# Patient Record
Sex: MALE | Race: BLACK OR AFRICAN AMERICAN | NOT HISPANIC OR LATINO | Employment: OTHER | URBAN - METROPOLITAN AREA
[De-identification: names, ages, dates, MRNs, and addresses within clinical notes are randomized per-mention and may not be internally consistent; named-entity substitution may affect disease eponyms.]

---

## 2017-01-23 ENCOUNTER — CLINICAL SUPPORT (OUTPATIENT)
Dept: ELECTROPHYSIOLOGY | Facility: CLINIC | Age: 57
End: 2017-01-23
Payer: COMMERCIAL

## 2017-01-23 DIAGNOSIS — Z95.0 CARDIAC PACEMAKER IN SITU: ICD-10-CM

## 2017-01-23 DIAGNOSIS — I49.5 SSS (SICK SINUS SYNDROME): ICD-10-CM

## 2017-01-23 PROCEDURE — 93293 PM PHONE R-STRIP DEVICE EVAL: CPT | Mod: S$GLB,,, | Performed by: INTERNAL MEDICINE

## 2017-01-27 ENCOUNTER — TELEPHONE (OUTPATIENT)
Dept: ENDOSCOPY | Facility: HOSPITAL | Age: 57
End: 2017-01-27

## 2017-01-27 DIAGNOSIS — Z12.11 SPECIAL SCREENING FOR MALIGNANT NEOPLASMS, COLON: Primary | ICD-10-CM

## 2017-01-27 RX ORDER — POLYETHYLENE GLYCOL 3350, SODIUM SULFATE ANHYDROUS, SODIUM BICARBONATE, SODIUM CHLORIDE, POTASSIUM CHLORIDE 236; 22.74; 6.74; 5.86; 2.97 G/4L; G/4L; G/4L; G/4L; G/4L
4 POWDER, FOR SOLUTION ORAL ONCE
Qty: 4000 ML | Refills: 0 | Status: SHIPPED | OUTPATIENT
Start: 2017-01-27 | End: 2017-01-27

## 2017-01-27 NOTE — TELEPHONE ENCOUNTER
Patient is rescheduled for Colonoscopy 2/13/2017 with Dr. Juares.  Instructions sent via mail.  Prep used: PEG.

## 2017-02-13 ENCOUNTER — ANESTHESIA (OUTPATIENT)
Dept: ENDOSCOPY | Facility: HOSPITAL | Age: 57
End: 2017-02-13
Payer: COMMERCIAL

## 2017-02-13 ENCOUNTER — ANESTHESIA EVENT (OUTPATIENT)
Dept: ENDOSCOPY | Facility: HOSPITAL | Age: 57
End: 2017-02-13
Payer: COMMERCIAL

## 2017-02-13 VITALS — RESPIRATION RATE: 11 BRPM

## 2017-02-13 PROCEDURE — 25000003 PHARM REV CODE 250: Performed by: COLON & RECTAL SURGERY

## 2017-02-13 PROCEDURE — D9220A PRA ANESTHESIA: Mod: 33,ANES,, | Performed by: ANESTHESIOLOGY

## 2017-02-13 PROCEDURE — 25000003 PHARM REV CODE 250: Performed by: NURSE ANESTHETIST, CERTIFIED REGISTERED

## 2017-02-13 PROCEDURE — D9220A PRA ANESTHESIA: Mod: 33,CRNA,, | Performed by: NURSE ANESTHETIST, CERTIFIED REGISTERED

## 2017-02-13 PROCEDURE — 63600175 PHARM REV CODE 636 W HCPCS: Performed by: NURSE ANESTHETIST, CERTIFIED REGISTERED

## 2017-02-13 RX ORDER — LIDOCAINE HCL/PF 100 MG/5ML
SYRINGE (ML) INTRAVENOUS
Status: DISCONTINUED | OUTPATIENT
Start: 2017-02-13 | End: 2017-02-13

## 2017-02-13 RX ORDER — PROPOFOL 10 MG/ML
VIAL (ML) INTRAVENOUS CONTINUOUS PRN
Status: DISCONTINUED | OUTPATIENT
Start: 2017-02-13 | End: 2017-02-13

## 2017-02-13 RX ORDER — PROPOFOL 10 MG/ML
VIAL (ML) INTRAVENOUS
Status: DISCONTINUED | OUTPATIENT
Start: 2017-02-13 | End: 2017-02-13

## 2017-02-13 RX ADMIN — LIDOCAINE HYDROCHLORIDE 100 MG: 20 INJECTION, SOLUTION INTRAVENOUS at 12:02

## 2017-02-13 RX ADMIN — PROPOFOL 40 MG: 10 INJECTION, EMULSION INTRAVENOUS at 12:02

## 2017-02-13 RX ADMIN — PROPOFOL 50 MG: 10 INJECTION, EMULSION INTRAVENOUS at 12:02

## 2017-02-13 RX ADMIN — SODIUM CHLORIDE: 0.9 INJECTION, SOLUTION INTRAVENOUS at 01:02

## 2017-02-13 RX ADMIN — SODIUM CHLORIDE: 0.9 INJECTION, SOLUTION INTRAVENOUS at 12:02

## 2017-02-13 RX ADMIN — PROPOFOL 150 MCG/KG/MIN: 10 INJECTION, EMULSION INTRAVENOUS at 12:02

## 2017-02-13 NOTE — ANESTHESIA PREPROCEDURE EVALUATION
02/13/2017  Renny Young is a 56 y.o., male.    [facility] Anesthesia Evaluation    I have reviewed the Patient Summary Reports.    I have reviewed the Nursing Notes.      Review of Systems  Anesthesia Hx:  No problems with previous Anesthesia    Hematology/Oncology:  Hematology Normal   Oncology Normal     EENT/Dental:EENT/Dental Normal   Cardiovascular:   Pacemaker Hypertension Sick sinus syndrome   Pulmonary:  Pulmonary Normal    Renal/:  Renal/ Normal     Hepatic/GI:  Hepatic/GI Normal    Musculoskeletal:  Musculoskeletal Normal    Neurological:  Neurology Normal    Endocrine:   Diabetes, type 2    Dermatological:  Skin Normal    Psych:  Psychiatric Normal           Physical Exam  General:  Well nourished    Airway/Jaw/Neck:  Airway Findings: Mouth Opening: Normal Tongue: Normal  General Airway Assessment: Adult  Mallampati: II  TM Distance: Normal, at least 6 cm        Eyes/Ears/Nose:  EYES/EARS/NOSE FINDINGS: Normal   Dental:  Dental Findings: In tact   Chest/Lungs:  Chest/Lungs Clear    Heart/Vascular:  Heart Findings: Normal Heart murmur: negative Vascular Findings: Normal    Abdomen:  Abdomen Findings: Normal    Musculoskeletal:  Musculoskeletal Findings: Normal   Skin:  Skin Findings: Normal    Mental Status:  Mental Status Findings: Normal        Anesthesia Plan  Type of Anesthesia, risks & benefits discussed:  Anesthesia Type:  general  Patient's Preference:   Intra-op Monitoring Plan:   Intra-op Monitoring Plan Comments:   Post Op Pain Control Plan:   Post Op Pain Control Plan Comments:   Induction:   IV  Beta Blocker:  Patient is not currently on a Beta-Blocker (No further documentation required).       Informed Consent: Patient understands risks and agrees with Anesthesia plan.  Questions answered. Anesthesia consent signed with patient.  ASA Score: 3     Day of Surgery Review of History &  Physical:    H&P update referred to the surgeon.         Ready For Surgery From Anesthesia Perspective.

## 2017-02-13 NOTE — TRANSFER OF CARE
"Anesthesia Transfer of Care Note    Patient: Renny Young    Procedure(s) Performed: Procedure(s) (LRB):  COLONOSCOPY (N/A)    Patient location: PACU    Anesthesia Type: general    Transport from OR: Transported from OR on room air with adequate spontaneous ventilation    Post pain: adequate analgesia    Post assessment: no apparent anesthetic complications    Post vital signs: stable    Level of consciousness: sedated and responds to stimulation    Nausea/Vomiting: no nausea/vomiting    Complications: none          Last vitals:   Visit Vitals    /69 (BP Location: Left arm, Patient Position: Lying, BP Method: Automatic)    Pulse 60    Temp 36.8 °C (98.3 °F) (Oral)    Resp 18    Ht 5' 7" (1.702 m)    Wt 68 kg (150 lb)    SpO2 98%    BMI 23.49 kg/m2     "

## 2017-02-13 NOTE — ANESTHESIA POSTPROCEDURE EVALUATION
"Anesthesia Post Evaluation    Patient: Renny Young    Procedure(s) Performed: Procedure(s) (LRB):  COLONOSCOPY (N/A)    Final Anesthesia Type: general  Patient location during evaluation: GI PACU  Patient participation: Yes- Able to Participate  Level of consciousness: awake and alert  Post-procedure vital signs: reviewed and stable  Pain management: adequate  Airway patency: patent  PONV status at discharge: No PONV  Anesthetic complications: no      Cardiovascular status: blood pressure returned to baseline  Respiratory status: unassisted, spontaneous ventilation and room air  Hydration status: euvolemic  Follow-up not needed.        Visit Vitals    /78    Pulse 60    Temp 36.8 °C (98.3 °F) (Oral)    Resp 20    Ht 5' 7" (1.702 m)    Wt 68 kg (150 lb)    SpO2 100%    BMI 23.49 kg/m2       Pain/Gill Score: Pain Assessment Performed: Yes (2/13/2017  1:52 PM)  Presence of Pain: denies (2/13/2017  1:52 PM)  Gill Score: 10 (2/13/2017  1:52 PM)      "

## 2017-02-14 PROBLEM — I10 HYPERTENSION, ESSENTIAL: Status: ACTIVE | Noted: 2017-02-14

## 2017-03-01 DIAGNOSIS — I10 ESSENTIAL HYPERTENSION: ICD-10-CM

## 2017-03-02 RX ORDER — METFORMIN HYDROCHLORIDE 500 MG/1
TABLET ORAL
Qty: 30 TABLET | Refills: 0 | Status: SHIPPED | OUTPATIENT
Start: 2017-03-02 | End: 2017-04-04 | Stop reason: SDUPTHER

## 2017-03-02 RX ORDER — AMLODIPINE BESYLATE 10 MG/1
TABLET ORAL
Qty: 30 TABLET | Refills: 0 | Status: SHIPPED | OUTPATIENT
Start: 2017-03-02 | End: 2017-04-06 | Stop reason: SDUPTHER

## 2017-04-04 RX ORDER — METFORMIN HYDROCHLORIDE 500 MG/1
TABLET ORAL
Qty: 30 TABLET | Refills: 0 | Status: SHIPPED | OUTPATIENT
Start: 2017-04-04 | End: 2017-04-06 | Stop reason: SDUPTHER

## 2017-04-06 ENCOUNTER — LAB VISIT (OUTPATIENT)
Dept: LAB | Facility: HOSPITAL | Age: 57
End: 2017-04-06
Attending: FAMILY MEDICINE
Payer: COMMERCIAL

## 2017-04-06 ENCOUNTER — OFFICE VISIT (OUTPATIENT)
Dept: INTERNAL MEDICINE | Facility: CLINIC | Age: 57
End: 2017-04-06
Payer: COMMERCIAL

## 2017-04-06 VITALS
OXYGEN SATURATION: 97 % | HEART RATE: 91 BPM | DIASTOLIC BLOOD PRESSURE: 58 MMHG | SYSTOLIC BLOOD PRESSURE: 105 MMHG | HEIGHT: 67 IN | WEIGHT: 154.63 LBS | BODY MASS INDEX: 24.27 KG/M2

## 2017-04-06 DIAGNOSIS — R63.4 WEIGHT LOSS: ICD-10-CM

## 2017-04-06 DIAGNOSIS — R10.9 FLANK PAIN: ICD-10-CM

## 2017-04-06 DIAGNOSIS — E78.5 HYPERLIPIDEMIA, UNSPECIFIED HYPERLIPIDEMIA TYPE: ICD-10-CM

## 2017-04-06 DIAGNOSIS — E11.9 TYPE 2 DIABETES MELLITUS WITHOUT COMPLICATION, UNSPECIFIED LONG TERM INSULIN USE STATUS: ICD-10-CM

## 2017-04-06 DIAGNOSIS — Z95.0 PACEMAKER: ICD-10-CM

## 2017-04-06 DIAGNOSIS — I10 HYPERTENSION, ESSENTIAL: ICD-10-CM

## 2017-04-06 DIAGNOSIS — R10.9 FLANK PAIN: Primary | ICD-10-CM

## 2017-04-06 DIAGNOSIS — I10 ESSENTIAL HYPERTENSION: ICD-10-CM

## 2017-04-06 DIAGNOSIS — F17.200 SMOKER: ICD-10-CM

## 2017-04-06 LAB
ALBUMIN SERPL BCP-MCNC: 4.1 G/DL
ALP SERPL-CCNC: 87 U/L
ALT SERPL W/O P-5'-P-CCNC: 19 U/L
ANION GAP SERPL CALC-SCNC: 9 MMOL/L
AST SERPL-CCNC: 21 U/L
BILIRUB SERPL-MCNC: 0.7 MG/DL
BILIRUB UR QL STRIP: NEGATIVE
BUN SERPL-MCNC: 21 MG/DL
CALCIUM SERPL-MCNC: 9.7 MG/DL
CHLORIDE SERPL-SCNC: 106 MMOL/L
CLARITY UR REFRACT.AUTO: CLEAR
CO2 SERPL-SCNC: 28 MMOL/L
COLOR UR AUTO: YELLOW
CREAT SERPL-MCNC: 1.2 MG/DL
ERYTHROCYTE [DISTWIDTH] IN BLOOD BY AUTOMATED COUNT: 15.2 %
EST. GFR  (AFRICAN AMERICAN): >60 ML/MIN/1.73 M^2
EST. GFR  (NON AFRICAN AMERICAN): >60 ML/MIN/1.73 M^2
GLUCOSE SERPL-MCNC: 89 MG/DL
GLUCOSE UR QL STRIP: NEGATIVE
HCT VFR BLD AUTO: 46 %
HGB BLD-MCNC: 15.6 G/DL
HGB UR QL STRIP: NEGATIVE
KETONES UR QL STRIP: NEGATIVE
LEUKOCYTE ESTERASE UR QL STRIP: NEGATIVE
MCH RBC QN AUTO: 29.6 PG
MCHC RBC AUTO-ENTMCNC: 33.9 %
MCV RBC AUTO: 87 FL
MICROSCOPIC COMMENT: NORMAL
NITRITE UR QL STRIP: NEGATIVE
PH UR STRIP: 6 [PH] (ref 5–8)
PLATELET # BLD AUTO: 244 K/UL
PMV BLD AUTO: 10.8 FL
POTASSIUM SERPL-SCNC: 4.1 MMOL/L
PROT SERPL-MCNC: 7.7 G/DL
PROT UR QL STRIP: NEGATIVE
RBC # BLD AUTO: 5.27 M/UL
SODIUM SERPL-SCNC: 143 MMOL/L
SP GR UR STRIP: 1.02 (ref 1–1.03)
TSH SERPL DL<=0.005 MIU/L-ACNC: 1.19 UIU/ML
URN SPEC COLLECT METH UR: ABNORMAL
UROBILINOGEN UR STRIP-ACNC: ABNORMAL EU/DL
WBC # BLD AUTO: 12.57 K/UL

## 2017-04-06 PROCEDURE — 36415 COLL VENOUS BLD VENIPUNCTURE: CPT

## 2017-04-06 PROCEDURE — 80053 COMPREHEN METABOLIC PANEL: CPT

## 2017-04-06 PROCEDURE — 81001 URINALYSIS AUTO W/SCOPE: CPT

## 2017-04-06 PROCEDURE — 3044F HG A1C LEVEL LT 7.0%: CPT | Mod: S$GLB,,, | Performed by: FAMILY MEDICINE

## 2017-04-06 PROCEDURE — 84443 ASSAY THYROID STIM HORMONE: CPT

## 2017-04-06 PROCEDURE — 3078F DIAST BP <80 MM HG: CPT | Mod: S$GLB,,, | Performed by: FAMILY MEDICINE

## 2017-04-06 PROCEDURE — 85027 COMPLETE CBC AUTOMATED: CPT

## 2017-04-06 PROCEDURE — 3060F POS MICROALBUMINURIA REV: CPT | Mod: S$GLB,,, | Performed by: FAMILY MEDICINE

## 2017-04-06 PROCEDURE — 3074F SYST BP LT 130 MM HG: CPT | Mod: S$GLB,,, | Performed by: FAMILY MEDICINE

## 2017-04-06 PROCEDURE — 1160F RVW MEDS BY RX/DR IN RCRD: CPT | Mod: S$GLB,,, | Performed by: FAMILY MEDICINE

## 2017-04-06 PROCEDURE — 83036 HEMOGLOBIN GLYCOSYLATED A1C: CPT

## 2017-04-06 PROCEDURE — 99999 PR PBB SHADOW E&M-EST. PATIENT-LVL III: CPT | Mod: PBBFAC,,, | Performed by: FAMILY MEDICINE

## 2017-04-06 PROCEDURE — 99214 OFFICE O/P EST MOD 30 MIN: CPT | Mod: S$GLB,,, | Performed by: FAMILY MEDICINE

## 2017-04-06 RX ORDER — DOCUSATE SODIUM 100 MG/1
100 CAPSULE, LIQUID FILLED ORAL 2 TIMES DAILY
Qty: 60 CAPSULE | Refills: 0 | Status: SHIPPED | OUTPATIENT
Start: 2017-04-06 | End: 2018-05-22

## 2017-04-06 RX ORDER — METFORMIN HYDROCHLORIDE 500 MG/1
TABLET ORAL
Qty: 30 TABLET | Refills: 2 | Status: SHIPPED | OUTPATIENT
Start: 2017-04-06 | End: 2017-04-26 | Stop reason: SDUPTHER

## 2017-04-06 RX ORDER — NAPROXEN SODIUM 220 MG/1
81 TABLET, FILM COATED ORAL
COMMUNITY
Start: 2014-02-18 | End: 2017-04-06

## 2017-04-06 RX ORDER — AMLODIPINE BESYLATE 10 MG/1
5 TABLET ORAL DAILY
Qty: 30 TABLET | Refills: 0
Start: 2017-04-06 | End: 2017-04-10 | Stop reason: DRUGHIGH

## 2017-04-06 NOTE — MR AVS SNAPSHOT
[facility]  [address]  [facility] [address]  Phone: [phone]  Fax: [phone]                  Renny LUNDBERG Hector   2017 3:20 PM   Office Visit    Description:  Male : 1960   Provider:  Jarvis Washington MD   Department:  [facility]           Reason for Visit     Flank Pain           Diagnoses this Visit        Comments    Flank pain    -  Primary     Weight loss         Smoker         Hypertension, essential         Type 2 diabetes mellitus without complication, unspecified long term insulin use status         Hyperlipidemia, unspecified hyperlipidemia type         Pacemaker         Essential hypertension                To Do List           Future Appointments        Provider Department Dept Phone    2017 4:00 PM TELEPHONE CHECK, PACEMAKER [facility] - Arrhythmia [phone]      Goals (5 Years of Data)     None      Follow-Up and Disposition     Return in about 3 weeks (around 2017) for after test results to discuss.    Follow-up and Disposition History       These Medications        Disp Refills Start End    docusate sodium (COLACE) 100 MG capsule 60 capsule 0 2017     Take 1 capsule (100 mg total) by mouth 2 (two) times daily. - Oral    Pharmacy: [facility] [address] [place] [address] Ph #: [phone]       amlodipine (NORVASC) 10 MG tablet 30 tablet 0 2017     Take 0.5 tablets (5 mg total) by mouth once daily. - Oral    Pharmacy: [facility] [address] [place] [address] Ph #: [phone]         [facility] On Call     [facility] On Call Nurse Care Line -  Assistance  Unless otherwise directed by your provider, please contact [facility], our nurse care line that is available for / assistance.     Registered nurses in the [facility] provide: appointment scheduling, clinical advisement, health education, and other advisory services.  Call:  [phone] (toll free)               Medications           Message regarding Medications     Verify the changes and/or additions to your medication regime listed below are the same as discussed with your clinician today.  If any of these changes or additions are incorrect, please notify your healthcare provider.        START taking these NEW medications        Refills    docusate sodium (COLACE) 100 MG capsule 0    Sig: Take 1 capsule (100 mg total) by mouth 2 (two) times daily.    Class: Normal    Route: Oral      CHANGE how you are taking these medications     Start Taking Instead of    amlodipine (NORVASC) 10 MG tablet amlodipine (NORVASC) 10 MG tablet    Dosage:  Take 0.5 tablets (5 mg total) by mouth once daily. Dosage:  TAKE ONE TABLET BY MOUTH ONCE DAILY    Reason for Change:  Reorder       STOP taking these medications     aspirin 81 MG Chew Take 81 mg by mouth. Take one tablet by mouth daily    tadalafil (CIALIS) 20 MG Tab Take 1 tablet (20 mg total) by mouth daily as needed.           Verify that the below list of medications is an accurate representation of the medications you are currently taking.  If none reported, the list may be blank. If incorrect, please contact your healthcare provider. Carry this list with you in case of emergency.           Current Medications     amlodipine (NORVASC) 10 MG tablet Take 0.5 tablets (5 mg total) by mouth once daily.    aspirin (ECOTRIN) 81 MG EC tablet Take 81 mg by mouth once daily.    atorvastatin (LIPITOR) 20 MG tablet Take 1 tablet (20 mg total) by mouth once daily.    gabapentin (NEURONTIN) 300 MG capsule Take 1 capsule (300 mg total) by mouth 2 (two) times daily.    lisinopril-hydrochlorothiazide (PRINZIDE,ZESTORETIC) 20-12.5 mg per tablet Take 1 tablet by mouth once daily.    metformin (GLUCOPHAGE) 500 MG tablet TAKE ONE TABLET BY MOUTH ONCE DAILY WITH BREAKFAST    docusate sodium (COLACE) 100 MG capsule Take 1 capsule (100 mg total) by mouth 2 (two)  "times daily.           Clinical Reference Information           Your Vitals Were     BP Pulse Height Weight SpO2 BMI    105/58 (BP Location: Right arm, Patient Position: Sitting, BP Method: Manual) 91 5' 7" (1.702 m) 70.1 kg (154 lb 10.4 oz) 97% 24.22 kg/m2      Blood Pressure          Most Recent Value    BP  (!)  105/58      Allergies as of 4/6/2017     No Known Allergies      Immunizations Administered on Date of Encounter - 4/6/2017     None      Orders Placed During Today's Visit      Normal Orders This Visit    Urinalysis Microscopic     Urinalysis     Future Labs/Procedures Expected by Expires    CBC Without Differential  4/6/2017 4/6/2018    Comprehensive metabolic panel  4/6/2017 4/6/2018    CT Chest Lung Screening Low Dose  4/6/2017 4/6/2018    Hemoglobin A1c  4/6/2017 4/6/2018    TSH  4/6/2017 4/6/2018    US Abdomen Complete  4/6/2017 4/6/2018      [facility] Sign-Up     Activating your [facility] account is as easy as 1-2-3!     1) Visit [email or web address], select Sign Up Now, enter this activation code and your date of birth, then select Next.  [email or web address]  Expires: 5/21/2017  4:29 PM      2) Create a username and password to use when you visit [facility] in the future and select a security question in case you lose your password and select Next.    3) Enter your e-mail address and click Sign Up!    Additional Information  If you have questions, please e-mail [email or web address] or call [phone] to talk to our [facility] staff. Remember, [facility] is NOT to be used for urgent needs. For medical emergencies, dial 911.         Smoking Cessation     If you would like to quit smoking:   You may be eligible for free services if you are a [place] resident and started smoking cigarettes before September 1, 1988.  Call the Smoking Cessation Trust ([employer]CT) toll free at (036) 055-5212 or (114) 918-4953.   Call 3-800-QUIT-NOW if you do not meet the above criteria.   Contact us via email: " [email or web address]   View our website for more information: [email or web address]        Language Assistance Services     ATTENTION: Language assistance services are available, free of charge. Please call [phone].      ATENCIÓN: Si habla rene, tiene a capps disposición servicios gratuitos de asistencia lingüística. Llame al [phone].     CHÚ Ý: N?u b?n nói Ti?ng Vi?t, có các d?ch v? h? tr? ngôn ng? mi?n phí dành cho b?n. G?i s? [phone].         [facility] complies with applicable Federal civil rights laws and does not discriminate on the basis of race, color, national origin, age, disability, or sex.

## 2017-04-07 LAB
ESTIMATED AVG GLUCOSE: 140 MG/DL
HBA1C MFR BLD HPLC: 6.5 %

## 2017-04-10 DIAGNOSIS — I10 ESSENTIAL HYPERTENSION: ICD-10-CM

## 2017-04-10 RX ORDER — AMLODIPINE BESYLATE 5 MG/1
5 TABLET ORAL DAILY
Qty: 30 TABLET | Refills: 11 | Status: SHIPPED | OUTPATIENT
Start: 2017-04-10 | End: 2017-04-26 | Stop reason: SDUPTHER

## 2017-04-21 ENCOUNTER — HOSPITAL ENCOUNTER (OUTPATIENT)
Dept: RADIOLOGY | Facility: HOSPITAL | Age: 57
Discharge: HOME | End: 2017-04-21
Attending: FAMILY MEDICINE

## 2017-04-21 ENCOUNTER — HOSPITAL ENCOUNTER (OUTPATIENT)
Dept: RADIOLOGY | Facility: HOSPITAL | Age: 57
Discharge: HOME | End: 2017-04-21
Attending: FAMILY MEDICINE
Payer: COMMERCIAL

## 2017-04-21 DIAGNOSIS — R63.4 WEIGHT LOSS: ICD-10-CM

## 2017-04-21 DIAGNOSIS — F17.200 SMOKER: ICD-10-CM

## 2017-04-21 DIAGNOSIS — R10.9 FLANK PAIN: ICD-10-CM

## 2017-04-21 PROCEDURE — 76497 UNLISTED CT PROCEDURE: CPT | Mod: TC

## 2017-04-21 PROCEDURE — 76700 US EXAM ABDOM COMPLETE: CPT | Mod: 26,,, | Performed by: RADIOLOGY

## 2017-04-21 PROCEDURE — 76700 US EXAM ABDOM COMPLETE: CPT | Mod: TC

## 2017-04-22 ENCOUNTER — TELEPHONE (OUTPATIENT)
Dept: INTERNAL MEDICINE | Facility: CLINIC | Age: 57
End: 2017-04-22

## 2017-04-24 ENCOUNTER — TELEPHONE (OUTPATIENT)
Dept: INTERNAL MEDICINE | Facility: CLINIC | Age: 57
End: 2017-04-24

## 2017-04-24 DIAGNOSIS — R91.1 PULMONARY NODULE: Primary | ICD-10-CM

## 2017-04-24 NOTE — TELEPHONE ENCOUNTER
Called patient and discussed labs and or test results. Patient expressed understanding and had the opportunity to ask questions. Any questions were answered. See meds, orders, follow up and instructions sections of encounter.    Specific issues include:  pulm nodule re scan in 6 months  US OK

## 2017-04-26 ENCOUNTER — OFFICE VISIT (OUTPATIENT)
Dept: INTERNAL MEDICINE | Facility: CLINIC | Age: 57
End: 2017-04-26
Payer: COMMERCIAL

## 2017-04-26 ENCOUNTER — CLINICAL SUPPORT (OUTPATIENT)
Dept: ELECTROPHYSIOLOGY | Facility: CLINIC | Age: 57
End: 2017-04-26
Payer: COMMERCIAL

## 2017-04-26 VITALS
BODY MASS INDEX: 24.17 KG/M2 | HEART RATE: 67 BPM | DIASTOLIC BLOOD PRESSURE: 64 MMHG | OXYGEN SATURATION: 97 % | SYSTOLIC BLOOD PRESSURE: 112 MMHG | HEIGHT: 67 IN | WEIGHT: 154 LBS

## 2017-04-26 DIAGNOSIS — I49.5 SSS (SICK SINUS SYNDROME): ICD-10-CM

## 2017-04-26 DIAGNOSIS — F17.200 SMOKER: ICD-10-CM

## 2017-04-26 DIAGNOSIS — M54.5 LOW BACK PAIN, UNSPECIFIED BACK PAIN LATERALITY, UNSPECIFIED CHRONICITY, WITH SCIATICA PRESENCE UNSPECIFIED: ICD-10-CM

## 2017-04-26 DIAGNOSIS — R91.1 PULMONARY NODULE: ICD-10-CM

## 2017-04-26 DIAGNOSIS — E78.5 HYPERLIPIDEMIA, UNSPECIFIED HYPERLIPIDEMIA TYPE: ICD-10-CM

## 2017-04-26 DIAGNOSIS — Z95.0 CARDIAC PACEMAKER IN SITU: ICD-10-CM

## 2017-04-26 DIAGNOSIS — I10 HYPERTENSION, ESSENTIAL: Primary | ICD-10-CM

## 2017-04-26 DIAGNOSIS — E11.9 TYPE 2 DIABETES MELLITUS WITHOUT COMPLICATION, UNSPECIFIED LONG TERM INSULIN USE STATUS: ICD-10-CM

## 2017-04-26 PROCEDURE — 3074F SYST BP LT 130 MM HG: CPT | Mod: S$GLB,,, | Performed by: FAMILY MEDICINE

## 2017-04-26 PROCEDURE — 99999 PR PBB SHADOW E&M-EST. PATIENT-LVL III: CPT | Mod: PBBFAC,,, | Performed by: FAMILY MEDICINE

## 2017-04-26 PROCEDURE — 3060F POS MICROALBUMINURIA REV: CPT | Mod: 8P,S$GLB,, | Performed by: FAMILY MEDICINE

## 2017-04-26 PROCEDURE — 93293 PM PHONE R-STRIP DEVICE EVAL: CPT | Mod: S$GLB,,, | Performed by: INTERNAL MEDICINE

## 2017-04-26 PROCEDURE — 3044F HG A1C LEVEL LT 7.0%: CPT | Mod: S$GLB,,, | Performed by: FAMILY MEDICINE

## 2017-04-26 PROCEDURE — 3078F DIAST BP <80 MM HG: CPT | Mod: S$GLB,,, | Performed by: FAMILY MEDICINE

## 2017-04-26 PROCEDURE — 99214 OFFICE O/P EST MOD 30 MIN: CPT | Mod: S$GLB,,, | Performed by: FAMILY MEDICINE

## 2017-04-26 PROCEDURE — 1160F RVW MEDS BY RX/DR IN RCRD: CPT | Mod: S$GLB,,, | Performed by: FAMILY MEDICINE

## 2017-04-26 RX ORDER — AMLODIPINE BESYLATE 5 MG/1
5 TABLET ORAL DAILY
Qty: 90 TABLET | Refills: 3 | Status: SHIPPED | OUTPATIENT
Start: 2017-04-26 | End: 2018-04-06 | Stop reason: SDUPTHER

## 2017-04-26 RX ORDER — METFORMIN HYDROCHLORIDE 500 MG/1
TABLET ORAL
Qty: 90 TABLET | Refills: 3 | Status: SHIPPED | OUTPATIENT
Start: 2017-04-26 | End: 2018-04-06 | Stop reason: SDUPTHER

## 2017-04-26 NOTE — PROGRESS NOTES
Subjective:       Patient ID: Renny Young is a 56 y.o. male.    Chief Complaint: Follow-up    HPI  Review of Systems   Constitutional: Negative for chills, fatigue and fever.   HENT: Negative for congestion and trouble swallowing.    Eyes: Negative for redness.   Respiratory: Negative for cough, chest tightness and shortness of breath.    Cardiovascular: Negative for chest pain, palpitations and leg swelling.   Gastrointestinal: Negative for abdominal pain and blood in stool.   Genitourinary: Positive for flank pain. Negative for hematuria.   Musculoskeletal: Positive for back pain. Negative for arthralgias, gait problem, joint swelling, myalgias and neck pain.   Skin: Negative for color change and rash.   Neurological: Negative for tremors, speech difficulty, weakness, numbness and headaches.   Hematological: Negative for adenopathy. Does not bruise/bleed easily.   Psychiatric/Behavioral: Negative for behavioral problems, confusion and sleep disturbance. The patient is not nervous/anxious.        Objective:      Physical Exam   Constitutional: He is oriented to person, place, and time. He appears well-developed and well-nourished. No distress.   Neck: Neck supple.   Pulmonary/Chest: Effort normal.   Abdominal: He exhibits no distension. There is no tenderness. There is no rebound and no CVA tenderness.   Musculoskeletal: He exhibits no edema.        Right hip: He exhibits normal range of motion and normal strength.        Left hip: He exhibits normal range of motion and normal strength.        Thoracic back: He exhibits normal range of motion and no tenderness.        Lumbar back: He exhibits normal range of motion, no tenderness and no spasm.        Right lower leg: He exhibits no edema.        Left lower leg: He exhibits no edema.   Neurological: He is alert and oriented to person, place, and time. He has normal strength. He displays normal reflexes. No sensory deficit. He displays a negative Romberg sign.  Coordination and gait normal.   Negative SLR.   Skin: Skin is warm and dry. No rash noted.   Psychiatric: He has a normal mood and affect. His behavior is normal. Judgment and thought content normal.   Nursing note and vitals reviewed.      Assessment:       1. Hypertension, essential    2. Uncontrolled type 2 diabetes mellitus with complication, without long-term current use of insulin    3. Hyperlipidemia, unspecified hyperlipidemia type    4. Type 2 diabetes mellitus without complication, unspecified long term insulin use status    5. Pulmonary nodule    6. Smoker    7. Low back pain, unspecified back pain laterality, unspecified chronicity, with sciatica presence unspecified        Plan:   Renny was seen today for follow-up.    Diagnoses and all orders for this visit:    Hypertension, essential    Uncontrolled type 2 diabetes mellitus with complication, without long-term current use of insulin    Hyperlipidemia, unspecified hyperlipidemia type    Type 2 diabetes mellitus without complication, unspecified long term insulin use status    Pulmonary nodule    Smoker    Low back pain, unspecified back pain laterality, unspecified chronicity, with sciatica presence unspecified  -     Ambulatory Consult to [facility] Sp[facility] Clinic    Other orders  -     amlodipine (NORVASC) 5 MG tablet; Take 1 tablet (5 mg total) by mouth once daily.  -     metformin (GLUCOPHAGE) 500 MG tablet; TAKE ONE TABLET BY MOUTH ONCE DAILY WITH BREAKFAST      See meds, orders, follow up, routing and instructions sections of encounter.  The patient is in, concerning his recent laboratory.  He presents with his   spouse who has sarcoidosis.  I explained this pulmonary nodule did not appear   ominous, we would recommend a six-month repeat.  I went over some additional   laboratory with him.  He has continued to have left flank pain.  His ultrasound   was negative.  We had reduced his Norvasc to half tablet and at this point going   forward, we will  change his dose to 5 mg tablet.  His A1c was 6.5.  He has no   other acute complaints at this time.      CARLA/HN  dd: 04/26/2017 18:36:26 (CDT)  td: 04/27/2017 08:46:51 (CDT)  Doc ID   #[id number]  Job ID #[id number]    CC:

## 2017-04-26 NOTE — MR AVS SNAPSHOT
[facility]  [address]  [facility] [address]  Phone: [phone]  Fax: [phone]                  Renny LUNDBERG Hector   2017 2:40 PM   Office Visit    Description:  Male : 1960   Provider:  Jarvis Washington MD   Department:  [facility]           Reason for Visit     Follow-up           Diagnoses this Visit        Comments    Hypertension, essential    -  Primary     Uncontrolled type 2 diabetes mellitus with complication, without long-term current use of insulin         Hyperlipidemia, unspecified hyperlipidemia type         Type 2 diabetes mellitus without complication, unspecified long term insulin use status         Pulmonary nodule         Smoker         Low back pain, unspecified back pain laterality, unspecified chronicity, with sciatica presence unspecified                To Do List           Future Appointments        Provider Department Dept Phone    2017 4:00 PM TELEPHONE CHECK, PACEMAKER [facility] Arrhythmia [phone]      Goals (5 Years of Data)     None      Follow-Up and Disposition     Return in about 6 months (around 10/26/2017), or if symptoms worsen or fail to improve, for lab review (after future labs), Call or message through Portal for status update..       These Medications        Disp Refills Start End    amlodipine (NORVASC) 5 MG tablet 90 tablet 3 2017     Take 1 tablet (5 mg total) by mouth once daily. - Oral    Pharmacy: [address] Ph #: [phone]       metformin (GLUCOPHAGE) 500 MG tablet 90 tablet 3 2017     TAKE ONE TABLET BY MOUTH ONCE DAILY WITH BREAKFAST    Pharmacy: [address] Ph #: [phone]         [facility]     [facility] Nurse Care Line -  Assistance  Unless otherwise directed by your provider, please contact [facility], our nurse care line that is  "available for 24/7 assistance.     Registered nurses in the [facility] provide: appointment scheduling, clinical advisement, health education, and other advisory services.  Call: [phone] (toll free)               Medications           Message regarding Medications     Verify the changes and/or additions to your medication regime listed below are the same as discussed with your clinician today.  If any of these changes or additions are incorrect, please notify your healthcare provider.             Verify that the below list of medications is an accurate representation of the medications you are currently taking.  If none reported, the list may be blank. If incorrect, please contact your healthcare provider. Carry this list with you in case of emergency.           Current Medications     amlodipine (NORVASC) 5 MG tablet Take 1 tablet (5 mg total) by mouth once daily.    aspirin (ECOTRIN) 81 MG EC tablet Take 81 mg by mouth once daily.    atorvastatin (LIPITOR) 20 MG tablet Take 1 tablet (20 mg total) by mouth once daily.    docusate sodium (COLACE) 100 MG capsule Take 1 capsule (100 mg total) by mouth 2 (two) times daily.    gabapentin (NEURONTIN) 300 MG capsule Take 1 capsule (300 mg total) by mouth 2 (two) times daily.    lisinopril-hydrochlorothiazide (PRINZIDE,ZESTORETIC) 20-12.5 mg per tablet Take 1 tablet by mouth once daily.    metformin (GLUCOPHAGE) 500 MG tablet TAKE ONE TABLET BY MOUTH ONCE DAILY WITH BREAKFAST           Clinical Reference Information           Your Vitals Were     BP Pulse Height Weight SpO2 BMI    112/64 (BP Location: Left arm, Patient Position: Sitting, BP Method: Manual) 67 5' 7" (1.702 m) 69.9 kg (154 lb) 97% 24.12 kg/m2      Blood Pressure          Most Recent Value    BP  112/64      Allergies as of 4/26/2017     No Known Allergies      Immunizations Administered on Date of Encounter - 4/26/2017     None      Orders Placed During Today's Visit      Normal Orders " This Visit    Ambulatory Consult to Back & Spine Clinic       [facility] Sign-Up     Activating your [facility] account is as easy as 1-2-3!     1) Visit [email or web address], select Sign Up Now, enter this activation code and your date of birth, then select Next.  [email or web address]  Expires: 5/21/2017  4:29 PM      2) Create a username and password to use when you visit [facility] in the future and select a security question in case you lose your password and select Next.    3) Enter your e-mail address and click Sign Up!    Additional Information  If you have questions, please e-mail [email or web address] or call [phone] to talk to our [facility] staff. Remember, [facility] is NOT to be used for urgent needs. For medical emergencies, dial 911.         Smoking Cessation     If you would like to quit smoking:   You may be eligible for free services if you are a [place] resident and started smoking cigarettes before September 1, 1988.  Call the Smoking Cessation Trust ([facility]) toll free at (572) 917-8115 or (260) 142-7425.   Call 1-800-QUIT-NOW if you do not meet the above criteria.   Contact us via email: [email or web address]   View our website for more information: [email or web address]        Language Assistance Services     ATTENTION: Language assistance services are available, free of charge. Please call [phone].      ATENCIÓN: Si habla español, tiene a capps disposición servicios gratuitos de asistencia lingüística. Llame al [phone].     CHÚ Ý: N?u b?n nói Ti?ng Vi?t, có các d?ch v? h? tr? ngôn ng? mi?n phí dành cho b?n. G?i s? [phone].         [facility] complies with applicable Federal civil rights laws and does not discriminate on the basis of race, color, national origin, age, disability, or sex.

## 2017-06-26 DIAGNOSIS — E11.9 TYPE 2 DIABETES MELLITUS WITHOUT COMPLICATION: ICD-10-CM

## 2017-06-26 RX ORDER — ATORVASTATIN CALCIUM 20 MG/1
TABLET, FILM COATED ORAL
Qty: 30 TABLET | Refills: 0 | Status: SHIPPED | OUTPATIENT
Start: 2017-06-26 | End: 2017-09-07 | Stop reason: SDUPTHER

## 2017-07-28 ENCOUNTER — CLINICAL SUPPORT (OUTPATIENT)
Dept: ELECTROPHYSIOLOGY | Facility: CLINIC | Age: 57
End: 2017-07-28
Payer: COMMERCIAL

## 2017-07-28 DIAGNOSIS — I49.5 SSS (SICK SINUS SYNDROME): ICD-10-CM

## 2017-07-28 DIAGNOSIS — Z95.0 CARDIAC PACEMAKER IN SITU: ICD-10-CM

## 2017-07-28 PROCEDURE — 93293 PM PHONE R-STRIP DEVICE EVAL: CPT | Mod: S$GLB,,, | Performed by: INTERNAL MEDICINE

## 2017-07-30 DIAGNOSIS — I10 ESSENTIAL HYPERTENSION: ICD-10-CM

## 2017-07-31 RX ORDER — LISINOPRIL AND HYDROCHLOROTHIAZIDE 12.5; 2 MG/1; MG/1
TABLET ORAL
Qty: 30 TABLET | Refills: 0 | Status: SHIPPED | OUTPATIENT
Start: 2017-07-31 | End: 2017-08-23 | Stop reason: SDUPTHER

## 2017-08-14 DIAGNOSIS — E11.42 DIABETIC POLYNEUROPATHY ASSOCIATED WITH TYPE 2 DIABETES MELLITUS: ICD-10-CM

## 2017-08-14 RX ORDER — GABAPENTIN 300 MG/1
CAPSULE ORAL
Qty: 60 CAPSULE | Refills: 5 | Status: SHIPPED | OUTPATIENT
Start: 2017-08-14 | End: 2018-09-02 | Stop reason: SDUPTHER

## 2017-08-23 DIAGNOSIS — I10 ESSENTIAL HYPERTENSION: ICD-10-CM

## 2017-08-23 RX ORDER — LISINOPRIL AND HYDROCHLOROTHIAZIDE 12.5; 2 MG/1; MG/1
TABLET ORAL
Qty: 30 TABLET | Refills: 0 | Status: SHIPPED | OUTPATIENT
Start: 2017-08-23 | End: 2017-09-26 | Stop reason: SDUPTHER

## 2017-09-07 DIAGNOSIS — E11.9 TYPE 2 DIABETES MELLITUS WITHOUT COMPLICATION: ICD-10-CM

## 2017-09-07 RX ORDER — ATORVASTATIN CALCIUM 20 MG/1
TABLET, FILM COATED ORAL
Qty: 30 TABLET | Refills: 0 | Status: SHIPPED | OUTPATIENT
Start: 2017-09-07 | End: 2017-11-01 | Stop reason: SDUPTHER

## 2017-09-26 DIAGNOSIS — I10 ESSENTIAL HYPERTENSION: ICD-10-CM

## 2017-09-26 RX ORDER — LISINOPRIL AND HYDROCHLOROTHIAZIDE 12.5; 2 MG/1; MG/1
TABLET ORAL
Qty: 30 TABLET | Refills: 0 | Status: SHIPPED | OUTPATIENT
Start: 2017-09-26 | End: 2017-11-01 | Stop reason: SDUPTHER

## 2017-10-30 ENCOUNTER — HOSPITAL ENCOUNTER (OUTPATIENT)
Dept: CARDIOLOGY | Facility: CLINIC | Age: 57
Discharge: HOME | End: 2017-10-30
Payer: COMMERCIAL

## 2017-10-30 ENCOUNTER — CLINICAL SUPPORT (OUTPATIENT)
Dept: ELECTROPHYSIOLOGY | Facility: CLINIC | Age: 57
End: 2017-10-30
Payer: COMMERCIAL

## 2017-10-30 ENCOUNTER — OFFICE VISIT (OUTPATIENT)
Dept: ELECTROPHYSIOLOGY | Facility: CLINIC | Age: 57
End: 2017-10-30
Payer: COMMERCIAL

## 2017-10-30 VITALS
HEART RATE: 60 BPM | BODY MASS INDEX: 24.81 KG/M2 | WEIGHT: 158.06 LBS | DIASTOLIC BLOOD PRESSURE: 62 MMHG | SYSTOLIC BLOOD PRESSURE: 108 MMHG | HEIGHT: 67 IN

## 2017-10-30 DIAGNOSIS — I10 HYPERTENSION, ESSENTIAL: ICD-10-CM

## 2017-10-30 DIAGNOSIS — I49.5 SSS (SICK SINUS SYNDROME): ICD-10-CM

## 2017-10-30 DIAGNOSIS — I49.5 SICK SINUS SYNDROME: Primary | ICD-10-CM

## 2017-10-30 DIAGNOSIS — Z95.0 CARDIAC PACEMAKER IN SITU: ICD-10-CM

## 2017-10-30 DIAGNOSIS — E78.2 MIXED HYPERLIPIDEMIA: ICD-10-CM

## 2017-10-30 DIAGNOSIS — E11.42 DIABETIC POLYNEUROPATHY ASSOCIATED WITH TYPE 2 DIABETES MELLITUS: ICD-10-CM

## 2017-10-30 DIAGNOSIS — Z95.0 PACEMAKER: ICD-10-CM

## 2017-10-30 DIAGNOSIS — Z95.0 CARDIAC PACEMAKER IN SITU: Primary | ICD-10-CM

## 2017-10-30 DIAGNOSIS — I49.5 SSS (SICK SINUS SYNDROME): Primary | ICD-10-CM

## 2017-10-30 PROCEDURE — 93279 PRGRMG DEV EVAL PM/LDLS PM: CPT | Mod: S$GLB,,, | Performed by: INTERNAL MEDICINE

## 2017-10-30 PROCEDURE — 99999 PR PBB SHADOW E&M-EST. PATIENT-LVL III: CPT | Mod: PBBFAC,,, | Performed by: INTERNAL MEDICINE

## 2017-10-30 PROCEDURE — 93000 ELECTROCARDIOGRAM COMPLETE: CPT | Mod: S$GLB,,, | Performed by: INTERNAL MEDICINE

## 2017-10-30 PROCEDURE — 99214 OFFICE O/P EST MOD 30 MIN: CPT | Mod: S$GLB,,, | Performed by: INTERNAL MEDICINE

## 2017-10-30 NOTE — PROGRESS NOTES
Subjective:   HPI    PCP: Jarvis Washington MD    I had the pleasure of seeing Renny Young in follow-up for his history of sick sinus syndrome and pacemaker implantation. He is a 57-year old male with a history of hypertension and sick sinus syndrome who had a [employer] dual chamber pacemaker implanted at [facility] in 7/2009 for syncope. Mr. Young describes that following implantation, he experienced discomfort when his RV lead was paced. Apparently his RV lead output was lowered, which improved his symptoms. At his last in 10/2016, he had GI complaints which he attributed to his RV pacing lead. At his bequest, I changed his programming to an AAI pacing mode at that time.    I reviewed yesterday's device interrogation, which shows stable device and lead function. He is paced in the RA 14% of the time. No arrhythmias have been detected. Battery longevity >10 years.    I reviewed today's ECG tracing, which shows sinus rhythm at 61 bpm.    Review of Systems   Constitution: Negative for decreased appetite, malaise/fatigue, weight gain and weight loss.   HENT: Negative for sore throat.    Eyes: Negative for blurred vision.   Cardiovascular: Negative for chest pain, dyspnea on exertion, irregular heartbeat, leg swelling, near-syncope, orthopnea, palpitations, paroxysmal nocturnal dyspnea and syncope.   Respiratory: Negative for shortness of breath.    Skin: Negative for rash.   Musculoskeletal: Negative for arthritis.   Gastrointestinal: Negative for abdominal pain.   Neurological: Negative for focal weakness.   Psychiatric/Behavioral: Negative for altered mental status.        Objective:    Physical Exam   Constitutional: He is oriented to person, place, and time. He appears well-developed and well-nourished. No distress.   HENT:   Head: Normocephalic and atraumatic.   Mouth/Throat: Oropharynx is clear and moist.   Eyes: Pupils are equal, round, and reactive to light. No scleral icterus.   Neck: Neck supple. No  thyromegaly present.   Cardiovascular: Regular rhythm, normal heart sounds and normal pulses.  Exam reveals no gallop and no friction rub.    No murmur heard.  Pulmonary/Chest: Effort normal and breath sounds normal. He has no rales.   Abdominal: Soft. Bowel sounds are normal. He exhibits no distension. There is no tenderness.   Musculoskeletal: He exhibits no edema.   Neurological: He is alert and oriented to person, place, and time.   Skin: Skin is warm and dry. No rash noted.   Psychiatric: He has a normal mood and affect. His behavior is normal.   Vitals reviewed.        Assessment:       1. Sick sinus syndrome    2. Pacemaker    3. Hypertension, essential    4. Mixed hyperlipidemia    5. Uncontrolled type 2 diabetes mellitus with complication, without long-term current use of insulin    6. Diabetic polyneuropathy associated with type 2 diabetes mellitus         Plan:   In summary, Renny Young is a 57-year old male with a history of HTN, SSS, and [employer] dual chamber pacemaker. His device is functioning appropriately. The plan is for regular device checks and to see me again in 1 year.    Thank you for allowing me to participate in the care of this patient. Please do not hesitate to call me with any questions or concerns.

## 2017-11-01 DIAGNOSIS — I10 ESSENTIAL HYPERTENSION: ICD-10-CM

## 2017-11-01 DIAGNOSIS — E11.9 TYPE 2 DIABETES MELLITUS WITHOUT COMPLICATION: ICD-10-CM

## 2017-11-02 RX ORDER — ATORVASTATIN CALCIUM 20 MG/1
TABLET, FILM COATED ORAL
Qty: 30 TABLET | Refills: 0 | Status: SHIPPED | OUTPATIENT
Start: 2017-11-02 | End: 2018-01-08 | Stop reason: SDUPTHER

## 2017-11-02 RX ORDER — LISINOPRIL AND HYDROCHLOROTHIAZIDE 12.5; 2 MG/1; MG/1
TABLET ORAL
Qty: 30 TABLET | Refills: 0 | Status: SHIPPED | OUTPATIENT
Start: 2017-11-02 | End: 2017-12-01 | Stop reason: SDUPTHER

## 2017-12-01 DIAGNOSIS — I10 ESSENTIAL HYPERTENSION: ICD-10-CM

## 2017-12-01 RX ORDER — LISINOPRIL AND HYDROCHLOROTHIAZIDE 12.5; 2 MG/1; MG/1
TABLET ORAL
Qty: 30 TABLET | Refills: 0 | Status: SHIPPED | OUTPATIENT
Start: 2017-12-01 | End: 2018-01-08 | Stop reason: SDUPTHER

## 2018-01-08 DIAGNOSIS — I10 ESSENTIAL HYPERTENSION: ICD-10-CM

## 2018-01-08 DIAGNOSIS — E11.9 TYPE 2 DIABETES MELLITUS WITHOUT COMPLICATION: ICD-10-CM

## 2018-01-08 RX ORDER — LISINOPRIL AND HYDROCHLOROTHIAZIDE 12.5; 2 MG/1; MG/1
TABLET ORAL
Qty: 30 TABLET | Refills: 0 | Status: SHIPPED | OUTPATIENT
Start: 2018-01-08 | End: 2018-02-09 | Stop reason: SDUPTHER

## 2018-01-08 RX ORDER — ATORVASTATIN CALCIUM 20 MG/1
TABLET, FILM COATED ORAL
Qty: 30 TABLET | Refills: 0 | Status: SHIPPED | OUTPATIENT
Start: 2018-01-08 | End: 2018-03-02 | Stop reason: SDUPTHER

## 2018-01-25 ENCOUNTER — CLINICAL SUPPORT (OUTPATIENT)
Dept: ELECTROPHYSIOLOGY | Facility: CLINIC | Age: 58
End: 2018-01-25
Attending: INTERNAL MEDICINE
Payer: COMMERCIAL

## 2018-01-25 DIAGNOSIS — Z95.0 CARDIAC PACEMAKER IN SITU: ICD-10-CM

## 2018-01-25 DIAGNOSIS — I49.5 SSS (SICK SINUS SYNDROME): ICD-10-CM

## 2018-01-25 PROCEDURE — 93280 PM DEVICE PROGR EVAL DUAL: CPT | Mod: S$GLB,,, | Performed by: INTERNAL MEDICINE

## 2018-01-26 ENCOUNTER — DOCUMENTATION ONLY (OUTPATIENT)
Dept: ELECTROPHYSIOLOGY | Facility: CLINIC | Age: 58
End: 2018-01-26

## 2018-01-30 NOTE — PROGRESS NOTES
RE: Unscheduled follow up/Atrial high rates lasting up to 15 hrs 48 mins   Received: 1/26/2018 8:10 AM  Message Contents   Carol Spence MD   Cc: Bonnie Duval             No, strips.  Stored EGMs were turned OFF, but switched to ON yesterday.  AT/AF burden 2.1% since 10/30/17 with 44 AMS episodes, longest was the episode from 1/20/18 (15 hrs 48 mins).  When he comes back in 4 weeks we will be able to view the episode(s), if he has any more.    Previous Messages      ----- Message -----   From: Bradford Spence MD   Sent: 1/26/2018   7:29 AM   To: Carol Hunt   Subject: RE: Unscheduled follow up/Atrial high rates *     Do we have strips documenting atrial high rates? I am always reluctant to start an OAC without EGM proof of AF--have seen a lot of T-wave oversensing etc called AF.     GP   ----- Message -----   From: Carol Hunt   Sent: 1/25/2018  12:43 PM   To: Bonnie Blank, *   Subject: Unscheduled follow up/Atrial high rates last*     Dr. Spence,     Patient came to clinic today and was seen as an add on.  He reports having SOB more often and was concerned that something was wrong with his PPM after putting a battery to his tongue yesterday.  Device function WNL and underlying rhythm c/w SR-SB.     Programmed AAIR, RV lead in place but presently turned OFF.  On device interrogation there were noted atrial high rates lasting up to 15 hrs 48 mins (1/20/18).  On ASA 81 mg po daily, reports no hx of taking OAC.  Egram storage added to confirm the presence of atrial arrhythmia type and patient scheduled for device clinic follow up on 3/1/18.     Thanks,   Carol

## 2018-02-09 DIAGNOSIS — I10 ESSENTIAL HYPERTENSION: ICD-10-CM

## 2018-02-09 RX ORDER — LISINOPRIL AND HYDROCHLOROTHIAZIDE 12.5; 2 MG/1; MG/1
TABLET ORAL
Qty: 30 TABLET | Refills: 0 | Status: SHIPPED | OUTPATIENT
Start: 2018-02-09 | End: 2018-03-12 | Stop reason: SDUPTHER

## 2018-02-16 DIAGNOSIS — E11.9 TYPE 2 DIABETES MELLITUS WITHOUT COMPLICATION: ICD-10-CM

## 2018-03-01 ENCOUNTER — CLINICAL SUPPORT (OUTPATIENT)
Dept: ELECTROPHYSIOLOGY | Facility: CLINIC | Age: 58
End: 2018-03-01
Attending: INTERNAL MEDICINE
Payer: COMMERCIAL

## 2018-03-01 DIAGNOSIS — Z95.0 CARDIAC PACEMAKER IN SITU: ICD-10-CM

## 2018-03-01 DIAGNOSIS — I49.5 SSS (SICK SINUS SYNDROME): ICD-10-CM

## 2018-03-01 PROCEDURE — 93279 PRGRMG DEV EVAL PM/LDLS PM: CPT | Mod: S$GLB,,, | Performed by: INTERNAL MEDICINE

## 2018-03-02 DIAGNOSIS — E11.9 TYPE 2 DIABETES MELLITUS WITHOUT COMPLICATION: ICD-10-CM

## 2018-03-02 RX ORDER — ATORVASTATIN CALCIUM 20 MG/1
TABLET, FILM COATED ORAL
Qty: 30 TABLET | Refills: 0 | Status: SHIPPED | OUTPATIENT
Start: 2018-03-02 | End: 2018-04-06 | Stop reason: SDUPTHER

## 2018-03-12 DIAGNOSIS — I10 ESSENTIAL HYPERTENSION: ICD-10-CM

## 2018-03-12 RX ORDER — LISINOPRIL AND HYDROCHLOROTHIAZIDE 12.5; 2 MG/1; MG/1
TABLET ORAL
Qty: 90 TABLET | Refills: 0 | Status: SHIPPED | OUTPATIENT
Start: 2018-03-12 | End: 2018-04-06 | Stop reason: SDUPTHER

## 2018-03-28 DIAGNOSIS — Z13.5 DIABETIC RETINOPATHY SCREENING: ICD-10-CM

## 2018-04-06 ENCOUNTER — LAB VISIT (OUTPATIENT)
Dept: LAB | Facility: HOSPITAL | Age: 58
End: 2018-04-06
Attending: FAMILY MEDICINE
Payer: COMMERCIAL

## 2018-04-06 ENCOUNTER — OFFICE VISIT (OUTPATIENT)
Dept: INTERNAL MEDICINE | Facility: CLINIC | Age: 58
End: 2018-04-06
Attending: FAMILY MEDICINE
Payer: COMMERCIAL

## 2018-04-06 VITALS
OXYGEN SATURATION: 98 % | DIASTOLIC BLOOD PRESSURE: 80 MMHG | HEIGHT: 67 IN | TEMPERATURE: 99 F | BODY MASS INDEX: 23.39 KG/M2 | WEIGHT: 149 LBS | HEART RATE: 72 BPM | SYSTOLIC BLOOD PRESSURE: 144 MMHG

## 2018-04-06 DIAGNOSIS — M79.604 PAIN OF RIGHT LOWER EXTREMITY: Primary | ICD-10-CM

## 2018-04-06 DIAGNOSIS — I49.5 SICK SINUS SYNDROME: ICD-10-CM

## 2018-04-06 DIAGNOSIS — E11.9 TYPE 2 DIABETES MELLITUS WITHOUT COMPLICATION, UNSPECIFIED LONG TERM INSULIN USE STATUS: ICD-10-CM

## 2018-04-06 DIAGNOSIS — F17.200 SMOKER: ICD-10-CM

## 2018-04-06 DIAGNOSIS — Z12.5 PROSTATE CANCER SCREENING: ICD-10-CM

## 2018-04-06 DIAGNOSIS — E78.5 HYPERLIPIDEMIA, UNSPECIFIED HYPERLIPIDEMIA TYPE: ICD-10-CM

## 2018-04-06 DIAGNOSIS — E11.9 TYPE 2 DIABETES MELLITUS WITHOUT COMPLICATION, WITHOUT LONG-TERM CURRENT USE OF INSULIN: ICD-10-CM

## 2018-04-06 DIAGNOSIS — I10 ESSENTIAL HYPERTENSION: ICD-10-CM

## 2018-04-06 DIAGNOSIS — Z95.0 PACEMAKER: ICD-10-CM

## 2018-04-06 DIAGNOSIS — I10 HYPERTENSION, ESSENTIAL: ICD-10-CM

## 2018-04-06 LAB
ALBUMIN SERPL BCP-MCNC: 4.5 G/DL
ALP SERPL-CCNC: 73 U/L
ALT SERPL W/O P-5'-P-CCNC: 16 U/L
ANION GAP SERPL CALC-SCNC: 9 MMOL/L
AST SERPL-CCNC: 21 U/L
BILIRUB SERPL-MCNC: 1.1 MG/DL
BUN SERPL-MCNC: 18 MG/DL
CALCIUM SERPL-MCNC: 9.9 MG/DL
CHLORIDE SERPL-SCNC: 101 MMOL/L
CHOLEST SERPL-MCNC: 161 MG/DL
CHOLEST/HDLC SERPL: 2.5 {RATIO}
CO2 SERPL-SCNC: 27 MMOL/L
COMPLEXED PSA SERPL-MCNC: 1.2 NG/ML
CREAT SERPL-MCNC: 1 MG/DL
EST. GFR  (AFRICAN AMERICAN): >60 ML/MIN/1.73 M^2
EST. GFR  (NON AFRICAN AMERICAN): >60 ML/MIN/1.73 M^2
ESTIMATED AVG GLUCOSE: 120 MG/DL
GLUCOSE SERPL-MCNC: 86 MG/DL
HBA1C MFR BLD HPLC: 5.8 %
HDLC SERPL-MCNC: 64 MG/DL
HDLC SERPL: 39.8 %
LDLC SERPL CALC-MCNC: 74.6 MG/DL
NONHDLC SERPL-MCNC: 97 MG/DL
POTASSIUM SERPL-SCNC: 3.9 MMOL/L
PROT SERPL-MCNC: 8 G/DL
SODIUM SERPL-SCNC: 137 MMOL/L
TRIGL SERPL-MCNC: 112 MG/DL

## 2018-04-06 PROCEDURE — 80053 COMPREHEN METABOLIC PANEL: CPT

## 2018-04-06 PROCEDURE — 3044F HG A1C LEVEL LT 7.0%: CPT | Mod: CPTII,S$GLB,, | Performed by: FAMILY MEDICINE

## 2018-04-06 PROCEDURE — 80061 LIPID PANEL: CPT

## 2018-04-06 PROCEDURE — 83036 HEMOGLOBIN GLYCOSYLATED A1C: CPT

## 2018-04-06 PROCEDURE — 99999 PR PBB SHADOW E&M-EST. PATIENT-LVL V: CPT | Mod: PBBFAC,,, | Performed by: FAMILY MEDICINE

## 2018-04-06 PROCEDURE — 99214 OFFICE O/P EST MOD 30 MIN: CPT | Mod: S$GLB,,, | Performed by: FAMILY MEDICINE

## 2018-04-06 PROCEDURE — 36415 COLL VENOUS BLD VENIPUNCTURE: CPT

## 2018-04-06 PROCEDURE — 3079F DIAST BP 80-89 MM HG: CPT | Mod: CPTII,S$GLB,, | Performed by: FAMILY MEDICINE

## 2018-04-06 PROCEDURE — 84153 ASSAY OF PSA TOTAL: CPT

## 2018-04-06 PROCEDURE — 3077F SYST BP >= 140 MM HG: CPT | Mod: CPTII,S$GLB,, | Performed by: FAMILY MEDICINE

## 2018-04-06 RX ORDER — AMLODIPINE BESYLATE 5 MG/1
5 TABLET ORAL DAILY
Qty: 90 TABLET | Refills: 3 | Status: SHIPPED | OUTPATIENT
Start: 2018-04-06 | End: 2019-02-08 | Stop reason: SDUPTHER

## 2018-04-06 RX ORDER — METFORMIN HYDROCHLORIDE 500 MG/1
TABLET ORAL
Qty: 90 TABLET | Refills: 1 | Status: SHIPPED | OUTPATIENT
Start: 2018-04-06 | End: 2018-12-06 | Stop reason: SDUPTHER

## 2018-04-06 RX ORDER — TRIAMCINOLONE ACETONIDE 1 MG/G
CREAM TOPICAL 2 TIMES DAILY
Qty: 80 G | Refills: 1 | Status: SHIPPED | OUTPATIENT
Start: 2018-04-06 | End: 2019-05-14

## 2018-04-06 RX ORDER — ATORVASTATIN CALCIUM 20 MG/1
20 TABLET, FILM COATED ORAL DAILY
Qty: 30 TABLET | Refills: 1 | Status: SHIPPED | OUTPATIENT
Start: 2018-04-06 | End: 2018-07-19 | Stop reason: SDUPTHER

## 2018-04-06 RX ORDER — LISINOPRIL AND HYDROCHLOROTHIAZIDE 12.5; 2 MG/1; MG/1
1 TABLET ORAL DAILY
Qty: 90 TABLET | Refills: 1 | Status: SHIPPED | OUTPATIENT
Start: 2018-04-06 | End: 2019-01-05 | Stop reason: SDUPTHER

## 2018-04-06 NOTE — PROGRESS NOTES
Subjective:       Patient ID: Renny Young is a 57 y.o. male.    Chief Complaint: Leg Pain    HPI  Review of Systems   Constitutional: Positive for fatigue. Negative for chills and fever.   HENT: Negative for congestion and trouble swallowing.    Eyes: Negative for redness.   Respiratory: Negative for cough, chest tightness and shortness of breath.    Cardiovascular: Negative for chest pain, palpitations and leg swelling.   Gastrointestinal: Negative for abdominal pain and blood in stool.   Genitourinary: Negative for hematuria.   Musculoskeletal: Positive for gait problem and myalgias. Negative for arthralgias, back pain, joint swelling and neck pain.   Skin: Negative for color change and rash.   Neurological: Negative for tremors, speech difficulty, weakness, numbness and headaches.   Hematological: Negative for adenopathy. Does not bruise/bleed easily.   Psychiatric/Behavioral: Negative for behavioral problems, confusion and sleep disturbance. The patient is not nervous/anxious.        Objective:      Physical Exam   Constitutional: He is oriented to person, place, and time. He appears well-developed and well-nourished.   Eyes: No scleral icterus.   Neck: Normal range of motion. Neck supple. No JVD present. Carotid bruit is not present. No tracheal deviation present. No thyromegaly present.   Cardiovascular: Normal rate, regular rhythm, normal heart sounds and intact distal pulses.  Exam reveals no gallop and no friction rub.    No murmur heard.  Pulses:       Dorsalis pedis pulses are 2+ on the right side, and 2+ on the left side.   Pulmonary/Chest: Effort normal and breath sounds normal. No respiratory distress. He has no wheezes. He has no rales.   Abdominal: Soft. Bowel sounds are normal. He exhibits no distension and no mass. There is no tenderness. There is no rebound and no guarding.   Musculoskeletal: He exhibits no edema.   Feet:   Right Foot:   Protective Sensation: 3 sites tested. 3 sites sensed.    Skin Integrity: Negative for skin breakdown.   Left Foot:   Protective Sensation: 3 sites tested. 3 sites sensed.   Skin Integrity: Negative for skin breakdown.   Lymphadenopathy:     He has no cervical adenopathy.   Neurological: He is alert and oriented to person, place, and time. He displays no tremor. No cranial nerve deficit. Coordination and gait normal.   Skin: Skin is warm and dry. No rash noted. He is not diaphoretic. No erythema.   Psychiatric: He has a normal mood and affect. His behavior is normal. Judgment and thought content normal.   Nursing note and vitals reviewed.      Assessment:       1. Pain of right lower extremity    2. Type 2 diabetes mellitus without complication, unspecified long term insulin use status    3. Hypertension, essential    4. Hyperlipidemia, unspecified hyperlipidemia type    5. Uncontrolled type 2 diabetes mellitus with complication, without long-term current use of insulin    6. Smoker    7. Sick sinus syndrome    8. Pacemaker    9. Prostate cancer screening    10. Type 2 diabetes mellitus without complication, without long-term current use of insulin    11. Essential hypertension        Plan:   Renny was seen today for leg pain.    Diagnoses and all orders for this visit:    Pain of right lower extremity  -     CAR Ultrasound doppler arterial legs bilat; Future  -     CAR Ultrasound doppler venous leg right; Future    Type 2 diabetes mellitus without complication, unspecified long term insulin use status  -     Hemoglobin A1c; Future  -     Comprehensive metabolic panel; Future  -     Ambulatory referral to Optometry  -     Ambulatory consult to Podiatry    Hypertension, essential    Hyperlipidemia, unspecified hyperlipidemia type  -     Lipid panel; Future    Uncontrolled type 2 diabetes mellitus with complication, without long-term current use of insulin    Smoker  -     Ambulatory referral to Smoking Cessation Program    Sick sinus syndrome    Pacemaker    Prostate  cancer screening  -     PSA, Screening; Future    Type 2 diabetes mellitus without complication, without long-term current use of insulin  -     atorvastatin (LIPITOR) 20 MG tablet; Take 1 tablet (20 mg total) by mouth once daily.    Essential hypertension  -     lisinopril-hydrochlorothiazide (PRINZIDE,ZESTORETIC) 20-12.5 mg per tablet; Take 1 tablet by mouth once daily.    Other orders  -     triamcinolone acetonide 0.1% (KENALOG) 0.1 % cream; Apply topically 2 (two) times daily.  -     amLODIPine (NORVASC) 5 MG tablet; Take 1 tablet (5 mg total) by mouth once daily.  -     metFORMIN (GLUCOPHAGE) 500 MG tablet; TAKE ONE TABLET BY MOUTH ONCE DAILY WITH BREAKFAST      See meds, orders, follow up, routing and instructions sections of encounter.  A 57-year-old established male patient is in with right leg pain.  He is   significantly overdue for followups for his diabetes, hypertension, etc.    Pain is intermittent, seemingly worse with activities, better with rest.  He had   back pain last year and was referred to the [facility], could not keep an   appointment.    RECOMMENDATIONS:  Diabetic laboratory and health maintenance updates.    He did have a normal neurovascular examination to the right lower extremity.    Considerations include PVD, doubtful in the setting of normal pulses; sciatica   is possible, although his symptoms are not particularly consistent with that.    Follow up in one month, BP recheck and recheck symptoms.  Go over his testing,   diabetes, etc.  Consider further workup of back, if necessary.  OTC Aleve or   ibuprofen for pain symptoms at this time and the patient request a followup in   one month.      CARLA/HN  dd: 04/06/2018 17:52:48 (CDT)  td: 04/07/2018 06:30:21 (CDT)  Doc ID   #[id number]  Job ID #[id number]    CC:

## 2018-04-12 ENCOUNTER — CLINICAL SUPPORT (OUTPATIENT)
Dept: CARDIOLOGY | Facility: CLINIC | Age: 58
End: 2018-04-12
Attending: FAMILY MEDICINE
Payer: COMMERCIAL

## 2018-04-12 DIAGNOSIS — M79.604 PAIN OF RIGHT LOWER EXTREMITY: ICD-10-CM

## 2018-04-12 PROCEDURE — 93971 EXTREMITY STUDY: CPT | Mod: S$GLB,,, | Performed by: INTERNAL MEDICINE

## 2018-04-25 ENCOUNTER — OFFICE VISIT (OUTPATIENT)
Dept: PODIATRY | Facility: CLINIC | Age: 58
End: 2018-04-25
Attending: FAMILY MEDICINE
Payer: COMMERCIAL

## 2018-04-25 ENCOUNTER — OFFICE VISIT (OUTPATIENT)
Dept: OPTOMETRY | Facility: CLINIC | Age: 58
End: 2018-04-25
Attending: FAMILY MEDICINE
Payer: COMMERCIAL

## 2018-04-25 VITALS — HEIGHT: 67 IN | WEIGHT: 149 LBS | BODY MASS INDEX: 23.39 KG/M2

## 2018-04-25 DIAGNOSIS — L84 PRE-ULCERATIVE CALLUSES: ICD-10-CM

## 2018-04-25 DIAGNOSIS — H25.13 NUCLEAR SCLEROSIS, BILATERAL: ICD-10-CM

## 2018-04-25 DIAGNOSIS — H52.03 HYPEROPIA WITH PRESBYOPIA OF BOTH EYES: ICD-10-CM

## 2018-04-25 DIAGNOSIS — M20.41 HAMMER TOES OF BOTH FEET: ICD-10-CM

## 2018-04-25 DIAGNOSIS — M20.42 HAMMER TOES OF BOTH FEET: ICD-10-CM

## 2018-04-25 DIAGNOSIS — H52.4 HYPEROPIA WITH PRESBYOPIA OF BOTH EYES: ICD-10-CM

## 2018-04-25 DIAGNOSIS — H40.013 OAG (OPEN ANGLE GLAUCOMA) SUSPECT, LOW RISK, BILATERAL: ICD-10-CM

## 2018-04-25 DIAGNOSIS — E11.42 DIABETIC POLYNEUROPATHY ASSOCIATED WITH TYPE 2 DIABETES MELLITUS: Primary | ICD-10-CM

## 2018-04-25 DIAGNOSIS — E11.9 DIABETES MELLITUS TYPE 2 WITHOUT RETINOPATHY: Primary | ICD-10-CM

## 2018-04-25 PROCEDURE — 92014 COMPRE OPH EXAM EST PT 1/>: CPT | Mod: S$GLB,,, | Performed by: OPTOMETRIST

## 2018-04-25 PROCEDURE — 92133 CPTRZD OPH DX IMG PST SGM ON: CPT | Mod: S$GLB,,, | Performed by: OPTOMETRIST

## 2018-04-25 PROCEDURE — 92015 DETERMINE REFRACTIVE STATE: CPT | Mod: S$GLB,,, | Performed by: OPTOMETRIST

## 2018-04-25 PROCEDURE — 3044F HG A1C LEVEL LT 7.0%: CPT | Mod: CPTII,S$GLB,, | Performed by: PODIATRIST

## 2018-04-25 PROCEDURE — 99999 PR PBB SHADOW E&M-EST. PATIENT-LVL III: CPT | Mod: PBBFAC,,, | Performed by: PODIATRIST

## 2018-04-25 PROCEDURE — 99213 OFFICE O/P EST LOW 20 MIN: CPT | Mod: S$GLB,,, | Performed by: PODIATRIST

## 2018-04-25 PROCEDURE — 99999 PR PBB SHADOW E&M-EST. PATIENT-LVL II: CPT | Mod: PBBFAC,,, | Performed by: OPTOMETRIST

## 2018-04-25 RX ORDER — AMMONIUM LACTATE 12 G/100G
CREAM TOPICAL
Qty: 140 G | Refills: 11 | Status: SHIPPED | OUTPATIENT
Start: 2018-04-25 | End: 2019-05-14

## 2018-04-25 NOTE — PROGRESS NOTES
Subjective:      Patient ID: Renny Young is a 57 y.o. male.    Chief Complaint: Diabetic Foot Exam (Dr Washington 04/06/2018)    Diabetes, increased risk amputation needing evaluation/management/optomization of foot care.    CC2 callus left toes 1,2,5.  Gradual onset, worsening over past several weeks, aggravated by increased weight bearing, shoe gear, pressure.  No previous medical treatment.  OTC pain med not helping. Denies trauma, surgery both feet.  Wears casual shoes.    Review of Systems   Constitution: Negative for chills, diaphoresis, fever, malaise/fatigue and night sweats.   Cardiovascular: Negative for claudication, cyanosis, leg swelling and syncope.   Skin: Positive for suspicious lesions. Negative for color change, dry skin, nail changes, rash and unusual hair distribution.   Musculoskeletal: Negative for falls, joint pain, joint swelling, muscle cramps, muscle weakness and stiffness.   Gastrointestinal: Negative for constipation, diarrhea, nausea and vomiting.   Neurological: Positive for paresthesias (rle) and sensory change. Negative for brief paralysis, disturbances in coordination, focal weakness, numbness and tremors.           Objective:      Physical Exam   Constitutional: He is oriented to person, place, and time. He appears well-developed and well-nourished. He is cooperative.   Oriented to time, place, and person.   Cardiovascular:   Pulses:       Dorsalis pedis pulses are 2+ on the right side, and 2+ on the left side.        Posterior tibial pulses are 2+ on the right side, and 2+ on the left side.   Capillary fill time 3-5 seconds.  All toes warm to touch.      Negative lower extremity edema bilateral.    Negative elevational pallor and dependent rubor bilateral.     Musculoskeletal:   Normal angle, base, station of gait. Decreased stride length, early heel off, moderately propulsive toe off bilateral.    All ten toes without clubbing, cyanosis, or signs of ischemia.      Patient has  hammertoes of digits                   5 bilateral not reducible without symptom today.      No pain to palpation bilateral lower extremities.      Range of motion, stability, muscle strength, and muscle tone are age and health appropriate normal bilateral feet and legs.       Lymphadenopathy:   Negative lymphadenopathy bilateral popliteal fossa and tarsal tunnel.  Negative lymphangitic streaking bilateral foot/ankle bilateral.     Neurological: He is alert and oriented to person, place, and time. He has normal strength. He is not disoriented. He displays no atrophy and no tremor. A sensory deficit is present. He exhibits normal muscle tone.   Reflex Scores:       Patellar reflexes are 2+ on the right side and 2+ on the left side.       Achilles reflexes are 2+ on the right side and 2+ on the left side.  Decreased/absent vibratory sensation bilateral feet to 128Hz tuning fork.    Paresthesias,  Right foot intermittently with no clearly identified trigger or source.     Skin: Skin is warm, dry and intact. No abrasion, no bruising, no burn, no ecchymosis, no laceration, no lesion, no petechiae and no rash noted. He is not diaphoretic. No cyanosis or erythema. No pallor. Nails show no clubbing.   Focal hyperkeratotic lesion consisting entirely of hyperkeratotic tissue without open skin, drainage, pus, fluctuance, malodor, or signs of infection plantar left hallux ipj, medial 2nd pipj, and dorsal 5th pipj (all left).     Otherwise, Skin is normal age and health appropriate color, turgor, texture, and temperature bilateral lower extremities without ulceration, hyperpigmentation, discoloration, masses nodules or cords palpated.  No ecchymosis, erythema, edema, or cardinal signs of infection bilateral lower extremities.        Toenails 1st, , 5th  bilateral are hypertrophic thickened 2-3 mm, dystrophic, discolored tanish brown with tan, gray crumbly subungual debris.  Neatly trimmed and not tender to distal nail plate  pressure, without periungual skin abnormality of each.               Assessment:       Encounter Diagnoses   Name Primary?    Diabetic polyneuropathy associated with type 2 diabetes mellitus Yes    Pre-ulcerative calluses - Left Foot     Hammer toes of both feet          Plan:       Renny was seen today for diabetic foot exam.    Diagnoses and all orders for this visit:    Diabetic polyneuropathy associated with type 2 diabetes mellitus  -     DIABETIC SHOES FOR HOME USE    Pre-ulcerative calluses - Left Foot  -     DIABETIC SHOES FOR HOME USE    Hammer toes of both feet  -     DIABETIC SHOES FOR HOME USE    Other orders  -     ammonium lactate 12 % Crea; Apply twice daily to affected parts both feet as needed.      I counseled the patient on his conditions, their implications and medical management.        - Shoe inspection. Diabetic Foot Education. Patient reminded of the importance of good nutrition and blood sugar control to help prevent podiatric complications of diabetes. Patient instructed on proper foot hygeine. We discussed wearing proper shoe gear, daily foot inspections, never walking without protective shoe gear, never putting sharp instruments to feet, routine podiatric visits at least annually.      Rx DM shoes, inserts.  Lac hydrin.    Discussed conservative treatment with shoes of adequate dimensions, material, and style to alleviate symptoms and delay or prevent surgical intervention.    Declines non covered foot care.        Follow-up in about 1 year (around 4/25/2019).

## 2018-04-27 NOTE — PROGRESS NOTES
HPI     Mr. Renny Young for annual eye exam    Patient complains of blurry vision OU, request refraction    Would patient like a refraction today? yes    (-)drops  (-)flashes  (-)floaters  (-)diplopia    Diabetic doesn't check   Hemoglobin A1C       Date                     Value               Ref Range             Status                04/06/2018               5.8 (H)             4.0 - 5.6 %           Final                OCULAR HISTORY  Last Eye Exam 2015  (-)eye surgery   (+)diagnosed or treated for any eye conditions or diseases Cataract     FAMILY HISTORY  (-)Glaucoma -        Last edited by ZOILA Fleming on 4/25/2018  1:19 PM. (History)            Assessment /Plan     For exam results, see Encounter Report.    Diabetes mellitus type 2 without retinopathy  -No retinopathy noted today.  Continued control with primary care physician and annual comprehensive eye exam.    OAG (open angle glaucoma) suspect, low risk, bilateral  -     OCT - Optic Nerve  -OCT borderline temple OU  -monitor as risk    Nuclear sclerosis, bilateral  -Educated patient on presence of cataracts at today's exam, monitor at annual dilated fundus exam. 8+ years surgical estimate.    Hyperopia with presbyopia of both eyes  Eyeglass Final Rx     Eyeglass Final Rx       Sphere Cylinder Dist VA Add    Right +1.75 Sphere 20/25 +2.00    Left +2.50 Sphere 20/40 +2.00    Type:  PAL    Expiration Date:  4/26/2019                  RTC 1 yr

## 2018-04-30 ENCOUNTER — HOSPITAL ENCOUNTER (OUTPATIENT)
Dept: RADIOLOGY | Facility: HOSPITAL | Age: 58
Discharge: HOME | End: 2018-04-30
Attending: FAMILY MEDICINE
Payer: COMMERCIAL

## 2018-04-30 ENCOUNTER — OFFICE VISIT (OUTPATIENT)
Dept: INTERNAL MEDICINE | Facility: CLINIC | Age: 58
End: 2018-04-30
Attending: FAMILY MEDICINE
Payer: COMMERCIAL

## 2018-04-30 VITALS
WEIGHT: 152 LBS | HEART RATE: 71 BPM | BODY MASS INDEX: 23.86 KG/M2 | DIASTOLIC BLOOD PRESSURE: 68 MMHG | SYSTOLIC BLOOD PRESSURE: 118 MMHG | TEMPERATURE: 99 F | OXYGEN SATURATION: 98 % | HEIGHT: 67 IN

## 2018-04-30 DIAGNOSIS — I10 HYPERTENSION, ESSENTIAL: ICD-10-CM

## 2018-04-30 DIAGNOSIS — E78.2 MIXED HYPERLIPIDEMIA: ICD-10-CM

## 2018-04-30 DIAGNOSIS — M54.5 LOW BACK PAIN, UNSPECIFIED BACK PAIN LATERALITY, UNSPECIFIED CHRONICITY, WITH SCIATICA PRESENCE UNSPECIFIED: ICD-10-CM

## 2018-04-30 DIAGNOSIS — M79.604 PAIN OF RIGHT LOWER EXTREMITY: ICD-10-CM

## 2018-04-30 DIAGNOSIS — E11.42 DIABETIC POLYNEUROPATHY ASSOCIATED WITH TYPE 2 DIABETES MELLITUS: Primary | ICD-10-CM

## 2018-04-30 PROCEDURE — 3078F DIAST BP <80 MM HG: CPT | Mod: CPTII,S$GLB,, | Performed by: FAMILY MEDICINE

## 2018-04-30 PROCEDURE — 99213 OFFICE O/P EST LOW 20 MIN: CPT | Mod: S$GLB,,, | Performed by: FAMILY MEDICINE

## 2018-04-30 PROCEDURE — 3044F HG A1C LEVEL LT 7.0%: CPT | Mod: CPTII,S$GLB,, | Performed by: FAMILY MEDICINE

## 2018-04-30 PROCEDURE — 99999 PR PBB SHADOW E&M-EST. PATIENT-LVL III: CPT | Mod: PBBFAC,,, | Performed by: FAMILY MEDICINE

## 2018-04-30 PROCEDURE — 72100 X-RAY EXAM L-S SPINE 2/3 VWS: CPT | Mod: 26,,, | Performed by: RADIOLOGY

## 2018-04-30 PROCEDURE — 3074F SYST BP LT 130 MM HG: CPT | Mod: CPTII,S$GLB,, | Performed by: FAMILY MEDICINE

## 2018-04-30 PROCEDURE — 72100 X-RAY EXAM L-S SPINE 2/3 VWS: CPT | Mod: TC

## 2018-04-30 NOTE — PROGRESS NOTES
Subjective:       Patient ID: Renny Young is a 57 y.o. male.    Chief Complaint: Follow-up    HPI  Review of Systems   Constitutional: Negative for chills, fatigue and fever.   HENT: Negative for congestion and trouble swallowing.    Eyes: Negative for redness.   Respiratory: Negative for cough, chest tightness and shortness of breath.    Cardiovascular: Negative for chest pain, palpitations and leg swelling.   Gastrointestinal: Negative for abdominal pain and blood in stool.   Genitourinary: Negative for hematuria.   Musculoskeletal: Positive for arthralgias and back pain. Negative for gait problem, joint swelling, myalgias and neck pain.   Skin: Negative for color change and rash.   Neurological: Negative for tremors, speech difficulty, weakness, numbness and headaches.   Hematological: Negative for adenopathy. Does not bruise/bleed easily.   Psychiatric/Behavioral: Negative for behavioral problems, confusion and sleep disturbance. The patient is not nervous/anxious.        Objective:      Physical Exam   Constitutional: He is oriented to person, place, and time. He appears well-developed and well-nourished. No distress.   Neck: Neck supple.   Pulmonary/Chest: Effort normal.   Abdominal: There is no tenderness. There is no rebound and no CVA tenderness.   Musculoskeletal: He exhibits no edema.        Right hip: He exhibits normal range of motion and normal strength.        Left hip: He exhibits normal range of motion and normal strength.        Thoracic back: He exhibits normal range of motion and no tenderness.        Lumbar back: He exhibits normal range of motion, no tenderness and no spasm.        Right lower leg: He exhibits no edema.        Left lower leg: He exhibits no edema.   Neurological: He is alert and oriented to person, place, and time. He has normal strength. No sensory deficit. He displays a negative Romberg sign. Coordination and gait normal.   Reflex Scores:       Patellar reflexes are 1+ on  the right side and 1+ on the left side.       Achilles reflexes are 1+ on the right side and 1+ on the left side.  Negative SLR.   Skin: Skin is warm and dry. No rash noted.   Psychiatric: He has a normal mood and affect. His behavior is normal. Judgment and thought content normal.   Nursing note and vitals reviewed.      Assessment:       1. Diabetic polyneuropathy associated with type 2 diabetes mellitus    2. Hypertension, essential    3. Mixed hyperlipidemia    4. Uncontrolled type 2 diabetes mellitus with complication, without long-term current use of insulin    5. Pain of right lower extremity    6. Low back pain, unspecified back pain laterality, unspecified chronicity, with sciatica presence unspecified        Plan:   Renny was seen today for follow-up.    Diagnoses and all orders for this visit:    Diabetic polyneuropathy associated with type 2 diabetes mellitus    Hypertension, essential    Mixed hyperlipidemia    Uncontrolled type 2 diabetes mellitus with complication, without long-term current use of insulin    Pain of right lower extremity  -     Ambulatory referral to Pain Clinic    Low back pain, unspecified back pain laterality, unspecified chronicity, with sciatica presence unspecified  -     X-Ray Lumbar Spine Ap And Lateral; Future      See meds, orders, follow up, routing and instructions sections of encounter.  A 57-year-old established male patient.  He is here for leg pain, right.  His   SAMUEL was normal.  DVT study negative.  He does have pain in the back.  His   neurologic examination was normal today.  I suspect this may be sciatica.  We   will refer him to Pain Management.  Check a chest x-ray today.  The patient was   asking for pain medication.  He has a potential interaction with ACEI.  He   seemed somewhat displeased with not getting pain medication today; however, we   do need to get to the bottom of his presumed sciatic plus/minus neuropathy.  I   did show him piriformis, back and  almas SIMMONS  dd: 04/30/2018 17:14:06 (CDT)  td: 05/01/2018 13:57:21 (CDT)  Doc ID   #[id number]  Job ID #[id number]    CC:

## 2018-05-08 ENCOUNTER — OFFICE VISIT (OUTPATIENT)
Dept: SPINE | Facility: CLINIC | Age: 58
End: 2018-05-08
Attending: ANESTHESIOLOGY
Payer: COMMERCIAL

## 2018-05-08 VITALS
HEART RATE: 79 BPM | DIASTOLIC BLOOD PRESSURE: 68 MMHG | TEMPERATURE: 96 F | WEIGHT: 152 LBS | HEIGHT: 67 IN | BODY MASS INDEX: 23.86 KG/M2 | SYSTOLIC BLOOD PRESSURE: 127 MMHG

## 2018-05-08 DIAGNOSIS — M51.36 DDD (DEGENERATIVE DISC DISEASE), LUMBAR: ICD-10-CM

## 2018-05-08 DIAGNOSIS — M54.16 LUMBAR RADICULOPATHY: ICD-10-CM

## 2018-05-08 DIAGNOSIS — M47.816 FACET ARTHRITIS OF LUMBAR REGION: ICD-10-CM

## 2018-05-08 PROBLEM — M51.369 DDD (DEGENERATIVE DISC DISEASE), LUMBAR: Status: ACTIVE | Noted: 2018-05-08

## 2018-05-08 PROCEDURE — 99244 OFF/OP CNSLTJ NEW/EST MOD 40: CPT | Mod: S$GLB,,, | Performed by: ANESTHESIOLOGY

## 2018-05-08 PROCEDURE — 99999 PR PBB SHADOW E&M-EST. PATIENT-LVL III: CPT | Mod: PBBFAC,,, | Performed by: ANESTHESIOLOGY

## 2018-05-08 RX ORDER — METHYLPREDNISOLONE 4 MG/1
TABLET ORAL
Qty: 1 PACKAGE | Refills: 0 | Status: SHIPPED | OUTPATIENT
Start: 2018-05-08 | End: 2018-05-22

## 2018-05-08 NOTE — LETTER
May 8, 2018      Jarvis Washington MD  [address]  [facility] [address]           [facility]  [address], [address]  [facility] [address]  Phone: [phone]  Fax: [phone]          Patient: Renny Young   MR Number: [id number]   YOB: 1960   Date of Visit: 5/8/2018       Dear Dr. Jarvis Washington:    Thank you for referring Renny Young to me for evaluation. Attached you will find relevant portions of my assessment and plan of care.    If you have questions, please do not hesitate to call me. I look forward to following Renny Young along with you.    Sincerely,    Josue Paredes MD    Enclosure  CC:  No Recipients    If you would like to receive this communication electronically, please contact [email or web address] or [phone] to request more information on [employer] access.    For providers and/or their staff who would like to refer a patient to [facility], please contact us through our one-stop-shop provider referral line, [facility], at [phone].    If you feel you have received this communication in error or would no longer like to receive these types of communications, please e-mail [email or web address]

## 2018-05-08 NOTE — PROGRESS NOTES
Chronic Pain - New Consult    Referring Physician: Jarvis Washington MD    Chief Complaint:   Chief Complaint   Patient presents with    Low-back Pain    Leg Pain     right side        SUBJECTIVE: Disclaimer: This note has been generated using voice-recognition software. There may be typographical errors that have been missed during proof-reading    Initial encounter:    Renny Young presents to the clinic for the evaluation of lower back and right leg pain. The pain started two months ago and symptoms have been worsening.    Brief history:    Pain Description:    The pain is located in the lower back and right leg area in the L5/S1 distribution.      At BEST  5/10     At WORST  7/10 on the WORST day.      On average pain is rated as 5/10.     Today the pain is rated as 5/10    The pain is described as aching      Symptoms interfere with daily activity.     Exacerbating factors: Standing and Morning.      Mitigating factors medications.     Patient denies night fever/night sweats, urinary incontinence, bowel incontinence, significant weight loss, significant motor weakness and loss of sensations.  Patient denies any suicidal or homicidal ideations    Pain Medications:  Current:  Aleve  Gabapentin 300mg in AM    Tried in Past:  NSAIDs -Never  TCA -Never  SNRI -Never  Anti-convulsants -  Muscle Relaxants -Never  Opioids-Never    Physical Therapy/Home Exercise: no       report:  Reviewed and consistent with medication use as prescribed.    Pain Procedures: none    Chiropractor -never  Acupuncture - never  TENS unit -never  Spinal decompression -never  Joint replacement -never    Imaging:     X-Ray Lumbar Spine    Narrative     EXAMINATION:  XR LUMBAR SPINE AP AND LATERAL    CLINICAL HISTORY:  Low back pain, >6wks conservative tx, persistent-progressive sx, surgical candidate;Low back pain    TECHNIQUE:  AP, lateral and spot images were performed of the lumbar spine.    COMPARISON:  None    FINDINGS:  There  are 5 non-rib-bearing lumbar type vertebral bodies.  There is mild levocurvature of the lumbar spine between L1 and L4 that I calculate at 4°.   Vertebral body heights and alignment are preserved.    There is severe loss of disc space height at L5-S1 with near effacement of that disc space; vacuum phenomenon is present at the anterior portion of the disc space.  Other disc spaces are preserved.    There is facet arthropathy at L5-S1.  I do not identify spondylolysis or spondylolisthesis.    Sacroiliac joints are incompletely visualized.    Calcific atherosclerosis is present in the abdominal aorta and iliac arteries of this 57-year-old man.  I do not identify aneurysm.   Impression       Please see above.      Electronically signed by: Arin Galicia MD  Date: 05/01/2018  Time: 08:06         Past Medical History:   Diagnosis Date    Diabetes mellitus, type 2     Hypertension     Pacemaker      Past Surgical History:   Procedure Laterality Date    COLONOSCOPY N/A 2/13/2017    Procedure: COLONOSCOPY;  Surgeon: NILDA Juares MD;  Location: [facility] ([address] [place] [address]);  Service: Endoscopy;  Laterality: N/A;  Do not cancel this order. Patient has Pacemaker in place.     HERNIA REPAIR       Social History     Social History    Marital status:      Spouse name: N/A    Number of children: 3    Years of education: N/A     Occupational History          Social History Main Topics    Smoking status: Current Every Day Smoker     Packs/day: 0.50     Types: Cigarettes    Smokeless tobacco: Never Used    Alcohol use Yes      Comment: Beer- Socially    Drug use: No    Sexual activity: Yes     Other Topics Concern    Not on file     Social History Narrative    No narrative on file     Family History   Problem Relation Age of Onset    Diabetes Mother     Diabetes Father     Kidney disease Father     Melanoma Neg Hx        Review of patient's allergies indicates:  No Known Allergies    Current Outpatient  Prescriptions   Medication Sig    aspirin (ECOTRIN) 81 MG EC tablet Take 81 mg by mouth once daily.    atorvastatin (LIPITOR) 20 MG tablet Take 1 tablet (20 mg total) by mouth once daily.    docusate sodium (COLACE) 100 MG capsule Take 1 capsule (100 mg total) by mouth 2 (two) times daily.    gabapentin (NEURONTIN) 300 MG capsule TAKE ONE CAPSULE BY MOUTH TWICE DAILY    lisinopril-hydrochlorothiazide (PRINZIDE,ZESTORETIC) 20-12.5 mg per tablet Take 1 tablet by mouth once daily.    metFORMIN (GLUCOPHAGE) 500 MG tablet TAKE ONE TABLET BY MOUTH ONCE DAILY WITH BREAKFAST    amLODIPine (NORVASC) 5 MG tablet Take 1 tablet (5 mg total) by mouth once daily.    ammonium lactate 12 % Crea Apply twice daily to affected parts both feet as needed.    methylPREDNISolone (MEDROL DOSEPACK) 4 mg tablet use as directed    triamcinolone acetonide 0.1% (KENALOG) 0.1 % cream Apply topically 2 (two) times daily.     No current facility-administered medications for this visit.        REVIEW OF SYSTEMS:    GENERAL:  No weight loss, malaise or fevers.  HEENT:   No recent changes in vision or hearing  NECK:  Negative for lumps, no difficulty with swallowing.  RESPIRATORY:  Negative for cough, wheezing or shortness of breath, patient denies any recent URI.  CARDIOVASCULAR:  Negative for chest pain, leg swelling or palpitations. Pacemaker for SSS  GI:  Negative for abdominal discomfort, blood in stools or black stools or change in bowel habits.  MUSCULOSKELETAL:  See HPI.  SKIN:  Negative for lesions, rash, and itching.  PSYCH:  No mood disorder or recent psychosocial stressors.  Patients sleep is not disturbed secondary to pain.  HEMATOLOGY/LYMPHOLOGY:  Negative for prolonged bleeding, bruising easily or swollen nodes.  Patient is not currently taking any anti-coagulants  ENDO: DM2 on metformin  NEURO:   No history of headaches, syncope, paralysis, seizures or tremors.  All other reviewed and negative other than  "HPI.    OBJECTIVE:    /68   Pulse 79   Temp 96.3 °F (35.7 °C)   Ht 5' 7" (1.702 m)   Wt 68.9 kg (152 lb)   BMI 23.81 kg/m²     PHYSICAL EXAMINATION:    GENERAL: Well appearing, in no acute distress, alert and oriented x3.  PSYCH:  Mood and affect appropriate.  SKIN: Skin color, texture, turgor normal, no rashes or lesions.  HEAD/FACE:  Normocephalic, atraumatic. Cranial nerves grossly intact.  CV: RRR with palpation of the radial artery.  PULM: No evidence of respiratory difficulty, symmetric chest rise.  BACK: Straight leg raising in the sitting and supine positions is negative to radicular pain. There is pain with palpation over the facet joints of the lumbar spine bilaterally at L5/S1. There is decreased range of motion with extension to 15 degrees, and facet loading maneuvers cause reproducible pain bilaterally left > right.    EXTREMITIES: Peripheral joint ROM is full and pain free without obvious instability or laxity in all four extremities. No deformities, edema, or skin discoloration. Good capillary refill.  MUSCULOSKELETAL: Hip, and knee provocative maneuvers are negative.  There is  pain with palpation over the sacroiliac joints bilaterally.  There is no pain to palpation over the greater trochanteric bursa bilaterally.  FABERs test is positive bilaterally.  FADIRs test is negative.   5/5 strength in right ankle with plantar and dorsiflexion, 5/5 strength in left ankle with plantar and dorsiflexion, 5/5 strength with right knee flexion extension, 5/5 strength with knee flexion extension on the left  No atrophy or tone abnormalities are noted.  NEURO: Bilateral lower extremity coordination and muscle stretch reflexes are physiologic and symmetric.  Plantar response are downgoing. No clonus.   Decreased sensation to light touch on the lateral aspect of the right leg.   GAIT: Antalgic, ambulates without assistance     Lab Results   Component Value Date    HGBA1C 5.8 (H) 04/06/2018     Lab Results "   Component Value Date    WBC 12.57 04/06/2017    HGB 15.6 04/06/2017    HCT 46.0 04/06/2017    MCV 87 04/06/2017     04/06/2017     BMP  Lab Results   Component Value Date     04/06/2018    K 3.9 04/06/2018     04/06/2018    CO2 27 04/06/2018    BUN 18 04/06/2018    CREATININE 1.0 04/06/2018    CALCIUM 9.9 04/06/2018    ANIONGAP 9 04/06/2018    ESTGFRAFRICA >60.0 04/06/2018    EGFRNONAA >60.0 04/06/2018     ASSESSMENT: 57 y.o. year old male with pain, consistent with     Encounter Diagnoses   Name Primary?    DDD (degenerative disc disease), lumbar     Lumbar radiculopathy     Facet arthritis of lumbar region        PLAN:   Medrol dose kizzy    Continue current dose of gabapentin, in the future we can escalate or trial lyrica as an alternative.    F/u in 2 weeks with APC, if no improvement will set the patient up for a right L5 and S1 TFESI    Additionally in the future he may benefit from a MBB to be followed by RFA of the lumbar spine at the levels of L3, L4, L5 bilaterally.    The above plan and management options were discussed at length with patient. Patient is in agreement with the above and verbalized understanding. It will be communicated with the referring physician via electronic record, fax, or mail.    Josue Paredes  05/08/2018

## 2018-05-22 ENCOUNTER — OFFICE VISIT (OUTPATIENT)
Dept: PAIN MEDICINE | Facility: CLINIC | Age: 58
End: 2018-05-22
Payer: COMMERCIAL

## 2018-05-22 VITALS
WEIGHT: 155 LBS | BODY MASS INDEX: 24.33 KG/M2 | SYSTOLIC BLOOD PRESSURE: 108 MMHG | HEART RATE: 74 BPM | TEMPERATURE: 98 F | HEIGHT: 67 IN | DIASTOLIC BLOOD PRESSURE: 70 MMHG

## 2018-05-22 DIAGNOSIS — M54.17 LUMBOSACRAL RADICULOPATHY: Primary | ICD-10-CM

## 2018-05-22 DIAGNOSIS — M47.816 LUMBAR SPONDYLOSIS: ICD-10-CM

## 2018-05-22 DIAGNOSIS — M51.36 DDD (DEGENERATIVE DISC DISEASE), LUMBAR: ICD-10-CM

## 2018-05-22 PROCEDURE — 99214 OFFICE O/P EST MOD 30 MIN: CPT | Mod: S$GLB,,, | Performed by: NURSE PRACTITIONER

## 2018-05-22 PROCEDURE — 99999 PR PBB SHADOW E&M-EST. PATIENT-LVL III: CPT | Mod: PBBFAC,,, | Performed by: NURSE PRACTITIONER

## 2018-05-22 PROCEDURE — 3078F DIAST BP <80 MM HG: CPT | Mod: CPTII,S$GLB,, | Performed by: NURSE PRACTITIONER

## 2018-05-22 PROCEDURE — 3008F BODY MASS INDEX DOCD: CPT | Mod: CPTII,S$GLB,, | Performed by: NURSE PRACTITIONER

## 2018-05-22 PROCEDURE — 3074F SYST BP LT 130 MM HG: CPT | Mod: CPTII,S$GLB,, | Performed by: NURSE PRACTITIONER

## 2018-05-22 NOTE — PROGRESS NOTES
Chronic Pain - Established Visit    Referring Physician: No ref. provider found    Chief Complaint:   No chief complaint on file.       SUBJECTIVE: Disclaimer: This note has been generated using voice-recognition software. There may be typographical errors that have been missed during proof-reading    Interval History 5/22/2018:  The patient returns for follow up of back pain.  He is having radiation down there back of the right leg to the posterior calf.  The back pain is most severe in the morning and he states that this feels like a stiffness.  This improves when he walks.  He has severe leg pain that is intermittent, mainly with activity.  This is his biggest complaint.  He did complete the medrol dose pack recently with limited improvement.  He had XRAYs which show severe loss of disc space at L5/S1.  He has not had a CT scan.  He is taking Gabapentin 300 mg at bedtime.  It is written BID but it makes him drowsy during the day.  He has some benefit with Aleve but has been told to avoid NSAIDs due to history of pacemaker placement.  His pain today is 5/10.     Initial encounter:    Renny Young presents to the clinic for the evaluation of lower back and right leg pain. The pain started two months ago and symptoms have been worsening.    Brief history:    Pain Description:    The pain is located in the lower back and right leg area in the L5/S1 distribution.      At BEST  5/10     At WORST  7/10 on the WORST day.      On average pain is rated as 5/10.     Today the pain is rated as 5/10    The pain is described as aching      Symptoms interfere with daily activity.     Exacerbating factors: Standing and Morning.      Mitigating factors medications.     Patient denies night fever/night sweats, urinary incontinence, bowel incontinence, significant weight loss, significant motor weakness and loss of sensations.  Patient denies any suicidal or homicidal ideations    Pain Medications:  Current:  Gabapentin 300mg in  AM    Tried in Past:  NSAIDs - Aleve  TCA -Never  SNRI -Never  Anti-convulsants - Gabapentin  Muscle Relaxants -Never  Opioids-Never    Physical Therapy/Home Exercise: yes     report:  Reviewed and consistent with medication use as prescribed.    Pain Procedures: none    Chiropractor -never  Acupuncture - never  TENS unit -never  Spinal decompression -never  Joint replacement -never    Imaging:     X-Ray Lumbar Spine    Narrative     EXAMINATION:  XR LUMBAR SPINE AP AND LATERAL    CLINICAL HISTORY:  Low back pain, >6wks conservative tx, persistent-progressive sx, surgical candidate;Low back pain    TECHNIQUE:  AP, lateral and spot images were performed of the lumbar spine.    COMPARISON:  None    FINDINGS:  There are 5 non-rib-bearing lumbar type vertebral bodies.  There is mild levocurvature of the lumbar spine between L1 and L4 that I calculate at 4°.   Vertebral body heights and alignment are preserved.    There is severe loss of disc space height at L5-S1 with near effacement of that disc space; vacuum phenomenon is present at the anterior portion of the disc space.  Other disc spaces are preserved.    There is facet arthropathy at L5-S1.  I do not identify spondylolysis or spondylolisthesis.    Sacroiliac joints are incompletely visualized.    Calcific atherosclerosis is present in the abdominal aorta and iliac arteries of this 57-year-old man.  I do not identify aneurysm.   Impression       Please see above.      Electronically signed by: Arin Galicia MD  Date: 05/01/2018  Time: 08:06         Past Medical History:   Diagnosis Date    Diabetes mellitus, type 2     Hypertension     Pacemaker      Past Surgical History:   Procedure Laterality Date    COLONOSCOPY N/A 2/13/2017    Procedure: COLONOSCOPY;  Surgeon: NILDA Juares MD;  Location: [facility] ([address] [place] [address]);  Service: Endoscopy;  Laterality: N/A;  Do not cancel this order. Patient has Pacemaker in place.     HERNIA REPAIR       Social History      Social History    Marital status:      Spouse name: N/A    Number of children: 3    Years of education: N/A     Occupational History          Social History Main Topics    Smoking status: Current Every Day Smoker     Packs/day: 0.50     Types: Cigarettes    Smokeless tobacco: Never Used    Alcohol use Yes      Comment: Beer- Socially    Drug use: No    Sexual activity: Yes     Other Topics Concern    Not on file     Social History Narrative    No narrative on file     Family History   Problem Relation Age of Onset    Diabetes Mother     Diabetes Father     Kidney disease Father     Melanoma Neg Hx        Review of patient's allergies indicates:  No Known Allergies    Current Outpatient Prescriptions   Medication Sig    amLODIPine (NORVASC) 5 MG tablet Take 1 tablet (5 mg total) by mouth once daily.    ammonium lactate 12 % Crea Apply twice daily to affected parts both feet as needed.    aspirin (ECOTRIN) 81 MG EC tablet Take 81 mg by mouth once daily.    atorvastatin (LIPITOR) 20 MG tablet Take 1 tablet (20 mg total) by mouth once daily.    gabapentin (NEURONTIN) 300 MG capsule TAKE ONE CAPSULE BY MOUTH TWICE DAILY    lisinopril-hydrochlorothiazide (PRINZIDE,ZESTORETIC) 20-12.5 mg per tablet Take 1 tablet by mouth once daily.    metFORMIN (GLUCOPHAGE) 500 MG tablet TAKE ONE TABLET BY MOUTH ONCE DAILY WITH BREAKFAST    methylPREDNISolone (MEDROL DOSEPACK) 4 mg tablet use as directed    triamcinolone acetonide 0.1% (KENALOG) 0.1 % cream Apply topically 2 (two) times daily.     No current facility-administered medications for this visit.        REVIEW OF SYSTEMS:    GENERAL:  No weight loss, malaise or fevers.  HEENT:   No recent changes in vision or hearing  NECK:  Negative for lumps, no difficulty with swallowing.  RESPIRATORY:  Negative for cough, wheezing or shortness of breath, patient denies any recent URI.  CARDIOVASCULAR:  Negative for chest pain, leg swelling or  "palpitations. Pacemaker for SSS  GI:  Negative for abdominal discomfort, blood in stools or black stools or change in bowel habits.  MUSCULOSKELETAL:  See HPI.  SKIN:  Negative for lesions, rash, and itching.  PSYCH:  No mood disorder or recent psychosocial stressors.  Patients sleep is not disturbed secondary to pain.  HEMATOLOGY/LYMPHOLOGY:  Negative for prolonged bleeding, bruising easily or swollen nodes.  Patient is not currently taking any anti-coagulants  ENDO: DM2 on metformin  NEURO:   No history of headaches, syncope, paralysis, seizures or tremors.  All other reviewed and negative other than HPI.    OBJECTIVE:    /70   Pulse 74   Temp 98.1 °F (36.7 °C)   Ht 5' 7" (1.702 m)   Wt 70.3 kg (154 lb 15.7 oz)   BMI 24.27 kg/m²     PHYSICAL EXAMINATION:    GENERAL: Well appearing, in no acute distress, alert and oriented x3.  PSYCH:  Mood and affect appropriate.  SKIN: Skin color, texture, turgor normal, no rashes or lesions.  HEAD/FACE:  Normocephalic, atraumatic. Cranial nerves grossly intact.  CV: RRR with palpation of the radial artery.  PULM: No evidence of respiratory difficulty, symmetric chest rise.  BACK: Straight leg raising in the sitting and supine positions is positive to radicular pain at right L5 distribution. There is pain with palpation over the facet joints of the lumbar spine bilaterally at L5/S1. There is decreased range of motion with extension to 15 degrees, and facet loading maneuvers cause reproducible pain bilaterally.  EXTREMITIES: Peripheral joint ROM is full and pain free without obvious instability or laxity in all four extremities. No deformities, edema, or skin discoloration. Good capillary refill.  MUSCULOSKELETAL: Hip, and knee provocative maneuvers are negative.  There is  pain with palpation over the sacroiliac joints bilaterally.  There is no pain to palpation over the greater trochanteric bursa bilaterally.  FABERs test is positive bilaterally.  FADIRs test is " negative.   5/5 strength in right ankle with plantar and dorsiflexion, 5/5 strength in left ankle with plantar and dorsiflexion, 5/5 strength with right knee flexion extension, 5/5 strength with knee flexion extension on the left  No atrophy or tone abnormalities are noted.  NEURO: Bilateral lower extremity coordination and muscle stretch reflexes are physiologic and symmetric.  Plantar response are downgoing. No clonus.   Decreased sensation to light touch on the lateral aspect of the right leg at L5 and S1 distribution.   GAIT: Antalgic, ambulates without assistance.    Lab Results   Component Value Date    HGBA1C 5.8 (H) 04/06/2018     Lab Results   Component Value Date    WBC 12.57 04/06/2017    HGB 15.6 04/06/2017    HCT 46.0 04/06/2017    MCV 87 04/06/2017     04/06/2017     BMP  Lab Results   Component Value Date     04/06/2018    K 3.9 04/06/2018     04/06/2018    CO2 27 04/06/2018    BUN 18 04/06/2018    CREATININE 1.0 04/06/2018    CALCIUM 9.9 04/06/2018    ANIONGAP 9 04/06/2018    ESTGFRAFRICA >60.0 04/06/2018    EGFRNONAA >60.0 04/06/2018     ASSESSMENT: 57 y.o. year old male with pain, consistent with     Encounter Diagnoses   Name Primary?    Lumbosacral radiculopathy Yes    DDD (degenerative disc disease), lumbar     Lumbar spondylosis        PLAN:     - Lumbar XRAYs reviewed with the patient today.  I will order a lumbar CT to further evaluate for radicular symptoms.  He cannot have an MRI due to pacemaker.    - Continue Gabapentin 300 mg which he will increase to 2 pills at bedtime.    - Schedule for right L5 and S1 TFESI.  I will call him with CT results prior to procedure.  The procedure, risks, benefits and options were discussed with patient. There are no contraindications to the procedure. The patient expressed understanding and agreed to proceed.      - Additionally in the future he may benefit from a MBB to be followed by RFA of the lumbar spine at the levels of L3, L4,  L5 bilaterally.  His morning pain and stiffness is most likely due to facet arthropathy.    - RTC 2 weeks after procedure.      The above plan and management options were discussed at length with patient. Patient is in agreement with the above and verbalized understanding.     Trista Oviedo  05/22/2018

## 2018-05-28 ENCOUNTER — HOSPITAL ENCOUNTER (OUTPATIENT)
Dept: RADIOLOGY | Facility: OTHER | Age: 58
Discharge: HOME | End: 2018-05-28
Attending: NURSE PRACTITIONER
Payer: COMMERCIAL

## 2018-05-28 DIAGNOSIS — M51.36 DDD (DEGENERATIVE DISC DISEASE), LUMBAR: ICD-10-CM

## 2018-05-28 DIAGNOSIS — M47.816 LUMBAR SPONDYLOSIS: ICD-10-CM

## 2018-05-28 DIAGNOSIS — M54.17 LUMBOSACRAL RADICULOPATHY: ICD-10-CM

## 2018-05-28 PROCEDURE — 72131 CT LUMBAR SPINE W/O DYE: CPT | Mod: TC

## 2018-05-28 PROCEDURE — 72131 CT LUMBAR SPINE W/O DYE: CPT | Mod: 26,,, | Performed by: RADIOLOGY

## 2018-05-30 ENCOUNTER — TELEPHONE (OUTPATIENT)
Dept: PAIN MEDICINE | Facility: CLINIC | Age: 58
End: 2018-05-30

## 2018-05-30 NOTE — TELEPHONE ENCOUNTER
Spoke with patient regarding lumbar CT results.  He will keep appointment for JEAN MARIE with Dr. Paredes.

## 2018-06-05 ENCOUNTER — SURGERY (OUTPATIENT)
Age: 58
End: 2018-06-05

## 2018-06-05 ENCOUNTER — HOSPITAL ENCOUNTER (OUTPATIENT)
Facility: OTHER | Age: 58
Discharge: HOME | End: 2018-06-05
Attending: ANESTHESIOLOGY | Admitting: ANESTHESIOLOGY
Payer: COMMERCIAL

## 2018-06-05 VITALS
HEART RATE: 66 BPM | DIASTOLIC BLOOD PRESSURE: 78 MMHG | RESPIRATION RATE: 18 BRPM | OXYGEN SATURATION: 100 % | SYSTOLIC BLOOD PRESSURE: 118 MMHG | HEIGHT: 67 IN | TEMPERATURE: 98 F | BODY MASS INDEX: 24.01 KG/M2 | WEIGHT: 153 LBS

## 2018-06-05 DIAGNOSIS — M54.16 LUMBAR RADICULOPATHY: Primary | ICD-10-CM

## 2018-06-05 LAB — POCT GLUCOSE: 83 MG/DL (ref 70–110)

## 2018-06-05 PROCEDURE — 82947 ASSAY GLUCOSE BLOOD QUANT: CPT | Performed by: ANESTHESIOLOGY

## 2018-06-05 PROCEDURE — 63600175 PHARM REV CODE 636 W HCPCS: Performed by: ANESTHESIOLOGY

## 2018-06-05 PROCEDURE — 64484 NJX AA&/STRD TFRM EPI L/S EA: CPT | Performed by: ANESTHESIOLOGY

## 2018-06-05 PROCEDURE — 25500020 PHARM REV CODE 255: Performed by: ANESTHESIOLOGY

## 2018-06-05 PROCEDURE — 64483 NJX AA&/STRD TFRM EPI L/S 1: CPT | Performed by: ANESTHESIOLOGY

## 2018-06-05 PROCEDURE — 64484 NJX AA&/STRD TFRM EPI L/S EA: CPT | Mod: RT,,, | Performed by: ANESTHESIOLOGY

## 2018-06-05 PROCEDURE — 64483 NJX AA&/STRD TFRM EPI L/S 1: CPT | Mod: RT,,, | Performed by: ANESTHESIOLOGY

## 2018-06-05 PROCEDURE — 25000003 PHARM REV CODE 250: Performed by: ANESTHESIOLOGY

## 2018-06-05 RX ORDER — MIDAZOLAM HYDROCHLORIDE 1 MG/ML
INJECTION INTRAMUSCULAR; INTRAVENOUS
Status: DISCONTINUED | OUTPATIENT
Start: 2018-06-05 | End: 2018-06-05 | Stop reason: HOSPADM

## 2018-06-05 RX ORDER — METHYLPREDNISOLONE ACETATE 40 MG/ML
INJECTION, SUSPENSION INTRA-ARTICULAR; INTRALESIONAL; INTRAMUSCULAR; SOFT TISSUE
Status: DISCONTINUED | OUTPATIENT
Start: 2018-06-05 | End: 2018-06-05 | Stop reason: HOSPADM

## 2018-06-05 RX ORDER — BUPIVACAINE HYDROCHLORIDE 2.5 MG/ML
INJECTION, SOLUTION EPIDURAL; INFILTRATION; INTRACAUDAL
Status: DISCONTINUED | OUTPATIENT
Start: 2018-06-05 | End: 2018-06-05 | Stop reason: HOSPADM

## 2018-06-05 RX ORDER — LIDOCAINE HYDROCHLORIDE 10 MG/ML
INJECTION INFILTRATION; PERINEURAL
Status: DISCONTINUED | OUTPATIENT
Start: 2018-06-05 | End: 2018-06-05 | Stop reason: HOSPADM

## 2018-06-05 RX ORDER — SODIUM CHLORIDE 9 MG/ML
INJECTION, SOLUTION INTRAVENOUS CONTINUOUS
Status: DISCONTINUED | OUTPATIENT
Start: 2018-06-05 | End: 2018-06-05 | Stop reason: HOSPADM

## 2018-06-05 RX ADMIN — METHYLPREDNISOLONE ACETATE 40 MG: 40 INJECTION, SUSPENSION INTRA-ARTICULAR; INTRALESIONAL; INTRAMUSCULAR; SOFT TISSUE at 03:06

## 2018-06-05 RX ADMIN — METHYLPREDNISOLONE ACETATE 40 MG: 40 INJECTION, SUSPENSION INTRA-ARTICULAR; INTRALESIONAL; INTRAMUSCULAR; SOFT TISSUE at 01:06

## 2018-06-05 RX ADMIN — BUPIVACAINE HYDROCHLORIDE 10 ML: 2.5 INJECTION, SOLUTION EPIDURAL; INFILTRATION; INTRACAUDAL; PERINEURAL at 03:06

## 2018-06-05 RX ADMIN — IOHEXOL 5 ML: 300 INJECTION, SOLUTION INTRAVENOUS at 03:06

## 2018-06-05 RX ADMIN — MIDAZOLAM HYDROCHLORIDE 2 MG: 1 INJECTION, SOLUTION INTRAMUSCULAR; INTRAVENOUS at 03:06

## 2018-06-05 RX ADMIN — LIDOCAINE HYDROCHLORIDE 10 ML: 10 INJECTION, SOLUTION INFILTRATION; PERINEURAL at 03:06

## 2018-06-05 NOTE — OP NOTE
INFORMED CONSENT: The procedure, risks, benefits and options were discussed with patient. There are no contraindications to the procedure. The patient expressed understanding and agreed to proceed. The personnel performing the procedure was discussed.    06/05/2018    Surgeon: Josue Paredes MD    Assistants: None    PROCEDURE:    1)  Right  L5 TRANSFORAMINAL EPIDURAL STEROID INJECTION    2)  Right  S1 TRANSFORAMINAL EPIDURAL STEROID INJECTION    Pre Procedure diagnosis:    Right  L5 and S1  Lumbar radiculopathy [M54.16]    Post-Procedure diagnosis:   same    Complications: None    Specimens: None      DESCRIPTION OF PROCEDURE: The patient was brought to the procedure room. IV access was obtained prior to the procedure. The patient was positioned prone on the fluoroscopy table. Continuous hemodynamic monitoring was initiated including blood pressure, EKG, and pulse oximetry. . The skin was prepped with chlorhexidine and draped in a sterile fashion. Skin anesthesia was achieved using a total of 10mL of lidocaine, 5mL over each respective injection site.     The  L5 and S1  transforaminal spaces were identified with fluoroscopy in the  AP, oblique, and lateral views.  A 22 gauge spinal quinke needle was then advanced into the area of the trans foraminal spaces bilaterally with confirmation of proper needle position using AP, oblique, and lateral fluoroscopic views. Once the needle tip was in the area of the transforaminal space, and there was no blood, CSF or paraesthesias,  1.5 mL of Omnipaque 300mg/ml was injected on each side for a total of 3mL.  Fluoroscopic imaging in the AP and lateral views revealed a clear outline of the spinal nerve with proximal spread of agent through the neural foramen into the epidural space. A total combination of 1 mL of Bupivicaine 0.25% and 40 mg depo medrol was injected on each side for a total of 4mL of injected medications with displacement of the contrast dye confirming that the  medication went into the area of the transforaminal spaces bilaterally. A sterile dressing was applied.   Patient tolerated the procedure well.    Conscious sedation provided by M.D    The patient was monitored with continuous pulse oximetry, EKG, and intermittent blood pressure monitors.  The patient was hemodynamically stable throughout the entire process was responsive to voice, and breathing spontaneously.  Supplemental O2 was provided at 2L/min via nasal cannula.  Patient was comfortable for the duration of the procedure. (See nurse documentation and case log for sedation time)    There was a total of 2mg IV Midazolam was titrated for the procedure    Patient was taken back to the recovery room for further observation.     The patient was discharged to home in stable condition

## 2018-06-05 NOTE — DISCHARGE SUMMARY
Discharge Note  Short Stay      SUMMARY     Admit Date: 6/5/2018    Attending Physician: Josue Paredes      Discharge Physician: Josue Paredes      Discharge Date: 6/5/2018 5:29 PM    Procedure(s) (LRB):  INJECTION-STEROID-EPIDURAL-TRANSFORAMINAL (Right)    Final Diagnosis: Lumbar radiculopathy [M54.16]    Disposition: Home or self care    Patient Instructions:   Discharge Medication List as of 6/5/2018  4:04 PM      CONTINUE these medications which have NOT CHANGED    Details   aspirin (ECOTRIN) 81 MG EC tablet Take 81 mg by mouth once daily., Until Discontinued, Historical Med      metFORMIN (GLUCOPHAGE) 500 MG tablet TAKE ONE TABLET BY MOUTH ONCE DAILY WITH BREAKFAST, Normal      amLODIPine (NORVASC) 5 MG tablet Take 1 tablet (5 mg total) by mouth once daily., Starting Fri 4/6/2018, Normal      ammonium lactate 12 % Crea Apply twice daily to affected parts both feet as needed., Normal      atorvastatin (LIPITOR) 20 MG tablet Take 1 tablet (20 mg total) by mouth once daily., Starting Fri 4/6/2018, Normal      gabapentin (NEURONTIN) 300 MG capsule TAKE ONE CAPSULE BY MOUTH TWICE DAILY, Normal      lisinopril-hydrochlorothiazide (PRINZIDE,ZESTORETIC) 20-12.5 mg per tablet Take 1 tablet by mouth once daily., Starting Fri 4/6/2018, Normal      triamcinolone acetonide 0.1% (KENALOG) 0.1 % cream Apply topically 2 (two) times daily., Starting Fri 4/6/2018, Normal                 Discharge Diagnosis: Lumbar radiculopathy [M54.16]  Condition on Discharge: Stable with no complications to procedure   Diet on Discharge: Same as before.  Activity: as per instruction sheet.  Discharge to: Home with a responsible adult.  Follow up: 2-4 weeks

## 2018-06-05 NOTE — H&P (VIEW-ONLY)
Chronic Pain - Established Visit    Referring Physician: No ref. provider found    Chief Complaint:   No chief complaint on file.       SUBJECTIVE: Disclaimer: This note has been generated using voice-recognition software. There may be typographical errors that have been missed during proof-reading    Interval History 5/22/2018:  The patient returns for follow up of back pain.  He is having radiation down there back of the right leg to the posterior calf.  The back pain is most severe in the morning and he states that this feels like a stiffness.  This improves when he walks.  He has severe leg pain that is intermittent, mainly with activity.  This is his biggest complaint.  He did complete the medrol dose pack recently with limited improvement.  He had XRAYs which show severe loss of disc space at L5/S1.  He has not had a CT scan.  He is taking Gabapentin 300 mg at bedtime.  It is written BID but it makes him drowsy during the day.  He has some benefit with Aleve but has been told to avoid NSAIDs due to history of pacemaker placement.  His pain today is 5/10.     Initial encounter:    Renny Young presents to the clinic for the evaluation of lower back and right leg pain. The pain started two months ago and symptoms have been worsening.    Brief history:    Pain Description:    The pain is located in the lower back and right leg area in the L5/S1 distribution.      At BEST  5/10     At WORST  7/10 on the WORST day.      On average pain is rated as 5/10.     Today the pain is rated as 5/10    The pain is described as aching      Symptoms interfere with daily activity.     Exacerbating factors: Standing and Morning.      Mitigating factors medications.     Patient denies night fever/night sweats, urinary incontinence, bowel incontinence, significant weight loss, significant motor weakness and loss of sensations.  Patient denies any suicidal or homicidal ideations    Pain Medications:  Current:  Gabapentin 300mg in  AM    Tried in Past:  NSAIDs - Aleve  TCA -Never  SNRI -Never  Anti-convulsants - Gabapentin  Muscle Relaxants -Never  Opioids-Never    Physical Therapy/Home Exercise: yes     report:  Reviewed and consistent with medication use as prescribed.    Pain Procedures: none    Chiropractor -never  Acupuncture - never  TENS unit -never  Spinal decompression -never  Joint replacement -never    Imaging:     X-Ray Lumbar Spine    Narrative     EXAMINATION:  XR LUMBAR SPINE AP AND LATERAL    CLINICAL HISTORY:  Low back pain, >6wks conservative tx, persistent-progressive sx, surgical candidate;Low back pain    TECHNIQUE:  AP, lateral and spot images were performed of the lumbar spine.    COMPARISON:  None    FINDINGS:  There are 5 non-rib-bearing lumbar type vertebral bodies.  There is mild levocurvature of the lumbar spine between L1 and L4 that I calculate at 4°.   Vertebral body heights and alignment are preserved.    There is severe loss of disc space height at L5-S1 with near effacement of that disc space; vacuum phenomenon is present at the anterior portion of the disc space.  Other disc spaces are preserved.    There is facet arthropathy at L5-S1.  I do not identify spondylolysis or spondylolisthesis.    Sacroiliac joints are incompletely visualized.    Calcific atherosclerosis is present in the abdominal aorta and iliac arteries of this 57-year-old man.  I do not identify aneurysm.   Impression       Please see above.      Electronically signed by: Arin Galicia MD  Date: 05/01/2018  Time: 08:06         Past Medical History:   Diagnosis Date    Diabetes mellitus, type 2     Hypertension     Pacemaker      Past Surgical History:   Procedure Laterality Date    COLONOSCOPY N/A 2/13/2017    Procedure: COLONOSCOPY;  Surgeon: NILDA Juares MD;  Location: [facility] ([address] [place] [address]);  Service: Endoscopy;  Laterality: N/A;  Do not cancel this order. Patient has Pacemaker in place.     HERNIA REPAIR       Social History      Social History    Marital status:      Spouse name: N/A    Number of children: 3    Years of education: N/A     Occupational History          Social History Main Topics    Smoking status: Current Every Day Smoker     Packs/day: 0.50     Types: Cigarettes    Smokeless tobacco: Never Used    Alcohol use Yes      Comment: Beer- Socially    Drug use: No    Sexual activity: Yes     Other Topics Concern    Not on file     Social History Narrative    No narrative on file     Family History   Problem Relation Age of Onset    Diabetes Mother     Diabetes Father     Kidney disease Father     Melanoma Neg Hx        Review of patient's allergies indicates:  No Known Allergies    Current Outpatient Prescriptions   Medication Sig    amLODIPine (NORVASC) 5 MG tablet Take 1 tablet (5 mg total) by mouth once daily.    ammonium lactate 12 % Crea Apply twice daily to affected parts both feet as needed.    aspirin (ECOTRIN) 81 MG EC tablet Take 81 mg by mouth once daily.    atorvastatin (LIPITOR) 20 MG tablet Take 1 tablet (20 mg total) by mouth once daily.    gabapentin (NEURONTIN) 300 MG capsule TAKE ONE CAPSULE BY MOUTH TWICE DAILY    lisinopril-hydrochlorothiazide (PRINZIDE,ZESTORETIC) 20-12.5 mg per tablet Take 1 tablet by mouth once daily.    metFORMIN (GLUCOPHAGE) 500 MG tablet TAKE ONE TABLET BY MOUTH ONCE DAILY WITH BREAKFAST    methylPREDNISolone (MEDROL DOSEPACK) 4 mg tablet use as directed    triamcinolone acetonide 0.1% (KENALOG) 0.1 % cream Apply topically 2 (two) times daily.     No current facility-administered medications for this visit.        REVIEW OF SYSTEMS:    GENERAL:  No weight loss, malaise or fevers.  HEENT:   No recent changes in vision or hearing  NECK:  Negative for lumps, no difficulty with swallowing.  RESPIRATORY:  Negative for cough, wheezing or shortness of breath, patient denies any recent URI.  CARDIOVASCULAR:  Negative for chest pain, leg swelling or  "palpitations. Pacemaker for SSS  GI:  Negative for abdominal discomfort, blood in stools or black stools or change in bowel habits.  MUSCULOSKELETAL:  See HPI.  SKIN:  Negative for lesions, rash, and itching.  PSYCH:  No mood disorder or recent psychosocial stressors.  Patients sleep is not disturbed secondary to pain.  HEMATOLOGY/LYMPHOLOGY:  Negative for prolonged bleeding, bruising easily or swollen nodes.  Patient is not currently taking any anti-coagulants  ENDO: DM2 on metformin  NEURO:   No history of headaches, syncope, paralysis, seizures or tremors.  All other reviewed and negative other than HPI.    OBJECTIVE:    /70   Pulse 74   Temp 98.1 °F (36.7 °C)   Ht 5' 7" (1.702 m)   Wt 70.3 kg (154 lb 15.7 oz)   BMI 24.27 kg/m²     PHYSICAL EXAMINATION:    GENERAL: Well appearing, in no acute distress, alert and oriented x3.  PSYCH:  Mood and affect appropriate.  SKIN: Skin color, texture, turgor normal, no rashes or lesions.  HEAD/FACE:  Normocephalic, atraumatic. Cranial nerves grossly intact.  CV: RRR with palpation of the radial artery.  PULM: No evidence of respiratory difficulty, symmetric chest rise.  BACK: Straight leg raising in the sitting and supine positions is positive to radicular pain at right L5 distribution. There is pain with palpation over the facet joints of the lumbar spine bilaterally at L5/S1. There is decreased range of motion with extension to 15 degrees, and facet loading maneuvers cause reproducible pain bilaterally.  EXTREMITIES: Peripheral joint ROM is full and pain free without obvious instability or laxity in all four extremities. No deformities, edema, or skin discoloration. Good capillary refill.  MUSCULOSKELETAL: Hip, and knee provocative maneuvers are negative.  There is  pain with palpation over the sacroiliac joints bilaterally.  There is no pain to palpation over the greater trochanteric bursa bilaterally.  FABERs test is positive bilaterally.  FADIRs test is " negative.   5/5 strength in right ankle with plantar and dorsiflexion, 5/5 strength in left ankle with plantar and dorsiflexion, 5/5 strength with right knee flexion extension, 5/5 strength with knee flexion extension on the left  No atrophy or tone abnormalities are noted.  NEURO: Bilateral lower extremity coordination and muscle stretch reflexes are physiologic and symmetric.  Plantar response are downgoing. No clonus.   Decreased sensation to light touch on the lateral aspect of the right leg at L5 and S1 distribution.   GAIT: Antalgic, ambulates without assistance.    Lab Results   Component Value Date    HGBA1C 5.8 (H) 04/06/2018     Lab Results   Component Value Date    WBC 12.57 04/06/2017    HGB 15.6 04/06/2017    HCT 46.0 04/06/2017    MCV 87 04/06/2017     04/06/2017     BMP  Lab Results   Component Value Date     04/06/2018    K 3.9 04/06/2018     04/06/2018    CO2 27 04/06/2018    BUN 18 04/06/2018    CREATININE 1.0 04/06/2018    CALCIUM 9.9 04/06/2018    ANIONGAP 9 04/06/2018    ESTGFRAFRICA >60.0 04/06/2018    EGFRNONAA >60.0 04/06/2018     ASSESSMENT: 57 y.o. year old male with pain, consistent with     Encounter Diagnoses   Name Primary?    Lumbosacral radiculopathy Yes    DDD (degenerative disc disease), lumbar     Lumbar spondylosis        PLAN:     - Lumbar XRAYs reviewed with the patient today.  I will order a lumbar CT to further evaluate for radicular symptoms.  He cannot have an MRI due to pacemaker.    - Continue Gabapentin 300 mg which he will increase to 2 pills at bedtime.    - Schedule for right L5 and S1 TFESI.  I will call him with CT results prior to procedure.  The procedure, risks, benefits and options were discussed with patient. There are no contraindications to the procedure. The patient expressed understanding and agreed to proceed.      - Additionally in the future he may benefit from a MBB to be followed by RFA of the lumbar spine at the levels of L3, L4,  L5 bilaterally.  His morning pain and stiffness is most likely due to facet arthropathy.    - RTC 2 weeks after procedure.      The above plan and management options were discussed at length with patient. Patient is in agreement with the above and verbalized understanding.     Trista Oviedo  05/22/2018

## 2018-06-05 NOTE — DISCHARGE INSTRUCTIONS
Thank you for allowing us to care for you today. You may receive a survey about the care we provided. Your feedback is valuable and helps us provide excellent care throughout the community. Home Care Instructions Pain Management:    1. DIET:   You may resume your normal diet today.   2. BATHING:   You may shower with luke warm water.  3. DRESSING:   You may remove your bandage today.   4. ACTIVITY LEVEL:   You may resume your normal activities 24 hrs after your procedure.  5. MEDICATIONS:   You may resume your normal medications today.   6. SPECIAL INSTRUCTIONS:   No heat to the injection site for 24 hrs including, bath or shower, heating pad, moist heat, or hot tubs.    Use ice pack to injection site for any pain or discomfort.  Apply ice packs for 20 minute intervals as needed.   If you have received any sedatives by mouth today you may not drive for 12 hours.    If you have received any sedation through your IV, you may not drive for 24 hrs.     PLEASE CALL YOUR DOCTOR IF:  1. Redness or swelling around the injection site.  2. Fever of 101 degrees  3. Drainage (pus) from the injection site.  4. For any continuous bleeding (some dried blood over the incision is normal.)    FOR EMERGENCIES:   If any unusual problems or difficulties occur during clinic hours, call [phone] or [phone].   Adult Procedural Sedation Instructions    Recovery After Procedural Sedation (Adult)  You have been given medicine by vein to make you sleep during your surgery. This may have included both a pain medicine and sleeping medicine. Most of the effects have worn off. But you may still have some drowsiness for the next 6 to 8 hours.  Home care  Follow these guidelines when you get home:  · For the next 8 hours, you should be watched by a responsible adult. This person should make sure your condition is not getting worse.  · Don't drink any alcohol for the next 24 hours.  · Don't drive, operate dangerous machinery, or make important  business or personal decisions during the next 24 hours.  Note: Your healthcare provider may tell you not to take any medicine by mouth for pain or sleep in the next 4 hours. These medicines may react with the medicines you were given in the hospital. This could cause a much stronger response than usual.  Follow-up care  Follow up with your healthcare provider if you are not alert and back to your usual level of activity within 12 hours.  When to seek medical advice  Call your healthcare provider right away if any of these occur:  · Drowsiness gets worse  · Weakness or dizziness gets worse  · Repeated vomiting  · You can't be awakened   Date Last Reviewed: 10/18/2016  © 0723-4642 [employer]. [address] [place] [address], [place] [address]. All rights reserved. This information is not intended as a substitute for professional medical care. Always follow your healthcare professional's instructions.

## 2018-06-20 ENCOUNTER — OFFICE VISIT (OUTPATIENT)
Dept: PAIN MEDICINE | Facility: CLINIC | Age: 58
End: 2018-06-20
Payer: COMMERCIAL

## 2018-06-20 VITALS
HEIGHT: 67 IN | RESPIRATION RATE: 18 BRPM | BODY MASS INDEX: 23.88 KG/M2 | DIASTOLIC BLOOD PRESSURE: 64 MMHG | WEIGHT: 152.13 LBS | TEMPERATURE: 97 F | HEART RATE: 66 BPM | SYSTOLIC BLOOD PRESSURE: 95 MMHG

## 2018-06-20 DIAGNOSIS — M47.816 LUMBAR SPONDYLOSIS: Primary | ICD-10-CM

## 2018-06-20 DIAGNOSIS — M51.36 DDD (DEGENERATIVE DISC DISEASE), LUMBAR: ICD-10-CM

## 2018-06-20 DIAGNOSIS — M54.16 LUMBAR RADICULOPATHY: ICD-10-CM

## 2018-06-20 DIAGNOSIS — M54.17 LUMBOSACRAL RADICULOPATHY: ICD-10-CM

## 2018-06-20 PROCEDURE — 3008F BODY MASS INDEX DOCD: CPT | Mod: CPTII,S$GLB,, | Performed by: NURSE PRACTITIONER

## 2018-06-20 PROCEDURE — 3074F SYST BP LT 130 MM HG: CPT | Mod: CPTII,S$GLB,, | Performed by: NURSE PRACTITIONER

## 2018-06-20 PROCEDURE — 99999 PR PBB SHADOW E&M-EST. PATIENT-LVL IV: CPT | Mod: PBBFAC,,, | Performed by: NURSE PRACTITIONER

## 2018-06-20 PROCEDURE — 3078F DIAST BP <80 MM HG: CPT | Mod: CPTII,S$GLB,, | Performed by: NURSE PRACTITIONER

## 2018-06-20 PROCEDURE — 99213 OFFICE O/P EST LOW 20 MIN: CPT | Mod: S$GLB,,, | Performed by: NURSE PRACTITIONER

## 2018-06-20 NOTE — PROGRESS NOTES
Chronic Pain - Established Visit    Referring Physician: No ref. provider found    Chief Complaint:   Chief Complaint   Patient presents with    Low-back Pain        SUBJECTIVE: Disclaimer: This note has been generated using voice-recognition software. There may be typographical errors that have been missed during proof-reading    Interval History 6/20/2018:  The patient returns today for follow up of lower back and right leg pain.  He is s/p right L5 and S1 TF JEAN MARIE on 6/5/18 with 100% relief of right leg pain.  He has not had any leg pain since the procedure.  He continues to report pain across the lower back.  This is always worse first thing in the morning.  He states that this feels aching and throbbing in nature.  He did have recent lumbar CT scan.  He would like to discuss further procedures.  He continues to work and be active.  His pain today is 5/10.    Interval History 5/22/2018:  The patient returns for follow up of back pain.  He is having radiation down there back of the right leg to the posterior calf.  The back pain is most severe in the morning and he states that this feels like a stiffness.  This improves when he walks.  He has severe leg pain that is intermittent, mainly with activity.  This is his biggest complaint.  He did complete the medrol dose pack recently with limited improvement.  He had XRAYs which show severe loss of disc space at L5/S1.  He has not had a CT scan.  He is taking Gabapentin 300 mg at bedtime.  It is written BID but it makes him drowsy during the day.  He has some benefit with Aleve but has been told to avoid NSAIDs due to history of pacemaker placement.  His pain today is 5/10.     Initial encounter:    Renny LUNDBERG Hector presents to the clinic for the evaluation of lower back and right leg pain. The pain started two months ago and symptoms have been worsening.    Brief history:    Pain Description:    The pain is located in the lower back and right leg area in the L5/S1  distribution.      At BEST  5/10     At WORST  7/10 on the WORST day.      On average pain is rated as 5/10.     Today the pain is rated as 5/10    The pain is described as aching      Symptoms interfere with daily activity.     Exacerbating factors: Standing and Morning.      Mitigating factors medications.     Patient denies night fever/night sweats, urinary incontinence, bowel incontinence, significant weight loss, significant motor weakness and loss of sensations.  Patient denies any suicidal or homicidal ideations    Pain Medications:  Current:  Gabapentin 300mg BID    Tried in Past:  NSAIDs - Aleve  TCA -Never  SNRI -Never  Anti-convulsants - Gabapentin  Muscle Relaxants -Never  Opioids-Never    Physical Therapy/Home Exercise: yes     report:  Reviewed and consistent with medication use as prescribed.    Pain Procedures: none    Chiropractor -never  Acupuncture - never  TENS unit -never  Spinal decompression -never  Joint replacement -never    Imaging:     Lumbar CT 5/28/18    Narrative     EXAMINATION:  CT LUMBAR SPINE WITHOUT CONTRAST    CLINICAL HISTORY:  Low back pain, >6wks conservative tx, persistent-progressive sx, surgical candidate;  Radiculopathy, lumbosacral region    TECHNIQUE:  Low-dose axial, sagittal and coronal reformations are obtained through the lumbar spine.  Contrast was not administered.    COMPARISON:  None.    FINDINGS:  Posterior vertebral alignment is satisfactory.  There is mild levoconvexity of the lumbar spine.  Vertebral body heights are well maintained.  There is prominent degenerative disc disease at the L5-S1 level with marked disc space narrowing with vertebral endplate sclerosis and irregularity as well as vacuum disc phenomenon at the L5-S1 level.  There are anterior and posterior osteophytes at the L5-S1 level associated with diffuse concentric disc bulging.  Mild concentric disc bulging is also present at the L4-L5 level.  There is mild bilateral neural foraminal  narrowing at the L5-S1 level due to the diffuse disc bulging and osteophyte formation as well as mild facet arthropathy at the L5-S1.  No abnormal paraspinal masses are evident.  There is a mild amount of atherosclerotic calcification within the abdominal aorta which tapers normally without aneurysmal dilatation.   Impression       No evidence for acute fracture, bone destruction, or subluxation.    Prominent degenerative disc disease at the L5-S1 level with anterior and posterior osteophyte formation as well as concentric disc bulging.  This does not appear to result in significant spinal stenosis.    Minimal diffuse disc bulging at the L4-L5 level.    Mild neural foraminal narrowing at the L5-S1 level bilaterally.       X-Ray Lumbar Spine    Narrative     EXAMINATION:  XR LUMBAR SPINE AP AND LATERAL    CLINICAL HISTORY:  Low back pain, >6wks conservative tx, persistent-progressive sx, surgical candidate;Low back pain    TECHNIQUE:  AP, lateral and spot images were performed of the lumbar spine.    COMPARISON:  None    FINDINGS:  There are 5 non-rib-bearing lumbar type vertebral bodies.  There is mild levocurvature of the lumbar spine between L1 and L4 that I calculate at 4°.   Vertebral body heights and alignment are preserved.    There is severe loss of disc space height at L5-S1 with near effacement of that disc space; vacuum phenomenon is present at the anterior portion of the disc space.  Other disc spaces are preserved.    There is facet arthropathy at L5-S1.  I do not identify spondylolysis or spondylolisthesis.    Sacroiliac joints are incompletely visualized.    Calcific atherosclerosis is present in the abdominal aorta and iliac arteries of this 57-year-old man.  I do not identify aneurysm.   Impression       Please see above.      Electronically signed by: Arin Galicia MD  Date: 05/01/2018  Time: 08:06         Past Medical History:   Diagnosis Date    Diabetes mellitus, type 2     Hypertension      Pacemaker      Past Surgical History:   Procedure Laterality Date    COLONOSCOPY N/A 2/13/2017    Procedure: COLONOSCOPY;  Surgeon: NILDA Juares MD;  Location: [facility] ([address] [place] [address]);  Service: Endoscopy;  Laterality: N/A;  Do not cancel this order. Patient has Pacemaker in place.     HERNIA REPAIR      TRANSFORAMINAL EPIDURAL INJECTION OF STEROID Right 6/5/2018    Procedure: INJECTION-STEROID-EPIDURAL-TRANSFORAMINAL;  Surgeon: Josue Paredes MD;  Location: [facility];  Service: Pain Management;  Laterality: Right;  LUMBAR RIGHT L5 AND S1 TRANSFORAMINL JEAN MARIE  [id number]    W/ SEDATION      Social History     Social History    Marital status:      Spouse name: N/A    Number of children: 3    Years of education: N/A     Occupational History          Social History Main Topics    Smoking status: Current Every Day Smoker     Packs/day: 0.50     Types: Cigarettes    Smokeless tobacco: Never Used    Alcohol use Yes      Comment: Beer- Socially    Drug use: No    Sexual activity: Yes     Other Topics Concern    Not on file     Social History Narrative    No narrative on file     Family History   Problem Relation Age of Onset    Diabetes Mother     Diabetes Father     Kidney disease Father     Melanoma Neg Hx        Review of patient's allergies indicates:  No Known Allergies    Current Outpatient Prescriptions   Medication Sig    aspirin (ECOTRIN) 81 MG EC tablet Take 81 mg by mouth once daily.    atorvastatin (LIPITOR) 20 MG tablet Take 1 tablet (20 mg total) by mouth once daily.    gabapentin (NEURONTIN) 300 MG capsule TAKE ONE CAPSULE BY MOUTH TWICE DAILY    lisinopril-hydrochlorothiazide (PRINZIDE,ZESTORETIC) 20-12.5 mg per tablet Take 1 tablet by mouth once daily.    metFORMIN (GLUCOPHAGE) 500 MG tablet TAKE ONE TABLET BY MOUTH ONCE DAILY WITH BREAKFAST    amLODIPine (NORVASC) 5 MG tablet Take 1 tablet (5 mg total) by mouth once daily.    ammonium lactate 12 % Crea Apply twice  "daily to affected parts both feet as needed.    triamcinolone acetonide 0.1% (KENALOG) 0.1 % cream Apply topically 2 (two) times daily.     No current facility-administered medications for this visit.        REVIEW OF SYSTEMS:    GENERAL:  No weight loss, malaise or fevers.  HEENT:   No recent changes in vision or hearing  NECK:  Negative for lumps, no difficulty with swallowing.  RESPIRATORY:  Negative for cough, wheezing or shortness of breath, patient denies any recent URI.  CARDIOVASCULAR:  Negative for chest pain, leg swelling or palpitations. Pacemaker for SSS  GI:  Negative for abdominal discomfort, blood in stools or black stools or change in bowel habits.  MUSCULOSKELETAL:  See HPI.  SKIN:  Negative for lesions, rash, and itching.  PSYCH:  No mood disorder or recent psychosocial stressors.  Patients sleep is not disturbed secondary to pain.  HEMATOLOGY/LYMPHOLOGY:  Negative for prolonged bleeding, bruising easily or swollen nodes.  Patient is not currently taking any anti-coagulants  ENDO: DM2 on metformin  NEURO:   No history of headaches, syncope, paralysis, seizures or tremors.  All other reviewed and negative other than HPI.    OBJECTIVE:    BP 95/64   Pulse 66   Temp 97.1 °F (36.2 °C) (Oral)   Resp 18   Ht 5' 7" (1.702 m)   Wt 69 kg (152 lb 1.6 oz)   BMI 23.82 kg/m²     PHYSICAL EXAMINATION:    GENERAL: Well appearing, in no acute distress, alert and oriented x3.  PSYCH:  Mood and affect appropriate.  SKIN: Skin color, texture, turgor normal, no rashes or lesions.  HEAD/FACE:  Normocephalic, atraumatic. Cranial nerves grossly intact.  CV: RRR with palpation of the radial artery.  PULM: No evidence of respiratory difficulty, symmetric chest rise.  BACK: Straight leg raising in the sitting and supine positions is negative to radicular pain.  There is pain with palpation over the facet joints of the lumbar spine bilaterally.  There is decreased range of motion with extension to 15 degrees, and " facet loading maneuvers cause reproducible pain bilaterally, R>L.  EXTREMITIES: Peripheral joint ROM is full and pain free without obvious instability or laxity in all four extremities. No deformities, edema, or skin discoloration. Good capillary refill.  MUSCULOSKELETAL: Hip, and knee provocative maneuvers are negative.  There is  pain with palpation over the sacroiliac joints bilaterally.  There is no pain to palpation over the greater trochanteric bursa bilaterally.  FABERs test is positive bilaterally.  FADIRs test is negative.   5/5 strength in right ankle with plantar and dorsiflexion, 5/5 strength in left ankle with plantar and dorsiflexion, 5/5 strength with right knee flexion extension, 5/5 strength with knee flexion extension on the left  No atrophy or tone abnormalities are noted.  NEURO: Bilateral lower extremity coordination and muscle stretch reflexes are physiologic and symmetric.  Plantar response are downgoing. No clonus.  Normal sensation.  GAIT: Antalgic, ambulates without assistance.    Lab Results   Component Value Date    HGBA1C 5.8 (H) 04/06/2018     Lab Results   Component Value Date    WBC 12.57 04/06/2017    HGB 15.6 04/06/2017    HCT 46.0 04/06/2017    MCV 87 04/06/2017     04/06/2017     BMP  Lab Results   Component Value Date     04/06/2018    K 3.9 04/06/2018     04/06/2018    CO2 27 04/06/2018    BUN 18 04/06/2018    CREATININE 1.0 04/06/2018    CALCIUM 9.9 04/06/2018    ANIONGAP 9 04/06/2018    ESTGFRAFRICA >60.0 04/06/2018    EGFRNONAA >60.0 04/06/2018     ASSESSMENT: 57 y.o. year old male with lower back pain, consistent with the following diagnoses:    Encounter Diagnoses   Name Primary?    Lumbar spondylosis Yes    Lumbar radiculopathy     Lumbosacral radiculopathy     DDD (degenerative disc disease), lumbar        PLAN:     - Recent lumbar CT reviewed with patient in detail today.    - Continue Gabapentin 300 mg BID (he takes both at night due to  sedation).    - He is s/p right L5 and S1 TFESI with complete relief of right leg pain.  Will schedule for bilateral L3,4,5 MBB for axial back pain.  The patient will call with relief and if diagnostic, we can schedule radiofrequency ablation of affected nerves, one side followed by the other 2 weeks apart.  The procedure, risks, benefits and options were discussed with patient. There are no contraindications to the procedure. The patient expressed understanding and agreed to proceed.      - Also consider SI joint injections.    - The patient will continue a home exercise routine to help with pain and strengthening.      - RTC 2 weeks after procedure.      The above plan and management options were discussed at length with patient. Patient is in agreement with the above and verbalized understanding.     Trista Oviedo  06/20/2018

## 2018-07-05 ENCOUNTER — HOSPITAL ENCOUNTER (OUTPATIENT)
Facility: OTHER | Age: 58
Discharge: HOME | End: 2018-07-05
Attending: ANESTHESIOLOGY | Admitting: ANESTHESIOLOGY
Payer: COMMERCIAL

## 2018-07-05 ENCOUNTER — SURGERY (OUTPATIENT)
Age: 58
End: 2018-07-05

## 2018-07-05 VITALS
HEART RATE: 72 BPM | TEMPERATURE: 98 F | SYSTOLIC BLOOD PRESSURE: 150 MMHG | RESPIRATION RATE: 18 BRPM | OXYGEN SATURATION: 98 % | HEIGHT: 67 IN | BODY MASS INDEX: 24.33 KG/M2 | DIASTOLIC BLOOD PRESSURE: 84 MMHG | WEIGHT: 155 LBS

## 2018-07-05 DIAGNOSIS — M47.816 LUMBAR SPONDYLOSIS: ICD-10-CM

## 2018-07-05 DIAGNOSIS — M47.816 FACET ARTHRITIS OF LUMBAR REGION: Primary | ICD-10-CM

## 2018-07-05 LAB — POCT GLUCOSE: 76 MG/DL (ref 70–110)

## 2018-07-05 PROCEDURE — 64494 INJ PARAVERT F JNT L/S 2 LEV: CPT | Mod: 50 | Performed by: ANESTHESIOLOGY

## 2018-07-05 PROCEDURE — 64495 INJ PARAVERT F JNT L/S 3 LEV: CPT | Mod: 50 | Performed by: ANESTHESIOLOGY

## 2018-07-05 PROCEDURE — 25000003 PHARM REV CODE 250: Performed by: ANESTHESIOLOGY

## 2018-07-05 PROCEDURE — 82947 ASSAY GLUCOSE BLOOD QUANT: CPT | Performed by: ANESTHESIOLOGY

## 2018-07-05 PROCEDURE — 64494 INJ PARAVERT F JNT L/S 2 LEV: CPT | Mod: 50,,, | Performed by: ANESTHESIOLOGY

## 2018-07-05 PROCEDURE — 64493 INJ PARAVERT F JNT L/S 1 LEV: CPT | Mod: 50 | Performed by: ANESTHESIOLOGY

## 2018-07-05 PROCEDURE — 64493 INJ PARAVERT F JNT L/S 1 LEV: CPT | Mod: 50,,, | Performed by: ANESTHESIOLOGY

## 2018-07-05 RX ORDER — BUPIVACAINE HYDROCHLORIDE 2.5 MG/ML
INJECTION, SOLUTION EPIDURAL; INFILTRATION; INTRACAUDAL
Status: DISCONTINUED | OUTPATIENT
Start: 2018-07-05 | End: 2018-07-05 | Stop reason: HOSPADM

## 2018-07-05 RX ORDER — SODIUM CHLORIDE 9 MG/ML
500 INJECTION, SOLUTION INTRAVENOUS CONTINUOUS
Status: DISCONTINUED | OUTPATIENT
Start: 2018-07-05 | End: 2018-07-05 | Stop reason: HOSPADM

## 2018-07-05 RX ORDER — LIDOCAINE HYDROCHLORIDE 10 MG/ML
INJECTION INFILTRATION; PERINEURAL
Status: DISCONTINUED | OUTPATIENT
Start: 2018-07-05 | End: 2018-07-05 | Stop reason: HOSPADM

## 2018-07-05 RX ORDER — TRIAMCINOLONE ACETONIDE 40 MG/ML
INJECTION, SUSPENSION INTRA-ARTICULAR; INTRAMUSCULAR
Status: DISCONTINUED | OUTPATIENT
Start: 2018-07-05 | End: 2018-07-05 | Stop reason: HOSPADM

## 2018-07-05 RX ADMIN — LIDOCAINE HYDROCHLORIDE 10 ML: 10 INJECTION, SOLUTION INFILTRATION; PERINEURAL at 02:07

## 2018-07-05 RX ADMIN — BUPIVACAINE HYDROCHLORIDE 10 ML: 2.5 INJECTION, SOLUTION EPIDURAL; INFILTRATION; INTRACAUDAL; PERINEURAL at 02:07

## 2018-07-05 NOTE — DISCHARGE INSTRUCTIONS
Home Care Instructions Pain Management:    1. DIET:   You may resume your normal diet today.   2. BATHING:   You may shower with luke warm water.  3. DRESSING:   You may remove your bandage today.   4. ACTIVITY LEVEL:   You may resume your normal activities 24 hrs after your procedure.  5. MEDICATIONS:   You may resume your normal medications today.   6. SPECIAL INSTRUCTIONS:   No heat to the injection site for 24 hrs including, bath or shower, heating pad, moist heat, or hot tubs.    Use ice pack to injection site for any pain or discomfort.  Apply ice packs for 20 minute intervals as needed.   If you have received any sedatives by mouth today you may not drive for 12 hours.    If you have received any sedation through your IV, you may not drive for 24 hrs.     PLEASE CALL YOUR DOCTOR IF:  1. Redness or swelling around the injection site.  2. Fever of 101 degrees  3. Drainage (pus) from the injection site.  4. For any continuous bleeding (some dried blood over the incision is normal.)    FOR EMERGENCIES:   If any unusual problems or difficulties occur during clinic hours, call [phone] or [phone].     Thank you for allowing us to care for you today. You may receive a survey about the care we provided. Your feedback is valuable and helps us provide excellent care throughout the community.

## 2018-07-05 NOTE — OP NOTE
lUMBAR Medial Branch Block Under Fluoroscopy  Time-out taken to identify patient and procedure side prior to starting the procedure.   I attest that I have reviewed the patient's home medications prior to the procedure and no contraindication have been identified. I  re-evaluated the patient after the patient was positioned for the procedure in the procedure room immediately before the procedural time-out. The vital signs are current and represent the current state of the patient which has not significantly changed since the preprocedure assessment.            Date of Service: 07/05/2018    PCP: Jarvis Neri MD    PROCEDURE:  Bilateral L3, L4, L5 medial branch blocks    REASON FOR PROCEDURE: Lumbar spondylosis [M47.816]  1. Facet arthritis of lumbar region    2. Lumbar spondylosis      POSTPROCEDURE DIAGNOSIS:   Lumbar spondylosis [M47.816]    1. Facet arthritis of lumbar region    2. Lumbar spondylosis           PHYSICIAN: Krystal Mtz MD    ASSISTANTS: Gee Alvarado DO    MEDICATIONS INJECTED:Kenalog 20mg Bupivicaine 25% 1.5ml at each level    LOCAL ANESTHETIC USED: Xylocaine 1% 9ml with Sodium Bicarbonate 1ml.  3ml each site.    SEDATION MEDICATIONS: None    ESTIMATED BLOOD LOSS:  None.    COMPLICATIONS:  None.    TECHNIQUE: Laying in a prone position, the patient was prepped and draped in the usual sterile fashion using ChloraPrep and fenestrated drape.  The level was determined under fluoroscopic guidance.  Local anesthetic was given by going down to the hub of the 27-gauge 1.25in needle and raising a wheel.  A 22-gauge 3.5inch needle was introduced to the anatomic local of the medial branch at the lateral mass utilizing live fluoroscopy. Medication was then injected slowly. The patient tolerated the procedure well.     PAIN BEFORE THE PROCEDURE:  7/10.    PAIN AFTER THE PROCEDURE:  0/10.    The patient was monitored after the procedure.  Patient was given post procedure and discharge instructions to follow  at home.  We will see the patient back in two weeks or the patient may call to inform of status. The patient was discharged in a stable condition

## 2018-07-19 DIAGNOSIS — E11.9 TYPE 2 DIABETES MELLITUS WITHOUT COMPLICATION, WITHOUT LONG-TERM CURRENT USE OF INSULIN: ICD-10-CM

## 2018-07-19 RX ORDER — ATORVASTATIN CALCIUM 20 MG/1
TABLET, FILM COATED ORAL
Qty: 30 TABLET | Refills: 1 | Status: SHIPPED | OUTPATIENT
Start: 2018-07-19 | End: 2018-10-23 | Stop reason: SDUPTHER

## 2018-07-23 ENCOUNTER — TELEPHONE (OUTPATIENT)
Dept: PAIN MEDICINE | Facility: CLINIC | Age: 58
End: 2018-07-23

## 2018-07-23 NOTE — TELEPHONE ENCOUNTER
Contacted patient regarding message.     Patient stated he would like to schedule a follow up visit with Trista to discuss scheduling another procedure.    Patient stated he had 100% complete relief from the Nerve Block he had on 07/05/18.    An appointment for a f/u after the procedure was scheduled with Trista Oviedo NP for 07/25/18 at 7:40am.    Patient acknowledged information given and expressed understanding.

## 2018-07-23 NOTE — TELEPHONE ENCOUNTER
----- Message from Suki Rodriguez sent at 7/23/2018 11:36 AM CDT -----  Contact: pt    Name of Who is Calling:ANDRES MONTERROSO [[id number]]    What is the request in detail: Patient request a call back to schedule a follow up appointment after Nerve block procedure  Please contact to further discuss and advise    Can the clinic reply by [facility]: No    What Number [facility] Back [facility]: [phone]

## 2018-07-25 ENCOUNTER — OFFICE VISIT (OUTPATIENT)
Dept: PAIN MEDICINE | Facility: CLINIC | Age: 58
End: 2018-07-25
Payer: COMMERCIAL

## 2018-07-25 VITALS
BODY MASS INDEX: 24.24 KG/M2 | SYSTOLIC BLOOD PRESSURE: 147 MMHG | RESPIRATION RATE: 13 BRPM | DIASTOLIC BLOOD PRESSURE: 97 MMHG | WEIGHT: 154.75 LBS | HEART RATE: 86 BPM

## 2018-07-25 DIAGNOSIS — M54.16 LUMBAR RADICULOPATHY: ICD-10-CM

## 2018-07-25 DIAGNOSIS — M47.816 FACET ARTHRITIS OF LUMBAR REGION: ICD-10-CM

## 2018-07-25 DIAGNOSIS — M51.36 DDD (DEGENERATIVE DISC DISEASE), LUMBAR: ICD-10-CM

## 2018-07-25 DIAGNOSIS — M47.816 LUMBAR SPONDYLOSIS: Primary | ICD-10-CM

## 2018-07-25 PROCEDURE — 99213 OFFICE O/P EST LOW 20 MIN: CPT | Mod: S$GLB,,, | Performed by: NURSE PRACTITIONER

## 2018-07-25 PROCEDURE — 3077F SYST BP >= 140 MM HG: CPT | Mod: CPTII,S$GLB,, | Performed by: NURSE PRACTITIONER

## 2018-07-25 PROCEDURE — 3008F BODY MASS INDEX DOCD: CPT | Mod: CPTII,S$GLB,, | Performed by: NURSE PRACTITIONER

## 2018-07-25 PROCEDURE — 99999 PR PBB SHADOW E&M-EST. PATIENT-LVL III: CPT | Mod: PBBFAC,,, | Performed by: NURSE PRACTITIONER

## 2018-07-25 PROCEDURE — 3080F DIAST BP >= 90 MM HG: CPT | Mod: CPTII,S$GLB,, | Performed by: NURSE PRACTITIONER

## 2018-07-25 NOTE — H&P (VIEW-ONLY)
Chronic Pain - Established Visit    Referring Physician: No ref. provider found    Chief Complaint:   No chief complaint on file.       SUBJECTIVE: Disclaimer: This note has been generated using voice-recognition software. There may be typographical errors that have been missed during proof-reading    Interval History 7/25/2018:  The patient returns today for follow up of back pain.  He is s/p bilateral L3,4,5 MBB on 7/5/18 with 100% relief for 2 days.  He previously had benefit with right TF JEAN MARIE for leg pain.  He has not had right leg pain since the epidural.  However, he is reporting lateral left leg pain that has progressed over the past couple of weeks.  He continues to be active and works.  His pain today is 5/10.    Interval History 6/20/2018:  The patient returns today for follow up of lower back and right leg pain.  He is s/p right L5 and S1 TF JEAN MARIE on 6/5/18 with 100% relief of right leg pain.  He has not had any leg pain since the procedure.  He continues to report pain across the lower back.  This is always worse first thing in the morning.  He states that this feels aching and throbbing in nature.  He did have recent lumbar CT scan.  He would like to discuss further procedures.  He continues to work and be active.  His pain today is 5/10.    Interval History 5/22/2018:  The patient returns for follow up of back pain.  He is having radiation down there back of the right leg to the posterior calf.  The back pain is most severe in the morning and he states that this feels like a stiffness.  This improves when he walks.  He has severe leg pain that is intermittent, mainly with activity.  This is his biggest complaint.  He did complete the medrol dose pack recently with limited improvement.  He had XRAYs which show severe loss of disc space at L5/S1.  He has not had a CT scan.  He is taking Gabapentin 300 mg at bedtime.  It is written BID but it makes him drowsy during the day.  He has some benefit with  Aleve but has been told to avoid NSAIDs due to history of pacemaker placement.  His pain today is 5/10.     Initial encounter:    Renny Young presents to the clinic for the evaluation of lower back and right leg pain. The pain started two months ago and symptoms have been worsening.    Brief history:    Pain Description:    The pain is located in the lower back and right leg area in the L5/S1 distribution.      At BEST  5/10     At WORST  7/10 on the WORST day.      On average pain is rated as 5/10.     Today the pain is rated as 5/10    The pain is described as aching      Symptoms interfere with daily activity.     Exacerbating factors: Standing and Morning.      Mitigating factors medications.     Patient denies night fever/night sweats, urinary incontinence, bowel incontinence, significant weight loss, significant motor weakness and loss of sensations.  Patient denies any suicidal or homicidal ideations    Pain Medications:  Current:  Gabapentin 300mg BID    Tried in Past:  NSAIDs - Aleve  TCA -Never  SNRI -Never  Anti-convulsants - Gabapentin   Muscle Relaxants -Never  Opioids-Never    Physical Therapy/Home Exercise: yes     report:  Reviewed and consistent with medication use as prescribed.    Pain Procedures:   6/5/18 Right L5 and S1 TF JEAN MARIE- significant  7/5/18 Bilateral L3,4,5 MBB- 100% relief for 2 days    Chiropractor -never  Acupuncture - never  TENS unit -never  Spinal decompression -never  Joint replacement -never    Imaging:     Lumbar CT 5/28/18    Narrative     EXAMINATION:  CT LUMBAR SPINE WITHOUT CONTRAST    CLINICAL HISTORY:  Low back pain, >6wks conservative tx, persistent-progressive sx, surgical candidate;  Radiculopathy, lumbosacral region    TECHNIQUE:  Low-dose axial, sagittal and coronal reformations are obtained through the lumbar spine.  Contrast was not administered.    COMPARISON:  None.    FINDINGS:  Posterior vertebral alignment is satisfactory.  There is mild levoconvexity of the  lumbar spine.  Vertebral body heights are well maintained.  There is prominent degenerative disc disease at the L5-S1 level with marked disc space narrowing with vertebral endplate sclerosis and irregularity as well as vacuum disc phenomenon at the L5-S1 level.  There are anterior and posterior osteophytes at the L5-S1 level associated with diffuse concentric disc bulging.  Mild concentric disc bulging is also present at the L4-L5 level.  There is mild bilateral neural foraminal narrowing at the L5-S1 level due to the diffuse disc bulging and osteophyte formation as well as mild facet arthropathy at the L5-S1.  No abnormal paraspinal masses are evident.  There is a mild amount of atherosclerotic calcification within the abdominal aorta which tapers normally without aneurysmal dilatation.   Impression       No evidence for acute fracture, bone destruction, or subluxation.    Prominent degenerative disc disease at the L5-S1 level with anterior and posterior osteophyte formation as well as concentric disc bulging.  This does not appear to result in significant spinal stenosis.    Minimal diffuse disc bulging at the L4-L5 level.    Mild neural foraminal narrowing at the L5-S1 level bilaterally.       X-Ray Lumbar Spine    Narrative     EXAMINATION:  XR LUMBAR SPINE AP AND LATERAL    CLINICAL HISTORY:  Low back pain, >6wks conservative tx, persistent-progressive sx, surgical candidate;Low back pain    TECHNIQUE:  AP, lateral and spot images were performed of the lumbar spine.    COMPARISON:  None    FINDINGS:  There are 5 non-rib-bearing lumbar type vertebral bodies.  There is mild levocurvature of the lumbar spine between L1 and L4 that I calculate at 4°.   Vertebral body heights and alignment are preserved.    There is severe loss of disc space height at L5-S1 with near effacement of that disc space; vacuum phenomenon is present at the anterior portion of the disc space.  Other disc spaces are preserved.    There is  facet arthropathy at L5-S1.  I do not identify spondylolysis or spondylolisthesis.    Sacroiliac joints are incompletely visualized.    Calcific atherosclerosis is present in the abdominal aorta and iliac arteries of this 57-year-old man.  I do not identify aneurysm.   Impression       Please see above.      Electronically signed by: Arin Galicia MD  Date: 05/01/2018  Time: 08:06         Past Medical History:   Diagnosis Date    Diabetes mellitus, type 2     Hypertension     Pacemaker      Past Surgical History:   Procedure Laterality Date    COLONOSCOPY N/A 2/13/2017    Procedure: COLONOSCOPY;  Surgeon: NILDA Juares MD;  Location: [facility] ENDO (4TH FLR);  Service: Endoscopy;  Laterality: N/A;  Do not cancel this order. Patient has Pacemaker in place.     HERNIA REPAIR      INJECTION OF ANESTHETIC AGENT AROUND NERVE Bilateral 7/5/2018    Procedure: BLOCK, NERVE;  Surgeon: Krystal Mtz MD;  Location: [facility] PAIN MGT;  Service: Pain Management;  Laterality: Bilateral;  Lumbar Bilateral L3-L4-L5 Medial Branch Block    TRANSFORAMINAL EPIDURAL INJECTION OF STEROID Right 6/5/2018    Procedure: INJECTION-STEROID-EPIDURAL-TRANSFORAMINAL;  Surgeon: Josue Paredes MD;  Location: [facility] PAIN MGT;  Service: Pain Management;  Laterality: Right;  LUMBAR RIGHT L5 AND S1 TRANSFORAMINL JEAN MARIE  [id number]    W/ SEDATION      Social History     Social History    Marital status:      Spouse name: N/A    Number of children: 3    Years of education: N/A     Occupational History          Social History Main Topics    Smoking status: Current Every Day Smoker     Packs/day: 0.50     Types: Cigarettes    Smokeless tobacco: Never Used    Alcohol use Yes      Comment: Beer- Socially    Drug use: No    Sexual activity: Yes     Other Topics Concern    Not on file     Social History Narrative    No narrative on file     Family History   Problem Relation Age of Onset    Diabetes Mother     Diabetes Father     Kidney  disease Father     Melanoma Neg Hx        Review of patient's allergies indicates:  No Known Allergies    Current Outpatient Prescriptions   Medication Sig    amLODIPine (NORVASC) 5 MG tablet Take 1 tablet (5 mg total) by mouth once daily.    ammonium lactate 12 % Crea Apply twice daily to affected parts both feet as needed.    aspirin (ECOTRIN) 81 MG EC tablet Take 81 mg by mouth once daily.    atorvastatin (LIPITOR) 20 MG tablet TAKE 1 TABLET BY MOUTH ONCE DAILY    gabapentin (NEURONTIN) 300 MG capsule TAKE ONE CAPSULE BY MOUTH TWICE DAILY    lisinopril-hydrochlorothiazide (PRINZIDE,ZESTORETIC) 20-12.5 mg per tablet Take 1 tablet by mouth once daily.    metFORMIN (GLUCOPHAGE) 500 MG tablet TAKE ONE TABLET BY MOUTH ONCE DAILY WITH BREAKFAST    triamcinolone acetonide 0.1% (KENALOG) 0.1 % cream Apply topically 2 (two) times daily.     No current facility-administered medications for this visit.        REVIEW OF SYSTEMS:    GENERAL:  No weight loss, malaise or fevers.  HEENT:   No recent changes in vision or hearing  NECK:  Negative for lumps, no difficulty with swallowing.  RESPIRATORY:  Negative for cough, wheezing or shortness of breath, patient denies any recent URI.  CARDIOVASCULAR:  Negative for chest pain, leg swelling or palpitations. Pacemaker for SSS.  GI:  Negative for abdominal discomfort, blood in stools or black stools or change in bowel habits.  MUSCULOSKELETAL:  See HPI.  SKIN:  Negative for lesions, rash, and itching.  PSYCH:  No mood disorder or recent psychosocial stressors.  Patients sleep is not disturbed secondary to pain.  HEMATOLOGY/LYMPHOLOGY:  Negative for prolonged bleeding, bruising easily or swollen nodes.  Patient is not currently taking any anti-coagulants  ENDO: DM2 on metformin  NEURO:   No history of headaches, syncope, paralysis, seizures or tremors.  All other reviewed and negative other than HPI.    OBJECTIVE:    BP (!) 147/97   Pulse 86   Resp 13   Wt 70.2 kg (154 lb  12.2 oz)   BMI 24.24 kg/m²     PHYSICAL EXAMINATION:    GENERAL: Well appearing, in no acute distress, alert and oriented x3.  PSYCH:  Mood and affect appropriate.  SKIN: Skin color, texture, turgor normal, no rashes or lesions.  HEAD/FACE:  Normocephalic, atraumatic. Cranial nerves grossly intact.  CV: RRR with palpation of the radial artery.  PULM: No evidence of respiratory difficulty, symmetric chest rise.  BACK: Straight leg raising in the sitting and supine positions is negative to radicular pain.  There is pain with palpation over the facet joints of the lumbar spine bilaterally.  There is decreased range of motion with extension to 15 degrees, and facet loading maneuvers cause reproducible pain bilaterally, L>R.  EXTREMITIES: Peripheral joint ROM is full and pain free without obvious instability or laxity in all four extremities. No deformities, edema, or skin discoloration. Good capillary refill.  MUSCULOSKELETAL: Hip, and knee provocative maneuvers are negative.  There is  pain with palpation over the sacroiliac joints bilaterally.  There is no pain to palpation over the greater trochanteric bursa bilaterally.  FABERs test is positive on the right.  FADIRs test is negative.  5/5 strength in right ankle with plantar and dorsiflexion, 5/5 strength in left ankle with plantar and dorsiflexion, 5/5 strength with right knee flexion extension, 5/5 strength with knee flexion extension on the left  No atrophy or tone abnormalities are noted.  NEURO: Bilateral lower extremity coordination and muscle stretch reflexes are physiologic and symmetric.  Plantar response are downgoing. No clonus.  Normal sensation.  GAIT: Antalgic, ambulates without assistance.    Lab Results   Component Value Date    HGBA1C 5.8 (H) 04/06/2018     Lab Results   Component Value Date    WBC 12.57 04/06/2017    HGB 15.6 04/06/2017    HCT 46.0 04/06/2017    MCV 87 04/06/2017     04/06/2017     BMP  Lab Results   Component Value Date      04/06/2018    K 3.9 04/06/2018     04/06/2018    CO2 27 04/06/2018    BUN 18 04/06/2018    CREATININE 1.0 04/06/2018    CALCIUM 9.9 04/06/2018    ANIONGAP 9 04/06/2018    ESTGFRAFRICA >60.0 04/06/2018    EGFRNONAA >60.0 04/06/2018     ASSESSMENT: 57 y.o. year old male with lower back pain, consistent with the following diagnoses:    Encounter Diagnoses   Name Primary?    Lumbar spondylosis Yes    Lumbar radiculopathy     DDD (degenerative disc disease), lumbar     Facet arthritis of lumbar region        PLAN:     - Recent lumbar CT reviewed with patient in detail today.    - Continue Gabapentin 300 mg BID (he takes both at night due to sedation).    - He is s/p bilateral L3,4,5 MBB with 100% relief short term.  Will schedule for left then right L3,4,5 RFAs, 2 weeks apart.    - Consider JEAN MARIE if left leg pain persists.     - The patient will continue a home exercise routine to help with pain and strengthening.      - RTC 4 weeks after procedures.      The above plan and management options were discussed at length with patient. Patient is in agreement with the above and verbalized understanding.     Trista Oviedo  07/25/2018

## 2018-07-25 NOTE — PROGRESS NOTES
Chronic Pain - Established Visit    Referring Physician: No ref. provider found    Chief Complaint:   No chief complaint on file.       SUBJECTIVE: Disclaimer: This note has been generated using voice-recognition software. There may be typographical errors that have been missed during proof-reading    Interval History 7/25/2018:  The patient returns today for follow up of back pain.  He is s/p bilateral L3,4,5 MBB on 7/5/18 with 100% relief for 2 days.  He previously had benefit with right TF JEAN MARIE for leg pain.  He has not had right leg pain since the epidural.  However, he is reporting lateral left leg pain that has progressed over the past couple of weeks.  He continues to be active and works.  His pain today is 5/10.    Interval History 6/20/2018:  The patient returns today for follow up of lower back and right leg pain.  He is s/p right L5 and S1 TF JEAN MARIE on 6/5/18 with 100% relief of right leg pain.  He has not had any leg pain since the procedure.  He continues to report pain across the lower back.  This is always worse first thing in the morning.  He states that this feels aching and throbbing in nature.  He did have recent lumbar CT scan.  He would like to discuss further procedures.  He continues to work and be active.  His pain today is 5/10.    Interval History 5/22/2018:  The patient returns for follow up of back pain.  He is having radiation down there back of the right leg to the posterior calf.  The back pain is most severe in the morning and he states that this feels like a stiffness.  This improves when he walks.  He has severe leg pain that is intermittent, mainly with activity.  This is his biggest complaint.  He did complete the medrol dose pack recently with limited improvement.  He had XRAYs which show severe loss of disc space at L5/S1.  He has not had a CT scan.  He is taking Gabapentin 300 mg at bedtime.  It is written BID but it makes him drowsy during the day.  He has some benefit with  Aleve but has been told to avoid NSAIDs due to history of pacemaker placement.  His pain today is 5/10.     Initial encounter:    Renny Young presents to the clinic for the evaluation of lower back and right leg pain. The pain started two months ago and symptoms have been worsening.    Brief history:    Pain Description:    The pain is located in the lower back and right leg area in the L5/S1 distribution.      At BEST  5/10     At WORST  7/10 on the WORST day.      On average pain is rated as 5/10.     Today the pain is rated as 5/10    The pain is described as aching      Symptoms interfere with daily activity.     Exacerbating factors: Standing and Morning.      Mitigating factors medications.     Patient denies night fever/night sweats, urinary incontinence, bowel incontinence, significant weight loss, significant motor weakness and loss of sensations.  Patient denies any suicidal or homicidal ideations    Pain Medications:  Current:  Gabapentin 300mg BID    Tried in Past:  NSAIDs - Aleve  TCA -Never  SNRI -Never  Anti-convulsants - Gabapentin   Muscle Relaxants -Never  Opioids-Never    Physical Therapy/Home Exercise: yes     report:  Reviewed and consistent with medication use as prescribed.    Pain Procedures:   6/5/18 Right L5 and S1 TF JEAN MARIE- significant  7/5/18 Bilateral L3,4,5 MBB- 100% relief for 2 days    Chiropractor -never  Acupuncture - never  TENS unit -never  Spinal decompression -never  Joint replacement -never    Imaging:     Lumbar CT 5/28/18    Narrative     EXAMINATION:  CT LUMBAR SPINE WITHOUT CONTRAST    CLINICAL HISTORY:  Low back pain, >6wks conservative tx, persistent-progressive sx, surgical candidate;  Radiculopathy, lumbosacral region    TECHNIQUE:  Low-dose axial, sagittal and coronal reformations are obtained through the lumbar spine.  Contrast was not administered.    COMPARISON:  None.    FINDINGS:  Posterior vertebral alignment is satisfactory.  There is mild levoconvexity of the  lumbar spine.  Vertebral body heights are well maintained.  There is prominent degenerative disc disease at the L5-S1 level with marked disc space narrowing with vertebral endplate sclerosis and irregularity as well as vacuum disc phenomenon at the L5-S1 level.  There are anterior and posterior osteophytes at the L5-S1 level associated with diffuse concentric disc bulging.  Mild concentric disc bulging is also present at the L4-L5 level.  There is mild bilateral neural foraminal narrowing at the L5-S1 level due to the diffuse disc bulging and osteophyte formation as well as mild facet arthropathy at the L5-S1.  No abnormal paraspinal masses are evident.  There is a mild amount of atherosclerotic calcification within the abdominal aorta which tapers normally without aneurysmal dilatation.   Impression       No evidence for acute fracture, bone destruction, or subluxation.    Prominent degenerative disc disease at the L5-S1 level with anterior and posterior osteophyte formation as well as concentric disc bulging.  This does not appear to result in significant spinal stenosis.    Minimal diffuse disc bulging at the L4-L5 level.    Mild neural foraminal narrowing at the L5-S1 level bilaterally.       X-Ray Lumbar Spine    Narrative     EXAMINATION:  XR LUMBAR SPINE AP AND LATERAL    CLINICAL HISTORY:  Low back pain, >6wks conservative tx, persistent-progressive sx, surgical candidate;Low back pain    TECHNIQUE:  AP, lateral and spot images were performed of the lumbar spine.    COMPARISON:  None    FINDINGS:  There are 5 non-rib-bearing lumbar type vertebral bodies.  There is mild levocurvature of the lumbar spine between L1 and L4 that I calculate at 4°.   Vertebral body heights and alignment are preserved.    There is severe loss of disc space height at L5-S1 with near effacement of that disc space; vacuum phenomenon is present at the anterior portion of the disc space.  Other disc spaces are preserved.    There is  facet arthropathy at L5-S1.  I do not identify spondylolysis or spondylolisthesis.    Sacroiliac joints are incompletely visualized.    Calcific atherosclerosis is present in the abdominal aorta and iliac arteries of this 57-year-old man.  I do not identify aneurysm.   Impression       Please see above.      Electronically signed by: Arin Galicia MD  Date: 05/01/2018  Time: 08:06         Past Medical History:   Diagnosis Date    Diabetes mellitus, type 2     Hypertension     Pacemaker      Past Surgical History:   Procedure Laterality Date    COLONOSCOPY N/A 2/13/2017    Procedure: COLONOSCOPY;  Surgeon: NILDA Juares MD;  Location: [facility] (4TH FLR);  Service: Endoscopy;  Laterality: N/A;  Do not cancel this order. Patient has Pacemaker in place.     HERNIA REPAIR      INJECTION OF ANESTHETIC AGENT AROUND NERVE Bilateral 7/5/2018    Procedure: BLOCK, NERVE;  Surgeon: Krystal Mtz MD;  Location: [facility], [place], operated by [facility];  Service: Pain Management;  Laterality: Bilateral;  Lumbar Bilateral L3-L4-L5 Medial Branch Block    TRANSFORAMINAL EPIDURAL INJECTION OF STEROID Right 6/5/2018    Procedure: INJECTION-STEROID-EPIDURAL-TRANSFORAMINAL;  Surgeon: Josue Paredes MD;  Location: [facility], [place], operated by [facility];  Service: Pain Management;  Laterality: Right;  LUMBAR RIGHT L5 AND S1 TRANSFORAMINL JEAN MARIE  [id number]    W/ SEDATION      Social History     Social History    Marital status:      Spouse name: N/A    Number of children: 3    Years of education: N/A     Occupational History          Social History Main Topics    Smoking status: Current Every Day Smoker     Packs/day: 0.50     Types: Cigarettes    Smokeless tobacco: Never Used    Alcohol use Yes      Comment: Beer- Socially    Drug use: No    Sexual activity: Yes     Other Topics Concern    Not on file     Social History Narrative    No narrative on file     Family History   Problem Relation Age of Onset    Diabetes Mother     Diabetes Father     Kidney  disease Father     Melanoma Neg Hx        Review of patient's allergies indicates:  No Known Allergies    Current Outpatient Prescriptions   Medication Sig    amLODIPine (NORVASC) 5 MG tablet Take 1 tablet (5 mg total) by mouth once daily.    ammonium lactate 12 % Crea Apply twice daily to affected parts both feet as needed.    aspirin (ECOTRIN) 81 MG EC tablet Take 81 mg by mouth once daily.    atorvastatin (LIPITOR) 20 MG tablet TAKE 1 TABLET BY MOUTH ONCE DAILY    gabapentin (NEURONTIN) 300 MG capsule TAKE ONE CAPSULE BY MOUTH TWICE DAILY    lisinopril-hydrochlorothiazide (PRINZIDE,ZESTORETIC) 20-12.5 mg per tablet Take 1 tablet by mouth once daily.    metFORMIN (GLUCOPHAGE) 500 MG tablet TAKE ONE TABLET BY MOUTH ONCE DAILY WITH BREAKFAST    triamcinolone acetonide 0.1% (KENALOG) 0.1 % cream Apply topically 2 (two) times daily.     No current facility-administered medications for this visit.        REVIEW OF SYSTEMS:    GENERAL:  No weight loss, malaise or fevers.  HEENT:   No recent changes in vision or hearing  NECK:  Negative for lumps, no difficulty with swallowing.  RESPIRATORY:  Negative for cough, wheezing or shortness of breath, patient denies any recent URI.  CARDIOVASCULAR:  Negative for chest pain, leg swelling or palpitations. Pacemaker for SSS.  GI:  Negative for abdominal discomfort, blood in stools or black stools or change in bowel habits.  MUSCULOSKELETAL:  See HPI.  SKIN:  Negative for lesions, rash, and itching.  PSYCH:  No mood disorder or recent psychosocial stressors.  Patients sleep is not disturbed secondary to pain.  HEMATOLOGY/LYMPHOLOGY:  Negative for prolonged bleeding, bruising easily or swollen nodes.  Patient is not currently taking any anti-coagulants  ENDO: DM2 on metformin  NEURO:   No history of headaches, syncope, paralysis, seizures or tremors.  All other reviewed and negative other than HPI.    OBJECTIVE:    BP (!) 147/97   Pulse 86   Resp 13   Wt 70.2 kg (154 lb  12.2 oz)   BMI 24.24 kg/m²     PHYSICAL EXAMINATION:    GENERAL: Well appearing, in no acute distress, alert and oriented x3.  PSYCH:  Mood and affect appropriate.  SKIN: Skin color, texture, turgor normal, no rashes or lesions.  HEAD/FACE:  Normocephalic, atraumatic. Cranial nerves grossly intact.  CV: RRR with palpation of the radial artery.  PULM: No evidence of respiratory difficulty, symmetric chest rise.  BACK: Straight leg raising in the sitting and supine positions is negative to radicular pain.  There is pain with palpation over the facet joints of the lumbar spine bilaterally.  There is decreased range of motion with extension to 15 degrees, and facet loading maneuvers cause reproducible pain bilaterally, L>R.  EXTREMITIES: Peripheral joint ROM is full and pain free without obvious instability or laxity in all four extremities. No deformities, edema, or skin discoloration. Good capillary refill.  MUSCULOSKELETAL: Hip, and knee provocative maneuvers are negative.  There is  pain with palpation over the sacroiliac joints bilaterally.  There is no pain to palpation over the greater trochanteric bursa bilaterally.  FABERs test is positive on the right.  FADIRs test is negative.  5/5 strength in right ankle with plantar and dorsiflexion, 5/5 strength in left ankle with plantar and dorsiflexion, 5/5 strength with right knee flexion extension, 5/5 strength with knee flexion extension on the left  No atrophy or tone abnormalities are noted.  NEURO: Bilateral lower extremity coordination and muscle stretch reflexes are physiologic and symmetric.  Plantar response are downgoing. No clonus.  Normal sensation.  GAIT: Antalgic, ambulates without assistance.    Lab Results   Component Value Date    HGBA1C 5.8 (H) 04/06/2018     Lab Results   Component Value Date    WBC 12.57 04/06/2017    HGB 15.6 04/06/2017    HCT 46.0 04/06/2017    MCV 87 04/06/2017     04/06/2017     BMP  Lab Results   Component Value Date      04/06/2018    K 3.9 04/06/2018     04/06/2018    CO2 27 04/06/2018    BUN 18 04/06/2018    CREATININE 1.0 04/06/2018    CALCIUM 9.9 04/06/2018    ANIONGAP 9 04/06/2018    ESTGFRAFRICA >60.0 04/06/2018    EGFRNONAA >60.0 04/06/2018     ASSESSMENT: 57 y.o. year old male with lower back pain, consistent with the following diagnoses:    Encounter Diagnoses   Name Primary?    Lumbar spondylosis Yes    Lumbar radiculopathy     DDD (degenerative disc disease), lumbar     Facet arthritis of lumbar region        PLAN:     - Recent lumbar CT reviewed with patient in detail today.    - Continue Gabapentin 300 mg BID (he takes both at night due to sedation).    - He is s/p bilateral L3,4,5 MBB with 100% relief short term.  Will schedule for left then right L3,4,5 RFAs, 2 weeks apart.    - Consider JEAN MARIE if left leg pain persists.     - The patient will continue a home exercise routine to help with pain and strengthening.      - RTC 4 weeks after procedures.      The above plan and management options were discussed at length with patient. Patient is in agreement with the above and verbalized understanding.     Trista Oviedo  07/25/2018

## 2018-07-27 ENCOUNTER — TELEPHONE (OUTPATIENT)
Dept: ELECTROPHYSIOLOGY | Facility: CLINIC | Age: 58
End: 2018-07-27

## 2018-07-27 NOTE — TELEPHONE ENCOUNTER
Contacted the patient on this afternoon in relation to scheduling an appointment to have the  upgrade done on his pacemaker.  Patient was given the appointment information of 8/3/18 @ 1:00 pm.  Informed the patient the [employer] Rep will be here to install the upgrade.  Patient verbalized understanding to all of the above.

## 2018-07-31 ENCOUNTER — SURGERY (OUTPATIENT)
Age: 58
End: 2018-07-31

## 2018-07-31 ENCOUNTER — HOSPITAL ENCOUNTER (OUTPATIENT)
Facility: OTHER | Age: 58
Discharge: HOME | End: 2018-07-31
Attending: ANESTHESIOLOGY | Admitting: ANESTHESIOLOGY
Payer: COMMERCIAL

## 2018-07-31 VITALS
TEMPERATURE: 97 F | DIASTOLIC BLOOD PRESSURE: 70 MMHG | SYSTOLIC BLOOD PRESSURE: 110 MMHG | RESPIRATION RATE: 16 BRPM | WEIGHT: 155 LBS | HEIGHT: 67 IN | HEART RATE: 62 BPM | OXYGEN SATURATION: 97 % | BODY MASS INDEX: 24.33 KG/M2

## 2018-07-31 DIAGNOSIS — M54.16 LUMBAR RADICULOPATHY: Primary | ICD-10-CM

## 2018-07-31 DIAGNOSIS — G89.29 CHRONIC PAIN: ICD-10-CM

## 2018-07-31 LAB — POCT GLUCOSE: 93 MG/DL (ref 70–110)

## 2018-07-31 PROCEDURE — 64635 DESTROY LUMB/SAC FACET JNT: CPT | Mod: LT,,, | Performed by: ANESTHESIOLOGY

## 2018-07-31 PROCEDURE — 63600175 PHARM REV CODE 636 W HCPCS: Performed by: ANESTHESIOLOGY

## 2018-07-31 PROCEDURE — 64636 DESTROY L/S FACET JNT ADDL: CPT | Mod: LT,,, | Performed by: ANESTHESIOLOGY

## 2018-07-31 PROCEDURE — 82947 ASSAY GLUCOSE BLOOD QUANT: CPT | Performed by: ANESTHESIOLOGY

## 2018-07-31 PROCEDURE — 25000003 PHARM REV CODE 250: Performed by: STUDENT IN AN ORGANIZED HEALTH CARE EDUCATION/TRAINING PROGRAM

## 2018-07-31 PROCEDURE — 25000003 PHARM REV CODE 250: Performed by: ANESTHESIOLOGY

## 2018-07-31 PROCEDURE — 64635 DESTROY LUMB/SAC FACET JNT: CPT | Performed by: ANESTHESIOLOGY

## 2018-07-31 PROCEDURE — 64636 DESTROY L/S FACET JNT ADDL: CPT | Performed by: ANESTHESIOLOGY

## 2018-07-31 PROCEDURE — 99152 MOD SED SAME PHYS/QHP 5/>YRS: CPT | Mod: ,,, | Performed by: ANESTHESIOLOGY

## 2018-07-31 RX ORDER — SODIUM CHLORIDE 9 MG/ML
500 INJECTION, SOLUTION INTRAVENOUS CONTINUOUS
Status: DISCONTINUED | OUTPATIENT
Start: 2018-07-31 | End: 2018-07-31 | Stop reason: HOSPADM

## 2018-07-31 RX ORDER — BUPIVACAINE HYDROCHLORIDE 2.5 MG/ML
INJECTION, SOLUTION EPIDURAL; INFILTRATION; INTRACAUDAL
Status: DISCONTINUED | OUTPATIENT
Start: 2018-07-31 | End: 2018-07-31 | Stop reason: HOSPADM

## 2018-07-31 RX ORDER — FENTANYL CITRATE 50 UG/ML
INJECTION, SOLUTION INTRAMUSCULAR; INTRAVENOUS
Status: DISCONTINUED | OUTPATIENT
Start: 2018-07-31 | End: 2018-07-31 | Stop reason: HOSPADM

## 2018-07-31 RX ORDER — LIDOCAINE HYDROCHLORIDE 10 MG/ML
INJECTION INFILTRATION; PERINEURAL
Status: DISCONTINUED | OUTPATIENT
Start: 2018-07-31 | End: 2018-07-31 | Stop reason: HOSPADM

## 2018-07-31 RX ORDER — MIDAZOLAM HYDROCHLORIDE 1 MG/ML
INJECTION INTRAMUSCULAR; INTRAVENOUS
Status: DISCONTINUED | OUTPATIENT
Start: 2018-07-31 | End: 2018-07-31 | Stop reason: HOSPADM

## 2018-07-31 RX ADMIN — FENTANYL CITRATE 25 MCG: 50 INJECTION, SOLUTION INTRAMUSCULAR; INTRAVENOUS at 11:07

## 2018-07-31 RX ADMIN — SODIUM CHLORIDE 500 ML: 0.9 INJECTION, SOLUTION INTRAVENOUS at 11:07

## 2018-07-31 RX ADMIN — MIDAZOLAM HYDROCHLORIDE 2 MG: 1 INJECTION, SOLUTION INTRAMUSCULAR; INTRAVENOUS at 11:07

## 2018-07-31 RX ADMIN — LIDOCAINE HYDROCHLORIDE 10 ML: 10 INJECTION, SOLUTION INFILTRATION; PERINEURAL at 11:07

## 2018-07-31 RX ADMIN — FENTANYL CITRATE 50 MCG: 50 INJECTION, SOLUTION INTRAMUSCULAR; INTRAVENOUS at 11:07

## 2018-07-31 RX ADMIN — BUPIVACAINE HYDROCHLORIDE 10 ML: 2.5 INJECTION, SOLUTION EPIDURAL; INFILTRATION; INTRACAUDAL; PERINEURAL at 11:07

## 2018-07-31 NOTE — H&P (VIEW-ONLY)
Chronic Pain - Established Visit    Referring Physician: No ref. provider found    Chief Complaint:   No chief complaint on file.       SUBJECTIVE: Disclaimer: This note has been generated using voice-recognition software. There may be typographical errors that have been missed during proof-reading    Interval History 7/25/2018:  The patient returns today for follow up of back pain.  He is s/p bilateral L3,4,5 MBB on 7/5/18 with 100% relief for 2 days.  He previously had benefit with right TF JEAN MARIE for leg pain.  He has not had right leg pain since the epidural.  However, he is reporting lateral left leg pain that has progressed over the past couple of weeks.  He continues to be active and works.  His pain today is 5/10.    Interval History 6/20/2018:  The patient returns today for follow up of lower back and right leg pain.  He is s/p right L5 and S1 TF JEAN MARIE on 6/5/18 with 100% relief of right leg pain.  He has not had any leg pain since the procedure.  He continues to report pain across the lower back.  This is always worse first thing in the morning.  He states that this feels aching and throbbing in nature.  He did have recent lumbar CT scan.  He would like to discuss further procedures.  He continues to work and be active.  His pain today is 5/10.    Interval History 5/22/2018:  The patient returns for follow up of back pain.  He is having radiation down there back of the right leg to the posterior calf.  The back pain is most severe in the morning and he states that this feels like a stiffness.  This improves when he walks.  He has severe leg pain that is intermittent, mainly with activity.  This is his biggest complaint.  He did complete the medrol dose pack recently with limited improvement.  He had XRAYs which show severe loss of disc space at L5/S1.  He has not had a CT scan.  He is taking Gabapentin 300 mg at bedtime.  It is written BID but it makes him drowsy during the day.  He has some benefit with  Aleve but has been told to avoid NSAIDs due to history of pacemaker placement.  His pain today is 5/10.     Initial encounter:    Renny Young presents to the clinic for the evaluation of lower back and right leg pain. The pain started two months ago and symptoms have been worsening.    Brief history:    Pain Description:    The pain is located in the lower back and right leg area in the L5/S1 distribution.      At BEST  5/10     At WORST  7/10 on the WORST day.      On average pain is rated as 5/10.     Today the pain is rated as 5/10    The pain is described as aching      Symptoms interfere with daily activity.     Exacerbating factors: Standing and Morning.      Mitigating factors medications.     Patient denies night fever/night sweats, urinary incontinence, bowel incontinence, significant weight loss, significant motor weakness and loss of sensations.  Patient denies any suicidal or homicidal ideations    Pain Medications:  Current:  Gabapentin 300mg BID    Tried in Past:  NSAIDs - Aleve  TCA -Never  SNRI -Never  Anti-convulsants - Gabapentin   Muscle Relaxants -Never  Opioids-Never    Physical Therapy/Home Exercise: yes     report:  Reviewed and consistent with medication use as prescribed.    Pain Procedures:   6/5/18 Right L5 and S1 TF JEAN MARIE- significant  7/5/18 Bilateral L3,4,5 MBB- 100% relief for 2 days    Chiropractor -never  Acupuncture - never  TENS unit -never  Spinal decompression -never  Joint replacement -never    Imaging:     Lumbar CT 5/28/18    Narrative     EXAMINATION:  CT LUMBAR SPINE WITHOUT CONTRAST    CLINICAL HISTORY:  Low back pain, >6wks conservative tx, persistent-progressive sx, surgical candidate;  Radiculopathy, lumbosacral region    TECHNIQUE:  Low-dose axial, sagittal and coronal reformations are obtained through the lumbar spine.  Contrast was not administered.    COMPARISON:  None.    FINDINGS:  Posterior vertebral alignment is satisfactory.  There is mild levoconvexity of the  lumbar spine.  Vertebral body heights are well maintained.  There is prominent degenerative disc disease at the L5-S1 level with marked disc space narrowing with vertebral endplate sclerosis and irregularity as well as vacuum disc phenomenon at the L5-S1 level.  There are anterior and posterior osteophytes at the L5-S1 level associated with diffuse concentric disc bulging.  Mild concentric disc bulging is also present at the L4-L5 level.  There is mild bilateral neural foraminal narrowing at the L5-S1 level due to the diffuse disc bulging and osteophyte formation as well as mild facet arthropathy at the L5-S1.  No abnormal paraspinal masses are evident.  There is a mild amount of atherosclerotic calcification within the abdominal aorta which tapers normally without aneurysmal dilatation.   Impression       No evidence for acute fracture, bone destruction, or subluxation.    Prominent degenerative disc disease at the L5-S1 level with anterior and posterior osteophyte formation as well as concentric disc bulging.  This does not appear to result in significant spinal stenosis.    Minimal diffuse disc bulging at the L4-L5 level.    Mild neural foraminal narrowing at the L5-S1 level bilaterally.       X-Ray Lumbar Spine    Narrative     EXAMINATION:  XR LUMBAR SPINE AP AND LATERAL    CLINICAL HISTORY:  Low back pain, >6wks conservative tx, persistent-progressive sx, surgical candidate;Low back pain    TECHNIQUE:  AP, lateral and spot images were performed of the lumbar spine.    COMPARISON:  None    FINDINGS:  There are 5 non-rib-bearing lumbar type vertebral bodies.  There is mild levocurvature of the lumbar spine between L1 and L4 that I calculate at 4°.   Vertebral body heights and alignment are preserved.    There is severe loss of disc space height at L5-S1 with near effacement of that disc space; vacuum phenomenon is present at the anterior portion of the disc space.  Other disc spaces are preserved.    There is  facet arthropathy at L5-S1.  I do not identify spondylolysis or spondylolisthesis.    Sacroiliac joints are incompletely visualized.    Calcific atherosclerosis is present in the abdominal aorta and iliac arteries of this 57-year-old man.  I do not identify aneurysm.   Impression       Please see above.      Electronically signed by: Arin Galicia MD  Date: 05/01/2018  Time: 08:06         Past Medical History:   Diagnosis Date    Diabetes mellitus, type 2     Hypertension     Pacemaker      Past Surgical History:   Procedure Laterality Date    COLONOSCOPY N/A 2/13/2017    Procedure: COLONOSCOPY;  Surgeon: NILDA Juares MD;  Location: [facility] ENDO (4TH FLR);  Service: Endoscopy;  Laterality: N/A;  Do not cancel this order. Patient has Pacemaker in place.     HERNIA REPAIR      INJECTION OF ANESTHETIC AGENT AROUND NERVE Bilateral 7/5/2018    Procedure: BLOCK, NERVE;  Surgeon: Krystal Mtz MD;  Location: [facility] PAIN MGT;  Service: Pain Management;  Laterality: Bilateral;  Lumbar Bilateral L3-L4-L5 Medial Branch Block    TRANSFORAMINAL EPIDURAL INJECTION OF STEROID Right 6/5/2018    Procedure: INJECTION-STEROID-EPIDURAL-TRANSFORAMINAL;  Surgeon: Josue Paredes MD;  Location: [facility] PAIN MGT;  Service: Pain Management;  Laterality: Right;  LUMBAR RIGHT L5 AND S1 TRANSFORAMINL JEAN MARIE  [id number]    W/ SEDATION      Social History     Social History    Marital status:      Spouse name: N/A    Number of children: 3    Years of education: N/A     Occupational History          Social History Main Topics    Smoking status: Current Every Day Smoker     Packs/day: 0.50     Types: Cigarettes    Smokeless tobacco: Never Used    Alcohol use Yes      Comment: Beer- Socially    Drug use: No    Sexual activity: Yes     Other Topics Concern    Not on file     Social History Narrative    No narrative on file     Family History   Problem Relation Age of Onset    Diabetes Mother     Diabetes Father     Kidney  disease Father     Melanoma Neg Hx        Review of patient's allergies indicates:  No Known Allergies    Current Outpatient Prescriptions   Medication Sig    amLODIPine (NORVASC) 5 MG tablet Take 1 tablet (5 mg total) by mouth once daily.    ammonium lactate 12 % Crea Apply twice daily to affected parts both feet as needed.    aspirin (ECOTRIN) 81 MG EC tablet Take 81 mg by mouth once daily.    atorvastatin (LIPITOR) 20 MG tablet TAKE 1 TABLET BY MOUTH ONCE DAILY    gabapentin (NEURONTIN) 300 MG capsule TAKE ONE CAPSULE BY MOUTH TWICE DAILY    lisinopril-hydrochlorothiazide (PRINZIDE,ZESTORETIC) 20-12.5 mg per tablet Take 1 tablet by mouth once daily.    metFORMIN (GLUCOPHAGE) 500 MG tablet TAKE ONE TABLET BY MOUTH ONCE DAILY WITH BREAKFAST    triamcinolone acetonide 0.1% (KENALOG) 0.1 % cream Apply topically 2 (two) times daily.     No current facility-administered medications for this visit.        REVIEW OF SYSTEMS:    GENERAL:  No weight loss, malaise or fevers.  HEENT:   No recent changes in vision or hearing  NECK:  Negative for lumps, no difficulty with swallowing.  RESPIRATORY:  Negative for cough, wheezing or shortness of breath, patient denies any recent URI.  CARDIOVASCULAR:  Negative for chest pain, leg swelling or palpitations. Pacemaker for SSS.  GI:  Negative for abdominal discomfort, blood in stools or black stools or change in bowel habits.  MUSCULOSKELETAL:  See HPI.  SKIN:  Negative for lesions, rash, and itching.  PSYCH:  No mood disorder or recent psychosocial stressors.  Patients sleep is not disturbed secondary to pain.  HEMATOLOGY/LYMPHOLOGY:  Negative for prolonged bleeding, bruising easily or swollen nodes.  Patient is not currently taking any anti-coagulants  ENDO: DM2 on metformin  NEURO:   No history of headaches, syncope, paralysis, seizures or tremors.  All other reviewed and negative other than HPI.    OBJECTIVE:    BP (!) 147/97   Pulse 86   Resp 13   Wt 70.2 kg (154 lb  12.2 oz)   BMI 24.24 kg/m²     PHYSICAL EXAMINATION:    GENERAL: Well appearing, in no acute distress, alert and oriented x3.  PSYCH:  Mood and affect appropriate.  SKIN: Skin color, texture, turgor normal, no rashes or lesions.  HEAD/FACE:  Normocephalic, atraumatic. Cranial nerves grossly intact.  CV: RRR with palpation of the radial artery.  PULM: No evidence of respiratory difficulty, symmetric chest rise.  BACK: Straight leg raising in the sitting and supine positions is negative to radicular pain.  There is pain with palpation over the facet joints of the lumbar spine bilaterally.  There is decreased range of motion with extension to 15 degrees, and facet loading maneuvers cause reproducible pain bilaterally, L>R.  EXTREMITIES: Peripheral joint ROM is full and pain free without obvious instability or laxity in all four extremities. No deformities, edema, or skin discoloration. Good capillary refill.  MUSCULOSKELETAL: Hip, and knee provocative maneuvers are negative.  There is  pain with palpation over the sacroiliac joints bilaterally.  There is no pain to palpation over the greater trochanteric bursa bilaterally.  FABERs test is positive on the right.  FADIRs test is negative.  5/5 strength in right ankle with plantar and dorsiflexion, 5/5 strength in left ankle with plantar and dorsiflexion, 5/5 strength with right knee flexion extension, 5/5 strength with knee flexion extension on the left  No atrophy or tone abnormalities are noted.  NEURO: Bilateral lower extremity coordination and muscle stretch reflexes are physiologic and symmetric.  Plantar response are downgoing. No clonus.  Normal sensation.  GAIT: Antalgic, ambulates without assistance.    Lab Results   Component Value Date    HGBA1C 5.8 (H) 04/06/2018     Lab Results   Component Value Date    WBC 12.57 04/06/2017    HGB 15.6 04/06/2017    HCT 46.0 04/06/2017    MCV 87 04/06/2017     04/06/2017     BMP  Lab Results   Component Value Date      04/06/2018    K 3.9 04/06/2018     04/06/2018    CO2 27 04/06/2018    BUN 18 04/06/2018    CREATININE 1.0 04/06/2018    CALCIUM 9.9 04/06/2018    ANIONGAP 9 04/06/2018    ESTGFRAFRICA >60.0 04/06/2018    EGFRNONAA >60.0 04/06/2018     ASSESSMENT: 57 y.o. year old male with lower back pain, consistent with the following diagnoses:    Encounter Diagnoses   Name Primary?    Lumbar spondylosis Yes    Lumbar radiculopathy     DDD (degenerative disc disease), lumbar     Facet arthritis of lumbar region        PLAN:     - Recent lumbar CT reviewed with patient in detail today.    - Continue Gabapentin 300 mg BID (he takes both at night due to sedation).    - He is s/p bilateral L3,4,5 MBB with 100% relief short term.  Will schedule for left then right L3,4,5 RFAs, 2 weeks apart.    - Consider JEAN MARIE if left leg pain persists.     - The patient will continue a home exercise routine to help with pain and strengthening.      - RTC 4 weeks after procedures.      The above plan and management options were discussed at length with patient. Patient is in agreement with the above and verbalized understanding.     Trista Oviedo  07/25/2018

## 2018-07-31 NOTE — OP NOTE
Lumbar Medial nerve branch block radiofrequency ablation Under Fluoroscopy     Time-out taken to identify patient and procedure side prior to starting the procedure.     07/31/2018    PROCEDURE: Left radiofrequency ablation of the the medial branch nerves at the   transverse process of  L4, L5 and sacral ala    2)Conscious sedation provided by MD     REASON FOR PROCEDURE: Facet arthritis of lumbar region [M46.96]     PHYSICIAN: Josue Paredes MD     ASSISTANTS: Camille Oglesby MD PGY-5 Pain Fellow    MEDICATIONS INJECTED: 0.25% Bupivicaine total 6mL     LOCAL ANESTHETIC USED: Xylocaine 1% 1mL / site     ESTIMATED BLOOD LOSS: None.     COMPLICATIONS: None.     Interval history: Patient reports that he had complete relief of pain for the day of the procedure, we will proceed with the RFA     TECHNIQUE: Laying in a prone position, the patient was prepped and draped in the usual sterile fashion using ChloraPrep and fenestrated drape. The level was determined under fluoroscopic guidance. Local anesthetic was given by going down to the hub of the 27-gauge 1.25in needle and raising a wheel. A 18-gauge 10mm curved active tip needle was introduced to the anatomic local of the medial branch at each of the above levels using fluoroscopy. Then sensory and motor testing was performed to confirm that the needle tips were in the correct location. Then after negative aspiration, 1 mL of 0.25% bupivacaine was injected into each level. This was followed by thermal lesioning at 80 degrees celsius for 90 seconds.     Conscious sedation provided by M.D   The patient was monitored with continuous pulse oximetry, EKG, and intermittent blood pressure monitors. The patient was hemodynamically stable throughout the entire process was responsive to voice, and breathing spontaneously. Supplemental O2 was provided at 2L/min via nasal cannula. Patient was comfortable for the duration of the procedure. (See nurse documentation and case log for  sedation time)    There was a total of 2mg IV Midazolam and 75mcg Fentanyl titrated for the procedure    The patient tolerated the procedure well. Was able to move their leg at the knee and ankle at the conclusion of the procedure    The patient was monitored after the procedure. Patient was given post procedure and discharge instructions to follow at home. We will see the patient back in two weeks or the patient may call to inform of status. The patient was discharged in a stable condition

## 2018-07-31 NOTE — DISCHARGE INSTRUCTIONS
Adult Procedural Sedation Instructions    Recovery After Procedural Sedation (Adult)  You have been given medicine by vein to make you sleep during your surgery. This may have included both a pain medicine and sleeping medicine. Most of the effects have worn off. But you may still have some drowsiness for the next 6 to 8 hours.  Home care  Follow these guidelines when you get home:  · For the next 8 hours, you should be watched by a responsible adult. This person should make sure your condition is not getting worse.  · Don't drink any alcohol for the next 24 hours.  · Don't drive, operate dangerous machinery, or make important business or personal decisions during the next 24 hours.  Note: Your healthcare provider may tell you not to take any medicine by mouth for pain or sleep in the next 4 hours. These medicines may react with the medicines you were given in the hospital. This could cause a much stronger response than usual.  Follow-up care  Follow up with your healthcare provider if you are not alert and back to your usual level of activity within 12 hours.  When to seek medical advice  Call your healthcare provider right away if any of these occur:  · Drowsiness gets worse  · Weakness or dizziness gets worse  · Repeated vomiting  · You can't be awakened   Date Last Reviewed: 10/18/2016  © 0947-0218 [employer]. [address] [place] [address], [place] [address]. All rights reserved. This information is not intended as a substitute for professional medical care. Always follow your healthcare professional's instructions.       Thank you for allowing us to care for you today. You may receive a survey about the care we provided. Your feedback is valuable and helps us provide excellent care throughout the community.     Home Care Instructions for Pain Management:    1. DIET:   You may resume your normal diet today.   2. BATHING:   You may shower with luke warm water. No tub baths or anything that will soak  injection sites under water for the next 24 hours.  3. DRESSING:   You may remove your bandage today.   4. ACTIVITY LEVEL:   You may resume your normal activities 24 hrs after your procedure. Nothing strenuous today.  5. MEDICATIONS:   You may resume your normal medications today. To restart blood thinners, ask your doctor.  6. DRIVING    If you have received any sedatives by mouth today, you may not drive for 12 hours.    If you have received any sedation through your IV, you may not drive for 24 hrs.   7. SPECIAL INSTRUCTIONS:   No heat to the injection site for 24 hrs including, hot bath or shower, heating pad, moist heat, or hot tubs.    Use ice pack to injection site for any pain or discomfort.  Apply ice packs for 20 minute intervals as needed.    IF you have diabetes, be sure to monitor your blood sugar more closely. IF your injection contained steroids your blood sugar levels may become higher than normal.    If you are still having pain upon discharge:  Your pain may improve over the next 48 hours. The anesthetic (numbing medication) works immediately to 48 hours. IF your injection contained a steroid (anti-inflammatory medication), it takes approximately 3 days to start feeling relief and 7-10 days to see your greatest results from the medication. It is possible you may need subsequent injections. This would be discussed at your follow up appointment with pain management or your referring doctor.      PLEASE CALL YOUR DOCTOR IF:  1. Redness or swelling around the injection site.  2. Fever of 101 degrees or more  3. Drainage (pus) from the injection site.  4. For any continuous bleeding (some dried blood over the incision is normal.)    FOR EMERGENCIES:   If any unusual problems or difficulties occur during clinic hours, call [phone] or 348.

## 2018-07-31 NOTE — DISCHARGE SUMMARY
Discharge Note  Short Stay      SUMMARY     Admit Date: 7/31/2018    Attending Physician: Camille Oglesby      Discharge Physician: Camille Oglesby      Discharge Date: 7/31/2018 11:48 AM    Procedure(s) (LRB):  RADIOFREQUENCY ABLATION (Left)    Final Diagnosis: Facet arthritis of lumbar region [M46.96]    Disposition: Home or self care    Patient Instructions:   Current Discharge Medication List      CONTINUE these medications which have NOT CHANGED    Details   amLODIPine (NORVASC) 5 MG tablet Take 1 tablet (5 mg total) by mouth once daily.  Qty: 90 tablet, Refills: 3      ammonium lactate 12 % Crea Apply twice daily to affected parts both feet as needed.  Qty: 140 g, Refills: 11      aspirin (ECOTRIN) 81 MG EC tablet Take 81 mg by mouth once daily.      atorvastatin (LIPITOR) 20 MG tablet TAKE 1 TABLET BY MOUTH ONCE DAILY  Qty: 30 tablet, Refills: 1    Comments: Please consider 90 day supplies to promote better adherence  Associated Diagnoses: Type 2 diabetes mellitus without complication, without long-term current use of insulin      gabapentin (NEURONTIN) 300 MG capsule TAKE ONE CAPSULE BY MOUTH TWICE DAILY  Qty: 60 capsule, Refills: 5    Associated Diagnoses: Diabetic polyneuropathy associated with type 2 diabetes mellitus      lisinopril-hydrochlorothiazide (PRINZIDE,ZESTORETIC) 20-12.5 mg per tablet Take 1 tablet by mouth once daily.  Qty: 90 tablet, Refills: 1    Comments: Please consider 90 day supplies to promote better adherence  Associated Diagnoses: Essential hypertension      metFORMIN (GLUCOPHAGE) 500 MG tablet TAKE ONE TABLET BY MOUTH ONCE DAILY WITH BREAKFAST  Qty: 90 tablet, Refills: 1      triamcinolone acetonide 0.1% (KENALOG) 0.1 % cream Apply topically 2 (two) times daily.  Qty: 80 g, Refills: 1                 Discharge Diagnosis: Facet arthritis of lumbar region [M46.96]  Condition on Discharge: Stable with no complications to procedure   Diet on Discharge: Same as before.  Activity: as per  instruction sheet.  Discharge to: Home with a responsible adult.  Follow up: 2-4 weeks

## 2018-08-03 ENCOUNTER — CLINICAL SUPPORT (OUTPATIENT)
Dept: ELECTROPHYSIOLOGY | Facility: CLINIC | Age: 58
End: 2018-08-03
Attending: INTERNAL MEDICINE
Payer: COMMERCIAL

## 2018-08-03 DIAGNOSIS — I49.5 SSS (SICK SINUS SYNDROME): ICD-10-CM

## 2018-08-03 DIAGNOSIS — Z95.0 CARDIAC PACEMAKER IN SITU: ICD-10-CM

## 2018-08-03 PROCEDURE — 93279 PRGRMG DEV EVAL PM/LDLS PM: CPT | Mod: S$GLB,,, | Performed by: INTERNAL MEDICINE

## 2018-08-14 ENCOUNTER — HOSPITAL ENCOUNTER (OUTPATIENT)
Facility: OTHER | Age: 58
Discharge: HOME | End: 2018-08-14
Attending: ANESTHESIOLOGY | Admitting: ANESTHESIOLOGY
Payer: COMMERCIAL

## 2018-08-14 VITALS
DIASTOLIC BLOOD PRESSURE: 67 MMHG | RESPIRATION RATE: 18 BRPM | BODY MASS INDEX: 24.33 KG/M2 | HEIGHT: 67 IN | WEIGHT: 155 LBS | TEMPERATURE: 98 F | SYSTOLIC BLOOD PRESSURE: 107 MMHG | HEART RATE: 60 BPM | OXYGEN SATURATION: 96 %

## 2018-08-14 DIAGNOSIS — M47.816 LUMBAR SPONDYLOSIS: Primary | ICD-10-CM

## 2018-08-14 LAB — POCT GLUCOSE: 101 MG/DL (ref 70–110)

## 2018-08-14 PROCEDURE — 25000003 PHARM REV CODE 250: Performed by: ANESTHESIOLOGY

## 2018-08-14 PROCEDURE — 64635 DESTROY LUMB/SAC FACET JNT: CPT | Performed by: ANESTHESIOLOGY

## 2018-08-14 PROCEDURE — 82947 ASSAY GLUCOSE BLOOD QUANT: CPT | Performed by: ANESTHESIOLOGY

## 2018-08-14 PROCEDURE — 64636 DESTROY L/S FACET JNT ADDL: CPT | Mod: RT,,, | Performed by: ANESTHESIOLOGY

## 2018-08-14 PROCEDURE — 64635 DESTROY LUMB/SAC FACET JNT: CPT | Mod: RT,,, | Performed by: ANESTHESIOLOGY

## 2018-08-14 PROCEDURE — 63600175 PHARM REV CODE 636 W HCPCS: Performed by: ANESTHESIOLOGY

## 2018-08-14 PROCEDURE — 99152 MOD SED SAME PHYS/QHP 5/>YRS: CPT | Mod: ,,, | Performed by: ANESTHESIOLOGY

## 2018-08-14 PROCEDURE — 64636 DESTROY L/S FACET JNT ADDL: CPT | Performed by: ANESTHESIOLOGY

## 2018-08-14 RX ORDER — BUPIVACAINE HYDROCHLORIDE 2.5 MG/ML
INJECTION, SOLUTION EPIDURAL; INFILTRATION; INTRACAUDAL
Status: DISCONTINUED | OUTPATIENT
Start: 2018-08-14 | End: 2018-08-14 | Stop reason: HOSPADM

## 2018-08-14 RX ORDER — MIDAZOLAM HYDROCHLORIDE 1 MG/ML
INJECTION INTRAMUSCULAR; INTRAVENOUS
Status: DISCONTINUED | OUTPATIENT
Start: 2018-08-14 | End: 2018-08-14 | Stop reason: HOSPADM

## 2018-08-14 RX ORDER — SODIUM CHLORIDE 9 MG/ML
INJECTION, SOLUTION INTRAVENOUS CONTINUOUS
Status: DISCONTINUED | OUTPATIENT
Start: 2018-08-14 | End: 2018-08-14 | Stop reason: HOSPADM

## 2018-08-14 RX ORDER — FENTANYL CITRATE 50 UG/ML
INJECTION, SOLUTION INTRAMUSCULAR; INTRAVENOUS
Status: DISCONTINUED | OUTPATIENT
Start: 2018-08-14 | End: 2018-08-14 | Stop reason: HOSPADM

## 2018-08-14 RX ORDER — LIDOCAINE HYDROCHLORIDE 10 MG/ML
INJECTION INFILTRATION; PERINEURAL
Status: DISCONTINUED | OUTPATIENT
Start: 2018-08-14 | End: 2018-08-14 | Stop reason: HOSPADM

## 2018-08-14 NOTE — INTERVAL H&P NOTE
The patient has been examined and the H&P has been reviewed:    I concur with the findings and no changes have occurred since H&P was written.    Anesthesia/Surgery risks, benefits and alternative options discussed and understood by patient/family.    HPI    Patient is a 57 year old male with history of lumbar facet arthropathy and RLE radiculopathy. He is s/p left L3, L4, L5 RFA on 7/31/18. Patient states today that this has provided him with ~70% relief of his low back pain on the left side.     He is here today for right L3, L4, L5 RFA.     Of note, he is also s/p right L5 and S1 TFESI on 6/5/18, which provided 100% relief of his right lower extremity pain, although now he states that the right sided leg pain is beginning to return.     PMHx, PSHx, Allergies, Medications reviewed in epic    ROS negative except pain complaints in HPI    OBJECTIVE:    There were no vitals taken for this visit.    PHYSICAL EXAMINATION:    GENERAL: Well appearing, in no acute distress, alert and oriented x3.  PSYCH:  Mood and affect appropriate.  SKIN: Skin color, texture, turgor normal, no rashes or lesions.  CV: RRR with palpation of the radial artery.  PULM: No evidence of respiratory difficulty, symmetric chest rise. Clear to auscultation.  NEURO: Cranial nerves grossly intact.    Plan:    Proceed with procedure as planned    Max Brownhty  08/14/2018        Active Hospital Problems    Diagnosis  POA    Lumbar spondylosis [M47.816]  Yes      Resolved Hospital Problems   No resolved problems to display.

## 2018-08-14 NOTE — OP NOTE
Lumbar Medial nerve branch block radiofrequency ablation Under Fluoroscopy     Time-out taken to identify patient and procedure side prior to starting the procedure.     08/14/2018    PROCEDURE: Right radiofrequency ablation of the the medial branch nerves at the   transverse process of  L4, L5 and sacral ala    2)Conscious sedation provided by MD     REASON FOR PROCEDURE: Facet arthritis of lumbar region [M46.96]     PHYSICIAN: Josue Paredes MD     ASSISTANTS: Dylan Roblero MD    MEDICATIONS INJECTED: 0.25% Bupivicaine total 6mL     LOCAL ANESTHETIC USED: Xylocaine 1% 1mL / site     ESTIMATED BLOOD LOSS: None.     COMPLICATIONS: None.     Interval history: Patient reports that he had complete relief of pain for the day of the procedure, we will proceed with the RFA     TECHNIQUE: Laying in a prone position, the patient was prepped and draped in the usual sterile fashion using ChloraPrep and fenestrated drape. The level was determined under fluoroscopic guidance. Local anesthetic was given by going down to the hub of the 27-gauge 1.25in needle and raising a wheel. A 18-gauge 10mm curved active tip needle was introduced to the anatomic local of the medial branch at each of the above levels using fluoroscopy. Then sensory and motor testing was performed to confirm that the needle tips were in the correct location. Then after negative aspiration, 1 mL of 0.25% bupivacaine was injected into each level. This was followed by thermal lesioning at 80 degrees celsius for 90 seconds.     Conscious sedation provided by M.D   The patient was monitored with continuous pulse oximetry, EKG, and intermittent blood pressure monitors. The patient was hemodynamically stable throughout the entire process was responsive to voice, and breathing spontaneously. Supplemental O2 was provided at 2L/min via nasal cannula. Patient was comfortable for the duration of the procedure. (See nurse documentation and case log for sedation  time)    There was a total of 2mg IV Midazolam and 100mcg Fentanyl titrated for the procedure    The patient tolerated the procedure well. Was able to move their leg at the knee and ankle at the conclusion of the procedure    The patient was monitored after the procedure. Patient was given post procedure and discharge instructions to follow at home. We will see the patient back in two weeks or the patient may call to inform of status. The patient was discharged in a stable condition

## 2018-08-14 NOTE — DISCHARGE SUMMARY
Approval received for 1/31/18-1/31/19    rx sent to pharmacy as previously ordered, LM for pharmacy on PA approval    Please call and schedule patient, no further refills until seen in office    Discharge Note  Short Stay      SUMMARY     Admit Date: 8/14/2018    Attending Physician: Josue Paredes    Discharge Physician: Josue Paredes    Discharge Date: 8/14/2018 11:32 AM    Procedure(s) (LRB):  RADIOFREQUENCY ABLATION (Right)    Final Diagnosis: Facet arthritis of lumbar region [M46.96]    Disposition: Home or self care    Patient Instructions:   Current Discharge Medication List      CONTINUE these medications which have NOT CHANGED    Details   amLODIPine (NORVASC) 5 MG tablet Take 1 tablet (5 mg total) by mouth once daily.  Qty: 90 tablet, Refills: 3      ammonium lactate 12 % Crea Apply twice daily to affected parts both feet as needed.  Qty: 140 g, Refills: 11      aspirin (ECOTRIN) 81 MG EC tablet Take 81 mg by mouth once daily.      atorvastatin (LIPITOR) 20 MG tablet TAKE 1 TABLET BY MOUTH ONCE DAILY  Qty: 30 tablet, Refills: 1    Comments: Please consider 90 day supplies to promote better adherence  Associated Diagnoses: Type 2 diabetes mellitus without complication, without long-term current use of insulin      gabapentin (NEURONTIN) 300 MG capsule TAKE ONE CAPSULE BY MOUTH TWICE DAILY  Qty: 60 capsule, Refills: 5    Associated Diagnoses: Diabetic polyneuropathy associated with type 2 diabetes mellitus      lisinopril-hydrochlorothiazide (PRINZIDE,ZESTORETIC) 20-12.5 mg per tablet Take 1 tablet by mouth once daily.  Qty: 90 tablet, Refills: 1    Comments: Please consider 90 day supplies to promote better adherence  Associated Diagnoses: Essential hypertension      metFORMIN (GLUCOPHAGE) 500 MG tablet TAKE ONE TABLET BY MOUTH ONCE DAILY WITH BREAKFAST  Qty: 90 tablet, Refills: 1      triamcinolone acetonide 0.1% (KENALOG) 0.1 % cream Apply topically 2 (two) times daily.  Qty: 80 g, Refills: 1                 Discharge Diagnosis: Facet arthritis of lumbar region [M46.96]  Condition on Discharge: Stable with no complications to procedure   Diet on Discharge: Same as before.  Activity: as per  instruction sheet.  Discharge to: Home with a responsible adult.  Follow up: 2-4 weeks

## 2018-09-01 DIAGNOSIS — E11.42 DIABETIC POLYNEUROPATHY ASSOCIATED WITH TYPE 2 DIABETES MELLITUS: ICD-10-CM

## 2018-09-02 RX ORDER — GABAPENTIN 300 MG/1
CAPSULE ORAL
Qty: 60 CAPSULE | Refills: 5 | Status: SHIPPED | OUTPATIENT
Start: 2018-09-02 | End: 2019-07-30 | Stop reason: SDUPTHER

## 2018-09-11 ENCOUNTER — OFFICE VISIT (OUTPATIENT)
Dept: PAIN MEDICINE | Facility: CLINIC | Age: 58
End: 2018-09-11
Payer: COMMERCIAL

## 2018-09-11 VITALS
SYSTOLIC BLOOD PRESSURE: 126 MMHG | WEIGHT: 156.5 LBS | RESPIRATION RATE: 18 BRPM | BODY MASS INDEX: 24.56 KG/M2 | DIASTOLIC BLOOD PRESSURE: 80 MMHG | HEIGHT: 67 IN | TEMPERATURE: 98 F | HEART RATE: 70 BPM

## 2018-09-11 DIAGNOSIS — M47.816 FACET ARTHRITIS OF LUMBAR REGION: ICD-10-CM

## 2018-09-11 DIAGNOSIS — M51.36 DDD (DEGENERATIVE DISC DISEASE), LUMBAR: ICD-10-CM

## 2018-09-11 DIAGNOSIS — M54.16 LUMBAR RADICULOPATHY: Primary | ICD-10-CM

## 2018-09-11 DIAGNOSIS — M47.816 LUMBAR SPONDYLOSIS: ICD-10-CM

## 2018-09-11 PROCEDURE — 99213 OFFICE O/P EST LOW 20 MIN: CPT | Mod: S$GLB,,, | Performed by: NURSE PRACTITIONER

## 2018-09-11 PROCEDURE — 3008F BODY MASS INDEX DOCD: CPT | Mod: CPTII,S$GLB,, | Performed by: NURSE PRACTITIONER

## 2018-09-11 PROCEDURE — 3079F DIAST BP 80-89 MM HG: CPT | Mod: CPTII,S$GLB,, | Performed by: NURSE PRACTITIONER

## 2018-09-11 PROCEDURE — 3074F SYST BP LT 130 MM HG: CPT | Mod: CPTII,S$GLB,, | Performed by: NURSE PRACTITIONER

## 2018-09-11 PROCEDURE — 99999 PR PBB SHADOW E&M-EST. PATIENT-LVL III: CPT | Mod: PBBFAC,,, | Performed by: NURSE PRACTITIONER

## 2018-09-11 NOTE — H&P (VIEW-ONLY)
Chronic Pain - Established Visit    Referring Physician: No ref. provider found    Chief Complaint:   No chief complaint on file.       SUBJECTIVE: Disclaimer: This note has been generated using voice-recognition software. There may be typographical errors that have been missed during proof-reading    Interval History 9/11/2018:  The patient presents for procedure follow up appointment.  He is s/p left then right L3,4,5 RFA completed on 8/14/18 with 80% pain relief initially.  He has had a little more pain over the past week.  He is now having intermittent radiation into the back of both legs, left greater than right.  He previously had benefit with right sided TF JEAN MARIE.  He Is still taking gabapentin.  His pain today is 5/10.    Interval History 7/25/2018:  The patient returns today for follow up of back pain.  He is s/p bilateral L3,4,5 MBB on 7/5/18 with 100% relief for 2 days.  He previously had benefit with right TF JEAN MARIE for leg pain.  He has not had right leg pain since the epidural.  However, he is reporting lateral left leg pain that has progressed over the past couple of weeks.  He continues to be active and works.  His pain today is 5/10.    Interval History 6/20/2018:  The patient returns today for follow up of lower back and right leg pain.  He is s/p right L5 and S1 TF JEAN MARIE on 6/5/18 with 100% relief of right leg pain.  He has not had any leg pain since the procedure.  He continues to report pain across the lower back.  This is always worse first thing in the morning.  He states that this feels aching and throbbing in nature.  He did have recent lumbar CT scan.  He would like to discuss further procedures.  He continues to work and be active.  His pain today is 5/10.    Interval History 5/22/2018:  The patient returns for follow up of back pain.  He is having radiation down there back of the right leg to the posterior calf.  The back pain is most severe in the morning and he states that this feels like a  stiffness.  This improves when he walks.  He has severe leg pain that is intermittent, mainly with activity.  This is his biggest complaint.  He did complete the medrol dose pack recently with limited improvement.  He had XRAYs which show severe loss of disc space at L5/S1.  He has not had a CT scan.  He is taking Gabapentin 300 mg at bedtime.  It is written BID but it makes him drowsy during the day.  He has some benefit with Aleve but has been told to avoid NSAIDs due to history of pacemaker placement.  His pain today is 5/10.     Initial encounter:    Renny Young presents to the clinic for the evaluation of lower back and right leg pain. The pain started two months ago and symptoms have been worsening.    Brief history:    Pain Description:    The pain is located in the lower back and right leg area in the L5/S1 distribution.      At BEST  5/10     At WORST  7/10 on the WORST day.      On average pain is rated as 5/10.     Today the pain is rated as 5/10    The pain is described as aching      Symptoms interfere with daily activity.     Exacerbating factors: Standing and Morning.      Mitigating factors medications.     Patient denies night fever/night sweats, urinary incontinence, bowel incontinence, significant weight loss, significant motor weakness and loss of sensations.  Patient denies any suicidal or homicidal ideations    Pain Medications:  Current:  Gabapentin 300mg BID    Tried in Past:  NSAIDs - Aleve  TCA -Never  SNRI -Never  Anti-convulsants - Gabapentin   Muscle Relaxants -Never  Opioids-Never    Physical Therapy/Home Exercise: yes     report:  Reviewed and consistent with medication use as prescribed.    Pain Procedures:   6/5/18 Right L5 and S1 TF JEAN MARIE- significant  7/5/18 Bilateral L3,4,5 MBB- 100% relief for 2 days  7/31/18 Left L3,4,5 RFA- 80% relief  8/14/18 Right L3,4,5 RFA- 80% relief    Chiropractor -never  Acupuncture - never  TENS unit -never  Spinal decompression -never  Joint  replacement -never    Imaging:     Lumbar CT 5/28/18    Narrative     EXAMINATION:  CT LUMBAR SPINE WITHOUT CONTRAST    CLINICAL HISTORY:  Low back pain, >6wks conservative tx, persistent-progressive sx, surgical candidate;  Radiculopathy, lumbosacral region    TECHNIQUE:  Low-dose axial, sagittal and coronal reformations are obtained through the lumbar spine.  Contrast was not administered.    COMPARISON:  None.    FINDINGS:  Posterior vertebral alignment is satisfactory.  There is mild levoconvexity of the lumbar spine.  Vertebral body heights are well maintained.  There is prominent degenerative disc disease at the L5-S1 level with marked disc space narrowing with vertebral endplate sclerosis and irregularity as well as vacuum disc phenomenon at the L5-S1 level.  There are anterior and posterior osteophytes at the L5-S1 level associated with diffuse concentric disc bulging.  Mild concentric disc bulging is also present at the L4-L5 level.  There is mild bilateral neural foraminal narrowing at the L5-S1 level due to the diffuse disc bulging and osteophyte formation as well as mild facet arthropathy at the L5-S1.  No abnormal paraspinal masses are evident.  There is a mild amount of atherosclerotic calcification within the abdominal aorta which tapers normally without aneurysmal dilatation.   Impression       No evidence for acute fracture, bone destruction, or subluxation.    Prominent degenerative disc disease at the L5-S1 level with anterior and posterior osteophyte formation as well as concentric disc bulging.  This does not appear to result in significant spinal stenosis.    Minimal diffuse disc bulging at the L4-L5 level.    Mild neural foraminal narrowing at the L5-S1 level bilaterally.       X-Ray Lumbar Spine    Narrative     EXAMINATION:  XR LUMBAR SPINE AP AND LATERAL    CLINICAL HISTORY:  Low back pain, >6wks conservative tx, persistent-progressive sx, surgical candidate;Low back pain    TECHNIQUE:  AP,  lateral and spot images were performed of the lumbar spine.    COMPARISON:  None    FINDINGS:  There are 5 non-rib-bearing lumbar type vertebral bodies.  There is mild levocurvature of the lumbar spine between L1 and L4 that I calculate at 4°.   Vertebral body heights and alignment are preserved.    There is severe loss of disc space height at L5-S1 with near effacement of that disc space; vacuum phenomenon is present at the anterior portion of the disc space.  Other disc spaces are preserved.    There is facet arthropathy at L5-S1.  I do not identify spondylolysis or spondylolisthesis.    Sacroiliac joints are incompletely visualized.    Calcific atherosclerosis is present in the abdominal aorta and iliac arteries of this 57-year-old man.  I do not identify aneurysm.   Impression       Please see above.      Electronically signed by: Arin Galicia MD  Date: 05/01/2018  Time: 08:06         Past Medical History:   Diagnosis Date    Diabetes mellitus, type 2     Hypertension     Pacemaker      Past Surgical History:   Procedure Laterality Date    BLOCK, NERVE Bilateral 7/5/2018    Performed by Krystal Mtz [facility] at [facility]    COLONOSCOPY N/A 2/13/2017    Procedure: COLONOSCOPY;  Surgeon: NILDA Juares MD;  Location: [facility] ([address] [place] [address]);  Service: Endoscopy;  Laterality: N/A;  Do not cancel this order. Patient has Pacemaker in place.     COLONOSCOPY N/A 2/13/2017    [facility]    COLONOSCOPY N/A 7/20/2015    Performed by Laz Elliott MD at [facility] ([facility])    HERNIA REPAIR      INJECTION OF ANESTHETIC AGENT AROUND NERVE Bilateral 7/5/2018    Procedure: BLOCK, NERVE;  Surgeon: Krystal Mtz MD;  Location: [facility];  Service: Pain Management;  Laterality: Bilateral;  Lumbar Bilateral L3-L4-L5 Medial Branch Block    INJECTION-STEROID-EPIDURAL-TRANSFORAMINAL Right 6/5/2018    Performed by Josue Paredes MD at [facility]    RADIOFREQUENCY  ABLATION Right 8/14/2018    [facility]    RADIOFREQUENCY ABLATION Left 7/31/2018    Performed by [facility]es MD at [facility]    TRANSFORAMINAL EPIDURAL INJECTION OF STEROID Right 6/5/2018    Procedure: INJECTION-STEROID-EPIDURAL-TRANSFORAMINAL;  Surgeon: Josue Paredes MD;  Location: [facility];  Service: Pain Management;  Laterality: Right;  LUMBAR RIGHT L5 AND S1 TRANSFORAMINL JEAN MARIE  [id number]    W/ SEDATION      Social History     Socioeconomic History    Marital status:      Spouse name: Not on file    Number of children: 3    Years of education: Not on file    Highest education level: Not on file   Social Needs    Financial resource strain: Not on file    Food insecurity - worry: Not on file    Food insecurity - inability: Not on file    Transportation needs - medical: Not on file    Transportation needs - non-medical: Not on file   Occupational History    Occupation:    Tobacco Use    Smoking status: Current Every Day Smoker     Packs/day: 0.50     Types: Cigarettes    Smokeless tobacco: Never Used   Substance and Sexual Activity    Alcohol use: Yes     Comment: Beer- Socially    Drug use: No    Sexual activity: Yes   Other Topics Concern    Not on file   Social History Narrative    Not on file     Family History   Problem Relation Age of Onset    Diabetes Mother     Diabetes Father     Kidney disease Father     Melanoma Neg Hx        Review of patient's allergies indicates:  No Known Allergies    Current Outpatient Medications   Medication Sig    amLODIPine (NORVASC) 5 MG tablet Take 1 tablet (5 mg total) by mouth once daily.    ammonium lactate 12 % Crea Apply twice daily to affected parts both feet as needed.    aspirin (ECOTRIN) 81 MG EC tablet Take 81 mg by mouth once daily.    atorvastatin (LIPITOR) 20 MG tablet TAKE 1 TABLET BY MOUTH ONCE DAILY    gabapentin (NEURONTIN) 300 MG capsule TAKE ONE CAPSULE BY MOUTH  "TWICE DAILY    lisinopril-hydrochlorothiazide (PRINZIDE,ZESTORETIC) 20-12.5 mg per tablet Take 1 tablet by mouth once daily.    metFORMIN (GLUCOPHAGE) 500 MG tablet TAKE ONE TABLET BY MOUTH ONCE DAILY WITH BREAKFAST    triamcinolone acetonide 0.1% (KENALOG) 0.1 % cream Apply topically 2 (two) times daily.     No current facility-administered medications for this visit.        REVIEW OF SYSTEMS:    GENERAL:  No weight loss, malaise or fevers.  HEENT:   No recent changes in vision or hearing  NECK:  Negative for lumps, no difficulty with swallowing.  RESPIRATORY:  Negative for cough, wheezing or shortness of breath, patient denies any recent URI.  CARDIOVASCULAR:  Negative for chest pain, leg swelling or palpitations. Pacemaker for SSS.  GI:  Negative for abdominal discomfort, blood in stools or black stools or change in bowel habits.  MUSCULOSKELETAL:  See HPI.  SKIN:  Negative for lesions, rash, and itching.  PSYCH:  No mood disorder or recent psychosocial stressors.  Patients sleep is not disturbed secondary to pain.  HEMATOLOGY/LYMPHOLOGY:  Negative for prolonged bleeding, bruising easily or swollen nodes.  Patient is not currently taking any anti-coagulants  ENDO: DM2 on metformin  NEURO:   No history of headaches, syncope, paralysis, seizures or tremors.  All other reviewed and negative other than HPI.    OBJECTIVE:    /80   Pulse 70   Temp 97.5 °F (36.4 °C) (Oral)   Resp 18   Ht 5' 7" (1.702 m)   Wt 71 kg (156 lb 8 oz)   BMI 24.51 kg/m²     PHYSICAL EXAMINATION:    GENERAL: Well appearing, in no acute distress, alert and oriented x3.  PSYCH:  Mood and affect appropriate.  SKIN: Skin color, texture, turgor normal, no rashes or lesions.  HEAD/FACE:  Normocephalic, atraumatic. Cranial nerves grossly intact.  CV: RRR with palpation of the radial artery.  PULM: No evidence of respiratory difficulty, symmetric chest rise.  BACK: Straight leg raising in the sitting and supine positions is positive to " radicular pain at bilateral L5 distribution.  There is no pain with palpation over the facet joints of the lumbar spine bilaterally.  There is decreased range of motion with extension to 15 degrees, and facet loading maneuvers cause reproducible pain bilaterally.  EXTREMITIES: Peripheral joint ROM is full and pain free without obvious instability or laxity in all four extremities. No deformities, edema, or skin discoloration. Good capillary refill.  MUSCULOSKELETAL: Hip, and knee provocative maneuvers are negative.  There is pain with palpation over the sacroiliac joints bilaterally.  There is no pain to palpation over the greater trochanteric bursa bilaterally.  FABERs test is positive on the right.  FADIRs test is negative.  5/5 strength in right ankle with plantar and dorsiflexion, 5/5 strength in left ankle with plantar and dorsiflexion, 5/5 strength with right knee flexion extension, 5/5 strength with knee flexion extension on the left  No atrophy or tone abnormalities are noted.  NEURO: Bilateral lower extremity coordination and muscle stretch reflexes are physiologic and symmetric.  Plantar response are downgoing. No clonus.  Normal sensation.  GAIT: Antalgic.    Lab Results   Component Value Date    HGBA1C 5.8 (H) 04/06/2018     Lab Results   Component Value Date    WBC 12.57 04/06/2017    HGB 15.6 04/06/2017    HCT 46.0 04/06/2017    MCV 87 04/06/2017     04/06/2017     BMP  Lab Results   Component Value Date     04/06/2018    K 3.9 04/06/2018     04/06/2018    CO2 27 04/06/2018    BUN 18 04/06/2018    CREATININE 1.0 04/06/2018    CALCIUM 9.9 04/06/2018    ANIONGAP 9 04/06/2018    ESTGFRAFRICA >60.0 04/06/2018    EGFRNONAA >60.0 04/06/2018     ASSESSMENT: 57 y.o. year old male with lower back pain, consistent with the following diagnoses:    Encounter Diagnoses   Name Primary?    Lumbar spondylosis Yes    Lumbar radiculopathy     DDD (degenerative disc disease), lumbar     Facet  arthritis of lumbar region        PLAN:     - Recent lumbar CT reviewed with patient in detail today.    - Continue Gabapentin 300 mg BID (he takes both at night due to sedation).    - Schedule for L5/S1 JEAN MARIE for leg pain.  The procedure, risks, benefits and options were discussed with patient. There are no contraindications to the procedure. The patient expressed understanding and agreed to proceed.      - The patient will continue a home exercise routine to help with pain and strengthening.      - RTC 2 weeks after procedure.      The above plan and management options were discussed at length with patient. Patient is in agreement with the above and verbalized understanding.     Trista Oviedo  09/11/2018

## 2018-09-11 NOTE — PROGRESS NOTES
Chronic Pain - Established Visit    Referring Physician: No ref. provider found    Chief Complaint:   No chief complaint on file.       SUBJECTIVE: Disclaimer: This note has been generated using voice-recognition software. There may be typographical errors that have been missed during proof-reading    Interval History 9/11/2018:  The patient presents for procedure follow up appointment.  He is s/p left then right L3,4,5 RFA completed on 8/14/18 with 80% pain relief initially.  He has had a little more pain over the past week.  He is now having intermittent radiation into the back of both legs, left greater than right.  He previously had benefit with right sided TF JEAN MARIE.  He Is still taking gabapentin.  His pain today is 5/10.    Interval History 7/25/2018:  The patient returns today for follow up of back pain.  He is s/p bilateral L3,4,5 MBB on 7/5/18 with 100% relief for 2 days.  He previously had benefit with right TF JEAN MARIE for leg pain.  He has not had right leg pain since the epidural.  However, he is reporting lateral left leg pain that has progressed over the past couple of weeks.  He continues to be active and works.  His pain today is 5/10.    Interval History 6/20/2018:  The patient returns today for follow up of lower back and right leg pain.  He is s/p right L5 and S1 TF JEAN MARIE on 6/5/18 with 100% relief of right leg pain.  He has not had any leg pain since the procedure.  He continues to report pain across the lower back.  This is always worse first thing in the morning.  He states that this feels aching and throbbing in nature.  He did have recent lumbar CT scan.  He would like to discuss further procedures.  He continues to work and be active.  His pain today is 5/10.    Interval History 5/22/2018:  The patient returns for follow up of back pain.  He is having radiation down there back of the right leg to the posterior calf.  The back pain is most severe in the morning and he states that this feels like a  stiffness.  This improves when he walks.  He has severe leg pain that is intermittent, mainly with activity.  This is his biggest complaint.  He did complete the medrol dose pack recently with limited improvement.  He had XRAYs which show severe loss of disc space at L5/S1.  He has not had a CT scan.  He is taking Gabapentin 300 mg at bedtime.  It is written BID but it makes him drowsy during the day.  He has some benefit with Aleve but has been told to avoid NSAIDs due to history of pacemaker placement.  His pain today is 5/10.     Initial encounter:    Renny Young presents to the clinic for the evaluation of lower back and right leg pain. The pain started two months ago and symptoms have been worsening.    Brief history:    Pain Description:    The pain is located in the lower back and right leg area in the L5/S1 distribution.      At BEST  5/10     At WORST  7/10 on the WORST day.      On average pain is rated as 5/10.     Today the pain is rated as 5/10    The pain is described as aching      Symptoms interfere with daily activity.     Exacerbating factors: Standing and Morning.      Mitigating factors medications.     Patient denies night fever/night sweats, urinary incontinence, bowel incontinence, significant weight loss, significant motor weakness and loss of sensations.  Patient denies any suicidal or homicidal ideations    Pain Medications:  Current:  Gabapentin 300mg BID    Tried in Past:  NSAIDs - Aleve  TCA -Never  SNRI -Never  Anti-convulsants - Gabapentin   Muscle Relaxants -Never  Opioids-Never    Physical Therapy/Home Exercise: yes     report:  Reviewed and consistent with medication use as prescribed.    Pain Procedures:   6/5/18 Right L5 and S1 TF JEAN MARIE- significant  7/5/18 Bilateral L3,4,5 MBB- 100% relief for 2 days  7/31/18 Left L3,4,5 RFA- 80% relief  8/14/18 Right L3,4,5 RFA- 80% relief    Chiropractor -never  Acupuncture - never  TENS unit -never  Spinal decompression -never  Joint  replacement -never    Imaging:     Lumbar CT 5/28/18    Narrative     EXAMINATION:  CT LUMBAR SPINE WITHOUT CONTRAST    CLINICAL HISTORY:  Low back pain, >6wks conservative tx, persistent-progressive sx, surgical candidate;  Radiculopathy, lumbosacral region    TECHNIQUE:  Low-dose axial, sagittal and coronal reformations are obtained through the lumbar spine.  Contrast was not administered.    COMPARISON:  None.    FINDINGS:  Posterior vertebral alignment is satisfactory.  There is mild levoconvexity of the lumbar spine.  Vertebral body heights are well maintained.  There is prominent degenerative disc disease at the L5-S1 level with marked disc space narrowing with vertebral endplate sclerosis and irregularity as well as vacuum disc phenomenon at the L5-S1 level.  There are anterior and posterior osteophytes at the L5-S1 level associated with diffuse concentric disc bulging.  Mild concentric disc bulging is also present at the L4-L5 level.  There is mild bilateral neural foraminal narrowing at the L5-S1 level due to the diffuse disc bulging and osteophyte formation as well as mild facet arthropathy at the L5-S1.  No abnormal paraspinal masses are evident.  There is a mild amount of atherosclerotic calcification within the abdominal aorta which tapers normally without aneurysmal dilatation.   Impression       No evidence for acute fracture, bone destruction, or subluxation.    Prominent degenerative disc disease at the L5-S1 level with anterior and posterior osteophyte formation as well as concentric disc bulging.  This does not appear to result in significant spinal stenosis.    Minimal diffuse disc bulging at the L4-L5 level.    Mild neural foraminal narrowing at the L5-S1 level bilaterally.       X-Ray Lumbar Spine    Narrative     EXAMINATION:  XR LUMBAR SPINE AP AND LATERAL    CLINICAL HISTORY:  Low back pain, >6wks conservative tx, persistent-progressive sx, surgical candidate;Low back pain    TECHNIQUE:  AP,  lateral and spot images were performed of the lumbar spine.    COMPARISON:  None    FINDINGS:  There are 5 non-rib-bearing lumbar type vertebral bodies.  There is mild levocurvature of the lumbar spine between L1 and L4 that I calculate at 4°.   Vertebral body heights and alignment are preserved.    There is severe loss of disc space height at L5-S1 with near effacement of that disc space; vacuum phenomenon is present at the anterior portion of the disc space.  Other disc spaces are preserved.    There is facet arthropathy at L5-S1.  I do not identify spondylolysis or spondylolisthesis.    Sacroiliac joints are incompletely visualized.    Calcific atherosclerosis is present in the abdominal aorta and iliac arteries of this 57-year-old man.  I do not identify aneurysm.   Impression       Please see above.      Electronically signed by: Arin Galicia MD  Date: 05/01/2018  Time: 08:06         Past Medical History:   Diagnosis Date    Diabetes mellitus, type 2     Hypertension     Pacemaker      Past Surgical History:   Procedure Laterality Date    BLOCK, NERVE Bilateral 7/5/2018    [facility]rystal [facility]tz [facility] at [facility]    COLONOSCOPY N/A 2/13/2017    Procedure: COLONOSCOPY;  Surgeon: NILDA Juares MD;  Location: [facility] ([address] [place] [address]);  Service: Endoscopy;  Laterality: N/A;  Do not cancel this order. Patient has Pacemaker in place.     COLONOSCOPY N/A 2/13/2017    [facility]    COLONOSCOPY N/A 7/20/2015    Performed by Laz Elliott MD at [facility] ([facility])    HERNIA REPAIR      INJECTION OF ANESTHETIC AGENT AROUND NERVE Bilateral 7/5/2018    Procedure: BLOCK, NERVE;  Surgeon: Krystal Mtz MD;  Location: [facility];  Service: Pain Management;  Laterality: Bilateral;  Lumbar Bilateral L3-L4-L5 Medial Branch Block    INJECTION-STEROID-EPIDURAL-TRANSFORAMINAL Right 6/5/2018    Performed by Josue Paredes MD at [facility]    RADIOFREQUENCY  ABLATION Right 8/14/2018    Performed by [facility]es [facility] at [facility] [place] [place]    RADIOFREQUENCY ABLATION Left 7/31/2018    Per[facility] by [facility] at [facility]    TRANSFORAMINAL EPIDURAL INJECTION OF STEROID Right 6/5/2018    Procedure: INJECTION-STEROID-EPIDURAL-TRANSFORAMINAL;  Surgeon: Josue Paredes MD;  Location: [facility];  Service: Pain Management;  Laterality: Right;  LUMBAR RIGHT L5 AND S1 TRANSFORAMINL JEAN MARIE  [id number]    W/ SEDATION      Social History     Socioeconomic History    Marital status:      Spouse name: Not on file    Number of children: 3    Years of education: Not on file    Highest education level: Not on file   Social Needs    Financial resource strain: Not on file    Food insecurity - worry: Not on file    Food insecurity - inability: Not on file    Transportation needs - medical: Not on file    Transportation needs - non-medical: Not on file   Occupational History    Occupation:    Tobacco Use    Smoking status: Current Every Day Smoker     Packs/day: 0.50     Types: Cigarettes    Smokeless tobacco: Never Used   Substance and Sexual Activity    Alcohol use: Yes     Comment: Beer- Socially    Drug use: No    Sexual activity: Yes   Other Topics Concern    Not on file   Social History Narrative    Not on file     Family History   Problem Relation Age of Onset    Diabetes Mother     Diabetes Father     Kidney disease Father     Melanoma Neg Hx        Review of patient's allergies indicates:  No Known Allergies    Current Outpatient Medications   Medication Sig    amLODIPine (NORVASC) 5 MG tablet Take 1 tablet (5 mg total) by mouth once daily.    ammonium lactate 12 % Crea Apply twice daily to affected parts both feet as needed.    aspirin (ECOTRIN) 81 MG EC tablet Take 81 mg by mouth once daily.    atorvastatin (LIPITOR) 20 MG tablet TAKE 1 TABLET BY MOUTH ONCE DAILY    gabapentin (NEURONTIN) 300 MG capsule TAKE ONE CAPSULE BY MOUTH  "TWICE DAILY    lisinopril-hydrochlorothiazide (PRINZIDE,ZESTORETIC) 20-12.5 mg per tablet Take 1 tablet by mouth once daily.    metFORMIN (GLUCOPHAGE) 500 MG tablet TAKE ONE TABLET BY MOUTH ONCE DAILY WITH BREAKFAST    triamcinolone acetonide 0.1% (KENALOG) 0.1 % cream Apply topically 2 (two) times daily.     No current facility-administered medications for this visit.        REVIEW OF SYSTEMS:    GENERAL:  No weight loss, malaise or fevers.  HEENT:   No recent changes in vision or hearing  NECK:  Negative for lumps, no difficulty with swallowing.  RESPIRATORY:  Negative for cough, wheezing or shortness of breath, patient denies any recent URI.  CARDIOVASCULAR:  Negative for chest pain, leg swelling or palpitations. Pacemaker for SSS.  GI:  Negative for abdominal discomfort, blood in stools or black stools or change in bowel habits.  MUSCULOSKELETAL:  See HPI.  SKIN:  Negative for lesions, rash, and itching.  PSYCH:  No mood disorder or recent psychosocial stressors.  Patients sleep is not disturbed secondary to pain.  HEMATOLOGY/LYMPHOLOGY:  Negative for prolonged bleeding, bruising easily or swollen nodes.  Patient is not currently taking any anti-coagulants  ENDO: DM2 on metformin  NEURO:   No history of headaches, syncope, paralysis, seizures or tremors.  All other reviewed and negative other than HPI.    OBJECTIVE:    /80   Pulse 70   Temp 97.5 °F (36.4 °C) (Oral)   Resp 18   Ht 5' 7" (1.702 m)   Wt 71 kg (156 lb 8 oz)   BMI 24.51 kg/m²     PHYSICAL EXAMINATION:    GENERAL: Well appearing, in no acute distress, alert and oriented x3.  PSYCH:  Mood and affect appropriate.  SKIN: Skin color, texture, turgor normal, no rashes or lesions.  HEAD/FACE:  Normocephalic, atraumatic. Cranial nerves grossly intact.  CV: RRR with palpation of the radial artery.  PULM: No evidence of respiratory difficulty, symmetric chest rise.  BACK: Straight leg raising in the sitting and supine positions is positive to " radicular pain at bilateral L5 distribution.  There is no pain with palpation over the facet joints of the lumbar spine bilaterally.  There is decreased range of motion with extension to 15 degrees, and facet loading maneuvers cause reproducible pain bilaterally.  EXTREMITIES: Peripheral joint ROM is full and pain free without obvious instability or laxity in all four extremities. No deformities, edema, or skin discoloration. Good capillary refill.  MUSCULOSKELETAL: Hip, and knee provocative maneuvers are negative.  There is pain with palpation over the sacroiliac joints bilaterally.  There is no pain to palpation over the greater trochanteric bursa bilaterally.  FABERs test is positive on the right.  FADIRs test is negative.  5/5 strength in right ankle with plantar and dorsiflexion, 5/5 strength in left ankle with plantar and dorsiflexion, 5/5 strength with right knee flexion extension, 5/5 strength with knee flexion extension on the left  No atrophy or tone abnormalities are noted.  NEURO: Bilateral lower extremity coordination and muscle stretch reflexes are physiologic and symmetric.  Plantar response are downgoing. No clonus.  Normal sensation.  GAIT: Antalgic.    Lab Results   Component Value Date    HGBA1C 5.8 (H) 04/06/2018     Lab Results   Component Value Date    WBC 12.57 04/06/2017    HGB 15.6 04/06/2017    HCT 46.0 04/06/2017    MCV 87 04/06/2017     04/06/2017     BMP  Lab Results   Component Value Date     04/06/2018    K 3.9 04/06/2018     04/06/2018    CO2 27 04/06/2018    BUN 18 04/06/2018    CREATININE 1.0 04/06/2018    CALCIUM 9.9 04/06/2018    ANIONGAP 9 04/06/2018    ESTGFRAFRICA >60.0 04/06/2018    EGFRNONAA >60.0 04/06/2018     ASSESSMENT: 57 y.o. year old male with lower back pain, consistent with the following diagnoses:    Encounter Diagnoses   Name Primary?    Lumbar spondylosis Yes    Lumbar radiculopathy     DDD (degenerative disc disease), lumbar     Facet  arthritis of lumbar region        PLAN:     - Recent lumbar CT reviewed with patient in detail today.    - Continue Gabapentin 300 mg BID (he takes both at night due to sedation).    - Schedule for L5/S1 JEAN MARIE for leg pain.  The procedure, risks, benefits and options were discussed with patient. There are no contraindications to the procedure. The patient expressed understanding and agreed to proceed.      - The patient will continue a home exercise routine to help with pain and strengthening.      - RTC 2 weeks after procedure.      The above plan and management options were discussed at length with patient. Patient is in agreement with the above and verbalized understanding.     Trista Oviedo  09/11/2018

## 2018-09-11 NOTE — LETTER
September 11, 2018      [facility]  [address]  [facility] [address]  Phone: [phone]  Fax: [phone]       Patient: Renny Young   YOB: 1960  Date of Visit: 09/11/2018    To Whom It May Concern:    Ermelinda Young  was at [facility] on 09/11/2018. He may return to work 9/12/2018.  We have been treating the patient since May 2018 for back pain.  He has been undergoing multiple procedures to help with his back pain since this time.  If you have any questions or concerns, or if I can be of further assistance, please do not hesitate to contact me.    Sincerely,    EDGARD Carreon

## 2018-10-11 ENCOUNTER — HOSPITAL ENCOUNTER (OUTPATIENT)
Facility: OTHER | Age: 58
Discharge: HOME | End: 2018-10-11
Attending: ANESTHESIOLOGY | Admitting: ANESTHESIOLOGY
Payer: COMMERCIAL

## 2018-10-11 VITALS
HEART RATE: 60 BPM | TEMPERATURE: 98 F | RESPIRATION RATE: 18 BRPM | BODY MASS INDEX: 24.33 KG/M2 | DIASTOLIC BLOOD PRESSURE: 77 MMHG | HEIGHT: 67 IN | SYSTOLIC BLOOD PRESSURE: 131 MMHG | WEIGHT: 155 LBS | OXYGEN SATURATION: 96 %

## 2018-10-11 DIAGNOSIS — M54.16 LUMBAR RADICULOPATHY: Primary | ICD-10-CM

## 2018-10-11 LAB — POCT GLUCOSE: 94 MG/DL (ref 70–110)

## 2018-10-11 PROCEDURE — 25000003 PHARM REV CODE 250: Performed by: ANESTHESIOLOGY

## 2018-10-11 PROCEDURE — 63600175 PHARM REV CODE 636 W HCPCS: Performed by: ANESTHESIOLOGY

## 2018-10-11 PROCEDURE — 62323 NJX INTERLAMINAR LMBR/SAC: CPT | Performed by: ANESTHESIOLOGY

## 2018-10-11 PROCEDURE — 25500020 PHARM REV CODE 255: Performed by: ANESTHESIOLOGY

## 2018-10-11 PROCEDURE — 82947 ASSAY GLUCOSE BLOOD QUANT: CPT | Performed by: ANESTHESIOLOGY

## 2018-10-11 PROCEDURE — 62323 NJX INTERLAMINAR LMBR/SAC: CPT | Mod: ,,, | Performed by: ANESTHESIOLOGY

## 2018-10-11 RX ORDER — MIDAZOLAM HYDROCHLORIDE 1 MG/ML
INJECTION INTRAMUSCULAR; INTRAVENOUS
Status: DISCONTINUED | OUTPATIENT
Start: 2018-10-11 | End: 2018-10-11 | Stop reason: HOSPADM

## 2018-10-11 RX ORDER — BUPIVACAINE HYDROCHLORIDE 2.5 MG/ML
INJECTION, SOLUTION EPIDURAL; INFILTRATION; INTRACAUDAL
Status: DISCONTINUED | OUTPATIENT
Start: 2018-10-11 | End: 2018-10-11 | Stop reason: HOSPADM

## 2018-10-11 RX ORDER — METHYLPREDNISOLONE ACETATE 40 MG/ML
INJECTION, SUSPENSION INTRA-ARTICULAR; INTRALESIONAL; INTRAMUSCULAR; SOFT TISSUE
Status: DISCONTINUED | OUTPATIENT
Start: 2018-10-11 | End: 2018-10-11 | Stop reason: HOSPADM

## 2018-10-11 RX ORDER — LIDOCAINE HYDROCHLORIDE 10 MG/ML
INJECTION INFILTRATION; PERINEURAL
Status: DISCONTINUED | OUTPATIENT
Start: 2018-10-11 | End: 2018-10-11 | Stop reason: HOSPADM

## 2018-10-11 NOTE — DISCHARGE INSTRUCTIONS
Thank you for allowing us to care for you today. You may receive a survey about the care we provided. Your feedback is valuable and helps us provide excellent care throughout the community.     Home Care Instructions for Pain Management:    1. DIET:   You may resume your normal diet today.   2. BATHING:   You may shower with luke warm water. No tub baths or anything that will soak injection sites under water for the next 24 hours.  3. DRESSING:   You may remove your bandage today.   4. ACTIVITY LEVEL:   You may resume your normal activities 24 hrs after your procedure. Nothing strenuous today.  5. MEDICATIONS:   You may resume your normal medications today. To restart blood thinners, ask your doctor.  6. DRIVING    If you have received any sedatives by mouth today, you may not drive for 12 hours.    If you have received any sedation through your IV, you may not drive for 24 hrs.   7. SPECIAL INSTRUCTIONS:   No heat to the injection site for 24 hrs including, hot bath or shower, heating pad, moist heat, or hot tubs.    Use ice pack to injection site for any pain or discomfort.  Apply ice packs for 20 minute intervals as needed.    IF you have diabetes, be sure to monitor your blood sugar more closely. IF your injection contained steroids your blood sugar levels may become higher than normal.    If you are still having pain upon discharge:  Your pain may improve over the next 48 hours. The anesthetic (numbing medication) works immediately to 48 hours. IF your injection contained a steroid (anti-inflammatory medication), it takes approximately 3 days to start feeling relief and 7-10 days to see your greatest results from the medication. It is possible you may need subsequent injections. This would be discussed at your follow up appointment with pain management or your referring doctor.      PLEASE CALL YOUR DOCTOR IF:  1. Redness or swelling around the injection site.  2. Fever of 101 degrees or more  3. Drainage  (pus) from the injection site.  4. For any continuous bleeding (some dried blood over the incision is normal.)    FOR EMERGENCIES:   If any unusual problems or difficulties occur during clinic hours, call [phone] or [phone].

## 2018-10-11 NOTE — INTERVAL H&P NOTE
"The patient has been examined and the H&P has been reviewed:    I concur with the findings and no changes have occurred since H&P was written.    Anesthesia/Surgery risks, benefits and alternative options discussed and understood by patient/family.    HPI  58 M with lumbar radiculopathy with pain in both legs and low back. Here today for L5/S1 IL JEAN MARIE.    PMHx, PSHx, Allergies, Medications reviewed in epic    ROS negative except pain complaints in HPI    OBJECTIVE:    /87   Pulse 65   Temp 98.2 °F (36.8 °C) (Oral)   Resp 18   Ht 5' 7" (1.702 m)   Wt 70.3 kg (155 lb)   SpO2 99%   BMI 24.28 kg/m²     PHYSICAL EXAMINATION:    GENERAL: Well appearing, in no acute distress, alert and oriented x3.  PSYCH:  Mood and affect appropriate.  SKIN: Skin color, texture, turgor normal, no rashes or lesions.  CV: RRR with palpation of the radial artery.  PULM: No evidence of respiratory difficulty, symmetric chest rise. Clear to auscultation.  NEURO: Cranial nerves grossly intact.    Plan:    Proceed with procedure as planned - L5/S1 ILESI.    Max Brownhty  10/11/2018        Active Hospital Problems    Diagnosis  POA    Lumbar radiculopathy [M54.16]  Yes      Resolved Hospital Problems   No resolved problems to display.     "

## 2018-10-11 NOTE — OP NOTE
Lumbar Interlaminar Epidural Steroid Injection under Fluoroscopic Guidance.   Time-out taken to identify patient and procedure prior to starting the procedure.     10/11/2018    PROCEDURE: Interlaminar epidural steroid injection under fluoroscopic guidance.     Pre-Op diagnosis: Lumbar radiculopathy [M54.16]    Post-Op diagnosis: Lumbar radiculopathy [M54.16]    PHYSICIAN: MICHELLE LI MD    ASSISTANTS: VANESSA ISRAEL MD    ESTIMATED BLOOD LOSS: none.     COMPLICATIONS: none.     SPECIMENS: none    TECHNIQUE: With the patient laying in a prone position, the area was prepped and draped in the usual sterile fashion using ChloraPrep and a fenestrated drape. 1% lidocaine was given using a 27-gauge needle by raising a wheal and going down to the hub of the needle over the L4/5 interlaminar space.  The interlaminar space was then approached with a 3.5 inch 18-gauge Touhy needle was introduced under fluoroscopic guidance in the AP and Lateral view. Once the Ligamentum flavum was encountered loss of resistance to saline was used to enter the epidural space. With positive loss of resistance and negative CSF or Blood, 3mL contrast dye Omnipaque (300mg/ml) was injected to confirm placement and there was no vascular runoff. Then 1ml 40mg/ml Depomedrol + 1mL 0.25% Bupivicaine + 8mL preservative free normal saline was injected slowly. Displacement of the radio opaque contrast after injection of the medication confirmed that the medication went into the area of the epidural space.  The patient tolerated the procedure well.     Conscious sedation provided by M.D    The patient was monitored with continuous pulse oximetry, EKG, and intermittent blood pressure monitors.  The patient was hemodynamically stable throughout the entire process was responsive to voice, and breathing spontaneously.  Supplemental O2 was provided at 2L/min via nasal cannula.  Patient was comfortable for the duration of the procedure. (See nurse  documentation and case log for sedation time)    There was a total of 2mg IV Midazolam was titrated for the procedure      The patient was monitored after the procedure.   They were given post-procedure and discharge instructions to follow at home.  The patient was discharged in a stable condition.

## 2018-10-23 DIAGNOSIS — E11.9 TYPE 2 DIABETES MELLITUS WITHOUT COMPLICATION, WITHOUT LONG-TERM CURRENT USE OF INSULIN: ICD-10-CM

## 2018-10-23 RX ORDER — ATORVASTATIN CALCIUM 20 MG/1
TABLET, FILM COATED ORAL
Qty: 90 TABLET | Refills: 1 | Status: SHIPPED | OUTPATIENT
Start: 2018-10-23 | End: 2019-02-08 | Stop reason: SDUPTHER

## 2018-10-25 ENCOUNTER — OFFICE VISIT (OUTPATIENT)
Dept: PAIN MEDICINE | Facility: CLINIC | Age: 58
End: 2018-10-25
Payer: COMMERCIAL

## 2018-10-25 VITALS
DIASTOLIC BLOOD PRESSURE: 85 MMHG | WEIGHT: 158.81 LBS | HEIGHT: 67 IN | TEMPERATURE: 97 F | SYSTOLIC BLOOD PRESSURE: 116 MMHG | RESPIRATION RATE: 18 BRPM | HEART RATE: 61 BPM | BODY MASS INDEX: 24.92 KG/M2

## 2018-10-25 DIAGNOSIS — M51.36 DDD (DEGENERATIVE DISC DISEASE), LUMBAR: ICD-10-CM

## 2018-10-25 DIAGNOSIS — M54.16 LUMBAR RADICULOPATHY: Primary | ICD-10-CM

## 2018-10-25 DIAGNOSIS — M47.816 LUMBAR SPONDYLOSIS: ICD-10-CM

## 2018-10-25 PROCEDURE — 99213 OFFICE O/P EST LOW 20 MIN: CPT | Mod: S$GLB,,, | Performed by: NURSE PRACTITIONER

## 2018-10-25 PROCEDURE — 99999 PR PBB SHADOW E&M-EST. PATIENT-LVL III: CPT | Mod: PBBFAC,,, | Performed by: NURSE PRACTITIONER

## 2018-10-25 PROCEDURE — 3074F SYST BP LT 130 MM HG: CPT | Mod: CPTII,S$GLB,, | Performed by: NURSE PRACTITIONER

## 2018-10-25 PROCEDURE — 3008F BODY MASS INDEX DOCD: CPT | Mod: CPTII,S$GLB,, | Performed by: NURSE PRACTITIONER

## 2018-10-25 PROCEDURE — 3079F DIAST BP 80-89 MM HG: CPT | Mod: CPTII,S$GLB,, | Performed by: NURSE PRACTITIONER

## 2018-10-25 NOTE — PROGRESS NOTES
Chronic Pain - Established Visit    Referring Physician: No ref. provider found    Chief Complaint:   Chief Complaint   Patient presents with    Low-back Pain        SUBJECTIVE: Disclaimer: This note has been generated using voice-recognition software. There may be typographical errors that have been missed during proof-reading    Interval History 10/25/2018:  The patient returns for follow up of back and leg pain.  He is s/p L5/S1 IL JEAN MARIE on 10/11/18 with 80% pain relief for his legs.  He has had increased lower back pain over the past few days which he attributes to weather changes.  He describes it as aching.  He has not been stretching.  He does walk a lot for work.  He cannot take oral NSAIDs due to pacemaker placement.  His pain today is 8/10.     Interval History 9/11/2018:  The patient presents for procedure follow up appointment.  He is s/p left then right L3,4,5 RFA completed on 8/14/18 with 80% pain relief initially.  He has had a little more pain over the past week.  He is now having intermittent radiation into the back of both legs, left greater than right.  He previously had benefit with right sided TF JEAN MARIE.  He is still taking gabapentin.  His pain today is 5/10.    Interval History 7/25/2018:  The patient returns today for follow up of back pain.  He is s/p bilateral L3,4,5 MBB on 7/5/18 with 100% relief for 2 days.  He previously had benefit with right TF JEAN MARIE for leg pain.  He has not had right leg pain since the epidural.  However, he is reporting lateral left leg pain that has progressed over the past couple of weeks.  He continues to be active and works.  His pain today is 5/10.    Interval History 6/20/2018:  The patient returns today for follow up of lower back and right leg pain.  He is s/p right L5 and S1 TF JEAN MARIE on 6/5/18 with 100% relief of right leg pain.  He has not had any leg pain since the procedure.  He continues to report pain across the lower back.  This is always worse first thing  in the morning.  He states that this feels aching and throbbing in nature.  He did have recent lumbar CT scan.  He would like to discuss further procedures.  He continues to work and be active.  His pain today is 5/10.    Interval History 5/22/2018:  The patient returns for follow up of back pain.  He is having radiation down there back of the right leg to the posterior calf.  The back pain is most severe in the morning and he states that this feels like a stiffness.  This improves when he walks.  He has severe leg pain that is intermittent, mainly with activity.  This is his biggest complaint.  He did complete the medrol dose pack recently with limited improvement.  He had XRAYs which show severe loss of disc space at L5/S1.  He has not had a CT scan.  He is taking Gabapentin 300 mg at bedtime.  It is written BID but it makes him drowsy during the day.  He has some benefit with Aleve but has been told to avoid NSAIDs due to history of pacemaker placement.  His pain today is 5/10.     Initial encounter:    Renny Young presents to the clinic for the evaluation of lower back and right leg pain. The pain started two months ago and symptoms have been worsening.    Brief history:    Pain Description:    The pain is located in the lower back and right leg area in the L5/S1 distribution.      At BEST  5/10     At WORST  7/10 on the WORST day.      On average pain is rated as 5/10.     Today the pain is rated as 5/10    The pain is described as aching      Symptoms interfere with daily activity.     Exacerbating factors: Standing and Morning.      Mitigating factors medications.     Patient denies night fever/night sweats, urinary incontinence, bowel incontinence, significant weight loss, significant motor weakness and loss of sensations.  Patient denies any suicidal or homicidal ideations    Pain Medications:  Current:  Gabapentin 300mg BID    Tried in Past:  NSAIDs - Aleve  TCA -Never  SNRI -Never  Anti-convulsants -  Gabapentin   Muscle Relaxants -Never  Opioids-Never    Physical Therapy/Home Exercise: yes     report:  Reviewed and consistent with medication use as prescribed.    Pain Procedures:   6/5/18 Right L5 and S1 TF JEAN MARIE- significant  7/5/18 Bilateral L3,4,5 MBB- 100% relief for 2 days  7/31/18 Left L3,4,5 RFA- 80% relief  8/14/18 Right L3,4,5 RFA- 80% relief  10/11/18 L5/S1 IL JEAN MARIE- 80% relief    Chiropractor -never  Acupuncture - never  TENS unit -never  Spinal decompression -never  Joint replacement -never    Imaging:     Lumbar CT 5/28/18    Narrative     EXAMINATION:  CT LUMBAR SPINE WITHOUT CONTRAST    CLINICAL HISTORY:  Low back pain, >6wks conservative tx, persistent-progressive sx, surgical candidate;  Radiculopathy, lumbosacral region    TECHNIQUE:  Low-dose axial, sagittal and coronal reformations are obtained through the lumbar spine.  Contrast was not administered.    COMPARISON:  None.    FINDINGS:  Posterior vertebral alignment is satisfactory.  There is mild levoconvexity of the lumbar spine.  Vertebral body heights are well maintained.  There is prominent degenerative disc disease at the L5-S1 level with marked disc space narrowing with vertebral endplate sclerosis and irregularity as well as vacuum disc phenomenon at the L5-S1 level.  There are anterior and posterior osteophytes at the L5-S1 level associated with diffuse concentric disc bulging.  Mild concentric disc bulging is also present at the L4-L5 level.  There is mild bilateral neural foraminal narrowing at the L5-S1 level due to the diffuse disc bulging and osteophyte formation as well as mild facet arthropathy at the L5-S1.  No abnormal paraspinal masses are evident.  There is a mild amount of atherosclerotic calcification within the abdominal aorta which tapers normally without aneurysmal dilatation.   Impression       No evidence for acute fracture, bone destruction, or subluxation.    Prominent degenerative disc disease at the L5-S1 level  with anterior and posterior osteophyte formation as well as concentric disc bulging.  This does not appear to result in significant spinal stenosis.    Minimal diffuse disc bulging at the L4-L5 level.    Mild neural foraminal narrowing at the L5-S1 level bilaterally.       X-Ray Lumbar Spine    Narrative     EXAMINATION:  XR LUMBAR SPINE AP AND LATERAL    CLINICAL HISTORY:  Low back pain, >6wks conservative tx, persistent-progressive sx, surgical candidate;Low back pain    TECHNIQUE:  AP, lateral and spot images were performed of the lumbar spine.    COMPARISON:  None    FINDINGS:  There are 5 non-rib-bearing lumbar type vertebral bodies.  There is mild levocurvature of the lumbar spine between L1 and L4 that I calculate at 4°.   Vertebral body heights and alignment are preserved.    There is severe loss of disc space height at L5-S1 with near effacement of that disc space; vacuum phenomenon is present at the anterior portion of the disc space.  Other disc spaces are preserved.    There is facet arthropathy at L5-S1.  I do not identify spondylolysis or spondylolisthesis.    Sacroiliac joints are incompletely visualized.    Calcific atherosclerosis is present in the abdominal aorta and iliac arteries of this 57-year-old man.  I do not identify aneurysm.   Impression       Please see above.      Electronically signed by: Arin Galicia MD  Date: 05/01/2018  Time: 08:06         Past Medical History:   Diagnosis Date    Diabetes mellitus, type 2     Hypertension     Pacemaker      Past Surgical History:   Procedure Laterality Date    BLOCK, NERVE Bilateral 7/5/2018    Performed by [facility]rystal [facility]tz [facility] at [facility]    COLONOSCOPY N/A 2/13/2017    Procedure: COLONOSCOPY;  Surgeon: NILDA Juares MD;  [facility]  Service: Endoscopy;  Laterality: N/A;  Do not cancel this order. Patient has Pacemaker in place.     COLONOSCOPY N/A 2/13/2017    Performed by NILDA Juares MD at [facility] (4TH FLR)     COLONOSCOPY N/A 7/20/2015    Performed by Laz Elliott [facility] at [facility] (4TH F[facility])    HERNIA REPAIR      INJECTION OF ANESTHETIC AGENT AROUND NERVE Bilateral 7/5/2018    Procedure: BLOCK, NERVE;  Surgeon: Krystal Mtz MD;  Location: [facility];  Service: Pain Management;  Laterality: Bilateral;  Lumbar Bilateral L3-L4-L5 Medial Branch Block    Injection,steroid,epidural, LUMBAR L5/S1 JEAN MARIE N/A 10/11/2018    Performed by Josue Paredes MD at [facility]    INJECTION-STEROID-EPIDURAL-TRANSFORAMINAL Right 6/5/2018    Performed by [facility]es [facility] at [facility]    RADIOFREQUENCY ABLATION Right 8/14/2018    Performed by [facility] at [facility]    RADIOFREQUENCY ABLATION Left 7/31/2018    Performed by [facility]es MD at [facility]    TRANSFORAMINAL EPIDURAL INJECTION OF STEROID Right 6/5/2018    Procedure: INJECTION-STEROID-EPIDURAL-TRANSFORAMINAL;  Surgeon: Josue Paredes MD;  Location: [facility];  Service: Pain Management;  Laterality: Right;  LUMBAR RIGHT L5 AND S1 TRANSFORAMINL JEAN MARIE  [id number]    W/ SEDATION      Social History     Socioeconomic History    Marital status: Single     Spouse name: Not on file    Number of children: 3    Years of education: Not on file    Highest education level: Not on file   Social Needs    Financial resource strain: Not on file    Food insecurity - worry: Not on file    Food insecurity - inability: Not on file    Transportation needs - medical: Not on file    Transportation needs - non-medical: Not on file   Occupational History    Occupation:    Tobacco Use    Smoking status: Current Every Day Smoker     Packs/day: 0.50     Types: Cigarettes    Smokeless tobacco: Never Used   Substance and Sexual Activity    Alcohol use: Yes     Comment: Beer- Socially    Drug use: No    Sexual activity: Yes   Other Topics Concern    Not on file   Social History Narrative    Not on file     Family History   Problem  Relation Age of Onset    Diabetes Mother     Diabetes Father     Kidney disease Father     Melanoma Neg Hx        Review of patient's allergies indicates:  No Known Allergies    Current Outpatient Medications   Medication Sig    amLODIPine (NORVASC) 5 MG tablet Take 1 tablet (5 mg total) by mouth once daily.    ammonium lactate 12 % Crea Apply twice daily to affected parts both feet as needed.    aspirin (ECOTRIN) 81 MG EC tablet Take 81 mg by mouth once daily.    atorvastatin (LIPITOR) 20 MG tablet TAKE 1 TABLET BY MOUTH ONCE DAILY    gabapentin (NEURONTIN) 300 MG capsule TAKE ONE CAPSULE BY MOUTH TWICE DAILY    lisinopril-hydrochlorothiazide (PRINZIDE,ZESTORETIC) 20-12.5 mg per tablet Take 1 tablet by mouth once daily.    metFORMIN (GLUCOPHAGE) 500 MG tablet TAKE ONE TABLET BY MOUTH ONCE DAILY WITH BREAKFAST    triamcinolone acetonide 0.1% (KENALOG) 0.1 % cream Apply topically 2 (two) times daily.     No current facility-administered medications for this visit.        REVIEW OF SYSTEMS:    GENERAL:  No weight loss, malaise or fevers.  HEENT:   No recent changes in vision or hearing  NECK:  Negative for lumps, no difficulty with swallowing.  RESPIRATORY:  Negative for cough, wheezing or shortness of breath, patient denies any recent URI.  CARDIOVASCULAR:  Negative for chest pain, leg swelling or palpitations. Pacemaker for SSS.  GI:  Negative for abdominal discomfort, blood in stools or black stools or change in bowel habits.  MUSCULOSKELETAL:  See HPI.  SKIN:  Negative for lesions, rash, and itching.  PSYCH:  No mood disorder or recent psychosocial stressors.  Patients sleep is not disturbed secondary to pain.  HEMATOLOGY/LYMPHOLOGY:  Negative for prolonged bleeding, bruising easily or swollen nodes.  Patient is not currently taking any anti-coagulants  ENDO: DM2 on metformin  NEURO:   No history of headaches, syncope, paralysis, seizures or tremors.  All other reviewed and negative other than  "HPI.    OBJECTIVE:    /85   Pulse 61   Temp 97 °F (36.1 °C) (Oral)   Resp 18   Ht 5' 7" (1.702 m)   Wt 72 kg (158 lb 12.8 oz)   BMI 24.87 kg/m²     PHYSICAL EXAMINATION:    GENERAL: Well appearing, in no acute distress, alert and oriented x3.  PSYCH:  Mood and affect appropriate.  SKIN: Skin color, texture, turgor normal, no rashes or lesions.  HEAD/FACE:  Normocephalic, atraumatic. Cranial nerves grossly intact.  CV: RRR with palpation of the radial artery.  PULM: No evidence of respiratory difficulty, symmetric chest rise.  BACK: Straight leg raising in the sitting and supine positions is negative to radicular pain.  There is no pain with palpation over the facet joints of the lumbar spine bilaterally.  There is decreased range of motion with extension to 15 degrees, and facet loading maneuvers cause reproducible pain bilaterally.  EXTREMITIES: Peripheral joint ROM is full and pain free without obvious instability or laxity in all four extremities. No deformities, edema, or skin discoloration. Good capillary refill.  MUSCULOSKELETAL: Hip, and knee provocative maneuvers are negative.  There is pain with palpation over the sacroiliac joints bilaterally.  There is no pain to palpation over the greater trochanteric bursa bilaterally.  FABERs test is negative.  FADIRs test is negative.  5/5 strength in right ankle with plantar and dorsiflexion, 5/5 strength in left ankle with plantar and dorsiflexion, 5/5 strength with right knee flexion extension, 5/5 strength with knee flexion extension on the left  No atrophy or tone abnormalities are noted.  NEURO: Bilateral lower extremity coordination and muscle stretch reflexes are physiologic and symmetric.  Plantar response are downgoing. No clonus.  Normal sensation.  GAIT: Antalgic.    Lab Results   Component Value Date    HGBA1C 5.8 (H) 04/06/2018     Lab Results   Component Value Date    WBC 12.57 04/06/2017    HGB 15.6 04/06/2017    HCT 46.0 04/06/2017    MCV " 87 04/06/2017     04/06/2017     BMP  Lab Results   Component Value Date     04/06/2018    K 3.9 04/06/2018     04/06/2018    CO2 27 04/06/2018    BUN 18 04/06/2018    CREATININE 1.0 04/06/2018    CALCIUM 9.9 04/06/2018    ANIONGAP 9 04/06/2018    ESTGFRAFRICA >60.0 04/06/2018    EGFRNONAA >60.0 04/06/2018     ASSESSMENT: 58 y.o. year old male with lower back pain, consistent with the following diagnoses:    Encounter Diagnoses   Name Primary?    Lumbar radiculopathy Yes    Lumbar spondylosis     DDD (degenerative disc disease), lumbar        PLAN:     - Recent lumbar CT reviewed with patient in detail today.    - Continue Gabapentin 300 mg BID (he takes both at night due to sedation).    - Trial diclofenac gel PRN.    - Consider SI joint injections.  He declined today.    - The patient will continue a home exercise routine to help with pain and strengthening.  I demonstrated some stretches to him today.    - RTC PRN.      The above plan and management options were discussed at length with patient. Patient is in agreement with the above and verbalized understanding.     Trista Oviedo  10/25/2018

## 2018-10-26 ENCOUNTER — IMMUNIZATION (OUTPATIENT)
Dept: PHARMACY | Facility: CLINIC | Age: 58
End: 2018-10-26

## 2018-10-26 ENCOUNTER — IMMUNIZATION (OUTPATIENT)
Dept: PHARMACY | Facility: CLINIC | Age: 58
End: 2018-10-26
Payer: COMMERCIAL

## 2018-10-29 DIAGNOSIS — E11.9 TYPE 2 DIABETES MELLITUS WITHOUT COMPLICATION: ICD-10-CM

## 2018-10-29 DIAGNOSIS — Z11.59 NEED FOR HEPATITIS C SCREENING TEST: ICD-10-CM

## 2018-12-06 ENCOUNTER — OFFICE VISIT (OUTPATIENT)
Dept: SPINE | Facility: CLINIC | Age: 58
End: 2018-12-06
Payer: COMMERCIAL

## 2018-12-06 VITALS
DIASTOLIC BLOOD PRESSURE: 70 MMHG | WEIGHT: 161 LBS | HEIGHT: 67 IN | BODY MASS INDEX: 25.27 KG/M2 | SYSTOLIC BLOOD PRESSURE: 108 MMHG | HEART RATE: 69 BPM

## 2018-12-06 DIAGNOSIS — M47.816 LUMBAR SPONDYLOSIS: Primary | ICD-10-CM

## 2018-12-06 DIAGNOSIS — M47.816 FACET ARTHRITIS OF LUMBAR REGION: ICD-10-CM

## 2018-12-06 DIAGNOSIS — M51.36 DDD (DEGENERATIVE DISC DISEASE), LUMBAR: ICD-10-CM

## 2018-12-06 DIAGNOSIS — M54.16 LUMBAR RADICULOPATHY: ICD-10-CM

## 2018-12-06 PROCEDURE — 3008F BODY MASS INDEX DOCD: CPT | Mod: CPTII,S$GLB,, | Performed by: NURSE PRACTITIONER

## 2018-12-06 PROCEDURE — 3078F DIAST BP <80 MM HG: CPT | Mod: CPTII,S$GLB,, | Performed by: NURSE PRACTITIONER

## 2018-12-06 PROCEDURE — 99999 PR PBB SHADOW E&M-EST. PATIENT-LVL III: CPT | Mod: PBBFAC,,, | Performed by: NURSE PRACTITIONER

## 2018-12-06 PROCEDURE — 3074F SYST BP LT 130 MM HG: CPT | Mod: CPTII,S$GLB,, | Performed by: NURSE PRACTITIONER

## 2018-12-06 PROCEDURE — 99213 OFFICE O/P EST LOW 20 MIN: CPT | Mod: S$GLB,,, | Performed by: NURSE PRACTITIONER

## 2018-12-06 RX ORDER — METFORMIN HYDROCHLORIDE 500 MG/1
TABLET ORAL
Qty: 90 TABLET | Refills: 1 | Status: SHIPPED | OUTPATIENT
Start: 2018-12-06 | End: 2019-02-08 | Stop reason: SDUPTHER

## 2018-12-06 NOTE — PROGRESS NOTES
Chronic Pain - Established Visit    Referring Physician: No ref. provider found    Chief Complaint:   Chief Complaint   Patient presents with    Low-back Pain        SUBJECTIVE: Disclaimer: This note has been generated using voice-recognition software. There may be typographical errors that have been missed during proof-reading    Interval History 12/6/2018:  The patient presents today for follow up.  He continues with pain to the lower back.  He is not having leg pain at this time.  He had some relief with RFAs with the past.  He stopped Gabapentin when his leg pain improved.  He takes Tylenol sparingly with some benefit.  His pain today is 5/10.  The patient denies any bowel or bladder incontinence or signs of saddle paresthesia.  The patient denies any major medical changes since last office visit.    Interval History 10/25/2018:  The patient returns for follow up of back and leg pain.  He is s/p L5/S1 IL JEAN MARIE on 10/11/18 with 80% pain relief for his legs.  He has had increased lower back pain over the past few days which he attributes to weather changes.  He describes it as aching.  He has not been stretching.  He does walk a lot for work.  He cannot take oral NSAIDs due to pacemaker placement.  His pain today is 8/10.     Interval History 9/11/2018:  The patient presents for procedure follow up appointment.  He is s/p left then right L3,4,5 RFA completed on 8/14/18 with 80% pain relief initially.  He has had a little more pain over the past week.  He is now having intermittent radiation into the back of both legs, left greater than right.  He previously had benefit with right sided TF JEAN MARIE.  He is still taking gabapentin.  His pain today is 5/10.    Interval History 7/25/2018:  The patient returns today for follow up of back pain.  He is s/p bilateral L3,4,5 MBB on 7/5/18 with 100% relief for 2 days.  He previously had benefit with right TF JEAN MARIE for leg pain.  He has not had right leg pain since the epidural.   However, he is reporting lateral left leg pain that has progressed over the past couple of weeks.  He continues to be active and works.  His pain today is 5/10.    Interval History 6/20/2018:  The patient returns today for follow up of lower back and right leg pain.  He is s/p right L5 and S1 TF JEAN MARIE on 6/5/18 with 100% relief of right leg pain.  He has not had any leg pain since the procedure.  He continues to report pain across the lower back.  This is always worse first thing in the morning.  He states that this feels aching and throbbing in nature.  He did have recent lumbar CT scan.  He would like to discuss further procedures.  He continues to work and be active.  His pain today is 5/10.    Interval History 5/22/2018:  The patient returns for follow up of back pain.  He is having radiation down there back of the right leg to the posterior calf.  The back pain is most severe in the morning and he states that this feels like a stiffness.  This improves when he walks.  He has severe leg pain that is intermittent, mainly with activity.  This is his biggest complaint.  He did complete the medrol dose pack recently with limited improvement.  He had XRAYs which show severe loss of disc space at L5/S1.  He has not had a CT scan.  He is taking Gabapentin 300 mg at bedtime.  It is written BID but it makes him drowsy during the day.  He has some benefit with Aleve but has been told to avoid NSAIDs due to history of pacemaker placement.  His pain today is 5/10.     Initial encounter:    Renny Young presents to the clinic for the evaluation of lower back and right leg pain. The pain started two months ago and symptoms have been worsening.    Brief history:    Pain Description:    The pain is located in the lower back and right leg area in the L5/S1 distribution.      At BEST  5/10     At WORST  7/10 on the WORST day.      On average pain is rated as 5/10.     Today the pain is rated as 5/10    The pain is described as  aching      Symptoms interfere with daily activity.     Exacerbating factors: Standing and Morning.      Mitigating factors medications.     Patient denies night fever/night sweats, urinary incontinence, bowel incontinence, significant weight loss, significant motor weakness and loss of sensations.  Patient denies any suicidal or homicidal ideations    Pain Medications:  Current:  OTC Tylenol PRN    Tried in Past:  NSAIDs - Aleve  TCA -Never  SNRI -Never  Anti-convulsants - Gabapentin   Muscle Relaxants -Never  Opioids-Never    Physical Therapy/Home Exercise: yes     report:  Reviewed and consistent with medication use as prescribed.    Pain Procedures:   6/5/18 Right L5 and S1 TF JEAN MARIE- significant  7/5/18 Bilateral L3,4,5 MBB- 100% relief for 2 days  7/31/18 Left L3,4,5 RFA- 80% relief  8/14/18 Right L3,4,5 RFA- 80% relief  10/11/18 L5/S1 IL JEAN MARIE- 80% relief    Chiropractor -never  Acupuncture - never  TENS unit -never  Spinal decompression -never  Joint replacement -never    Imaging:     Lumbar CT 5/28/18    Narrative     EXAMINATION:  CT LUMBAR SPINE WITHOUT CONTRAST    CLINICAL HISTORY:  Low back pain, >6wks conservative tx, persistent-progressive sx, surgical candidate;  Radiculopathy, lumbosacral region    TECHNIQUE:  Low-dose axial, sagittal and coronal reformations are obtained through the lumbar spine.  Contrast was not administered.    COMPARISON:  None.    FINDINGS:  Posterior vertebral alignment is satisfactory.  There is mild levoconvexity of the lumbar spine.  Vertebral body heights are well maintained.  There is prominent degenerative disc disease at the L5-S1 level with marked disc space narrowing with vertebral endplate sclerosis and irregularity as well as vacuum disc phenomenon at the L5-S1 level.  There are anterior and posterior osteophytes at the L5-S1 level associated with diffuse concentric disc bulging.  Mild concentric disc bulging is also present at the L4-L5 level.  There is mild  bilateral neural foraminal narrowing at the L5-S1 level due to the diffuse disc bulging and osteophyte formation as well as mild facet arthropathy at the L5-S1.  No abnormal paraspinal masses are evident.  There is a mild amount of atherosclerotic calcification within the abdominal aorta which tapers normally without aneurysmal dilatation.   Impression       No evidence for acute fracture, bone destruction, or subluxation.    Prominent degenerative disc disease at the L5-S1 level with anterior and posterior osteophyte formation as well as concentric disc bulging.  This does not appear to result in significant spinal stenosis.    Minimal diffuse disc bulging at the L4-L5 level.    Mild neural foraminal narrowing at the L5-S1 level bilaterally.       X-Ray Lumbar Spine    Narrative     EXAMINATION:  XR LUMBAR SPINE AP AND LATERAL    CLINICAL HISTORY:  Low back pain, >6wks conservative tx, persistent-progressive sx, surgical candidate;Low back pain    TECHNIQUE:  AP, lateral and spot images were performed of the lumbar spine.    COMPARISON:  None    FINDINGS:  There are 5 non-rib-bearing lumbar type vertebral bodies.  There is mild levocurvature of the lumbar spine between L1 and L4 that I calculate at 4°.   Vertebral body heights and alignment are preserved.    There is severe loss of disc space height at L5-S1 with near effacement of that disc space; vacuum phenomenon is present at the anterior portion of the disc space.  Other disc spaces are preserved.    There is facet arthropathy at L5-S1.  I do not identify spondylolysis or spondylolisthesis.    Sacroiliac joints are incompletely visualized.    Calcific atherosclerosis is present in the abdominal aorta and iliac arteries of this 57-year-old man.  I do not identify aneurysm.   Impression       Please see above.      Electronically signed by: Arin Galicia MD  Date: 05/01/2018  Time: 08:06         Past Medical History:   Diagnosis Date    Diabetes mellitus,  type 2     Hypertension     Pacemaker      Past Surgical History:   Procedure Laterality Date    BLOCK, NERVE Bilateral 7/5/2018    Performed by [facility]rystal [facility]tz [facility] at [facility]    COLONOSCOPY N/A 2/13/2017    Procedure: COLONOSCOPY;  Surgeon: NILDA Juares MD;  Location: [facility] (4TH FLR);  Service: Endoscopy;  Laterality: N/A;  Do not cancel this order. Patient has Pacemaker in place.     COLONOSCOPY N/A 2/13/2017    Performed by NILDA Juares MD at [facility] (4TH FLR)    COLONOSCOPY N/A 7/20/2015    Performed by Laz Elliott MD at [facility] (4TH FLR)    HERNIA REPAIR      INJECTION OF ANESTHETIC AGENT AROUND NERVE Bilateral 7/5/2018    Procedure: BLOCK, NERVE;  Surgeon: Krystal Mtz MD;  Location: [facility];  Service: Pain Management;  Laterality: Bilateral;  Lumbar Bilateral L3-L4-L5 Medial Branch Block    Injection,steroid,epidural, LUMBAR L5/S1 JENA MARIE N/A 10/11/2018    Performed by Josue Paredes MD at [facility]    INJECTION-STEROID-EPIDURAL-TRANSFORAMINAL Right 6/5/2018    Performed by [facility] at [facility]    RADIOFREQUENCY ABLATION Right 8/14/2018    Performed by [facility] at [facility]    RADIOFREQUENCY ABLATION Left 7/31/2018    Performed by [facility] at [facility]    TRANSFORAMINAL EPIDURAL INJECTION OF STEROID Right 6/5/2018    Procedure: INJECTION-STEROID-EPIDURAL-TRANSFORAMINAL;  Surgeon: Josue Paredes MD;  Location: [facility];  Service: Pain Management;  Laterality: Right;  LUMBAR RIGHT L5 AND S1 TRANSFORAMINL JEAN MARIE  [id number]    W/ SEDATION      Social History     Socioeconomic History    Marital status:      Spouse name: Not on file    Number of children: 3    Years of education: Not on file    Highest education level: Not on file   Social Needs    Financial resource strain: Not on file    Food insecurity - worry: Not on file    Food insecurity - inability: Not on file    Transportation needs -  medical: Not on file    Transportation needs - non-medical: Not on file   Occupational History    Occupation:    Tobacco Use    Smoking status: Current Every Day Smoker     Packs/day: 0.50     Types: Cigarettes    Smokeless tobacco: Never Used   Substance and Sexual Activity    Alcohol use: Yes     Comment: Beer- Socially    Drug use: No    Sexual activity: Yes   Other Topics Concern    Not on file   Social History Narrative    Not on file     Family History   Problem Relation Age of Onset    Diabetes Mother     Diabetes Father     Kidney disease Father     Melanoma Neg Hx        Review of patient's allergies indicates:  No Known Allergies    Current Outpatient Medications   Medication Sig    amLODIPine (NORVASC) 5 MG tablet Take 1 tablet (5 mg total) by mouth once daily.    ammonium lactate 12 % Crea Apply twice daily to affected parts both feet as needed.    aspirin (ECOTRIN) 81 MG EC tablet Take 81 mg by mouth once daily.    atorvastatin (LIPITOR) 20 MG tablet TAKE 1 TABLET BY MOUTH ONCE DAILY    gabapentin (NEURONTIN) 300 MG capsule TAKE ONE CAPSULE BY MOUTH TWICE DAILY    lisinopril-hydrochlorothiazide (PRINZIDE,ZESTORETIC) 20-12.5 mg per tablet Take 1 tablet by mouth once daily.    metFORMIN (GLUCOPHAGE) 500 MG tablet TAKE 1 TABLET BY MOUTH ONCE DAILY WITH BREAKFAST    triamcinolone acetonide 0.1% (KENALOG) 0.1 % cream Apply topically 2 (two) times daily.     No current facility-administered medications for this visit.        REVIEW OF SYSTEMS:    GENERAL:  No weight loss, malaise or fevers.  HEENT:   No recent changes in vision or hearing  NECK:  Negative for lumps, no difficulty with swallowing.  RESPIRATORY:  Negative for cough, wheezing or shortness of breath, patient denies any recent URI.  CARDIOVASCULAR:  Negative for chest pain, leg swelling or palpitations. Pacemaker for SSS.  GI:  Negative for abdominal discomfort, blood in stools or black stools or change in bowel  "habits.  MUSCULOSKELETAL:  See HPI.  SKIN:  Negative for lesions, rash, and itching.  PSYCH:  No mood disorder or recent psychosocial stressors.  Patients sleep is not disturbed secondary to pain.  HEMATOLOGY/LYMPHOLOGY:  Negative for prolonged bleeding, bruising easily or swollen nodes.  Patient is not currently taking any anti-coagulants  ENDO: DM2 on metformin  NEURO:   No history of headaches, syncope, paralysis, seizures or tremors.  All other reviewed and negative other than HPI.    OBJECTIVE:    Ht 5' 7" (1.702 m)   Wt 73 kg (161 lb)   BMI 25.22 kg/m²     PHYSICAL EXAMINATION:    GENERAL: Well appearing, in no acute distress, alert and oriented x3.  PSYCH:  Mood and affect appropriate.  SKIN: Skin color, texture, turgor normal, no rashes or lesions.  HEAD/FACE:  Normocephalic, atraumatic. Cranial nerves grossly intact.  CV: RRR with palpation of the radial artery.  PULM: No evidence of respiratory difficulty, symmetric chest rise.  BACK: Straight leg raising in the sitting and supine positions is negative to radicular pain.  There is no pain with palpation over the facet joints of the lumbar spine bilaterally.  There is decreased range of motion with extension to 15 degrees, and facet loading maneuvers cause reproducible pain bilaterally, L>R.  EXTREMITIES: Peripheral joint ROM is full and pain free without obvious instability or laxity in all four extremities. No deformities, edema, or skin discoloration. Good capillary refill.  MUSCULOSKELETAL: Hip, and knee provocative maneuvers are negative.  There is mild pain with palpation over the sacroiliac joints bilaterally.  There is no pain to palpation over the greater trochanteric bursa bilaterally.  FABERs test is negative.  FADIRs test is negative.  5/5 strength in right ankle with plantar and dorsiflexion, 5/5 strength in left ankle with plantar and dorsiflexion, 5/5 strength with right knee flexion extension, 5/5 strength with knee flexion extension on " the left  No atrophy or tone abnormalities are noted.  NEURO: Bilateral lower extremity coordination and muscle stretch reflexes are physiologic and symmetric.  Plantar response are downgoing. No clonus.  Normal sensation.  GAIT: Antalgic.    Lab Results   Component Value Date    HGBA1C 5.8 (H) 04/06/2018     Lab Results   Component Value Date    WBC 12.57 04/06/2017    HGB 15.6 04/06/2017    HCT 46.0 04/06/2017    MCV 87 04/06/2017     04/06/2017     BMP  Lab Results   Component Value Date     04/06/2018    K 3.9 04/06/2018     04/06/2018    CO2 27 04/06/2018    BUN 18 04/06/2018    CREATININE 1.0 04/06/2018    CALCIUM 9.9 04/06/2018    ANIONGAP 9 04/06/2018    ESTGFRAFRICA >60.0 04/06/2018    EGFRNONAA >60.0 04/06/2018     ASSESSMENT: 58 y.o. year old male with lower back pain, consistent with the following diagnoses:    Encounter Diagnoses   Name Primary?    DDD (degenerative disc disease), lumbar Yes    Lumbar radiculopathy     Facet arthritis of lumbar region        PLAN:     - Lumbar CT reviewed with patient in detail today.    - Discontinue Gabapentin.    - Can repeat RFAs after January if needed.    - The patient will continue a home exercise routine to help with pain and strengthening.    - RTC in 2 months or sooner if needed.      The above plan and management options were discussed at length with patient. Patient is in agreement with the above and verbalized understanding.     Trista Oviedo  12/06/2018

## 2019-01-05 DIAGNOSIS — I10 HYPERTENSION, ESSENTIAL: ICD-10-CM

## 2019-01-05 DIAGNOSIS — Z00.00 ANNUAL PHYSICAL EXAM: Primary | ICD-10-CM

## 2019-01-05 DIAGNOSIS — I10 ESSENTIAL HYPERTENSION: ICD-10-CM

## 2019-01-05 DIAGNOSIS — Z12.5 PROSTATE CANCER SCREENING: ICD-10-CM

## 2019-01-05 DIAGNOSIS — E78.5 HYPERLIPIDEMIA, UNSPECIFIED HYPERLIPIDEMIA TYPE: ICD-10-CM

## 2019-01-06 RX ORDER — LISINOPRIL AND HYDROCHLOROTHIAZIDE 12.5; 2 MG/1; MG/1
1 TABLET ORAL DAILY
Qty: 90 TABLET | Refills: 0 | Status: SHIPPED | OUTPATIENT
Start: 2019-01-06 | End: 2019-02-08 | Stop reason: SINTOL

## 2019-01-06 NOTE — TELEPHONE ENCOUNTER
Patient is due for a follow up appointment - please call patient to schedule appointment. Also, please review lab order section and schedule any standing or future labs before appointment if applicable. Thank you.

## 2019-01-21 ENCOUNTER — LAB VISIT (OUTPATIENT)
Dept: LAB | Facility: HOSPITAL | Age: 59
End: 2019-01-21
Attending: FAMILY MEDICINE
Payer: COMMERCIAL

## 2019-01-21 DIAGNOSIS — E78.5 HYPERLIPIDEMIA, UNSPECIFIED HYPERLIPIDEMIA TYPE: ICD-10-CM

## 2019-01-21 DIAGNOSIS — Z00.00 ANNUAL PHYSICAL EXAM: ICD-10-CM

## 2019-01-21 DIAGNOSIS — Z11.59 NEED FOR HEPATITIS C SCREENING TEST: ICD-10-CM

## 2019-01-21 DIAGNOSIS — Z12.5 PROSTATE CANCER SCREENING: ICD-10-CM

## 2019-01-21 LAB
ALBUMIN SERPL BCP-MCNC: 4.2 G/DL
ALP SERPL-CCNC: 67 U/L
ALT SERPL W/O P-5'-P-CCNC: 22 U/L
ANION GAP SERPL CALC-SCNC: 9 MMOL/L
AST SERPL-CCNC: 22 U/L
BILIRUB SERPL-MCNC: 1.1 MG/DL
BUN SERPL-MCNC: 17 MG/DL
CALCIUM SERPL-MCNC: 10 MG/DL
CHLORIDE SERPL-SCNC: 106 MMOL/L
CHOLEST SERPL-MCNC: 129 MG/DL
CHOLEST/HDLC SERPL: 2.5 {RATIO}
CO2 SERPL-SCNC: 27 MMOL/L
COMPLEXED PSA SERPL-MCNC: 1 NG/ML
CREAT SERPL-MCNC: 1.1 MG/DL
EST. GFR  (AFRICAN AMERICAN): >60 ML/MIN/1.73 M^2
EST. GFR  (NON AFRICAN AMERICAN): >60 ML/MIN/1.73 M^2
ESTIMATED AVG GLUCOSE: 134 MG/DL
GLUCOSE SERPL-MCNC: 102 MG/DL
HBA1C MFR BLD HPLC: 6.3 %
HDLC SERPL-MCNC: 52 MG/DL
HDLC SERPL: 40.3 %
LDLC SERPL CALC-MCNC: 62.2 MG/DL
NONHDLC SERPL-MCNC: 77 MG/DL
POTASSIUM SERPL-SCNC: 4.5 MMOL/L
PROT SERPL-MCNC: 7.7 G/DL
SODIUM SERPL-SCNC: 142 MMOL/L
TRIGL SERPL-MCNC: 74 MG/DL

## 2019-01-21 PROCEDURE — 84153 ASSAY OF PSA TOTAL: CPT

## 2019-01-21 PROCEDURE — 86803 HEPATITIS C AB TEST: CPT

## 2019-01-21 PROCEDURE — 80053 COMPREHEN METABOLIC PANEL: CPT

## 2019-01-21 PROCEDURE — 36415 COLL VENOUS BLD VENIPUNCTURE: CPT

## 2019-01-21 PROCEDURE — 83036 HEMOGLOBIN GLYCOSYLATED A1C: CPT

## 2019-01-21 PROCEDURE — 80061 LIPID PANEL: CPT

## 2019-01-22 LAB — HCV AB SERPL QL IA: NEGATIVE

## 2019-02-07 ENCOUNTER — OFFICE VISIT (OUTPATIENT)
Dept: SPINE | Facility: CLINIC | Age: 59
End: 2019-02-07
Payer: COMMERCIAL

## 2019-02-07 VITALS
BODY MASS INDEX: 25.27 KG/M2 | TEMPERATURE: 99 F | HEIGHT: 67 IN | SYSTOLIC BLOOD PRESSURE: 146 MMHG | HEART RATE: 99 BPM | DIASTOLIC BLOOD PRESSURE: 90 MMHG | WEIGHT: 161 LBS

## 2019-02-07 DIAGNOSIS — M51.36 DDD (DEGENERATIVE DISC DISEASE), LUMBAR: ICD-10-CM

## 2019-02-07 DIAGNOSIS — M54.16 LUMBAR RADICULOPATHY: ICD-10-CM

## 2019-02-07 DIAGNOSIS — M47.816 LUMBAR SPONDYLOSIS: Primary | ICD-10-CM

## 2019-02-07 DIAGNOSIS — M47.816 FACET ARTHRITIS OF LUMBAR REGION: ICD-10-CM

## 2019-02-07 PROCEDURE — 99213 OFFICE O/P EST LOW 20 MIN: CPT | Mod: S$GLB,,, | Performed by: NURSE PRACTITIONER

## 2019-02-07 PROCEDURE — 99999 PR PBB SHADOW E&M-EST. PATIENT-LVL III: CPT | Mod: PBBFAC,,, | Performed by: NURSE PRACTITIONER

## 2019-02-07 PROCEDURE — 3080F DIAST BP >= 90 MM HG: CPT | Mod: CPTII,S$GLB,, | Performed by: NURSE PRACTITIONER

## 2019-02-07 PROCEDURE — 3077F SYST BP >= 140 MM HG: CPT | Mod: CPTII,S$GLB,, | Performed by: NURSE PRACTITIONER

## 2019-02-07 PROCEDURE — 3077F PR MOST RECENT SYSTOLIC BLOOD PRESSURE >= 140 MM HG: ICD-10-PCS | Mod: CPTII,S$GLB,, | Performed by: NURSE PRACTITIONER

## 2019-02-07 PROCEDURE — 3080F PR MOST RECENT DIASTOLIC BLOOD PRESSURE >= 90 MM HG: ICD-10-PCS | Mod: CPTII,S$GLB,, | Performed by: NURSE PRACTITIONER

## 2019-02-07 PROCEDURE — 3008F BODY MASS INDEX DOCD: CPT | Mod: CPTII,S$GLB,, | Performed by: NURSE PRACTITIONER

## 2019-02-07 PROCEDURE — 99999 PR PBB SHADOW E&M-EST. PATIENT-LVL III: ICD-10-PCS | Mod: PBBFAC,,, | Performed by: NURSE PRACTITIONER

## 2019-02-07 PROCEDURE — 99213 PR OFFICE/OUTPT VISIT, EST, LEVL III, 20-29 MIN: ICD-10-PCS | Mod: S$GLB,,, | Performed by: NURSE PRACTITIONER

## 2019-02-07 PROCEDURE — 3008F PR BODY MASS INDEX (BMI) DOCUMENTED: ICD-10-PCS | Mod: CPTII,S$GLB,, | Performed by: NURSE PRACTITIONER

## 2019-02-07 NOTE — LETTER
February 7, 2019      [facility] [address]  [address], [address]  [facility] [address]  Phone: [phone]  Fax: [phone]       Patient: Renny Young   YOB: 1960  Date of Visit: 02/07/2019    To Whom It May Concern:    Ermelinda Young  was at [facility] on 02/07/2019. He may return to work with no restrictions. If you have any questions or concerns, or if I can be of further assistance, please do not hesitate to contact me.    Sincerely,    EDGARD Carreon

## 2019-02-07 NOTE — PROGRESS NOTES
Chronic Pain - Established Visit    Referring Physician: No ref. provider found    Chief Complaint:   Chief Complaint   Patient presents with    Low-back Pain        SUBJECTIVE: Disclaimer: This note has been generated using voice-recognition software. There may be typographical errors that have been missed during proof-reading    Interval History 2/7/2019:  The patient presents for check up of chronic back pain.  He reports doing well since last visit.  He feels that last JEAN MARIE is still providing him benefit.  He has not had any leg pain.  He does still have back pain but states that it is tolerable.  His morning pain is much improved from RFAs last year.  He continues to work and is active.  His pain today is 5/10.    Interval History 12/6/2018:  The patient presents today for follow up.  He continues with pain to the lower back.  He is not having leg pain at this time.  He had some relief with RFAs with the past.  He stopped Gabapentin when his leg pain improved.  He takes Tylenol sparingly with some benefit.  His pain today is 5/10.  The patient denies any bowel or bladder incontinence or signs of saddle paresthesia.  The patient denies any major medical changes since last office visit.    Interval History 10/25/2018:  The patient returns for follow up of back and leg pain.  He is s/p L5/S1 IL JEAN MARIE on 10/11/18 with 80% pain relief for his legs.  He has had increased lower back pain over the past few days which he attributes to weather changes.  He describes it as aching.  He has not been stretching.  He does walk a lot for work.  He cannot take oral NSAIDs due to pacemaker placement.  His pain today is 8/10.     Interval History 9/11/2018:  The patient presents for procedure follow up appointment.  He is s/p left then right L3,4,5 RFA completed on 8/14/18 with 80% pain relief initially.  He has had a little more pain over the past week.  He is now having intermittent radiation into the back of both legs, left  greater than right.  He previously had benefit with right sided TF JEAN MARIE.  He is still taking gabapentin.  His pain today is 5/10.    Interval History 7/25/2018:  The patient returns today for follow up of back pain.  He is s/p bilateral L3,4,5 MBB on 7/5/18 with 100% relief for 2 days.  He previously had benefit with right TF JEAN MARIE for leg pain.  He has not had right leg pain since the epidural.  However, he is reporting lateral left leg pain that has progressed over the past couple of weeks.  He continues to be active and works.  His pain today is 5/10.    Interval History 6/20/2018:  The patient returns today for follow up of lower back and right leg pain.  He is s/p right L5 and S1 TF JEAN MARIE on 6/5/18 with 100% relief of right leg pain.  He has not had any leg pain since the procedure.  He continues to report pain across the lower back.  This is always worse first thing in the morning.  He states that this feels aching and throbbing in nature.  He did have recent lumbar CT scan.  He would like to discuss further procedures.  He continues to work and be active.  His pain today is 5/10.    Interval History 5/22/2018:  The patient returns for follow up of back pain.  He is having radiation down there back of the right leg to the posterior calf.  The back pain is most severe in the morning and he states that this feels like a stiffness.  This improves when he walks.  He has severe leg pain that is intermittent, mainly with activity.  This is his biggest complaint.  He did complete the medrol dose pack recently with limited improvement.  He had XRAYs which show severe loss of disc space at L5/S1.  He has not had a CT scan.  He is taking Gabapentin 300 mg at bedtime.  It is written BID but it makes him drowsy during the day.  He has some benefit with Aleve but has been told to avoid NSAIDs due to history of pacemaker placement.  His pain today is 5/10.     Initial encounter:    Renny Young presents to the clinic for the  evaluation of lower back and right leg pain. The pain started two months ago and symptoms have been worsening.    Brief history:    Pain Description:    The pain is located in the lower back and right leg area in the L5/S1 distribution.      At BEST  5/10     At WORST  7/10 on the WORST day.      On average pain is rated as 5/10.     Today the pain is rated as 5/10    The pain is described as aching      Symptoms interfere with daily activity.     Exacerbating factors: Standing and Morning.      Mitigating factors medications.     Patient denies night fever/night sweats, urinary incontinence, bowel incontinence, significant weight loss, significant motor weakness and loss of sensations.  Patient denies any suicidal or homicidal ideations    Pain Medications:  Current:  OTC Tylenol PRN    Tried in Past:  NSAIDs - Aleve  TCA -Never  SNRI -Never  Anti-convulsants - Gabapentin   Muscle Relaxants -Never  Opioids-Never    Physical Therapy/Home Exercise: yes     report:  Reviewed and consistent with medication use as prescribed.    Pain Procedures:   6/5/18 Right L5 and S1 TF JEAN MARIE- significant  7/5/18 Bilateral L3,4,5 MBB- 100% relief for 2 days  7/31/18 Left L3,4,5 RFA- 80% relief  8/14/18 Right L3,4,5 RFA- 80% relief  10/11/18 L5/S1 IL JEAN MARIE- 80% relief    Chiropractor -never  Acupuncture - never  TENS unit -never  Spinal decompression -never  Joint replacement -never    Imaging:     Lumbar CT 5/28/18    Narrative     EXAMINATION:  CT LUMBAR SPINE WITHOUT CONTRAST    CLINICAL HISTORY:  Low back pain, >6wks conservative tx, persistent-progressive sx, surgical candidate;  Radiculopathy, lumbosacral region    TECHNIQUE:  Low-dose axial, sagittal and coronal reformations are obtained through the lumbar spine.  Contrast was not administered.    COMPARISON:  None.    FINDINGS:  Posterior vertebral alignment is satisfactory.  There is mild levoconvexity of the lumbar spine.  Vertebral body heights are well maintained.  There is  prominent degenerative disc disease at the L5-S1 level with marked disc space narrowing with vertebral endplate sclerosis and irregularity as well as vacuum disc phenomenon at the L5-S1 level.  There are anterior and posterior osteophytes at the L5-S1 level associated with diffuse concentric disc bulging.  Mild concentric disc bulging is also present at the L4-L5 level.  There is mild bilateral neural foraminal narrowing at the L5-S1 level due to the diffuse disc bulging and osteophyte formation as well as mild facet arthropathy at the L5-S1.  No abnormal paraspinal masses are evident.  There is a mild amount of atherosclerotic calcification within the abdominal aorta which tapers normally without aneurysmal dilatation.   Impression       No evidence for acute fracture, bone destruction, or subluxation.    Prominent degenerative disc disease at the L5-S1 level with anterior and posterior osteophyte formation as well as concentric disc bulging.  This does not appear to result in significant spinal stenosis.    Minimal diffuse disc bulging at the L4-L5 level.    Mild neural foraminal narrowing at the L5-S1 level bilaterally.       X-Ray Lumbar Spine    Narrative     EXAMINATION:  XR LUMBAR SPINE AP AND LATERAL    CLINICAL HISTORY:  Low back pain, >6wks conservative tx, persistent-progressive sx, surgical candidate;Low back pain    TECHNIQUE:  AP, lateral and spot images were performed of the lumbar spine.    COMPARISON:  None    FINDINGS:  There are 5 non-rib-bearing lumbar type vertebral bodies.  There is mild levocurvature of the lumbar spine between L1 and L4 that I calculate at 4°.   Vertebral body heights and alignment are preserved.    There is severe loss of disc space height at L5-S1 with near effacement of that disc space; vacuum phenomenon is present at the anterior portion of the disc space.  Other disc spaces are preserved.    There is facet arthropathy at L5-S1.  I do not identify spondylolysis or  spondylolisthesis.    Sacroiliac joints are incompletely visualized.    Calcific atherosclerosis is present in the abdominal aorta and iliac arteries of this 57-year-old man.  I do not identify aneurysm.   Impression       Please see above.      Electronically signed by: Arin Galicia MD  Date: 05/01/2018  Time: 08:06         Past Medical History:   Diagnosis Date    Diabetes mellitus, type 2     Hypertension     Pacemaker      Past Surgical History:   Procedure Laterality Date    BLOCK, NERVE Bilateral 7/5/2018    [facility]    COLONOSCOPY N/A 2/13/2017    [facility]DA [facility]ares [facility] at [facility] ([facility])    COLONOSCOPY N/A 7/20/2015    Performed by Laz Elliott MD at [facility] ([facility])    HERNIA REPAIR      Injection,steroid,epidural, LUMBAR L5/S1 JEAN MARIE N/A 10/11/2018    [facility] by [facility] at [facility]    INJECTION-STEROID-EPIDURAL-TRANSFORAMINAL Right 6/5/2018    [facility] at [facility]    RADIOFREQUENCY ABLATION Right 8/14/2018    [facility] at [facility]    RADIOFREQUENCY ABLATION Left 7/31/2018    [facility] by [facility] at [facility]     Social History     Socioeconomic History    Marital status:      Spouse name: Not on file    Number of children: 3    Years of education: Not on file    Highest education level: Not on file   Social Needs    Financial resource strain: Not on file    Food insecurity - worry: Not on file    Food insecurity - inability: Not on file    Transportation needs - medical: Not on file    Transportation needs - non-medical: Not on file   Occupational History    Occupation:    Tobacco Use    Smoking status: Current Every Day Smoker     Packs/day: 0.50     Types: Cigarettes    Smokeless tobacco: Never Used   Substance and Sexual Activity    Alcohol use: Yes     Comment: Beer- Socially    Drug use: No    Sexual  activity: Yes   Other Topics Concern    Not on file   Social History Narrative    Not on file     Family History   Problem Relation Age of Onset    Diabetes Mother     Diabetes Father     Kidney disease Father     Melanoma Neg Hx        Review of patient's allergies indicates:  No Known Allergies    Current Outpatient Medications   Medication Sig    amLODIPine (NORVASC) 5 MG tablet Take 1 tablet (5 mg total) by mouth once daily.    ammonium lactate 12 % Crea Apply twice daily to affected parts both feet as needed.    aspirin (ECOTRIN) 81 MG EC tablet Take 81 mg by mouth once daily.    atorvastatin (LIPITOR) 20 MG tablet TAKE 1 TABLET BY MOUTH ONCE DAILY    gabapentin (NEURONTIN) 300 MG capsule TAKE ONE CAPSULE BY MOUTH TWICE DAILY    lisinopril-hydrochlorothiazide (PRINZIDE,ZESTORETIC) 20-12.5 mg per tablet TAKE 1 TABLET BY MOUTH ONCE DAILY    metFORMIN (GLUCOPHAGE) 500 MG tablet TAKE 1 TABLET BY MOUTH ONCE DAILY WITH BREAKFAST    triamcinolone acetonide 0.1% (KENALOG) 0.1 % cream Apply topically 2 (two) times daily.     No current facility-administered medications for this visit.        REVIEW OF SYSTEMS:    GENERAL:  No weight loss, malaise or fevers.  HEENT:   No recent changes in vision or hearing  NECK:  Negative for lumps, no difficulty with swallowing.  RESPIRATORY:  Negative for cough, wheezing or shortness of breath, patient denies any recent URI.  CARDIOVASCULAR:  Negative for chest pain, leg swelling or palpitations. Pacemaker for SSS.  GI:  Negative for abdominal discomfort, blood in stools or black stools or change in bowel habits.  MUSCULOSKELETAL:  See HPI.  SKIN:  Negative for lesions, rash, and itching.  PSYCH:  No mood disorder or recent psychosocial stressors.  Patients sleep is not disturbed secondary to pain.  HEMATOLOGY/LYMPHOLOGY:  Negative for prolonged bleeding, bruising easily or swollen nodes.  Patient is not currently taking any anti-coagulants  ENDO: DM2 on  "metformin  NEURO:   No history of headaches, syncope, paralysis, seizures or tremors.  All other reviewed and negative other than HPI.    OBJECTIVE:    BP (!) 146/90   Pulse 99   Temp 98.5 °F (36.9 °C) (Oral)   Ht 5' 7" (1.702 m)   Wt 73 kg (161 lb)   BMI 25.22 kg/m²     PHYSICAL EXAMINATION:    GENERAL: Well appearing, in no acute distress, alert and oriented x3.  PSYCH:  Mood and affect appropriate.  SKIN: Skin color, texture, turgor normal, no rashes or lesions.  HEAD/FACE:  Normocephalic, atraumatic. Cranial nerves grossly intact.  CV: RRR with palpation of the radial artery.  PULM: No evidence of respiratory difficulty, symmetric chest rise.  BACK: Straight leg raising in the sitting and supine positions is negative to radicular pain.  There is no pain with palpation over the facet joints of the lumbar spine bilaterally.  There is decreased range of motion with extension to 15 degrees, and facet loading maneuvers cause mild pain bilaterally.  EXTREMITIES: Peripheral joint ROM is full and pain free without obvious instability or laxity in all four extremities. No deformities, edema, or skin discoloration. Good capillary refill.  MUSCULOSKELETAL: Hip, and knee provocative maneuvers are negative.  There is no pain with palpation over the sacroiliac joints bilaterally.  There is no pain to palpation over the greater trochanteric bursa bilaterally.  FABERs test is negative.  FADIRs test is negative.  5/5 strength in right ankle with plantar and dorsiflexion, 5/5 strength in left ankle with plantar and dorsiflexion, 5/5 strength with right knee flexion extension, 5/5 strength with knee flexion extension on the left  No atrophy or tone abnormalities are noted.  NEURO: Bilateral lower extremity coordination and muscle stretch reflexes are physiologic and symmetric.  Plantar response are downgoing. No clonus.  Normal sensation.  GAIT: Antalgic.    Lab Results   Component Value Date    HGBA1C 6.3 (H) 01/21/2019 "     Lab Results   Component Value Date    WBC 12.57 04/06/2017    HGB 15.6 04/06/2017    HCT 46.0 04/06/2017    MCV 87 04/06/2017     04/06/2017     BMP  Lab Results   Component Value Date     01/21/2019    K 4.5 01/21/2019     01/21/2019    CO2 27 01/21/2019    BUN 17 01/21/2019    CREATININE 1.1 01/21/2019    CALCIUM 10.0 01/21/2019    ANIONGAP 9 01/21/2019    ESTGFRAFRICA >60 01/21/2019    EGFRNONAA >60 01/21/2019     ASSESSMENT: 58 y.o. year old male with lower back pain, consistent with the following diagnoses:    Encounter Diagnoses   Name Primary?    Lumbar spondylosis Yes    Lumbar radiculopathy     DDD (degenerative disc disease), lumbar     Facet arthritis of lumbar region        PLAN:     - Lumbar CT reviewed with patient in detail today.    - Can repeat RFAs or JEAN MARIE if needed.    - The patient will continue a home exercise routine to help with pain and strengthening.    - RTC in 2 months or sooner if needed.      The above plan and management options were discussed at length with patient. Patient is in agreement with the above and verbalized understanding.     Trista Oviedo  02/07/2019

## 2019-02-08 ENCOUNTER — OFFICE VISIT (OUTPATIENT)
Dept: INTERNAL MEDICINE | Facility: CLINIC | Age: 59
End: 2019-02-08
Attending: FAMILY MEDICINE
Payer: COMMERCIAL

## 2019-02-08 VITALS
OXYGEN SATURATION: 96 % | BODY MASS INDEX: 24.56 KG/M2 | TEMPERATURE: 99 F | WEIGHT: 156.5 LBS | HEART RATE: 87 BPM | HEIGHT: 67 IN | SYSTOLIC BLOOD PRESSURE: 96 MMHG | DIASTOLIC BLOOD PRESSURE: 60 MMHG

## 2019-02-08 DIAGNOSIS — Z95.0 PACEMAKER: ICD-10-CM

## 2019-02-08 DIAGNOSIS — R91.1 LUNG NODULE: ICD-10-CM

## 2019-02-08 DIAGNOSIS — E11.9 TYPE 2 DIABETES MELLITUS WITHOUT COMPLICATION, WITHOUT LONG-TERM CURRENT USE OF INSULIN: ICD-10-CM

## 2019-02-08 DIAGNOSIS — E78.5 HYPERLIPIDEMIA, UNSPECIFIED HYPERLIPIDEMIA TYPE: ICD-10-CM

## 2019-02-08 DIAGNOSIS — I49.5 SICK SINUS SYNDROME: ICD-10-CM

## 2019-02-08 DIAGNOSIS — Z00.00 ANNUAL PHYSICAL EXAM: Primary | ICD-10-CM

## 2019-02-08 DIAGNOSIS — F17.200 SMOKER: ICD-10-CM

## 2019-02-08 DIAGNOSIS — R91.1 PULMONARY NODULE: ICD-10-CM

## 2019-02-08 DIAGNOSIS — E11.9 TYPE 2 DIABETES MELLITUS WITHOUT COMPLICATION, UNSPECIFIED WHETHER LONG TERM INSULIN USE: ICD-10-CM

## 2019-02-08 DIAGNOSIS — I10 HYPERTENSION, ESSENTIAL: ICD-10-CM

## 2019-02-08 PROCEDURE — 3078F DIAST BP <80 MM HG: CPT | Mod: CPTII,S$GLB,, | Performed by: FAMILY MEDICINE

## 2019-02-08 PROCEDURE — 3078F PR MOST RECENT DIASTOLIC BLOOD PRESSURE < 80 MM HG: ICD-10-PCS | Mod: CPTII,S$GLB,, | Performed by: FAMILY MEDICINE

## 2019-02-08 PROCEDURE — 99396 PR PREVENTIVE VISIT,EST,40-64: ICD-10-PCS | Mod: S$GLB,,, | Performed by: FAMILY MEDICINE

## 2019-02-08 PROCEDURE — 99999 PR PBB SHADOW E&M-EST. PATIENT-LVL V: CPT | Mod: PBBFAC,,, | Performed by: FAMILY MEDICINE

## 2019-02-08 PROCEDURE — 3044F HG A1C LEVEL LT 7.0%: CPT | Mod: CPTII,S$GLB,, | Performed by: FAMILY MEDICINE

## 2019-02-08 PROCEDURE — 3074F PR MOST RECENT SYSTOLIC BLOOD PRESSURE < 130 MM HG: ICD-10-PCS | Mod: CPTII,S$GLB,, | Performed by: FAMILY MEDICINE

## 2019-02-08 PROCEDURE — 99999 PR PBB SHADOW E&M-EST. PATIENT-LVL V: ICD-10-PCS | Mod: PBBFAC,,, | Performed by: FAMILY MEDICINE

## 2019-02-08 PROCEDURE — 99396 PREV VISIT EST AGE 40-64: CPT | Mod: S$GLB,,, | Performed by: FAMILY MEDICINE

## 2019-02-08 PROCEDURE — 3044F PR MOST RECENT HEMOGLOBIN A1C LEVEL <7.0%: ICD-10-PCS | Mod: CPTII,S$GLB,, | Performed by: FAMILY MEDICINE

## 2019-02-08 PROCEDURE — 3074F SYST BP LT 130 MM HG: CPT | Mod: CPTII,S$GLB,, | Performed by: FAMILY MEDICINE

## 2019-02-08 RX ORDER — AMLODIPINE BESYLATE 5 MG/1
5 TABLET ORAL DAILY
Qty: 90 TABLET | Refills: 3 | Status: SHIPPED | OUTPATIENT
Start: 2019-02-08 | End: 2020-02-17 | Stop reason: SDUPTHER

## 2019-02-08 RX ORDER — METFORMIN HYDROCHLORIDE 500 MG/1
TABLET ORAL
Qty: 90 TABLET | Refills: 3 | Status: SHIPPED | OUTPATIENT
Start: 2019-02-08 | End: 2020-02-17 | Stop reason: SDUPTHER

## 2019-02-08 RX ORDER — LOSARTAN POTASSIUM AND HYDROCHLOROTHIAZIDE 12.5; 5 MG/1; MG/1
1 TABLET ORAL DAILY
Qty: 90 TABLET | Refills: 3 | Status: SHIPPED | OUTPATIENT
Start: 2019-02-08 | End: 2019-11-08 | Stop reason: SDUPTHER

## 2019-02-08 RX ORDER — ATORVASTATIN CALCIUM 20 MG/1
20 TABLET, FILM COATED ORAL DAILY
Qty: 90 TABLET | Refills: 3 | Status: SHIPPED | OUTPATIENT
Start: 2019-02-08 | End: 2020-02-17 | Stop reason: SDUPTHER

## 2019-02-08 NOTE — PROGRESS NOTES
Subjective:       Patient ID: Renny Young is a 58 y.o. male.    Chief Complaint: Follow-up    Established patient for an annual wellness check/physical exam and also chronic disease management. Specific complaints - see dictation and please see ROS.  P, S, Fm, Soc Hx's; Meds, allergies reviewed and reconciled.  Health maintenance file reviewed and addressed items due.      Review of Systems   Constitutional: Negative for chills, fatigue, fever and unexpected weight change.   HENT: Negative for congestion and trouble swallowing.    Eyes: Negative for redness and visual disturbance.   Respiratory: Negative for cough, chest tightness and shortness of breath.    Cardiovascular: Negative for chest pain, palpitations and leg swelling.   Gastrointestinal: Negative for abdominal pain and blood in stool.   Genitourinary: Negative for difficulty urinating and hematuria.   Musculoskeletal: Positive for back pain. Negative for arthralgias, gait problem, joint swelling, myalgias and neck pain.   Skin: Negative for color change and rash.   Neurological: Negative for tremors, speech difficulty, weakness, numbness and headaches.   Hematological: Negative for adenopathy. Does not bruise/bleed easily.   Psychiatric/Behavioral: Negative for behavioral problems, confusion and sleep disturbance. The patient is not nervous/anxious.        Objective:      Physical Exam   Constitutional: He appears well-developed and well-nourished.   HENT:   Head: Normocephalic.   Right Ear: Tympanic membrane, external ear and ear canal normal.   Left Ear: Tympanic membrane, external ear and ear canal normal.   Mouth/Throat: Oropharynx is clear and moist.   Eyes: Pupils are equal, round, and reactive to light.   Neck: Normal range of motion. Neck supple. Carotid bruit is not present. No thyromegaly present.   Cardiovascular: Normal rate, regular rhythm, normal heart sounds and intact distal pulses. Exam reveals no gallop and no friction rub.   No murmur  heard.  Pulmonary/Chest: Effort normal and breath sounds normal.   Abdominal: Soft. He exhibits no distension and no mass. There is no tenderness. There is no rebound and no guarding.   Musculoskeletal: Normal range of motion. He exhibits no edema or tenderness.   Lymphadenopathy:     He has no cervical adenopathy.   Neurological: He is alert. He has normal strength. He displays normal reflexes. No cranial nerve deficit or sensory deficit. Coordination and gait normal.   Skin: Skin is warm and dry.   Psychiatric: He has a normal mood and affect. His behavior is normal. Judgment and thought content normal.   Nursing note and vitals reviewed.      Assessment:       1. Annual physical exam    2. Hypertension, essential    3. Hyperlipidemia, unspecified hyperlipidemia type    4. Type 2 diabetes mellitus without complication, unspecified whether long term insulin use    5. Sick sinus syndrome    6. Pacemaker    7. Smoker    8. Pulmonary nodule    9. Lung nodule    10. Type 2 diabetes mellitus without complication, without long-term current use of insulin        Plan:   Renny was seen today for follow-up.    Diagnoses and all orders for this visit:    Annual physical exam  -     Ambulatory referral to Optometry    Hypertension, essential    Hyperlipidemia, unspecified hyperlipidemia type    Type 2 diabetes mellitus without complication, unspecified whether long term insulin use  -     Ambulatory referral to Optometry    Sick sinus syndrome  -     Ambulatory Referral to Electrophysiology    Pacemaker  -     Ambulatory Referral to Electrophysiology    Smoker    Pulmonary nodule    Lung nodule  -     CT Chest Without Contrast; Future    Type 2 diabetes mellitus without complication, without long-term current use of insulin  -     atorvastatin (LIPITOR) 20 MG tablet; Take 1 tablet (20 mg total) by mouth once daily.    Other orders  -     losartan-hydrochlorothiazide 50-12.5 mg (HYZAAR) 50-12.5 mg per tablet; Take 1 tablet by  mouth once daily.  -     amLODIPine (NORVASC) 5 MG tablet; Take 1 tablet (5 mg total) by mouth once daily.  -     metFORMIN (GLUCOPHAGE) 500 MG tablet; TAKE 1 TABLET BY MOUTH ONCE DAILY WITH BREAKFAST      See meds, orders, follow up, routing and instructions sections of encounter.  A 58-year-old established patient.  He is in for his annual.  His A1c was 6.3.    His other laboratory looked quite good.  He does not have any significant   symptoms at this time.  His back pain is relatively well controlled.    Recommend one-year followup.  We did discuss smoking cessation.  He is not   interested in quitting at this time.      CARLA/HN  dd: 02/08/2019 16:27:10 (CST)  td: 02/09/2019 07:16:21 (CST)  Doc ID   #[id number]  Job ID #[id number]    CC:

## 2019-02-11 ENCOUNTER — TELEPHONE (OUTPATIENT)
Dept: ELECTROPHYSIOLOGY | Facility: CLINIC | Age: 59
End: 2019-02-11

## 2019-02-13 ENCOUNTER — HOSPITAL ENCOUNTER (OUTPATIENT)
Dept: RADIOLOGY | Facility: HOSPITAL | Age: 59
Discharge: HOME | End: 2019-02-13
Attending: FAMILY MEDICINE
Payer: COMMERCIAL

## 2019-02-13 DIAGNOSIS — R91.1 LUNG NODULE: ICD-10-CM

## 2019-02-13 PROCEDURE — 71250 CT THORAX DX C-: CPT | Mod: 26,,, | Performed by: RADIOLOGY

## 2019-02-13 PROCEDURE — 71250 CT THORAX DX C-: CPT | Mod: TC

## 2019-02-13 PROCEDURE — 71250 CT CHEST WITHOUT CONTRAST: ICD-10-PCS | Mod: 26,,, | Performed by: RADIOLOGY

## 2019-02-19 DIAGNOSIS — I49.5 SICK SINUS SYNDROME: Primary | ICD-10-CM

## 2019-02-25 ENCOUNTER — TELEPHONE (OUTPATIENT)
Dept: ELECTROPHYSIOLOGY | Facility: CLINIC | Age: 59
End: 2019-02-25

## 2019-02-25 NOTE — TELEPHONE ENCOUNTER
----- Message from Janeth Farrell sent at 2/25/2019  8:07 AM CST -----  Contact: pt  Pt says he received a text Friday evening that an appt was being scheduled for him on today. He cannot make this appt due to work, and he would like to rs. He's requesting that he is called before rs the appt and he will need a late appt because of his work schedule. He can be reached at the number listed.    Thanks

## 2019-03-06 ENCOUNTER — TELEPHONE (OUTPATIENT)
Dept: INTERNAL MEDICINE | Facility: CLINIC | Age: 59
End: 2019-03-06

## 2019-03-06 DIAGNOSIS — R91.1 LUNG NODULE: ICD-10-CM

## 2019-03-06 DIAGNOSIS — R91.1 PULMONARY NODULE: Primary | ICD-10-CM

## 2019-03-06 NOTE — TELEPHONE ENCOUNTER
Called patient and discussed labs and or test results. Patient expressed understanding and had the opportunity to ask questions. Any questions were answered. See meds, orders, follow up and instructions sections of encounter.    Specific issues include:  Stable chest CT - repeat in 1 year. Liver lesion was ultrasounded previously.

## 2019-03-26 ENCOUNTER — CLINICAL SUPPORT (OUTPATIENT)
Dept: CARDIOLOGY | Facility: HOSPITAL | Age: 59
End: 2019-03-26
Attending: INTERNAL MEDICINE
Payer: COMMERCIAL

## 2019-03-26 ENCOUNTER — OFFICE VISIT (OUTPATIENT)
Dept: ELECTROPHYSIOLOGY | Facility: CLINIC | Age: 59
End: 2019-03-26
Attending: FAMILY MEDICINE
Payer: COMMERCIAL

## 2019-03-26 ENCOUNTER — HOSPITAL ENCOUNTER (OUTPATIENT)
Dept: CARDIOLOGY | Facility: CLINIC | Age: 59
Discharge: HOME | End: 2019-03-26
Payer: COMMERCIAL

## 2019-03-26 VITALS
WEIGHT: 155 LBS | HEART RATE: 61 BPM | DIASTOLIC BLOOD PRESSURE: 62 MMHG | BODY MASS INDEX: 24.28 KG/M2 | SYSTOLIC BLOOD PRESSURE: 100 MMHG

## 2019-03-26 DIAGNOSIS — R14.2 BELCHING: ICD-10-CM

## 2019-03-26 DIAGNOSIS — I49.5 SICK SINUS SYNDROME: ICD-10-CM

## 2019-03-26 DIAGNOSIS — Z95.0 PACEMAKER: Primary | ICD-10-CM

## 2019-03-26 DIAGNOSIS — I49.5 SSS (SICK SINUS SYNDROME): ICD-10-CM

## 2019-03-26 DIAGNOSIS — I10 HYPERTENSION, ESSENTIAL: ICD-10-CM

## 2019-03-26 DIAGNOSIS — Z95.0 CARDIAC PACEMAKER IN SITU: ICD-10-CM

## 2019-03-26 PROCEDURE — 93279 PRGRMG DEV EVAL PM/LDLS PM: CPT

## 2019-03-26 PROCEDURE — 93000 ELECTROCARDIOGRAM COMPLETE: CPT | Mod: S$GLB,,, | Performed by: INTERNAL MEDICINE

## 2019-03-26 PROCEDURE — 99999 PR PBB SHADOW E&M-EST. PATIENT-LVL III: ICD-10-PCS | Mod: PBBFAC,,, | Performed by: NURSE PRACTITIONER

## 2019-03-26 PROCEDURE — 3008F PR BODY MASS INDEX (BMI) DOCUMENTED: ICD-10-PCS | Mod: CPTII,S$GLB,, | Performed by: NURSE PRACTITIONER

## 2019-03-26 PROCEDURE — 99214 PR OFFICE/OUTPT VISIT, EST, LEVL IV, 30-39 MIN: ICD-10-PCS | Mod: S$GLB,,, | Performed by: NURSE PRACTITIONER

## 2019-03-26 PROCEDURE — 99999 PR PBB SHADOW E&M-EST. PATIENT-LVL III: CPT | Mod: PBBFAC,,, | Performed by: NURSE PRACTITIONER

## 2019-03-26 PROCEDURE — 3074F SYST BP LT 130 MM HG: CPT | Mod: CPTII,S$GLB,, | Performed by: NURSE PRACTITIONER

## 2019-03-26 PROCEDURE — 3008F BODY MASS INDEX DOCD: CPT | Mod: CPTII,S$GLB,, | Performed by: NURSE PRACTITIONER

## 2019-03-26 PROCEDURE — 3078F DIAST BP <80 MM HG: CPT | Mod: CPTII,S$GLB,, | Performed by: NURSE PRACTITIONER

## 2019-03-26 PROCEDURE — 3074F PR MOST RECENT SYSTOLIC BLOOD PRESSURE < 130 MM HG: ICD-10-PCS | Mod: CPTII,S$GLB,, | Performed by: NURSE PRACTITIONER

## 2019-03-26 PROCEDURE — 3078F PR MOST RECENT DIASTOLIC BLOOD PRESSURE < 80 MM HG: ICD-10-PCS | Mod: CPTII,S$GLB,, | Performed by: NURSE PRACTITIONER

## 2019-03-26 PROCEDURE — 99214 OFFICE O/P EST MOD 30 MIN: CPT | Mod: S$GLB,,, | Performed by: NURSE PRACTITIONER

## 2019-03-26 PROCEDURE — 93000 RHYTHM STRIP: ICD-10-PCS | Mod: S$GLB,,, | Performed by: INTERNAL MEDICINE

## 2019-03-26 RX ORDER — IBUPROFEN 600 MG/1
TABLET ORAL
COMMUNITY
Start: 2019-01-18 | End: 2019-05-14

## 2019-03-26 NOTE — LETTER
March 26, 2019      Jarvis Washington MD  [address]  [facility] [address]           [facility] Arrhythmia  [address]  [facility] [address]  Phone: [phone]  Fax: [phone]          Patient: Renny Young   MR Number: [id number]   YOB: 1960   Date of Visit: 3/26/2019       Dear Dr. Jarvis Washington:    Thank you for referring Renny Young to me for evaluation. Attached you will find relevant portions of my assessment and plan of care.    If you have questions, please do not hesitate to call me. I look forward to following Renny Young along with you.    Sincerely,    Lizabeth Hill, COREY    Enclosure  CC:  No Recipients    If you would like to receive this communication electronically, please contact [email or web address] or [phone] to request more information on [facility] Link access.    For providers and/or their staff who would like to refer a patient to [facility], please contact us through our one-stop-shop provider referral line, [facility], at [phone].    If you feel you have received this communication in error or would no longer like to receive these types of communications, please e-mail [email or web address]

## 2019-03-26 NOTE — Clinical Note
He has been belching since he got his device. He also had other discomfort that was resolved after turning off his v lead, but continues to belch. Is there any other reason his device could be contributing to this or should he just follow up with GI?LIAM Lui

## 2019-03-26 NOTE — PROGRESS NOTES
"Mr. Young is a patient of Dr. Spence and was last seen in clinic 10/30/2017.      Subjective:   Patient ID:  Renny Young is a 58 y.o. male who presents for follow-up of Pacemaker Check  .     HPI:    Mr. Young is a 58 y.o. male with SSS, PPM (2009), DM, and HTN here for annual follow up.     Background:    PCP: Jarvis Washington MD    Renny Young has a history of hypertension and sick sinus syndrome who had a [employer] dual chamber pacemaker implanted at [facility] in 7/2009 for syncope. Mr. Young describes that following implantation, he experienced discomfort when his RV lead was paced. Apparently his RV lead output was lowered, which improved his symptoms. At his last in 10/2016, he had GI complaints which he attributed to his RV pacing lead. At his bequest, I changed his programming to an AAI pacing mode at that time.    Update (03/26/2019):    Today he complains of belching and fatigue that comes and goes. He will experience these symptoms frequently off and on for a couple of days and then feel fine for days (episodes can occur once a month or once every few months). These symptoms have been going on for years (see note from 2015). He does say that he feels better with v pacing off. Mr. Young denies chest pain with exertion or at rest, palpitations, dizziness, or syncope.    Device Interrogation (3/26/2019) reveals an intrinsic sinus rhythm with stable lead and device function. No arrhythmias or treated episodes were noted. ("AT"x8, c/w FFRW)  He paces 13% in the RA. Estimated battery longevity 5-6 years.     I have personally reviewed the patient's EKG today, which shows sinus rhythm at 61bpm. OR interval is 140. QTc is 370.    Current Outpatient Medications   Medication Sig    amLODIPine (NORVASC) 5 MG tablet Take 1 tablet (5 mg total) by mouth once daily.    ammonium lactate 12 % Crea Apply twice daily to affected parts both feet as needed.    aspirin (ECOTRIN) 81 MG EC tablet Take 81 mg by mouth once " daily.    atorvastatin (LIPITOR) 20 MG tablet Take 1 tablet (20 mg total) by mouth once daily.    gabapentin (NEURONTIN) 300 MG capsule TAKE ONE CAPSULE BY MOUTH TWICE DAILY    losartan-hydrochlorothiazide 50-12.5 mg (HYZAAR) 50-12.5 mg per tablet Take 1 tablet by mouth once daily.    metFORMIN (GLUCOPHAGE) 500 MG tablet TAKE 1 TABLET BY MOUTH ONCE DAILY WITH BREAKFAST    triamcinolone acetonide 0.1% (KENALOG) 0.1 % cream Apply topically 2 (two) times daily.     No current facility-administered medications for this visit.      Review of Systems   Constitution: Negative for malaise/fatigue.   Cardiovascular: Negative for chest pain, dyspnea on exertion, irregular heartbeat, leg swelling and palpitations.   Respiratory: Negative for shortness of breath.    Hematologic/Lymphatic: Negative for bleeding problem.   Skin: Negative for rash.   Musculoskeletal: Negative for myalgias.   Gastrointestinal: Negative for hematemesis, hematochezia and nausea.        Belching     Genitourinary: Negative for hematuria.   Neurological: Negative for light-headedness.   Psychiatric/Behavioral: Negative for altered mental status.   Allergic/Immunologic: Negative for persistent infections.     Objective:        /62   Pulse 61   Wt 70.3 kg (155 lb)   BMI 24.28 kg/m²     Physical Exam   Constitutional: He is oriented to person, place, and time. He appears well-developed and well-nourished.   HENT:   Head: Normocephalic.   Nose: Nose normal.   Eyes: Pupils are equal, round, and reactive to light.   Cardiovascular: Normal rate, regular rhythm, S1 normal and S2 normal.   No murmur heard.  Pulses:       Radial pulses are 2+ on the right side, and 2+ on the left side.   Pulmonary/Chest: Breath sounds normal. No respiratory distress.   Device to LUCW.   Abdominal: Normal appearance.   Musculoskeletal: Normal range of motion. He exhibits no edema.   Neurological: He is alert and oriented to person, place, and time.   Skin: Skin is  warm and dry. No erythema.   Psychiatric: He has a normal mood and affect. His speech is normal and behavior is normal.   Nursing note and vitals reviewed.    Lab Results   Component Value Date     01/21/2019    K 4.5 01/21/2019    BUN 17 01/21/2019    CREATININE 1.1 01/21/2019    ALT 22 01/21/2019    AST 22 01/21/2019    HGB 15.6 04/06/2017    HCT 46.0 04/06/2017    TSH 1.194 04/06/2017    LDLCALC 62.2 (L) 01/21/2019           Assessment:     1. Pacemaker    2. Sick sinus syndrome    3. Hypertension, essential    4. Belching      Plan:     In summary, Mr. Young is a 58 y.o. male with SSS, PPM (2009), DM, and HTN here for annual follow up.   Mr. Young is doing well from a device perspective with stable lead and device function. No arrhythmia noted. Ventricular pacing off.   He continues to have periodic belching which has persisted for years. Has not had GI eval.  He has some confusion about which BP meds to take. He will confirm this with his PCP.     Referral to GI.  Continue current medication regimen and device settings.   Follow up in device clinic as scheduled.   Follow up in EP clinic in 1 year, sooner as needed.     *A copy of this note has been sent to Dr. Spence*    Follow up in about 1 year (around 3/26/2020).    ------------------------------------------------------------------    SARAH Sahu, NP-C  [facility]

## 2019-04-20 DIAGNOSIS — I10 ESSENTIAL HYPERTENSION: ICD-10-CM

## 2019-04-21 RX ORDER — LISINOPRIL AND HYDROCHLOROTHIAZIDE 12.5; 2 MG/1; MG/1
1 TABLET ORAL DAILY
Qty: 90 TABLET | Refills: 0 | OUTPATIENT
Start: 2019-04-21

## 2019-04-24 DIAGNOSIS — I10 ESSENTIAL HYPERTENSION: ICD-10-CM

## 2019-04-24 RX ORDER — LISINOPRIL AND HYDROCHLOROTHIAZIDE 12.5; 2 MG/1; MG/1
1 TABLET ORAL DAILY
Qty: 90 TABLET | Refills: 0 | OUTPATIENT
Start: 2019-04-24

## 2019-04-29 ENCOUNTER — OFFICE VISIT (OUTPATIENT)
Dept: OPTOMETRY | Facility: CLINIC | Age: 59
End: 2019-04-29
Payer: COMMERCIAL

## 2019-04-29 DIAGNOSIS — H52.03 HYPEROPIA WITH PRESBYOPIA OF BOTH EYES: ICD-10-CM

## 2019-04-29 DIAGNOSIS — H40.013 OAG (OPEN ANGLE GLAUCOMA) SUSPECT, LOW RISK, BILATERAL: ICD-10-CM

## 2019-04-29 DIAGNOSIS — E11.9 DIABETES MELLITUS TYPE 2 WITHOUT RETINOPATHY: Primary | ICD-10-CM

## 2019-04-29 DIAGNOSIS — H25.13 NUCLEAR SCLEROSIS, BILATERAL: ICD-10-CM

## 2019-04-29 DIAGNOSIS — H52.4 HYPEROPIA WITH PRESBYOPIA OF BOTH EYES: ICD-10-CM

## 2019-04-29 PROCEDURE — 92133 CPTRZD OPH DX IMG PST SGM ON: CPT | Mod: S$GLB,,, | Performed by: OPTOMETRIST

## 2019-04-29 PROCEDURE — 92133 OCT, OPTIC NERVE - OU - BOTH EYES: ICD-10-PCS | Mod: S$GLB,,, | Performed by: OPTOMETRIST

## 2019-04-29 PROCEDURE — 92015 PR REFRACTION: ICD-10-PCS | Mod: S$GLB,,, | Performed by: OPTOMETRIST

## 2019-04-29 PROCEDURE — 99999 PR PBB SHADOW E&M-EST. PATIENT-LVL III: CPT | Mod: PBBFAC,,, | Performed by: OPTOMETRIST

## 2019-04-29 PROCEDURE — 92015 DETERMINE REFRACTIVE STATE: CPT | Mod: S$GLB,,, | Performed by: OPTOMETRIST

## 2019-04-29 PROCEDURE — 92014 PR EYE EXAM, EST PATIENT,COMPREHESV: ICD-10-PCS | Mod: S$GLB,,, | Performed by: OPTOMETRIST

## 2019-04-29 PROCEDURE — 92014 COMPRE OPH EXAM EST PT 1/>: CPT | Mod: S$GLB,,, | Performed by: OPTOMETRIST

## 2019-04-29 PROCEDURE — 99999 PR PBB SHADOW E&M-EST. PATIENT-LVL III: ICD-10-PCS | Mod: PBBFAC,,, | Performed by: OPTOMETRIST

## 2019-04-29 NOTE — LETTER
April 29, 2019      Jarvis Washington MD  [address]  [facility] [address]           [facility]  [address]  [facility] [address]  Phone: [phone]  Fax: [phone]          Patient: Renny Young   MR Number: [id number]   YOB: 1960   Date of Visit: 4/29/2019       Dear Dr. Jarvis Washington:    Thank you for referring Renny Young to me for evaluation. Attached you will find relevant portions of my assessment and plan of care.    If you have questions, please do not hesitate to call me. I look forward to following Renny Young along with you.    Sincerely,    Harley Duffy, OD    Enclosure  CC:  No Recipients    If you would like to receive this communication electronically, please contact [email or web address] or [phone] to request more information on [employer] access.    For providers and/or their staff who would like to refer a patient to [facility], please contact us through our one-stop-shop provider referral line, [facility], at [phone].    If you feel you have received this communication in error or would no longer like to receive these types of communications, please e-mail [email or web address]

## 2019-04-29 NOTE — PROGRESS NOTES
HPI     58 Year old male presents for diabetic eye exam OU.  Pt states that he believes he needs a new rx for glasses. Pt did not get   the new rx last year. Pt denies any signs of flashes or floaters OU. No   pain or discomfort OU. No use of eye gtts at this time.     Diabetic x 8 Years  LBS-Unknown  Hemoglobin A1C       Date                     Value               Ref Range             Status                01/21/2019               6.3 (H)             4.0 - 5.6 %           Final                 04/06/2018               5.8 (H)             4.0 - 5.6 %           Final                 04/06/2017               6.5 (H)             4.5 - 6.2 %           Final                  Last edited by Aliya Randhawa on 4/29/2019  3:40 PM. (History)            Assessment /Plan     For exam results, see Encounter Report.    Diabetes mellitus type 2 without retinopathy  -No retinopathy noted today.  Continued control with primary care physician and annual comprehensive eye exam.    Nuclear sclerosis, bilateral  -Educated patient on presence of cataracts at today's exam, monitor at annual dilated fundus exam. 5+ years surgical estimate.    OAG (open angle glaucoma) suspect, low risk, bilateral  -     OCT, Optic Nerve - OU - Both Eyes  -OCT stable with prior  -monitor as suspect    Hyperopia with presbyopia of both eyes  Eyeglass Final Rx     Eyeglass Final Rx       Sphere Cylinder Dist VA Add    Right +1.50 Sphere 20/60 +2.00    Left +2.50 Sphere 20/25 +2.00    Expiration Date:  4/29/2020                  RTC 1 yr

## 2019-05-14 ENCOUNTER — OFFICE VISIT (OUTPATIENT)
Dept: PAIN MEDICINE | Facility: CLINIC | Age: 59
End: 2019-05-14
Payer: COMMERCIAL

## 2019-05-14 VITALS
BODY MASS INDEX: 24.8 KG/M2 | HEIGHT: 67 IN | RESPIRATION RATE: 18 BRPM | TEMPERATURE: 98 F | SYSTOLIC BLOOD PRESSURE: 123 MMHG | DIASTOLIC BLOOD PRESSURE: 76 MMHG | HEART RATE: 70 BPM | WEIGHT: 158 LBS

## 2019-05-14 DIAGNOSIS — M47.816 FACET ARTHRITIS OF LUMBAR REGION: ICD-10-CM

## 2019-05-14 DIAGNOSIS — M54.16 LUMBAR RADICULOPATHY: ICD-10-CM

## 2019-05-14 DIAGNOSIS — M47.816 LUMBAR SPONDYLOSIS: Primary | ICD-10-CM

## 2019-05-14 DIAGNOSIS — M51.36 DDD (DEGENERATIVE DISC DISEASE), LUMBAR: ICD-10-CM

## 2019-05-14 PROCEDURE — 3074F SYST BP LT 130 MM HG: CPT | Mod: CPTII,S$GLB,, | Performed by: NURSE PRACTITIONER

## 2019-05-14 PROCEDURE — 3008F BODY MASS INDEX DOCD: CPT | Mod: CPTII,S$GLB,, | Performed by: NURSE PRACTITIONER

## 2019-05-14 PROCEDURE — 3008F PR BODY MASS INDEX (BMI) DOCUMENTED: ICD-10-PCS | Mod: CPTII,S$GLB,, | Performed by: NURSE PRACTITIONER

## 2019-05-14 PROCEDURE — 3078F DIAST BP <80 MM HG: CPT | Mod: CPTII,S$GLB,, | Performed by: NURSE PRACTITIONER

## 2019-05-14 PROCEDURE — 99999 PR PBB SHADOW E&M-EST. PATIENT-LVL III: ICD-10-PCS | Mod: PBBFAC,,, | Performed by: NURSE PRACTITIONER

## 2019-05-14 PROCEDURE — 3074F PR MOST RECENT SYSTOLIC BLOOD PRESSURE < 130 MM HG: ICD-10-PCS | Mod: CPTII,S$GLB,, | Performed by: NURSE PRACTITIONER

## 2019-05-14 PROCEDURE — 3078F PR MOST RECENT DIASTOLIC BLOOD PRESSURE < 80 MM HG: ICD-10-PCS | Mod: CPTII,S$GLB,, | Performed by: NURSE PRACTITIONER

## 2019-05-14 PROCEDURE — 99999 PR PBB SHADOW E&M-EST. PATIENT-LVL III: CPT | Mod: PBBFAC,,, | Performed by: NURSE PRACTITIONER

## 2019-05-14 PROCEDURE — 99213 PR OFFICE/OUTPT VISIT, EST, LEVL III, 20-29 MIN: ICD-10-PCS | Mod: S$GLB,,, | Performed by: NURSE PRACTITIONER

## 2019-05-14 PROCEDURE — 99213 OFFICE O/P EST LOW 20 MIN: CPT | Mod: S$GLB,,, | Performed by: NURSE PRACTITIONER

## 2019-05-14 NOTE — PROGRESS NOTES
Chronic Pain - Established Visit    Referring Physician: No ref. provider found    Chief Complaint:   No chief complaint on file.       SUBJECTIVE: Disclaimer: This note has been generated using voice-recognition software. There may be typographical errors that have been missed during proof-reading    Interval History 5/14/2019;  The patient returns for follow up.  He previously had benefit with JEAN MARIE for leg pain and RFAs for back pain.  He is having some back pain that has been worsening over the past couple of weeks.  He has significant pain in the morning.  He has some improvement when he moves around.  He says that cold air aggravates his pain.  He stopped Gabapentin last year when his leg pain improved.  He is not having much leg pain now.  His pain today is 6/10.    Interval History 2/7/2019:  The patient presents for check up of chronic back pain.  He reports doing well since last visit.  He feels that last JEAN MARIE is still providing him benefit.  He has not had any leg pain.  He does still have back pain but states that it is tolerable.  His morning pain is much improved from RFAs last year.  He continues to work and is active.  His pain today is 5/10.    Interval History 12/6/2018:  The patient presents today for follow up.  He continues with pain to the lower back.  He is not having leg pain at this time.  He had some relief with RFAs with the past.  He stopped Gabapentin when his leg pain improved.  He takes Tylenol sparingly with some benefit.  His pain today is 5/10.  The patient denies any bowel or bladder incontinence or signs of saddle paresthesia.  The patient denies any major medical changes since last office visit.    Interval History 10/25/2018:  The patient returns for follow up of back and leg pain.  He is s/p L5/S1 IL JEAN MARIE on 10/11/18 with 80% pain relief for his legs.  He has had increased lower back pain over the past few days which he attributes to weather changes.  He describes it as aching.   He has not been stretching.  He does walk a lot for work.  He cannot take oral NSAIDs due to pacemaker placement.  His pain today is 8/10.     Interval History 9/11/2018:  The patient presents for procedure follow up appointment.  He is s/p left then right L3,4,5 RFA completed on 8/14/18 with 80% pain relief initially.  He has had a little more pain over the past week.  He is now having intermittent radiation into the back of both legs, left greater than right.  He previously had benefit with right sided TF JEAN MARIE.  He is still taking gabapentin.  His pain today is 5/10.    Interval History 7/25/2018:  The patient returns today for follow up of back pain.  He is s/p bilateral L3,4,5 MBB on 7/5/18 with 100% relief for 2 days.  He previously had benefit with right TF JEAN MARIE for leg pain.  He has not had right leg pain since the epidural.  However, he is reporting lateral left leg pain that has progressed over the past couple of weeks.  He continues to be active and works.  His pain today is 5/10.    Interval History 6/20/2018:  The patient returns today for follow up of lower back and right leg pain.  He is s/p right L5 and S1 TF JEAN MARIE on 6/5/18 with 100% relief of right leg pain.  He has not had any leg pain since the procedure.  He continues to report pain across the lower back.  This is always worse first thing in the morning.  He states that this feels aching and throbbing in nature.  He did have recent lumbar CT scan.  He would like to discuss further procedures.  He continues to work and be active.  His pain today is 5/10.    Interval History 5/22/2018:  The patient returns for follow up of back pain.  He is having radiation down there back of the right leg to the posterior calf.  The back pain is most severe in the morning and he states that this feels like a stiffness.  This improves when he walks.  He has severe leg pain that is intermittent, mainly with activity.  This is his biggest complaint.  He did complete the  medrol dose pack recently with limited improvement.  He had XRAYs which show severe loss of disc space at L5/S1.  He has not had a CT scan.  He is taking Gabapentin 300 mg at bedtime.  It is written BID but it makes him drowsy during the day.  He has some benefit with Aleve but has been told to avoid NSAIDs due to history of pacemaker placement.  His pain today is 5/10.     Initial encounter:    Renny Young presents to the clinic for the evaluation of lower back and right leg pain. The pain started two months ago and symptoms have been worsening.    Brief history:    Pain Description:    The pain is located in the lower back and right leg area in the L5/S1 distribution.      At BEST  5/10     At WORST  7/10 on the WORST day.      On average pain is rated as 5/10.     Today the pain is rated as 5/10    The pain is described as aching      Symptoms interfere with daily activity.     Exacerbating factors: Standing and Morning.      Mitigating factors medications.     Patient denies night fever/night sweats, urinary incontinence, bowel incontinence, significant weight loss, significant motor weakness and loss of sensations.  Patient denies any suicidal or homicidal ideations    Pain Medications:  Current:  OTC Tylenol PRN    Tried in Past:  NSAIDs - Aleve  TCA -Never  SNRI -Never  Anti-convulsants - Gabapentin   Muscle Relaxants -Never  Opioids-Never    Physical Therapy/Home Exercise: yes     report:  Reviewed and consistent with medication use as prescribed.    Pain Procedures:   6/5/18 Right L5 and S1 TF JEAN MARIE- significant  7/5/18 Bilateral L3,4,5 MBB- 100% relief for 2 days  7/31/18 Left L3,4,5 RFA- 80% relief  8/14/18 Right L3,4,5 RFA- 80% relief  10/11/18 L5/S1 IL JEAN MARIE- 80% relief    Chiropractor -never  Acupuncture - never  TENS unit -never  Spinal decompression -never  Joint replacement -never    Imaging:     Lumbar CT 5/28/18    Narrative     EXAMINATION:  CT LUMBAR SPINE WITHOUT CONTRAST    CLINICAL  HISTORY:  Low back pain, >6wks conservative tx, persistent-progressive sx, surgical candidate;  Radiculopathy, lumbosacral region    TECHNIQUE:  Low-dose axial, sagittal and coronal reformations are obtained through the lumbar spine.  Contrast was not administered.    COMPARISON:  None.    FINDINGS:  Posterior vertebral alignment is satisfactory.  There is mild levoconvexity of the lumbar spine.  Vertebral body heights are well maintained.  There is prominent degenerative disc disease at the L5-S1 level with marked disc space narrowing with vertebral endplate sclerosis and irregularity as well as vacuum disc phenomenon at the L5-S1 level.  There are anterior and posterior osteophytes at the L5-S1 level associated with diffuse concentric disc bulging.  Mild concentric disc bulging is also present at the L4-L5 level.  There is mild bilateral neural foraminal narrowing at the L5-S1 level due to the diffuse disc bulging and osteophyte formation as well as mild facet arthropathy at the L5-S1.  No abnormal paraspinal masses are evident.  There is a mild amount of atherosclerotic calcification within the abdominal aorta which tapers normally without aneurysmal dilatation.   Impression       No evidence for acute fracture, bone destruction, or subluxation.    Prominent degenerative disc disease at the L5-S1 level with anterior and posterior osteophyte formation as well as concentric disc bulging.  This does not appear to result in significant spinal stenosis.    Minimal diffuse disc bulging at the L4-L5 level.    Mild neural foraminal narrowing at the L5-S1 level bilaterally.       X-Ray Lumbar Spine    Narrative     EXAMINATION:  XR LUMBAR SPINE AP AND LATERAL    CLINICAL HISTORY:  Low back pain, >6wks conservative tx, persistent-progressive sx, surgical candidate;Low back pain    TECHNIQUE:  AP, lateral and spot images were performed of the lumbar spine.    COMPARISON:  None    FINDINGS:  There are 5 non-rib-bearing  lumbar type vertebral bodies.  There is mild levocurvature of the lumbar spine between L1 and L4 that I calculate at 4°.   Vertebral body heights and alignment are preserved.    There is severe loss of disc space height at L5-S1 with near effacement of that disc space; vacuum phenomenon is present at the anterior portion of the disc space.  Other disc spaces are preserved.    There is facet arthropathy at L5-S1.  I do not identify spondylolysis or spondylolisthesis.    Sacroiliac joints are incompletely visualized.    Calcific atherosclerosis is present in the abdominal aorta and iliac arteries of this 57-year-old man.  I do not identify aneurysm.   Impression       Please see above.      Electronically signed by: Arin Galicia MD  Date: 05/01/2018  Time: 08:06         Past Medical History:   Diagnosis Date    Diabetes mellitus, type 2     Hypertension     Pacemaker      Past Surgical History:   Procedure Laterality Date    BLOCK, NERVE Bilateral 7/5/2018    [facility]    COLONOSCOPY N/A 2/13/2017    Performed by [facility]ares [facility] at [facility] ([facility])    COLONOSCOPY N/A 7/20/2015    Performed by Laz Elliott [facility] at [facility] (4TH FLR)    HERNIA REPAIR      Injection,steroid,epidural, LUMBAR L5/S1 JEAN MARIE N/A 10/11/2018    Performed by [facility]es [facility] at [facility]    INJECTION-STEROID-EPIDURAL-TRANSFORAMINAL Right 6/5/2018    Performed [facility] at [facility]    RADIOFREQUENCY ABLATION Right 8/14/2018    [facility] at [facility]    RADIOFREQUENCY ABLATION Left 7/31/2018    [facility] by [facility] at [facility]     Social History     Socioeconomic History    Marital status:      Spouse name: Not on file    Number of children: 3    Years of education: Not on file    Highest education level: Not on file   Occupational History    Occupation:    Social Needs    Financial resource  strain: Not on file    Food insecurity:     Worry: Not on file     Inability: Not on file    Transportation needs:     Medical: Not on file     Non-medical: Not on file   Tobacco Use    Smoking status: Current Every Day Smoker     Packs/day: 0.50     Types: Cigarettes    Smokeless tobacco: Never Used   Substance and Sexual Activity    Alcohol use: Yes     Comment: Beer- Socially    Drug use: No    Sexual activity: Yes   Lifestyle    Physical activity:     Days per week: Not on file     Minutes per session: Not on file    Stress: Not on file   Relationships    Social connections:     Talks on phone: Not on file     Gets together: Not on file     Attends Yazidism service: Not on file     Active member of club or organization: Not on file     Attends meetings of clubs or organizations: Not on file     Relationship status: Not on file   Other Topics Concern    Not on file   Social History Narrative    Not on file     Family History   Problem Relation Age of Onset    Diabetes Mother     Diabetes Father     Kidney disease Father     Melanoma Neg Hx        Review of patient's allergies indicates:  No Known Allergies    Current Outpatient Medications   Medication Sig    amLODIPine (NORVASC) 5 MG tablet Take 1 tablet (5 mg total) by mouth once daily.    ammonium lactate 12 % Crea Apply twice daily to affected parts both feet as needed.    aspirin (ECOTRIN) 81 MG EC tablet Take 81 mg by mouth once daily.    atorvastatin (LIPITOR) 20 MG tablet Take 1 tablet (20 mg total) by mouth once daily.    gabapentin (NEURONTIN) 300 MG capsule TAKE ONE CAPSULE BY MOUTH TWICE DAILY    ibuprofen (ADVIL,MOTRIN) 600 MG tablet     losartan-hydrochlorothiazide 50-12.5 mg (HYZAAR) 50-12.5 mg per tablet Take 1 tablet by mouth once daily.    metFORMIN (GLUCOPHAGE) 500 MG tablet TAKE 1 TABLET BY MOUTH ONCE DAILY WITH BREAKFAST    triamcinolone acetonide 0.1% (KENALOG) 0.1 % cream Apply topically 2 (two) times daily.     No  "current facility-administered medications for this visit.        REVIEW OF SYSTEMS:    GENERAL:  No weight loss, malaise or fevers.  HEENT:   No recent changes in vision or hearing  NECK:  Negative for lumps, no difficulty with swallowing.  RESPIRATORY:  Negative for cough, wheezing or shortness of breath, patient denies any recent URI.  CARDIOVASCULAR:  Negative for chest pain, leg swelling or palpitations. Pacemaker for SSS.  GI:  Negative for abdominal discomfort, blood in stools or black stools or change in bowel habits.  MUSCULOSKELETAL:  See HPI.  SKIN:  Negative for lesions, rash, and itching.  PSYCH:  No mood disorder or recent psychosocial stressors.  Patients sleep is not disturbed secondary to pain.  HEMATOLOGY/LYMPHOLOGY:  Negative for prolonged bleeding, bruising easily or swollen nodes.  Patient is not currently taking any anti-coagulants  ENDO: DM2 on metformin  NEURO:   No history of headaches, syncope, paralysis, seizures or tremors.  All other reviewed and negative other than HPI.    OBJECTIVE:    /76   Pulse 70   Temp 98.3 °F (36.8 °C)   Resp 18   Ht 5' 7" (1.702 m)   Wt 71.7 kg (158 lb)   BMI 24.75 kg/m²     PHYSICAL EXAMINATION:    GENERAL: Well appearing, in no acute distress, alert and oriented x3.  PSYCH:  Mood and affect appropriate.  SKIN: Skin color, texture, turgor normal, no rashes or lesions.  HEAD/FACE:  Normocephalic, atraumatic. Cranial nerves grossly intact.  CV: RRR with palpation of the radial artery.  PULM: No evidence of respiratory difficulty, symmetric chest rise.  BACK: Straight leg raising in the sitting and supine positions is negative to radicular pain.  There is no pain with palpation over the facet joints of the lumbar spine bilaterally.  There is decreased range of motion with extension to 15 degrees, and facet loading maneuvers cause mild pain bilaterally, L>R.  EXTREMITIES: Peripheral joint ROM is full and pain free without obvious instability or laxity in " all four extremities. No deformities, edema, or skin discoloration. Good capillary refill.  MUSCULOSKELETAL: Hip, and knee provocative maneuvers are negative.  There is no pain with palpation over the sacroiliac joints bilaterally.  There is no pain to palpation over the greater trochanteric bursa bilaterally.  FABERs test is negative.  FADIRs test is negative.  5/5 strength in right ankle with plantar and dorsiflexion, 5/5 strength in left ankle with plantar and dorsiflexion, 5/5 strength with right knee flexion extension, 5/5 strength with knee flexion extension on the left.  No atrophy or tone abnormalities are noted.  NEURO: Bilateral lower extremity coordination and muscle stretch reflexes are physiologic and symmetric.  Plantar response are downgoing. No clonus.  Normal sensation.  GAIT: Antalgic.    Lab Results   Component Value Date    HGBA1C 6.3 (H) 01/21/2019     Lab Results   Component Value Date    WBC 12.57 04/06/2017    HGB 15.6 04/06/2017    HCT 46.0 04/06/2017    MCV 87 04/06/2017     04/06/2017     BMP  Lab Results   Component Value Date     01/21/2019    K 4.5 01/21/2019     01/21/2019    CO2 27 01/21/2019    BUN 17 01/21/2019    CREATININE 1.1 01/21/2019    CALCIUM 10.0 01/21/2019    ANIONGAP 9 01/21/2019    ESTGFRAFRICA >60 01/21/2019    EGFRNONAA >60 01/21/2019     ASSESSMENT: 58 y.o. year old male with lower back pain, consistent with the following diagnoses:    Encounter Diagnoses   Name Primary?    Lumbar spondylosis Yes    Lumbar radiculopathy     Facet arthritis of lumbar region     DDD (degenerative disc disease), lumbar        PLAN:     - Lumbar CT reviewed with patient in detail today.    - Schedule for repeat left then right L3,4,5 RFAs, 2 weeks apart.    - Consider repeat JEAN MARIE if leg pain returns.    - The patient will continue a home exercise routine to help with pain and strengthening.    - RTC 4 weeks after completion of procedures.      The above plan and  management options were discussed at length with patient. Patient is in agreement with the above and verbalized understanding.     Trista Oviedo  05/14/2019

## 2019-06-11 ENCOUNTER — HOSPITAL ENCOUNTER (OUTPATIENT)
Facility: OTHER | Age: 59
Discharge: HOME | End: 2019-06-11
Attending: ANESTHESIOLOGY | Admitting: ANESTHESIOLOGY
Payer: COMMERCIAL

## 2019-06-11 VITALS
HEART RATE: 60 BPM | HEIGHT: 67 IN | BODY MASS INDEX: 24.33 KG/M2 | OXYGEN SATURATION: 98 % | WEIGHT: 155 LBS | SYSTOLIC BLOOD PRESSURE: 123 MMHG | RESPIRATION RATE: 18 BRPM | TEMPERATURE: 98 F | DIASTOLIC BLOOD PRESSURE: 79 MMHG

## 2019-06-11 DIAGNOSIS — G89.29 CHRONIC PAIN: ICD-10-CM

## 2019-06-11 LAB — POCT GLUCOSE: 88 MG/DL (ref 70–110)

## 2019-06-11 PROCEDURE — 64494 INJ PARAVERT F JNT L/S 2 LEV: CPT | Mod: LT | Performed by: ANESTHESIOLOGY

## 2019-06-11 PROCEDURE — 64495 INJ PARAVERT F JNT L/S 3 LEV: CPT | Mod: LT | Performed by: ANESTHESIOLOGY

## 2019-06-11 PROCEDURE — 64494 PR INJ DX/THER AGNT PARAVERT FACET JOINT,IMG GUIDE,LUMBAR/SAC, 2ND LEVEL: ICD-10-PCS | Mod: LT,,, | Performed by: ANESTHESIOLOGY

## 2019-06-11 PROCEDURE — 64493 INJ PARAVERT F JNT L/S 1 LEV: CPT | Mod: LT,,, | Performed by: ANESTHESIOLOGY

## 2019-06-11 PROCEDURE — 25000003 PHARM REV CODE 250: Performed by: ANESTHESIOLOGY

## 2019-06-11 PROCEDURE — 64493 INJ PARAVERT F JNT L/S 1 LEV: CPT | Mod: LT | Performed by: ANESTHESIOLOGY

## 2019-06-11 PROCEDURE — 64494 INJ PARAVERT F JNT L/S 2 LEV: CPT | Mod: LT,,, | Performed by: ANESTHESIOLOGY

## 2019-06-11 PROCEDURE — 63600175 PHARM REV CODE 636 W HCPCS: Performed by: ANESTHESIOLOGY

## 2019-06-11 PROCEDURE — 64493 PR INJ DX/THER AGNT PARAVERT FACET JOINT,IMG GUIDE,LUMBAR/SAC,1ST LVL: ICD-10-PCS | Mod: LT,,, | Performed by: ANESTHESIOLOGY

## 2019-06-11 PROCEDURE — 82947 ASSAY GLUCOSE BLOOD QUANT: CPT | Performed by: ANESTHESIOLOGY

## 2019-06-11 RX ORDER — SODIUM CHLORIDE 9 MG/ML
500 INJECTION, SOLUTION INTRAVENOUS CONTINUOUS
Status: DISCONTINUED | OUTPATIENT
Start: 2019-06-11 | End: 2019-06-11 | Stop reason: HOSPADM

## 2019-06-11 RX ORDER — BUPIVACAINE HYDROCHLORIDE 2.5 MG/ML
INJECTION, SOLUTION EPIDURAL; INFILTRATION; INTRACAUDAL
Status: DISCONTINUED | OUTPATIENT
Start: 2019-06-11 | End: 2019-06-11 | Stop reason: HOSPADM

## 2019-06-11 RX ORDER — LIDOCAINE HYDROCHLORIDE 10 MG/ML
INJECTION INFILTRATION; PERINEURAL
Status: DISCONTINUED | OUTPATIENT
Start: 2019-06-11 | End: 2019-06-11 | Stop reason: HOSPADM

## 2019-06-11 RX ORDER — FENTANYL CITRATE 50 UG/ML
INJECTION, SOLUTION INTRAMUSCULAR; INTRAVENOUS
Status: DISCONTINUED | OUTPATIENT
Start: 2019-06-11 | End: 2019-06-11 | Stop reason: HOSPADM

## 2019-06-11 RX ORDER — MIDAZOLAM HYDROCHLORIDE 1 MG/ML
INJECTION INTRAMUSCULAR; INTRAVENOUS
Status: DISCONTINUED | OUTPATIENT
Start: 2019-06-11 | End: 2019-06-11 | Stop reason: HOSPADM

## 2019-06-11 RX ADMIN — SODIUM CHLORIDE 500 ML: 0.9 INJECTION, SOLUTION INTRAVENOUS at 01:06

## 2019-06-11 NOTE — H&P
"HPI  Patient presenting for  Procedure(s) (LRB):  RADIOFREQUENCY ABLATION, L3-L4-L5 MEDIAL BRANCH   1 OF 2 (Left)    No health changes since previous encounter    PMHx, PSHx, Allergies, Medications reviewed in epic    ROS negative except pain complaints in HPI    OBJECTIVE:    /78 (BP Location: Right arm, Patient Position: Lying)   Pulse 70   Temp 98.3 °F (36.8 °C) (Oral)   Resp 18   Ht 5' 7" (1.702 m)   Wt 70.3 kg (155 lb)   SpO2 95%   BMI 24.28 kg/m²     PHYSICAL EXAMINATION:    GENERAL: Well appearing, in no acute distress, alert and oriented x3.  PSYCH:  Mood and affect appropriate.  SKIN: Skin color, texture, turgor normal, no rashes or lesions.  CV: RRR with palpation of the radial artery.  PULM: No evidence of respiratory difficulty, symmetric chest rise. Clear to auscultation.  NEURO: Cranial nerves grossly intact.    Plan:    Proceed with procedure as planned    SALINAS Ewing  06/11/2019    "

## 2019-06-11 NOTE — OP NOTE
Lumbar Medial nerve branch block radiofrequency ablation Under Fluoroscopy     Time-out taken to identify patient and procedure side prior to starting the procedure.     06/11/2019    PROCEDURE: Left radiofrequency ablation of the the medial branch nerves at the   transverse process of  L4, L5 and sacral ala    2)Conscious sedation provided by MD     REASON FOR PROCEDURE: Lumbar spondylosis [M47.816]     PHYSICIAN: Josue Paredes MD     ASSISTANTS: None     MEDICATIONS INJECTED: 0.25% Bupivicaine total 6mL     LOCAL ANESTHETIC USED: Xylocaine 1% 1mL / site     ESTIMATED BLOOD LOSS: None.     COMPLICATIONS: None.     Interval history: Patient reports that he had complete relief of pain for the day of the procedure, we will proceed with the RFA     TECHNIQUE: Laying in a prone position, the patient was prepped and draped in the usual sterile fashion using ChloraPrep and fenestrated drape. The level was determined under fluoroscopic guidance. Local anesthetic was given by going down to the hub of the 27-gauge 1.25in needle and raising a wheel. A 18-gauge 10mm curved active tip needle was introduced to the anatomic local of the medial branch at each of the above levels using fluoroscopy. Then sensory and motor testing was performed to confirm that the needle tips were in the correct location. Then after negative aspiration, 1 mL of 0.25% bupivacaine was injected into each level. This was followed by thermal lesioning at 80 degrees celsius for 90 seconds.     Conscious sedation provided by M.D   The patient was monitored with continuous pulse oximetry, EKG, and intermittent blood pressure monitors. The patient was hemodynamically stable throughout the entire process was responsive to voice, and breathing spontaneously. Supplemental O2 was provided at 2L/min via nasal cannula. Patient was comfortable for the duration of the procedure. (See nurse documentation and case log for sedation time)    There was a total of 2mg IV  Midazolam and 100 mcg Fentanyl titrated for the procedure    The patient tolerated the procedure well. Was able to move their leg at the knee and ankle at the conclusion of the procedure    The patient was monitored after the procedure. Patient was given post procedure and discharge instructions to follow at home. We will see the patient back in two weeks or the patient may call to inform of status. The patient was discharged in a stable condition

## 2019-06-11 NOTE — DISCHARGE INSTRUCTIONS
Thank you for allowing us to care for you today. You may receive a survey about the care we provided. Your feedback is valuable and helps us provide excellent care throughout the community.     Home Care Instructions for Pain Management:    1. DIET:   You may resume your normal diet today.   2. BATHING:   You may shower with luke warm water. No tub baths or anything that will soak injection sites under water for the next 24 hours.  3. DRESSING:   You may remove your bandage today.   4. ACTIVITY LEVEL:   You may resume your normal activities 24 hrs after your procedure. Nothing strenuous today.  5. MEDICATIONS:   You may resume your normal medications today. To restart blood thinners, ask your doctor.  6. DRIVING    If you have received any sedatives by mouth today, you may not drive for 12 hours.    If you have received any sedation through your IV, you may not drive for 24 hrs.   7. SPECIAL INSTRUCTIONS:   No heat to the injection site for 24 hrs including, hot bath or shower, heating pad, moist heat, or hot tubs.    Use ice pack to injection site for any pain or discomfort.  Apply ice packs for 20 minute intervals as needed.    IF you have diabetes, be sure to monitor your blood sugar more closely. IF your injection contained steroids your blood sugar levels may become higher than normal.    If you are still having pain upon discharge:  Your pain may improve over the next 48 hours. The anesthetic (numbing medication) works immediately to 48 hours. IF your injection contained a steroid (anti-inflammatory medication), it takes approximately 3 days to start feeling relief and 7-10 days to see your greatest results from the medication. It is possible you may need subsequent injections. This would be discussed at your follow up appointment with pain management or your referring doctor.      PLEASE CALL YOUR DOCTOR IF:  1. Redness or swelling around the injection site.  2. Fever of 101 degrees or more  3. Drainage  (pus) from the injection site.  4. For any continuous bleeding (some dried blood over the incision is normal.)    FOR EMERGENCIES:   If any unusual problems or difficulties occur during clinic hours, call [phone] or [phone].

## 2019-06-25 ENCOUNTER — HOSPITAL ENCOUNTER (OUTPATIENT)
Facility: OTHER | Age: 59
Discharge: HOME | End: 2019-06-25
Attending: ANESTHESIOLOGY | Admitting: ANESTHESIOLOGY
Payer: COMMERCIAL

## 2019-06-25 VITALS
HEIGHT: 67 IN | RESPIRATION RATE: 18 BRPM | SYSTOLIC BLOOD PRESSURE: 116 MMHG | TEMPERATURE: 99 F | BODY MASS INDEX: 24.48 KG/M2 | HEART RATE: 59 BPM | OXYGEN SATURATION: 96 % | DIASTOLIC BLOOD PRESSURE: 69 MMHG | WEIGHT: 156 LBS

## 2019-06-25 DIAGNOSIS — G89.29 CHRONIC PAIN: ICD-10-CM

## 2019-06-25 DIAGNOSIS — M47.816 LUMBAR SPONDYLOSIS: Primary | ICD-10-CM

## 2019-06-25 LAB — POCT GLUCOSE: 104 MG/DL (ref 70–110)

## 2019-06-25 PROCEDURE — 25000003 PHARM REV CODE 250: Performed by: ANESTHESIOLOGY

## 2019-06-25 PROCEDURE — 64636 DESTROY L/S FACET JNT ADDL: CPT | Mod: RT | Performed by: ANESTHESIOLOGY

## 2019-06-25 PROCEDURE — 99152 MOD SED SAME PHYS/QHP 5/>YRS: CPT | Mod: ,,, | Performed by: ANESTHESIOLOGY

## 2019-06-25 PROCEDURE — 64635 DESTROY LUMB/SAC FACET JNT: CPT | Mod: RT,,, | Performed by: ANESTHESIOLOGY

## 2019-06-25 PROCEDURE — 99152 PR MOD CONSCIOUS SEDATION, SAME PHYS, 5+ YRS, FIRST 15 MIN: ICD-10-PCS | Mod: ,,, | Performed by: ANESTHESIOLOGY

## 2019-06-25 PROCEDURE — 64635 PR DESTROY LUMB/SAC FACET JNT: ICD-10-PCS | Mod: RT,,, | Performed by: ANESTHESIOLOGY

## 2019-06-25 PROCEDURE — 64636 DESTROY L/S FACET JNT ADDL: CPT | Mod: RT,,, | Performed by: ANESTHESIOLOGY

## 2019-06-25 PROCEDURE — 63600175 PHARM REV CODE 636 W HCPCS: Performed by: ANESTHESIOLOGY

## 2019-06-25 PROCEDURE — 82947 ASSAY GLUCOSE BLOOD QUANT: CPT | Performed by: ANESTHESIOLOGY

## 2019-06-25 PROCEDURE — 64635 DESTROY LUMB/SAC FACET JNT: CPT | Mod: RT | Performed by: ANESTHESIOLOGY

## 2019-06-25 PROCEDURE — 64636 PR DESTROY L/S FACET JNT ADDL: ICD-10-PCS | Mod: RT,,, | Performed by: ANESTHESIOLOGY

## 2019-06-25 RX ORDER — LIDOCAINE HYDROCHLORIDE 10 MG/ML
INJECTION INFILTRATION; PERINEURAL
Status: DISCONTINUED | OUTPATIENT
Start: 2019-06-25 | End: 2019-06-25 | Stop reason: HOSPADM

## 2019-06-25 RX ORDER — BUPIVACAINE HYDROCHLORIDE 2.5 MG/ML
INJECTION, SOLUTION EPIDURAL; INFILTRATION; INTRACAUDAL
Status: DISCONTINUED | OUTPATIENT
Start: 2019-06-25 | End: 2019-06-25 | Stop reason: HOSPADM

## 2019-06-25 RX ORDER — SODIUM CHLORIDE 9 MG/ML
500 INJECTION, SOLUTION INTRAVENOUS CONTINUOUS
Status: DISCONTINUED | OUTPATIENT
Start: 2019-06-25 | End: 2019-06-25 | Stop reason: HOSPADM

## 2019-06-25 RX ORDER — FENTANYL CITRATE 50 UG/ML
INJECTION, SOLUTION INTRAMUSCULAR; INTRAVENOUS
Status: DISCONTINUED | OUTPATIENT
Start: 2019-06-25 | End: 2019-06-25 | Stop reason: HOSPADM

## 2019-06-25 RX ORDER — MIDAZOLAM HYDROCHLORIDE 1 MG/ML
INJECTION INTRAMUSCULAR; INTRAVENOUS
Status: DISCONTINUED | OUTPATIENT
Start: 2019-06-25 | End: 2019-06-25 | Stop reason: HOSPADM

## 2019-06-25 RX ADMIN — SODIUM CHLORIDE 500 ML: 0.9 INJECTION, SOLUTION INTRAVENOUS at 09:06

## 2019-06-25 NOTE — DISCHARGE SUMMARY
Discharge Note  Short Stay      SUMMARY     Admit Date: 6/25/2019    Attending Physician: MICHELLE LI      Discharge Physician: MICHELLE LI      Discharge Date: 6/25/2019 11:03 AM    Procedure(s) (LRB):  RADIOFREQUENCY ABLATION, L3-L4-L5 MEDIAL BRANCH JING 2 OF 2 (Right)    Final Diagnosis: Lumbar spondylosis [M47.816]    Disposition: Home or self care    Patient Instructions:   Discharge Medication List as of 6/25/2019 10:27 AM      CONTINUE these medications which have NOT CHANGED    Details   amLODIPine (NORVASC) 5 MG tablet Take 1 tablet (5 mg total) by mouth once daily., Starting Fri 2/8/2019, Normal      aspirin (ECOTRIN) 81 MG EC tablet Take 81 mg by mouth once daily., Until Discontinued, Historical Med      atorvastatin (LIPITOR) 20 MG tablet Take 1 tablet (20 mg total) by mouth once daily., Starting Fri 2/8/2019, Normal      gabapentin (NEURONTIN) 300 MG capsule TAKE ONE CAPSULE BY MOUTH TWICE DAILY, Normal      losartan-hydrochlorothiazide 50-12.5 mg (HYZAAR) 50-12.5 mg per tablet Take 1 tablet by mouth once daily., Starting Fri 2/8/2019, Normal      metFORMIN (GLUCOPHAGE) 500 MG tablet TAKE 1 TABLET BY MOUTH ONCE DAILY WITH BREAKFAST, Normal                 Discharge Diagnosis: Lumbar spondylosis [M47.816]  Condition on Discharge: Stable with no complications to procedure   Diet on Discharge: Same as before.  Activity: as per instruction sheet.  Discharge to: Home with a responsible adult.  Follow up: 2-4 weeks

## 2019-06-25 NOTE — H&P
"HPI  Patient presenting for  Procedure(s) (LRB):  RADIOFREQUENCY ABLATION, L3-L4-L5 MEDIAL BRANCH JING 2 OF 2 (Right)    No health changes since previous encounter    PMHx, PSHx, Allergies, Medications reviewed in epic    ROS negative except pain complaints in HPI    OBJECTIVE:    /73   Pulse 64   Temp 98.7 °F (37.1 °C) (Oral)   Resp 18   Ht 5' 7" (1.702 m)   Wt 70.8 kg (156 lb)   SpO2 99%   BMI 24.43 kg/m²     PHYSICAL EXAMINATION:    GENERAL: Well appearing, in no acute distress, alert and oriented x3.  PSYCH:  Mood and affect appropriate.  SKIN: Skin color, texture, turgor normal, no rashes or lesions.  CV: RRR with palpation of the radial artery.  PULM: No evidence of respiratory difficulty, symmetric chest rise. Clear to auscultation.  NEURO: Cranial nerves grossly intact.    Plan:    Proceed with procedure as planned    Dylan Roblero  06/25/2019    "

## 2019-06-25 NOTE — DISCHARGE INSTRUCTIONS
Adult Procedural Sedation Instructions    Recovery After Procedural Sedation (Adult)  You have been given medicine by vein to make you sleep during your surgery. This may have included both a pain medicine and sleeping medicine. Most of the effects have worn off. But you may still have some drowsiness for the next 6 to 8 hours.  Home care  Follow these guidelines when you get home:  · For the next 8 hours, you should be watched by a responsible adult. This person should make sure your condition is not getting worse.  · Don't drink any alcohol for the next 24 hours.  · Don't drive, operate dangerous machinery, or make important business or personal decisions during the next 24 hours.  Note: Your healthcare provider may tell you not to take any medicine by mouth for pain or sleep in the next 4 hours. These medicines may react with the medicines you were given in the hospital. This could cause a much stronger response than usual.  Follow-up care  Follow up with your healthcare provider if you are not alert and back to your usual level of activity within 12 hours.  When to seek medical advice  Call your healthcare provider right away if any of these occur:  · Drowsiness gets worse  · Weakness or dizziness gets worse  · Repeated vomiting  · You can't be awakened   Date Last Reviewed: 10/18/2016  © 0190-5567 [employer]. [address] [place] [address], [place] [address]. All rights reserved. This information is not intended as a substitute for professional medical care. Always follow your healthcare professional's instructions.       Thank you for allowing us to care for you today. You may receive a survey about the care we provided. Your feedback is valuable and helps us provide excellent care throughout the community.     Home Care Instructions for Pain Management:    1. DIET:   You may resume your normal diet today.   2. BATHING:   You may shower with luke warm water. No tub baths or anything that will soak  injection sites under water for the next 24 hours.  3. DRESSING:   You may remove your bandage today.   4. ACTIVITY LEVEL:   You may resume your normal activities 24 hrs after your procedure. Nothing strenuous today.  5. MEDICATIONS:   You may resume your normal medications today. To restart blood thinners, ask your doctor.  6. DRIVING    If you have received any sedatives by mouth today, you may not drive for 12 hours.    If you have received any sedation through your IV, you may not drive for 24 hrs.   7. SPECIAL INSTRUCTIONS:   No heat to the injection site for 24 hrs including, hot bath or shower, heating pad, moist heat, or hot tubs.    Use ice pack to injection site for any pain or discomfort.  Apply ice packs for 20 minute intervals as needed.    IF you have diabetes, be sure to monitor your blood sugar more closely. IF your injection contained steroids your blood sugar levels may become higher than normal.    If you are still having pain upon discharge:  Your pain may improve over the next 48 hours. The anesthetic (numbing medication) works immediately to 48 hours. IF your injection contained a steroid (anti-inflammatory medication), it takes approximately 3 days to start feeling relief and 7-10 days to see your greatest results from the medication. It is possible you may need subsequent injections. This would be discussed at your follow up appointment with pain management or your referring doctor.      PLEASE CALL YOUR DOCTOR IF:  1. Redness or swelling around the injection site.  2. Fever of 101 degrees or more  3. Drainage (pus) from the injection site.  4. For any continuous bleeding (some dried blood over the incision is normal.)    FOR EMERGENCIES:   If any unusual problems or difficulties occur during clinic hours, call [phone] or [phone].

## 2019-07-17 DIAGNOSIS — Z95.0 PACEMAKER: Primary | ICD-10-CM

## 2019-07-17 DIAGNOSIS — I49.5 SICK SINUS SYNDROME: ICD-10-CM

## 2019-07-29 ENCOUNTER — PATIENT OUTREACH (OUTPATIENT)
Dept: ADMINISTRATIVE | Facility: OTHER | Age: 59
End: 2019-07-29

## 2019-07-30 ENCOUNTER — OFFICE VISIT (OUTPATIENT)
Dept: PAIN MEDICINE | Facility: CLINIC | Age: 59
End: 2019-07-30
Payer: COMMERCIAL

## 2019-07-30 VITALS
TEMPERATURE: 99 F | RESPIRATION RATE: 18 BRPM | SYSTOLIC BLOOD PRESSURE: 143 MMHG | DIASTOLIC BLOOD PRESSURE: 94 MMHG | BODY MASS INDEX: 26.37 KG/M2 | HEART RATE: 82 BPM | WEIGHT: 168 LBS | HEIGHT: 67 IN

## 2019-07-30 DIAGNOSIS — M47.816 LUMBAR SPONDYLOSIS: ICD-10-CM

## 2019-07-30 DIAGNOSIS — M47.816 FACET ARTHRITIS OF LUMBAR REGION: ICD-10-CM

## 2019-07-30 DIAGNOSIS — E11.42 DIABETIC POLYNEUROPATHY ASSOCIATED WITH TYPE 2 DIABETES MELLITUS: ICD-10-CM

## 2019-07-30 DIAGNOSIS — M54.16 LUMBAR RADICULOPATHY: Primary | ICD-10-CM

## 2019-07-30 PROCEDURE — 3080F DIAST BP >= 90 MM HG: CPT | Mod: CPTII,S$GLB,, | Performed by: NURSE PRACTITIONER

## 2019-07-30 PROCEDURE — 3077F SYST BP >= 140 MM HG: CPT | Mod: CPTII,S$GLB,, | Performed by: NURSE PRACTITIONER

## 2019-07-30 PROCEDURE — 99213 OFFICE O/P EST LOW 20 MIN: CPT | Mod: S$GLB,,, | Performed by: NURSE PRACTITIONER

## 2019-07-30 PROCEDURE — 3080F PR MOST RECENT DIASTOLIC BLOOD PRESSURE >= 90 MM HG: ICD-10-PCS | Mod: CPTII,S$GLB,, | Performed by: NURSE PRACTITIONER

## 2019-07-30 PROCEDURE — 99999 PR PBB SHADOW E&M-EST. PATIENT-LVL III: CPT | Mod: PBBFAC,,, | Performed by: NURSE PRACTITIONER

## 2019-07-30 PROCEDURE — 3077F PR MOST RECENT SYSTOLIC BLOOD PRESSURE >= 140 MM HG: ICD-10-PCS | Mod: CPTII,S$GLB,, | Performed by: NURSE PRACTITIONER

## 2019-07-30 PROCEDURE — 3008F PR BODY MASS INDEX (BMI) DOCUMENTED: ICD-10-PCS | Mod: CPTII,S$GLB,, | Performed by: NURSE PRACTITIONER

## 2019-07-30 PROCEDURE — 3044F HG A1C LEVEL LT 7.0%: CPT | Mod: CPTII,S$GLB,, | Performed by: NURSE PRACTITIONER

## 2019-07-30 PROCEDURE — 99213 PR OFFICE/OUTPT VISIT, EST, LEVL III, 20-29 MIN: ICD-10-PCS | Mod: S$GLB,,, | Performed by: NURSE PRACTITIONER

## 2019-07-30 PROCEDURE — 3044F PR MOST RECENT HEMOGLOBIN A1C LEVEL <7.0%: ICD-10-PCS | Mod: CPTII,S$GLB,, | Performed by: NURSE PRACTITIONER

## 2019-07-30 PROCEDURE — 99999 PR PBB SHADOW E&M-EST. PATIENT-LVL III: ICD-10-PCS | Mod: PBBFAC,,, | Performed by: NURSE PRACTITIONER

## 2019-07-30 PROCEDURE — 3008F BODY MASS INDEX DOCD: CPT | Mod: CPTII,S$GLB,, | Performed by: NURSE PRACTITIONER

## 2019-07-30 RX ORDER — GABAPENTIN 300 MG/1
300 CAPSULE ORAL 2 TIMES DAILY
Qty: 60 CAPSULE | Refills: 5 | Status: SHIPPED | OUTPATIENT
Start: 2019-07-30 | End: 2020-06-09 | Stop reason: SDUPTHER

## 2019-07-30 NOTE — PROGRESS NOTES
Chronic Pain - Established Visit    Referring Physician: No ref. provider found    Chief Complaint:   Chief Complaint   Patient presents with    Follow-up     4 weeks after 2nd RFA        SUBJECTIVE: Disclaimer: This note has been generated using voice-recognition software. There may be typographical errors that have been missed during proof-reading    Interval History 7/30/2019:  The patient returns for follow up of back pain.  He is s/p repeat left then right L3,4,5 RFAs completed on 6/24/19 with about 80% relief.  His back pain is mild.  He has had increased leg pain recently, worse on the left side.  Previous leg pain was relieved with JEAN MARIE last year.  He would like to repeat this.  His leg pain bothers him mainly at night when trying to sleep.  He stopped Gabapentin when he was not having leg pain but recently restarted.  He is unable to take at night.  His pain today is 6/10.    Interval History 5/14/2019:  The patient returns for follow up.  He previously had benefit with JEAN MARIE for leg pain and RFAs for back pain.  He is having some back pain that has been worsening over the past couple of weeks.  He has significant pain in the morning.  He has some improvement when he moves around.  He says that cold air aggravates his pain.  He stopped Gabapentin last year when his leg pain improved.  He is not having much leg pain now.  His pain today is 6/10.    Interval History 2/7/2019:  The patient presents for check up of chronic back pain.  He reports doing well since last visit.  He feels that last JEAN MARIE is still providing him benefit.  He has not had any leg pain.  He does still have back pain but states that it is tolerable.  His morning pain is much improved from RFAs last year.  He continues to work and is active.  His pain today is 5/10.    Interval History 12/6/2018:  The patient presents today for follow up.  He continues with pain to the lower back.  He is not having leg pain at this time.  He had some relief  with RFAs with the past.  He stopped Gabapentin when his leg pain improved.  He takes Tylenol sparingly with some benefit.  His pain today is 5/10.  The patient denies any bowel or bladder incontinence or signs of saddle paresthesia.  The patient denies any major medical changes since last office visit.    Interval History 10/25/2018:  The patient returns for follow up of back and leg pain.  He is s/p L5/S1 IL JEAN MARIE on 10/11/18 with 80% pain relief for his legs.  He has had increased lower back pain over the past few days which he attributes to weather changes.  He describes it as aching.  He has not been stretching.  He does walk a lot for work.  He cannot take oral NSAIDs due to pacemaker placement.  His pain today is 8/10.     Interval History 9/11/2018:  The patient presents for procedure follow up appointment.  He is s/p left then right L3,4,5 RFA completed on 8/14/18 with 80% pain relief initially.  He has had a little more pain over the past week.  He is now having intermittent radiation into the back of both legs, left greater than right.  He previously had benefit with right sided TF JEAN MARIE.  He is still taking gabapentin.  His pain today is 5/10.    Interval History 7/25/2018:  The patient returns today for follow up of back pain.  He is s/p bilateral L3,4,5 MBB on 7/5/18 with 100% relief for 2 days.  He previously had benefit with right TF JEAN MARIE for leg pain.  He has not had right leg pain since the epidural.  However, he is reporting lateral left leg pain that has progressed over the past couple of weeks.  He continues to be active and works.  His pain today is 5/10.    Interval History 6/20/2018:  The patient returns today for follow up of lower back and right leg pain.  He is s/p right L5 and S1 TF JEAN MARIE on 6/5/18 with 100% relief of right leg pain.  He has not had any leg pain since the procedure.  He continues to report pain across the lower back.  This is always worse first thing in the morning.  He states  that this feels aching and throbbing in nature.  He did have recent lumbar CT scan.  He would like to discuss further procedures.  He continues to work and be active.  His pain today is 5/10.    Interval History 5/22/2018:  The patient returns for follow up of back pain.  He is having radiation down there back of the right leg to the posterior calf.  The back pain is most severe in the morning and he states that this feels like a stiffness.  This improves when he walks.  He has severe leg pain that is intermittent, mainly with activity.  This is his biggest complaint.  He did complete the medrol dose pack recently with limited improvement.  He had XRAYs which show severe loss of disc space at L5/S1.  He has not had a CT scan.  He is taking Gabapentin 300 mg at bedtime.  It is written BID but it makes him drowsy during the day.  He has some benefit with Aleve but has been told to avoid NSAIDs due to history of pacemaker placement.  His pain today is 5/10.     Initial encounter:    Renny Young presents to the clinic for the evaluation of lower back and right leg pain. The pain started two months ago and symptoms have been worsening.    Brief history:    Pain Description:    The pain is located in the lower back and right leg area in the L5/S1 distribution.      At BEST  5/10     At WORST  7/10 on the WORST day.      On average pain is rated as 5/10.     Today the pain is rated as 5/10    The pain is described as aching      Symptoms interfere with daily activity.     Exacerbating factors: Standing and Morning.      Mitigating factors medications.     Patient denies night fever/night sweats, urinary incontinence, bowel incontinence, significant weight loss, significant motor weakness and loss of sensations.  Patient denies any suicidal or homicidal ideations    Pain Medications:  Current:  OTC Tylenol PRN    Tried in Past:  NSAIDs - Aleve  TCA -Never  SNRI -Never  Anti-convulsants - Gabapentin   Muscle Relaxants  -Never  Opioids-Never    Physical Therapy/Home Exercise: yes     report:  Reviewed and consistent with medication use as prescribed.    Pain Procedures:   6/5/18 Right L5 and S1 TF JEAN MARIE- significant relief  7/5/18 Bilateral L3,4,5 MBB- 100% relief for 2 days  7/31/18 Left L3,4,5 RFA- 80% relief  8/14/18 Right L3,4,5 RFA- 80% relief  10/11/18 L5/S1 IL JEAN MARIE- 80% relief  6/11/19 Left L3,4,5 RFA- 80% relief  6/25/19 Right L3,4,5 RFA- 80% relief    Chiropractor -never  Acupuncture - never  TENS unit -never  Spinal decompression -never  Joint replacement -never    Imaging:     Lumbar CT 5/28/18    Narrative     EXAMINATION:  CT LUMBAR SPINE WITHOUT CONTRAST    CLINICAL HISTORY:  Low back pain, >6wks conservative tx, persistent-progressive sx, surgical candidate;  Radiculopathy, lumbosacral region    TECHNIQUE:  Low-dose axial, sagittal and coronal reformations are obtained through the lumbar spine.  Contrast was not administered.    COMPARISON:  None.    FINDINGS:  Posterior vertebral alignment is satisfactory.  There is mild levoconvexity of the lumbar spine.  Vertebral body heights are well maintained.  There is prominent degenerative disc disease at the L5-S1 level with marked disc space narrowing with vertebral endplate sclerosis and irregularity as well as vacuum disc phenomenon at the L5-S1 level.  There are anterior and posterior osteophytes at the L5-S1 level associated with diffuse concentric disc bulging.  Mild concentric disc bulging is also present at the L4-L5 level.  There is mild bilateral neural foraminal narrowing at the L5-S1 level due to the diffuse disc bulging and osteophyte formation as well as mild facet arthropathy at the L5-S1.  No abnormal paraspinal masses are evident.  There is a mild amount of atherosclerotic calcification within the abdominal aorta which tapers normally without aneurysmal dilatation.   Impression       No evidence for acute fracture, bone destruction, or  subluxation.    Prominent degenerative disc disease at the L5-S1 level with anterior and posterior osteophyte formation as well as concentric disc bulging.  This does not appear to result in significant spinal stenosis.    Minimal diffuse disc bulging at the L4-L5 level.    Mild neural foraminal narrowing at the L5-S1 level bilaterally.       X-Ray Lumbar Spine    Narrative     EXAMINATION:  XR LUMBAR SPINE AP AND LATERAL    CLINICAL HISTORY:  Low back pain, >6wks conservative tx, persistent-progressive sx, surgical candidate;Low back pain    TECHNIQUE:  AP, lateral and spot images were performed of the lumbar spine.    COMPARISON:  None    FINDINGS:  There are 5 non-rib-bearing lumbar type vertebral bodies.  There is mild levocurvature of the lumbar spine between L1 and L4 that I calculate at 4°.   Vertebral body heights and alignment are preserved.    There is severe loss of disc space height at L5-S1 with near effacement of that disc space; vacuum phenomenon is present at the anterior portion of the disc space.  Other disc spaces are preserved.    There is facet arthropathy at L5-S1.  I do not identify spondylolysis or spondylolisthesis.    Sacroiliac joints are incompletely visualized.    Calcific atherosclerosis is present in the abdominal aorta and iliac arteries of this 57-year-old man.  I do not identify aneurysm.   Impression       Please see above.      Electronically signed by: Arin Galicia MD  Date: 05/01/2018  Time: 08:06         Past Medical History:   Diagnosis Date    Diabetes mellitus, type 2     Hypertension     Pacemaker      Past Surgical History:   Procedure Laterality Date    BLOCK, NERVE Bilateral 7/5/2018    Performed by Krystal Mtz MD at [facility] [place] [facility]    COLONOSCOPY N/A 2/13/2017    Performed by NILDA Juares [facility] at [facility] ENDO (4TH FLR)    COLONOSCOPY N/A 7/20/2015    Performed by Laz Elliott MD at [facility] ENDO (4TH FLR)    HERNIA REPAIR      Injection,steroid,epidural,  LUMBAR L5/S1 JEAN MARIE N/A 10/11/2018    Performed by [facility] at [facility]    INJECTION-STEROID-EPIDURAL-TRANSFORAMINAL Right 6/5/2018    Performed [facility] at [facility]    RADIOFREQUENCY ABLATION Right 8/14/2018    Performed [facility] at [facility]    RADIOFREQUENCY ABLATION Left 7/31/2018    Performed by [facility] at [facility]    RADIOFREQUENCY ABLATION, L3-L4-L5 MEDIAL BRANCH   1 OF 2 Left 6/11/2019    Performed by [facility] at [facility]    RADIOFREQUENCY ABLATION, L3-L4-L5 MEDIAL BRANCH JING 2 OF 2 Right 6/25/2019    Performed by [facility]es MD at [facility]     Social History     Socioeconomic History    Marital status: Single     Spouse name: Not on file    Number of children: 3    Years of education: Not on file    Highest education level: Not on file   Occupational History    Occupation:    Social Needs    Financial resource strain: Not on file    Food insecurity:     Worry: Not on file     Inability: Not on file    Transportation needs:     Medical: Not on file     Non-medical: Not on file   Tobacco Use    Smoking status: Current Every Day Smoker     Packs/day: 0.50     Types: Cigarettes    Smokeless tobacco: Never Used   Substance and Sexual Activity    Alcohol use: Yes     Comment: Beer- Socially    Drug use: No    Sexual activity: Yes   Lifestyle    Physical activity:     Days per week: Not on file     Minutes per session: Not on file    Stress: Not on file   Relationships    Social connections:     Talks on phone: Not on file     Gets together: Not on file     Attends Mandaeism service: Not on file     Active member of club or organization: Not on file     Attends meetings of clubs or organizations: Not on file     Relationship status: Not on file   Other Topics Concern    Not on file   Social History Narrative    Not on file     Family History   Problem Relation Age of Onset     "Diabetes Mother     Diabetes Father     Kidney disease Father     Melanoma Neg Hx        Review of patient's allergies indicates:  No Known Allergies    Current Outpatient Medications   Medication Sig    amLODIPine (NORVASC) 5 MG tablet Take 1 tablet (5 mg total) by mouth once daily.    aspirin (ECOTRIN) 81 MG EC tablet Take 81 mg by mouth once daily.    atorvastatin (LIPITOR) 20 MG tablet Take 1 tablet (20 mg total) by mouth once daily.    gabapentin (NEURONTIN) 300 MG capsule TAKE ONE CAPSULE BY MOUTH TWICE DAILY    losartan-hydrochlorothiazide 50-12.5 mg (HYZAAR) 50-12.5 mg per tablet Take 1 tablet by mouth once daily.    metFORMIN (GLUCOPHAGE) 500 MG tablet TAKE 1 TABLET BY MOUTH ONCE DAILY WITH BREAKFAST     No current facility-administered medications for this visit.        REVIEW OF SYSTEMS:    GENERAL:  No weight loss, malaise or fevers.  HEENT:   No recent changes in vision or hearing  NECK:  Negative for lumps, no difficulty with swallowing.  RESPIRATORY:  Negative for cough, wheezing or shortness of breath, patient denies any recent URI.  CARDIOVASCULAR:  Negative for chest pain, leg swelling or palpitations. Pacemaker for SSS.  GI:  Negative for abdominal discomfort, blood in stools or black stools or change in bowel habits.  MUSCULOSKELETAL:  See HPI.  SKIN:  Negative for lesions, rash, and itching.  PSYCH:  No mood disorder or recent psychosocial stressors.  Patients sleep is not disturbed secondary to pain.  HEMATOLOGY/LYMPHOLOGY:  Negative for prolonged bleeding, bruising easily or swollen nodes.  Patient is not currently taking any anti-coagulants  ENDO: DM2 on metformin  NEURO:   No history of headaches, syncope, paralysis, seizures or tremors.  All other reviewed and negative other than HPI.    OBJECTIVE:    BP (!) 143/94   Pulse 82   Temp 98.5 °F (36.9 °C)   Resp 18   Ht 5' 7" (1.702 m)   Wt 76.2 kg (167 lb 15.9 oz)   BMI 26.31 kg/m²     PHYSICAL EXAMINATION:    GENERAL: Well " appearing, in no acute distress, alert and oriented x3.  PSYCH:  Mood and affect appropriate.  SKIN: Skin color, texture, turgor normal, no rashes or lesions.  HEAD/FACE:  Normocephalic, atraumatic. Cranial nerves grossly intact.  CV: RRR with palpation of the radial artery.  PULM: No evidence of respiratory difficulty, symmetric chest rise.  BACK: Straight leg raising in the sitting and supine positions is positive to radicular pain at left L5 distribution.  There is no pain with palpation over the facet joints of the lumbar spine bilaterally.  There is decreased range of motion with extension to 15 degrees, and facet loading maneuvers cause mild pain on the left.  EXTREMITIES: Peripheral joint ROM is full and pain free without obvious instability or laxity in all four extremities. No deformities, edema, or skin discoloration. Good capillary refill.  MUSCULOSKELETAL: Hip, and knee provocative maneuvers are negative.  There is no pain with palpation over the sacroiliac joints bilaterally.  There is no pain to palpation over the greater trochanteric bursa bilaterally.  FABERs test is negative.  FADIRs test is negative.  5/5 strength in right ankle with plantar and dorsiflexion, 5/5 strength in left ankle with plantar and dorsiflexion, 5/5 strength with right knee flexion extension, 5/5 strength with knee flexion extension on the left.  No atrophy or tone abnormalities are noted.  NEURO: Bilateral lower extremity coordination and muscle stretch reflexes are physiologic and symmetric.  Plantar response are downgoing. No clonus.  Decreased sensation to LLE.  GAIT: Antalgic.    Lab Results   Component Value Date    HGBA1C 6.3 (H) 01/21/2019     Lab Results   Component Value Date    WBC 12.57 04/06/2017    HGB 15.6 04/06/2017    HCT 46.0 04/06/2017    MCV 87 04/06/2017     04/06/2017     BMP  Lab Results   Component Value Date     01/21/2019    K 4.5 01/21/2019     01/21/2019    CO2 27 01/21/2019    BUN  17 01/21/2019    CREATININE 1.1 01/21/2019    CALCIUM 10.0 01/21/2019    ANIONGAP 9 01/21/2019    ESTGFRAFRICA >60 01/21/2019    EGFRNONAA >60 01/21/2019     ASSESSMENT: 58 y.o. year old male with lower back pain, consistent with the following diagnoses:    Encounter Diagnoses   Name Primary?    Diabetic polyneuropathy associated with type 2 diabetes mellitus     Lumbar radiculopathy Yes    Lumbar spondylosis     Facet arthritis of lumbar region        PLAN:     - Lumbar CT reviewed with patient in detail today.    - He is s/p left then right L3,4,5 RFAs with benefit of back pain.  Will schedule for repeat L5-S1 IL JEAN MARIE to help with leg pain.  Previous provided significant relief for about 8 months.    - Increased Gabapentin to 600 mg at bedtime.    - The patient will continue a home exercise routine to help with pain and strengthening.    - RTC 2 weeks after procedure.      The above plan and management options were discussed at length with patient. Patient is in agreement with the above and verbalized understanding.     Trista Oviedo  07/30/2019

## 2019-07-30 NOTE — H&P (VIEW-ONLY)
Chronic Pain - Established Visit    Referring Physician: No ref. provider found    Chief Complaint:   Chief Complaint   Patient presents with    Follow-up     4 weeks after 2nd RFA        SUBJECTIVE: Disclaimer: This note has been generated using voice-recognition software. There may be typographical errors that have been missed during proof-reading    Interval History 7/30/2019:  The patient returns for follow up of back pain.  He is s/p repeat left then right L3,4,5 RFAs completed on 6/24/19 with about 80% relief.  His back pain is mild.  He has had increased leg pain recently, worse on the left side.  Previous leg pain was relieved with JEAN MARIE last year.  He would like to repeat this.  His leg pain bothers him mainly at night when trying to sleep.  He stopped Gabapentin when he was not having leg pain but recently restarted.  He is unable to take at night.  His pain today is 6/10.    Interval History 5/14/2019:  The patient returns for follow up.  He previously had benefit with JEAN MARIE for leg pain and RFAs for back pain.  He is having some back pain that has been worsening over the past couple of weeks.  He has significant pain in the morning.  He has some improvement when he moves around.  He says that cold air aggravates his pain.  He stopped Gabapentin last year when his leg pain improved.  He is not having much leg pain now.  His pain today is 6/10.    Interval History 2/7/2019:  The patient presents for check up of chronic back pain.  He reports doing well since last visit.  He feels that last JEAN MARIE is still providing him benefit.  He has not had any leg pain.  He does still have back pain but states that it is tolerable.  His morning pain is much improved from RFAs last year.  He continues to work and is active.  His pain today is 5/10.    Interval History 12/6/2018:  The patient presents today for follow up.  He continues with pain to the lower back.  He is not having leg pain at this time.  He had some relief  with RFAs with the past.  He stopped Gabapentin when his leg pain improved.  He takes Tylenol sparingly with some benefit.  His pain today is 5/10.  The patient denies any bowel or bladder incontinence or signs of saddle paresthesia.  The patient denies any major medical changes since last office visit.    Interval History 10/25/2018:  The patient returns for follow up of back and leg pain.  He is s/p L5/S1 IL JEAN MARIE on 10/11/18 with 80% pain relief for his legs.  He has had increased lower back pain over the past few days which he attributes to weather changes.  He describes it as aching.  He has not been stretching.  He does walk a lot for work.  He cannot take oral NSAIDs due to pacemaker placement.  His pain today is 8/10.     Interval History 9/11/2018:  The patient presents for procedure follow up appointment.  He is s/p left then right L3,4,5 RFA completed on 8/14/18 with 80% pain relief initially.  He has had a little more pain over the past week.  He is now having intermittent radiation into the back of both legs, left greater than right.  He previously had benefit with right sided TF JEAN MARIE.  He is still taking gabapentin.  His pain today is 5/10.    Interval History 7/25/2018:  The patient returns today for follow up of back pain.  He is s/p bilateral L3,4,5 MBB on 7/5/18 with 100% relief for 2 days.  He previously had benefit with right TF JEAN MARIE for leg pain.  He has not had right leg pain since the epidural.  However, he is reporting lateral left leg pain that has progressed over the past couple of weeks.  He continues to be active and works.  His pain today is 5/10.    Interval History 6/20/2018:  The patient returns today for follow up of lower back and right leg pain.  He is s/p right L5 and S1 TF JEAN MARIE on 6/5/18 with 100% relief of right leg pain.  He has not had any leg pain since the procedure.  He continues to report pain across the lower back.  This is always worse first thing in the morning.  He states  that this feels aching and throbbing in nature.  He did have recent lumbar CT scan.  He would like to discuss further procedures.  He continues to work and be active.  His pain today is 5/10.    Interval History 5/22/2018:  The patient returns for follow up of back pain.  He is having radiation down there back of the right leg to the posterior calf.  The back pain is most severe in the morning and he states that this feels like a stiffness.  This improves when he walks.  He has severe leg pain that is intermittent, mainly with activity.  This is his biggest complaint.  He did complete the medrol dose pack recently with limited improvement.  He had XRAYs which show severe loss of disc space at L5/S1.  He has not had a CT scan.  He is taking Gabapentin 300 mg at bedtime.  It is written BID but it makes him drowsy during the day.  He has some benefit with Aleve but has been told to avoid NSAIDs due to history of pacemaker placement.  His pain today is 5/10.     Initial encounter:    Renny Young presents to the clinic for the evaluation of lower back and right leg pain. The pain started two months ago and symptoms have been worsening.    Brief history:    Pain Description:    The pain is located in the lower back and right leg area in the L5/S1 distribution.      At BEST  5/10     At WORST  7/10 on the WORST day.      On average pain is rated as 5/10.     Today the pain is rated as 5/10    The pain is described as aching      Symptoms interfere with daily activity.     Exacerbating factors: Standing and Morning.      Mitigating factors medications.     Patient denies night fever/night sweats, urinary incontinence, bowel incontinence, significant weight loss, significant motor weakness and loss of sensations.  Patient denies any suicidal or homicidal ideations    Pain Medications:  Current:  OTC Tylenol PRN    Tried in Past:  NSAIDs - Aleve  TCA -Never  SNRI -Never  Anti-convulsants - Gabapentin   Muscle Relaxants  -Never  Opioids-Never    Physical Therapy/Home Exercise: yes     report:  Reviewed and consistent with medication use as prescribed.    Pain Procedures:   6/5/18 Right L5 and S1 TF JEAN MARIE- significant relief  7/5/18 Bilateral L3,4,5 MBB- 100% relief for 2 days  7/31/18 Left L3,4,5 RFA- 80% relief  8/14/18 Right L3,4,5 RFA- 80% relief  10/11/18 L5/S1 IL JEAN MARIE- 80% relief  6/11/19 Left L3,4,5 RFA- 80% relief  6/25/19 Right L3,4,5 RFA- 80% relief    Chiropractor -never  Acupuncture - never  TENS unit -never  Spinal decompression -never  Joint replacement -never    Imaging:     Lumbar CT 5/28/18    Narrative     EXAMINATION:  CT LUMBAR SPINE WITHOUT CONTRAST    CLINICAL HISTORY:  Low back pain, >6wks conservative tx, persistent-progressive sx, surgical candidate;  Radiculopathy, lumbosacral region    TECHNIQUE:  Low-dose axial, sagittal and coronal reformations are obtained through the lumbar spine.  Contrast was not administered.    COMPARISON:  None.    FINDINGS:  Posterior vertebral alignment is satisfactory.  There is mild levoconvexity of the lumbar spine.  Vertebral body heights are well maintained.  There is prominent degenerative disc disease at the L5-S1 level with marked disc space narrowing with vertebral endplate sclerosis and irregularity as well as vacuum disc phenomenon at the L5-S1 level.  There are anterior and posterior osteophytes at the L5-S1 level associated with diffuse concentric disc bulging.  Mild concentric disc bulging is also present at the L4-L5 level.  There is mild bilateral neural foraminal narrowing at the L5-S1 level due to the diffuse disc bulging and osteophyte formation as well as mild facet arthropathy at the L5-S1.  No abnormal paraspinal masses are evident.  There is a mild amount of atherosclerotic calcification within the abdominal aorta which tapers normally without aneurysmal dilatation.   Impression       No evidence for acute fracture, bone destruction, or  subluxation.    Prominent degenerative disc disease at the L5-S1 level with anterior and posterior osteophyte formation as well as concentric disc bulging.  This does not appear to result in significant spinal stenosis.    Minimal diffuse disc bulging at the L4-L5 level.    Mild neural foraminal narrowing at the L5-S1 level bilaterally.       X-Ray Lumbar Spine    Narrative     EXAMINATION:  XR LUMBAR SPINE AP AND LATERAL    CLINICAL HISTORY:  Low back pain, >6wks conservative tx, persistent-progressive sx, surgical candidate;Low back pain    TECHNIQUE:  AP, lateral and spot images were performed of the lumbar spine.    COMPARISON:  None    FINDINGS:  There are 5 non-rib-bearing lumbar type vertebral bodies.  There is mild levocurvature of the lumbar spine between L1 and L4 that I calculate at 4°.   Vertebral body heights and alignment are preserved.    There is severe loss of disc space height at L5-S1 with near effacement of that disc space; vacuum phenomenon is present at the anterior portion of the disc space.  Other disc spaces are preserved.    There is facet arthropathy at L5-S1.  I do not identify spondylolysis or spondylolisthesis.    Sacroiliac joints are incompletely visualized.    Calcific atherosclerosis is present in the abdominal aorta and iliac arteries of this 57-year-old man.  I do not identify aneurysm.   Impression       Please see above.      Electronically signed by: Arin Galicia MD  Date: 05/01/2018  Time: 08:06         Past Medical History:   Diagnosis Date    Diabetes mellitus, type 2     Hypertension     Pacemaker      Past Surgical History:   Procedure Laterality Date    BLOCK, NERVE Bilateral 7/5/2018    Performed [facility]tz [facility] at [facility] PAIN MGT    COLONOSCOPY N/A 2/13/2017    Performed by NILDA Juares [facility] at [facility] ENDO (4TH FLR)    COLONOSCOPY N/A 7/20/2015    Performed by Laz Elliott MD at [facility] ENDO (4TH FLR)    HERNIA REPAIR      Injection,steroid,epidural,  LUMBAR L5/S1 JEAN MARIE N/A 10/11/2018    Performed by [facility] at [facility]    INJECTION-STEROID-EPIDURAL-TRANSFORAMINAL Right 6/5/2018    Per[facility] by [facility] at [facility]    RADIOFREQUENCY ABLATION Right 8/14/2018    Performed by [facility] at [facility]    RADIOFREQUENCY ABLATION Left 7/31/2018    Per[facility] by [facility] at [facility]    RADIOFREQUENCY ABLATION, L3-L4-L5 MEDIAL BRANCH   1 OF 2 Left 6/11/2019    Performed by [facility] at [facility]    RADIOFREQUENCY ABLATION, L3-L4-L5 MEDIAL BRANCH JING 2 OF 2 Right 6/25/2019    Performed by [facility]es [facility] at [facility]     Social History     Socioeconomic History    Marital status: Single     Spouse name: Not on file    Number of children: 3    Years of education: Not on file    Highest education level: Not on file   Occupational History    Occupation:    Social Needs    Financial resource strain: Not on file    Food insecurity:     Worry: Not on file     Inability: Not on file    Transportation needs:     Medical: Not on file     Non-medical: Not on file   Tobacco Use    Smoking status: Current Every Day Smoker     Packs/day: 0.50     Types: Cigarettes    Smokeless tobacco: Never Used   Substance and Sexual Activity    Alcohol use: Yes     Comment: Beer- Socially    Drug use: No    Sexual activity: Yes   Lifestyle    Physical activity:     Days per week: Not on file     Minutes per session: Not on file    Stress: Not on file   Relationships    Social connections:     Talks on phone: Not on file     Gets together: Not on file     Attends [employer] service: Not on file     Active member of club or organization: Not on file     Attends meetings of clubs or organizations: Not on file     Relationship status: Not on file   Other Topics Concern    Not on file   Social History Narrative    Not on file     Family History   Problem Relation Age of Onset     "Diabetes Mother     Diabetes Father     Kidney disease Father     Melanoma Neg Hx        Review of patient's allergies indicates:  No Known Allergies    Current Outpatient Medications   Medication Sig    amLODIPine (NORVASC) 5 MG tablet Take 1 tablet (5 mg total) by mouth once daily.    aspirin (ECOTRIN) 81 MG EC tablet Take 81 mg by mouth once daily.    atorvastatin (LIPITOR) 20 MG tablet Take 1 tablet (20 mg total) by mouth once daily.    gabapentin (NEURONTIN) 300 MG capsule TAKE ONE CAPSULE BY MOUTH TWICE DAILY    losartan-hydrochlorothiazide 50-12.5 mg (HYZAAR) 50-12.5 mg per tablet Take 1 tablet by mouth once daily.    metFORMIN (GLUCOPHAGE) 500 MG tablet TAKE 1 TABLET BY MOUTH ONCE DAILY WITH BREAKFAST     No current facility-administered medications for this visit.        REVIEW OF SYSTEMS:    GENERAL:  No weight loss, malaise or fevers.  HEENT:   No recent changes in vision or hearing  NECK:  Negative for lumps, no difficulty with swallowing.  RESPIRATORY:  Negative for cough, wheezing or shortness of breath, patient denies any recent URI.  CARDIOVASCULAR:  Negative for chest pain, leg swelling or palpitations. Pacemaker for SSS.  GI:  Negative for abdominal discomfort, blood in stools or black stools or change in bowel habits.  MUSCULOSKELETAL:  See HPI.  SKIN:  Negative for lesions, rash, and itching.  PSYCH:  No mood disorder or recent psychosocial stressors.  Patients sleep is not disturbed secondary to pain.  HEMATOLOGY/LYMPHOLOGY:  Negative for prolonged bleeding, bruising easily or swollen nodes.  Patient is not currently taking any anti-coagulants  ENDO: DM2 on metformin  NEURO:   No history of headaches, syncope, paralysis, seizures or tremors.  All other reviewed and negative other than HPI.    OBJECTIVE:    BP (!) 143/94   Pulse 82   Temp 98.5 °F (36.9 °C)   Resp 18   Ht 5' 7" (1.702 m)   Wt 76.2 kg (167 lb 15.9 oz)   BMI 26.31 kg/m²     PHYSICAL EXAMINATION:    GENERAL: Well " appearing, in no acute distress, alert and oriented x3.  PSYCH:  Mood and affect appropriate.  SKIN: Skin color, texture, turgor normal, no rashes or lesions.  HEAD/FACE:  Normocephalic, atraumatic. Cranial nerves grossly intact.  CV: RRR with palpation of the radial artery.  PULM: No evidence of respiratory difficulty, symmetric chest rise.  BACK: Straight leg raising in the sitting and supine positions is positive to radicular pain at left L5 distribution.  There is no pain with palpation over the facet joints of the lumbar spine bilaterally.  There is decreased range of motion with extension to 15 degrees, and facet loading maneuvers cause mild pain on the left.  EXTREMITIES: Peripheral joint ROM is full and pain free without obvious instability or laxity in all four extremities. No deformities, edema, or skin discoloration. Good capillary refill.  MUSCULOSKELETAL: Hip, and knee provocative maneuvers are negative.  There is no pain with palpation over the sacroiliac joints bilaterally.  There is no pain to palpation over the greater trochanteric bursa bilaterally.  FABERs test is negative.  FADIRs test is negative.  5/5 strength in right ankle with plantar and dorsiflexion, 5/5 strength in left ankle with plantar and dorsiflexion, 5/5 strength with right knee flexion extension, 5/5 strength with knee flexion extension on the left.  No atrophy or tone abnormalities are noted.  NEURO: Bilateral lower extremity coordination and muscle stretch reflexes are physiologic and symmetric.  Plantar response are downgoing. No clonus.  Decreased sensation to LLE.  GAIT: Antalgic.    Lab Results   Component Value Date    HGBA1C 6.3 (H) 01/21/2019     Lab Results   Component Value Date    WBC 12.57 04/06/2017    HGB 15.6 04/06/2017    HCT 46.0 04/06/2017    MCV 87 04/06/2017     04/06/2017     BMP  Lab Results   Component Value Date     01/21/2019    K 4.5 01/21/2019     01/21/2019    CO2 27 01/21/2019    BUN  17 01/21/2019    CREATININE 1.1 01/21/2019    CALCIUM 10.0 01/21/2019    ANIONGAP 9 01/21/2019    ESTGFRAFRICA >60 01/21/2019    EGFRNONAA >60 01/21/2019     ASSESSMENT: 58 y.o. year old male with lower back pain, consistent with the following diagnoses:    Encounter Diagnoses   Name Primary?    Diabetic polyneuropathy associated with type 2 diabetes mellitus     Lumbar radiculopathy Yes    Lumbar spondylosis     Facet arthritis of lumbar region        PLAN:     - Lumbar CT reviewed with patient in detail today.    - He is s/p left then right L3,4,5 RFAs with benefit of back pain.  Will schedule for repeat L5-S1 IL JEAN MARIE to help with leg pain.  Previous provided significant relief for about 8 months.    - Increased Gabapentin to 600 mg at bedtime.    - The patient will continue a home exercise routine to help with pain and strengthening.    - RTC 2 weeks after procedure.      The above plan and management options were discussed at length with patient. Patient is in agreement with the above and verbalized understanding.     Trista Oviedo  07/30/2019

## 2019-08-13 ENCOUNTER — HOSPITAL ENCOUNTER (OUTPATIENT)
Facility: OTHER | Age: 59
Discharge: HOME | End: 2019-08-13
Attending: ANESTHESIOLOGY | Admitting: ANESTHESIOLOGY
Payer: COMMERCIAL

## 2019-08-13 ENCOUNTER — TELEPHONE (OUTPATIENT)
Dept: PAIN MEDICINE | Facility: CLINIC | Age: 59
End: 2019-08-13

## 2019-08-13 VITALS
HEART RATE: 65 BPM | SYSTOLIC BLOOD PRESSURE: 131 MMHG | DIASTOLIC BLOOD PRESSURE: 73 MMHG | BODY MASS INDEX: 24.48 KG/M2 | TEMPERATURE: 98 F | RESPIRATION RATE: 16 BRPM | HEIGHT: 67 IN | WEIGHT: 156 LBS | OXYGEN SATURATION: 95 %

## 2019-08-13 DIAGNOSIS — M54.16 LUMBAR RADICULOPATHY: Primary | ICD-10-CM

## 2019-08-13 DIAGNOSIS — M51.36 DDD (DEGENERATIVE DISC DISEASE), LUMBAR: ICD-10-CM

## 2019-08-13 LAB — POCT GLUCOSE: 80 MG/DL (ref 70–110)

## 2019-08-13 PROCEDURE — 63600175 PHARM REV CODE 636 W HCPCS: Performed by: ANESTHESIOLOGY

## 2019-08-13 PROCEDURE — 62323 NJX INTERLAMINAR LMBR/SAC: CPT | Performed by: ANESTHESIOLOGY

## 2019-08-13 PROCEDURE — 99152 PR MOD CONSCIOUS SEDATION, SAME PHYS, 5+ YRS, FIRST 15 MIN: ICD-10-PCS | Mod: ,,, | Performed by: ANESTHESIOLOGY

## 2019-08-13 PROCEDURE — 63600175 PHARM REV CODE 636 W HCPCS: Performed by: STUDENT IN AN ORGANIZED HEALTH CARE EDUCATION/TRAINING PROGRAM

## 2019-08-13 PROCEDURE — 25000003 PHARM REV CODE 250: Performed by: ANESTHESIOLOGY

## 2019-08-13 PROCEDURE — 62323 NJX INTERLAMINAR LMBR/SAC: CPT | Mod: ,,, | Performed by: ANESTHESIOLOGY

## 2019-08-13 PROCEDURE — 99152 MOD SED SAME PHYS/QHP 5/>YRS: CPT | Mod: ,,, | Performed by: ANESTHESIOLOGY

## 2019-08-13 PROCEDURE — 25500020 PHARM REV CODE 255: Performed by: ANESTHESIOLOGY

## 2019-08-13 PROCEDURE — 62323 PR INJ LUMBAR/SACRAL, W/IMAGING GUIDANCE: ICD-10-PCS | Mod: ,,, | Performed by: ANESTHESIOLOGY

## 2019-08-13 RX ORDER — MIDAZOLAM HYDROCHLORIDE 1 MG/ML
INJECTION INTRAMUSCULAR; INTRAVENOUS
Status: DISCONTINUED | OUTPATIENT
Start: 2019-08-13 | End: 2019-08-13 | Stop reason: HOSPADM

## 2019-08-13 RX ORDER — METHYLPREDNISOLONE ACETATE 80 MG/ML
INJECTION, SUSPENSION INTRA-ARTICULAR; INTRALESIONAL; INTRAMUSCULAR; SOFT TISSUE
Status: DISCONTINUED | OUTPATIENT
Start: 2019-08-13 | End: 2019-08-13 | Stop reason: HOSPADM

## 2019-08-13 RX ORDER — BUPIVACAINE HYDROCHLORIDE 2.5 MG/ML
INJECTION, SOLUTION EPIDURAL; INFILTRATION; INTRACAUDAL
Status: DISCONTINUED | OUTPATIENT
Start: 2019-08-13 | End: 2019-08-13 | Stop reason: HOSPADM

## 2019-08-13 RX ORDER — SODIUM CHLORIDE 9 MG/ML
500 INJECTION, SOLUTION INTRAVENOUS CONTINUOUS
Status: DISCONTINUED | OUTPATIENT
Start: 2019-08-13 | End: 2019-08-13 | Stop reason: HOSPADM

## 2019-08-13 RX ORDER — LIDOCAINE HYDROCHLORIDE 10 MG/ML
INJECTION INFILTRATION; PERINEURAL
Status: DISCONTINUED | OUTPATIENT
Start: 2019-08-13 | End: 2019-08-13 | Stop reason: HOSPADM

## 2019-08-13 RX ORDER — FENTANYL CITRATE 50 UG/ML
INJECTION, SOLUTION INTRAMUSCULAR; INTRAVENOUS
Status: DISCONTINUED | OUTPATIENT
Start: 2019-08-13 | End: 2019-08-13 | Stop reason: HOSPADM

## 2019-08-13 RX ADMIN — SODIUM CHLORIDE 500 ML: 0.9 INJECTION, SOLUTION INTRAVENOUS at 03:08

## 2019-08-13 NOTE — OP NOTE
Lumbar Interlaminar Epidural Steroid Injection under Fluoroscopic Guidance.   Time-out taken to identify patient and procedure prior to starting the procedure.     08/13/2019    PROCEDURE: Interlaminar epidural steroid injection under fluoroscopic guidance.     Pre-Op diagnosis: Lumbar radiculopathy [M54.16]    Post-Op diagnosis: Lumbar radiculopathy [M54.16]    PHYSICIAN: MICHELLE LI     ASSISTANTS: Anson Leon    ESTIMATED BLOOD LOSS: none.     COMPLICATIONS: none.     SPECIMENS: none    TECHNIQUE: With the patient laying in a prone position, the area was prepped and draped in the usual sterile fashion using ChloraPrep and a fenestrated drape. 1% lidocaine was given using a 27-gauge needle by raising a wheal and going down to the hub of the needle over the L5/S1 interlaminar space.  The interlaminar space was then approached with a 3.5 inch 18-gauge Touhy needle was introduced under fluoroscopic guidance in the AP and Lateral view. Once the Ligamentum flavum was encountered loss of resistance to saline was used to enter the epidural space. With positive loss of resistance and negative CSF or Blood, 3mL contrast dye Omnipaque (300mg/ml) was injected to confirm placement and there was no vascular runoff. Then 1ml 40mg/ml Depomedrol + 1mL 0.25% Bupivicaine + 8mL preservative free normal saline was injected slowly. Displacement of the radio opaque contrast after injection of the medication confirmed that the medication went into the area of the epidural space.  The patient tolerated the procedure well.     Conscious sedation provided by M.D    The patient was monitored with continuous pulse oximetry, EKG, and intermittent blood pressure monitors.  The patient was hemodynamically stable throughout the entire process was responsive to voice, and breathing spontaneously.  Supplemental O2 was provided at 2L/min via nasal cannula.  Patient was comfortable for the duration of the procedure. (See nurse documentation and  case log for sedation time)    There was a total of 2mg IV Midazolam and 50mcg Fentanyl titrated for the procedure      The patient was monitored after the procedure.   They were given post-procedure and discharge instructions to follow at home.  The patient was discharged in a stable condition.

## 2019-08-13 NOTE — TELEPHONE ENCOUNTER
Called patient regarding procedure arrival time today after receiving IM, no answer, left voicemail message

## 2019-08-13 NOTE — TELEPHONE ENCOUNTER
----- Message from Thao Lo sent at 8/13/2019  8:50 AM CDT -----  Contact: ANDRES MONTERROSO [[id number]]  Name of Who is Calling: ANDRES MONTERROSO [[id number]]    What is the request in detail: Patient would like to speak with staff in regards to today's procedure. Patient states he never received a call with the time of the procedure. Please contact to further discuss and advise      Can the clinic reply by [facility]: no     What Number to Call Back if not [facility]:  [phone]

## 2019-08-13 NOTE — DISCHARGE SUMMARY
Discharge Note  Short Stay      SUMMARY     Admit Date: 8/13/2019    Attending Physician: Jose Pierson      Discharge Physician: Jose Pierson      Discharge Date: 8/13/2019 3:35 PM    Procedure(s) (LRB):  INJECTION, STEROID, EPIDURAL IL, L5/S1 (N/A)    Final Diagnosis: Lumbar radiculopathy [M54.16]    Disposition: Home or self care    Patient Instructions:   Current Discharge Medication List      CONTINUE these medications which have NOT CHANGED    Details   amLODIPine (NORVASC) 5 MG tablet Take 1 tablet (5 mg total) by mouth once daily.  Qty: 90 tablet, Refills: 3      aspirin (ECOTRIN) 81 MG EC tablet Take 81 mg by mouth once daily.      atorvastatin (LIPITOR) 20 MG tablet Take 1 tablet (20 mg total) by mouth once daily.  Qty: 90 tablet, Refills: 3    Comments: Please consider 90 day supplies to promote better adherence  Associated Diagnoses: Type 2 diabetes mellitus without complication, without long-term current use of insulin      gabapentin (NEURONTIN) 300 MG capsule Take 1 capsule (300 mg total) by mouth 2 (two) times daily.  Qty: 60 capsule, Refills: 5    Comments: Please consider 90 day supplies to promote better adherence  Associated Diagnoses: Diabetic polyneuropathy associated with type 2 diabetes mellitus      losartan-hydrochlorothiazide 50-12.5 mg (HYZAAR) 50-12.5 mg per tablet Take 1 tablet by mouth once daily.  Qty: 90 tablet, Refills: 3      metFORMIN (GLUCOPHAGE) 500 MG tablet TAKE 1 TABLET BY MOUTH ONCE DAILY WITH BREAKFAST  Qty: 90 tablet, Refills: 3                 Discharge Diagnosis: Lumbar radiculopathy [M54.16]  Condition on Discharge: Stable with no complications to procedure   Diet on Discharge: Same as before.  Activity: as per instruction sheet.  Discharge to: Home with a responsible adult.  Follow up: 2-4 weeks       Please call my office or pager at [phone] if experienced any weakness or loss of sensation, fever > 101.5, pain uncontrolled with oral medications, persistent  nausea/vomiting/or diarrhea, redness or drainage from the incisions, or any other worrisome concerns. If physician on call was not reached or could not communicate with our office for any reason please go to the nearest emergency department

## 2019-08-13 NOTE — DISCHARGE INSTRUCTIONS
Recovery After Procedural Sedation (Adult)  You have been given medicine by vein to make you sleep during your surgery. This may have included both a pain medicine and sleeping medicine. Most of the effects have worn off. But you may still have some drowsiness for the next 6 to 8 hours.  Home care  Follow these guidelines when you get home:  · For the next 8 hours, you should be watched by a responsible adult. This person should make sure your condition is not getting worse.  · Don't drink any alcohol for the next 24 hours.  · Don't drive, operate dangerous machinery, or make important business or personal decisions during the next 24 hours.  Note: Your healthcare provider may tell you not to take any medicine by mouth for pain or sleep in the next 4 hours. These medicines may react with the medicines you were given in the hospital. This could cause a much stronger response than usual.  Follow-up care  Follow up with your healthcare provider if you are not alert and back to your usual level of activity within 12 hours.  When to seek medical advice  Call your healthcare provider right away if any of these occur:  · Drowsiness gets worse  · Weakness or dizziness gets worse  · Repeated vomiting  · You can't be awakened   Date Last Reviewed: 10/18/2016  © 1640-1038 [employer]. [address] [place] [address], [place] [address]. All rights reserved. This information is not intended as a substitute for professional medical care. Always follow your healthcare professional's instructions.       Thank you for allowing us to care for you today. You may receive a survey about the care we provided. Your feedback is valuable and helps us provide excellent care throughout the community.     Home Care Instructions for Pain Management:    1. DIET:   You may resume your normal diet today.   2. BATHING:   You may shower with luke warm water. No tub baths or anything that will soak injection sites under water for the next 24  hours.  3. DRESSING:   You may remove your bandage today.   4. ACTIVITY LEVEL:   You may resume your normal activities 24 hrs after your procedure. Nothing strenuous today.  5. MEDICATIONS:   You may resume your normal medications today. To restart blood thinners, ask your doctor.  6. DRIVING    If you have received any sedatives by mouth today, you may not drive for 12 hours.    If you have received any sedation through your IV, you may not drive for 24 hrs.   7. SPECIAL INSTRUCTIONS:   No heat to the injection site for 24 hrs including, hot bath or shower, heating pad, moist heat, or hot tubs.    Use ice pack to injection site for any pain or discomfort.  Apply ice packs for 20 minute intervals as needed.    IF you have diabetes, be sure to monitor your blood sugar more closely. IF your injection contained steroids your blood sugar levels may become higher than normal.    If you are still having pain upon discharge:  Your pain may improve over the next 48 hours. The anesthetic (numbing medication) works immediately to 48 hours. IF your injection contained a steroid (anti-inflammatory medication), it takes approximately 3 days to start feeling relief and 7-10 days to see your greatest results from the medication. It is possible you may need subsequent injections. This would be discussed at your follow up appointment with pain management or your referring doctor.    Please call my office or pager at [phone] if experienced any weakness or loss of sensation, fever > 101.5, pain uncontrolled with oral medications, persistent nausea/vomiting/or diarrhea, redness or drainage from the incisions, or any other worrisome concerns. If physician on call was not reached or could not communicate with our office for any reason please go to the nearest emergency department.

## 2019-09-26 ENCOUNTER — CLINICAL SUPPORT (OUTPATIENT)
Dept: CARDIOLOGY | Facility: HOSPITAL | Age: 59
End: 2019-09-26
Attending: INTERNAL MEDICINE
Payer: COMMERCIAL

## 2019-09-26 DIAGNOSIS — I49.5 SICK SINUS SYNDROME: ICD-10-CM

## 2019-09-26 DIAGNOSIS — Z95.0 PACEMAKER: ICD-10-CM

## 2019-09-26 PROCEDURE — 93279 PRGRMG DEV EVAL PM/LDLS PM: CPT

## 2019-10-03 ENCOUNTER — OFFICE VISIT (OUTPATIENT)
Dept: INTERNAL MEDICINE | Facility: CLINIC | Age: 59
End: 2019-10-03
Attending: INTERNAL MEDICINE
Payer: COMMERCIAL

## 2019-10-03 ENCOUNTER — TELEPHONE (OUTPATIENT)
Dept: INTERNAL MEDICINE | Facility: CLINIC | Age: 59
End: 2019-10-03

## 2019-10-03 VITALS
SYSTOLIC BLOOD PRESSURE: 120 MMHG | BODY MASS INDEX: 26.02 KG/M2 | TEMPERATURE: 98 F | WEIGHT: 165.81 LBS | DIASTOLIC BLOOD PRESSURE: 72 MMHG | HEIGHT: 67 IN | HEART RATE: 84 BPM

## 2019-10-03 DIAGNOSIS — T63.481A INSECT STINGS, ACCIDENTAL OR UNINTENTIONAL, INITIAL ENCOUNTER: Primary | ICD-10-CM

## 2019-10-03 PROCEDURE — 3074F PR MOST RECENT SYSTOLIC BLOOD PRESSURE < 130 MM HG: ICD-10-PCS | Mod: CPTII,S$GLB,, | Performed by: INTERNAL MEDICINE

## 2019-10-03 PROCEDURE — 3078F DIAST BP <80 MM HG: CPT | Mod: CPTII,S$GLB,, | Performed by: INTERNAL MEDICINE

## 2019-10-03 PROCEDURE — 3078F PR MOST RECENT DIASTOLIC BLOOD PRESSURE < 80 MM HG: ICD-10-PCS | Mod: CPTII,S$GLB,, | Performed by: INTERNAL MEDICINE

## 2019-10-03 PROCEDURE — 99999 PR PBB SHADOW E&M-EST. PATIENT-LVL III: CPT | Mod: PBBFAC,,, | Performed by: INTERNAL MEDICINE

## 2019-10-03 PROCEDURE — 99213 PR OFFICE/OUTPT VISIT, EST, LEVL III, 20-29 MIN: ICD-10-PCS | Mod: S$GLB,,, | Performed by: INTERNAL MEDICINE

## 2019-10-03 PROCEDURE — 3008F BODY MASS INDEX DOCD: CPT | Mod: CPTII,S$GLB,, | Performed by: INTERNAL MEDICINE

## 2019-10-03 PROCEDURE — 99999 PR PBB SHADOW E&M-EST. PATIENT-LVL III: ICD-10-PCS | Mod: PBBFAC,,, | Performed by: INTERNAL MEDICINE

## 2019-10-03 PROCEDURE — 3074F SYST BP LT 130 MM HG: CPT | Mod: CPTII,S$GLB,, | Performed by: INTERNAL MEDICINE

## 2019-10-03 PROCEDURE — 99213 OFFICE O/P EST LOW 20 MIN: CPT | Mod: S$GLB,,, | Performed by: INTERNAL MEDICINE

## 2019-10-03 PROCEDURE — 3008F PR BODY MASS INDEX (BMI) DOCUMENTED: ICD-10-PCS | Mod: CPTII,S$GLB,, | Performed by: INTERNAL MEDICINE

## 2019-10-03 RX ORDER — TRAMADOL HYDROCHLORIDE 50 MG/1
50 TABLET ORAL EVERY 8 HOURS PRN
Qty: 20 TABLET | Refills: 0 | Status: SHIPPED | OUTPATIENT
Start: 2019-10-03 | End: 2020-02-17 | Stop reason: ALTCHOICE

## 2019-10-03 NOTE — PROGRESS NOTES
"Clinic Note  10/3/2019      Subjective:       Patient ID:  Renny is a 58 y.o. male being seen for an urgent care visit.    Chief Complaint: Insect Bite (pain up r arm , thumb sore )    Insect Bite   This is a new problem. The current episode started in the past 7 days. The problem occurs constantly. The problem has been unchanged. Associated symptoms comments: R hand and arm pain  . Nothing aggravates the symptoms. He has tried nothing for the symptoms.       ROS    Medication List with Changes/Refills   New Medications    TRAMADOL (ULTRAM) 50 MG TABLET    Take 1 tablet (50 mg total) by mouth every 8 (eight) hours as needed for Pain.   Current Medications    AMLODIPINE (NORVASC) 5 MG TABLET    Take 1 tablet (5 mg total) by mouth once daily.    ASPIRIN (ECOTRIN) 81 MG EC TABLET    Take 81 mg by mouth once daily.    ATORVASTATIN (LIPITOR) 20 MG TABLET    Take 1 tablet (20 mg total) by mouth once daily.    GABAPENTIN (NEURONTIN) 300 MG CAPSULE    Take 1 capsule (300 mg total) by mouth 2 (two) times daily.    LOSARTAN-HYDROCHLOROTHIAZIDE 50-12.5 MG (HYZAAR) 50-12.5 MG PER TABLET    Take 1 tablet by mouth once daily.    METFORMIN (GLUCOPHAGE) 500 MG TABLET    TAKE 1 TABLET BY MOUTH ONCE DAILY WITH BREAKFAST       Patient Active Problem List   Diagnosis    Hypertension, essential    Uncontrolled type 2 diabetes mellitus with complication, without long-term current use of insulin    Pacemaker    Smoker    Sick sinus syndrome    Diabetic polyneuropathy associated with type 2 diabetes mellitus    Hyperlipidemia    Type 2 diabetes mellitus without complication    Pulmonary nodule    DDD (degenerative disc disease), lumbar    Lumbar radiculopathy    Facet arthritis of lumbar region    Lumbar spondylosis    Chronic pain           Objective:      /72   Pulse 84   Temp 98.2 °F (36.8 °C)   Ht 5' 7" (1.702 m)   Wt 75.2 kg (165 lb 12.6 oz)   BMI 25.97 kg/m²   Estimated body mass index is 25.97 kg/m² as " "calculated from the following:    Height as of this encounter: 5' 7" (1.702 m).    Weight as of this encounter: 75.2 kg (165 lb 12.6 oz).  Physical Exam   Musculoskeletal:        Hands:        Assessment and Plan:         Problem List Items Addressed This Visit     None      Visit Diagnoses     Insect stings, accidental or unintentional, initial encounter    -  Primary - toxin from caterpillar sting.  No signs of additional infection.  May take 3-4 weeks to resolve.  Naprosyn OTC and given few days of tramadol to help.          Follow Up:   Follow up if symptoms worsen or fail to improve.        Florian Dodson      "

## 2019-10-03 NOTE — TELEPHONE ENCOUNTER
----- Message from Antonio Gutierrez sent at 10/3/2019 10:30 AM CDT -----  Contact: [facility] [phone]  Pharmacy is calling to clarify an RX.  RX name:  traMADol (ULTRAM) 50 mg tablet  What do they need to clarify:    Comments: pharmacy would like to know why the Rx was prescribed, Diagnosis code.    [facility] [address] [place] [address]    Please call an advise  Thank you

## 2019-11-08 RX ORDER — LOSARTAN POTASSIUM AND HYDROCHLOROTHIAZIDE 12.5; 5 MG/1; MG/1
1 TABLET ORAL DAILY
Qty: 90 TABLET | Refills: 0 | Status: SHIPPED | OUTPATIENT
Start: 2019-11-08 | End: 2019-11-14 | Stop reason: SDUPTHER

## 2019-11-08 NOTE — TELEPHONE ENCOUNTER
----- Message from Telma Holt sent at 11/8/2019 10:03 AM CST -----  Prescription Alternative Needed:     The pharmacy needs alternative on the following RX:    losartan-hydrochlorothiazide 50-12.5 mg (HYZAAR) 50-12.5 mg per tablet    Reason: Combination drug is on backorder. Please call or fax approval to separate into 2 drugs.    Pharmacy: [facility]  [phone]    Please advise.    Thank You

## 2019-11-14 RX ORDER — LOSARTAN POTASSIUM AND HYDROCHLOROTHIAZIDE 12.5; 5 MG/1; MG/1
1 TABLET ORAL DAILY
Qty: 90 TABLET | Refills: 0 | Status: SHIPPED | OUTPATIENT
Start: 2019-11-14 | End: 2019-11-15

## 2019-11-14 NOTE — TELEPHONE ENCOUNTER
----- Message from Trista Freire sent at 11/14/2019  4:47 PM CST -----  Contact: Patient [phone]  Patient is calling for an RX refill or new RX.  Is this a refill or new RX:  refill  RX name and strength: losartan-hydrochlorothiazide 50-12.5 mg (HYZAAR) 50-12.5 mg per tablet  Directions (copy/paste from chart):    Is this a 30 day or 90 day RX:    Local pharmacy or mail order pharmacy:  local  Pharmacy name and phone # (copy/paste from chart):   [id number] [place] [address] [facility]  AT [address] [phone] (Phone)  [phone] (Fax)    Comments:  Was suppose to be sent to [facility] not [facility]. Patient is out of medication. Please have this corrected asap.    Thank You!!!

## 2019-11-15 ENCOUNTER — TELEPHONE (OUTPATIENT)
Dept: INTERNAL MEDICINE | Facility: CLINIC | Age: 59
End: 2019-11-15

## 2019-11-15 RX ORDER — LOSARTAN POTASSIUM 50 MG/1
50 TABLET ORAL DAILY
Qty: 90 TABLET | Refills: 3 | Status: SHIPPED | OUTPATIENT
Start: 2019-11-15 | End: 2020-02-17 | Stop reason: SDUPTHER

## 2019-11-15 RX ORDER — HYDROCHLOROTHIAZIDE 12.5 MG/1
12.5 TABLET ORAL DAILY
Qty: 90 TABLET | Refills: 1 | Status: SHIPPED | OUTPATIENT
Start: 2019-11-15 | End: 2020-02-17 | Stop reason: SDUPTHER

## 2019-11-15 NOTE — TELEPHONE ENCOUNTER
----- Message from Telma Holt sent at 11/15/2019  8:45 AM CST -----  Prescription Clarification:     The pharmacy needs clarity on the following Rx:    losartan-hydrochlorothiazide 50-12.5 mg (HYZAAR) 50-12.5 mg per tablet    Pharmacy: [facility] [address] [place], [place] [address] [phone]      Please give pharmacy approval to separate. Combo not available.    Thank You

## 2020-02-17 ENCOUNTER — OFFICE VISIT (OUTPATIENT)
Dept: INTERNAL MEDICINE | Facility: CLINIC | Age: 60
End: 2020-02-17
Attending: FAMILY MEDICINE
Payer: COMMERCIAL

## 2020-02-17 ENCOUNTER — LAB VISIT (OUTPATIENT)
Dept: LAB | Facility: HOSPITAL | Age: 60
End: 2020-02-17
Attending: FAMILY MEDICINE
Payer: COMMERCIAL

## 2020-02-17 VITALS
TEMPERATURE: 99 F | SYSTOLIC BLOOD PRESSURE: 150 MMHG | BODY MASS INDEX: 26.44 KG/M2 | DIASTOLIC BLOOD PRESSURE: 70 MMHG | HEART RATE: 92 BPM | OXYGEN SATURATION: 97 % | HEIGHT: 67 IN | WEIGHT: 168.44 LBS

## 2020-02-17 DIAGNOSIS — D17.1 LIPOMA OF TORSO: ICD-10-CM

## 2020-02-17 DIAGNOSIS — Z12.5 PROSTATE CANCER SCREENING: ICD-10-CM

## 2020-02-17 DIAGNOSIS — R20.2 NUMBNESS AND TINGLING IN BOTH HANDS: ICD-10-CM

## 2020-02-17 DIAGNOSIS — E11.69 TYPE 2 DIABETES MELLITUS WITH OTHER SPECIFIED COMPLICATION, WITHOUT LONG-TERM CURRENT USE OF INSULIN: ICD-10-CM

## 2020-02-17 DIAGNOSIS — Z00.00 ANNUAL PHYSICAL EXAM: ICD-10-CM

## 2020-02-17 DIAGNOSIS — R20.0 NUMBNESS AND TINGLING IN BOTH HANDS: ICD-10-CM

## 2020-02-17 DIAGNOSIS — Z87.891 PERSONAL HISTORY OF NICOTINE DEPENDENCE: ICD-10-CM

## 2020-02-17 DIAGNOSIS — Z95.0 PACEMAKER: ICD-10-CM

## 2020-02-17 DIAGNOSIS — E11.42 DIABETIC POLYNEUROPATHY ASSOCIATED WITH TYPE 2 DIABETES MELLITUS: ICD-10-CM

## 2020-02-17 DIAGNOSIS — I49.5 SICK SINUS SYNDROME: ICD-10-CM

## 2020-02-17 DIAGNOSIS — E78.5 HYPERLIPIDEMIA, UNSPECIFIED HYPERLIPIDEMIA TYPE: ICD-10-CM

## 2020-02-17 DIAGNOSIS — R91.1 PULMONARY NODULE: ICD-10-CM

## 2020-02-17 DIAGNOSIS — I10 HYPERTENSION, ESSENTIAL: ICD-10-CM

## 2020-02-17 DIAGNOSIS — A63.0 HPV (HUMAN PAPILLOMA VIRUS) ANOGENITAL INFECTION: ICD-10-CM

## 2020-02-17 DIAGNOSIS — E11.9 TYPE 2 DIABETES MELLITUS WITHOUT COMPLICATION, WITHOUT LONG-TERM CURRENT USE OF INSULIN: ICD-10-CM

## 2020-02-17 DIAGNOSIS — Z00.00 ANNUAL PHYSICAL EXAM: Primary | ICD-10-CM

## 2020-02-17 LAB
ALBUMIN/CREAT UR: 6.3 UG/MG (ref 0–30)
CREAT UR-MCNC: 206 MG/DL (ref 23–375)
MICROALBUMIN UR DL<=1MG/L-MCNC: 13 UG/ML

## 2020-02-17 PROCEDURE — 82043 UR ALBUMIN QUANTITATIVE: CPT

## 2020-02-17 PROCEDURE — 99396 PR PREVENTIVE VISIT,EST,40-64: ICD-10-PCS | Mod: S$GLB,,, | Performed by: FAMILY MEDICINE

## 2020-02-17 PROCEDURE — 99999 PR PBB SHADOW E&M-EST. PATIENT-LVL V: CPT | Mod: PBBFAC,,, | Performed by: FAMILY MEDICINE

## 2020-02-17 PROCEDURE — 3078F DIAST BP <80 MM HG: CPT | Mod: CPTII,S$GLB,, | Performed by: FAMILY MEDICINE

## 2020-02-17 PROCEDURE — 99999 PR PBB SHADOW E&M-EST. PATIENT-LVL V: ICD-10-PCS | Mod: PBBFAC,,, | Performed by: FAMILY MEDICINE

## 2020-02-17 PROCEDURE — 3077F PR MOST RECENT SYSTOLIC BLOOD PRESSURE >= 140 MM HG: ICD-10-PCS | Mod: CPTII,S$GLB,, | Performed by: FAMILY MEDICINE

## 2020-02-17 PROCEDURE — 3078F PR MOST RECENT DIASTOLIC BLOOD PRESSURE < 80 MM HG: ICD-10-PCS | Mod: CPTII,S$GLB,, | Performed by: FAMILY MEDICINE

## 2020-02-17 PROCEDURE — 3077F SYST BP >= 140 MM HG: CPT | Mod: CPTII,S$GLB,, | Performed by: FAMILY MEDICINE

## 2020-02-17 PROCEDURE — 99396 PREV VISIT EST AGE 40-64: CPT | Mod: S$GLB,,, | Performed by: FAMILY MEDICINE

## 2020-02-17 RX ORDER — LOSARTAN POTASSIUM 50 MG/1
50 TABLET ORAL DAILY
Qty: 90 TABLET | Refills: 0 | Status: SHIPPED | OUTPATIENT
Start: 2020-02-17 | End: 2020-12-09

## 2020-02-17 RX ORDER — ATORVASTATIN CALCIUM 20 MG/1
20 TABLET, FILM COATED ORAL DAILY
Qty: 90 TABLET | Refills: 0 | Status: SHIPPED | OUTPATIENT
Start: 2020-02-17 | End: 2021-02-04 | Stop reason: SDUPTHER

## 2020-02-17 RX ORDER — HYDROCHLOROTHIAZIDE 12.5 MG/1
12.5 TABLET ORAL DAILY
Qty: 90 TABLET | Refills: 0 | Status: SHIPPED | OUTPATIENT
Start: 2020-02-17 | End: 2020-09-09

## 2020-02-17 RX ORDER — METFORMIN HYDROCHLORIDE 500 MG/1
TABLET ORAL
Qty: 90 TABLET | Refills: 0 | Status: SHIPPED | OUTPATIENT
Start: 2020-02-17 | End: 2020-06-15

## 2020-02-17 RX ORDER — AMLODIPINE BESYLATE 5 MG/1
5 TABLET ORAL DAILY
Qty: 90 TABLET | Refills: 0 | Status: SHIPPED | OUTPATIENT
Start: 2020-02-17 | End: 2020-06-16

## 2020-02-17 NOTE — PROGRESS NOTES
Subjective:       Patient ID: Renny Young is a 59 y.o. male.    Chief Complaint: Establish Care (hands and arms feel swollen in the morning, left foot tingling)    Established patient follows up for management of chronic medical illnesses with complaints today. Please see dictation and ROS for interval problems, specific complaints and disease management discussion.    P, S, Fm, Soc Hx's; Meds, allergies reviewed and reconciled.  Health maintenance file reviewed and addressed items due.      Established patient for an annual wellness check/physical exam and also chronic disease management. Specific complaints - see dictation and please see ROS.  P, S, Fm, Soc Hx's; Meds, allergies reviewed and reconciled.  Health maintenance file reviewed and addressed items due.    Primary complaint today is numbness in his hands more so than feet.  No motor weakness.    Chronic lump right flank.  Nontender.  No significant change recently.    Review of Systems   Constitutional: Negative for chills, fatigue, fever and unexpected weight change.   HENT: Negative for congestion and trouble swallowing.    Eyes: Negative for redness and visual disturbance.   Respiratory: Negative for cough, chest tightness and shortness of breath.    Cardiovascular: Negative for chest pain, palpitations and leg swelling.   Gastrointestinal: Negative for abdominal pain and blood in stool.   Genitourinary: Negative for difficulty urinating and hematuria.   Musculoskeletal: Positive for arthralgias. Negative for back pain, gait problem, joint swelling, myalgias and neck pain.   Skin: Negative for color change and rash.   Neurological: Positive for numbness. Negative for tremors, speech difficulty, weakness and headaches.   Hematological: Negative for adenopathy. Does not bruise/bleed easily.   Psychiatric/Behavioral: Negative for behavioral problems, confusion and sleep disturbance. The patient is not nervous/anxious.        Objective:      Physical Exam    Constitutional: He appears well-developed and well-nourished.   HENT:   Head: Normocephalic.   Right Ear: Tympanic membrane, external ear and ear canal normal.   Left Ear: Tympanic membrane, external ear and ear canal normal.   Mouth/Throat: Oropharynx is clear and moist.   Eyes: Pupils are equal, round, and reactive to light.   Neck: Normal range of motion. Neck supple. Carotid bruit is not present. No thyromegaly present.   Cardiovascular: Normal rate, regular rhythm and intact distal pulses. Exam reveals distant heart sounds. Exam reveals no gallop and no friction rub.   No murmur heard.  Pulmonary/Chest: Effort normal. He has decreased breath sounds.   Abdominal: Soft. He exhibits no distension and no mass. There is no tenderness. There is no rebound and no guarding.   Genitourinary:         Musculoskeletal: Normal range of motion. He exhibits no edema or tenderness.   Lymphadenopathy:     He has no cervical adenopathy.   Neurological: He is alert. He has normal strength. He displays normal reflexes. No cranial nerve deficit or sensory deficit. Coordination and gait normal.   Skin: Skin is warm and dry.        Psychiatric: He has a normal mood and affect. His behavior is normal. Judgment and thought content normal.   Nursing note and vitals reviewed.      Assessment:       1. Annual physical exam    2. Type 2 diabetes mellitus with other specified complication, without long-term current use of insulin    3. Uncontrolled type 2 diabetes mellitus with complication, without long-term current use of insulin    4. Diabetic polyneuropathy associated with type 2 diabetes mellitus    5. Hypertension, essential    6. Hyperlipidemia, unspecified hyperlipidemia type    7. Prostate cancer screening    8. Sick sinus syndrome    9. Pacemaker    10. Numbness and tingling in both hands    11. Personal history of nicotine dependence    12. Lipoma of torso    13. Pulmonary nodule    14. HPV (human papilloma virus) anogenital  infection    15. Type 2 diabetes mellitus without complication, without long-term current use of insulin        Plan:     Medication List with Changes/Refills   Current Medications    ASPIRIN (ECOTRIN) 81 MG EC TABLET    Take 81 mg by mouth once daily.    GABAPENTIN (NEURONTIN) 300 MG CAPSULE    Take 1 capsule (300 mg total) by mouth 2 (two) times daily.   Changed and/or Refilled Medications    Modified Medication Previous Medication    AMLODIPINE (NORVASC) 5 MG TABLET amLODIPine (NORVASC) 5 MG tablet       Take 1 tablet (5 mg total) by mouth once daily.    Take 1 tablet (5 mg total) by mouth once daily.    ATORVASTATIN (LIPITOR) 20 MG TABLET atorvastatin (LIPITOR) 20 MG tablet       Take 1 tablet (20 mg total) by mouth once daily.    Take 1 tablet (20 mg total) by mouth once daily.    HYDROCHLOROTHIAZIDE (HYDRODIURIL) 12.5 MG TAB hydroCHLOROthiazide (HYDRODIURIL) 12.5 MG Tab       Take 1 tablet (12.5 mg total) by mouth once daily.    Take 1 tablet (12.5 mg total) by mouth once daily.    LOSARTAN (COZAAR) 50 MG TABLET losartan (COZAAR) 50 MG tablet       Take 1 tablet (50 mg total) by mouth once daily.    Take 1 tablet (50 mg total) by mouth once daily.    METFORMIN (GLUCOPHAGE) 500 MG TABLET metFORMIN (GLUCOPHAGE) 500 MG tablet       TAKE 1 TABLET BY MOUTH ONCE DAILY WITH BREAKFAST    TAKE 1 TABLET BY MOUTH ONCE DAILY WITH BREAKFAST   Discontinued Medications    TRAMADOL (ULTRAM) 50 MG TABLET    Take 1 tablet (50 mg total) by mouth every 8 (eight) hours as needed for Pain.     Renny was seen today for establish care.    Diagnoses and all orders for this visit:    Annual physical exam  -     Hemoglobin A1c; Standing  -     Comprehensive metabolic panel; Standing  -     Lipid panel; Standing  -     Microalbumin/creatinine urine ratio; Standing  -     PSA, Screening; Standing    Type 2 diabetes mellitus with other specified complication, without long-term current use of insulin  -     Hemoglobin A1c; Standing  -      Comprehensive metabolic panel; Standing  -     Microalbumin/creatinine urine ratio; Standing  -     Ambulatory referral/consult to Podiatry; Future  -     metFORMIN (GLUCOPHAGE) 500 MG tablet; TAKE 1 TABLET BY MOUTH ONCE DAILY WITH BREAKFAST    Uncontrolled type 2 diabetes mellitus with complication, without long-term current use of insulin    Diabetic polyneuropathy associated with type 2 diabetes mellitus  -     Ambulatory referral/consult to Podiatry; Future    Hypertension, essential  -     amLODIPine (NORVASC) 5 MG tablet; Take 1 tablet (5 mg total) by mouth once daily.  -     hydroCHLOROthiazide (HYDRODIURIL) 12.5 MG Tab; Take 1 tablet (12.5 mg total) by mouth once daily.  -     losartan (COZAAR) 50 MG tablet; Take 1 tablet (50 mg total) by mouth once daily.    Hyperlipidemia, unspecified hyperlipidemia type  -     Lipid panel; Standing  -     atorvastatin (LIPITOR) 20 MG tablet; Take 1 tablet (20 mg total) by mouth once daily.    Prostate cancer screening  -     PSA, Screening; Standing    Sick sinus syndrome  -     Ambulatory referral/consult to Cardiology; Future    Pacemaker  -     Ambulatory referral/consult to Cardiology; Future    Numbness and tingling in both hands  -     EMG W/ ULTRASOUND AND NERVE CONDUCTION TEST 2 Extremities; Future    Personal history of nicotine dependence    Lipoma of torso  -     US Soft Tissue Misc; Future    Pulmonary nodule    HPV (human papilloma virus) anogenital infection  -     Ambulatory referral/consult to Urology; Future    Type 2 diabetes mellitus without complication, without long-term current use of insulin      See meds, orders, follow up, routing and instructions sections of encounter and AVS. Discussed with patient and provided on AVS.    Discussed diet and exercise and links provided on AVS for detailed information.    Diabetes Management Status    Statin: Taking  ACE/ARB: Taking    Screening or Prevention Patient's value Goal Complete/Controlled?   HgA1C  Testing and Control   Lab Results   Component Value Date    HGBA1C 6.3 (H) 01/21/2019      Annually/Less than 8% No   Lipid profile : 01/21/2019 Annually No   LDL control Lab Results   Component Value Date    LDLCALC 62.2 (L) 01/21/2019    Annually/Less than 100 mg/dl  No   Nephropathy screening Lab Results   Component Value Date    LABMICR 16.0 01/21/2019     Lab Results   Component Value Date    PROTEINUA Negative 04/06/2017    Annually No   Blood pressure BP Readings from Last 1 Encounters:   02/17/20 (!) 150/70    Less than 140/90 No   Dilated retinal exam : 04/29/2019 Annually Yes   Foot exam   : 04/06/2018 Annually No

## 2020-02-17 NOTE — PATIENT INSTRUCTIONS
Schedule lab orders for today.     Schedule chest CT ordered on 3/6/2019 soon - see placed or standing orders.    Information about cholesterol, high blood pressure and healthy diet and activity recommendations can be found at the following links on the Internet:    http://www.nhlbi.nih.gov/health/health-topics/topics/hbc  http://www.nhlbi.nih.gov/health/educational/lose_wt/index.htm  Http://www.nhlbi.nih.gov/files/docs/public/heart/hbp_low.pdf  http://www.heart.org/HEARTORG/  http://diabetes.org/  https://www.cdc.gov/  Https://healthfinder.gov/  https://health.gov/dietaryguidelines/2015/guidelines/  https://health.gov/paguidelines/second-edition/pdf/Physical_Activity_Guidelines_2nd_edition.pdf

## 2020-02-20 ENCOUNTER — HOSPITAL ENCOUNTER (OUTPATIENT)
Dept: RADIOLOGY | Facility: OTHER | Age: 60
Discharge: HOME | End: 2020-02-20
Attending: FAMILY MEDICINE
Payer: COMMERCIAL

## 2020-02-20 DIAGNOSIS — D17.1 LIPOMA OF TORSO: ICD-10-CM

## 2020-02-20 PROCEDURE — 76705 ECHO EXAM OF ABDOMEN: CPT | Mod: 26,,, | Performed by: INTERNAL MEDICINE

## 2020-02-20 PROCEDURE — 76705 PR US, ABDOMEN LIMITED: ICD-10-PCS | Mod: 26,,, | Performed by: INTERNAL MEDICINE

## 2020-02-20 PROCEDURE — 76999 ECHO EXAMINATION PROCEDURE: CPT | Mod: TC

## 2020-02-21 NOTE — PROGRESS NOTES
Subjective:      Renny Young is a 59 y.o. male who was referred by Dr. Washington for evaluation of his genital lesion.    The patient present today reporting a genital wart that developed over the last few months. States that he previously had a lesion in the same location removed by derm about 5 years ago.  Denies bothersome urinary symptoms today.     The following portions of the patient's history were reviewed and updated as appropriate: allergies, current medications, past family history, past medical history, past social history, past surgical history and problem list.    Review of Systems  Constitutional: no fever or chills  ENT: no nasal congestion or sore throat  Respiratory: no cough or shortness of breath  Cardiovascular: no chest pain or palpitations  Gastrointestinal: no nausea or vomiting, tolerating diet  Genitourinary: as per HPI  Hematologic/Lymphatic: no easy bruising or lymphadenopathy  Musculoskeletal: no arthralgias or myalgias  Neurological: no seizures or tremors  Behavioral/Psych: no auditory or visual hallucinations     Objective:   Vitals:  Vitals:    02/24/20 0706   BP: (!) 162/106   Pulse: 89       Physical Exam   General: alert and oriented, no acute distress  Head: normocephalic, atraumatic  Neck: supple, no lymphadenopathy, normal ROM, no masses  Respiratory: Symmetric expansion, non-labored breathing  Cardiovascular: regular rate and rhythm, nomal pulses, no peripheral edema  Abdomen: soft, non tender, non distended, no palpable masses, no hernias, no hepatomegaly or splenomegaly  Genitourinary:   Penis: small condyloma noted to the ventral aspect of the penis, proximal to the glans  Scrotum: no rashes or skin changes;   Testes: descended bilaterally, no masses, nontender, normal epididymides bilaterally, no hydroceles  Lymphatic: no inguinal nodes  Skin: normal coloration and turgor, no rashes, no suspicious skin lesions noted  Neuro: alert and oriented x3, no gross deficits  Psych:  normal judgment and insight, normal mood/affect and non-anxious    Lab Review   Urinalysis demonstrates: no specimen   Lab Results   Component Value Date    WBC 12.57 04/06/2017    HGB 15.6 04/06/2017    HCT 46.0 04/06/2017    MCV 87 04/06/2017     04/06/2017     Lab Results   Component Value Date    CREATININE 1.2 02/17/2020    BUN 16 02/17/2020     Lab Results   Component Value Date    PSA 0.79 02/17/2020     Imaging   None    Assessment:   Condyloma     Plan:   Diagnoses and all orders for this visit:    Condyloma  -     podofilox (CONDYLOX) 0.5 % gel; Apply topically 2 (two) times daily.  -     Ambulatory referral/consult to Dermatology; Future    HPV (human papilloma virus) anogenital infection  -     Ambulatory referral/consult to Urology    Plan:  --Condylox- reviewed instructions for use   --Referral to derm if there is inadequate response to the above

## 2020-02-24 ENCOUNTER — OFFICE VISIT (OUTPATIENT)
Dept: CARDIOLOGY | Facility: CLINIC | Age: 60
End: 2020-02-24
Attending: FAMILY MEDICINE
Payer: COMMERCIAL

## 2020-02-24 ENCOUNTER — OFFICE VISIT (OUTPATIENT)
Dept: UROLOGY | Facility: CLINIC | Age: 60
End: 2020-02-24
Payer: COMMERCIAL

## 2020-02-24 VITALS
HEIGHT: 67 IN | WEIGHT: 167.31 LBS | HEART RATE: 78 BPM | SYSTOLIC BLOOD PRESSURE: 129 MMHG | BODY MASS INDEX: 26.26 KG/M2 | DIASTOLIC BLOOD PRESSURE: 76 MMHG

## 2020-02-24 VITALS — HEART RATE: 89 BPM | DIASTOLIC BLOOD PRESSURE: 106 MMHG | SYSTOLIC BLOOD PRESSURE: 162 MMHG

## 2020-02-24 DIAGNOSIS — A63.0 HPV (HUMAN PAPILLOMA VIRUS) ANOGENITAL INFECTION: ICD-10-CM

## 2020-02-24 DIAGNOSIS — E78.2 MIXED HYPERLIPIDEMIA: ICD-10-CM

## 2020-02-24 DIAGNOSIS — I49.5 SICK SINUS SYNDROME: Primary | ICD-10-CM

## 2020-02-24 DIAGNOSIS — Z95.0 PACEMAKER: ICD-10-CM

## 2020-02-24 DIAGNOSIS — I10 HYPERTENSION, ESSENTIAL: ICD-10-CM

## 2020-02-24 DIAGNOSIS — A63.0 CONDYLOMA: Primary | ICD-10-CM

## 2020-02-24 PROCEDURE — 99214 PR OFFICE/OUTPT VISIT, EST, LEVL IV, 30-39 MIN: ICD-10-PCS | Mod: S$GLB,,, | Performed by: INTERNAL MEDICINE

## 2020-02-24 PROCEDURE — 99214 OFFICE O/P EST MOD 30 MIN: CPT | Mod: S$GLB,,, | Performed by: INTERNAL MEDICINE

## 2020-02-24 PROCEDURE — 3080F PR MOST RECENT DIASTOLIC BLOOD PRESSURE >= 90 MM HG: ICD-10-PCS | Mod: CPTII,S$GLB,, | Performed by: NURSE PRACTITIONER

## 2020-02-24 PROCEDURE — 3078F PR MOST RECENT DIASTOLIC BLOOD PRESSURE < 80 MM HG: ICD-10-PCS | Mod: CPTII,S$GLB,, | Performed by: INTERNAL MEDICINE

## 2020-02-24 PROCEDURE — 99214 OFFICE O/P EST MOD 30 MIN: CPT | Mod: S$GLB,,, | Performed by: NURSE PRACTITIONER

## 2020-02-24 PROCEDURE — 99999 PR PBB SHADOW E&M-EST. PATIENT-LVL III: CPT | Mod: PBBFAC,,, | Performed by: INTERNAL MEDICINE

## 2020-02-24 PROCEDURE — 3074F PR MOST RECENT SYSTOLIC BLOOD PRESSURE < 130 MM HG: ICD-10-PCS | Mod: CPTII,S$GLB,, | Performed by: INTERNAL MEDICINE

## 2020-02-24 PROCEDURE — 3080F DIAST BP >= 90 MM HG: CPT | Mod: CPTII,S$GLB,, | Performed by: NURSE PRACTITIONER

## 2020-02-24 PROCEDURE — 3077F SYST BP >= 140 MM HG: CPT | Mod: CPTII,S$GLB,, | Performed by: NURSE PRACTITIONER

## 2020-02-24 PROCEDURE — 99214 PR OFFICE/OUTPT VISIT, EST, LEVL IV, 30-39 MIN: ICD-10-PCS | Mod: S$GLB,,, | Performed by: NURSE PRACTITIONER

## 2020-02-24 PROCEDURE — 3074F SYST BP LT 130 MM HG: CPT | Mod: CPTII,S$GLB,, | Performed by: INTERNAL MEDICINE

## 2020-02-24 PROCEDURE — 3077F PR MOST RECENT SYSTOLIC BLOOD PRESSURE >= 140 MM HG: ICD-10-PCS | Mod: CPTII,S$GLB,, | Performed by: NURSE PRACTITIONER

## 2020-02-24 PROCEDURE — 3008F PR BODY MASS INDEX (BMI) DOCUMENTED: ICD-10-PCS | Mod: CPTII,S$GLB,, | Performed by: INTERNAL MEDICINE

## 2020-02-24 PROCEDURE — 99999 PR PBB SHADOW E&M-EST. PATIENT-LVL III: ICD-10-PCS | Mod: PBBFAC,,, | Performed by: INTERNAL MEDICINE

## 2020-02-24 PROCEDURE — 3008F BODY MASS INDEX DOCD: CPT | Mod: CPTII,S$GLB,, | Performed by: INTERNAL MEDICINE

## 2020-02-24 PROCEDURE — 3078F DIAST BP <80 MM HG: CPT | Mod: CPTII,S$GLB,, | Performed by: INTERNAL MEDICINE

## 2020-02-24 RX ORDER — PODOFILOX 5 MG/G
GEL TOPICAL 2 TIMES DAILY
Qty: 3.5 G | Refills: 2 | Status: SHIPPED | OUTPATIENT
Start: 2020-02-24 | End: 2023-08-11

## 2020-02-24 NOTE — PATIENT INSTRUCTIONS
Every 12 hours in the morning and evening for 3 days, then withhold for 4 days; repeat cycle up to 4 times

## 2020-02-24 NOTE — PROGRESS NOTES
"Subjective:   Patient ID:  Renny Young is a 59 y.o. male who presents for follow-up of Sick sinus syndrome      HPI:   Pt referred for SSS and permanent pacer.  He has HTN, HPL and DM.   He is followed by EP (Asad).  His EP follow-up is scheduled for 4-.  He denies any exertional chest discomfort, exertional dyspnea, palpitations, TIA's, syncope or presyncope.  He does not have a regimented exercise program, however does lead an active lifestyle and is not limited by any cardiac symptoms.    Per EP note 3-2019:  Renny Young has a history of hypertension and sick sinus syndrome who had a [employer] dual chamber pacemaker implanted at [facility] in 7/2009 for syncope. Mr. Young describes that following implantation, he experienced discomfort when his RV lead was paced. Apparently his RV lead output was lowered, which improved his symptoms. At his last in 10/2016, he had GI complaints which he attributed to his RV pacing lead. At his bequest, I changed his programming to an AAI pacing mode at that time    Device Interrogation (3/26/2019) reveals an intrinsic sinus rhythm with stable lead and device function. No arrhythmias or treated episodes were noted. ("AT"x8, c/w FFRW)  He paces 13% in the RA. Estimated battery longevity 5-6 years.     SAMUEL's in 2018 were normal.     BP's well controlled. Run 110-120 at home.     Smokes socially - doesn't want to stop.         Vitals:    02/24/20 0856   BP: 129/76   BP Location: Left arm   Patient Position: Sitting   BP Method: Large (Automatic)   Pulse: 78   Weight: 75.9 kg (167 lb 5.3 oz)   Height: 5' 7" (1.702 m)     Body mass index is 26.21 kg/m².  CrCl cannot be calculated (Patient's most recent lab result is older than the maximum 7 days allowed.).    Lab Results   Component Value Date     02/17/2020    K 4.1 02/17/2020     02/17/2020    CO2 24 02/17/2020    BUN 16 02/17/2020    CREATININE 1.2 02/17/2020    GLU 90 02/17/2020    HGBA1C 6.3 (H) " 02/17/2020    AST 28 02/17/2020    ALT 23 02/17/2020    ALBUMIN 4.6 02/17/2020    PROT 8.1 02/17/2020    BILITOT 0.8 02/17/2020    WBC 12.57 04/06/2017    HGB 15.6 04/06/2017    HCT 46.0 04/06/2017    MCV 87 04/06/2017     04/06/2017    INR 1.1 09/28/2015    PSA 0.79 02/17/2020    TSH 1.194 04/06/2017    CHOL 139 02/17/2020    HDL 58 02/17/2020    LDLCALC 58.4 (L) 02/17/2020    TRIG 113 02/17/2020       Current Outpatient Medications   Medication Sig    amLODIPine (NORVASC) 5 MG tablet Take 1 tablet (5 mg total) by mouth once daily.    aspirin (ECOTRIN) 81 MG EC tablet Take 81 mg by mouth once daily.    atorvastatin (LIPITOR) 20 MG tablet Take 1 tablet (20 mg total) by mouth once daily.    gabapentin (NEURONTIN) 300 MG capsule Take 1 capsule (300 mg total) by mouth 2 (two) times daily.    hydroCHLOROthiazide (HYDRODIURIL) 12.5 MG Tab Take 1 tablet (12.5 mg total) by mouth once daily.    losartan (COZAAR) 50 MG tablet Take 1 tablet (50 mg total) by mouth once daily.    metFORMIN (GLUCOPHAGE) 500 MG tablet TAKE 1 TABLET BY MOUTH ONCE DAILY WITH BREAKFAST    podofilox (CONDYLOX) 0.5 % gel Apply topically 2 (two) times daily.     No current facility-administered medications for this visit.        Review of Systems   Constitution: Negative for decreased appetite, malaise/fatigue, weight gain and weight loss.   Eyes: Negative for visual disturbance.   Cardiovascular: Negative for chest pain, claudication, dyspnea on exertion, irregular heartbeat, orthopnea, palpitations, paroxysmal nocturnal dyspnea and syncope.   Respiratory: Negative for cough, shortness of breath and snoring.    Skin: Negative for rash.   Musculoskeletal: Negative for arthritis, muscle cramps, muscle weakness and myalgias.   Gastrointestinal: Negative for abdominal pain, anorexia, change in bowel habit and nausea.   Genitourinary: Negative for dysuria and frequency.   Neurological: Negative for excessive daytime sleepiness, dizziness,  headaches, loss of balance, numbness and weakness.   Psychiatric/Behavioral: Negative for depression.       Objective:   Physical Exam   Constitutional: He is oriented to person, place, and time. He appears well-developed and well-nourished.   HENT:   Head: Normocephalic and atraumatic.   Cardiovascular: Normal rate, regular rhythm, normal heart sounds and intact distal pulses. Exam reveals no gallop and no friction rub.   No murmur heard.  Pulmonary/Chest: Effort normal and breath sounds normal.   Neurological: He is alert and oriented to person, place, and time.   Psychiatric: He has a normal mood and affect. His behavior is normal. Judgment and thought content normal.       Assessment:     1. Sick sinus syndrome    2. Pacemaker    3. Hypertension, essential    4. Mixed hyperlipidemia        Plan:   Pt has been followed by EP and has EP f/u already scheduled.  He has no other active cardiac issues that requires general cards (at this time).  His risk factors are well controlled.  Pt would like to get seen today by EP however, given this is the week of Basil Avina, staffing is limited..     No orders of the defined types were placed in this encounter.

## 2020-02-24 NOTE — LETTER
February 24, 2020      Jarvis Washington MD  [address]  [place] [address]           [facility] - Cardiology  [address]  [place] [address]  Phone: [phone]          Patient: Renny Young   MR Number: [id number]   YOB: 1960   Date of Visit: 2/24/2020       Dear Dr. Jarvis Washington:    Thank you for referring Renny Young to me for evaluation. Attached you will find relevant portions of my assessment and plan of care.    If you have questions, please do not hesitate to call me. I look forward to following Renny Young along with you.    Sincerely,    Harley Thompson MD    Enclosure  CC:  No Recipients    If you would like to receive this communication electronically, please contact [email or web address] or [phone] to request more information on [employer] Link access.    For providers and/or their staff who would like to refer a patient to [facility], please contact us through our one-stop-shop provider referral line, [facility], at [phone].    If you feel you have received this communication in error or would no longer like to receive these types of communications, please e-mail [email or web address]

## 2020-02-24 NOTE — LETTER
February 24, 2020      Jarvis Washington MD  [address]  [facility] [address]           [facility] [address] [address]  Phone: [phone]  Fax: [phone]          Patient: Renny Young   MR Number: [id number]   YOB: 1960   Date of Visit: 2/24/2020       Dear Dr. Jarvis Washington:    Thank you for referring Renny Young to me for evaluation. Attached you will find relevant portions of my assessment and plan of care.    If you have questions, please do not hesitate to call me. I look forward to following Renny Young along with you.    Sincerely,    Stephanie Morfin, COREY    Enclosure  CC:  No Recipients    If you would like to receive this communication electronically, please contact [email or web address] or [phone] to request more information on [employer] Link access.    For providers and/or their staff who would like to refer a patient to [facility], please contact us through our one-stop-shop provider referral line, [facility], at [phone].    If you feel you have received this communication in error or would no longer like to receive these types of communications, please e-mail [email or web address]

## 2020-02-26 ENCOUNTER — TELEPHONE (OUTPATIENT)
Dept: UROLOGY | Facility: CLINIC | Age: 60
End: 2020-02-26

## 2020-02-26 ENCOUNTER — PROCEDURE VISIT (OUTPATIENT)
Dept: PHYSICAL MEDICINE AND REHAB | Facility: CLINIC | Age: 60
End: 2020-02-26
Attending: FAMILY MEDICINE
Payer: COMMERCIAL

## 2020-02-26 DIAGNOSIS — R20.0 NUMBNESS AND TINGLING IN BOTH HANDS: ICD-10-CM

## 2020-02-26 DIAGNOSIS — R20.2 NUMBNESS AND TINGLING IN BOTH HANDS: ICD-10-CM

## 2020-02-26 PROCEDURE — 95886 PR EMG COMPLETE, W/ NERVE CONDUCTION STUDIES, 5+ MUSCLES: ICD-10-PCS | Mod: S$GLB,,, | Performed by: PHYSICAL MEDICINE & REHABILITATION

## 2020-02-26 PROCEDURE — 95910 PR NERVE CONDUCTION STUDY; 7-8 STUDIES: ICD-10-PCS | Mod: S$GLB,,, | Performed by: PHYSICAL MEDICINE & REHABILITATION

## 2020-02-26 PROCEDURE — 95886 MUSC TEST DONE W/N TEST COMP: CPT | Mod: S$GLB,,, | Performed by: PHYSICAL MEDICINE & REHABILITATION

## 2020-02-26 PROCEDURE — 95910 NRV CNDJ TEST 7-8 STUDIES: CPT | Mod: S$GLB,,, | Performed by: PHYSICAL MEDICINE & REHABILITATION

## 2020-02-26 NOTE — TELEPHONE ENCOUNTER
Spoke with the pt. Advised him to call his insurance company to see alternatives to condylox on his formulary. He will give us the name of the medication and we will prescribe if equivalent.

## 2020-02-26 NOTE — PROCEDURES
Test Date:  2020    Patient: renny young : 1960 Physician: Dylan Ascencio D.O.   ID#:  SEX: Male Ref. Phys: Jarvis Washington MD     HPI: Renny Young is a 59 y.o.male who presents for NCS/EMG to evaluate hand numbness bilaterally.      NCV & EMG Findings:   Evaluation of the left median motor and the right median motor nerves showed prolonged distal onset latency.   The left median sensory nerve showed prolonged distal peak latency and decreased conduction velocity.   The right median sensory nerve showed no response.   All remaining nerves (as indicated in the following tables) were within normal limits.   Needle evaluation of the left Abductor Pollicis Brevis muscle showed increased motor unit amplitude and diminished recruitment.   All remaining muscles (as indicated in the following table) showed no evidence of electrical instability.    Impression:  There is electrophysiologic evidence of a bilateral sensorimotor median mononeuropathy across the wrist (I.e. Carpal tunnel syndrome).  There is no motor axonal loss.  There is is no active denervation.  This is graded as Moderate in severity bilaterally.        ___________________________  Dylan Ascencio D.O.        NCS+  Motor Nerve Results      Latency Amplitude F-Lat Segment Distance CV Comment   Site (ms) Norm (mV) Norm (ms)  (cm) (m/s) Norm    Left Median (APB)   Wrist *5.2  < 4.7 5.5 -  Wrist-Palm - - -    Elbow 10.0 - 5.1 -  Elbow-Wrist 23 48  > 47    Right Median (APB)   Wrist *5.4  < 4.7 7.9 -  Wrist-Palm - - -    Elbow 10.3 - 5.0 -  Elbow-Wrist 25 51  > 47    Left Ulnar (ADM)   Wrist 3.2  < 3.7 8.2  > 3.0         Bel Elbow 7.4 - 8.0 -  Bel Elbow-Wrist 26 62  > 52    Abv Elbow 9.4 - 7.5 -  Abv Elbow-Bel Elbow 14 70  > 43    Right Ulnar (ADM)   Wrist 3.2  < 3.7 6.0  > 3.0         Bel Elbow 7.3 - 7.1 -  Bel Elbow-Wrist 24 59  > 52    Abv Elbow 9.2 - 7.7 -  Abv Elbow-Bel Elbow 14 74  > 43      Sensory Nerve Results       Latency (Peak) Amplitude (P-P) Segment Distance CV Comment   Site (ms) Norm (µV) Norm  (cm) (m/s) Norm    Left Median (Stim:Wrist)   Dig II *4.5  < 4.0 16  > 8 Wrist-Dig II 14 *31  > 39    Right Median (Stim:Wrist)   Dig II *NR  < 4.0 *NR  > 8 Wrist-Dig II 14 *NR  > 39    Left Ulnar (Stim:Wrist)   Dig V 3.4  < 4.0 17  > 4 Wrist-Dig V 14 41  > 38    Right Ulnar (Stim:Wrist)   Dig V 3.1  < 4.0 27  > 4 Wrist-Dig V 14 45  > 38      EMG+     Side Muscle Nerve Root Ins Act Fibs Psw Amp Dur Poly Recrt Int Pat Comment   Left Biceps Musculocut C5-C6 Nml Nml Nml Nml Nml 0 Nml Nml    Left Triceps Radial C6-C8 Nml Nml Nml Nml Nml 0 Nml Nml    Left Brachiorad Radial C5-C6 Nml Nml Nml Nml Nml 0 Nml Nml    Left FDI Ulnar C8-T1 Nml Nml Nml Nml Nml 0 Nml Nml    Left APB Median C8-T1 Nml Nml Nml *Incr Nml 0 *Reduced Nml    Right Biceps Musculocut C5-C6 Nml Nml Nml Nml Nml 0 Nml Nml    Right Triceps Radial C6-C8 Nml Nml Nml Nml Nml 0 Nml Nml    Right Brachiorad Radial C5-C6 Nml Nml Nml Nml Nml 0 Nml Nml    Right FDI Ulnar C8-T1 Nml Nml Nml Nml Nml 0 Nml Nml    Right APB Median C8-T1 Nml Nml Nml Nml Nml 0 Nml Nml            Waveforms:    Motor              Sensory

## 2020-02-26 NOTE — TELEPHONE ENCOUNTER
----- Message from Stephanie Morfin NP sent at 2/24/2020  7:40 AM CST -----  Please schedule appointment with derm. Thanks!

## 2020-02-26 NOTE — TELEPHONE ENCOUNTER
"----- Message from Alesha Banegas MA sent at 2/26/2020  3:07 PM CST -----  Contact: ANDRES MONTERROSO [[id number]]  PLEASE ADVISE.  ----- Message -----  From: Lisa Valdez  Sent: 2/26/2020   2:46 PM CST  To: Shel[facility] Brown Staff    Name of Who is Calling: ANDRES MONTERROSO [[id number]]      What is the request in detail: Patient called stating, "the medication podofilox (CONDYLOX) 0.5 % gel is too expensive, and please prescribe a cheaper medication."   Please give a call back at your earliest convenience and further advise.       THANKS!        Reply by [facility]: no    Call Back: ANDRES MONTERROSO // ph# [phone]                                      "

## 2020-02-27 ENCOUNTER — OFFICE VISIT (OUTPATIENT)
Dept: PODIATRY | Facility: CLINIC | Age: 60
End: 2020-02-27
Payer: COMMERCIAL

## 2020-02-27 ENCOUNTER — TELEPHONE (OUTPATIENT)
Dept: INTERNAL MEDICINE | Facility: CLINIC | Age: 60
End: 2020-02-27

## 2020-02-27 VITALS
WEIGHT: 167 LBS | HEART RATE: 73 BPM | SYSTOLIC BLOOD PRESSURE: 160 MMHG | DIASTOLIC BLOOD PRESSURE: 83 MMHG | HEIGHT: 67 IN | BODY MASS INDEX: 26.21 KG/M2

## 2020-02-27 DIAGNOSIS — G56.00 CARPAL TUNNEL SYNDROME, UNSPECIFIED LATERALITY: Primary | ICD-10-CM

## 2020-02-27 DIAGNOSIS — L84 CORN OR CALLUS: Primary | ICD-10-CM

## 2020-02-27 DIAGNOSIS — E11.69 TYPE 2 DIABETES MELLITUS WITH OTHER SPECIFIED COMPLICATION, WITHOUT LONG-TERM CURRENT USE OF INSULIN: ICD-10-CM

## 2020-02-27 DIAGNOSIS — E11.42 DIABETIC POLYNEUROPATHY ASSOCIATED WITH TYPE 2 DIABETES MELLITUS: ICD-10-CM

## 2020-02-27 DIAGNOSIS — A63.0 GENITAL CONDYLOMA, MALE: Primary | ICD-10-CM

## 2020-02-27 PROCEDURE — 3077F PR MOST RECENT SYSTOLIC BLOOD PRESSURE >= 140 MM HG: ICD-10-PCS | Mod: CPTII,S$GLB,, | Performed by: PODIATRIST

## 2020-02-27 PROCEDURE — 11056 PARNG/CUTG B9 HYPRKR LES 2-4: CPT | Mod: Q9,S$GLB,, | Performed by: PODIATRIST

## 2020-02-27 PROCEDURE — 99213 OFFICE O/P EST LOW 20 MIN: CPT | Mod: 25,S$GLB,, | Performed by: PODIATRIST

## 2020-02-27 PROCEDURE — 99999 PR PBB SHADOW E&M-EST. PATIENT-LVL III: ICD-10-PCS | Mod: PBBFAC,,, | Performed by: PODIATRIST

## 2020-02-27 PROCEDURE — 3079F PR MOST RECENT DIASTOLIC BLOOD PRESSURE 80-89 MM HG: ICD-10-PCS | Mod: CPTII,S$GLB,, | Performed by: PODIATRIST

## 2020-02-27 PROCEDURE — 3008F PR BODY MASS INDEX (BMI) DOCUMENTED: ICD-10-PCS | Mod: CPTII,S$GLB,, | Performed by: PODIATRIST

## 2020-02-27 PROCEDURE — 3008F BODY MASS INDEX DOCD: CPT | Mod: CPTII,S$GLB,, | Performed by: PODIATRIST

## 2020-02-27 PROCEDURE — 3044F HG A1C LEVEL LT 7.0%: CPT | Mod: CPTII,S$GLB,, | Performed by: PODIATRIST

## 2020-02-27 PROCEDURE — 11056 PR TRIM BENIGN HYPERKERATOTIC SKIN LESION,2-4: ICD-10-PCS | Mod: Q9,S$GLB,, | Performed by: PODIATRIST

## 2020-02-27 PROCEDURE — 3044F PR MOST RECENT HEMOGLOBIN A1C LEVEL <7.0%: ICD-10-PCS | Mod: CPTII,S$GLB,, | Performed by: PODIATRIST

## 2020-02-27 PROCEDURE — 99999 PR PBB SHADOW E&M-EST. PATIENT-LVL III: CPT | Mod: PBBFAC,,, | Performed by: PODIATRIST

## 2020-02-27 PROCEDURE — 3077F SYST BP >= 140 MM HG: CPT | Mod: CPTII,S$GLB,, | Performed by: PODIATRIST

## 2020-02-27 PROCEDURE — 99213 PR OFFICE/OUTPT VISIT, EST, LEVL III, 20-29 MIN: ICD-10-PCS | Mod: 25,S$GLB,, | Performed by: PODIATRIST

## 2020-02-27 PROCEDURE — 3079F DIAST BP 80-89 MM HG: CPT | Mod: CPTII,S$GLB,, | Performed by: PODIATRIST

## 2020-02-27 RX ORDER — IMIQUIMOD 12.5 MG/.25G
CREAM TOPICAL
Qty: 24 PACKET | Refills: 0 | Status: SHIPPED | OUTPATIENT
Start: 2020-02-28 | End: 2020-03-23

## 2020-02-27 RX ORDER — AMMONIUM LACTATE 12 G/100G
CREAM TOPICAL
Qty: 140 G | Refills: 11 | Status: SHIPPED | OUTPATIENT
Start: 2020-02-27 | End: 2021-02-04 | Stop reason: ALTCHOICE

## 2020-02-27 NOTE — TELEPHONE ENCOUNTER
Called patient and discussed labs and or test results. Patient expressed understanding and had the opportunity to ask questions. Any questions were answered. See meds, orders, follow up and instructions sections of encounter.    Specific issues include:  CTS on EMG    Please see referral orders and please call patient to schedule a consult with Dr. Duke. Thank you.

## 2020-02-27 NOTE — PROGRESS NOTES
Subjective:      Patient ID: Renny Young is a 59 y.o. male.    Chief Complaint: Diabetic Foot Exam (Dr Jarvis Washington MD 2- ) and Diabetes Mellitus    Diabetes, increased risk amputation needing evaluation/management/optomization of foot care.    CC2 callus left toes 1,2,5.  Gradual onset, worsening over past several weeks, aggravated by increased weight bearing, shoe gear, pressure.  No previous medical treatment.  OTC pain med not helping. Denies trauma, surgery both feet.  Wears casual shoes.    Chief Complaint   Patient presents with    Diabetic Foot Exam     Dr Jarvis Washington MD 2-     Diabetes Mellitus     Casual shoes both feet.    Review of Systems   Constitution: Negative for chills, diaphoresis, fever, malaise/fatigue and night sweats.   Cardiovascular: Negative for claudication, cyanosis, leg swelling and syncope.   Skin: Positive for suspicious lesions. Negative for color change, dry skin, nail changes, rash and unusual hair distribution.   Musculoskeletal: Negative for falls, joint pain, joint swelling, muscle cramps, muscle weakness and stiffness.   Gastrointestinal: Negative for constipation, diarrhea, nausea and vomiting.   Neurological: Positive for paresthesias (rle) and sensory change. Negative for brief paralysis, disturbances in coordination, focal weakness, numbness and tremors.           Objective:      Physical Exam   Constitutional: He is oriented to person, place, and time. He appears well-developed and well-nourished. He is cooperative.   Oriented to time, place, and person.   Cardiovascular:   Pulses:       Dorsalis pedis pulses are 2+ on the right side, and 2+ on the left side.        Posterior tibial pulses are 2+ on the right side, and 2+ on the left side.   Capillary fill time 3-5 seconds.  All toes warm to touch.      Negative lower extremity edema bilateral.    Negative elevational pallor and dependent rubor bilateral.     Musculoskeletal:   Normal angle,  base, station of gait. Decreased stride length, early heel off, moderately propulsive toe off bilateral.    All ten toes without clubbing, cyanosis, or signs of ischemia.      Patient has hammertoes of digits                   5 bilateral not reducible without symptom today.      No pain to palpation bilateral lower extremities.      Range of motion, stability, muscle strength, and muscle tone are age and health appropriate normal bilateral feet and legs.       Lymphadenopathy:   Negative lymphadenopathy bilateral popliteal fossa and tarsal tunnel.  Negative lymphangitic streaking bilateral foot/ankle bilateral.     Neurological: He is alert and oriented to person, place, and time. He has normal strength. He is not disoriented. He displays no atrophy and no tremor. A sensory deficit is present. He exhibits normal muscle tone.   Reflex Scores:       Patellar reflexes are 2+ on the right side and 2+ on the left side.       Achilles reflexes are 2+ on the right side and 2+ on the left side.  Decreased/absent vibratory sensation bilateral feet to 128Hz tuning fork.    Paresthesias, burning pain  Right and Left foot intermittently with no clearly identified trigger or source.     Skin: Skin is warm, dry and intact. No abrasion, no bruising, no burn, no ecchymosis, no laceration, no lesion, no petechiae and no rash noted. He is not diaphoretic. No cyanosis or erythema. No pallor. Nails show no clubbing.   Focal hyperkeratotic lesion consisting entirely of hyperkeratotic tissue without open skin, drainage, pus, fluctuance, malodor, or signs of infection plantar left hallux ipj, medial 2nd pipj, and dorsal 5th pipj (all left).     Otherwise, Skin is normal age and health appropriate color, turgor, texture, and temperature bilateral lower extremities without ulceration, hyperpigmentation, discoloration, masses nodules or cords palpated.  No ecchymosis, erythema, edema, or cardinal signs of infection bilateral lower  extremities.        Toenails 1st, , 5th  bilateral are hypertrophic thickened 2-3 mm, dystrophic, discolored tanish brown with tan, gray crumbly subungual debris.  Neatly trimmed and not tender to distal nail plate pressure, without periungual skin abnormality of each.               Assessment:       Encounter Diagnoses   Name Primary?    Type 2 diabetes mellitus with other specified complication, without long-term current use of insulin     Diabetic polyneuropathy associated with type 2 diabetes mellitus     Corn or callus Yes         Plan:       Renny was seen today for diabetic foot exam and diabetes mellitus.    Diagnoses and all orders for this visit:    Corn or callus  -     DIABETIC SHOES FOR HOME USE    Type 2 diabetes mellitus with other specified complication, without long-term current use of insulin  -     Ambulatory referral/consult to Podiatry  -     DIABETIC SHOES FOR HOME USE    Diabetic polyneuropathy associated with type 2 diabetes mellitus  -     Ambulatory referral/consult to Podiatry  -     DIABETIC SHOES FOR HOME USE    Other orders  -     ammonium lactate 12 % Crea; Apply twice daily to affected parts both feet as needed.      I counseled the patient on his conditions, their implications and medical management.        - Shoe inspection. Diabetic Foot Education. Patient reminded of the importance of good nutrition and blood sugar control to help prevent podiatric complications of diabetes. Patient instructed on proper foot hygeine. We discussed wearing proper shoe gear, daily foot inspections, never walking without protective shoe gear, never putting sharp instruments to feet, routine podiatric visits at least annually.      Rx DM shoes, inserts.  Lac hydrin.    Discussed conservative treatment with shoes of adequate dimensions, material, and style to alleviate symptoms and delay or prevent surgical intervention.    With the patient's permission, I debrided hyperkeratotic lesion(s) as above  totaling       3         to, not  Including dermis with sterile #15 blade.  Patient tolerated the procedure well and related significant relief.            Follow up in about 1 year (around 2/27/2021).

## 2020-02-27 NOTE — LETTER
February 27, 2020      Jarvis Washington MD  [address]  [facility] [address]           [facility]  [address] [address]  Phone: [phone]  Fax: [phone]          Patient: Renny Young   MR Number: [id number]   YOB: 1960   Date of Visit: 2/27/2020       Dear Dr. Jarvis Washington:    Thank you for referring Renny Young to me for evaluation. Attached you will find relevant portions of my assessment and plan of care.    If you have questions, please do not hesitate to call me. I look forward to following Renny Young along with you.    Sincerely,    Morro Quiles, DPROSANNA    Enclosure  CC:  No Recipients    If you would like to receive this communication electronically, please contact [email or web address] or [phone] to request more information on [employer] Link access.    For providers and/or their staff who would like to refer a patient to [facility], please contact us through our one-stop-shop provider referral line, [facility], at [phone].    If you feel you have received this communication in error or would no longer like to receive these types of communications, please e-mail [email or web address]

## 2020-03-11 ENCOUNTER — HOSPITAL ENCOUNTER (OUTPATIENT)
Dept: RADIOLOGY | Facility: OTHER | Age: 60
Discharge: HOME | End: 2020-03-11
Attending: ORTHOPAEDIC SURGERY
Payer: COMMERCIAL

## 2020-03-11 ENCOUNTER — TELEPHONE (OUTPATIENT)
Dept: ORTHOPEDICS | Facility: CLINIC | Age: 60
End: 2020-03-11

## 2020-03-11 DIAGNOSIS — M79.641 BILATERAL HAND PAIN: ICD-10-CM

## 2020-03-11 DIAGNOSIS — M79.641 BILATERAL HAND PAIN: Primary | ICD-10-CM

## 2020-03-11 DIAGNOSIS — M79.642 BILATERAL HAND PAIN: Primary | ICD-10-CM

## 2020-03-11 DIAGNOSIS — M79.642 BILATERAL HAND PAIN: ICD-10-CM

## 2020-03-11 PROCEDURE — 73130 XR HAND COMPLETE 3 VIEWS BILATERAL: ICD-10-PCS | Mod: 26,50,, | Performed by: RADIOLOGY

## 2020-03-11 PROCEDURE — 73130 X-RAY EXAM OF HAND: CPT | Mod: 26,50,, | Performed by: RADIOLOGY

## 2020-03-11 PROCEDURE — 73130 X-RAY EXAM OF HAND: CPT | Mod: TC,50,FY

## 2020-03-11 NOTE — TELEPHONE ENCOUNTER
Called patient in regard to appt on 3/16/2020. Informed him of the need for XR prior to his appt-he verbalized understanding and stated that he would like to do them before Monday. The order is put in for him to go at his convenience.

## 2020-04-21 ENCOUNTER — TELEPHONE (OUTPATIENT)
Dept: DERMATOLOGY | Facility: CLINIC | Age: 60
End: 2020-04-21

## 2020-04-21 NOTE — TELEPHONE ENCOUNTER
Spoke with pt and pt stated that he is fine with being scheduled for a later date. Pt stated that he will call back if he needs something sooner than July.

## 2020-04-28 ENCOUNTER — CLINICAL SUPPORT (OUTPATIENT)
Dept: CARDIOLOGY | Facility: HOSPITAL | Age: 60
End: 2020-04-28
Attending: INTERNAL MEDICINE
Payer: COMMERCIAL

## 2020-04-28 DIAGNOSIS — Z95.0 PACEMAKER: ICD-10-CM

## 2020-04-28 DIAGNOSIS — I49.5 SICK SINUS SYNDROME: ICD-10-CM

## 2020-04-28 PROCEDURE — 93279 PRGRMG DEV EVAL PM/LDLS PM: CPT

## 2020-04-28 PROCEDURE — 93279 CARDIAC DEVICE CHECK - IN CLINIC & HOSPITAL: ICD-10-PCS | Mod: 26,,, | Performed by: INTERNAL MEDICINE

## 2020-04-28 PROCEDURE — 93279 PRGRMG DEV EVAL PM/LDLS PM: CPT | Mod: 26,,, | Performed by: INTERNAL MEDICINE

## 2020-04-30 ENCOUNTER — PATIENT OUTREACH (OUTPATIENT)
Dept: ADMINISTRATIVE | Facility: OTHER | Age: 60
End: 2020-04-30

## 2020-04-30 ENCOUNTER — OFFICE VISIT (OUTPATIENT)
Dept: ELECTROPHYSIOLOGY | Facility: CLINIC | Age: 60
End: 2020-04-30
Payer: COMMERCIAL

## 2020-04-30 DIAGNOSIS — E78.49 OTHER HYPERLIPIDEMIA: ICD-10-CM

## 2020-04-30 DIAGNOSIS — I49.5 SICK SINUS SYNDROME: ICD-10-CM

## 2020-04-30 DIAGNOSIS — Z45.010 PACEMAKER BATTERY DEPLETION: ICD-10-CM

## 2020-04-30 DIAGNOSIS — Z95.0 PACEMAKER: ICD-10-CM

## 2020-04-30 DIAGNOSIS — I10 HYPERTENSION, ESSENTIAL: ICD-10-CM

## 2020-04-30 DIAGNOSIS — E11.42 DIABETIC POLYNEUROPATHY ASSOCIATED WITH TYPE 2 DIABETES MELLITUS: Primary | ICD-10-CM

## 2020-04-30 PROCEDURE — 99442 PR PHYSICIAN TELEPHONE EVALUATION 11-20 MIN: CPT | Mod: 95,,, | Performed by: INTERNAL MEDICINE

## 2020-04-30 PROCEDURE — 3044F HG A1C LEVEL LT 7.0%: CPT | Mod: 95,CPTII,, | Performed by: INTERNAL MEDICINE

## 2020-04-30 PROCEDURE — 99442 PR PHYSICIAN TELEPHONE EVALUATION 11-20 MIN: ICD-10-PCS | Mod: 95,,, | Performed by: INTERNAL MEDICINE

## 2020-04-30 PROCEDURE — 3044F PR MOST RECENT HEMOGLOBIN A1C LEVEL <7.0%: ICD-10-PCS | Mod: 95,CPTII,, | Performed by: INTERNAL MEDICINE

## 2020-04-30 NOTE — PROGRESS NOTES
Chart reviewed.   Immunizations: Reconciled  Orders placed: Diabetic eye cam  Upcoming appts to satisfy CLAUDETTE topics: n/a

## 2020-04-30 NOTE — PROGRESS NOTES
Subjective:     PCP: Jarvis Washington MD    The patient location is: Home  The chief complaint leading to consultation is: SSS  Visit type: audio only  Total time spent with patient: 20 min  Each patient to whom he or she provides medical services by telemedicine is:  (1) informed of the relationship between the physician and patient and the respective role of any other health care provider with respect to management of the patient; and (2) notified that he or she may decline to receive medical services by telemedicine and may withdraw from such care at any time.    Renny Young has a history of hypertension, DM2, and sick sinus syndrome status-post [employer] dual chamber pacemaker implantion at [facility] in 7/2009 for syncope. Mr. Young describes that following implantation, he experienced discomfort when his RV lead was paced. Apparently his RV lead output was lowered, which improved his symptoms. At his visit with me in 10/2016, he had GI complaints which he attributed to his RV pacing lead. At his bequest, I reprogrammed his device to an AAI pacing mode.    Today he complains of belching and fatigue that comes and goes. He will experience these symptoms frequently off and on for a couple of days and then feel fine for days (episodes can occur once a month or once every few months). These symptoms have been going on for years (see note from 2015). He does say that he feels better with v pacing off. Mr. Young denies chest pain with exertion or at rest, palpitations, dizziness, or syncope.    At his last visit in 3/2019, he described intermittent belching and fatigue, but otherwise felt well. He admitted to feeling better with his RV pacing lead turned off.    Today Mr. Young has no complaints.     A device check performed on 4/28/2020 indicated his device is at EOS (reached KELLY on 10/3/2019, and consistent with early battery depletion based on past measurements). Additionally his atrial lead impedence, which has  historically been stable, was measured in the office at 968-1500 ohms (3x above normal), with 38% RA pacing.    Current Outpatient Medications   Medication Sig    amLODIPine (NORVASC) 5 MG tablet Take 1 tablet (5 mg total) by mouth once daily.    ammonium lactate 12 % Crea Apply twice daily to affected parts both feet as needed.    aspirin (ECOTRIN) 81 MG EC tablet Take 81 mg by mouth once daily.    atorvastatin (LIPITOR) 20 MG tablet Take 1 tablet (20 mg total) by mouth once daily.    gabapentin (NEURONTIN) 300 MG capsule TAKE ONE CAPSULE BY MOUTH TWICE DAILY    losartan-hydrochlorothiazide 50-12.5 mg (HYZAAR) 50-12.5 mg per tablet Take 1 tablet by mouth once daily.    metFORMIN (GLUCOPHAGE) 500 MG tablet TAKE 1 TABLET BY MOUTH ONCE DAILY WITH BREAKFAST    triamcinolone acetonide 0.1% (KENALOG) 0.1 % cream Apply topically 2 (two) times daily.     No current facility-administered medications for this visit.      Review of Systems   Constitution: Negative for malaise/fatigue.   Cardiovascular: Negative for chest pain, dyspnea on exertion, irregular heartbeat, leg swelling and palpitations.   Respiratory: Negative for shortness of breath.    Hematologic/Lymphatic: Negative for bleeding problem.   Skin: Negative for rash.   Musculoskeletal: Negative for myalgias.   Gastrointestinal: Negative for hematemesis, hematochezia and nausea.        Belching     Genitourinary: Negative for hematuria.   Neurological: Negative for light-headedness.   Psychiatric/Behavioral: Negative for altered mental status.   Allergic/Immunologic: Negative for persistent infections.     Objective:      Physical exam not performed--telehealth    Lab Results   Component Value Date     02/17/2020    K 4.1 02/17/2020    BUN 16 02/17/2020    CREATININE 1.2 02/17/2020    ALT 23 02/17/2020    AST 28 02/17/2020    HGB 15.6 04/06/2017    HCT 46.0 04/06/2017    TSH 1.194 04/06/2017    LDLCALC 58.4 (L) 02/17/2020           Assessment:     1.  Diabetic polyneuropathy associated with type 2 diabetes mellitus    2. Pacemaker battery depletion    3. Hypertension, essential    4. Pacemaker    5. Sick sinus syndrome    6. Other hyperlipidemia      Plan:     In summary, Mr. Young is a 59 y.o. male with SSS, PPM (2009), DM, and HTN, who recently had a device check consistent with early battery depletion. His device is at EOL. We discussed the risks, benefits, indications, and alternatives to pacemaker generator change. After considering his options he has agreed to proceed. At the time of generator change, additional testing will be performed on the RA lead given recent threefold increase in impedence--if there is evidence of RA lead compromise, an RA lead revision will be performed.    Thank you for allowing me to participate in the care of this patient. Please do not hesitate to call me with any questions or concerns.

## 2020-05-01 ENCOUNTER — TELEPHONE (OUTPATIENT)
Dept: ELECTROPHYSIOLOGY | Facility: CLINIC | Age: 60
End: 2020-05-01

## 2020-05-01 DIAGNOSIS — Z45.010 PACEMAKER AT END OF BATTERY LIFE: Primary | ICD-10-CM

## 2020-05-01 NOTE — PROGRESS NOTES
Patient Instructions  For Device Generator Change      Pre-Procedure Testin/04/2020 at 2:50pm  COVID-19 Drive thru testing  Mario Rodriguez behind the [facility] at the [facility].      Important Medication Information:    · HOLD (do NOT take) METFORMIN  on the day of your procedure.  Your last dose should be taken on 2020.  · You may take all other morning medications with a sip of water on the day of your procedure.       Day of Procedure:    2020 at 10:30 AM - Generator Change  Please report to Cardiology Waiting Room on the 3rd floor of the Hospital    · Do not eat or drink anything after 12 midnight on the night before your procedure.  · Wash your chest with HIBICLENS OR AN ANTIBACTERIAL SOAP (such as Dial) on the night before and the morning of your procedure.  · You will be GOING HOME AFTER YOUR PROCEDURE  · You will need someone to drive you home from the hospital due to anesthesia.   · .If you wear a CPAP or BIPAP machine for sleep apnea, please be sure to bring your machine with you if you are scheduled to spend the night.          · You will need to be dropped off in front of the hospital, near the flags  · No visitors will be allowed (for patient/staff safety)  · A family member should provide the staff with a valid cell phone number so that he or she can receive automated updates  · You can be picked up at the same spot that you were dropped off (staff will let your family member know when you are ready for discharge).          Post-Procedure Patient Discharge Instructions  Devices    Restrictions   No pushing, pulling, or lifting greater than 5 pounds for six weeks.     No lifting left arm higher than shoulder level for six weeks.  You will wear a sling and swathe for the first two days to restrict arm movement.  If you tend to raise arms above shoulder level during sleep, it is asked you wear sling and swathe at night for six weeks.    No driving until your clinic  wound check appointment (will be set up for 7-10 days post implant).   No submerging implant incision site in a tub, pool, or body of water for six weeks.       Wound Care   If you are discharged with a standard dressing over the incision, you may remove the dressing after 24 hours.    If you are discharged with an AQUACEL dressing, you should keep it on until your follow-up appointment in 1-2 weeks.   If there are Steri-strips (strips of tape) over your incision, leave them on until your follow-up appointment. They may begin to fall off on their own, which is normal. If there is Dermabond (clear glue) over your incision, do not scrub it off. It acts as a barrier and will eventually disappear.   You may be discharged with 5 days of oral antibiotics. Please take the full prescription until it is gone.   Do not get the incision wet for 48 hours following the procedure. You may sponge bath during this period, working around the incision. After 48 hours, you may shower, but you should still try to keep this area as dry as possible, and avoid direct water contact to the incision (allow the water to hit back of your shoulder rather than directly on the incision). Gently pat the incision dry if it does get wet.   You may take regular showers after 2 weeks, unless otherwise indicated at your follow-up visit.   Avoid using deodorants, powders, creams, lotions, etc. on your incision for 6 weeks.   If you notice unusual swelling, redness, drainage, have more device site pain, chest pain, shortness of breath, or have a fever greater than 100 degrees, call our device clinic immediately: [phone] or [phone] during normal office hours. You may call [phone] after-hours or on weekends and ask for the electrophysiologist on call.      Activity   If you only had a battery/generator change performed, there are no postoperative activity restrictions.    If this is your first device or if you had new wires  added to your existing device, then you will be discharged in a sling which you should wear continuously for 48 hours. After that, the sling should remain off during the day but should be worn at night for another 2-4 weeks (depending on how active a sleeper you are).   Do not raise your device-side arm above your shoulder for 6 weeks. Do not lift more than 5-10 lbs with your device-side arm for 6 weeks.    If you were driving prior to the procedure, you may resume driving after your first follow-up appointment (1-2 weeks). If you have a history of passing out or a history of certain arrhythmias, there may be driving restrictions unrelated to the procedure. Please clarify with your physician if this is the case.   No heavy activity with the affected arm for 6 weeks (eg. tennis, golf, bowling, aerobics, mowing the lawn, etc.).   Avoid rough contact at the device site for 6 weeks.   You may participate in sexual activity unless otherwise instructed.   You may return to work within 3-5 days unless told otherwise, provided you adhere to the above activity restrictions.    Long-Term Instructions   Keep your pacemaker or defibrillator identification card with you at all times.   If you have a defibrillator and you get shocked by the device: If you receive one shock and you feel ok, you may call [phone] or [phone] during normal office hours. You may call [phone] after-hours. If you receive one shock and you do not feel well, call Emergency Medical Services. They will take you to the nearest emergency room.   If you have a defibrillator and you get more than one shock from the device or multiple shocks in a short period of time: Call Emergency Medical Services. They will take you to the nearest emergency room.   Appliances: You may operate any electrical device in your home, including microwaves.   Security Systems: Electromagnetic security systems can be located in the workplace,  courthouses, or other high-security areas. Exposure to this type of security system has been shown to cause interference in some cases. Interference may be related to the duration of exposure and/or the distance between the device and the security device. You should be aware of the location of security systems, move through them at a normal pace, and avoid leaning or standing too close.   Metal Detectors at Airports: Metal detectors at airports can potentially interfere with pacemakers or defibrillators, although this is unlikely. Metal detectors will likely be triggered by your device and therefore at places such as airports  it will be important for you to carry your identification card for the pacemaker/defibrillator. Airport personnel will likely prefer to do a manual search.   Cellular Phones: It is unlikely that using a cellular phone will interfere with your device. It should be used with the hand opposite to the side where your device was implanted. The phone should not be carried in the shirt pocket on the same side as the device.   Specific Work Conditions: Patients who work near high-voltage lines, transmitting towers, large motors, welding equipment, or powerful magnets should discuss their specific situation with their physician. In general, remain at least two feet from external electrical equipment, verify that the equipment is properly grounded, and wear insulated gloves when using electrical devices. Leave the immediate location if lightheadedness or other symptoms develop.   MRI: Some pacemakers and defibrillators are safe in MRI scanners, while others are not. Please consult with your physician to see if you have an MRI-compatible device.   Surgery: Should you require surgery in the future, some electrosurgical devices can interfere with your device function. You should discuss this with your surgeon before any operation.   Radiation Therapy: If you ever require radiation therapy in the  future, care must be taken to avoid irradiating the device.    Long-Term Follow-Up   Your device has the ability to transmit device information from home to the doctors office using a home monitoring system.   This remote system takes the place of a doctors visit. Your device will be checked from home every 3-6 months. Every 6-12 months, you will be asked to come into the office for an in-office check.   Your device should last in the range of 6-12 years. This depends on many factors including how often it paces the heart.   When the battery is low, a generator change will be performed. This is a same-day procedure with no post-op activity restrictions, unless one of the pacemaker or defibrillator leads needs to be replaced at that time, or a new lead is added to your existing system.      Any need to reschedule or cancel procedures, or any questions regarding your procedures should be addressed directly with the Arrhythmia Department Nurses at the following phone number: [phone].

## 2020-05-01 NOTE — TELEPHONE ENCOUNTER
Contacted pt to schedule gen change. Procedure scheduled for 5/06/2020. Pt instructed on covid 19 testing and visitor policy. Pt voiced understanding and agreed to proceed.

## 2020-05-04 ENCOUNTER — LAB VISIT (OUTPATIENT)
Dept: INTERNAL MEDICINE | Facility: CLINIC | Age: 60
End: 2020-05-04
Payer: COMMERCIAL

## 2020-05-04 DIAGNOSIS — Z45.010 PACEMAKER AT END OF BATTERY LIFE: ICD-10-CM

## 2020-05-04 LAB — SARS-COV-2 RNA RESP QL NAA+PROBE: NOT DETECTED

## 2020-05-04 PROCEDURE — U0002 COVID-19 LAB TEST NON-CDC: HCPCS

## 2020-05-05 ENCOUNTER — TELEPHONE (OUTPATIENT)
Dept: ELECTROPHYSIOLOGY | Facility: CLINIC | Age: 60
End: 2020-05-05

## 2020-05-05 NOTE — TELEPHONE ENCOUNTER
Contacted pt to confirm procedure for tomorrow. Spoke with pt and reviewed pre op instructions including: arrival time of 10:30 am at the front door of the hospital, NPO after MN, wash chest with hibiclens night prior and am of proc, Okay to take morning meds with sip of water, hold metformin am of proc, pt voiced understanding of visitor policy. Pt denies any fever, cough, chills, nausea , loss of appetite etc. Pt stated he would be here.

## 2020-05-06 ENCOUNTER — ANESTHESIA (OUTPATIENT)
Dept: MEDSURG UNIT | Facility: HOSPITAL | Age: 60
End: 2020-05-06
Payer: COMMERCIAL

## 2020-05-06 ENCOUNTER — ANESTHESIA EVENT (OUTPATIENT)
Dept: MEDSURG UNIT | Facility: HOSPITAL | Age: 60
End: 2020-05-06
Payer: COMMERCIAL

## 2020-05-06 ENCOUNTER — HOSPITAL ENCOUNTER (OUTPATIENT)
Facility: HOSPITAL | Age: 60
Discharge: HOME | End: 2020-05-06
Attending: INTERNAL MEDICINE | Admitting: INTERNAL MEDICINE
Payer: COMMERCIAL

## 2020-05-06 VITALS
HEART RATE: 60 BPM | SYSTOLIC BLOOD PRESSURE: 134 MMHG | WEIGHT: 155 LBS | OXYGEN SATURATION: 100 % | RESPIRATION RATE: 18 BRPM | HEIGHT: 67 IN | TEMPERATURE: 97 F | BODY MASS INDEX: 24.33 KG/M2 | DIASTOLIC BLOOD PRESSURE: 81 MMHG

## 2020-05-06 DIAGNOSIS — T82.110A PACEMAKER LEAD MALFUNCTION, INITIAL ENCOUNTER: ICD-10-CM

## 2020-05-06 DIAGNOSIS — Z45.010 PACEMAKER AT END OF BATTERY LIFE: ICD-10-CM

## 2020-05-06 DIAGNOSIS — I49.9 ARRHYTHMIA: ICD-10-CM

## 2020-05-06 LAB
BASOPHILS # BLD AUTO: 0.05 K/UL (ref 0–0.2)
BASOPHILS NFR BLD: 0.6 % (ref 0–1.9)
DIFFERENTIAL METHOD: ABNORMAL
EOSINOPHIL # BLD AUTO: 0.1 K/UL (ref 0–0.5)
EOSINOPHIL NFR BLD: 1.5 % (ref 0–8)
ERYTHROCYTE [DISTWIDTH] IN BLOOD BY AUTOMATED COUNT: 14.5 % (ref 11.5–14.5)
HCT VFR BLD AUTO: 52.1 % (ref 40–54)
HGB BLD-MCNC: 16.3 G/DL (ref 14–18)
IMM GRANULOCYTES # BLD AUTO: 0.03 K/UL (ref 0–0.04)
IMM GRANULOCYTES NFR BLD AUTO: 0.4 % (ref 0–0.5)
LYMPHOCYTES # BLD AUTO: 1.9 K/UL (ref 1–4.8)
LYMPHOCYTES NFR BLD: 23.3 % (ref 18–48)
MCH RBC QN AUTO: 28.4 PG (ref 27–31)
MCHC RBC AUTO-ENTMCNC: 31.3 G/DL (ref 32–36)
MCV RBC AUTO: 91 FL (ref 82–98)
MONOCYTES # BLD AUTO: 1 K/UL (ref 0.3–1)
MONOCYTES NFR BLD: 11.7 % (ref 4–15)
NEUTROPHILS # BLD AUTO: 5.2 K/UL (ref 1.8–7.7)
NEUTROPHILS NFR BLD: 62.5 % (ref 38–73)
NRBC BLD-RTO: 0 /100 WBC
PLATELET # BLD AUTO: 228 K/UL (ref 150–350)
PMV BLD AUTO: 10.7 FL (ref 9.2–12.9)
POCT GLUCOSE: 104 MG/DL (ref 70–110)
POCT GLUCOSE: 80 MG/DL (ref 70–110)
RBC # BLD AUTO: 5.74 M/UL (ref 4.6–6.2)
WBC # BLD AUTO: 8.23 K/UL (ref 3.9–12.7)

## 2020-05-06 PROCEDURE — 33228 REMV&REPLC PM GEN DUAL LEAD: CPT | Mod: ,,, | Performed by: INTERNAL MEDICINE

## 2020-05-06 PROCEDURE — 33228 PR REMV&REPLC PM GEN DUAL LEAD: ICD-10-PCS | Mod: ,,, | Performed by: INTERNAL MEDICINE

## 2020-05-06 PROCEDURE — 93010 EKG 12-LEAD: ICD-10-PCS | Mod: ,,, | Performed by: INTERNAL MEDICINE

## 2020-05-06 PROCEDURE — 63600175 PHARM REV CODE 636 W HCPCS: Performed by: INTERNAL MEDICINE

## 2020-05-06 PROCEDURE — 85025 COMPLETE CBC W/AUTO DIFF WBC: CPT

## 2020-05-06 PROCEDURE — 37000008 HC ANESTHESIA 1ST 15 MINUTES: Performed by: INTERNAL MEDICINE

## 2020-05-06 PROCEDURE — D9220A PRA ANESTHESIA: ICD-10-PCS | Mod: ANES,,, | Performed by: ANESTHESIOLOGY

## 2020-05-06 PROCEDURE — 25000003 PHARM REV CODE 250: Performed by: INTERNAL MEDICINE

## 2020-05-06 PROCEDURE — D9220A PRA ANESTHESIA: Mod: CRNA,,, | Performed by: NURSE ANESTHETIST, CERTIFIED REGISTERED

## 2020-05-06 PROCEDURE — 82962 GLUCOSE BLOOD TEST: CPT | Performed by: INTERNAL MEDICINE

## 2020-05-06 PROCEDURE — 37000009 HC ANESTHESIA EA ADD 15 MINS: Performed by: INTERNAL MEDICINE

## 2020-05-06 PROCEDURE — 33228 REMV&REPLC PM GEN DUAL LEAD: CPT | Performed by: INTERNAL MEDICINE

## 2020-05-06 PROCEDURE — 93005 ELECTROCARDIOGRAM TRACING: CPT

## 2020-05-06 PROCEDURE — 93010 ELECTROCARDIOGRAM REPORT: CPT | Mod: ,,, | Performed by: INTERNAL MEDICINE

## 2020-05-06 PROCEDURE — 93005 ELECTROCARDIOGRAM TRACING: CPT | Mod: 59

## 2020-05-06 PROCEDURE — D9220A PRA ANESTHESIA: Mod: ANES,,, | Performed by: ANESTHESIOLOGY

## 2020-05-06 PROCEDURE — D9220A PRA ANESTHESIA: ICD-10-PCS | Mod: CRNA,,, | Performed by: NURSE ANESTHETIST, CERTIFIED REGISTERED

## 2020-05-06 PROCEDURE — 25000003 PHARM REV CODE 250: Performed by: NURSE ANESTHETIST, CERTIFIED REGISTERED

## 2020-05-06 PROCEDURE — 63600175 PHARM REV CODE 636 W HCPCS: Performed by: NURSE ANESTHETIST, CERTIFIED REGISTERED

## 2020-05-06 PROCEDURE — C1785 PMKR, DUAL, RATE-RESP: HCPCS | Performed by: INTERNAL MEDICINE

## 2020-05-06 DEVICE — PULSE GENERATOR
Type: IMPLANTABLE DEVICE | Site: CHEST | Status: FUNCTIONAL
Brand: ASSURITY MRI™

## 2020-05-06 RX ORDER — ONDANSETRON 2 MG/ML
4 INJECTION INTRAMUSCULAR; INTRAVENOUS DAILY PRN
Status: CANCELLED | OUTPATIENT
Start: 2020-05-06

## 2020-05-06 RX ORDER — FENTANYL CITRATE 50 UG/ML
25 INJECTION, SOLUTION INTRAMUSCULAR; INTRAVENOUS EVERY 5 MIN PRN
Status: CANCELLED | OUTPATIENT
Start: 2020-05-06

## 2020-05-06 RX ORDER — LIDOCAINE HYDROCHLORIDE 20 MG/ML
INJECTION, SOLUTION EPIDURAL; INFILTRATION; INTRACAUDAL; PERINEURAL
Status: DISCONTINUED | OUTPATIENT
Start: 2020-05-06 | End: 2020-05-06 | Stop reason: HOSPADM

## 2020-05-06 RX ORDER — BUPIVACAINE HYDROCHLORIDE 2.5 MG/ML
INJECTION, SOLUTION EPIDURAL; INFILTRATION; INTRACAUDAL
Status: DISCONTINUED | OUTPATIENT
Start: 2020-05-06 | End: 2020-05-06 | Stop reason: HOSPADM

## 2020-05-06 RX ORDER — ACETAMINOPHEN 325 MG/1
650 TABLET ORAL EVERY 4 HOURS PRN
Status: DISCONTINUED | OUTPATIENT
Start: 2020-05-06 | End: 2020-05-06 | Stop reason: HOSPADM

## 2020-05-06 RX ORDER — SODIUM CHLORIDE 0.9 % (FLUSH) 0.9 %
10 SYRINGE (ML) INJECTION
Status: CANCELLED | OUTPATIENT
Start: 2020-05-06

## 2020-05-06 RX ORDER — SODIUM CHLORIDE 9 MG/ML
INJECTION, SOLUTION INTRAVENOUS CONTINUOUS PRN
Status: DISCONTINUED | OUTPATIENT
Start: 2020-05-06 | End: 2020-05-06

## 2020-05-06 RX ORDER — VANCOMYCIN HYDROCHLORIDE 1 G/20ML
INJECTION, POWDER, LYOPHILIZED, FOR SOLUTION INTRAVENOUS
Status: DISCONTINUED | OUTPATIENT
Start: 2020-05-06 | End: 2020-05-06 | Stop reason: HOSPADM

## 2020-05-06 RX ORDER — EPHEDRINE SULFATE 50 MG/ML
INJECTION, SOLUTION INTRAVENOUS
Status: DISCONTINUED | OUTPATIENT
Start: 2020-05-06 | End: 2020-05-06

## 2020-05-06 RX ORDER — MIDAZOLAM HYDROCHLORIDE 1 MG/ML
INJECTION, SOLUTION INTRAMUSCULAR; INTRAVENOUS
Status: DISCONTINUED | OUTPATIENT
Start: 2020-05-06 | End: 2020-05-06

## 2020-05-06 RX ORDER — CEFAZOLIN SODIUM 1 G/3ML
2 INJECTION, POWDER, FOR SOLUTION INTRAMUSCULAR; INTRAVENOUS
Status: COMPLETED | OUTPATIENT
Start: 2020-05-06 | End: 2020-05-06

## 2020-05-06 RX ORDER — PROPOFOL 10 MG/ML
VIAL (ML) INTRAVENOUS CONTINUOUS PRN
Status: DISCONTINUED | OUTPATIENT
Start: 2020-05-06 | End: 2020-05-06

## 2020-05-06 RX ORDER — SODIUM CHLORIDE 0.9 G/100ML
IRRIGANT IRRIGATION
Status: DISCONTINUED | OUTPATIENT
Start: 2020-05-06 | End: 2020-05-06 | Stop reason: HOSPADM

## 2020-05-06 RX ORDER — FENTANYL CITRATE 50 UG/ML
INJECTION, SOLUTION INTRAMUSCULAR; INTRAVENOUS
Status: DISCONTINUED | OUTPATIENT
Start: 2020-05-06 | End: 2020-05-06

## 2020-05-06 RX ORDER — CEPHALEXIN 500 MG/1
500 CAPSULE ORAL EVERY 8 HOURS
Qty: 15 CAPSULE | Refills: 0 | Status: SHIPPED | OUTPATIENT
Start: 2020-05-06 | End: 2020-05-11

## 2020-05-06 RX ADMIN — EPHEDRINE SULFATE 10 MG: 50 INJECTION, SOLUTION INTRAMUSCULAR; INTRAVENOUS; SUBCUTANEOUS at 01:05

## 2020-05-06 RX ADMIN — EPHEDRINE SULFATE 10 MG: 50 INJECTION, SOLUTION INTRAMUSCULAR; INTRAVENOUS; SUBCUTANEOUS at 02:05

## 2020-05-06 RX ADMIN — PROPOFOL 75 MCG/KG/MIN: 10 INJECTION, EMULSION INTRAVENOUS at 01:05

## 2020-05-06 RX ADMIN — FENTANYL CITRATE 50 MCG: 50 INJECTION, SOLUTION INTRAMUSCULAR; INTRAVENOUS at 01:05

## 2020-05-06 RX ADMIN — CEFAZOLIN 2 G: 330 INJECTION, POWDER, FOR SOLUTION INTRAMUSCULAR; INTRAVENOUS at 01:05

## 2020-05-06 RX ADMIN — SODIUM CHLORIDE: 9 INJECTION, SOLUTION INTRAVENOUS at 01:05

## 2020-05-06 RX ADMIN — MIDAZOLAM 2 MG: 1 INJECTION INTRAMUSCULAR; INTRAVENOUS at 01:05

## 2020-05-06 NOTE — PLAN OF CARE
Pt arrived to EP Pacu slot 2. Bcg's maintained. VSS. A&Ox4. BHARAT incision dermabond, aquacell, pressure tape intact. Pt denies any complaints or pain. Pt updated on POC. Cousin called with permission of pt and updated on POC. Will continue to monitor.

## 2020-05-06 NOTE — DISCHARGE SUMMARY
[facility] - Short Stay Cardiac Unit  Cardiac Electrophysiology  Discharge Summary      Patient Name: Renny Young  MRN: [id number]  Admission Date: 5/6/2020  Hospital Length of Stay: 0 days  Discharge Date and Time: 5/6/2020  5:20 PM  Attending Physician: Bradford Spence MD   Discharging Provider: Lobito Castro MD  Primary Care Physician: Jarvis Neri MD    HPI:   Mr Young is a 59 year old man with SSS s/p dual chamber ppm, DM2, syncope, and HTN who [place] [facility] [place] elective generator change and possible RA lead revision.    Procedure(s) (LRB):  REPLACEMENT, PULSE GENERATOR, CARDIAC PACEMAKER (Left)  REVISION, ELECTRODE LEAD, CARDIAC PACEMAKER (Left)     Indwelling Lines/Drains at time of discharge:  Lines/Drains/Airways     None                 Hospital Course:  Underwent generator change. RA lead tested. Revealed no abnormalities with lead.     Significant Diagnostic Studies: Labs:   BMP: No results for input(s): GLU, NA, K, CL, CO2, BUN, CREATININE, CALCIUM, MG in the last 48 hours., CMP No results for input(s): NA, K, CL, CO2, GLU, BUN, CREATININE, CALCIUM, PROT, ALBUMIN, BILITOT, ALKPHOS, AST, ALT, ANIONGAP, ESTGFRAFRICA, EGFRNONAA in the last 48 hours., CBC   Recent Labs   Lab 05/06/20  1048   WBC 8.23   HGB 16.3   HCT 52.1       and INR   Lab Results   Component Value Date    INR 1.1 09/28/2015       Pending Diagnostic Studies:     Procedure Component Value Units Date/Time    EKG 12-lead [[id number]]     Order Status:  Sent Lab Status:  No result           Final Active Diagnoses:    Diagnosis Date Noted POA    Pacemaker at end of battery life [Z45.010] 05/06/2020 Not Applicable      Problems Resolved During this Admission:     No new Assessment & Plan notes have been filed under this hospital service since the last note was generated.  Service: Arrhythmia      Discharged Condition: good    Disposition: Home or Self Care    Follow Up:  Follow-up Information     Dain  Michael - Arrhythmia In 7 days.    Specialty:  Cardiology  Why:  [facility] ppm device and wound check  Contact information:  [address]aci Rash[address]  [facility] [address] [phone]  Additional information:  [facility] [place] - 3rd Floor           Bradford Spence MD In 4 months.    Specialties:  Electrophysiology, Cardiovascular Disease, Cardiology  Contact information:  Traci RASHID  [facility] [address]  [phone]                 Patient Instructions:   No discharge procedures on file.  Medications:  Reconciled Home Medications:      Medication List      START taking these medications    cephALEXin 500 MG capsule  Commonly known as:  KEFLEX  Take 1 capsule (500 mg total) by mouth every 8 (eight) hours. for 5 days        CONTINUE taking these medications    amLODIPine 5 MG tablet  Commonly known as:  NORVASC  Take 1 tablet (5 mg total) by mouth once daily.     ammonium lactate 12 % Crea  Apply twice daily to affected parts both feet as needed.     aspirin 81 MG EC tablet  Commonly known as:  ECOTRIN  Take 81 mg by mouth once daily.     atorvastatin 20 MG tablet  Commonly known as:  LIPITOR  Take 1 tablet (20 mg total) by mouth once daily.     gabapentin 300 MG capsule  Commonly known as:  NEURONTIN  Take 1 capsule (300 mg total) by mouth 2 (two) times daily.     hydroCHLOROthiazide 12.5 MG Tab  Commonly known as:  HYDRODIURIL  Take 1 tablet (12.5 mg total) by mouth once daily.     losartan 50 MG tablet  Commonly known as:  COZAAR  Take 1 tablet (50 mg total) by mouth once daily.     metFORMIN 500 MG tablet  Commonly known as:  GLUCOPHAGE  TAKE 1 TABLET BY MOUTH ONCE DAILY WITH BREAKFAST     podofilox 0.5 % gel  Commonly known as:  CONDYLOX  Apply topically 2 (two) times daily.            Time spent on the discharge of patient: 30 minutes    Lobito Castro MD  Cardiac Electrophysiology  [facility] Short Stay Cardiac Unit

## 2020-05-06 NOTE — NURSING TRANSFER
Nursing Transfer Note      5/6/2020     Transfer To: SSCU 9    Transfer via stretcher    Transfer with cardiac monitoring    Transported by BEHZAD Looney    Medicines sent: None    Chart send with patient: Yes    Notified: Cousin    Patient reassessed at: see epic (date, time)    Upon arrival to floor: cardiac monitor applied, patient oriented to room, call bell in reach and bed in lowest position. Report given to BEHZAD Leigh in SSCU.

## 2020-05-06 NOTE — Clinical Note
The site was marked. Prepped: groin. Prepped with: ChloraPrep and Ioban. The site was clipped. The patient was draped.

## 2020-05-06 NOTE — TRANSFER OF CARE
"Anesthesia Transfer of Care Note    Patient: Renny Young    Procedure(s) Performed: Procedure(s) (LRB):  REPLACEMENT, PULSE GENERATOR, CARDIAC PACEMAKER (Left)  REVISION, ELECTRODE LEAD, CARDIAC PACEMAKER (Left)    Patient location: PACU    Anesthesia Type: general    Transport from OR: Transported from OR on 100% O2 by closed face mask with adequate spontaneous ventilation    Post pain: adequate analgesia    Post assessment: no apparent anesthetic complications and tolerated procedure well    Post vital signs: stable    Level of consciousness: awake, alert and oriented    Nausea/Vomiting: no nausea/vomiting    Complications: none    Transfer of care protocol was followed      Last vitals:   Visit Vitals  BP (!) 173/98 (BP Location: Right arm, Patient Position: Lying)   Pulse 78   Temp 36.8 °C (98.2 °F) (Oral)   Resp 18   Ht 5' 7" (1.702 m)   Wt 70.3 kg (155 lb)   SpO2 98%   BMI 24.28 kg/m²     "

## 2020-05-06 NOTE — H&P
"Electrophysiology Pre Procedure H&P    HPI:   Renny Young is a 59 y.o. male with SSS s/p dual chamber ppm, DM2, syncope, and HTN who presents to [facility] today for outpatient elective generator change and possible RA lead revision. He has no fever or chills. No chest pain or pressure. No shortness of breath. He does have some fatigue and effort intolerance.    From Dr Spence's clinic note 4/30/20:  "Renny Young has a history of hypertension, DM2, and sick sinus syndrome status-post [employer] dual chamber pacemaker implantion at [facility] in 7/2009 for syncope. Mr. Young describes that following implantation, he experienced discomfort when his RV lead was paced. Apparently his RV lead output was lowered, which improved his symptoms. At his visit with me in 10/2016, he had GI complaints which he attributed to his RV pacing lead. At his bequest, I reprogrammed his device to an AAI pacing mode.  Today he complains of belching and fatigue that comes and goes. He will experience these symptoms frequently off and on for a couple of days and then feel fine for days (episodes can occur once a month or once every few months). These symptoms have been going on for years (see note from 2015). He does say that he feels better with v pacing off. Mr. Young denies chest pain with exertion or at rest, palpitations, dizziness, or syncope.  At his last visit in 3/2019, he described intermittent belching and fatigue, but otherwise felt well. He admitted to feeling better with his RV pacing lead turned off.  Today Mr. Young has no complaints.   A device check performed on 4/28/2020 indicated his device is at EOS (reached KELLY on 10/3/2019, and consistent with early battery depletion based on past measurements). Additionally his atrial lead impedence, which has historically been stable, was measured in the office at 968-1500 ohms (3x above normal), with 38% RA pacing."  Past Medical History:   Diagnosis Date    Diabetes mellitus, type 2     " Hypertension     Pacemaker       Social History     Socioeconomic History    Marital status:      Spouse name: Not on file    Number of children: 3    Years of education: Not on file    Highest education level: Not on file   Occupational History    Occupation:    Social Needs    Financial resource strain: Not on file    Food insecurity:     Worry: Not on file     Inability: Not on file    Transportation needs:     Medical: Not on file     Non-medical: Not on file   Tobacco Use    Smoking status: Current Every Day Smoker     Packs/day: 0.50     Types: Cigarettes    Smokeless tobacco: Never Used   Substance and Sexual Activity    Alcohol use: Yes     Comment: Beer- Socially    Drug use: No    Sexual activity: Yes   Lifestyle    Physical activity:     Days per week: Not on file     Minutes per session: Not on file    Stress: Not on file   Relationships    Social connections:     Talks on phone: Not on file     Gets together: Not on file     Attends Rastafarian service: Not on file     Active member of club or organization: Not on file     Attends meetings of clubs or organizations: Not on file     Relationship status: Not on file   Other Topics Concern    Not on file   Social History Narrative    Not on file        Family History   Problem Relation Age of Onset    Diabetes Mother     Diabetes Father     Kidney disease Father     Melanoma Neg Hx         Current Facility-Administered Medications   Medication Dose Route Frequency Provider Last Rate Last Dose    ceFAZolin injection 2 g  2 g Intravenous On Call Procedure Bradford Spence MD            Constitutional: (-)fevers, (-)chills, (-)night sweats  HEENT: (-) headaches (-) lightheadedness (-) blurry vision  Cardiovascular: (-)chest pain (-)paroxysmal nocturnal dyspnea (-)orthopnea  Respiratory: (-)shortness of breath, (-)dyspnea on exertion (-)hemoptysis  Gastrointestinal: (-)abdominal pain (-)nausea (-)vomiting (-)tarry stools.  "+belching  Musculoskeletal: (-)arthralgias (-)limited range of motion  Neurologic: (-)parasthesias (-)mood disorder (-)anxiety  Endo: (-)polyuria (-)polydipsia (-)heat/cold intolerance  Skin: (-)rash     Vitals:    05/06/20 1059 05/06/20 1101   BP: (!) 188/93 (!) 173/98   BP Location: Left arm Right arm   Patient Position: Lying Lying   Pulse: 78    Resp: 18    Temp: 98.2 °F (36.8 °C)    TempSrc: Oral    SpO2: 98%    Weight: 70.3 kg (155 lb)    Height: 5' 7" (1.702 m)      Physical Exam:   Gen: no acute distress, pleasant patient answering questions appropriately  HEENT: extra-ocular muscles intact, normocephalic-atraumatic  Neck: no jugular venous distension  CVS: regular rate and rhythm  CHEST: nonlabored respirations  ABD: Soft  EXT: No Edema  NEURO: awake, alert, and oriented   Skin: No rash     Review of Labs:   Lab Results   Component Value Date    INR 1.1 09/28/2015     Lab Results   Component Value Date    WBC 8.23 05/06/2020    HGB 16.3 05/06/2020    HCT 52.1 05/06/2020    MCV 91 05/06/2020     05/06/2020     CMP  Sodium   Date Value Ref Range Status   02/17/2020 140 136 - 145 mmol/L Final     Potassium   Date Value Ref Range Status   02/17/2020 4.1 3.5 - 5.1 mmol/L Final     Chloride   Date Value Ref Range Status   02/17/2020 104 95 - 110 mmol/L Final     CO2   Date Value Ref Range Status   02/17/2020 24 23 - 29 mmol/L Final     Glucose   Date Value Ref Range Status   02/17/2020 90 70 - 110 mg/dL Final     BUN, Bld   Date Value Ref Range Status   02/17/2020 16 6 - 20 mg/dL Final     Creatinine   Date Value Ref Range Status   02/17/2020 1.2 0.5 - 1.4 mg/dL Final     Calcium   Date Value Ref Range Status   02/17/2020 10.0 8.7 - 10.5 mg/dL Final     Total Protein   Date Value Ref Range Status   02/17/2020 8.1 6.0 - 8.4 g/dL Final     Albumin   Date Value Ref Range Status   02/17/2020 4.6 3.5 - 5.2 g/dL Final     Total Bilirubin   Date Value Ref Range Status   02/17/2020 0.8 0.1 - 1.0 mg/dL Final     " Comment:     For infants and newborns, interpretation of results should be based  on gestational age, weight and in agreement with clinical  observations.  Premature Infant recommended reference ranges:  Up to 24 hours.............<8.0 mg/dL  Up to 48 hours............<12.0 mg/dL  3-5 days..................<15.0 mg/dL  6-29 days.................<15.0 mg/dL       Alkaline Phosphatase   Date Value Ref Range Status   02/17/2020 78 55 - 135 U/L Final     AST   Date Value Ref Range Status   02/17/2020 28 10 - 40 U/L Final     ALT   Date Value Ref Range Status   02/17/2020 23 10 - 44 U/L Final     Anion Gap   Date Value Ref Range Status   02/17/2020 12 8 - 16 mmol/L Final     eGFR if    Date Value Ref Range Status   02/17/2020 >60.0 >60 mL/min/1.73 m^2 Final     eGFR if non    Date Value Ref Range Status   02/17/2020 >60.0 >60 mL/min/1.73 m^2 Final     Comment:     Calculation used to obtain the estimated glomerular filtration  rate (eGFR) is the CKD-EPI equation.        Most recent ECG  Sinus    Assessment/Plan:  Renny Young is a 59 y.o. male with SSS s/p dual chamber ppm, DM2, syncope, and HTN who presents to [facility] today for outpatient elective generator change and possible RA lead revision.  We discussed the alternatives, benefits and risks of the procedure including pain, infection, bleeding, injury to lung causing pneumothorax requiring tube placement, injury to heart valves, puncture of the heart leading to pericardial effusion or tamponade requiring tube drainage, heart attack, stroke and death.  Consent signed and placed in chart.

## 2020-05-06 NOTE — PLAN OF CARE
Discharge instructions and medlist given.  Patient verbalized understanding.  Escort requested with wheelchair.

## 2020-05-06 NOTE — PLAN OF CARE
Pt transferred from recovery via stretcher.  Vss.  Post op orders and assessment initiated.  Pt aao, tolerating po.  Family called to bs.  Pt in no acute distress and verbalizes no complaints. Call bell within reach.  Will continue to monitor.

## 2020-05-06 NOTE — DISCHARGE INSTRUCTIONS
NO HEPARIN PRODUCTS  Keflex 500 mg TID for 5 days at discharge  No lifting over 5 pounds  No driving for 1 week and for 4 weeks if patient uses left arm to make turns  Patient may shower in 24 hours, do not let beam of shower hit site directly and no scrubbing in area  Follow up in device clinic in 1 week and with Dr Spence in 4 months.    Remove the thick white tape tomorrow morning. Use the provided swabs to help dissolve the adhesive of the tape by applying swab to where the tape sticks to the skin. Go slowly and have some one help you as this tape can cause skin tears.     There is a beige/brown colored dressing under the white tape. This dressing on your wound is to stay intact and in place until you see our nurses for a wound check in 1 week.  This dressing can get wet so you can shower tomorrow.  Your paper instructions may mention to remove the dressing tomorrow, however that is in reference to a different type of bandage.  This bandage is unique and helps prevent infection and should stay on until your wound check.

## 2020-05-06 NOTE — BRIEF OP NOTE
Presented in fasting state. Safety timeout performed. Sedation per anesthesia. Lidocaine for local anesthetic. Antibiotics prior to incision. Incision made. Blunt and electrocautery dissection to level of existing generator. RA lead tested through PSA. Appropriate numbers obtained. New generator connected to leads. Pocket washed with antibiotic solution. Generator placed in pocket. Pocket washed with antibiotic solution again. Pocket closed.     Lobito Castro MD PGY7

## 2020-05-06 NOTE — ANESTHESIA POSTPROCEDURE EVALUATION
Anesthesia Post Evaluation    Patient: Renny Young    Procedure(s) Performed: Procedure(s) (LRB):  REPLACEMENT, PULSE GENERATOR, CARDIAC PACEMAKER (Left)  REVISION, ELECTRODE LEAD, CARDIAC PACEMAKER (Left)    Final Anesthesia Type: general    Patient location during evaluation: PACU  Patient participation: Yes- Able to Participate  Level of consciousness: awake and alert  Post-procedure vital signs: reviewed and stable  Pain management: adequate  Airway patency: patent    PONV status at discharge: No PONV  Anesthetic complications: no      Cardiovascular status: blood pressure returned to baseline  Respiratory status: unassisted  Follow-up not needed.          Vitals Value Taken Time   /81 5/6/2020  5:05 PM   Temp 35.9 °C (96.6 °F) 5/6/2020  5:05 PM   Pulse 60 5/6/2020  5:05 PM   Resp 18 5/6/2020  5:05 PM   SpO2 99 % 5/6/2020  4:02 PM   Vitals shown include unvalidated device data.      No case tracking events are documented in the log.      Pain/Gill Score: Gill Score: 10 (5/6/2020  5:11 PM)

## 2020-05-06 NOTE — PLAN OF CARE
Patient arrived to room. PIV placed. Admit assessment completed. Plan of care discussed with patient. Will monitor

## 2020-05-06 NOTE — Clinical Note
Pre existing device removed from pocket, pre-existing leads disconnected from device, device explanted and returned to

## 2020-05-06 NOTE — PLAN OF CARE
Dr Spence notified of patient's bmp clotted.  He reports it is not necessary to recollect.  Darryn was notified that coags was clotted.

## 2020-05-06 NOTE — ANESTHESIA PREPROCEDURE EVALUATION
05/06/2020  Renny Young is a 59 y.o., male presenting for pacemaker gen change and possible RA lead revision.  PPM put in for SSS.  No recent echo in our system.    Past Medical History:   Diagnosis Date    Diabetes mellitus, type 2     Hypertension     Pacemaker      Past Surgical History:   Procedure Laterality Date    COLONOSCOPY N/A 2/13/2017    Procedure: COLONOSCOPY;  Surgeon: NILDA Juares MD;  [facility]  Service: Endoscopy;  Laterality: N/A;  Do not cancel this order. Patient has Pacemaker in place.     EPIDURAL STEROID INJECTION N/A 8/13/2019    Procedure: INJECTION, STEROID, EPIDURAL IL, L5/S1;  Surgeon: Josue Paredes MD;  Location: [facility] PAIN MGT;  Service: Pain Management;  Laterality: N/A;  IL JEAN MARIE L5/S1    HERNIA REPAIR      INJECTION OF ANESTHETIC AGENT AROUND NERVE Bilateral 7/5/2018    Procedure: BLOCK, NERVE;  Surgeon: Krystal Mtz MD;  Location: [facility] PAIN MGT;  Service: Pain Management;  Laterality: Bilateral;  Lumbar Bilateral L3-L4-L5 Medial Branch Block    RADIOFREQUENCY ABLATION Left 6/11/2019    Procedure: RADIOFREQUENCY ABLATION, L3-L4-L5 MEDIAL BRANCH   1 OF 2;  Surgeon: Josue Paredes MD;  Location: [facility] PAIN MGT;  Service: Pain Management;  Laterality: Left;    RADIOFREQUENCY ABLATION Right 6/25/2019    Procedure: RADIOFREQUENCY ABLATION, L3-L4-L5 MEDIAL BRANCH JING 2 OF 2;  Surgeon: Josue Paredes MD;  Location: [facility] PAIN MGT;  Service: Pain Management;  Laterality: Right;    TRANSFORAMINAL EPIDURAL INJECTION OF STEROID Right 6/5/2018    Procedure: INJECTION-STEROID-EPIDURAL-TRANSFORAMINAL;  Surgeon: Josue Paredes MD;  Location: [facility] PAIN MGT;  Service: Pain Management;  Laterality: Right;  LUMBAR RIGHT L5 AND S1 TRANSFORAMINL JEAN MARIE  [id number]    W/ SEDATION      Review of patient's allergies indicates:  No Known Allergies  No  current facility-administered medications on file prior to encounter.      Current Outpatient Medications on File Prior to Encounter   Medication Sig Dispense Refill    amLODIPine (NORVASC) 5 MG tablet Take 1 tablet (5 mg total) by mouth once daily. 90 tablet 0    aspirin (ECOTRIN) 81 MG EC tablet Take 81 mg by mouth once daily.      atorvastatin (LIPITOR) 20 MG tablet Take 1 tablet (20 mg total) by mouth once daily. 90 tablet 0    gabapentin (NEURONTIN) 300 MG capsule Take 1 capsule (300 mg total) by mouth 2 (two) times daily. 60 capsule 5    hydroCHLOROthiazide (HYDRODIURIL) 12.5 MG Tab Take 1 tablet (12.5 mg total) by mouth once daily. 90 tablet 0    losartan (COZAAR) 50 MG tablet Take 1 tablet (50 mg total) by mouth once daily. 90 tablet 0    metFORMIN (GLUCOPHAGE) 500 MG tablet TAKE 1 TABLET BY MOUTH ONCE DAILY WITH BREAKFAST 90 tablet 0    ammonium lactate 12 % Crea Apply twice daily to affected parts both feet as needed. 140 g 11    podofilox (CONDYLOX) 0.5 % gel Apply topically 2 (two) times daily. 3.5 g 2     Lab Results   Component Value Date    WBC 12.57 04/06/2017    HGB 15.6 04/06/2017    HCT 46.0 04/06/2017    MCV 87 04/06/2017     04/06/2017       BMP  Lab Results   Component Value Date     02/17/2020    K 4.1 02/17/2020     02/17/2020    CO2 24 02/17/2020    BUN 16 02/17/2020    CREATININE 1.2 02/17/2020    CALCIUM 10.0 02/17/2020    ANIONGAP 12 02/17/2020    ESTGFRAFRICA >60.0 02/17/2020    EGFRNONAA >60.0 02/17/2020         Anesthesia Evaluation    I have reviewed the Patient Summary Reports.     I have reviewed the Medications.     Review of Systems  Anesthesia Hx:  No problems with previous Anesthesia   Denies Personal Hx of Anesthesia complications.   Cardiovascular:   Exercise tolerance: good Pacemaker Hypertension    Pulmonary:  Pulmonary Normal    Renal/:  Renal/ Normal     Hepatic/GI:  Hepatic/GI Normal    Neurological:   Denies CVA. Denies Seizures.     Endocrine:   Diabetes        Physical Exam  General:  Well nourished    Airway/Jaw/Neck:  Airway Findings: Mouth Opening: Normal Tongue: Large  General Airway Assessment: Adult  Mallampati: IV  TM Distance: Normal, at least 6 cm  Jaw/Neck Findings:  Neck ROM: Normal ROM      Dental:  Dental Findings: Edentulous        Mental Status:  Mental Status Findings:  Cooperative, Alert and Oriented         Anesthesia Plan  Type of Anesthesia, risks & benefits discussed:  Anesthesia Type:  general  Patient's Preference: natural airway  Intra-op Monitoring Plan: standard ASA monitors  Intra-op Monitoring Plan Comments:   Post Op Pain Control Plan: per primary service following discharge from PACU and IV/PO Opioids PRN  Post Op Pain Control Plan Comments:   Induction:   IV  Beta Blocker:  Patient is not currently on a Beta-Blocker (No further documentation required).       Informed Consent: Patient understands risks and agrees with Anesthesia plan.  Questions answered. Anesthesia consent signed with patient.  ASA Score: 3     Day of Surgery Review of History & Physical:    H&P update referred to the surgeon.         Ready For Surgery From Anesthesia Perspective.

## 2020-05-08 ENCOUNTER — PATIENT OUTREACH (OUTPATIENT)
Dept: ADMINISTRATIVE | Facility: OTHER | Age: 60
End: 2020-05-08

## 2020-05-14 ENCOUNTER — TELEPHONE (OUTPATIENT)
Dept: ELECTROPHYSIOLOGY | Facility: CLINIC | Age: 60
End: 2020-05-14

## 2020-05-14 ENCOUNTER — CLINICAL SUPPORT (OUTPATIENT)
Dept: CARDIOLOGY | Facility: HOSPITAL | Age: 60
End: 2020-05-14
Attending: INTERNAL MEDICINE
Payer: COMMERCIAL

## 2020-05-14 ENCOUNTER — TELEPHONE (OUTPATIENT)
Dept: CARDIOLOGY | Facility: HOSPITAL | Age: 60
End: 2020-05-14

## 2020-05-14 DIAGNOSIS — Z95.0 PACEMAKER: ICD-10-CM

## 2020-05-14 DIAGNOSIS — I49.5 SICK SINUS SYNDROME: ICD-10-CM

## 2020-05-14 PROCEDURE — 93279 PRGRMG DEV EVAL PM/LDLS PM: CPT | Mod: 26,,, | Performed by: INTERNAL MEDICINE

## 2020-05-14 PROCEDURE — 93279 PRGRMG DEV EVAL PM/LDLS PM: CPT

## 2020-05-14 PROCEDURE — 93279 CARDIAC DEVICE CHECK - IN CLINIC & HOSPITAL: ICD-10-PCS | Mod: 26,,, | Performed by: INTERNAL MEDICINE

## 2020-05-14 NOTE — TELEPHONE ENCOUNTER
"Pt was issued a Merlin post PPM Gen change. He states when he plugged it in he felt "belching for 30 minutes and did not feel well."    Had pt plug in Merlin while on PPM . No ectopy or arrhythmias noted.  Advised pt the monitor does not interfere with his PPM, just collects data.   Pt will go home and try Merlin.     He will contact clinic if he disconnects monitor.   "

## 2020-05-14 NOTE — TELEPHONE ENCOUNTER
Returned call to pt, no answer. Left message with number for pt to return call.      ----- Message from Homa Mason MA sent at 5/14/2020 12:39 PM CDT -----  Regarding: FW: Return to work papers, s/p Generator change      ----- Message -----  From: Magdalena Rubin RN  Sent: 5/14/2020  11:12 AM CDT  To: Alley Mi RN, Bebe Felder Staff  Subject: Return to work papers, s/p Generator change      Pt is asking for paperwork to return to work.  Can you please contact him.    Thanks,  Magdalena

## 2020-05-15 ENCOUNTER — TELEPHONE (OUTPATIENT)
Dept: ELECTROPHYSIOLOGY | Facility: CLINIC | Age: 60
End: 2020-05-15

## 2020-05-15 NOTE — TELEPHONE ENCOUNTER
Pt had generator change with no RA lead rev. He is calling to see when he can go back to work. 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 He is concerned that going back to soon may delay healing. Advised I would speak with Dr Spence and return a call on Monday.      ----- Message from Homa Mason MA sent at 5/15/2020  8:48 AM CDT -----  Contact: Patient      ----- Message -----  From: Leny Leonard  Sent: 5/14/2020   4:45 PM CDT  To: Bebe Felder Staff    The Pt is returning a call. Please  call him back @ [phone]. Thanks, Leny

## 2020-05-18 ENCOUNTER — TELEPHONE (OUTPATIENT)
Dept: ELECTROPHYSIOLOGY | Facility: CLINIC | Age: 60
End: 2020-05-18

## 2020-05-18 DIAGNOSIS — Z95.0 PACEMAKER: Primary | ICD-10-CM

## 2020-05-18 DIAGNOSIS — Z95.0 CARDIAC PACEMAKER IN SITU: ICD-10-CM

## 2020-05-18 DIAGNOSIS — I49.5 SICK SINUS SYNDROME: ICD-10-CM

## 2020-05-18 NOTE — TELEPHONE ENCOUNTER
Discussed with Dr Spence. He is okay with the patient waiting 6 weeks before returning to work due to [employer] work duties. Letter sent to pt.       ----- Message from Alley Mi RN sent at 5/15/2020  3:53 PM CDT -----  Contact: Patient  Pt had generator change with no RA lead rev. He is calling to see when he can go back to work. 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 He is concerned that going back to soon may delay healing. Advised I would speak with Dr Spence and return a call on Monday.        ----- Message -----  From: Homa Mason MA  Sent: 5/15/2020   8:48 AM CDT  To: Alley Mi RN        ----- Message -----  From: Leny Leonard  Sent: 5/14/2020   4:45 PM CDT  To: Bebe Felder Staff    The Pt is returning a call. Please  call him back @ [phone]. Thanks, Leny

## 2020-05-20 ENCOUNTER — NURSE TRIAGE (OUTPATIENT)
Dept: ADMINISTRATIVE | Facility: CLINIC | Age: 60
End: 2020-05-20

## 2020-05-20 NOTE — TELEPHONE ENCOUNTER
Two attempts made, phone line is busy.    Reason for Disposition   Second attempt to contact family AND no contact made. Phone number verified.    Protocols used: NO CONTACT OR DUPLICATE CONTACT CALL-A-OH

## 2020-05-26 ENCOUNTER — TELEPHONE (OUTPATIENT)
Dept: CARDIOLOGY | Facility: HOSPITAL | Age: 60
End: 2020-05-26

## 2020-05-26 ENCOUNTER — TELEPHONE (OUTPATIENT)
Dept: ELECTROPHYSIOLOGY | Facility: CLINIC | Age: 60
End: 2020-05-26

## 2020-05-26 NOTE — TELEPHONE ENCOUNTER
Patient states he does not have [email or web address] plugged in since he thinks it is hurting him when it is plugged in. Reinforced that it is not hurting him, but is sending a transmission when he sleeps at night. He wants to talk to Magdalena since he is not using it. Magdalena Rubin  messaged.

## 2020-05-26 NOTE — TELEPHONE ENCOUNTER
Message   Received: Today   Message Contents   BEHZAD Inman RN   Cc: Jhony Peters   Caller: Unspecified (Today,  9:49 AM)             Tried to explain to pt that his Merlin is not causing him to have SOB or belching, but pt insists when he plugs it in even if he goes to another room he is symptomatic.     Told pt to unplug it but hold onto monitor at this time.   We will check him in clinic every 6 mos and I will make a note of this in Merlin.   Magdalena    Previous Messages

## 2020-06-02 ENCOUNTER — TELEPHONE (OUTPATIENT)
Dept: ELECTROPHYSIOLOGY | Facility: CLINIC | Age: 60
End: 2020-06-02

## 2020-06-02 NOTE — TELEPHONE ENCOUNTER
Attempted to reach pt, no answer. Left message with number for him to return call.         ----- Message from Jelly Kimball MA sent at 6/2/2020  8:42 AM CDT -----  Contact: pt      ----- Message -----  From: Yodit Rg  Sent: 6/1/2020  10:29 AM CDT  To: Bebe Felder Staff    Pt had a procedure on 5/6/20 and was scheduled to be out of work until the 18th but his job called him this morning and wants him to return today.  He needs to speak to someone ASAP and can be reached at [phone].    Thank you

## 2020-06-03 ENCOUNTER — TELEPHONE (OUTPATIENT)
Dept: PAIN MEDICINE | Facility: CLINIC | Age: 60
End: 2020-06-03

## 2020-06-03 NOTE — TELEPHONE ENCOUNTER
----- Message from Oli Awad sent at 6/3/2020 10:55 AM CDT -----  Contact: ANDRES MONTERROSO [[id number]]  Name of Who is Calling:ANDRES MONTERROSO [[id number]]     What is the request in detail:ANDRES MONTERROSO [[id number] is  Calling in regards to injection ...]Please contact to further discuss and advise      Can the clinic reply by [facility]: no     [facility]  [phone] (home)

## 2020-06-03 NOTE — TELEPHONE ENCOUNTER
Staff returned call to patient in regards to his message.    Staff informed patient he would have to be schedule for an office visit with VENICE due to his last office visit being 6 months ago(patient agreed).    Staff was able to schedule patient for 6/9/20 at 11:20am     Pt verbalized understanding and has accepted the appointment.

## 2020-06-04 ENCOUNTER — TELEPHONE (OUTPATIENT)
Dept: ORTHOPEDICS | Facility: CLINIC | Age: 60
End: 2020-06-04

## 2020-06-04 ENCOUNTER — PATIENT OUTREACH (OUTPATIENT)
Dept: ADMINISTRATIVE | Facility: OTHER | Age: 60
End: 2020-06-04

## 2020-06-04 DIAGNOSIS — M79.641 RIGHT HAND PAIN: Primary | ICD-10-CM

## 2020-06-04 NOTE — PROGRESS NOTES
Chart reviewed.   Immunizations: Triggered Imm Registry     Orders placed: n/a  Upcoming appts to satisfy CLAUDETTE topics: n/a

## 2020-06-05 ENCOUNTER — HOSPITAL ENCOUNTER (OUTPATIENT)
Dept: RADIOLOGY | Facility: OTHER | Age: 60
Discharge: HOME | End: 2020-06-05
Attending: FAMILY MEDICINE
Payer: COMMERCIAL

## 2020-06-05 ENCOUNTER — OFFICE VISIT (OUTPATIENT)
Dept: ORTHOPEDICS | Facility: CLINIC | Age: 60
End: 2020-06-05
Payer: COMMERCIAL

## 2020-06-05 VITALS
SYSTOLIC BLOOD PRESSURE: 139 MMHG | WEIGHT: 155 LBS | DIASTOLIC BLOOD PRESSURE: 86 MMHG | HEART RATE: 63 BPM | HEIGHT: 67 IN | BODY MASS INDEX: 24.33 KG/M2

## 2020-06-05 DIAGNOSIS — G56.03 BILATERAL CARPAL TUNNEL SYNDROME: Primary | ICD-10-CM

## 2020-06-05 DIAGNOSIS — G56.00 CARPAL TUNNEL SYNDROME, UNSPECIFIED LATERALITY: ICD-10-CM

## 2020-06-05 DIAGNOSIS — M79.641 RIGHT HAND PAIN: ICD-10-CM

## 2020-06-05 DIAGNOSIS — M65.312 TRIGGER THUMB OF LEFT HAND: ICD-10-CM

## 2020-06-05 PROCEDURE — 3079F DIAST BP 80-89 MM HG: CPT | Mod: CPTII,S$GLB,, | Performed by: PLASTIC SURGERY

## 2020-06-05 PROCEDURE — 99203 OFFICE O/P NEW LOW 30 MIN: CPT | Mod: 25,S$GLB,, | Performed by: PLASTIC SURGERY

## 2020-06-05 PROCEDURE — 3008F PR BODY MASS INDEX (BMI) DOCUMENTED: ICD-10-PCS | Mod: CPTII,S$GLB,, | Performed by: PLASTIC SURGERY

## 2020-06-05 PROCEDURE — 3075F PR MOST RECENT SYSTOLIC BLOOD PRESS GE 130-139MM HG: ICD-10-PCS | Mod: CPTII,S$GLB,, | Performed by: PLASTIC SURGERY

## 2020-06-05 PROCEDURE — 3075F SYST BP GE 130 - 139MM HG: CPT | Mod: CPTII,S$GLB,, | Performed by: PLASTIC SURGERY

## 2020-06-05 PROCEDURE — 73130 X-RAY EXAM OF HAND: CPT | Mod: TC,FY,RT

## 2020-06-05 PROCEDURE — 73130 X-RAY EXAM OF HAND: CPT | Mod: 26,RT,, | Performed by: RADIOLOGY

## 2020-06-05 PROCEDURE — 20550 PR INJECT TENDON SHEATH/LIGAMENT: ICD-10-PCS | Mod: 51,FA,S$GLB, | Performed by: PLASTIC SURGERY

## 2020-06-05 PROCEDURE — 3079F PR MOST RECENT DIASTOLIC BLOOD PRESSURE 80-89 MM HG: ICD-10-PCS | Mod: CPTII,S$GLB,, | Performed by: PLASTIC SURGERY

## 2020-06-05 PROCEDURE — 73130 XR HAND COMPLETE 3 VIEW RIGHT: ICD-10-PCS | Mod: 26,RT,, | Performed by: RADIOLOGY

## 2020-06-05 PROCEDURE — 20526 PR INJECT CARPAL TUNNEL: ICD-10-PCS | Mod: 50,S$GLB,, | Performed by: PLASTIC SURGERY

## 2020-06-05 PROCEDURE — 3008F BODY MASS INDEX DOCD: CPT | Mod: CPTII,S$GLB,, | Performed by: PLASTIC SURGERY

## 2020-06-05 PROCEDURE — 99999 PR PBB SHADOW E&M-EST. PATIENT-LVL III: CPT | Mod: PBBFAC,,, | Performed by: PLASTIC SURGERY

## 2020-06-05 PROCEDURE — 20526 THER INJECTION CARP TUNNEL: CPT | Mod: 50,S$GLB,, | Performed by: PLASTIC SURGERY

## 2020-06-05 PROCEDURE — 99203 PR OFFICE/OUTPT VISIT, NEW, LEVL III, 30-44 MIN: ICD-10-PCS | Mod: 25,S$GLB,, | Performed by: PLASTIC SURGERY

## 2020-06-05 PROCEDURE — 99999 PR PBB SHADOW E&M-EST. PATIENT-LVL III: ICD-10-PCS | Mod: PBBFAC,,, | Performed by: PLASTIC SURGERY

## 2020-06-05 PROCEDURE — 20550 NJX 1 TENDON SHEATH/LIGAMENT: CPT | Mod: 51,FA,S$GLB, | Performed by: PLASTIC SURGERY

## 2020-06-05 RX ORDER — TRIAMCINOLONE ACETONIDE 40 MG/ML
60 INJECTION, SUSPENSION INTRA-ARTICULAR; INTRAMUSCULAR
Status: COMPLETED | OUTPATIENT
Start: 2020-06-05 | End: 2020-06-05

## 2020-06-05 RX ADMIN — TRIAMCINOLONE ACETONIDE 60 MG: 40 INJECTION, SUSPENSION INTRA-ARTICULAR; INTRAMUSCULAR at 09:06

## 2020-06-05 NOTE — PROGRESS NOTES
Chief Complaint: Numbness of the Right Hand      HPI:    Renny Young is a right hand dominant 59 y.o. male presenting today for bilateral hand numbness and tingling particularly the right hand.  He also has noticed triggering of the left thumb for the past several months.  The patient states over last several months he has had numbness and tingling within the right hand that is somewhat improved over last several weeks.  He complained of nocturnal symptoms.  He underwent an EMG in February of this year which demonstrated bilateral moderate carpal tunnel syndrome.  [facility].  He denies any previous history of trauma.  He has no other complaints today.    Past Medical History:   Diagnosis Date    Diabetes mellitus, type 2     Hypertension     Pacemaker        Past Surgical History:   Procedure Laterality Date    COLONOSCOPY N/A 2/13/2017    Procedure: COLONOSCOPY;  Surgeon: NILDA Juares MD;  Location: [address]);  Service: Endoscopy;  Laterality: N/A;  Do not cancel this order. Patient has Pacemaker in place.     EPIDURAL STEROID INJECTION N/A 8/13/2019    Procedure: INJECTION, STEROID, EPIDURAL IL, L5/S1;  Surgeon: Josue Paredes MD;  Location: [facility] PAIN MGT;  Service: Pain Management;  Laterality: N/A;  IL JEAN MARIE L5/S1    HERNIA REPAIR      INJECTION OF ANESTHETIC AGENT AROUND NERVE Bilateral 7/5/2018    Procedure: BLOCK, NERVE;  Surgeon: Krystal Mtz MD;  Location: [facility] PAIN MGT;  Service: Pain Management;  Laterality: Bilateral;  Lumbar Bilateral L3-L4-L5 Medial Branch Block    RADIOFREQUENCY ABLATION Left 6/11/2019    Procedure: RADIOFREQUENCY ABLATION, L3-L4-L5 MEDIAL BRANCH   1 OF 2;  Surgeon: Josue Paredes MD;  Location: [facility] PAIN T;  Service: Pain Management;  Laterality: Left;    RADIOFREQUENCY ABLATION Right 6/25/2019    Procedure: RADIOFREQUENCY ABLATION, L3-L4-L5 MEDIAL BRANCH JING 2 OF 2;  Surgeon: Josue MATAMOROS  MD Lena;  Location: [facility] PAIN MGT;  Service: Pain Management;  Laterality: Right;    REPLACEMENT OF PACEMAKER GENERATOR Left 5/6/2020    Procedure: REPLACEMENT, PULSE GENERATOR, CARDIAC PACEMAKER;  Surgeon: Bradford Spence MD;  Location: [facility] EP LAB;  Service: Cardiology;  Laterality: Left;  EOL/LEAD Mlfx, Gen Change, Poss RA lead rev, SJM, MAC, GP, 3 PREP    REVISION OF PROCEDURE INVOLVING PACEMAKER LEAD Left 5/6/2020    Procedure: REVISION, ELECTRODE LEAD, CARDIAC PACEMAKER;  Surgeon: Bradford Spence MD;  Location: [facility] EP LAB;  Service: Cardiology;  Laterality: Left;    TRANSFORAMINAL EPIDURAL INJECTION OF STEROID Right 6/5/2018    Procedure: INJECTION-STEROID-EPIDURAL-TRANSFORAMINAL;  Surgeon: Josue Paredes MD;  Location: [facility] PAIN MGT;  Service: Pain Management;  Laterality: Right;  LUMBAR RIGHT L5 AND S1 TRANSFORAMINL JEAN MARIE  19886    W/ SEDATION         Family History   Problem Relation Age of Onset    Diabetes Mother     Diabetes Father     Kidney disease Father     Melanoma Neg Hx        Social History     Socioeconomic History    Marital status:      Spouse name: Not on file    Number of children: 3    Years of education: Not on file    Highest education level: Not on file   Occupational History    Occupation:    Social Needs    Financial resource strain: Not on file    Food insecurity:     Worry: Not on file     Inability: Not on file    Transportation needs:     Medical: Not on file     Non-medical: Not on file   Tobacco Use    Smoking status: Current Every Day Smoker     Packs/day: 0.50     Types: Cigarettes    Smokeless tobacco: Never Used   Substance and Sexual Activity    Alcohol use: Yes     Comment: Beer- Socially    Drug use: No    Sexual activity: Yes   Lifestyle    Physical activity:     Days per week: Not on file     Minutes per session: Not on file    Stress: Not on file   Relationships    Social connections:     Talks on phone: Not on file     Gets  together: Not on file     Attends [employer] service: Not on file     Active member of club or organization: Not on file     Attends meetings of clubs or organizations: Not on file     Relationship status: Not on file   Other Topics Concern    Not on file   Social History Narrative    Not on file       Review of patient's allergies indicates:  No Known Allergies      Current Outpatient Medications:     amLODIPine (NORVASC) 5 MG tablet, Take 1 tablet (5 mg total) by mouth once daily., Disp: 90 tablet, Rfl: 0    ammonium lactate 12 % Crea, Apply twice daily to affected parts both feet as needed., Disp: 140 g, Rfl: 11    aspirin (ECOTRIN) 81 MG EC tablet, Take 81 mg by mouth once daily., Disp: , Rfl:     atorvastatin (LIPITOR) 20 MG tablet, Take 1 tablet (20 mg total) by mouth once daily., Disp: 90 tablet, Rfl: 0    gabapentin (NEURONTIN) 300 MG capsule, Take 1 capsule (300 mg total) by mouth 2 (two) times daily., Disp: 60 capsule, Rfl: 5    hydroCHLOROthiazide (HYDRODIURIL) 12.5 MG Tab, Take 1 tablet (12.5 mg total) by mouth once daily., Disp: 90 tablet, Rfl: 0    losartan (COZAAR) 50 MG tablet, Take 1 tablet (50 mg total) by mouth once daily., Disp: 90 tablet, Rfl: 0    metFORMIN (GLUCOPHAGE) 500 MG tablet, TAKE 1 TABLET BY MOUTH ONCE DAILY WITH BREAKFAST, Disp: 90 tablet, Rfl: 0    podofilox (CONDYLOX) 0.5 % gel, Apply topically 2 (two) times daily., Disp: 3.5 g, Rfl: 2        Review of Systems:  Constitutional: no fever or chills  ENT: no nasal congestion or sore throat  Respiratory: no cough or shortness of breath  Cardiovascular: no chest pain or palpitations  Gastrointestinal: no nausea or vomiting, PUD, GERD, NSAID intolerance  Genitourinary: no hematuria or dysuria  Integument/Breast: no rash or pruritis  Hematologic/Lymphatic: no easy bruising or lymphadenopathy  Musculoskeletal: see HPI  Neurological: no seizures or tremors  Behavioral/Psych: no auditory or visual hallucinations      Objective:     "  PHYSICAL EXAM:  Vitals:    06/05/20 0815   BP: 139/86   Pulse: 63   Weight: 70.3 kg (155 lb)   Height: 5' 7" (1.702 m)   PainSc: 0-No pain     General Appearance: WDWN, NAD  Neuro/Psych: Mood & affect appropriate  Lungs: Respirations equal and unlabored.   CV: No apparent CV dysfunction, distal pulses intact, RRR  Skin: Intact throughout  Extremities:   Right Hand Exam     Tenderness   The patient is experiencing no tenderness.     Range of Motion   The patient has normal right wrist ROM.     Tests   Phalens sign: positive  Tinel's sign (median nerve): negative    Other   Erythema: absent  Sensation: normal  Pulse: present    Comments:  No thenar or hypothenar atrophy good opposition strong intrinsics he is able to make composite fist full active range of motion of all digits at all joints      Left Hand Exam     Tenderness   Left hand tenderness location: Tenderness over the A1 pulley at the base of the thumb, mild active triggering with flexion extension the IP joint.     Tests   Phalens sign: positive  Tinel's sign (median nerve): positive    Other   Erythema: absent  Sensation: normal  Pulse: present    Comments:  No thenar or hypothenar atrophy good opposition strong intrinsics he is able to make composite fist full active range of motion of all digits at all joints, there is a prominence over the radial collateral ligament of the MCP joint of the thumb consistent with a possible old radial collateral ligament tear              DIAGNOSTICS/IMAGING:    Radiologist's Impression:     EXAMINATION:  XR HAND COMPLETE 3 VIEWS BILATERAL    CLINICAL HISTORY:  . Pain in right hand    TECHNIQUE:  PA, lateral, and oblique views of both hands were performed.    COMPARISON:  None    FINDINGS:  Question bilateral ulnar minus versus artifact from positioning.  There is no evidence for acute fracture or dislocation of the hands bilaterally.  No focal bony erosion.  Articular spaces are otherwise relatively maintained. "  Further evaluation as warranted clinically.      Impression         Comments: I have personnaly reviewed the imaging and I agree with the above radiologist's report.    I have explained the risks, benefits, and alternatives of the procedure in detail.  The patient voices understanding and all questions have been answered.  The patient agrees to proceed as planned. After a sterile prep of the skin the bilateral carpal tunnel is injected from the volar approach using a 25 gauge needle with a combination of 1cc 1% plain xylocaine and 20 mg of Kenalog each.  The patient is cautioned and immediate relief of pain is secondary to the local anesthetic and will be temporary.  After the anesthetic wears off there may be a increase in pain that may last for a few hours or a few days and they should use ice to help alleviate this flair up of pain.     PROCEDURE:  I have explained the risks, benefits, and alternatives of the procedure in detail.  The patient voices understanding and all questions have been answered. So after I performed a sterile prep of the skin, the level of there A-1 pulley of the left thumb is injected using a 25 gauge needle from the volar approach with a  combination of 1cc 1% plain Lidocaine and 20 mg of Kenalog.  The patient is cautioned and immediate relief of pain is secondary to the local anesthetic and will be temporary.  After the anesthetic wears off there may be a increase in pain that may last for a few hours or a few days and they should use ice to help alleviate this flair up of pain.             Assessment:     Encounter Diagnoses   Name Primary?    Carpal tunnel syndrome, unspecified laterality     Bilateral carpal tunnel syndrome Yes    Trigger thumb of left hand           Plan/Discussion:   Renny was seen today for numbness.    Diagnoses and all orders for this visit:    Bilateral carpal tunnel syndrome    Carpal tunnel syndrome, unspecified laterality  -     Ambulatory referral/consult  to Orthopedics    Trigger thumb of left hand      Based on findings today and recent EMG the patient has bilateral moderate carpal tunnel syndrome as well as a left trigger thumb.  We discussed the pathophysiology progression treatment of these conditions in detail.  It was advised that he use a splint at night while sleeping to maintain a neutral position of the wrist and avoid further compression of the median nerve while sleeping.  In addition he was offered a corticosteroid injection to the tendon sheath of the left thumb as well as into both carpal tunnels to help alleviate his symptoms.  I discussed that this is a temporary solution and is often therapeutic and diagnostic.  I discussed that if his symptoms fail to improve or he has any worsening symptoms she may return to clinic otherwise may follow up p.r.n. he elected to undergo the procedure today please see the procedure note above

## 2020-06-05 NOTE — LETTER
June 5, 2020      Jarvis Washington MD  [address]  [place] [address]           [address], [address]  [facility] [address]  Phone: [phone]          Patient: Renny Young   MR Number: [id number]   YOB: 1960   Date of Visit: 6/5/2020       Dear Dr. Jarvis Washington:    Thank you for referring Renny Young to me for evaluation. Attached you will find relevant portions of my assessment and plan of care.    If you have questions, please do not hesitate to call me. I look forward to following Renny Young along with you.    Sincerely,    Randy Bynum Jr., MD    Enclosure  CC:  No Recipients    If you would like to receive this communication electronically, please contact [email or web address] or [phone] to request more information on [employer] access.    For providers and/or their staff who would like to refer a patient to [facility], please contact us through our one-stop-shop provider referral line, [facility], at [phone].    If you feel you have received this communication in error or would no longer like to receive these types of communications, please e-mail [email or web address]

## 2020-06-08 ENCOUNTER — TELEPHONE (OUTPATIENT)
Dept: PAIN MEDICINE | Facility: CLINIC | Age: 60
End: 2020-06-08

## 2020-06-08 NOTE — TELEPHONE ENCOUNTER
Staff contacted the patient to confirm 06/09/20 11:20 am with Trista [address]ied[address] N[address] on the 9th floor [address] and to inform the patient of the following:       -Please call [phone] upon arriving to building   -No non essential visitors due to limited seating  -Do you require assistance?no assist   -Stop at temp station/ get mask if you do not have one (Ok to wear your own).    Patient confirmed appointment and verbalized understanding and expressed thanks.

## 2020-06-09 ENCOUNTER — OFFICE VISIT (OUTPATIENT)
Dept: PAIN MEDICINE | Facility: CLINIC | Age: 60
End: 2020-06-09
Payer: COMMERCIAL

## 2020-06-09 VITALS
DIASTOLIC BLOOD PRESSURE: 87 MMHG | WEIGHT: 169 LBS | HEIGHT: 67 IN | BODY MASS INDEX: 26.53 KG/M2 | RESPIRATION RATE: 18 BRPM | SYSTOLIC BLOOD PRESSURE: 151 MMHG | TEMPERATURE: 99 F | HEART RATE: 70 BPM | OXYGEN SATURATION: 100 %

## 2020-06-09 DIAGNOSIS — M47.816 FACET ARTHRITIS OF LUMBAR REGION: ICD-10-CM

## 2020-06-09 DIAGNOSIS — E11.42 DIABETIC POLYNEUROPATHY ASSOCIATED WITH TYPE 2 DIABETES MELLITUS: ICD-10-CM

## 2020-06-09 DIAGNOSIS — M54.16 LUMBAR RADICULOPATHY: Primary | ICD-10-CM

## 2020-06-09 DIAGNOSIS — M51.36 DDD (DEGENERATIVE DISC DISEASE), LUMBAR: ICD-10-CM

## 2020-06-09 PROCEDURE — 99213 PR OFFICE/OUTPT VISIT, EST, LEVL III, 20-29 MIN: ICD-10-PCS | Mod: S$GLB,,, | Performed by: NURSE PRACTITIONER

## 2020-06-09 PROCEDURE — 3079F DIAST BP 80-89 MM HG: CPT | Mod: CPTII,S$GLB,, | Performed by: NURSE PRACTITIONER

## 2020-06-09 PROCEDURE — 3077F SYST BP >= 140 MM HG: CPT | Mod: CPTII,S$GLB,, | Performed by: NURSE PRACTITIONER

## 2020-06-09 PROCEDURE — 3008F PR BODY MASS INDEX (BMI) DOCUMENTED: ICD-10-PCS | Mod: CPTII,S$GLB,, | Performed by: NURSE PRACTITIONER

## 2020-06-09 PROCEDURE — 3008F BODY MASS INDEX DOCD: CPT | Mod: CPTII,S$GLB,, | Performed by: NURSE PRACTITIONER

## 2020-06-09 PROCEDURE — 3044F PR MOST RECENT HEMOGLOBIN A1C LEVEL <7.0%: ICD-10-PCS | Mod: CPTII,S$GLB,, | Performed by: NURSE PRACTITIONER

## 2020-06-09 PROCEDURE — 99999 PR PBB SHADOW E&M-EST. PATIENT-LVL III: CPT | Mod: PBBFAC,,, | Performed by: NURSE PRACTITIONER

## 2020-06-09 PROCEDURE — 99999 PR PBB SHADOW E&M-EST. PATIENT-LVL III: ICD-10-PCS | Mod: PBBFAC,,, | Performed by: NURSE PRACTITIONER

## 2020-06-09 PROCEDURE — 3079F PR MOST RECENT DIASTOLIC BLOOD PRESSURE 80-89 MM HG: ICD-10-PCS | Mod: CPTII,S$GLB,, | Performed by: NURSE PRACTITIONER

## 2020-06-09 PROCEDURE — 99213 OFFICE O/P EST LOW 20 MIN: CPT | Mod: S$GLB,,, | Performed by: NURSE PRACTITIONER

## 2020-06-09 PROCEDURE — 3077F PR MOST RECENT SYSTOLIC BLOOD PRESSURE >= 140 MM HG: ICD-10-PCS | Mod: CPTII,S$GLB,, | Performed by: NURSE PRACTITIONER

## 2020-06-09 PROCEDURE — 3044F HG A1C LEVEL LT 7.0%: CPT | Mod: CPTII,S$GLB,, | Performed by: NURSE PRACTITIONER

## 2020-06-09 RX ORDER — GABAPENTIN 300 MG/1
300 CAPSULE ORAL 2 TIMES DAILY
Qty: 60 CAPSULE | Refills: 5 | Status: SHIPPED | OUTPATIENT
Start: 2020-06-09 | End: 2021-06-20

## 2020-06-09 NOTE — H&P (VIEW-ONLY)
Chronic Pain - Established Visit    Referring Physician: No ref. provider found    Chief Complaint:   No chief complaint on file.       SUBJECTIVE: Disclaimer: This note has been generated using voice-recognition software. There may be typographical errors that have been missed during proof-reading    Interval History 6/9/2020:  The patient is here for follow up of chronic lower back pain.  We have not seen the patient since last year.  He underwent an epidural at that time and says that his pain has been very mild until a few weeks ago.  He is having pain across lower back with radiation into the legs, mainly on the left.  He continues to take gabapentin with some benefit.  The pain bothers him the most with walking and activity.  He had pacemaker replacement on 5/6/20.  He is not currently on blood thinners.  His pain today is 5/10.    Interval History 7/30/2019:  The patient returns for follow up of back pain.  He is s/p repeat left then right L3,4,5 RFAs completed on 6/24/19 with about 80% relief.  His back pain is mild.  He has had increased leg pain recently, worse on the left side.  Previous leg pain was relieved with JEAN MARIE last year.  He would like to repeat this.  His leg pain bothers him mainly at night when trying to sleep.  He stopped Gabapentin when he was not having leg pain but recently restarted.  He is unable to take at night.  His pain today is 6/10.    Interval History 5/14/2019:  The patient returns for follow up.  He previously had benefit with JEAN MARIE for leg pain and RFAs for back pain.  He is having some back pain that has been worsening over the past couple of weeks.  He has significant pain in the morning.  He has some improvement when he moves around.  He says that cold air aggravates his pain.  He stopped Gabapentin last year when his leg pain improved.  He is not having much leg pain now.  His pain today is 6/10.    Interval History 2/7/2019:  The patient presents for check up of chronic  back pain.  He reports doing well since last visit.  He feels that last JEAN MARIE is still providing him benefit.  He has not had any leg pain.  He does still have back pain but states that it is tolerable.  His morning pain is much improved from RFAs last year.  He continues to work and is active.  His pain today is 5/10.    Interval History 12/6/2018:  The patient presents today for follow up.  He continues with pain to the lower back.  He is not having leg pain at this time.  He had some relief with RFAs with the past.  He stopped Gabapentin when his leg pain improved.  He takes Tylenol sparingly with some benefit.  His pain today is 5/10.  The patient denies any bowel or bladder incontinence or signs of saddle paresthesia.  The patient denies any major medical changes since last office visit.    Interval History 10/25/2018:  The patient returns for follow up of back and leg pain.  He is s/p L5/S1 IL JEAN MARIE on 10/11/18 with 80% pain relief for his legs.  He has had increased lower back pain over the past few days which he attributes to weather changes.  He describes it as aching.  He has not been stretching.  He does walk a lot for work.  He cannot take oral NSAIDs due to pacemaker placement.  His pain today is 8/10.     Interval History 9/11/2018:  The patient presents for procedure follow up appointment.  He is s/p left then right L3,4,5 RFA completed on 8/14/18 with 80% pain relief initially.  He has had a little more pain over the past week.  He is now having intermittent radiation into the back of both legs, left greater than right.  He previously had benefit with right sided TF JEAN MARIE.  He is still taking gabapentin.  His pain today is 5/10.    Interval History 7/25/2018:  The patient returns today for follow up of back pain.  He is s/p bilateral L3,4,5 MBB on 7/5/18 with 100% relief for 2 days.  He previously had benefit with right TF JEAN MARIE for leg pain.  He has not had right leg pain since the epidural.  However, he is  reporting lateral left leg pain that has progressed over the past couple of weeks.  He continues to be active and works.  His pain today is 5/10.    Interval History 6/20/2018:  The patient returns today for follow up of lower back and right leg pain.  He is s/p right L5 and S1 TF JEAN MARIE on 6/5/18 with 100% relief of right leg pain.  He has not had any leg pain since the procedure.  He continues to report pain across the lower back.  This is always worse first thing in the morning.  He states that this feels aching and throbbing in nature.  He did have recent lumbar CT scan.  He would like to discuss further procedures.  He continues to work and be active.  His pain today is 5/10.    Interval History 5/22/2018:  The patient returns for follow up of back pain.  He is having radiation down there back of the right leg to the posterior calf.  The back pain is most severe in the morning and he states that this feels like a stiffness.  This improves when he walks.  He has severe leg pain that is intermittent, mainly with activity.  This is his biggest complaint.  He did complete the medrol dose pack recently with limited improvement.  He had XRAYs which show severe loss of disc space at L5/S1.  He has not had a CT scan.  He is taking Gabapentin 300 mg at bedtime.  It is written BID but it makes him drowsy during the day.  He has some benefit with Aleve but has been told to avoid NSAIDs due to history of pacemaker placement.  His pain today is 5/10.     Initial encounter:    Renny Young presents to the clinic for the evaluation of lower back and right leg pain. The pain started two months ago and symptoms have been worsening.    Brief history:    Pain Description:    The pain is located in the lower back and right leg area in the L5/S1 distribution.      At BEST  5/10     At WORST  7/10 on the WORST day.      On average pain is rated as 5/10.     Today the pain is rated as 5/10    The pain is described as aching       Symptoms interfere with daily activity.     Exacerbating factors: Standing and Morning.      Mitigating factors medications.     Patient denies night fever/night sweats, urinary incontinence, bowel incontinence, significant weight loss, significant motor weakness and loss of sensations.  Patient denies any suicidal or homicidal ideations    Pain Medications:  Current:  OTC Tylenol PRN    Tried in Past:  NSAIDs - Aleve  TCA -Never  SNRI -Never  Anti-convulsants - Gabapentin   Muscle Relaxants -Never  Opioids-Never    Physical Therapy/Home Exercise: yes     report:  Reviewed and consistent with medication use as prescribed.    Pain Procedures:   6/5/18 Right L5 and S1 TF JEAN MARIE- significant relief  7/5/18 Bilateral L3,4,5 MBB- 100% relief for 2 days  7/31/18 Left L3,4,5 RFA- 80% relief  8/14/18 Right L3,4,5 RFA- 80% relief  10/11/18 L5/S1 IL JEAN MARIE- 80% relief  6/11/19 Left L3,4,5 RFA- 80% relief  6/25/19 Right L3,4,5 RFA- 80% relief  8/13/19 L5/S1 IL JEAN MARIE- 90% relief for 9 months    Chiropractor -never  Acupuncture - never  TENS unit -never  Spinal decompression -never  Joint replacement -never    Imaging:     Lumbar CT 5/28/18    Narrative     EXAMINATION:  CT LUMBAR SPINE WITHOUT CONTRAST    CLINICAL HISTORY:  Low back pain, >6wks conservative tx, persistent-progressive sx, surgical candidate;  Radiculopathy, lumbosacral region    TECHNIQUE:  Low-dose axial, sagittal and coronal reformations are obtained through the lumbar spine.  Contrast was not administered.    COMPARISON:  None.    FINDINGS:  Posterior vertebral alignment is satisfactory.  There is mild levoconvexity of the lumbar spine.  Vertebral body heights are well maintained.  There is prominent degenerative disc disease at the L5-S1 level with marked disc space narrowing with vertebral endplate sclerosis and irregularity as well as vacuum disc phenomenon at the L5-S1 level.  There are anterior and posterior osteophytes at the L5-S1 level associated with  diffuse concentric disc bulging.  Mild concentric disc bulging is also present at the L4-L5 level.  There is mild bilateral neural foraminal narrowing at the L5-S1 level due to the diffuse disc bulging and osteophyte formation as well as mild facet arthropathy at the L5-S1.  No abnormal paraspinal masses are evident.  There is a mild amount of atherosclerotic calcification within the abdominal aorta which tapers normally without aneurysmal dilatation.   Impression       No evidence for acute fracture, bone destruction, or subluxation.    Prominent degenerative disc disease at the L5-S1 level with anterior and posterior osteophyte formation as well as concentric disc bulging.  This does not appear to result in significant spinal stenosis.    Minimal diffuse disc bulging at the L4-L5 level.    Mild neural foraminal narrowing at the L5-S1 level bilaterally.       X-Ray Lumbar Spine    Narrative     EXAMINATION:  XR LUMBAR SPINE AP AND LATERAL    CLINICAL HISTORY:  Low back pain, >6wks conservative tx, persistent-progressive sx, surgical candidate;Low back pain    TECHNIQUE:  AP, lateral and spot images were performed of the lumbar spine.    COMPARISON:  None    FINDINGS:  There are 5 non-rib-bearing lumbar type vertebral bodies.  There is mild levocurvature of the lumbar spine between L1 and L4 that I calculate at 4°.   Vertebral body heights and alignment are preserved.    There is severe loss of disc space height at L5-S1 with near effacement of that disc space; vacuum phenomenon is present at the anterior portion of the disc space.  Other disc spaces are preserved.    There is facet arthropathy at L5-S1.  I do not identify spondylolysis or spondylolisthesis.    Sacroiliac joints are incompletely visualized.    Calcific atherosclerosis is present in the abdominal aorta and iliac arteries of this 57-year-old man.  I do not identify aneurysm.   Impression       Please see above.      Electronically signed by: Arin  MD Frida  Date: 05/01/2018  Time: 08:06         Past Medical History:   Diagnosis Date    Diabetes mellitus, type 2     Hypertension     Pacemaker      Past Surgical History:   Procedure Laterality Date    COLONOSCOPY N/A 2/13/2017    Procedure: COLONOSCOPY;  Surgeon: NILDA Juares MD;  Location: [facility] ([address] [place] [address]);  Service: Endoscopy;  Laterality: N/A;  Do not cancel this order. Patient has Pacemaker in place.     EPIDURAL STEROID INJECTION N/A 8/13/2019    Procedure: INJECTION, STEROID, EPIDURAL IL, L5/S1;  Surgeon: Josue Paredes MD;  Location: [facility] PAIN MGT;  Service: Pain Management;  Laterality: N/A;  IL JEAN MARIE L5/S1    HERNIA REPAIR      INJECTION OF ANESTHETIC AGENT AROUND NERVE Bilateral 7/5/2018    Procedure: BLOCK, NERVE;  Surgeon: Krystal Mtz MD;  Location: [facility] PAIN [facility]GT;  Service: Pain Management;  Laterality: Bilateral;  Lumbar Bilateral L3-L4-L5 Medial Branch Block    RADIOFREQUENCY ABLATION Left 6/11/2019    Procedure: RADIOFREQUENCY ABLATION, L3-L4-L5 MEDIAL BRANCH   1 OF 2;  Surgeon: Josue Paredes MD;  Location: [facility] PAIN [facility]GT;  Service: Pain Management;  Laterality: Left;    RADIOFREQUENCY ABLATION Right 6/25/2019    Procedure: RADIOFREQUENCY ABLATION, L3-L4-L5 MEDIAL BRANCH JING 2 OF 2;  Surgeon: Josue Paredes MD;  Location: [facility] PAIN [facility]GT;  Service: Pain Management;  Laterality: Right;    REPLACEMENT OF PACEMAKER GENERATOR Left 5/6/2020    Procedure: REPLACEMENT, PULSE GENERATOR, CARDIAC PACEMAKER;  Surgeon: Bradford Spence MD;  Location: [facility] EP LAB;  Service: Cardiology;  Laterality: Left;  EOL/LEAD Mlfx, Gen Change, Poss RA lead rev, SJM, MAC, GP, 3 PREP    REVISION OF PROCEDURE INVOLVING PACEMAKER LEAD Left 5/6/2020    Procedure: REVISION, ELECTRODE LEAD, CARDIAC PACEMAKER;  Surgeon: Bradford Spence MD;  Location: [facility] EP LAB;  Service: Cardiology;  Laterality: Left;    TRANSFORAMINAL EPIDURAL INJECTION OF STEROID Right 6/5/2018    Procedure:  INJECTION-STEROID-EPIDURAL-TRANSFORAMINAL;  Surgeon: Josue Paredes MD;  Location: [facility] PAIN MGT;  Service: Pain Management;  Laterality: Right;  LUMBAR RIGHT L5 AND S1 TRANSFORAMINL JEAN MARIE  [id number]    W/ SEDATION      Social History     Socioeconomic History    Marital status:      Spouse name: Not on file    Number of children: 3    Years of education: Not on file    Highest education level: Not on file   Occupational History    Occupation:    Social Needs    Financial resource strain: Not on file    Food insecurity:     Worry: Not on file     Inability: Not on file    Transportation needs:     Medical: Not on file     Non-medical: Not on file   Tobacco Use    Smoking status: Current Every Day Smoker     Packs/day: 0.50     Types: Cigarettes    Smokeless tobacco: Never Used   Substance and Sexual Activity    Alcohol use: Yes     Comment: Beer- Socially    Drug use: No    Sexual activity: Yes   Lifestyle    Physical activity:     Days per week: Not on file     Minutes per session: Not on file    Stress: Not on file   Relationships    Social connections:     Talks on phone: Not on file     Gets together: Not on file     Attends Restorationism service: Not on file     Active member of club or organization: Not on file     Attends meetings of clubs or organizations: Not on file     Relationship status: Not on file   Other Topics Concern    Not on file   Social History Narrative    Not on file     Family History   Problem Relation Age of Onset    Diabetes Mother     Diabetes Father     Kidney disease Father     Melanoma Neg Hx        Review of patient's allergies indicates:  No Known Allergies    Current Outpatient Medications   Medication Sig    amLODIPine (NORVASC) 5 MG tablet Take 1 tablet (5 mg total) by mouth once daily.    ammonium lactate 12 % Crea Apply twice daily to affected parts both feet as needed.    aspirin (ECOTRIN) 81 MG EC tablet Take 81 mg by mouth once daily.     "atorvastatin (LIPITOR) 20 MG tablet Take 1 tablet (20 mg total) by mouth once daily.    gabapentin (NEURONTIN) 300 MG capsule Take 1 capsule (300 mg total) by mouth 2 (two) times daily.    hydroCHLOROthiazide (HYDRODIURIL) 12.5 MG Tab Take 1 tablet (12.5 mg total) by mouth once daily.    losartan (COZAAR) 50 MG tablet Take 1 tablet (50 mg total) by mouth once daily.    metFORMIN (GLUCOPHAGE) 500 MG tablet TAKE 1 TABLET BY MOUTH ONCE DAILY WITH BREAKFAST    podofilox (CONDYLOX) 0.5 % gel Apply topically 2 (two) times daily.     No current facility-administered medications for this visit.        REVIEW OF SYSTEMS:    GENERAL:  No weight loss, malaise or fevers.  HEENT:   No recent changes in vision or hearing  NECK:  Negative for lumps, no difficulty with swallowing.  RESPIRATORY:  Negative for cough, wheezing or shortness of breath, patient denies any recent URI.  CARDIOVASCULAR:  Negative for chest pain, leg swelling or palpitations. Pacemaker for SSS.  GI:  Negative for abdominal discomfort, blood in stools or black stools or change in bowel habits.  MUSCULOSKELETAL:  See HPI.  SKIN:  Negative for lesions, rash, and itching.  PSYCH:  No mood disorder or recent psychosocial stressors.  Patients sleep is not disturbed secondary to pain.  HEMATOLOGY/LYMPHOLOGY:  Negative for prolonged bleeding, bruising easily or swollen nodes.  Patient is not currently taking any anti-coagulants  ENDO: DM2 on metformin  NEURO:   No history of headaches, syncope, paralysis, seizures or tremors.  All other reviewed and negative other than HPI.    OBJECTIVE:    BP (!) 151/87   Pulse 70   Temp 99 °F (37.2 °C)   Resp 18   Ht 5' 7" (1.702 m)   Wt 76.7 kg (169 lb)   SpO2 100%   BMI 26.47 kg/m²     PHYSICAL EXAMINATION:    GENERAL: Well appearing, in no acute distress, alert and oriented x3.  PSYCH:  Mood and affect appropriate.  SKIN: Skin color, texture, turgor normal, no rashes or lesions.  HEAD/FACE:  Normocephalic, " atraumatic. Cranial nerves grossly intact.  CV: RRR with palpation of the radial artery.  PULM: No evidence of respiratory difficulty, symmetric chest rise.  BACK: Straight leg raising in the sitting and supine positions is positive to radicular pain at left L5 distribution.  There is no pain with palpation over the facet joints of the lumbar spine bilaterally.  There is decreased range of motion with extension to 15 degrees, and facet loading maneuvers cause mild pain bilaterally.  EXTREMITIES: Peripheral joint ROM is full and pain free without obvious instability or laxity in all four extremities. No deformities, edema, or skin discoloration. Good capillary refill.  MUSCULOSKELETAL: Hip, and knee provocative maneuvers are negative.  There is no pain with palpation over the sacroiliac joints bilaterally.  There is no pain to palpation over the greater trochanteric bursa bilaterally.  FABERs test is negative.  FADIRs test is negative.  5/5 strength in right ankle with plantar and dorsiflexion, 5/5 strength in left ankle with plantar and dorsiflexion, 5/5 strength with right knee flexion extension, 5/5 strength with knee flexion extension on the left.  No atrophy or tone abnormalities are noted.  NEURO: Bilateral lower extremity coordination and muscle stretch reflexes are physiologic and symmetric.  Plantar response are downgoing. No clonus.  Decreased sensation to LLE at L5 distribution.  GAIT: Antalgic- ambulates without assistance.    Lab Results   Component Value Date    HGBA1C 6.3 (H) 02/17/2020     Lab Results   Component Value Date    WBC 8.23 05/06/2020    HGB 16.3 05/06/2020    HCT 52.1 05/06/2020    MCV 91 05/06/2020     05/06/2020     BMP  Lab Results   Component Value Date     02/17/2020    K 4.1 02/17/2020     02/17/2020    CO2 24 02/17/2020    BUN 16 02/17/2020    CREATININE 1.2 02/17/2020    CALCIUM 10.0 02/17/2020    ANIONGAP 12 02/17/2020    ESTGFRAFRICA >60.0 02/17/2020     EGFRNONAA >60.0 02/17/2020     ASSESSMENT: 59 y.o. year old male with lower back pain, consistent with the following diagnoses:    Encounter Diagnoses   Name Primary?    Diabetic polyneuropathy associated with type 2 diabetes mellitus     Lumbar radiculopathy Yes    DDD (degenerative disc disease), lumbar     Facet arthritis of lumbar region        PLAN:     - Lumbar CT reviewed with patient in detail today.    - Will schedule for repeat L5-S1 IL JEAN MARIE for back and leg pain.    - Consider repeat RFAs in the future.    - Continue Gabapentin.    - The patient will continue a home exercise routine to help with pain and strengthening.    - RTC 2 weeks after procedure.      The above plan and management options were discussed at length with patient. Patient is in agreement with the above and verbalized understanding.     Trista Oviedo  06/09/2020

## 2020-06-09 NOTE — PROGRESS NOTES
Chronic Pain - Established Visit    Referring Physician: No ref. provider found    Chief Complaint:   No chief complaint on file.       SUBJECTIVE: Disclaimer: This note has been generated using voice-recognition software. There may be typographical errors that have been missed during proof-reading    Interval History 6/9/2020:  The patient is here for follow up of chronic lower back pain.  We have not seen the patient since last year.  He underwent an epidural at that time and says that his pain has been very mild until a few weeks ago.  He is having pain across lower back with radiation into the legs, mainly on the left.  He continues to take gabapentin with some benefit.  The pain bothers him the most with walking and activity.  He had pacemaker replacement on 5/6/20.  He is not currently on blood thinners.  His pain today is 5/10.    Interval History 7/30/2019:  The patient returns for follow up of back pain.  He is s/p repeat left then right L3,4,5 RFAs completed on 6/24/19 with about 80% relief.  His back pain is mild.  He has had increased leg pain recently, worse on the left side.  Previous leg pain was relieved with JEAN MARIE last year.  He would like to repeat this.  His leg pain bothers him mainly at night when trying to sleep.  He stopped Gabapentin when he was not having leg pain but recently restarted.  He is unable to take at night.  His pain today is 6/10.    Interval History 5/14/2019:  The patient returns for follow up.  He previously had benefit with JEAN MARIE for leg pain and RFAs for back pain.  He is having some back pain that has been worsening over the past couple of weeks.  He has significant pain in the morning.  He has some improvement when he moves around.  He says that cold air aggravates his pain.  He stopped Gabapentin last year when his leg pain improved.  He is not having much leg pain now.  His pain today is 6/10.    Interval History 2/7/2019:  The patient presents for check up of chronic  back pain.  He reports doing well since last visit.  He feels that last JEAN MARIE is still providing him benefit.  He has not had any leg pain.  He does still have back pain but states that it is tolerable.  His morning pain is much improved from RFAs last year.  He continues to work and is active.  His pain today is 5/10.    Interval History 12/6/2018:  The patient presents today for follow up.  He continues with pain to the lower back.  He is not having leg pain at this time.  He had some relief with RFAs with the past.  He stopped Gabapentin when his leg pain improved.  He takes Tylenol sparingly with some benefit.  His pain today is 5/10.  The patient denies any bowel or bladder incontinence or signs of saddle paresthesia.  The patient denies any major medical changes since last office visit.    Interval History 10/25/2018:  The patient returns for follow up of back and leg pain.  He is s/p L5/S1 IL JEAN MARIE on 10/11/18 with 80% pain relief for his legs.  He has had increased lower back pain over the past few days which he attributes to weather changes.  He describes it as aching.  He has not been stretching.  He does walk a lot for work.  He cannot take oral NSAIDs due to pacemaker placement.  His pain today is 8/10.     Interval History 9/11/2018:  The patient presents for procedure follow up appointment.  He is s/p left then right L3,4,5 RFA completed on 8/14/18 with 80% pain relief initially.  He has had a little more pain over the past week.  He is now having intermittent radiation into the back of both legs, left greater than right.  He previously had benefit with right sided TF JEAN MARIE.  He is still taking gabapentin.  His pain today is 5/10.    Interval History 7/25/2018:  The patient returns today for follow up of back pain.  He is s/p bilateral L3,4,5 MBB on 7/5/18 with 100% relief for 2 days.  He previously had benefit with right TF JEAN MARIE for leg pain.  He has not had right leg pain since the epidural.  However, he is  reporting lateral left leg pain that has progressed over the past couple of weeks.  He continues to be active and works.  His pain today is 5/10.    Interval History 6/20/2018:  The patient returns today for follow up of lower back and right leg pain.  He is s/p right L5 and S1 TF JEAN MARIE on 6/5/18 with 100% relief of right leg pain.  He has not had any leg pain since the procedure.  He continues to report pain across the lower back.  This is always worse first thing in the morning.  He states that this feels aching and throbbing in nature.  He did have recent lumbar CT scan.  He would like to discuss further procedures.  He continues to work and be active.  His pain today is 5/10.    Interval History 5/22/2018:  The patient returns for follow up of back pain.  He is having radiation down there back of the right leg to the posterior calf.  The back pain is most severe in the morning and he states that this feels like a stiffness.  This improves when he walks.  He has severe leg pain that is intermittent, mainly with activity.  This is his biggest complaint.  He did complete the medrol dose pack recently with limited improvement.  He had XRAYs which show severe loss of disc space at L5/S1.  He has not had a CT scan.  He is taking Gabapentin 300 mg at bedtime.  It is written BID but it makes him drowsy during the day.  He has some benefit with Aleve but has been told to avoid NSAIDs due to history of pacemaker placement.  His pain today is 5/10.     Initial encounter:    Renny Young presents to the clinic for the evaluation of lower back and right leg pain. The pain started two months ago and symptoms have been worsening.    Brief history:    Pain Description:    The pain is located in the lower back and right leg area in the L5/S1 distribution.      At BEST  5/10     At WORST  7/10 on the WORST day.      On average pain is rated as 5/10.     Today the pain is rated as 5/10    The pain is described as aching       Symptoms interfere with daily activity.     Exacerbating factors: Standing and Morning.      Mitigating factors medications.     Patient denies night fever/night sweats, urinary incontinence, bowel incontinence, significant weight loss, significant motor weakness and loss of sensations.  Patient denies any suicidal or homicidal ideations    Pain Medications:  Current:  OTC Tylenol PRN    Tried in Past:  NSAIDs - Aleve  TCA -Never  SNRI -Never  Anti-convulsants - Gabapentin   Muscle Relaxants -Never  Opioids-Never    Physical Therapy/Home Exercise: yes     report:  Reviewed and consistent with medication use as prescribed.    Pain Procedures:   6/5/18 Right L5 and S1 TF JEAN MARIE- significant relief  7/5/18 Bilateral L3,4,5 MBB- 100% relief for 2 days  7/31/18 Left L3,4,5 RFA- 80% relief  8/14/18 Right L3,4,5 RFA- 80% relief  10/11/18 L5/S1 IL JEAN MARIE- 80% relief  6/11/19 Left L3,4,5 RFA- 80% relief  6/25/19 Right L3,4,5 RFA- 80% relief  8/13/19 L5/S1 IL JEAN MARIE- 90% relief for 9 months    Chiropractor -never  Acupuncture - never  TENS unit -never  Spinal decompression -never  Joint replacement -never    Imaging:     Lumbar CT 5/28/18    Narrative     EXAMINATION:  CT LUMBAR SPINE WITHOUT CONTRAST    CLINICAL HISTORY:  Low back pain, >6wks conservative tx, persistent-progressive sx, surgical candidate;  Radiculopathy, lumbosacral region    TECHNIQUE:  Low-dose axial, sagittal and coronal reformations are obtained through the lumbar spine.  Contrast was not administered.    COMPARISON:  None.    FINDINGS:  Posterior vertebral alignment is satisfactory.  There is mild levoconvexity of the lumbar spine.  Vertebral body heights are well maintained.  There is prominent degenerative disc disease at the L5-S1 level with marked disc space narrowing with vertebral endplate sclerosis and irregularity as well as vacuum disc phenomenon at the L5-S1 level.  There are anterior and posterior osteophytes at the L5-S1 level associated with  diffuse concentric disc bulging.  Mild concentric disc bulging is also present at the L4-L5 level.  There is mild bilateral neural foraminal narrowing at the L5-S1 level due to the diffuse disc bulging and osteophyte formation as well as mild facet arthropathy at the L5-S1.  No abnormal paraspinal masses are evident.  There is a mild amount of atherosclerotic calcification within the abdominal aorta which tapers normally without aneurysmal dilatation.   Impression       No evidence for acute fracture, bone destruction, or subluxation.    Prominent degenerative disc disease at the L5-S1 level with anterior and posterior osteophyte formation as well as concentric disc bulging.  This does not appear to result in significant spinal stenosis.    Minimal diffuse disc bulging at the L4-L5 level.    Mild neural foraminal narrowing at the L5-S1 level bilaterally.       X-Ray Lumbar Spine    Narrative     EXAMINATION:  XR LUMBAR SPINE AP AND LATERAL    CLINICAL HISTORY:  Low back pain, >6wks conservative tx, persistent-progressive sx, surgical candidate;Low back pain    TECHNIQUE:  AP, lateral and spot images were performed of the lumbar spine.    COMPARISON:  None    FINDINGS:  There are 5 non-rib-bearing lumbar type vertebral bodies.  There is mild levocurvature of the lumbar spine between L1 and L4 that I calculate at 4°.   Vertebral body heights and alignment are preserved.    There is severe loss of disc space height at L5-S1 with near effacement of that disc space; vacuum phenomenon is present at the anterior portion of the disc space.  Other disc spaces are preserved.    There is facet arthropathy at L5-S1.  I do not identify spondylolysis or spondylolisthesis.    Sacroiliac joints are incompletely visualized.    Calcific atherosclerosis is present in the abdominal aorta and iliac arteries of this 57-year-old man.  I do not identify aneurysm.   Impression       Please see above.      Electronically signed by: Arin  MD Frida  Date: 05/01/2018  Time: 08:06         Past Medical History:   Diagnosis Date    Diabetes mellitus, type 2     Hypertension     Pacemaker      Past Surgical History:   Procedure Laterality Date    COLONOSCOPY N/A 2/13/2017    Procedure: COLONOSCOPY;  Surgeon: NILDA Juares MD;  Location: [facility] ([address] [place] [address]);  Service: Endoscopy;  Laterality: N/A;  Do not cancel this order. Patient has Pacemaker in place.     EPIDURAL STEROID INJECTION N/A 8/13/2019    Procedure: INJECTION, STEROID, EPIDURAL IL, L5/S1;  Surgeon: Josue Paredes MD;  Location: [facility];  Service: Pain Management;  Laterality: N/A;  IL JEAN MARIE L5/S1    HERNIA REPAIR      INJECTION OF ANESTHETIC AGENT AROUND NERVE Bilateral 7/5/2018    Procedure: BLOCK, NERVE;  Surgeon: Krystal Mtz MD;  Location: [facility];  Service: Pain Management;  Laterality: Bilateral;  Lumbar Bilateral L3-L4-L5 Medial Branch Block    RADIOFREQUENCY ABLATION Left 6/11/2019    Procedure: RADIOFREQUENCY ABLATION, L3-L4-L5 MEDIAL BRANCH   1 OF 2;  Surgeon: Josue Paredes MD;  Location: [facility];  Service: Pain Management;  Laterality: Left;    RADIOFREQUENCY ABLATION Right 6/25/2019    Procedure: RADIOFREQUENCY ABLATION, L3-L4-L5 MEDIAL BRANCH JING 2 OF 2;  Surgeon: Josue Paredes MD;  Location: [facility];  Service: Pain Management;  Laterality: Right;    REPLACEMENT OF PACEMAKER GENERATOR Left 5/6/2020    Procedure: REPLACEMENT, PULSE GENERATOR, CARDIAC PACEMAKER;  Surgeon: Bradford Spence MD;  Location: [facility] EP LAB;  Service: Cardiology;  Laterality: Left;  EOL/LEAD Mlfx, Gen Change, Poss RA lead rev, SJM, MAC, GP, 3 PREP    REVISION OF PROCEDURE INVOLVING PACEMAKER LEAD Left 5/6/2020    Procedure: REVISION, ELECTRODE LEAD, CARDIAC PACEMAKER;  Surgeon: Bradford Spence MD;  Location: [facility] EP LAB;  Service: Cardiology;  Laterality: Left;    TRANSFORAMINAL EPIDURAL INJECTION OF STEROID Right 6/5/2018    Procedure:  INJECTION-STEROID-EPIDURAL-TRANSFORAMINAL;  Surgeon: Josue Paredes MD;  Location: [facility];  Service: Pain Management;  Laterality: Right;  LUMBAR RIGHT L5 AND S1 TRANSFORAMINL JEAN MARIE  [id number]    W/ SEDATION      Social History     Socioeconomic History    Marital status:      Spouse name: Not on file    Number of children: 3    Years of education: Not on file    Highest education level: Not on file   Occupational History    Occupation:    Social Needs    Financial resource strain: Not on file    Food insecurity:     Worry: Not on file     Inability: Not on file    Transportation needs:     Medical: Not on file     Non-medical: Not on file   Tobacco Use    Smoking status: Current Every Day Smoker     Packs/day: 0.50     Types: Cigarettes    Smokeless tobacco: Never Used   Substance and Sexual Activity    Alcohol use: Yes     Comment: Beer- Socially    Drug use: No    Sexual activity: Yes   Lifestyle    Physical activity:     Days per week: Not on file     Minutes per session: Not on file    Stress: Not on file   Relationships    Social connections:     Talks on phone: Not on file     Gets together: Not on file     Attends Orthodoxy service: Not on file     Active member of club or organization: Not on file     Attends meetings of clubs or organizations: Not on file     Relationship status: Not on file   Other Topics Concern    Not on file   Social History Narrative    Not on file     Family History   Problem Relation Age of Onset    Diabetes Mother     Diabetes Father     Kidney disease Father     Melanoma Neg Hx        Review of patient's allergies indicates:  No Known Allergies    Current Outpatient Medications   Medication Sig    amLODIPine (NORVASC) 5 MG tablet Take 1 tablet (5 mg total) by mouth once daily.    ammonium lactate 12 % Crea Apply twice daily to affected parts both feet as needed.    aspirin (ECOTRIN) 81 MG EC tablet Take 81 mg by mouth once daily.     "atorvastatin (LIPITOR) 20 MG tablet Take 1 tablet (20 mg total) by mouth once daily.    gabapentin (NEURONTIN) 300 MG capsule Take 1 capsule (300 mg total) by mouth 2 (two) times daily.    hydroCHLOROthiazide (HYDRODIURIL) 12.5 MG Tab Take 1 tablet (12.5 mg total) by mouth once daily.    losartan (COZAAR) 50 MG tablet Take 1 tablet (50 mg total) by mouth once daily.    metFORMIN (GLUCOPHAGE) 500 MG tablet TAKE 1 TABLET BY MOUTH ONCE DAILY WITH BREAKFAST    podofilox (CONDYLOX) 0.5 % gel Apply topically 2 (two) times daily.     No current facility-administered medications for this visit.        REVIEW OF SYSTEMS:    GENERAL:  No weight loss, malaise or fevers.  HEENT:   No recent changes in vision or hearing  NECK:  Negative for lumps, no difficulty with swallowing.  RESPIRATORY:  Negative for cough, wheezing or shortness of breath, patient denies any recent URI.  CARDIOVASCULAR:  Negative for chest pain, leg swelling or palpitations. Pacemaker for SSS.  GI:  Negative for abdominal discomfort, blood in stools or black stools or change in bowel habits.  MUSCULOSKELETAL:  See HPI.  SKIN:  Negative for lesions, rash, and itching.  PSYCH:  No mood disorder or recent psychosocial stressors.  Patients sleep is not disturbed secondary to pain.  HEMATOLOGY/LYMPHOLOGY:  Negative for prolonged bleeding, bruising easily or swollen nodes.  Patient is not currently taking any anti-coagulants  ENDO: DM2 on metformin  NEURO:   No history of headaches, syncope, paralysis, seizures or tremors.  All other reviewed and negative other than HPI.    OBJECTIVE:    BP (!) 151/87   Pulse 70   Temp 99 °F (37.2 °C)   Resp 18   Ht 5' 7" (1.702 m)   Wt 76.7 kg (169 lb)   SpO2 100%   BMI 26.47 kg/m²     PHYSICAL EXAMINATION:    GENERAL: Well appearing, in no acute distress, alert and oriented x3.  PSYCH:  Mood and affect appropriate.  SKIN: Skin color, texture, turgor normal, no rashes or lesions.  HEAD/FACE:  Normocephalic, " atraumatic. Cranial nerves grossly intact.  CV: RRR with palpation of the radial artery.  PULM: No evidence of respiratory difficulty, symmetric chest rise.  BACK: Straight leg raising in the sitting and supine positions is positive to radicular pain at left L5 distribution.  There is no pain with palpation over the facet joints of the lumbar spine bilaterally.  There is decreased range of motion with extension to 15 degrees, and facet loading maneuvers cause mild pain bilaterally.  EXTREMITIES: Peripheral joint ROM is full and pain free without obvious instability or laxity in all four extremities. No deformities, edema, or skin discoloration. Good capillary refill.  MUSCULOSKELETAL: Hip, and knee provocative maneuvers are negative.  There is no pain with palpation over the sacroiliac joints bilaterally.  There is no pain to palpation over the greater trochanteric bursa bilaterally.  FABERs test is negative.  FADIRs test is negative.  5/5 strength in right ankle with plantar and dorsiflexion, 5/5 strength in left ankle with plantar and dorsiflexion, 5/5 strength with right knee flexion extension, 5/5 strength with knee flexion extension on the left.  No atrophy or tone abnormalities are noted.  NEURO: Bilateral lower extremity coordination and muscle stretch reflexes are physiologic and symmetric.  Plantar response are downgoing. No clonus.  Decreased sensation to LLE at L5 distribution.  GAIT: Antalgic- ambulates without assistance.    Lab Results   Component Value Date    HGBA1C 6.3 (H) 02/17/2020     Lab Results   Component Value Date    WBC 8.23 05/06/2020    HGB 16.3 05/06/2020    HCT 52.1 05/06/2020    MCV 91 05/06/2020     05/06/2020     BMP  Lab Results   Component Value Date     02/17/2020    K 4.1 02/17/2020     02/17/2020    CO2 24 02/17/2020    BUN 16 02/17/2020    CREATININE 1.2 02/17/2020    CALCIUM 10.0 02/17/2020    ANIONGAP 12 02/17/2020    ESTGFRAFRICA >60.0 02/17/2020     EGFRNONAA >60.0 02/17/2020     ASSESSMENT: 59 y.o. year old male with lower back pain, consistent with the following diagnoses:    Encounter Diagnoses   Name Primary?    Diabetic polyneuropathy associated with type 2 diabetes mellitus     Lumbar radiculopathy Yes    DDD (degenerative disc disease), lumbar     Facet arthritis of lumbar region        PLAN:     - Lumbar CT reviewed with patient in detail today.    - Will schedule for repeat L5-S1 IL JEAN MARIE for back and leg pain.    - Consider repeat RFAs in the future.    - Continue Gabapentin.    - The patient will continue a home exercise routine to help with pain and strengthening.    - RTC 2 weeks after procedure.      The above plan and management options were discussed at length with patient. Patient is in agreement with the above and verbalized understanding.     Trista Oviedo  06/09/2020

## 2020-06-11 DIAGNOSIS — Z01.818 PRE-OP TESTING: Primary | ICD-10-CM

## 2020-06-13 DIAGNOSIS — E11.69 TYPE 2 DIABETES MELLITUS WITH OTHER SPECIFIED COMPLICATION, WITHOUT LONG-TERM CURRENT USE OF INSULIN: ICD-10-CM

## 2020-06-15 RX ORDER — METFORMIN HYDROCHLORIDE 500 MG/1
TABLET ORAL
Qty: 90 TABLET | Refills: 0 | Status: SHIPPED | OUTPATIENT
Start: 2020-06-15 | End: 2020-09-16

## 2020-06-15 NOTE — TELEPHONE ENCOUNTER
Refill Authorization Note    is requesting a refill authorization.    Brief assessment and rationale for refill: APPROVE: prr               Medication reconciliation completed: No                         Comments:      Requested Prescriptions   Signed Prescriptions Disp Refills    metFORMIN (GLUCOPHAGE) 500 MG tablet 90 tablet 0     Sig: Take 1 tablet by mouth once daily with breakfast       Endocrinology:  Diabetes - Biguanides Passed - 6/13/2020  1:50 PM        Passed - Patient is at least 18 years old        Passed - Office visit in past 12 months or future 90 days.     Recent Outpatient Visits            6 days ago Lumbar radiculopathy    [facility] [address]    1 week ago Bilateral carpal tunnel syndrome    [facility] [address] MD    1 month ago Diabetic polyneuropathy associated with type 2 diabetes mellitus    [facility] - Arrhythmia Bradford Spence MD    3 months ago [place] or callus    [place] - Podiatry Morro Quiles DPM    3 months ago Sick sinus syndrome    [facility] - Cardiology Harley Thompson MD          Future Appointments              In 2 weeks Julita Garces, [facility] - Dermatology, [facility]    In 2 weeks [address] [facility] [address]    In 1 month HOME MONITOR DEVICE CHECK, [facility] - Arrhythmia, [facility]    In 2 months EKG, AP[facility] - EKG, [facility]    In 2 months COORDINATED DEVICE CHECK [facility] - Arrhythmia, [facility]    In 2 months Lizabeth Hill, C[facility] - Arrhythmia, [address]                Passed - Cr is 1.3 or below and within 360 days     Creatinine   Date Value Ref Range Status   02/17/2020 1.2 0.5 - 1.4 mg/dL Final   01/21/2019 1.1 0.5 - 1.4 mg/dL Final   04/06/2018 1.0 0.5 - 1.4 mg/dL Final              Passed - HBA1C is 7.9 or below and within 180 days     Hemoglobin A1C   Date Value Ref Range Status   02/17/2020 6.3 (H) 4.0 - 5.6 %  Final     Comment:     ADA Screening Guidelines:  5.7-6.4%  Consistent with prediabetes  >or=6.5%  Consistent with diabetes  High levels of fetal hemoglobin interfere with the HbA1C  assay. Heterozygous hemoglobin variants (HbS, HgC, etc)do  not significantly interfere with this assay.   However, presence of multiple variants may affect accuracy.     01/21/2019 6.3 (H) 4.0 - 5.6 % Final     Comment:     ADA Screening Guidelines:  5.7-6.4%  Consistent with prediabetes  >or=6.5%  Consistent with diabetes  High levels of fetal hemoglobin interfere with the HbA1C  assay. Heterozygous hemoglobin variants (HbS, HgC, etc)do  not significantly interfere with this assay.   However, presence of multiple variants may affect accuracy.     04/06/2018 5.8 (H) 4.0 - 5.6 % Final     Comment:     According to ADA guidelines, hemoglobin A1c <7.0% represents  optimal control in non-pregnant diabetic patients. Different  metrics may apply to specific patient populations.   Standards of Medical Care in Diabetes-2016.  For the purpose of screening for the presence of diabetes:  <5.7%     Consistent with the absence of diabetes  5.7-6.4%  Consistent with increasing risk for diabetes   (prediabetes)  >or=6.5%  Consistent with diabetes  Currently, no consensus exists for use of hemoglobin A1c  for diagnosis of diabetes for children.  This Hemoglobin A1c assay has significant interference with fetal   hemoglobin   (HbF). The results are invalid for patients with abnormal amounts of   HbF,   including those with known Hereditary Persistence   of Fetal Hemoglobin. Heterozygous hemoglobin variants (HbAS, HbAC,   HbAD, HbAE, HbA2) do not significantly interfere with this assay;   however, presence of multiple variants in a sample may impact the %   interference.                Passed - eGFR is 30 or above and within 360 days     eGFR if non    Date Value Ref Range Status   02/17/2020 >60.0 >60 mL/min/1.73 m^2 Final     Comment:      Calculation used to obtain the estimated glomerular filtration  rate (eGFR) is the CKD-EPI equation.      01/21/2019 >60 >60 mL/min/1.73 m^2 Final     Comment:     Calculation used to obtain the estimated glomerular filtration  rate (eGFR) is the CKD-EPI equation.      04/06/2018 >60.0 >60 mL/min/1.73 m^2 Final     Comment:     Calculation used to obtain the estimated glomerular filtration  rate (eGFR) is the CKD-EPI equation.        eGFR if    Date Value Ref Range Status   02/17/2020 >60.0 >60 mL/min/1.73 m^2 Final   01/21/2019 >60 >60 mL/min/1.73 m^2 Final   04/06/2018 >60.0 >60 mL/min/1.73 m^2 Final                  Appointments  past 12m or future 3m with PCP    Date Provider   Last Visit   2/17/2020 Jarvis Washington MD   Next Visit   Visit date not found Jarvis Washington MD   ED visits in past 90 days: [unfilled]     Note composed:12:11 PM 06/15/2020

## 2020-06-16 ENCOUNTER — HOSPITAL ENCOUNTER (OUTPATIENT)
Facility: OTHER | Age: 60
Discharge: HOME | End: 2020-06-16
Attending: ANESTHESIOLOGY | Admitting: ANESTHESIOLOGY
Payer: COMMERCIAL

## 2020-06-16 VITALS
HEART RATE: 71 BPM | BODY MASS INDEX: 26.53 KG/M2 | TEMPERATURE: 98 F | WEIGHT: 169 LBS | DIASTOLIC BLOOD PRESSURE: 76 MMHG | RESPIRATION RATE: 16 BRPM | SYSTOLIC BLOOD PRESSURE: 130 MMHG | HEIGHT: 67 IN | OXYGEN SATURATION: 98 %

## 2020-06-16 DIAGNOSIS — I10 HYPERTENSION, ESSENTIAL: ICD-10-CM

## 2020-06-16 DIAGNOSIS — G89.29 CHRONIC PAIN: ICD-10-CM

## 2020-06-16 DIAGNOSIS — M54.16 LUMBAR RADICULOPATHY: Primary | ICD-10-CM

## 2020-06-16 LAB — POCT GLUCOSE: 91 MG/DL (ref 70–110)

## 2020-06-16 PROCEDURE — 25500020 PHARM REV CODE 255: Performed by: ANESTHESIOLOGY

## 2020-06-16 PROCEDURE — 63600175 PHARM REV CODE 636 W HCPCS: Performed by: ANESTHESIOLOGY

## 2020-06-16 PROCEDURE — 25000003 PHARM REV CODE 250: Performed by: ANESTHESIOLOGY

## 2020-06-16 PROCEDURE — 62323 PR INJ LUMBAR/SACRAL, W/IMAGING GUIDANCE: ICD-10-PCS | Mod: ,,, | Performed by: ANESTHESIOLOGY

## 2020-06-16 PROCEDURE — 62323 NJX INTERLAMINAR LMBR/SAC: CPT | Mod: ,,, | Performed by: ANESTHESIOLOGY

## 2020-06-16 PROCEDURE — 62323 NJX INTERLAMINAR LMBR/SAC: CPT

## 2020-06-16 RX ORDER — BUPIVACAINE HYDROCHLORIDE 2.5 MG/ML
INJECTION, SOLUTION EPIDURAL; INFILTRATION; INTRACAUDAL
Status: DISCONTINUED | OUTPATIENT
Start: 2020-06-16 | End: 2020-06-16 | Stop reason: HOSPADM

## 2020-06-16 RX ORDER — AMLODIPINE BESYLATE 5 MG/1
TABLET ORAL
Qty: 90 TABLET | Refills: 2 | Status: SHIPPED | OUTPATIENT
Start: 2020-06-16 | End: 2021-01-14

## 2020-06-16 RX ORDER — DEXAMETHASONE SODIUM PHOSPHATE 10 MG/ML
INJECTION INTRAMUSCULAR; INTRAVENOUS
Status: DISCONTINUED | OUTPATIENT
Start: 2020-06-16 | End: 2020-06-16 | Stop reason: HOSPADM

## 2020-06-16 RX ORDER — LIDOCAINE HYDROCHLORIDE 10 MG/ML
INJECTION INFILTRATION; PERINEURAL
Status: DISCONTINUED | OUTPATIENT
Start: 2020-06-16 | End: 2020-06-16 | Stop reason: HOSPADM

## 2020-06-16 RX ORDER — ALPRAZOLAM 0.5 MG/1
1 TABLET ORAL ONCE
Status: COMPLETED | OUTPATIENT
Start: 2020-06-16 | End: 2020-06-16

## 2020-06-16 RX ORDER — SODIUM CHLORIDE 9 MG/ML
500 INJECTION, SOLUTION INTRAVENOUS CONTINUOUS
Status: DISCONTINUED | OUTPATIENT
Start: 2020-06-16 | End: 2020-06-16 | Stop reason: HOSPADM

## 2020-06-16 RX ADMIN — ALPRAZOLAM 1 MG: 0.5 TABLET ORAL at 02:06

## 2020-06-16 NOTE — DISCHARGE SUMMARY
Discharge Note  Short Stay      SUMMARY     Admit Date: 6/16/2020    Attending Physician: Josue Paredes      Discharge Physician: Josue Paredes      Discharge Date: 6/16/2020 3:10 PM    Procedure(s) (LRB):  INJECTION, STEROID, EPIDURAL, L5-S1 IL add on @ 2 ok by  [place] (N/A)    Final Diagnosis: Lumbar radiculopathy [M54.16]    Disposition: Home or self care    Patient Instructions:   Current Discharge Medication List      CONTINUE these medications which have NOT CHANGED    Details   amLODIPine (NORVASC) 5 MG tablet Take 1 tablet (5 mg total) by mouth once daily.  Qty: 90 tablet, Refills: 0    Associated Diagnoses: Hypertension, essential      ammonium lactate 12 % Crea Apply twice daily to affected parts both feet as needed.  Qty: 140 g, Refills: 11      aspirin (ECOTRIN) 81 MG EC tablet Take 81 mg by mouth once daily.      atorvastatin (LIPITOR) 20 MG tablet Take 1 tablet (20 mg total) by mouth once daily.  Qty: 90 tablet, Refills: 0    Comments: Please consider 90 day supplies to promote better adherence  Associated Diagnoses: Hyperlipidemia, unspecified hyperlipidemia type      gabapentin (NEURONTIN) 300 MG capsule Take 1 capsule (300 mg total) by mouth 2 (two) times daily.  Qty: 60 capsule, Refills: 5    Comments: Please consider 90 day supplies to promote better adherence  Associated Diagnoses: Diabetic polyneuropathy associated with type 2 diabetes mellitus      hydroCHLOROthiazide (HYDRODIURIL) 12.5 MG Tab Take 1 tablet (12.5 mg total) by mouth once daily.  Qty: 90 tablet, Refills: 0    Associated Diagnoses: Hypertension, essential      losartan (COZAAR) 50 MG tablet Take 1 tablet (50 mg total) by mouth once daily.  Qty: 90 tablet, Refills: 0    Associated Diagnoses: Hypertension, essential      metFORMIN (GLUCOPHAGE) 500 MG tablet Take 1 tablet by mouth once daily with breakfast  Qty: 90 tablet, Refills: 0    Associated Diagnoses: Type 2 diabetes mellitus with other specified complication, without  long-term current use of insulin      podofilox (CONDYLOX) 0.5 % gel Apply topically 2 (two) times daily.  Qty: 3.5 g, Refills: 2    Associated Diagnoses: Condyloma                 Discharge Diagnosis: Lumbar radiculopathy [M54.16]  Condition on Discharge: Stable with no complications to procedure   Diet on Discharge: Same as before.  Activity: as per instruction sheet.  Discharge to: Home with a responsible adult.  Follow up: 2-4 weeks       Please call my office or pager at [phone] if experienced any weakness or loss of sensation, fever > 101.5, pain uncontrolled with oral medications, persistent nausea/vomiting/or diarrhea, redness or drainage from the incisions, or any other worrisome concerns. If physician on call was not reached or could not communicate with our office for any reason please go to the nearest emergency department

## 2020-06-16 NOTE — OP NOTE
Lumbar Interlaminar Epidural Steroid Injection under Fluoroscopic Guidance.   Time-out taken to identify patient and procedure prior to starting the procedure.     06/16/2020    PROCEDURE: Interlaminar epidural steroid injection under fluoroscopic guidance.     Pre-Op diagnosis: Lumbar radiculopathy [M54.16]    Post-Op diagnosis: Lumbar radiculopathy [M54.16]    PHYSICIAN: MICHELLE LI     Assistants:  MD GRACIE Marina was present and supervising all critical portions of the procedure        ESTIMATED BLOOD LOSS: none.     COMPLICATIONS: none.     SPECIMENS: none    TECHNIQUE: With the patient laying in a prone position, the area was prepped and draped in the usual sterile fashion using ChloraPrep and a fenestrated drape. 1% lidocaine was given using a 27-gauge needle by raising a wheal and going down to the hub of the needle over the L5/S1 interlaminar space.  The interlaminar space was then approached with a 3.5 inch 18-gauge Touhy needle was introduced under fluoroscopic guidance in the AP and Lateral view. Once the Ligamentum flavum was encountered loss of resistance to saline was used to enter the epidural space. With positive loss of resistance and negative CSF or Blood, 3mL contrast dye Omnipaque (300mg/ml) was injected to confirm placement and there was no vascular runoff. Then 1ml 10mg/ml Decadron+ 1mL 0.25% Bupivicaine + 8mL preservative free normal saline was injected slowly. Displacement of the radio opaque contrast after injection of the medication confirmed that the medication went into the area of the epidural space.  The patient tolerated the procedure well.       The patient was monitored after the procedure.   They were given post-procedure and discharge instructions to follow at home.  The patient was discharged in a stable condition.

## 2020-06-16 NOTE — DISCHARGE INSTRUCTIONS

## 2020-06-16 NOTE — INTERVAL H&P NOTE
HPI  Patient presenting for Procedure(s) (LRB):  INJECTION, STEROID, EPIDURAL, L5-S1 IL add on @ 2 ok by  [place] (N/A)     Patient on Anti-coagulation No    No health changes since previous encounter    Past Medical History:   Diagnosis Date    Diabetes mellitus, type 2     Hypertension     Pacemaker      Past Surgical History:   Procedure Laterality Date    COLONOSCOPY N/A 2/13/2017    Procedure: COLONOSCOPY;  Surgeon: NILDA Juares MD;  Location: [facility] ENDO (4TH FLR);  Service: Endoscopy;  Laterality: N/A;  Do not cancel this order. Patient has Pacemaker in place.     EPIDURAL STEROID INJECTION N/A 8/13/2019    Procedure: INJECTION, STEROID, EPIDURAL IL, L5/S1;  Surgeon: Josue Paredes MD;  Location: [facility] PAIN MGT;  Service: Pain Management;  Laterality: N/A;  IL JEAN MARIE L5/S1    HERNIA REPAIR      INJECTION OF ANESTHETIC AGENT AROUND NERVE Bilateral 7/5/2018    Procedure: BLOCK, NERVE;  Surgeon: Krystal Mtz MD;  Location: [facility] PAIN MGT;  Service: Pain Management;  Laterality: Bilateral;  Lumbar Bilateral L3-L4-L5 Medial Branch Block    RADIOFREQUENCY ABLATION Left 6/11/2019    Procedure: RADIOFREQUENCY ABLATION, L3-L4-L5 MEDIAL BRANCH   1 OF 2;  Surgeon: Josue Paredes MD;  Location: [facility] PAIN MGT;  Service: Pain Management;  Laterality: Left;    RADIOFREQUENCY ABLATION Right 6/25/2019    Procedure: RADIOFREQUENCY ABLATION, L3-L4-L5 MEDIAL BRANCH JING 2 OF 2;  Surgeon: Josue Paredes MD;  Location: [facility] PAIN MGT;  Service: Pain Management;  Laterality: Right;    REPLACEMENT OF PACEMAKER GENERATOR Left 5/6/2020    Procedure: REPLACEMENT, PULSE GENERATOR, CARDIAC PACEMAKER;  Surgeon: Bradford Spence MD;  Location: [facility] EP LAB;  Service: Cardiology;  Laterality: Left;  EOL/LEAD Mlfx, Gen Change, Poss RA lead rev, SJM, MAC, GP, 3 PREP    REVISION OF PROCEDURE INVOLVING PACEMAKER LEAD Left 5/6/2020    Procedure: REVISION, ELECTRODE LEAD, CARDIAC PACEMAKER;  Surgeon: Bradford Spence MD;  Location:  "[facility] EP LAB;  Service: Cardiology;  Laterality: Left;    TRANSFORAMINAL EPIDURAL INJECTION OF STEROID Right 6/5/2018    Procedure: INJECTION-STEROID-EPIDURAL-TRANSFORAMINAL;  Surgeon: Josue Paredes MD;  Location: [facility] PAIN T;  Service: Pain Management;  Laterality: Right;  LUMBAR RIGHT L5 AND S1 TRANSFORAMINL JEAN MARIE  [id number]    W/ SEDATION      Review of patient's allergies indicates:  No Known Allergies   Current Facility-Administered Medications   Medication    0.9%  NaCl infusion       PMHx, PSHx, Allergies, Medications reviewed in epic    ROS negative except pain complaints in HPI    OBJECTIVE:    /85 (BP Location: Right arm, Patient Position: Lying)   Pulse 87   Temp 98.4 °F (36.9 °C) (Oral)   Resp 18   Ht 5' 7" (1.702 m)   Wt 76.7 kg (169 lb)   SpO2 100%   BMI 26.47 kg/m²     PHYSICAL EXAMINATION:    GENERAL: Well appearing, in no acute distress, alert and oriented x3.  PSYCH:  Mood and affect appropriate.  SKIN: Skin color, texture, turgor normal, no rashes or lesions which will impact the procedure.  CV: RRR with palpation of the radial artery.  PULM: No evidence of respiratory difficulty, symmetric chest rise. Clear to auscultation.  NEURO: Cranial nerves grossly intact.    Plan:    Proceed with procedure as planned Procedure(s) (LRB):  INJECTION, STEROID, EPIDURAL, L5-S1 IL add on @ 2 ok by  [place] (N/A)    Jeremiah Khan MD  06/16/2020              The patient has been examined and the H&P has been reviewed:    I concur with the findings and no changes have occurred since H&P was written.    Anesthesia/Surgery risks, benefits and alternative options discussed and understood by patient/family.          Active Hospital Problems    Diagnosis  POA    Chronic pain [G89.29]  Yes      Resolved Hospital Problems   No resolved problems to display.     "

## 2020-06-17 NOTE — PROGRESS NOTES
Refill Authorization Note     is requesting a refill authorization.    Brief assessment and rationale for refill: APPROVE: prr               Medication reconciliation completed: No                         Comments:   Automatic Epic Protocol Generated Data:    Requested Prescriptions   Signed Prescriptions Disp Refills    amLODIPine (NORVASC) 5 MG tablet 90 tablet 2     Sig: Take 1 tablet by mouth once daily       Cardiovascular:  Calcium Channel Blockers Passed - 6/16/2020 10:18 PM        Passed - Patient is at least 18 years old        Passed - Last BP in normal range within 360 days.     BP Readings from Last 3 Encounters:   06/16/20 130/76   06/09/20 (!) 151/87   06/05/20 139/86              Passed - Office visit in past 12 months or future 90 days.     Recent Outpatient Visits            1 week ago Lumbar radiculopathy    [facility] [address] [facility]    1 week ago Bilateral carpal tunnel syndrome    [facility] [address] [address]., MD    1 month ago Diabetic polyneuropathy associated with type 2 diabetes mellitus    [facility] - Arrhythmia Bradford Spence MD    3 months ago [place] or callus    [facility] - Podiatry Morro Quiles DPM    3 months ago Sick sinus syndrome    [facility] - Cardiology Harley Thompson MD          Future Appointments              In 2 weeks [facility] [place] - Dermatology, [facility]    In 2 weeks EDG[facility] Carreon [facility] [facility]    In 1 month HOME MONITOR DEVICE CHECK, [facility] - Arrhythmia, [facility]    In 2 months EKG, [facility] - EKG, [facility] Carranzay    In 2 months COORDINATED DEVICE CHECK [facility] - Arrhythmia, Dain Carranzay    In 2 months C[facility] - Arrhythmia, [facility] Carranzay                      Appointments  past 12m or future 3m with PCP    Date Provider   Last Visit   2/17/2020 Jarvis Washington MD   Next Visit   Visit date not found Jarvis PILLAI  MD Padmini   ED visits in past 90 days: 0     Note composed:10:24 PM 06/16/2020

## 2020-06-19 ENCOUNTER — TELEPHONE (OUTPATIENT)
Dept: PAIN MEDICINE | Facility: CLINIC | Age: 60
End: 2020-06-19

## 2020-06-19 NOTE — TELEPHONE ENCOUNTER
"Staff called patient regarding his concerns,    Patient states,"I have been having hiccups for 2 day."     Staff spoke with another nurse practitioner in the office and informed the patient to contact his primary care provider to see if there is anything they can prescribe the patient to help with those symptoms.     Patient verbalized understanding and expressed thanks.     "

## 2020-06-19 NOTE — TELEPHONE ENCOUNTER
----- Message from Thao Lo sent at 6/19/2020 10:31 AM CDT -----  Name of Who is Calling: ANDRES MONTERROSO [[id number]]    What is the request in detail: Would like to speak with provider in regards experiencing hiccups for two days. Please contact to further discuss and advise      Can the clinic reply by [facility]: no    What Number to Call Back [facility] not [facility]: [phone]

## 2020-06-29 ENCOUNTER — NURSE TRIAGE (OUTPATIENT)
Dept: ADMINISTRATIVE | Facility: CLINIC | Age: 60
End: 2020-06-29

## 2020-06-30 ENCOUNTER — PATIENT OUTREACH (OUTPATIENT)
Dept: ADMINISTRATIVE | Facility: OTHER | Age: 60
End: 2020-06-30

## 2020-06-30 ENCOUNTER — TELEPHONE (OUTPATIENT)
Dept: PAIN MEDICINE | Facility: CLINIC | Age: 60
End: 2020-06-30

## 2020-06-30 NOTE — PROGRESS NOTES
Requested updates within Care Everywhere.  Patient's chart was reviewed for overdue CLAUDETTE topics.  Immunizations reconciled.    Orders placed:n/a  Labs Linked:n/a

## 2020-06-30 NOTE — TELEPHONE ENCOUNTER
"Staff called patient to confirm 7/1/20 1:40 pm with Trista Oviedo Np for in office visit     -Please arrive 15 minutes prior to appointment time  -Please call [phone] upon arriving to building   -No Non essential visitors due to limited seating in waiting area  -Essential visitors will be seated in chairs outside of clinic until patient exam is complete.   -Do you require assistance? No assistance.   -Stop at temp station/ get mask if you do not have one (Ok to wear your own)      Patient stated, " He is doing better since he did the injection."     Staff cancelled the appointment   "

## 2020-07-01 ENCOUNTER — OFFICE VISIT (OUTPATIENT)
Dept: DERMATOLOGY | Facility: CLINIC | Age: 60
End: 2020-07-01
Payer: COMMERCIAL

## 2020-07-01 DIAGNOSIS — A63.0 CONDYLOMA ACUMINATA: Primary | ICD-10-CM

## 2020-07-01 PROCEDURE — 99499 NO LOS: ICD-10-PCS | Mod: S$GLB,,, | Performed by: PHYSICIAN ASSISTANT

## 2020-07-01 PROCEDURE — 99999 PR PBB SHADOW E&M-EST. PATIENT-LVL III: ICD-10-PCS | Mod: PBBFAC,,, | Performed by: PHYSICIAN ASSISTANT

## 2020-07-01 PROCEDURE — 54056 CRYOSURGERY PENIS LESION(S): CPT | Mod: S$GLB,,, | Performed by: PHYSICIAN ASSISTANT

## 2020-07-01 PROCEDURE — 99999 PR PBB SHADOW E&M-EST. PATIENT-LVL III: CPT | Mod: PBBFAC,,, | Performed by: PHYSICIAN ASSISTANT

## 2020-07-01 PROCEDURE — 54056 PR DESTR PENIS LESN,SIMPL,CRYOSURG: ICD-10-PCS | Mod: S$GLB,,, | Performed by: PHYSICIAN ASSISTANT

## 2020-07-01 PROCEDURE — 99499 UNLISTED E&M SERVICE: CPT | Mod: S$GLB,,, | Performed by: PHYSICIAN ASSISTANT

## 2020-07-01 NOTE — PATIENT INSTRUCTIONS

## 2020-07-01 NOTE — PROGRESS NOTES
Subjective:       Patient ID:  Renny Young is a 59 y.o. male who presents for   Chief Complaint   Patient presents with    Genital Warts     penis, X6mos, no sx, prev tx by pcp      Genital Warts - Initial  Affected locations: genitalia  Duration: 6 months  Signs / symptoms: spreading  Aggravated by: nothing  Treatments tried: condylox x 2 months.  Improvement on treatment: no relief        Review of Systems   Constitutional: Negative for fever and chills.   Skin: Negative for sensitivity to antibiotic ointment, sensitivity to bandage adhesive and tendency to form keloidal scars.   Hematologic/Lymphatic: Does not bruise/bleed easily.        Objective:    Physical Exam   Constitutional: He appears well-developed and well-nourished. No distress.   Genitourinary:       Neurological: He is alert and oriented to person, place, and time. He is not disoriented.   Psychiatric: He has a normal mood and affect.   Skin:   Areas Examined (abnormalities noted in diagram):   Genitals / Buttocks / Groin Inspection Performed         Diagram Legend     Erythematous scaling macule/papule c/w actinic keratosis       Vascular papule c/w angioma      Pigmented verrucoid papule/plaque c/w seborrheic keratosis      Yellow umbilicated papule c/w sebaceous hyperplasia      Irregularly shaped tan macule c/w lentigo     1-2 mm smooth white papules consistent with Milia      Movable subcutaneous cyst with punctum c/w epidermal inclusion cyst      Subcutaneous movable cyst c/w pilar cyst      Firm pink to brown papule c/w dermatofibroma      Pedunculated fleshy papule(s) c/w skin tag(s)      Evenly pigmented macule c/w junctional nevus     Mildly variegated pigmented, slightly irregular-bordered macule c/w mildly atypical nevus      Flesh colored to evenly pigmented papule c/w intradermal nevus       Pink pearly papule/plaque c/w basal cell carcinoma      Erythematous hyperkeratotic cursted plaque c/w SCC      Surgical scar with no sign of  skin cancer recurrence      Open and closed comedones      Inflammatory papules and pustules      Verrucoid papule consistent consistent with wart     Erythematous eczematous patches and plaques     Dystrophic onycholytic nail with subungual debris c/w onychomycosis     Umbilicated papule    Erythematous-base heme-crusted tan verrucoid plaque consistent with inflamed seborrheic keratosis     Erythematous Silvery Scaling Plaque c/w Psoriasis     See annotation      Assessment / Plan:      Condyloma acuminata  Cryosurgery procedure note:    Verbal consent from the patient is obtained including, but not limited to, risk of hypopigmentation/hyperpigmentation, scar, recurrence of lesion. Liquid nitrogen cryosurgery is applied to 1 lesion to produce a freeze injury. The patient is aware that blisters may form and is instructed on wound care with gentle cleansing and use of vaseline ointment to keep moist until healed. The patient is supplied a handout on cryosurgery and is instructed to call if lesions do not completely resolve.           Follow up in about 1 month (around 8/1/2020).

## 2020-07-01 NOTE — LETTER
July 1, 2020      Stephanie Morfin, NP  [address] [facility] [address]           [facility]  [address]  [place] [address]  Phone: [phone]  Fax: [phone]          Patient: Renny Young   MR Number: [id number]   YOB: 1960   Date of Visit: 7/1/2020       Dear Stephanie Morfin:    Thank you for referring Renny Yougn to me for evaluation. Attached you will find relevant portions of my assessment and plan of care.    If you have questions, please do not hesitate to call me. I look forward to following Renny Young along with you.    Sincerely,    Julita Garces PA-C    Enclosure  CC:  No Recipients    If you would like to receive this communication electronically, please contact [email or web address] or [phone] to request more information on [facility] access.    For providers and/or their staff who would like to refer a patient to [facility], please contact us through our one-stop-shop provider referral line, [facility], at [phone].    If you feel you have received this communication in error or would no longer like to receive these types of communications, please e-mail [email or web address]

## 2020-08-04 ENCOUNTER — PATIENT OUTREACH (OUTPATIENT)
Dept: ADMINISTRATIVE | Facility: OTHER | Age: 60
End: 2020-08-04

## 2020-08-05 ENCOUNTER — OFFICE VISIT (OUTPATIENT)
Dept: DERMATOLOGY | Facility: CLINIC | Age: 60
End: 2020-08-05
Payer: COMMERCIAL

## 2020-08-05 DIAGNOSIS — A63.0 CONDYLOMA ACUMINATA: Primary | ICD-10-CM

## 2020-08-05 PROCEDURE — 99499 UNLISTED E&M SERVICE: CPT | Mod: S$GLB,,, | Performed by: PHYSICIAN ASSISTANT

## 2020-08-05 PROCEDURE — 99999 PR PBB SHADOW E&M-EST. PATIENT-LVL III: CPT | Mod: PBBFAC,,, | Performed by: PHYSICIAN ASSISTANT

## 2020-08-05 PROCEDURE — 54056 CRYOSURGERY PENIS LESION(S): CPT | Mod: S$GLB,,, | Performed by: PHYSICIAN ASSISTANT

## 2020-08-05 PROCEDURE — 54056 PR DESTR PENIS LESN,SIMPL,CRYOSURG: ICD-10-PCS | Mod: S$GLB,,, | Performed by: PHYSICIAN ASSISTANT

## 2020-08-05 PROCEDURE — 99999 PR PBB SHADOW E&M-EST. PATIENT-LVL III: ICD-10-PCS | Mod: PBBFAC,,, | Performed by: PHYSICIAN ASSISTANT

## 2020-08-05 PROCEDURE — 99499 NO LOS: ICD-10-PCS | Mod: S$GLB,,, | Performed by: PHYSICIAN ASSISTANT

## 2020-08-05 NOTE — PATIENT INSTRUCTIONS

## 2020-08-05 NOTE — PROGRESS NOTES
Subjective:       Patient ID:  Renny Young is a 59 y.o. male who presents for   Chief Complaint   Patient presents with    Warts     Follow up     Genital Warts - Follow-up  Symptom course: improving (last seen 7/1/20)  Currently using: s/p LN2.  Affected locations: genitalia  Signs / symptoms: asymptomatic        Review of Systems   Constitutional: Negative for fever and chills.   Skin: Negative for sensitivity to antibiotic ointment, sensitivity to bandage adhesive and tendency to form keloidal scars.   Hematologic/Lymphatic: Does not bruise/bleed easily.        Objective:    Physical Exam   Constitutional: He appears well-developed and well-nourished. No distress.   Genitourinary:       Neurological: He is alert and oriented to person, place, and time. He is not disoriented.   Psychiatric: He has a normal mood and affect.   Skin:   Areas Examined (abnormalities noted in diagram):   Genitals / Buttocks / Groin Inspection Performed         Diagram Legend     Erythematous scaling macule/papule c/w actinic keratosis       Vascular papule c/w angioma      Pigmented verrucoid papule/plaque c/w seborrheic keratosis      Yellow umbilicated papule c/w sebaceous hyperplasia      Irregularly shaped tan macule c/w lentigo     1-2 mm smooth white papules consistent with Milia      Movable subcutaneous cyst with punctum c/w epidermal inclusion cyst      Subcutaneous movable cyst c/w pilar cyst      Firm pink to brown papule c/w dermatofibroma      Pedunculated fleshy papule(s) c/w skin tag(s)      Evenly pigmented macule c/w junctional nevus     Mildly variegated pigmented, slightly irregular-bordered macule c/w mildly atypical nevus      Flesh colored to evenly pigmented papule c/w intradermal nevus       Pink pearly papule/plaque c/w basal cell carcinoma      Erythematous hyperkeratotic cursted plaque c/w SCC      Surgical scar with no sign of skin cancer recurrence      Open and closed comedones      Inflammatory  papules and pustules      Verrucoid papule consistent consistent with wart     Erythematous eczematous patches and plaques     Dystrophic onycholytic nail with subungual debris c/w onychomycosis     Umbilicated papule    Erythematous-base heme-crusted tan verrucoid plaque consistent with inflamed seborrheic keratosis     Erythematous Silvery Scaling Plaque c/w Psoriasis     See annotation      Assessment / Plan:      Condyloma acuminata  Cryosurgery procedure note:    Verbal consent from the patient is obtained including, but not limited to, risk of hypopigmentation/hyperpigmentation, scar, recurrence of lesion. Liquid nitrogen cryosurgery is applied to 1 lesions to produce a freeze injury. The patient is aware that blisters may form and is instructed on wound care with gentle cleansing and use of vaseline ointment to keep moist until healed. The patient is supplied a handout on cryosurgery and is instructed to call if lesions do not completely resolve.         Follow up in about 1 month (around 9/5/2020).

## 2020-08-24 ENCOUNTER — TELEPHONE (OUTPATIENT)
Dept: ELECTROPHYSIOLOGY | Facility: CLINIC | Age: 60
End: 2020-08-24

## 2020-08-27 ENCOUNTER — TELEPHONE (OUTPATIENT)
Dept: PAIN MEDICINE | Facility: CLINIC | Age: 60
End: 2020-08-27

## 2020-08-27 NOTE — TELEPHONE ENCOUNTER
Staff returned the patient's call regarding his request for right side injections. Staff offered the patient a follow up appointment to discuss as he was to have a two week follow up after his 06/16/20 IL JEAN MARIE Lumbar.     Patient accepted an appointment for 09/1/20 3 pm with Trista Oviedo Np to discuss possible injection for his right leg.

## 2020-08-27 NOTE — TELEPHONE ENCOUNTER
----- Message from Lizabeth Foss sent at 8/27/2020  8:29 AM CDT -----  Contact: ANDRES MONTERROSO [[id number]]  Type: Patient Call Back    Who called: ANDRES MONTERROSO [[id number]]    What is the request in detail: Patient is requesting a call back. He states that he would like to schedule his injection on his right side.   Please advise.    Can the clinic reply by [facility]? No    Best call back number: [phone]    Additional Information: N/A

## 2020-08-31 NOTE — PROGRESS NOTES
Mr. Young is a patient of Dr. Spence and was last seen in clinic 4/30/2020 (televisit).      Subjective:   Patient ID:  Renny Young is a 59 y.o. male who presents for follow-up of Pacemaker Check  .     HPI:    Mr. Young is a 59 y.o. male with SSS, PPM (2009), DM, and HTN here for annual follow up.     Background:    PCP: Jarvis Washington MD    Renny Young has a history of hypertension and sick sinus syndrome who had a [employer] dual chamber pacemaker implanted at [facility] in 7/2009 for syncope. Mr. Young describes that following implantation, he experienced discomfort when his RV lead was paced. Apparently his RV lead output was lowered, which improved his symptoms. At his last in 10/2016, he had GI complaints which he attributed to his RV pacing lead. At his bequest, Dr. Spence changed his programming to an AAI pacing mode at that time.    Device check performed on 4/28/2020 indicated his device is at EOS (reached KELLY on 10/3/2019, and consistent with early battery depletion based on past measurements). Additionally his atrial lead impedence, which has historically been stable, was measured in the office at 968-1500 ohms (3x above normal), with 38% RA pacing.  At the time of generator change, additional testing will be performed on the RA lead given recent threefold increase in impedence--if there is evidence of RA lead compromise, an RA lead revision will be performed.    Update (09/09/2020):    5/6/2020: Underwent generator change. RA lead tested. Revealed no abnormalities with lead.     Today he says he feels well. Belching comes and goes but is improved from his last clinic visit and is his only complaint. Mr. Young denies chest pain with exertion or at rest, palpitations, SOB, URRUTIA, dizziness, or syncope.    Device Interrogation (9/9/2020) reveals an intrinsic SR with stable lead and device function. No arrhythmias or treated episodes were noted.  He paces 16% in the RA. AAIR mode. Estimated battery longevity  "10-11.2 years.     I have personally reviewed the patient's EKG today, which shows sinus rhythm at 60bpm. NM interval is 152. QRS is 78. QT is 378.    Current Outpatient Medications   Medication Sig    amLODIPine (NORVASC) 5 MG tablet Take 1 tablet by mouth once daily    aspirin (ECOTRIN) 81 MG EC tablet Take 81 mg by mouth once daily.    atorvastatin (LIPITOR) 20 MG tablet Take 1 tablet (20 mg total) by mouth once daily.    gabapentin (NEURONTIN) 300 MG capsule Take 1 capsule (300 mg total) by mouth 2 (two) times daily.    hydroCHLOROthiazide (HYDRODIURIL) 12.5 MG Tab Take 1 tablet by mouth once daily    losartan (COZAAR) 50 MG tablet Take 1 tablet (50 mg total) by mouth once daily.    metFORMIN (GLUCOPHAGE) 500 MG tablet Take 1 tablet by mouth once daily with breakfast    ammonium lactate 12 % Crea Apply twice daily to affected parts both feet as needed.    podofilox (CONDYLOX) 0.5 % gel Apply topically 2 (two) times daily.     No current facility-administered medications for this visit.      Review of Systems   Constitution: Negative for malaise/fatigue.   Cardiovascular: Negative for chest pain, dyspnea on exertion, irregular heartbeat, leg swelling and palpitations.   Respiratory: Negative for shortness of breath.    Hematologic/Lymphatic: Negative for bleeding problem.   Skin: Negative for rash.   Musculoskeletal: Negative for myalgias.   Gastrointestinal: Positive for heartburn. Negative for hematemesis, hematochezia and nausea.   Genitourinary: Negative for hematuria.   Neurological: Negative for light-headedness.   Psychiatric/Behavioral: Negative for altered mental status.   Allergic/Immunologic: Negative for persistent infections.     Objective:        BP (!) 110/48   Pulse 60   Ht 5' 7" (1.702 m)   Wt 79.2 kg (174 lb 9.7 oz)   BMI 27.35 kg/m²     Physical Exam   Constitutional: He is oriented to person, place, and time. He appears well-developed and well-nourished.   HENT:   Head: " Normocephalic.   Nose: Nose normal.   Eyes: Pupils are equal, round, and reactive to light.   Cardiovascular: Normal rate, regular rhythm, S1 normal and S2 normal.   No murmur heard.  Pulses:       Radial pulses are 2+ on the right side and 2+ on the left side.   Pulmonary/Chest: Breath sounds normal. No respiratory distress.   Device to LUCW. Incision and pocket in good repair.   Abdominal: Normal appearance.   Musculoskeletal: Normal range of motion.         General: No edema.   Neurological: He is alert and oriented to person, place, and time.   Skin: Skin is warm and dry. No erythema.   Psychiatric: He has a normal mood and affect. His speech is normal and behavior is normal.   Nursing note and vitals reviewed.    Lab Results   Component Value Date     02/17/2020    K 4.1 02/17/2020    BUN 16 02/17/2020    CREATININE 1.2 02/17/2020    ALT 23 02/17/2020    AST 28 02/17/2020    HGB 16.3 05/06/2020    HCT 52.1 05/06/2020    TSH 1.194 04/06/2017    LDLCALC 58.4 (L) 02/17/2020           Assessment:     1. Pacemaker    2. Sick sinus syndrome    3. Hypertension, essential      Plan:     In summary, Mr. Young is a 59 y.o. male with SSS, PPM (2009), DM, and HTN here for annual follow up.   He is 4 months s/p generator change. Mr. Young is doing well from a device perspective with stable lead and device function. No arrhythmia noted.  AAIR mode. 16% RA pacing. No syncope.    Continue current medication regimen and device settings.   Follow up in device clinic as scheduled. (6 mo as pt has declined home monitoring)  Follow up in EP clinic in 1 year, sooner as needed.     *A copy of this note has been sent to Dr. Spence*    Follow up in about 1 year (around 9/9/2021).    ------------------------------------------------------------------    SARAH Sahu, NP-C  Cardiac Electrophysiology

## 2020-09-01 ENCOUNTER — OFFICE VISIT (OUTPATIENT)
Dept: PAIN MEDICINE | Facility: CLINIC | Age: 60
End: 2020-09-01
Payer: COMMERCIAL

## 2020-09-01 VITALS
HEART RATE: 87 BPM | WEIGHT: 174.63 LBS | SYSTOLIC BLOOD PRESSURE: 150 MMHG | DIASTOLIC BLOOD PRESSURE: 98 MMHG | TEMPERATURE: 100 F | HEIGHT: 67 IN | BODY MASS INDEX: 27.41 KG/M2

## 2020-09-01 DIAGNOSIS — E11.42 DIABETIC POLYNEUROPATHY ASSOCIATED WITH TYPE 2 DIABETES MELLITUS: ICD-10-CM

## 2020-09-01 DIAGNOSIS — M54.16 LUMBAR RADICULOPATHY: Primary | ICD-10-CM

## 2020-09-01 DIAGNOSIS — M47.816 FACET ARTHRITIS OF LUMBAR REGION: ICD-10-CM

## 2020-09-01 DIAGNOSIS — M47.816 LUMBAR SPONDYLOSIS: ICD-10-CM

## 2020-09-01 PROCEDURE — 3080F PR MOST RECENT DIASTOLIC BLOOD PRESSURE >= 90 MM HG: ICD-10-PCS | Mod: CPTII,S$GLB,, | Performed by: NURSE PRACTITIONER

## 2020-09-01 PROCEDURE — 3008F PR BODY MASS INDEX (BMI) DOCUMENTED: ICD-10-PCS | Mod: CPTII,S$GLB,, | Performed by: NURSE PRACTITIONER

## 2020-09-01 PROCEDURE — 3008F BODY MASS INDEX DOCD: CPT | Mod: CPTII,S$GLB,, | Performed by: NURSE PRACTITIONER

## 2020-09-01 PROCEDURE — 3044F HG A1C LEVEL LT 7.0%: CPT | Mod: CPTII,S$GLB,, | Performed by: NURSE PRACTITIONER

## 2020-09-01 PROCEDURE — 99999 PR PBB SHADOW E&M-EST. PATIENT-LVL IV: ICD-10-PCS | Mod: PBBFAC,,, | Performed by: NURSE PRACTITIONER

## 2020-09-01 PROCEDURE — 99213 PR OFFICE/OUTPT VISIT, EST, LEVL III, 20-29 MIN: ICD-10-PCS | Mod: S$GLB,,, | Performed by: NURSE PRACTITIONER

## 2020-09-01 PROCEDURE — 3077F SYST BP >= 140 MM HG: CPT | Mod: CPTII,S$GLB,, | Performed by: NURSE PRACTITIONER

## 2020-09-01 PROCEDURE — 3077F PR MOST RECENT SYSTOLIC BLOOD PRESSURE >= 140 MM HG: ICD-10-PCS | Mod: CPTII,S$GLB,, | Performed by: NURSE PRACTITIONER

## 2020-09-01 PROCEDURE — 99213 OFFICE O/P EST LOW 20 MIN: CPT | Mod: S$GLB,,, | Performed by: NURSE PRACTITIONER

## 2020-09-01 PROCEDURE — 99999 PR PBB SHADOW E&M-EST. PATIENT-LVL IV: CPT | Mod: PBBFAC,,, | Performed by: NURSE PRACTITIONER

## 2020-09-01 PROCEDURE — 3080F DIAST BP >= 90 MM HG: CPT | Mod: CPTII,S$GLB,, | Performed by: NURSE PRACTITIONER

## 2020-09-01 PROCEDURE — 3044F PR MOST RECENT HEMOGLOBIN A1C LEVEL <7.0%: ICD-10-PCS | Mod: CPTII,S$GLB,, | Performed by: NURSE PRACTITIONER

## 2020-09-01 NOTE — PATIENT INSTRUCTIONS
Possible Causes of Low Back or Leg Pain    The symptoms in your back or leg may be due to pressure on a nerve. This pressure may be caused by a damaged disk or by abnormal bone growth. Either way, you may feel pain, burning, tingling, or numbness. If you have pressure on a nerve that connects to the sciatic nerve, pain may shoot down your leg.    Pressure from the disk  Constant wear and tear can weaken a disk over time and cause back pain. The disk can then be damaged by a sudden movement or injury. If its soft center begins to bulge, the disk may press on a nerve. Or the outside of the disk may tear, and the soft center may squeeze through and pinch a nerve.    Pressure from bone  As a disk wears out, the vertebrae right above and below the disk begin to touch. This can put pressure on a nerve. Often, abnormal bone (called bone spurs) grows where the vertebrae rub against each other. This can cause the foramen or the spinal canal to narrow (called stenosis) and press against a nerve.  Date Last Reviewed: 10/4/2015  © 7656-7925 [employer]. [address] [place] [address]. All rights reserved. This information is not intended as a substitute for professional medical care. Always follow your healthcare professional's instructions.        Causes of Lumbar (Low Back) Pain  Low back pain can be caused by problems with any part of the lumbar spine. A disk can herniate (push out) and press on a nerve. Vertebrae can rub against each other or slip out of place. This can irritate facet joints and nerves. It can also lead to stenosis, a narrowing of the spinal canal or foramen.  Pressure from a disk  Constant wear and tear on a disk can cause it to weaken and push outward. Part of the disk may then press on nearby nerves. There are two common types of herniated disks:  Contained means the soft nucleus is protruding outward.   Extruded means the firm annulus has torn, letting the soft center squeeze  through.     Pressure from bone  An unstable spine   With age, a disk may thin and wear out. Vertebrae above and below the disk may begin to touch. This can put pressure on nerves. It can also cause bone spurs (growths) to form where the bones rub together.    Stenosis results when bone spurs narrow the foramen or spinal canal. This also puts pressure on nerves. Slipping vertebrae can irritate nerves and joints. They can also worsen stenosis.    In some cases, vertebrae become unstable and slip forward. This is called spondylolisthesis.     Date Last Reviewed: 10/12/2015  © 0968-1487 [employer]. [address] [place] [address], [place] [address]. All rights reserved. This information is not intended as a substitute for professional medical care. Always follow your healthcare professional's instructions.

## 2020-09-01 NOTE — PROGRESS NOTES
Chronic Pain - Established Visit    Referring Physician: No ref. provider found    Chief Complaint:   No chief complaint on file.       SUBJECTIVE: Disclaimer: This note has been generated using voice-recognition software. There may be typographical errors that have been missed during proof-reading    Interval History 9/1/2020:  The patient is here for follow up of back and leg pain.  He is now s/p L5/S1 IL JEAN MARIE with 80% of back pain.  However, he is having significant right leg pain, mainly down the buttock and posterior calf.  He had benefit with right L5/S1 and S1 TF JEAN MARIE in the past and would like to repeat.  He has associated numbness to right calf, worse with walking.  He does have some benefit with Gabapentin.  He had a recent Covid test for work which was negative. His pain today is 5/10.    Interval History 6/9/2020:  The patient is here for follow up of chronic lower back pain.  We have not seen the patient since last year.  He underwent an epidural at that time and says that his pain has been very mild until a few weeks ago.  He is having pain across lower back with radiation into the legs, mainly on the left.  He continues to take gabapentin with some benefit.  The pain bothers him the most with walking and activity.  He had pacemaker replacement on 5/6/20.  He is not currently on blood thinners.  His pain today is 5/10.    Interval History 7/30/2019:  The patient returns for follow up of back pain.  He is s/p repeat left then right L3,4,5 RFAs completed on 6/24/19 with about 80% relief.  His back pain is mild.  He has had increased leg pain recently, worse on the left side.  Previous leg pain was relieved with JEAN MARIE last year.  He would like to repeat this.  His leg pain bothers him mainly at night when trying to sleep.  He stopped Gabapentin when he was not having leg pain but recently restarted.  He is unable to take at night.  His pain today is 6/10.    Interval History 5/14/2019:  The patient returns  for follow up.  He previously had benefit with JEAN MARIE for leg pain and RFAs for back pain.  He is having some back pain that has been worsening over the past couple of weeks.  He has significant pain in the morning.  He has some improvement when he moves around.  He says that cold air aggravates his pain.  He stopped Gabapentin last year when his leg pain improved.  He is not having much leg pain now.  His pain today is 6/10.    Interval History 2/7/2019:  The patient presents for check up of chronic back pain.  He reports doing well since last visit.  He feels that last JEAN MARIE is still providing him benefit.  He has not had any leg pain.  He does still have back pain but states that it is tolerable.  His morning pain is much improved from RFAs last year.  He continues to work and is active.  His pain today is 5/10.    Interval History 12/6/2018:  The patient presents today for follow up.  He continues with pain to the lower back.  He is not having leg pain at this time.  He had some relief with RFAs with the past.  He stopped Gabapentin when his leg pain improved.  He takes Tylenol sparingly with some benefit.  His pain today is 5/10.  The patient denies any bowel or bladder incontinence or signs of saddle paresthesia.  The patient denies any major medical changes since last office visit.    Interval History 10/25/2018:  The patient returns for follow up of back and leg pain.  He is s/p L5/S1 IL JEAN MARIE on 10/11/18 with 80% pain relief for his legs.  He has had increased lower back pain over the past few days which he attributes to weather changes.  He describes it as aching.  He has not been stretching.  He does walk a lot for work.  He cannot take oral NSAIDs due to pacemaker placement.  His pain today is 8/10.     Interval History 9/11/2018:  The patient presents for procedure follow up appointment.  He is s/p left then right L3,4,5 RFA completed on 8/14/18 with 80% pain relief initially.  He has had a little more pain  over the past week.  He is now having intermittent radiation into the back of both legs, left greater than right.  He previously had benefit with right sided TF JEAN MARIE.  He is still taking gabapentin.  His pain today is 5/10.    Interval History 7/25/2018:  The patient returns today for follow up of back pain.  He is s/p bilateral L3,4,5 MBB on 7/5/18 with 100% relief for 2 days.  He previously had benefit with right TF JEAN MARIE for leg pain.  He has not had right leg pain since the epidural.  However, he is reporting lateral left leg pain that has progressed over the past couple of weeks.  He continues to be active and works.  His pain today is 5/10.    Interval History 6/20/2018:  The patient returns today for follow up of lower back and right leg pain.  He is s/p right L5 and S1 TF JEAN MARIE on 6/5/18 with 100% relief of right leg pain.  He has not had any leg pain since the procedure.  He continues to report pain across the lower back.  This is always worse first thing in the morning.  He states that this feels aching and throbbing in nature.  He did have recent lumbar CT scan.  He would like to discuss further procedures.  He continues to work and be active.  His pain today is 5/10.    Interval History 5/22/2018:  The patient returns for follow up of back pain.  He is having radiation down there back of the right leg to the posterior calf.  The back pain is most severe in the morning and he states that this feels like a stiffness.  This improves when he walks.  He has severe leg pain that is intermittent, mainly with activity.  This is his biggest complaint.  He did complete the medrol dose pack recently with limited improvement.  He had XRAYs which show severe loss of disc space at L5/S1.  He has not had a CT scan.  He is taking Gabapentin 300 mg at bedtime.  It is written BID but it makes him drowsy during the day.  He has some benefit with Aleve but has been told to avoid NSAIDs due to history of pacemaker placement.  His  pain today is 5/10.     Initial encounter:    Renny Young presents to the clinic for the evaluation of lower back and right leg pain. The pain started two months ago and symptoms have been worsening.    Brief history:    Pain Description:    The pain is located in the lower back and right leg area in the L5/S1 distribution.      At BEST  5/10     At WORST  7/10 on the WORST day.      On average pain is rated as 5/10.     Today the pain is rated as 5/10    The pain is described as aching      Symptoms interfere with daily activity.     Exacerbating factors: Standing and Morning.      Mitigating factors medications.     Patient denies night fever/night sweats, urinary incontinence, bowel incontinence, significant weight loss, significant motor weakness and loss of sensations.  Patient denies any suicidal or homicidal ideations    Pain Medications:  Current:  OTC Tylenol PRN    Tried in Past:  NSAIDs - Aleve  TCA -Never  SNRI -Never  Anti-convulsants - Gabapentin   Muscle Relaxants -Never  Opioids-Never    Physical Therapy/Home Exercise: yes     report:  Reviewed and consistent with medication use as prescribed.    Pain Procedures:   6/5/18 Right L5 and S1 TF JEAN MARIE- significant relief  7/5/18 Bilateral L3,4,5 MBB- 100% relief for 2 days  7/31/18 Left L3,4,5 RFA- 80% relief  8/14/18 Right L3,4,5 RFA- 80% relief  10/11/18 L5/S1 IL JEAN MARIE- 80% relief  6/11/19 Left L3,4,5 RFA- 80% relief  6/25/19 Right L3,4,5 RFA- 80% relief  8/13/19 L5/S1 IL JEAN MARIE- 90% relief for 9 months  6/16/20 L5/S1 IL JEAN MARIE- 80% relief of back pain    Chiropractor -never  Acupuncture - never  TENS unit -never  Spinal decompression -never  Joint replacement -never    Imaging:     Lumbar CT 5/28/18    Narrative     EXAMINATION:  CT LUMBAR SPINE WITHOUT CONTRAST    CLINICAL HISTORY:  Low back pain, >6wks conservative tx, persistent-progressive sx, surgical candidate;  Radiculopathy, lumbosacral region    TECHNIQUE:  Low-dose axial, sagittal and coronal  reformations are obtained through the lumbar spine.  Contrast was not administered.    COMPARISON:  None.    FINDINGS:  Posterior vertebral alignment is satisfactory.  There is mild levoconvexity of the lumbar spine.  Vertebral body heights are well maintained.  There is prominent degenerative disc disease at the L5-S1 level with marked disc space narrowing with vertebral endplate sclerosis and irregularity as well as vacuum disc phenomenon at the L5-S1 level.  There are anterior and posterior osteophytes at the L5-S1 level associated with diffuse concentric disc bulging.  Mild concentric disc bulging is also present at the L4-L5 level.  There is mild bilateral neural foraminal narrowing at the L5-S1 level due to the diffuse disc bulging and osteophyte formation as well as mild facet arthropathy at the L5-S1.  No abnormal paraspinal masses are evident.  There is a mild amount of atherosclerotic calcification within the abdominal aorta which tapers normally without aneurysmal dilatation.   Impression       No evidence for acute fracture, bone destruction, or subluxation.    Prominent degenerative disc disease at the L5-S1 level with anterior and posterior osteophyte formation as well as concentric disc bulging.  This does not appear to result in significant spinal stenosis.    Minimal diffuse disc bulging at the L4-L5 level.    Mild neural foraminal narrowing at the L5-S1 level bilaterally.       X-Ray Lumbar Spine    Narrative     EXAMINATION:  XR LUMBAR SPINE AP AND LATERAL    CLINICAL HISTORY:  Low back pain, >6wks conservative tx, persistent-progressive sx, surgical candidate;Low back pain    TECHNIQUE:  AP, lateral and spot images were performed of the lumbar spine.    COMPARISON:  None    FINDINGS:  There are 5 non-rib-bearing lumbar type vertebral bodies.  There is mild levocurvature of the lumbar spine between L1 and L4 that I calculate at 4°.   Vertebral body heights and alignment are preserved.    There is  severe loss of disc space height at L5-S1 with near effacement of that disc space; vacuum phenomenon is present at the anterior portion of the disc space.  Other disc spaces are preserved.    There is facet arthropathy at L5-S1.  I do not identify spondylolysis or spondylolisthesis.    Sacroiliac joints are incompletely visualized.    Calcific atherosclerosis is present in the abdominal aorta and iliac arteries of this 57-year-old man.  I do not identify aneurysm.   Impression       Please see above.      Electronically signed by: Arin Galicia MD  Date: 05/01/2018  Time: 08:06         Past Medical History:   Diagnosis Date    Diabetes mellitus, type 2     Hypertension     Pacemaker      Past Surgical History:   Procedure Laterality Date    COLONOSCOPY N/A 2/13/2017    Procedure: COLONOSCOPY;  Surgeon: NILDA Juares MD;  Location: [facility] ENDO ([address] [place] [address]);  Service: Endoscopy;  Laterality: N/A;  Do not cancel this order. Patient has Pacemaker in place.     EPIDURAL STEROID INJECTION N/A 8/13/2019    Procedure: INJECTION, STEROID, EPIDURAL IL, L5/S1;  Surgeon: Josue Paredes MD;  [facility] [place];  Service: Pain Management;  Laterality: N/A;  IL JEAN MARIE L5/S1    EPIDURAL STEROID INJECTION N/A 6/16/2020    Procedure: INJECTION, STEROID, EPIDURAL, L5-S1 IL add on @ 2 ok by  [place];  Surgeon: Josue Paredes MD;  Location: [facility] PAIN T;  Service: Pain Management;  Laterality: N/A;    HERNIA REPAIR      INJECTION OF ANESTHETIC AGENT AROUND NERVE Bilateral 7/5/2018    Procedure: BLOCK, NERVE;  Surgeon: Krystal Mtz MD;  Location: [facility] PAIN T;  Service: Pain Management;  Laterality: Bilateral;  Lumbar Bilateral L3-L4-L5 Medial Branch Block    RADIOFREQUENCY ABLATION Left 6/11/2019    Procedure: RADIOFREQUENCY ABLATION, L3-L4-L5 MEDIAL BRANCH   1 OF 2;  Surgeon: Josue Paredes MD;  [facility];  Service: Pain Management;  Laterality: Left;    RADIOFREQUENCY ABLATION Right 6/25/2019     Procedure: RADIOFREQUENCY ABLATION, L3-L4-L5 MEDIAL BRANCH JING 2 OF 2;  Surgeon: Josue Paerdes MD;  Location: [facility] PAIN MGT;  Service: Pain Management;  Laterality: Right;    REPLACEMENT OF PACEMAKER GENERATOR Left 5/6/2020    Procedure: REPLACEMENT, PULSE GENERATOR, CARDIAC PACEMAKER;  Surgeon: Bradford Spence MD;  Location: [facility] EP LAB;  Service: Cardiology;  Laterality: Left;  EOL/LEAD Mlfx, Gen Change, Poss RA lead rev, SJM, MAC, GP, 3 PREP    REVISION OF PROCEDURE INVOLVING PACEMAKER LEAD Left 5/6/2020    Procedure: REVISION, ELECTRODE LEAD, CARDIAC PACEMAKER;  Surgeon: Bradford Spence MD;  Location: [facility] EP LAB;  Service: Cardiology;  Laterality: Left;    TRANSFORAMINAL EPIDURAL INJECTION OF STEROID Right 6/5/2018    Procedure: INJECTION-STEROID-EPIDURAL-TRANSFORAMINAL;  Surgeon: Josue Paredes MD;  Location: [facility] PAIN MGT;  Service: Pain Management;  Laterality: Right;  LUMBAR RIGHT L5 AND S1 TRANSFORAMINL JEAN MARIE  [id number]    W/ SEDATION      Social History     Socioeconomic History    Marital status:      Spouse name: Not on file    Number of children: 3    Years of education: Not on file    Highest education level: Not on file   Occupational History    Occupation:    Social Needs    Financial resource strain: Not on file    Food insecurity     Worry: Not on file     Inability: Not on file    Transportation needs     Medical: Not on file     Non-medical: Not on file   Tobacco Use    Smoking status: Current Every Day Smoker     Packs/day: 0.50     Types: Cigarettes    Smokeless tobacco: Never Used   Substance and Sexual Activity    Alcohol use: Yes     Comment: Beer- Socially    Drug use: No    Sexual activity: Yes   Lifestyle    Physical activity     Days per week: Not on file     Minutes per session: Not on file    Stress: Not on file   Relationships    Social connections     Talks on phone: Not on file     Gets together: Not on file     Attends Mosque service: Not  on file     Active member of club or organization: Not on file     Attends meetings of clubs or organizations: Not on file     Relationship status: Not on file   Other Topics Concern    Not on file   Social History Narrative    Not on file     Family History   Problem Relation Age of Onset    Diabetes Mother     Diabetes Father     Kidney disease Father     Melanoma Neg Hx        Review of patient's allergies indicates:  No Known Allergies    Current Outpatient Medications   Medication Sig    amLODIPine (NORVASC) 5 MG tablet Take 1 tablet by mouth once daily    aspirin (ECOTRIN) 81 MG EC tablet Take 81 mg by mouth once daily.    atorvastatin (LIPITOR) 20 MG tablet Take 1 tablet (20 mg total) by mouth once daily.    gabapentin (NEURONTIN) 300 MG capsule Take 1 capsule (300 mg total) by mouth 2 (two) times daily.    hydroCHLOROthiazide (HYDRODIURIL) 12.5 MG Tab Take 1 tablet (12.5 mg total) by mouth once daily.    losartan (COZAAR) 50 MG tablet Take 1 tablet (50 mg total) by mouth once daily.    metFORMIN (GLUCOPHAGE) 500 MG tablet Take 1 tablet by mouth once daily with breakfast    ammonium lactate 12 % Crea Apply twice daily to affected parts both feet as needed. (Patient not taking: Reported on 9/1/2020)    podofilox (CONDYLOX) 0.5 % gel Apply topically 2 (two) times daily. (Patient not taking: Reported on 9/1/2020)     No current facility-administered medications for this visit.        REVIEW OF SYSTEMS:    GENERAL:  No weight loss, malaise or fevers.  HEENT:   No recent changes in vision or hearing  NECK:  Negative for lumps, no difficulty with swallowing.  RESPIRATORY:  Negative for cough, wheezing or shortness of breath, patient denies any recent URI.  CARDIOVASCULAR:  Negative for chest pain, leg swelling or palpitations. Pacemaker for SSS.  GI:  Negative for abdominal discomfort, blood in stools or black stools or change in bowel habits.  MUSCULOSKELETAL:  See HPI.  SKIN:  Negative for lesions,  "rash, and itching.  PSYCH:  No mood disorder or recent psychosocial stressors.  Patients sleep is not disturbed secondary to pain.  HEMATOLOGY/LYMPHOLOGY:  Negative for prolonged bleeding, bruising easily or swollen nodes.  Patient is not currently taking any anti-coagulants  ENDO: DM2 on metformin  NEURO:   No history of headaches, syncope, paralysis, seizures or tremors.  All other reviewed and negative other than HPI.    OBJECTIVE:    BP (!) 150/98   Pulse 87   Temp 99.7 °F (37.6 °C)   Ht 5' 7" (1.702 m)   Wt 79.2 kg (174 lb 9.7 oz)   BMI 27.35 kg/m²     PHYSICAL EXAMINATION:    GENERAL: Well appearing, in no acute distress, alert and oriented x3.  PSYCH:  Mood and affect appropriate.  SKIN: Skin color, texture, turgor normal, no rashes or lesions.  HEAD/FACE:  Normocephalic, atraumatic. Cranial nerves grossly intact.  CV: RRR with palpation of the radial artery.  PULM: No evidence of respiratory difficulty, symmetric chest rise.  BACK: Straight leg raising in the sitting and supine positions is positive to radicular pain at right L5 and S1 distribution.  There is no pain with palpation over the facet joints of the lumbar spine bilaterally.  There is decreased range of motion with extension to 15 degrees, and facet loading maneuvers cause mild pain bilaterally.  EXTREMITIES: Peripheral joint ROM is full and pain free without obvious instability or laxity in all four extremities. No deformities, edema, or skin discoloration. Good capillary refill.  MUSCULOSKELETAL: Hip, and knee provocative maneuvers are negative.  There is no pain with palpation over the sacroiliac joints bilaterally.  There is no pain to palpation over the greater trochanteric bursa bilaterally.  FABERs test is negative.  FADIRs test is negative.  5/5 strength in right ankle with plantar and dorsiflexion, 5/5 strength in left ankle with plantar and dorsiflexion, 5/5 strength with right knee flexion extension, 5/5 strength with knee flexion " extension on the left.  No atrophy or tone abnormalities are noted.  NEURO: Bilateral lower extremity coordination and muscle stretch reflexes are physiologic and symmetric.  Plantar response are downgoing. No clonus.  Decreased sensation to LLE at L5 distribution.  GAIT: Antalgic.    Lab Results   Component Value Date    HGBA1C 6.3 (H) 02/17/2020     Lab Results   Component Value Date    WBC 8.23 05/06/2020    HGB 16.3 05/06/2020    HCT 52.1 05/06/2020    MCV 91 05/06/2020     05/06/2020     BMP  Lab Results   Component Value Date     02/17/2020    K 4.1 02/17/2020     02/17/2020    CO2 24 02/17/2020    BUN 16 02/17/2020    CREATININE 1.2 02/17/2020    CALCIUM 10.0 02/17/2020    ANIONGAP 12 02/17/2020    ESTGFRAFRICA >60.0 02/17/2020    EGFRNONAA >60.0 02/17/2020     ASSESSMENT: 59 y.o. year old male with lower back pain, consistent with the following diagnoses:    Encounter Diagnoses   Name Primary?    Lumbar radiculopathy Yes    Lumbar spondylosis     Diabetic polyneuropathy associated with type 2 diabetes mellitus     Facet arthritis of lumbar region        PLAN:     - Previous imaging was reviewed and discussed with the patient today.    - He is still having right sided leg pain. Will schedule for repeat right L5/S1 and S1 TF JEAN MARIE.  Previous helped significantly.    - Consider repeat RFAs in the future.    - Continue Gabapentin.    - The patient will continue a home exercise routine to help with pain and strengthening.    - RTC 2 weeks after procedure.      The above plan and management options were discussed at length with patient. Patient is in agreement with the above and verbalized understanding.     Trista Oviedo  09/01/2020

## 2020-09-01 NOTE — H&P (VIEW-ONLY)
Chronic Pain - Established Visit    Referring Physician: No ref. provider found    Chief Complaint:   No chief complaint on file.       SUBJECTIVE: Disclaimer: This note has been generated using voice-recognition software. There may be typographical errors that have been missed during proof-reading    Interval History 9/1/2020:  The patient is here for follow up of back and leg pain.  He is now s/p L5/S1 IL JEAN MARIE with 80% of back pain.  However, he is having significant right leg pain, mainly down the buttock and posterior calf.  He had benefit with right L5/S1 and S1 TF JEAN MARIE in the past and would like to repeat.  He has associated numbness to right calf, worse with walking.  He does have some benefit with Gabapentin.  He had a recent Covid test for work which was negative. His pain today is 5/10.    Interval History 6/9/2020:  The patient is here for follow up of chronic lower back pain.  We have not seen the patient since last year.  He underwent an epidural at that time and says that his pain has been very mild until a few weeks ago.  He is having pain across lower back with radiation into the legs, mainly on the left.  He continues to take gabapentin with some benefit.  The pain bothers him the most with walking and activity.  He had pacemaker replacement on 5/6/20.  He is not currently on blood thinners.  His pain today is 5/10.    Interval History 7/30/2019:  The patient returns for follow up of back pain.  He is s/p repeat left then right L3,4,5 RFAs completed on 6/24/19 with about 80% relief.  His back pain is mild.  He has had increased leg pain recently, worse on the left side.  Previous leg pain was relieved with JEAN MARIE last year.  He would like to repeat this.  His leg pain bothers him mainly at night when trying to sleep.  He stopped Gabapentin when he was not having leg pain but recently restarted.  He is unable to take at night.  His pain today is 6/10.    Interval History 5/14/2019:  The patient returns  for follow up.  He previously had benefit with JEAN MARIE for leg pain and RFAs for back pain.  He is having some back pain that has been worsening over the past couple of weeks.  He has significant pain in the morning.  He has some improvement when he moves around.  He says that cold air aggravates his pain.  He stopped Gabapentin last year when his leg pain improved.  He is not having much leg pain now.  His pain today is 6/10.    Interval History 2/7/2019:  The patient presents for check up of chronic back pain.  He reports doing well since last visit.  He feels that last JEAN MARIE is still providing him benefit.  He has not had any leg pain.  He does still have back pain but states that it is tolerable.  His morning pain is much improved from RFAs last year.  He continues to work and is active.  His pain today is 5/10.    Interval History 12/6/2018:  The patient presents today for follow up.  He continues with pain to the lower back.  He is not having leg pain at this time.  He had some relief with RFAs with the past.  He stopped Gabapentin when his leg pain improved.  He takes Tylenol sparingly with some benefit.  His pain today is 5/10.  The patient denies any bowel or bladder incontinence or signs of saddle paresthesia.  The patient denies any major medical changes since last office visit.    Interval History 10/25/2018:  The patient returns for follow up of back and leg pain.  He is s/p L5/S1 IL JEAN MARIE on 10/11/18 with 80% pain relief for his legs.  He has had increased lower back pain over the past few days which he attributes to weather changes.  He describes it as aching.  He has not been stretching.  He does walk a lot for work.  He cannot take oral NSAIDs due to pacemaker placement.  His pain today is 8/10.     Interval History 9/11/2018:  The patient presents for procedure follow up appointment.  He is s/p left then right L3,4,5 RFA completed on 8/14/18 with 80% pain relief initially.  He has had a little more pain  over the past week.  He is now having intermittent radiation into the back of both legs, left greater than right.  He previously had benefit with right sided TF JEAN MARIE.  He is still taking gabapentin.  His pain today is 5/10.    Interval History 7/25/2018:  The patient returns today for follow up of back pain.  He is s/p bilateral L3,4,5 MBB on 7/5/18 with 100% relief for 2 days.  He previously had benefit with right TF JEAN MARIE for leg pain.  He has not had right leg pain since the epidural.  However, he is reporting lateral left leg pain that has progressed over the past couple of weeks.  He continues to be active and works.  His pain today is 5/10.    Interval History 6/20/2018:  The patient returns today for follow up of lower back and right leg pain.  He is s/p right L5 and S1 TF JEAN MARIE on 6/5/18 with 100% relief of right leg pain.  He has not had any leg pain since the procedure.  He continues to report pain across the lower back.  This is always worse first thing in the morning.  He states that this feels aching and throbbing in nature.  He did have recent lumbar CT scan.  He would like to discuss further procedures.  He continues to work and be active.  His pain today is 5/10.    Interval History 5/22/2018:  The patient returns for follow up of back pain.  He is having radiation down there back of the right leg to the posterior calf.  The back pain is most severe in the morning and he states that this feels like a stiffness.  This improves when he walks.  He has severe leg pain that is intermittent, mainly with activity.  This is his biggest complaint.  He did complete the medrol dose pack recently with limited improvement.  He had XRAYs which show severe loss of disc space at L5/S1.  He has not had a CT scan.  He is taking Gabapentin 300 mg at bedtime.  It is written BID but it makes him drowsy during the day.  He has some benefit with Aleve but has been told to avoid NSAIDs due to history of pacemaker placement.  His  pain today is 5/10.     Initial encounter:    Renny Young presents to the clinic for the evaluation of lower back and right leg pain. The pain started two months ago and symptoms have been worsening.    Brief history:    Pain Description:    The pain is located in the lower back and right leg area in the L5/S1 distribution.      At BEST  5/10     At WORST  7/10 on the WORST day.      On average pain is rated as 5/10.     Today the pain is rated as 5/10    The pain is described as aching      Symptoms interfere with daily activity.     Exacerbating factors: Standing and Morning.      Mitigating factors medications.     Patient denies night fever/night sweats, urinary incontinence, bowel incontinence, significant weight loss, significant motor weakness and loss of sensations.  Patient denies any suicidal or homicidal ideations    Pain Medications:  Current:  OTC Tylenol PRN    Tried in Past:  NSAIDs - Aleve  TCA -Never  SNRI -Never  Anti-convulsants - Gabapentin   Muscle Relaxants -Never  Opioids-Never    Physical Therapy/Home Exercise: yes     report:  Reviewed and consistent with medication use as prescribed.    Pain Procedures:   6/5/18 Right L5 and S1 TF JEAN MARIE- significant relief  7/5/18 Bilateral L3,4,5 MBB- 100% relief for 2 days  7/31/18 Left L3,4,5 RFA- 80% relief  8/14/18 Right L3,4,5 RFA- 80% relief  10/11/18 L5/S1 IL JEAN MARIE- 80% relief  6/11/19 Left L3,4,5 RFA- 80% relief  6/25/19 Right L3,4,5 RFA- 80% relief  8/13/19 L5/S1 IL JEAN MARIE- 90% relief for 9 months  6/16/20 L5/S1 IL JEAN MARIE- 80% relief of back pain    Chiropractor -never  Acupuncture - never  TENS unit -never  Spinal decompression -never  Joint replacement -never    Imaging:     Lumbar CT 5/28/18    Narrative     EXAMINATION:  CT LUMBAR SPINE WITHOUT CONTRAST    CLINICAL HISTORY:  Low back pain, >6wks conservative tx, persistent-progressive sx, surgical candidate;  Radiculopathy, lumbosacral region    TECHNIQUE:  Low-dose axial, sagittal and coronal  reformations are obtained through the lumbar spine.  Contrast was not administered.    COMPARISON:  None.    FINDINGS:  Posterior vertebral alignment is satisfactory.  There is mild levoconvexity of the lumbar spine.  Vertebral body heights are well maintained.  There is prominent degenerative disc disease at the L5-S1 level with marked disc space narrowing with vertebral endplate sclerosis and irregularity as well as vacuum disc phenomenon at the L5-S1 level.  There are anterior and posterior osteophytes at the L5-S1 level associated with diffuse concentric disc bulging.  Mild concentric disc bulging is also present at the L4-L5 level.  There is mild bilateral neural foraminal narrowing at the L5-S1 level due to the diffuse disc bulging and osteophyte formation as well as mild facet arthropathy at the L5-S1.  No abnormal paraspinal masses are evident.  There is a mild amount of atherosclerotic calcification within the abdominal aorta which tapers normally without aneurysmal dilatation.   Impression       No evidence for acute fracture, bone destruction, or subluxation.    Prominent degenerative disc disease at the L5-S1 level with anterior and posterior osteophyte formation as well as concentric disc bulging.  This does not appear to result in significant spinal stenosis.    Minimal diffuse disc bulging at the L4-L5 level.    Mild neural foraminal narrowing at the L5-S1 level bilaterally.       X-Ray Lumbar Spine    Narrative     EXAMINATION:  XR LUMBAR SPINE AP AND LATERAL    CLINICAL HISTORY:  Low back pain, >6wks conservative tx, persistent-progressive sx, surgical candidate;Low back pain    TECHNIQUE:  AP, lateral and spot images were performed of the lumbar spine.    COMPARISON:  None    FINDINGS:  There are 5 non-rib-bearing lumbar type vertebral bodies.  There is mild levocurvature of the lumbar spine between L1 and L4 that I calculate at 4°.   Vertebral body heights and alignment are preserved.    There is  severe loss of disc space height at L5-S1 with near effacement of that disc space; vacuum phenomenon is present at the anterior portion of the disc space.  Other disc spaces are preserved.    There is facet arthropathy at L5-S1.  I do not identify spondylolysis or spondylolisthesis.    Sacroiliac joints are incompletely visualized.    Calcific atherosclerosis is present in the abdominal aorta and iliac arteries of this 57-year-old man.  I do not identify aneurysm.   Impression       Please see above.      Electronically signed by: Arin Galicia MD  Date: 05/01/2018  Time: 08:06         Past Medical History:   Diagnosis Date    Diabetes mellitus, type 2     Hypertension     Pacemaker      Past Surgical History:   Procedure Laterality Date    COLONOSCOPY N/A 2/13/2017    Procedure: COLONOSCOPY;  Surgeon: NILDA Juares MD;  Location: [facility] ENDO ([address] [place] [address]);  Service: Endoscopy;  Laterality: N/A;  Do not cancel this order. Patient has Pacemaker in place.     EPIDURAL STEROID INJECTION N/A 8/13/2019    Procedure: INJECTION, STEROID, EPIDURAL IL, L5/S1;  Surgeon: Josue Paredes MD;  Location: [facility] PAIN [facility];  Service: Pain Management;  Laterality: N/A;  IL JEAN MARIE L5/S1    EPIDURAL STEROID INJECTION N/A 6/16/2020    Procedure: INJECTION, STEROID, EPIDURAL, L5-S1 IL add on @ 2 ok by  [place];  Surgeon: Josue Paredes MD;  Location: [facility] PAIN T;  Service: Pain Management;  Laterality: N/A;    HERNIA REPAIR      INJECTION OF ANESTHETIC AGENT AROUND NERVE Bilateral 7/5/2018    Procedure: BLOCK, NERVE;  Surgeon: Krystal Mtz MD;  Location: [facility] PAIN T;  Service: Pain Management;  Laterality: Bilateral;  Lumbar Bilateral L3-L4-L5 Medial Branch Block    RADIOFREQUENCY ABLATION Left 6/11/2019    Procedure: RADIOFREQUENCY ABLATION, L3-L4-L5 MEDIAL BRANCH   1 OF 2;  Surgeon: Josue Paredes MD;  [facility];  Service: Pain Management;  Laterality: Left;    RADIOFREQUENCY ABLATION Right 6/25/2019     Procedure: RADIOFREQUENCY ABLATION, L3-L4-L5 MEDIAL BRANCH JING 2 OF 2;  Surgeon: Josue Paredes MD;  Location: [facility] PAIN MGT;  Service: Pain Management;  Laterality: Right;    REPLACEMENT OF PACEMAKER GENERATOR Left 5/6/2020    Procedure: REPLACEMENT, PULSE GENERATOR, CARDIAC PACEMAKER;  Surgeon: Bradford Spence MD;  Location: [facility] EP LAB;  Service: Cardiology;  Laterality: Left;  EOL/LEAD Mlfx, Gen Change, Poss RA lead rev, SJM, MAC, GP, 3 PREP    REVISION OF PROCEDURE INVOLVING PACEMAKER LEAD Left 5/6/2020    Procedure: REVISION, ELECTRODE LEAD, CARDIAC PACEMAKER;  Surgeon: Bradford Spence MD;  Location: [facility] EP LAB;  Service: Cardiology;  Laterality: Left;    TRANSFORAMINAL EPIDURAL INJECTION OF STEROID Right 6/5/2018    Procedure: INJECTION-STEROID-EPIDURAL-TRANSFORAMINAL;  Surgeon: Josue Paredes MD;  Location: [facility] PAIN MGT;  Service: Pain Management;  Laterality: Right;  LUMBAR RIGHT L5 AND S1 TRANSFORAMINL JEAN MARIE  [id number]    W/ SEDATION      Social History     Socioeconomic History    Marital status:      Spouse name: Not on file    Number of children: 3    Years of education: Not on file    Highest education level: Not on file   Occupational History    Occupation:    Social Needs    Financial resource strain: Not on file    Food insecurity     Worry: Not on file     Inability: Not on file    Transportation needs     Medical: Not on file     Non-medical: Not on file   Tobacco Use    Smoking status: Current Every Day Smoker     Packs/day: 0.50     Types: Cigarettes    Smokeless tobacco: Never Used   Substance and Sexual Activity    Alcohol use: Yes     Comment: Beer- Socially    Drug use: No    Sexual activity: Yes   Lifestyle    Physical activity     Days per week: Not on file     Minutes per session: Not on file    Stress: Not on file   Relationships    Social connections     Talks on phone: Not on file     Gets together: Not on file     Attends Sikhism service: Not  on file     Active member of club or organization: Not on file     Attends meetings of clubs or organizations: Not on file     Relationship status: Not on file   Other Topics Concern    Not on file   Social History Narrative    Not on file     Family History   Problem Relation Age of Onset    Diabetes Mother     Diabetes Father     Kidney disease Father     Melanoma Neg Hx        Review of patient's allergies indicates:  No Known Allergies    Current Outpatient Medications   Medication Sig    amLODIPine (NORVASC) 5 MG tablet Take 1 tablet by mouth once daily    aspirin (ECOTRIN) 81 MG EC tablet Take 81 mg by mouth once daily.    atorvastatin (LIPITOR) 20 MG tablet Take 1 tablet (20 mg total) by mouth once daily.    gabapentin (NEURONTIN) 300 MG capsule Take 1 capsule (300 mg total) by mouth 2 (two) times daily.    hydroCHLOROthiazide (HYDRODIURIL) 12.5 MG Tab Take 1 tablet (12.5 mg total) by mouth once daily.    losartan (COZAAR) 50 MG tablet Take 1 tablet (50 mg total) by mouth once daily.    metFORMIN (GLUCOPHAGE) 500 MG tablet Take 1 tablet by mouth once daily with breakfast    ammonium lactate 12 % Crea Apply twice daily to affected parts both feet as needed. (Patient not taking: Reported on 9/1/2020)    podofilox (CONDYLOX) 0.5 % gel Apply topically 2 (two) times daily. (Patient not taking: Reported on 9/1/2020)     No current facility-administered medications for this visit.        REVIEW OF SYSTEMS:    GENERAL:  No weight loss, malaise or fevers.  HEENT:   No recent changes in vision or hearing  NECK:  Negative for lumps, no difficulty with swallowing.  RESPIRATORY:  Negative for cough, wheezing or shortness of breath, patient denies any recent URI.  CARDIOVASCULAR:  Negative for chest pain, leg swelling or palpitations. Pacemaker for SSS.  GI:  Negative for abdominal discomfort, blood in stools or black stools or change in bowel habits.  MUSCULOSKELETAL:  See HPI.  SKIN:  Negative for lesions,  "rash, and itching.  PSYCH:  No mood disorder or recent psychosocial stressors.  Patients sleep is not disturbed secondary to pain.  HEMATOLOGY/LYMPHOLOGY:  Negative for prolonged bleeding, bruising easily or swollen nodes.  Patient is not currently taking any anti-coagulants  ENDO: DM2 on metformin  NEURO:   No history of headaches, syncope, paralysis, seizures or tremors.  All other reviewed and negative other than HPI.    OBJECTIVE:    BP (!) 150/98   Pulse 87   Temp 99.7 °F (37.6 °C)   Ht 5' 7" (1.702 m)   Wt 79.2 kg (174 lb 9.7 oz)   BMI 27.35 kg/m²     PHYSICAL EXAMINATION:    GENERAL: Well appearing, in no acute distress, alert and oriented x3.  PSYCH:  Mood and affect appropriate.  SKIN: Skin color, texture, turgor normal, no rashes or lesions.  HEAD/FACE:  Normocephalic, atraumatic. Cranial nerves grossly intact.  CV: RRR with palpation of the radial artery.  PULM: No evidence of respiratory difficulty, symmetric chest rise.  BACK: Straight leg raising in the sitting and supine positions is positive to radicular pain at right L5 and S1 distribution.  There is no pain with palpation over the facet joints of the lumbar spine bilaterally.  There is decreased range of motion with extension to 15 degrees, and facet loading maneuvers cause mild pain bilaterally.  EXTREMITIES: Peripheral joint ROM is full and pain free without obvious instability or laxity in all four extremities. No deformities, edema, or skin discoloration. Good capillary refill.  MUSCULOSKELETAL: Hip, and knee provocative maneuvers are negative.  There is no pain with palpation over the sacroiliac joints bilaterally.  There is no pain to palpation over the greater trochanteric bursa bilaterally.  FABERs test is negative.  FADIRs test is negative.  5/5 strength in right ankle with plantar and dorsiflexion, 5/5 strength in left ankle with plantar and dorsiflexion, 5/5 strength with right knee flexion extension, 5/5 strength with knee flexion " extension on the left.  No atrophy or tone abnormalities are noted.  NEURO: Bilateral lower extremity coordination and muscle stretch reflexes are physiologic and symmetric.  Plantar response are downgoing. No clonus.  Decreased sensation to LLE at L5 distribution.  GAIT: Antalgic.    Lab Results   Component Value Date    HGBA1C 6.3 (H) 02/17/2020     Lab Results   Component Value Date    WBC 8.23 05/06/2020    HGB 16.3 05/06/2020    HCT 52.1 05/06/2020    MCV 91 05/06/2020     05/06/2020     BMP  Lab Results   Component Value Date     02/17/2020    K 4.1 02/17/2020     02/17/2020    CO2 24 02/17/2020    BUN 16 02/17/2020    CREATININE 1.2 02/17/2020    CALCIUM 10.0 02/17/2020    ANIONGAP 12 02/17/2020    ESTGFRAFRICA >60.0 02/17/2020    EGFRNONAA >60.0 02/17/2020     ASSESSMENT: 59 y.o. year old male with lower back pain, consistent with the following diagnoses:    Encounter Diagnoses   Name Primary?    Lumbar radiculopathy Yes    Lumbar spondylosis     Diabetic polyneuropathy associated with type 2 diabetes mellitus     Facet arthritis of lumbar region        PLAN:     - Previous imaging was reviewed and discussed with the patient today.    - He is still having right sided leg pain. Will schedule for repeat right L5/S1 and S1 TF JEAN MARIE.  Previous helped significantly.    - Consider repeat RFAs in the future.    - Continue Gabapentin.    - The patient will continue a home exercise routine to help with pain and strengthening.    - RTC 2 weeks after procedure.      The above plan and management options were discussed at length with patient. Patient is in agreement with the above and verbalized understanding.     Trista Oviedo  09/01/2020

## 2020-09-04 ENCOUNTER — PATIENT OUTREACH (OUTPATIENT)
Dept: ADMINISTRATIVE | Facility: OTHER | Age: 60
End: 2020-09-04

## 2020-09-08 ENCOUNTER — OFFICE VISIT (OUTPATIENT)
Dept: DERMATOLOGY | Facility: CLINIC | Age: 60
End: 2020-09-08
Payer: COMMERCIAL

## 2020-09-08 DIAGNOSIS — A63.0 CONDYLOMA ACUMINATA: Primary | ICD-10-CM

## 2020-09-08 PROCEDURE — 54056 PR DESTR PENIS LESN,SIMPL,CRYOSURG: ICD-10-PCS | Mod: S$GLB,,, | Performed by: PHYSICIAN ASSISTANT

## 2020-09-08 PROCEDURE — 99999 PR PBB SHADOW E&M-EST. PATIENT-LVL III: CPT | Mod: PBBFAC,,, | Performed by: PHYSICIAN ASSISTANT

## 2020-09-08 PROCEDURE — 54056 CRYOSURGERY PENIS LESION(S): CPT | Mod: S$GLB,,, | Performed by: PHYSICIAN ASSISTANT

## 2020-09-08 PROCEDURE — 99499 UNLISTED E&M SERVICE: CPT | Mod: S$GLB,,, | Performed by: PHYSICIAN ASSISTANT

## 2020-09-08 PROCEDURE — 99499 NO LOS: ICD-10-PCS | Mod: S$GLB,,, | Performed by: PHYSICIAN ASSISTANT

## 2020-09-08 PROCEDURE — 99999 PR PBB SHADOW E&M-EST. PATIENT-LVL III: ICD-10-PCS | Mod: PBBFAC,,, | Performed by: PHYSICIAN ASSISTANT

## 2020-09-08 NOTE — PROGRESS NOTES
Subjective:       Patient ID:  Renny Young is a 59 y.o. male who presents for   Chief Complaint   Patient presents with    Genital Warts     better     Genital Warts - Follow-up  Symptom course: improving (last seen8/5/20)  Currently using: s/p cryo.  Affected locations: genitalia  Signs / symptoms: asymptomatic        Review of Systems   Constitutional: Negative for fever and chills.   Skin: Negative for sensitivity to antibiotic ointment, sensitivity to bandage adhesive and tendency to form keloidal scars.   Hematologic/Lymphatic: Does not bruise/bleed easily.        Objective:    Physical Exam   Constitutional: He appears well-developed and well-nourished. No distress.   Genitourinary:       Neurological: He is alert and oriented to person, place, and time. He is not disoriented.   Psychiatric: He has a normal mood and affect.   Skin:   Areas Examined (abnormalities noted in diagram):   Genitals / Buttocks / Groin Inspection Performed         Diagram Legend     Erythematous scaling macule/papule c/w actinic keratosis       Vascular papule c/w angioma      Pigmented verrucoid papule/plaque c/w seborrheic keratosis      Yellow umbilicated papule c/w sebaceous hyperplasia      Irregularly shaped tan macule c/w lentigo     1-2 mm smooth white papules consistent with Milia      Movable subcutaneous cyst with punctum c/w epidermal inclusion cyst      Subcutaneous movable cyst c/w pilar cyst      Firm pink to brown papule c/w dermatofibroma      Pedunculated fleshy papule(s) c/w skin tag(s)      Evenly pigmented macule c/w junctional nevus     Mildly variegated pigmented, slightly irregular-bordered macule c/w mildly atypical nevus      Flesh colored to evenly pigmented papule c/w intradermal nevus       Pink pearly papule/plaque c/w basal cell carcinoma      Erythematous hyperkeratotic cursted plaque c/w SCC      Surgical scar with no sign of skin cancer recurrence      Open and closed comedones      Inflammatory  papules and pustules      Verrucoid papule consistent consistent with wart     Erythematous eczematous patches and plaques     Dystrophic onycholytic nail with subungual debris c/w onychomycosis     Umbilicated papule    Erythematous-base heme-crusted tan verrucoid plaque consistent with inflamed seborrheic keratosis     Erythematous Silvery Scaling Plaque c/w Psoriasis     See annotation    Assessment / Plan:      Condyloma acuminata  Cryosurgery procedure note:    Verbal consent from the patient is obtained including, but not limited to, risk of hypopigmentation/hyperpigmentation, scar, recurrence of lesion. Liquid nitrogen cryosurgery is applied to 1 verruca with prior paring, as detailed in the physical exam, to produce a freeze injury. 2 consecutive freeze thaw cycles are applied to each verruca. The patient is aware that blisters (possibly blood blisters) may form.         Follow up in about 1 month (around 10/8/2020).

## 2020-09-08 NOTE — PROGRESS NOTES
Subjective:       Patient ID:  Renny Young is a 59 y.o. male who presents for   Chief Complaint   Patient presents with    Genital Warts     better     HPI Pt presents today for follow up of warts , he states the warts are better but not completely gone, would like     Review of Systems     Objective:    Physical Exam       Diagram Legend     Erythematous scaling macule/papule c/w actinic keratosis       Vascular papule c/w angioma      Pigmented verrucoid papule/plaque c/w seborrheic keratosis      Yellow umbilicated papule c/w sebaceous hyperplasia      Irregularly shaped tan macule c/w lentigo     1-2 mm smooth white papules consistent with Milia      Movable subcutaneous cyst with punctum c/w epidermal inclusion cyst      Subcutaneous movable cyst c/w pilar cyst      Firm pink to brown papule c/w dermatofibroma      Pedunculated fleshy papule(s) c/w skin tag(s)      Evenly pigmented macule c/w junctional nevus     Mildly variegated pigmented, slightly irregular-bordered macule c/w mildly atypical nevus      Flesh colored to evenly pigmented papule c/w intradermal nevus       Pink pearly papule/plaque c/w basal cell carcinoma      Erythematous hyperkeratotic cursted plaque c/w SCC      Surgical scar with no sign of skin cancer recurrence      Open and closed comedones      Inflammatory papules and pustules      Verrucoid papule consistent consistent with wart     Erythematous eczematous patches and plaques     Dystrophic onycholytic nail with subungual debris c/w onychomycosis     Umbilicated papule    Erythematous-base heme-crusted tan verrucoid plaque consistent with inflamed seborrheic keratosis     Erythematous Silvery Scaling Plaque c/w Psoriasis     See annotation      Assessment / Plan:        There are no diagnoses linked to this encounter.         No follow-ups on file.

## 2020-09-08 NOTE — PATIENT INSTRUCTIONS

## 2020-09-09 ENCOUNTER — CLINICAL SUPPORT (OUTPATIENT)
Dept: CARDIOLOGY | Facility: HOSPITAL | Age: 60
End: 2020-09-09
Attending: INTERNAL MEDICINE
Payer: COMMERCIAL

## 2020-09-09 ENCOUNTER — HOSPITAL ENCOUNTER (OUTPATIENT)
Dept: CARDIOLOGY | Facility: CLINIC | Age: 60
Discharge: HOME | End: 2020-09-09
Attending: INTERNAL MEDICINE
Payer: COMMERCIAL

## 2020-09-09 ENCOUNTER — OFFICE VISIT (OUTPATIENT)
Dept: ELECTROPHYSIOLOGY | Facility: CLINIC | Age: 60
End: 2020-09-09
Attending: INTERNAL MEDICINE
Payer: COMMERCIAL

## 2020-09-09 VITALS
HEART RATE: 60 BPM | DIASTOLIC BLOOD PRESSURE: 48 MMHG | BODY MASS INDEX: 27.41 KG/M2 | WEIGHT: 174.63 LBS | SYSTOLIC BLOOD PRESSURE: 110 MMHG | HEIGHT: 67 IN

## 2020-09-09 DIAGNOSIS — Z95.0 PACEMAKER: ICD-10-CM

## 2020-09-09 DIAGNOSIS — I49.5 SICK SINUS SYNDROME: ICD-10-CM

## 2020-09-09 DIAGNOSIS — Z95.0 PACEMAKER: Primary | ICD-10-CM

## 2020-09-09 DIAGNOSIS — Z95.0 CARDIAC PACEMAKER IN SITU: ICD-10-CM

## 2020-09-09 DIAGNOSIS — I10 HYPERTENSION, ESSENTIAL: ICD-10-CM

## 2020-09-09 PROCEDURE — 93010 RHYTHM STRIP: ICD-10-PCS | Mod: S$GLB,,, | Performed by: INTERNAL MEDICINE

## 2020-09-09 PROCEDURE — 3008F BODY MASS INDEX DOCD: CPT | Mod: CPTII,S$GLB,, | Performed by: NURSE PRACTITIONER

## 2020-09-09 PROCEDURE — 3078F PR MOST RECENT DIASTOLIC BLOOD PRESSURE < 80 MM HG: ICD-10-PCS | Mod: CPTII,S$GLB,, | Performed by: NURSE PRACTITIONER

## 2020-09-09 PROCEDURE — 93279 PRGRMG DEV EVAL PM/LDLS PM: CPT | Mod: 26,,, | Performed by: INTERNAL MEDICINE

## 2020-09-09 PROCEDURE — 93010 ELECTROCARDIOGRAM REPORT: CPT | Mod: S$GLB,,, | Performed by: INTERNAL MEDICINE

## 2020-09-09 PROCEDURE — 3074F PR MOST RECENT SYSTOLIC BLOOD PRESSURE < 130 MM HG: ICD-10-PCS | Mod: CPTII,S$GLB,, | Performed by: NURSE PRACTITIONER

## 2020-09-09 PROCEDURE — 3074F SYST BP LT 130 MM HG: CPT | Mod: CPTII,S$GLB,, | Performed by: NURSE PRACTITIONER

## 2020-09-09 PROCEDURE — 3078F DIAST BP <80 MM HG: CPT | Mod: CPTII,S$GLB,, | Performed by: NURSE PRACTITIONER

## 2020-09-09 PROCEDURE — 93279 PRGRMG DEV EVAL PM/LDLS PM: CPT

## 2020-09-09 PROCEDURE — 93005 ELECTROCARDIOGRAM TRACING: CPT | Mod: S$GLB,,, | Performed by: INTERNAL MEDICINE

## 2020-09-09 PROCEDURE — 99999 PR PBB SHADOW E&M-EST. PATIENT-LVL IV: CPT | Mod: PBBFAC,,, | Performed by: NURSE PRACTITIONER

## 2020-09-09 PROCEDURE — 99999 PR PBB SHADOW E&M-EST. PATIENT-LVL IV: ICD-10-PCS | Mod: PBBFAC,,, | Performed by: NURSE PRACTITIONER

## 2020-09-09 PROCEDURE — 93279 CARDIAC DEVICE CHECK - IN CLINIC & HOSPITAL: ICD-10-PCS | Mod: 26,,, | Performed by: INTERNAL MEDICINE

## 2020-09-09 PROCEDURE — 93005 RHYTHM STRIP: ICD-10-PCS | Mod: S$GLB,,, | Performed by: INTERNAL MEDICINE

## 2020-09-09 PROCEDURE — 99214 PR OFFICE/OUTPT VISIT, EST, LEVL IV, 30-39 MIN: ICD-10-PCS | Mod: S$GLB,,, | Performed by: NURSE PRACTITIONER

## 2020-09-09 PROCEDURE — 99214 OFFICE O/P EST MOD 30 MIN: CPT | Mod: S$GLB,,, | Performed by: NURSE PRACTITIONER

## 2020-09-09 PROCEDURE — 3008F PR BODY MASS INDEX (BMI) DOCUMENTED: ICD-10-PCS | Mod: CPTII,S$GLB,, | Performed by: NURSE PRACTITIONER

## 2020-09-09 NOTE — LETTER
September 9, 2020      Bradford Spence MD  [address] [facility] [address]           [facility]  [address]  [facility] [address]  Phone: [phone]  Fax: [phone]          Patient: Renny Young   MR Number: [id number]   YOB: 1960   Date of Visit: 9/9/2020       Dear Dr. Bradford Spence:    Thank you for referring Renny Young to me for evaluation. Attached you will find relevant portions of my assessment and plan of care.    If you have questions, please do not hesitate to call me. I look forward to following Renny Young along with you.    Sincerely,    Lizabeth Hill NP    Enclosure  CC:  No Recipients    If you would like to receive this communication electronically, please contact [email or web address] or [phone] to request more information on "[employer] Link access.    For providers and/or their staff who would like to refer a patient to [facility], please contact us through our one-stop-shop provider referral line, [facility], at [phone].    If you feel you have received this communication in error or would no longer like to receive these types of communications, please e-mail [email or web address]

## 2020-09-10 ENCOUNTER — HOSPITAL ENCOUNTER (OUTPATIENT)
Facility: OTHER | Age: 60
Discharge: HOME | End: 2020-09-10
Attending: ANESTHESIOLOGY | Admitting: ANESTHESIOLOGY
Payer: COMMERCIAL

## 2020-09-10 VITALS
DIASTOLIC BLOOD PRESSURE: 83 MMHG | TEMPERATURE: 99 F | SYSTOLIC BLOOD PRESSURE: 145 MMHG | BODY MASS INDEX: 27.15 KG/M2 | WEIGHT: 173 LBS | OXYGEN SATURATION: 97 % | HEART RATE: 63 BPM | RESPIRATION RATE: 16 BRPM | HEIGHT: 67 IN

## 2020-09-10 DIAGNOSIS — M54.17 LUMBOSACRAL RADICULOPATHY: Primary | ICD-10-CM

## 2020-09-10 DIAGNOSIS — M51.36 DDD (DEGENERATIVE DISC DISEASE), LUMBAR: ICD-10-CM

## 2020-09-10 LAB — POCT GLUCOSE: 89 MG/DL (ref 70–110)

## 2020-09-10 PROCEDURE — 64483 PR EPIDURAL INJ, ANES/STEROID, TRANSFORAMINAL, LUMB/SACR, SNGL LEVL: ICD-10-PCS | Mod: RT,,, | Performed by: ANESTHESIOLOGY

## 2020-09-10 PROCEDURE — 25500020 PHARM REV CODE 255: Performed by: ANESTHESIOLOGY

## 2020-09-10 PROCEDURE — 63600175 PHARM REV CODE 636 W HCPCS: Performed by: ANESTHESIOLOGY

## 2020-09-10 PROCEDURE — 25000003 PHARM REV CODE 250: Performed by: ANESTHESIOLOGY

## 2020-09-10 PROCEDURE — 64484 NJX AA&/STRD TFRM EPI L/S EA: CPT | Mod: RT,,, | Performed by: ANESTHESIOLOGY

## 2020-09-10 PROCEDURE — 64484 NJX AA&/STRD TFRM EPI L/S EA: CPT | Mod: RT | Performed by: ANESTHESIOLOGY

## 2020-09-10 PROCEDURE — 64484 PRA INJECT ANES/STEROID FORAMEN LUMBAR/SACRAL W IMG GUIDE ,EA ADD LEVEL: ICD-10-PCS | Mod: RT,,, | Performed by: ANESTHESIOLOGY

## 2020-09-10 PROCEDURE — 25000003 PHARM REV CODE 250: Performed by: STUDENT IN AN ORGANIZED HEALTH CARE EDUCATION/TRAINING PROGRAM

## 2020-09-10 PROCEDURE — 82947 ASSAY GLUCOSE BLOOD QUANT: CPT | Performed by: ANESTHESIOLOGY

## 2020-09-10 PROCEDURE — 64483 NJX AA&/STRD TFRM EPI L/S 1: CPT | Mod: RT,,, | Performed by: ANESTHESIOLOGY

## 2020-09-10 PROCEDURE — 64483 NJX AA&/STRD TFRM EPI L/S 1: CPT | Mod: RT | Performed by: ANESTHESIOLOGY

## 2020-09-10 RX ORDER — DEXAMETHASONE SODIUM PHOSPHATE 10 MG/ML
INJECTION INTRAMUSCULAR; INTRAVENOUS
Status: DISCONTINUED | OUTPATIENT
Start: 2020-09-10 | End: 2020-09-10 | Stop reason: HOSPADM

## 2020-09-10 RX ORDER — LIDOCAINE HYDROCHLORIDE 10 MG/ML
INJECTION INFILTRATION; PERINEURAL
Status: DISCONTINUED | OUTPATIENT
Start: 2020-09-10 | End: 2020-09-10 | Stop reason: HOSPADM

## 2020-09-10 RX ORDER — MIDAZOLAM HYDROCHLORIDE 1 MG/ML
INJECTION INTRAMUSCULAR; INTRAVENOUS
Status: DISCONTINUED | OUTPATIENT
Start: 2020-09-10 | End: 2020-09-10 | Stop reason: HOSPADM

## 2020-09-10 RX ORDER — BUPIVACAINE HYDROCHLORIDE 2.5 MG/ML
INJECTION, SOLUTION EPIDURAL; INFILTRATION; INTRACAUDAL
Status: DISCONTINUED | OUTPATIENT
Start: 2020-09-10 | End: 2020-09-10 | Stop reason: HOSPADM

## 2020-09-10 RX ORDER — FENTANYL CITRATE 50 UG/ML
INJECTION, SOLUTION INTRAMUSCULAR; INTRAVENOUS
Status: DISCONTINUED | OUTPATIENT
Start: 2020-09-10 | End: 2020-09-10 | Stop reason: HOSPADM

## 2020-09-10 RX ORDER — SODIUM CHLORIDE 9 MG/ML
500 INJECTION, SOLUTION INTRAVENOUS CONTINUOUS
Status: DISCONTINUED | OUTPATIENT
Start: 2020-09-10 | End: 2020-09-10 | Stop reason: HOSPADM

## 2020-09-10 RX ADMIN — SODIUM CHLORIDE 500 ML: 0.9 INJECTION, SOLUTION INTRAVENOUS at 02:09

## 2020-09-10 NOTE — DISCHARGE INSTRUCTIONS

## 2020-09-10 NOTE — OP NOTE
INFORMED CONSENT: The procedure, risks, benefits and options were discussed with patient. There are no contraindications to the procedure. The patient expressed understanding and agreed to proceed. The personnel performing the procedure was discussed.    09/10/2020    Surgeon: Josue Paredes MD    Assistants: Kimani Naik DO    PROCEDURE:    1)  Right  L5/S1 TRANSFORAMINAL EPIDURAL STEROID INJECTION    2)  Right  S1 TRANSFORAMINAL EPIDURAL STEROID INJECTION    Pre Procedure diagnosis:    Right  L5/S1 and S1  Lumbar radiculopathy [M54.16]    Post-Procedure diagnosis:   same    Complications: None    Specimens: None      DESCRIPTION OF PROCEDURE: The patient was brought to the procedure room. IV access was obtained prior to the procedure. The patient was positioned prone on the fluoroscopy table. Continuous hemodynamic monitoring was initiated including blood pressure, EKG, and pulse oximetry. . The skin was prepped with chlorhexidine and draped in a sterile fashion. Skin anesthesia was achieved using a total of 10mL of lidocaine, 5mL over each respective injection site.     The  L5/S1and S1 transforaminal spaces were identified with fluoroscopy in the  AP, oblique, and lateral views.  A 22 gauge spinal quinke needle was then advanced into the area of the trans foraminal spaces at the above levels with confirmation of proper needle position using AP, oblique, and lateral fluoroscopic views. Once the needle tip was in the area of the transforaminal space, and there was no blood, CSF or paraesthesias,  1 mL of Omnipaque 300mg/ml was injected on each side for a total of 2 mL.  Fluoroscopic imaging in the AP and lateral views revealed a clear outline of the spinal nerve with proximal spread of agent through the neural foramen into the epidural space. A total combination of 1 mL of Bupivicaine 0.25% and 40 mg depo medrol was injected into each level for a total of 4mL of injected medications with displacement of  the contrast dye confirming that the medication went into the area of the transforaminal spaces at each level. A sterile dressing was applied.   Patient tolerated the procedure well.    Conscious sedation provided by M.D    The patient was monitored with continuous pulse oximetry, EKG, and intermittent blood pressure monitors.  The patient was hemodynamically stable throughout the entire process was responsive to voice, and breathing spontaneously.  Supplemental O2 was provided at 2L/min via nasal cannula.  Patient was comfortable for the duration of the procedure. (See nurse documentation and case log for sedation time)    There was a total of 2mg IV Midazolam and 75mcg Fentanyl titrated for the procedure    Patient was taken back to the recovery room for further observation.     The patient was discharged to home in stable condition

## 2020-09-10 NOTE — DISCHARGE SUMMARY
Discharge Note  Short Stay      SUMMARY     Admit Date: 9/10/2020    Attending Physician: Kimani Naik      Discharge Physician: Kimani Naik      Discharge Date: 9/10/2020 3:07 PM    Procedure(s) (LRB):  INJECTION, STEROID, EPIDURAL, TRANSFORAMINAL APPROACH, L5-S1 AND S1 (Right)    Final Diagnosis: Lumbar radiculopathy [M54.16]    Disposition: Home or self care    Patient Instructions:   Current Discharge Medication List      CONTINUE these medications which have NOT CHANGED    Details   amLODIPine (NORVASC) 5 MG tablet Take 1 tablet by mouth once daily  Qty: 90 tablet, Refills: 2    Comments: Please inactivate all prior scripts with same name and strength including on holds.  Associated Diagnoses: Hypertension, essential      ammonium lactate 12 % Crea Apply twice daily to affected parts both feet as needed.  Qty: 140 g, Refills: 11      aspirin (ECOTRIN) 81 MG EC tablet Take 81 mg by mouth once daily.      atorvastatin (LIPITOR) 20 MG tablet Take 1 tablet (20 mg total) by mouth once daily.  Qty: 90 tablet, Refills: 0    Comments: Please consider 90 day supplies to promote better adherence  Associated Diagnoses: Hyperlipidemia, unspecified hyperlipidemia type      gabapentin (NEURONTIN) 300 MG capsule Take 1 capsule (300 mg total) by mouth 2 (two) times daily.  Qty: 60 capsule, Refills: 5    Comments: Please consider 90 day supplies to promote better adherence  Associated Diagnoses: Diabetic polyneuropathy associated with type 2 diabetes mellitus      hydroCHLOROthiazide (HYDRODIURIL) 12.5 MG Tab Take 1 tablet by mouth once daily  Qty: 90 tablet, Refills: 0    Associated Diagnoses: Hypertension, essential      losartan (COZAAR) 50 MG tablet Take 1 tablet (50 mg total) by mouth once daily.  Qty: 90 tablet, Refills: 0    Associated Diagnoses: Hypertension, essential      metFORMIN (GLUCOPHAGE) 500 MG tablet Take 1 tablet by mouth once daily with breakfast  Qty: 90 tablet, Refills: 0    Associated  Diagnoses: Type 2 diabetes mellitus with other specified complication, without long-term current use of insulin      podofilox (CONDYLOX) 0.5 % gel Apply topically 2 (two) times daily.  Qty: 3.5 g, Refills: 2    Associated Diagnoses: Condyloma                 Discharge Diagnosis: Lumbar radiculopathy [M54.16]  Condition on Discharge: Stable with no complications to procedure   Diet on Discharge: Same as before.  Activity: as per instruction sheet.  Discharge to: Home with a responsible adult.  Follow up: 2-4 weeks       Please call my office or pager at [phone] if experienced any weakness or loss of sensation, fever > 101.5, pain uncontrolled with oral medications, persistent nausea/vomiting/or diarrhea, redness or drainage from the incisions, or any other worrisome concerns. If physician on call was not reached or could not communicate with our office for any reason please go to the nearest emergency department

## 2020-09-15 DIAGNOSIS — E11.69 TYPE 2 DIABETES MELLITUS WITH OTHER SPECIFIED COMPLICATION, WITHOUT LONG-TERM CURRENT USE OF INSULIN: ICD-10-CM

## 2020-09-15 NOTE — TELEPHONE ENCOUNTER
No new care gaps identified.  Powered by [employer]. Reference number: [id number]. 9/15/2020 10:20:46 AM SHALONDAT

## 2020-09-16 RX ORDER — METFORMIN HYDROCHLORIDE 500 MG/1
TABLET ORAL
Qty: 90 TABLET | Refills: 0 | Status: SHIPPED | OUTPATIENT
Start: 2020-09-16 | End: 2020-12-17

## 2020-09-16 NOTE — PROGRESS NOTES
Refill Authorization Note   Renny Young is requesting a refill authorization.     Brief assessment and rationale for refill: APPROVE: prr          Medication Therapy Plan: CDMR.  Labs recommended in prev appt    Medication reconciliation completed: No                    Comments:          Requested Prescriptions   Pending Prescriptions Disp Refills    metFORMIN (GLUCOPHAGE) 500 MG tablet [Pharmacy Med Name: metFORMIN HCl 500 MG Oral Tablet] 90 tablet 0     Sig: Take 1 tablet by mouth once daily with breakfast       Endocrinology:  Diabetes - Biguanides Failed - 9/15/2020 10:20 AM        Failed - HBA1C is 7.9 or below and within 180 days     Hemoglobin A1C   Date Value Ref Range Status   02/17/2020 6.3 (H) 4.0 - 5.6 % Final     Comment:     ADA Screening Guidelines:  5.7-6.4%  Consistent with prediabetes  >or=6.5%  Consistent with diabetes  High levels of fetal hemoglobin interfere with the HbA1C  assay. Heterozygous hemoglobin variants (HbS, HgC, etc)do  not significantly interfere with this assay.   However, presence of multiple variants may affect accuracy.     01/21/2019 6.3 (H) 4.0 - 5.6 % Final     Comment:     ADA Screening Guidelines:  5.7-6.4%  Consistent with prediabetes  >or=6.5%  Consistent with diabetes  High levels of fetal hemoglobin interfere with the HbA1C  assay. Heterozygous hemoglobin variants (HbS, HgC, etc)do  not significantly interfere with this assay.   However, presence of multiple variants may affect accuracy.     04/06/2018 5.8 (H) 4.0 - 5.6 % Final     Comment:     According to ADA guidelines, hemoglobin A1c <7.0% represents  optimal control in non-pregnant diabetic patients. Different  metrics may apply to specific patient populations.   Standards of Medical Care in Diabetes-2016.  For the purpose of screening for the presence of diabetes:  <5.7%     Consistent with the absence of diabetes  5.7-6.4%  Consistent with increasing risk for diabetes   (prediabetes)  >or=6.5%  Consistent with  diabetes  Currently, no consensus exists for use of hemoglobin A1c  for diagnosis of diabetes for children.  This Hemoglobin A1c assay has significant interference with fetal   hemoglobin   (HbF). The results are invalid for patients with abnormal amounts of   HbF,   including those with known Hereditary Persistence   of Fetal Hemoglobin. Heterozygous hemoglobin variants (HbAS, HbAC,   HbAD, HbAE, HbA2) do not significantly interfere with this assay;   however, presence of multiple variants in a sample may impact the %   interference.                Passed - Patient is at least 18 years old        Passed - Office visit in past 12 months or future 90 days.     Recent Outpatient Visits            1 week ago Pacemaker    [facility] Lizabeth Hill, NP    1 week ago Condyloma acuminata    [facility] Fl Julita Garces PA-C    2 weeks ago Lumbar radiculopathy    [facility] [address] ED[facility] Carreon    1 month ago Condyloma acuminata    [facility] Julita Garces PA-C    2 months ago Condyloma acuminata    [facility] [address] Julita Garces PA-C          Future Appointments              In 1 week EDGARD Carreon [facility] [address]    In 2 weeks [facility] [place] - Dermatology 11th Fl, [facility]    In 5 months PACEMAKER, ICD [facility] 3rd Fl, [facility]                Passed - Cr is 1.3 or below and within 360 days     Creatinine   Date Value Ref Range Status   02/17/2020 1.2 0.5 - 1.4 mg/dL Final   01/21/2019 1.1 0.5 - 1.4 mg/dL Final   04/06/2018 1.0 0.5 - 1.4 mg/dL Final              Passed - eGFR is 30 or above and within 360 days     eGFR if non    Date Value Ref Range Status   02/17/2020 >60.0 >60 mL/min/1.73 m^2 Final     Comment:     Calculation used to obtain the estimated glomerular filtration  rate (eGFR) is the CKD-EPI  equation.      01/21/2019 >60 >60 mL/min/1.73 m^2 Final     Comment:     Calculation used to obtain the estimated glomerular filtration  rate (eGFR) is the CKD-EPI equation.      04/06/2018 >60.0 >60 mL/min/1.73 m^2 Final     Comment:     Calculation used to obtain the estimated glomerular filtration  rate (eGFR) is the CKD-EPI equation.        eGFR if    Date Value Ref Range Status   02/17/2020 >60.0 >60 mL/min/1.73 m^2 Final   01/21/2019 >60 >60 mL/min/1.73 m^2 Final   04/06/2018 >60.0 >60 mL/min/1.73 m^2 Final                  Appointments  past 12m or future 3m with PCP    Date Provider   Last Visit   2/17/2020 Jarvis Washington MD   Next Visit   Visit date not found Jarvis Washington MD   ED visits in past 90 days: 0     Note composed:3:37 PM 09/16/2020

## 2020-09-23 ENCOUNTER — TELEPHONE (OUTPATIENT)
Dept: PAIN MEDICINE | Facility: CLINIC | Age: 60
End: 2020-09-23

## 2020-09-23 NOTE — TELEPHONE ENCOUNTER
Staff called to confirm 9/24/20 3 pm in office visit with Trista Oviedo Np. Patient informed of instructions.    Patient did not answer therefore staff left a detailed voice message informing the patient of the above information.

## 2020-09-28 ENCOUNTER — TELEPHONE (OUTPATIENT)
Dept: ELECTROPHYSIOLOGY | Facility: CLINIC | Age: 60
End: 2020-09-28

## 2020-09-28 NOTE — TELEPHONE ENCOUNTER
Returned call to pt, no answer. Left voice mail with number for pt to return call.         ----- Message from Janeth Farrell sent at 9/28/2020  8:22 AM CDT -----  Regarding: Pain by pacemaker  Pt would like a call stating he has pain in his chest in the area of his pacemaker. Please call him at [phone]    Thanks

## 2020-09-28 NOTE — TELEPHONE ENCOUNTER
Returned call to pt, no answer. Left message with number for pt to return call.         ----- Message from Lydia Spencer sent at 9/28/2020  2:13 PM CDT -----  Regarding: Call  Pt returning your call. Thanks    [phone]

## 2020-09-29 ENCOUNTER — TELEPHONE (OUTPATIENT)
Dept: CARDIOLOGY | Facility: HOSPITAL | Age: 60
End: 2020-09-29

## 2020-09-29 ENCOUNTER — TELEPHONE (OUTPATIENT)
Dept: CARDIOLOGY | Facility: CLINIC | Age: 60
End: 2020-09-29

## 2020-09-29 ENCOUNTER — TELEPHONE (OUTPATIENT)
Dept: ELECTROPHYSIOLOGY | Facility: CLINIC | Age: 60
End: 2020-09-29

## 2020-09-29 DIAGNOSIS — R07.9 CHEST PAIN, UNSPECIFIED TYPE: Primary | ICD-10-CM

## 2020-09-29 NOTE — TELEPHONE ENCOUNTER
Returned call to pt. Pt stated he had chest pain this weekend. He was SOB, chest heaviness that radiated down his arm. Since he feels fatigued and is just off. Not feeling like himself. Pt would like to be seen. Message sent to his Cardiologist, Dr Thompson for pt to be seen.         ----- Message from Lydia Spencer sent at 9/29/2020  4:16 PM CDT -----  Regarding: Call  Pt returning call says if can try calling mother's #. Thanks    [phone]

## 2020-09-29 NOTE — TELEPHONE ENCOUNTER
----- Message from Alley Mi RN sent at 9/29/2020  4:29 PM CDT -----  Regarding: FW: Call  Pt is calling stating that over the weekend he experienced pain down his left arm and chest. Some SOB but he states he feels very fatigued since.  Lasted 3-4 minutes and didn't come back.  Pt would like appt to see physician. PLease call.  ----- Message -----  From: Lydia Spencer  Sent: 9/29/2020   4:16 PM CDT  To: Alley Mi RN  Subject: Call                                             Pt returning call says if can try calling mother's #. Thanks    [phone]

## 2020-09-29 NOTE — TELEPHONE ENCOUNTER
Returned call to pt, no answer. Left voice mail with number for him to return call.         ----- Message from Jodi Mcguire sent at 9/29/2020 10:38 AM CDT -----  Contact: Pt called  Pt returning call. Please call pt @ [phone]. Thank you.

## 2020-09-29 NOTE — TELEPHONE ENCOUNTER
Pt instructed to go to ER id symptoms reoccurred. Has had no cp or symptoms since Saturday. Schedule appointment with Dr Cardenas tomorrow.

## 2020-09-29 NOTE — TELEPHONE ENCOUNTER
Spoke with patient and he stated he is scheduled to come in tomorrow for noon to have an EKG. I scheduled him prior to his EKG for 11:20 to have his device checked.   ----- Message from Alley Mi RN sent at 9/29/2020  4:35 PM CDT -----  Regarding: FW: Call  Pt called because of pain to chest this weekend. He states that he thinks its his device and would like to get it checked. Pt states he does not have monitor because it was broken. I sent a message to Cardiology to get appt scheduled with Dr Thompson. He would like device checked but doesn't have monitor.  ----- Message -----  From: Lydia Spencer  Sent: 9/29/2020   4:16 PM CDT  To: Alley Mi RN  Subject: Call                                             Pt returning call says if can try calling mother's #. Thanks    [phone]

## 2020-09-30 ENCOUNTER — DOCUMENTATION ONLY (OUTPATIENT)
Dept: ELECTROPHYSIOLOGY | Facility: CLINIC | Age: 60
End: 2020-09-30

## 2020-09-30 ENCOUNTER — HOSPITAL ENCOUNTER (OUTPATIENT)
Dept: CARDIOLOGY | Facility: CLINIC | Age: 60
Discharge: HOME | End: 2020-09-30
Payer: COMMERCIAL

## 2020-09-30 ENCOUNTER — OFFICE VISIT (OUTPATIENT)
Dept: CARDIOLOGY | Facility: CLINIC | Age: 60
End: 2020-09-30
Payer: COMMERCIAL

## 2020-09-30 ENCOUNTER — CLINICAL SUPPORT (OUTPATIENT)
Dept: CARDIOLOGY | Facility: HOSPITAL | Age: 60
End: 2020-09-30
Attending: INTERNAL MEDICINE
Payer: COMMERCIAL

## 2020-09-30 VITALS
OXYGEN SATURATION: 98 % | DIASTOLIC BLOOD PRESSURE: 83 MMHG | SYSTOLIC BLOOD PRESSURE: 165 MMHG | HEIGHT: 67 IN | BODY MASS INDEX: 27.75 KG/M2 | HEART RATE: 64 BPM | WEIGHT: 176.81 LBS

## 2020-09-30 DIAGNOSIS — Z95.0 PACEMAKER: ICD-10-CM

## 2020-09-30 DIAGNOSIS — R07.9 CHEST PAIN, UNSPECIFIED TYPE: ICD-10-CM

## 2020-09-30 DIAGNOSIS — M94.0 ACUTE COSTOCHONDRITIS: Primary | ICD-10-CM

## 2020-09-30 DIAGNOSIS — I49.5 SICK SINUS SYNDROME: ICD-10-CM

## 2020-09-30 DIAGNOSIS — F45.8 AEROPHAGIA: ICD-10-CM

## 2020-09-30 PROCEDURE — 93000 EKG 12-LEAD: ICD-10-PCS | Mod: S$GLB,,, | Performed by: INTERNAL MEDICINE

## 2020-09-30 PROCEDURE — 3077F SYST BP >= 140 MM HG: CPT | Mod: CPTII,S$GLB,, | Performed by: INTERNAL MEDICINE

## 2020-09-30 PROCEDURE — 93279 PRGRMG DEV EVAL PM/LDLS PM: CPT

## 2020-09-30 PROCEDURE — 3079F PR MOST RECENT DIASTOLIC BLOOD PRESSURE 80-89 MM HG: ICD-10-PCS | Mod: CPTII,S$GLB,, | Performed by: INTERNAL MEDICINE

## 2020-09-30 PROCEDURE — 99999 PR PBB SHADOW E&M-EST. PATIENT-LVL III: ICD-10-PCS | Mod: PBBFAC,,, | Performed by: INTERNAL MEDICINE

## 2020-09-30 PROCEDURE — 99214 PR OFFICE/OUTPT VISIT, EST, LEVL IV, 30-39 MIN: ICD-10-PCS | Mod: 25,S$GLB,, | Performed by: INTERNAL MEDICINE

## 2020-09-30 PROCEDURE — 3077F PR MOST RECENT SYSTOLIC BLOOD PRESSURE >= 140 MM HG: ICD-10-PCS | Mod: CPTII,S$GLB,, | Performed by: INTERNAL MEDICINE

## 2020-09-30 PROCEDURE — 99214 OFFICE O/P EST MOD 30 MIN: CPT | Mod: 25,S$GLB,, | Performed by: INTERNAL MEDICINE

## 2020-09-30 PROCEDURE — 93279 PRGRMG DEV EVAL PM/LDLS PM: CPT | Mod: 26,,, | Performed by: INTERNAL MEDICINE

## 2020-09-30 PROCEDURE — 3079F DIAST BP 80-89 MM HG: CPT | Mod: CPTII,S$GLB,, | Performed by: INTERNAL MEDICINE

## 2020-09-30 PROCEDURE — 93279 CARDIAC DEVICE CHECK - IN CLINIC & HOSPITAL: ICD-10-PCS | Mod: 26,,, | Performed by: INTERNAL MEDICINE

## 2020-09-30 PROCEDURE — 3008F PR BODY MASS INDEX (BMI) DOCUMENTED: ICD-10-PCS | Mod: CPTII,S$GLB,, | Performed by: INTERNAL MEDICINE

## 2020-09-30 PROCEDURE — 99999 PR PBB SHADOW E&M-EST. PATIENT-LVL III: CPT | Mod: PBBFAC,,, | Performed by: INTERNAL MEDICINE

## 2020-09-30 PROCEDURE — 93000 ELECTROCARDIOGRAM COMPLETE: CPT | Mod: S$GLB,,, | Performed by: INTERNAL MEDICINE

## 2020-09-30 PROCEDURE — 3008F BODY MASS INDEX DOCD: CPT | Mod: CPTII,S$GLB,, | Performed by: INTERNAL MEDICINE

## 2020-09-30 NOTE — PROGRESS NOTES
IN-OFFICE device interrogation and testing performed. Single chamber Pacemaker AAI. Off schedule visit, complaints of left sided chest pain radiating down left arm over weekend.    Presenting egram demonstrates As  Underlying rhythm c/w SR   Device fxn WNL  Autocapture algorithms are On  RA pacing 12%  Atrial arrhythmias: None  Anticoagulation Status: None  Battery Status/Longevity: 11.1-11.3 years  Reprogramming at this visit: None  Follow up with Dr. Cardenas today.  F/U via device clinic in 6 months.  Report prepared by FREYA Padilla RN/NV

## 2020-09-30 NOTE — PROGRESS NOTES
Subjective:    Patient ID:  Renny Young is a 59 y.o. male who presents for evaluation of No chief complaint on file.      HPI     Renny Young is a 59 year old male who has a history of hypertension and sick sinus syndrome who had a [employer] dual chamber pacemaker implanted at [facility] in 7/2009 for syncope and is followed by Dr Spence in . On 9/26/20 he had a left chest and arm pain occurred while reached for the remote for the TV. The pain lasted < 5 minutes and has not recurred and no history of similar pain is the past. He reports having onset of left lateral chest pain at that time which continues. He has lumbar radiculopathy and had a steriod injection 9/10/20.  CT of the chest 2/8/19 reported mild calcification of the aorta but none reported in the coronaries. He is a smoker but brother has open heart surgery for unknown reason at 62. Today's EKG is unchanged from previous.  Lab Results   Component Value Date     02/17/2020    K 4.1 02/17/2020     02/17/2020    CO2 24 02/17/2020    BUN 16 02/17/2020    CREATININE 1.2 02/17/2020    GLU 90 02/17/2020    HGBA1C 6.3 (H) 02/17/2020    AST 28 02/17/2020    ALT 23 02/17/2020    ALBUMIN 4.6 02/17/2020    PROT 8.1 02/17/2020    BILITOT 0.8 02/17/2020    WBC 8.23 05/06/2020    HGB 16.3 05/06/2020    HCT 52.1 05/06/2020    MCV 91 05/06/2020     05/06/2020    INR 1.1 09/28/2015    PSA 0.79 02/17/2020    TSH 1.194 04/06/2017         Lab Results   Component Value Date    CHOL 139 02/17/2020    HDL 58 02/17/2020    TRIG 113 02/17/2020       Lab Results   Component Value Date    LDLCALC 58.4 (L) 02/17/2020       Past Medical History:   Diagnosis Date    Diabetes mellitus, type 2     Hypertension     Pacemaker        Current Outpatient Medications:     amLODIPine (NORVASC) 5 MG tablet, Take 1 tablet by mouth once daily, Disp: 90 tablet, Rfl: 2    aspirin (ECOTRIN) 81 MG EC tablet, Take 81 mg by mouth once daily., Disp: , Rfl:     atorvastatin  (LIPITOR) 20 MG tablet, Take 1 tablet (20 mg total) by mouth once daily., Disp: 90 tablet, Rfl: 0    gabapentin (NEURONTIN) 300 MG capsule, Take 1 capsule (300 mg total) by mouth 2 (two) times daily., Disp: 60 capsule, Rfl: 5    hydroCHLOROthiazide (HYDRODIURIL) 12.5 MG Tab, Take 1 tablet by mouth once daily, Disp: 90 tablet, Rfl: 0    losartan (COZAAR) 50 MG tablet, Take 1 tablet (50 mg total) by mouth once daily., Disp: 90 tablet, Rfl: 0    metFORMIN (GLUCOPHAGE) 500 MG tablet, Take 1 tablet by mouth once daily with breakfast, Disp: 90 tablet, Rfl: 0    ammonium lactate 12 % Crea, Apply twice daily to affected parts both feet as needed. (Patient not taking: Reported on 9/30/2020), Disp: 140 g, Rfl: 11    podofilox (CONDYLOX) 0.5 % gel, Apply topically 2 (two) times daily. (Patient not taking: Reported on 9/30/2020), Disp: 3.5 g, Rfl: 2          Review of Systems   Constitution: Negative for decreased appetite, diaphoresis, fever, malaise/fatigue, weight gain and weight loss.   HENT: Negative for congestion, ear discharge, ear pain and nosebleeds.    Eyes: Negative for blurred vision, double vision and visual disturbance.   Cardiovascular: Positive for chest pain. Negative for claudication, cyanosis, dyspnea on exertion, irregular heartbeat, leg swelling, near-syncope, orthopnea, palpitations, paroxysmal nocturnal dyspnea and syncope.   Respiratory: Negative for cough, hemoptysis, shortness of breath, sleep disturbances due to breathing, snoring, sputum production and wheezing.    Endocrine: Negative for polydipsia, polyphagia and polyuria.   Hematologic/Lymphatic: Negative for adenopathy and bleeding problem. Does not bruise/bleed easily.   Skin: Negative for color change, nail changes, poor wound healing and rash.   Musculoskeletal: Negative for muscle cramps and muscle weakness.   Gastrointestinal: Negative for abdominal pain, anorexia, change in bowel habit, hematochezia, nausea and vomiting.  "  Genitourinary: Negative for dysuria, frequency and hematuria.   Neurological: Negative for brief paralysis, difficulty with concentration, excessive daytime sleepiness, dizziness, focal weakness, headaches, light-headedness, seizures, vertigo and weakness.   Psychiatric/Behavioral: Negative for altered mental status and depression.   Allergic/Immunologic: Negative for persistent infections.        Objective:BP (!) 165/83   Pulse 64   Ht 5' 7" (1.702 m)   Wt 80.2 kg (176 lb 12.9 oz)   SpO2 98%   BMI 27.69 kg/m²             Physical Exam   Constitutional: He is oriented to person, place, and time. He appears well-developed and well-nourished.   Frequent eructation during the clinic visit   HENT:   Head: Normocephalic.   Right Ear: External ear normal.   Left Ear: External ear normal.   Nose: Nose normal.   Inspection of lips, teeth and gums normal   Eyes: Pupils are equal, round, and reactive to light. EOM are normal. No scleral icterus.   Neck: Normal range of motion. Neck supple. No JVD present. No tracheal deviation present. No thyromegaly present.   Cardiovascular: Normal rate, regular rhythm and intact distal pulses. Exam reveals no gallop and no friction rub.   No murmur heard.  Pulses:       Dorsalis pedis pulses are 2+ on the right side and 2+ on the left side.   Pulmonary/Chest: Effort normal and breath sounds normal.       Abdominal: Bowel sounds are normal. He exhibits no distension. There is no hepatosplenomegaly. There is no abdominal tenderness. There is no guarding.   Musculoskeletal: Normal range of motion.         General: No tenderness or edema.   Lymphadenopathy:   Palpation of neck and groin lymph nodes normal   Neurological: He is alert and oriented to person, place, and time. No cranial nerve deficit. He exhibits normal muscle tone. Coordination normal.   Skin: Skin is dry.   Palpation of skin normal   Psychiatric: His behavior is normal. Judgment and thought content normal.       "   Assessment:       1. Acute costochondritis    2. Aerophagia         Plan:       Diagnoses and all orders for this visit:    Acute costochondritis    Aerophagia

## 2020-10-01 ENCOUNTER — OFFICE VISIT (OUTPATIENT)
Dept: DERMATOLOGY | Facility: CLINIC | Age: 60
End: 2020-10-01
Payer: COMMERCIAL

## 2020-10-01 DIAGNOSIS — A63.0 CONDYLOMA ACUMINATA: Primary | ICD-10-CM

## 2020-10-01 PROCEDURE — 99499 UNLISTED E&M SERVICE: CPT | Mod: S$GLB,,, | Performed by: PHYSICIAN ASSISTANT

## 2020-10-01 PROCEDURE — 99999 PR PBB SHADOW E&M-EST. PATIENT-LVL III: ICD-10-PCS | Mod: PBBFAC,,, | Performed by: PHYSICIAN ASSISTANT

## 2020-10-01 PROCEDURE — 54056 CRYOSURGERY PENIS LESION(S): CPT | Mod: S$GLB,,, | Performed by: PHYSICIAN ASSISTANT

## 2020-10-01 PROCEDURE — 99499 NO LOS: ICD-10-PCS | Mod: S$GLB,,, | Performed by: PHYSICIAN ASSISTANT

## 2020-10-01 PROCEDURE — 54056 PR DESTR PENIS LESN,SIMPL,CRYOSURG: ICD-10-PCS | Mod: S$GLB,,, | Performed by: PHYSICIAN ASSISTANT

## 2020-10-01 PROCEDURE — 99999 PR PBB SHADOW E&M-EST. PATIENT-LVL III: CPT | Mod: PBBFAC,,, | Performed by: PHYSICIAN ASSISTANT

## 2020-10-01 NOTE — PROGRESS NOTES
Subjective:       Patient ID:  Renny Young is a 59 y.o. male who presents for   Chief Complaint   Patient presents with    Warts     follow up     Genital Warts - Follow-up  Symptom course: improving (last seen 9/8/20)  Currently using: s/p cryo.  Affected locations: genitalia  Signs / symptoms: asymptomatic        Review of Systems   Constitutional: Negative for fever and chills.   Skin: Negative for sensitivity to antibiotic ointment, sensitivity to bandage adhesive and tendency to form keloidal scars.   Hematologic/Lymphatic: Does not bruise/bleed easily.        Objective:    Physical Exam   Constitutional: He appears well-developed and well-nourished. No distress.   Genitourinary:       Neurological: He is alert and oriented to person, place, and time. He is not disoriented.   Psychiatric: He has a normal mood and affect.   Skin:   Areas Examined (abnormalities noted in diagram):   Genitals / Buttocks / Groin Inspection Performed         Diagram Legend     Erythematous scaling macule/papule c/w actinic keratosis       Vascular papule c/w angioma      Pigmented verrucoid papule/plaque c/w seborrheic keratosis      Yellow umbilicated papule c/w sebaceous hyperplasia      Irregularly shaped tan macule c/w lentigo     1-2 mm smooth white papules consistent with Milia      Movable subcutaneous cyst with punctum c/w epidermal inclusion cyst      Subcutaneous movable cyst c/w pilar cyst      Firm pink to brown papule c/w dermatofibroma      Pedunculated fleshy papule(s) c/w skin tag(s)      Evenly pigmented macule c/w junctional nevus     Mildly variegated pigmented, slightly irregular-bordered macule c/w mildly atypical nevus      Flesh colored to evenly pigmented papule c/w intradermal nevus       Pink pearly papule/plaque c/w basal cell carcinoma      Erythematous hyperkeratotic cursted plaque c/w SCC      Surgical scar with no sign of skin cancer recurrence      Open and closed comedones      Inflammatory  papules and pustules      Verrucoid papule consistent consistent with wart     Erythematous eczematous patches and plaques     Dystrophic onycholytic nail with subungual debris c/w onychomycosis     Umbilicated papule    Erythematous-base heme-crusted tan verrucoid plaque consistent with inflamed seborrheic keratosis     Erythematous Silvery Scaling Plaque c/w Psoriasis     See annotation    Assessment / Plan:      Condyloma acuminata  Cryosurgery procedure note:    Verbal consent from the patient is obtained including, but not limited to, risk of hypopigmentation/hyperpigmentation, scar, recurrence of lesion. Liquid nitrogen cryosurgery is applied to 1 verruca, as detailed in the physical exam, to produce a freeze injury. 3 consecutive freeze thaw cycles are applied to each verruca. The patient is aware that blisters (possibly blood blisters) may form.         Follow up in about 1 month (around 11/1/2020).

## 2020-10-01 NOTE — PATIENT INSTRUCTIONS

## 2020-11-02 ENCOUNTER — PATIENT OUTREACH (OUTPATIENT)
Dept: ADMINISTRATIVE | Facility: OTHER | Age: 60
End: 2020-11-02

## 2020-11-02 NOTE — PROGRESS NOTES
Requested updates within Care Everywhere.  Patient's chart was reviewed for overdue CLAUDETTE topics.  Media reviewed for outside eye exam.  Immunizations reconciled.

## 2020-11-03 ENCOUNTER — OFFICE VISIT (OUTPATIENT)
Dept: DERMATOLOGY | Facility: CLINIC | Age: 60
End: 2020-11-03
Payer: COMMERCIAL

## 2020-11-03 DIAGNOSIS — A63.0 CONDYLOMA ACUMINATA: Primary | ICD-10-CM

## 2020-11-03 PROCEDURE — 99999 PR PBB SHADOW E&M-EST. PATIENT-LVL III: ICD-10-PCS | Mod: PBBFAC,,, | Performed by: PHYSICIAN ASSISTANT

## 2020-11-03 PROCEDURE — 99999 PR PBB SHADOW E&M-EST. PATIENT-LVL III: CPT | Mod: PBBFAC,,, | Performed by: PHYSICIAN ASSISTANT

## 2020-11-03 PROCEDURE — 54056 CRYOSURGERY PENIS LESION(S): CPT | Mod: S$GLB,,, | Performed by: PHYSICIAN ASSISTANT

## 2020-11-03 PROCEDURE — 99499 UNLISTED E&M SERVICE: CPT | Mod: S$GLB,,, | Performed by: PHYSICIAN ASSISTANT

## 2020-11-03 PROCEDURE — 54056 PR DESTR PENIS LESN,SIMPL,CRYOSURG: ICD-10-PCS | Mod: S$GLB,,, | Performed by: PHYSICIAN ASSISTANT

## 2020-11-03 PROCEDURE — 99499 NO LOS: ICD-10-PCS | Mod: S$GLB,,, | Performed by: PHYSICIAN ASSISTANT

## 2020-11-03 NOTE — PROGRESS NOTES
Subjective:       Patient ID:  Renny Young is a 60 y.o. male who presents for   Chief Complaint   Patient presents with    Warts     FOLLOW UP     Genital Warts - Follow-up  Symptom course: improving (last seen 10/1/20)  Currently using: s/p cryo.  Affected locations: genitalia  Signs / symptoms: asymptomatic        Review of Systems   Constitutional: Negative for fever and chills.   Skin: Negative for sensitivity to antibiotic ointment, sensitivity to bandage adhesive and tendency to form keloidal scars.   Hematologic/Lymphatic: Does not bruise/bleed easily.        Objective:    Physical Exam   Constitutional: He appears well-developed and well-nourished. No distress.   Genitourinary:       Neurological: He is alert and oriented to person, place, and time. He is not disoriented.   Psychiatric: He has a normal mood and affect.   Skin:   Areas Examined (abnormalities noted in diagram):   Genitals / Buttocks / Groin Inspection Performed         Diagram Legend     Erythematous scaling macule/papule c/w actinic keratosis       Vascular papule c/w angioma      Pigmented verrucoid papule/plaque c/w seborrheic keratosis      Yellow umbilicated papule c/w sebaceous hyperplasia      Irregularly shaped tan macule c/w lentigo     1-2 mm smooth white papules consistent with Milia      Movable subcutaneous cyst with punctum c/w epidermal inclusion cyst      Subcutaneous movable cyst c/w pilar cyst      Firm pink to brown papule c/w dermatofibroma      Pedunculated fleshy papule(s) c/w skin tag(s)      Evenly pigmented macule c/w junctional nevus     Mildly variegated pigmented, slightly irregular-bordered macule c/w mildly atypical nevus      Flesh colored to evenly pigmented papule c/w intradermal nevus       Pink pearly papule/plaque c/w basal cell carcinoma      Erythematous hyperkeratotic cursted plaque c/w SCC      Surgical scar with no sign of skin cancer recurrence      Open and closed comedones      Inflammatory  papules and pustules      Verrucoid papule consistent consistent with wart     Erythematous eczematous patches and plaques     Dystrophic onycholytic nail with subungual debris c/w onychomycosis     Umbilicated papule    Erythematous-base heme-crusted tan verrucoid plaque consistent with inflamed seborrheic keratosis     Erythematous Silvery Scaling Plaque c/w Psoriasis     See annotation      Assessment / Plan:      Condyloma acuminata  Cryosurgery procedure note:    Verbal consent from the patient is obtained including, but not limited to, risk of hypopigmentation/hyperpigmentation, scar, recurrence of lesion. Liquid nitrogen cryosurgery is applied to 1 verruca, as detailed in the physical exam, to produce a freeze injury. 2 consecutive freeze thaw cycles are applied to each verruca. The patient is aware that blisters (possibly blood blisters) may form.    Although there has been some improvement, pt is not responding very well to cryo. Recommend he follow back up with urology. Message sent to Stephanie Morfin NP, who originally referred him to derm to coordinate appt.         Follow up for urology appt.

## 2020-11-03 NOTE — Clinical Note
Magen Brown,  You saw this pt earlier this year for condyloma. He treated it for a few months with condylox and saw no improvement so then scheduled f/u with me in derm. Today was my 5th time treating it with cryo, and although I do feel like it has improved a little, it really isn't responding as well as I'd like. I think it's going to need to be biopsied/ excised, and due to it's location, I don't feel comfortable doing it. Is that something you can do for him, or do you recommend him following up with someone else?   Thanks so much!  Julita

## 2020-11-03 NOTE — PATIENT INSTRUCTIONS

## 2020-11-05 ENCOUNTER — TELEPHONE (OUTPATIENT)
Dept: UROLOGY | Facility: CLINIC | Age: 60
End: 2020-11-05

## 2020-11-05 NOTE — TELEPHONE ENCOUNTER
----- Message from Stephanie Morfin NP sent at 11/5/2020  1:37 PM CST -----  Please schedule an appointment with Dr. Covington to discuss excision of his genital lesion. Not an emergency. Thanks!

## 2020-11-07 ENCOUNTER — OFFICE VISIT (OUTPATIENT)
Dept: URGENT CARE | Facility: CLINIC | Age: 60
End: 2020-11-07
Payer: COMMERCIAL

## 2020-11-07 VITALS
SYSTOLIC BLOOD PRESSURE: 174 MMHG | HEIGHT: 67 IN | TEMPERATURE: 98 F | OXYGEN SATURATION: 96 % | HEART RATE: 84 BPM | DIASTOLIC BLOOD PRESSURE: 99 MMHG | BODY MASS INDEX: 27.62 KG/M2 | WEIGHT: 176 LBS | RESPIRATION RATE: 16 BRPM

## 2020-11-07 DIAGNOSIS — R36.9 PENILE DISCHARGE: Primary | ICD-10-CM

## 2020-11-07 LAB
BILIRUB UR QL STRIP: NEGATIVE
GLUCOSE UR QL STRIP: NEGATIVE
KETONES UR QL STRIP: NEGATIVE
LEUKOCYTE ESTERASE UR QL STRIP: NEGATIVE
PH, POC UA: 6 (ref 5–8)
POC BLOOD, URINE: NEGATIVE
POC NITRATES, URINE: NEGATIVE
PROT UR QL STRIP: POSITIVE
SP GR UR STRIP: 1.02 (ref 1–1.03)
UROBILINOGEN UR STRIP-ACNC: ABNORMAL (ref 0.3–2.2)

## 2020-11-07 PROCEDURE — 99214 PR OFFICE/OUTPT VISIT, EST, LEVL IV, 30-39 MIN: ICD-10-PCS | Mod: 25,S$GLB,, | Performed by: NURSE PRACTITIONER

## 2020-11-07 PROCEDURE — 99214 OFFICE O/P EST MOD 30 MIN: CPT | Mod: 25,S$GLB,, | Performed by: NURSE PRACTITIONER

## 2020-11-07 PROCEDURE — 81003 URINALYSIS AUTO W/O SCOPE: CPT | Mod: QW,S$GLB,, | Performed by: NURSE PRACTITIONER

## 2020-11-07 PROCEDURE — 87661 TRICHOMONAS VAGINALIS AMPLIF: CPT

## 2020-11-07 PROCEDURE — 96372 THER/PROPH/DIAG INJ SC/IM: CPT | Mod: S$GLB,,, | Performed by: EMERGENCY MEDICINE

## 2020-11-07 PROCEDURE — 96372 PR INJECTION,THERAP/PROPH/DIAG2ST, IM OR SUBCUT: ICD-10-PCS | Mod: S$GLB,,, | Performed by: EMERGENCY MEDICINE

## 2020-11-07 PROCEDURE — 81003 POCT URINALYSIS, DIPSTICK, AUTOMATED, W/O SCOPE: ICD-10-PCS | Mod: QW,S$GLB,, | Performed by: NURSE PRACTITIONER

## 2020-11-07 RX ORDER — AZITHROMYCIN 500 MG/1
1000 TABLET, FILM COATED ORAL ONCE
Qty: 2 TABLET | Refills: 0 | Status: SHIPPED | OUTPATIENT
Start: 2020-11-07 | End: 2020-11-07

## 2020-11-07 RX ORDER — CEFTRIAXONE 250 MG/1
250 INJECTION, POWDER, FOR SOLUTION INTRAMUSCULAR; INTRAVENOUS
Status: COMPLETED | OUTPATIENT
Start: 2020-11-07 | End: 2020-11-07

## 2020-11-07 RX ADMIN — CEFTRIAXONE 250 MG: 250 INJECTION, POWDER, FOR SOLUTION INTRAMUSCULAR; INTRAVENOUS at 10:11

## 2020-11-07 NOTE — PROGRESS NOTES
"Subjective:       Patient ID: Renny Young is a 60 y.o. male.    Vitals:  height is 5' 7" (1.702 m) and weight is 79.8 kg (176 lb). His temperature is 97.6 °F (36.4 °C). His blood pressure is 158/108 (abnormal) and his pulse is 84. His respiration is 16 and oxygen saturation is 96%.     Chief Complaint: Penile Discharge    Penile Discharge  The patient's primary symptoms include penile discharge. The patient's pertinent negatives include no penile pain, scrotal swelling or testicular pain. This is a new problem. The current episode started 1 to 4 weeks ago (1 week). The problem occurs constantly. The problem has been unchanged. The patient is experiencing no pain. Pertinent negatives include no abdominal pain, chills, dysuria, fever, frequency, nausea, rash, urgency or vomiting. The penile discharge was clear and thin. Nothing aggravates the symptoms. He has tried nothing for the symptoms.       Constitution: Negative for chills and fever.   Neck: Negative for painful lymph nodes.   Gastrointestinal: Negative for abdominal pain, nausea and vomiting.   Genitourinary: Positive for penile discharge. Negative for dysuria, frequency, urgency, urine decreased, hematuria, history of kidney stones, genital trauma, painful intercourse, genital sore, painful ejaculation, penile pain, penile swelling, scrotal swelling and testicular pain.   Musculoskeletal: Negative for back pain.   Skin: Negative for rash and lesion.   Hematologic/Lymphatic: Negative for swollen lymph nodes.       Objective:      Physical Exam   Constitutional: He is oriented to person, place, and time. He appears well-developed. He is cooperative.  Non-toxic appearance. He does not appear ill. No distress.   HENT:   Head: Normocephalic and atraumatic.   Ears:   Right Ear: Hearing, tympanic membrane, external ear and ear canal normal.   Left Ear: Hearing, tympanic membrane, external ear and ear canal normal.   Nose: Nose normal. No mucosal edema, rhinorrhea " "or nasal deformity. No epistaxis. Right sinus exhibits no maxillary sinus tenderness and no frontal sinus tenderness. Left sinus exhibits no maxillary sinus tenderness and no frontal sinus tenderness.   Mouth/Throat: Uvula is midline, oropharynx is clear and moist and mucous membranes are normal. No trismus in the jaw. Normal dentition. No uvula swelling. No posterior oropharyngeal erythema.   Eyes: Conjunctivae and lids are normal. Right eye exhibits no discharge. Left eye exhibits no discharge. No scleral icterus.   Neck: Trachea normal, normal range of motion, full passive range of motion without pain and phonation normal. Neck supple.   Cardiovascular: Normal rate, regular rhythm, normal heart sounds and normal pulses.   Pulmonary/Chest: Effort normal and breath sounds normal. No respiratory distress.   Abdominal: Soft. Normal appearance and bowel sounds are normal. He exhibits no distension, no pulsatile midline mass and no mass. There is no abdominal tenderness.   Genitourinary:         Comments: Denies exam   Reports a "clear drip"     Musculoskeletal: Normal range of motion.         General: No deformity.   Neurological: He is alert and oriented to person, place, and time. He exhibits normal muscle tone. Coordination normal.   Skin: Skin is warm, dry, intact, not diaphoretic and not pale. Psychiatric: His speech is normal and behavior is normal. Judgment and thought content normal.   Nursing note and vitals reviewed.        Results for orders placed or performed in visit on 11/07/20   POCT Urinalysis, Dipstick, Automated, W/O Scope   Result Value Ref Range    POC Blood, Urine Negative Negative    POC Bilirubin, Urine Negative Negative    POC Urobilinogen, Urine Norm 0.3 - 2.2    POC Ketones, Urine Negative Negative    POC Protein, Urine Positive (A) Negative    POC Nitrates, Urine Negative Negative    POC Glucose, Urine Negative Negative    pH, UA 6.0 5 - 8    POC Specific Gravity, Urine 1.025 1.003 - 1.029 "    POC Leukocytes, Urine Negative Negative     Assessment:       1. Penile discharge        Plan:       GC/CL tests unavailable to order at this time. Will treat based off symptoms. BP noted patient asymptomatic. Patient to follow up with PCP   Penile discharge  -     POCT Urinalysis, Dipstick, Automated, W/O Scope  -     Trichomonas vaginalis, RNA, Qual, Urine (Males Only)  -     cefTRIAXone injection 250 mg  -     azithromycin (ZITHROMAX) 500 MG tablet; Take 2 tablets (1,000 mg total) by mouth once. for 1 dose  Dispense: 2 tablet; Refill: 0      Patient Instructions   Take azithromycin 1000mg for one dose.     Follow up if symptoms persist or worsen.       Genital Warts  Genital warts are painless skin bumps in your genital area. You may have a single wart or several grouped together. They can have a flat or rough surface. Genital warts can appear on the penis, scrotum, vagina, vulva, or anus.  Genital warts are caused by the human papillomavirus (HPV). HPV is the most common sexually transmitted disease in the [place] Most people who become infected with HPV will not have any warts, but they can still pass the virus on to another person. HPV is passed on through skin contact with a wart during sex.  It can take 1 to 6 months for warts to appear after you are exposed to the virus. But sometimes a wart may not appear until years later. Genital warts can grow at different rates. Warts may grow and spread faster in people who are pregnant and who have a weak immune system.  About 30 types of HPV can cause genital warts. Most of these types cause no other problems. But a few types of HPV can infect a womans cervix. If a cervical infection with one of these viruses goes undiagnosed and untreated, the woman is at higher risk for cervical cancer. It takes many months to years for signs of cancer to develop from an HPV virus infection of the cervix. Women who have genital warts and women who have a partner with genital  warts should get regular Pap tests. These tests look for changes that may lead to cervical cancer.  Most warts go away on their own in 1 to 2 years. But its important to consider treatment. Treatment can:  · Make the warts go away sooner  · Lower the risk of spreading HPV to others  · Lower the risk for cervical cancer if youre a woman  Warts can be treated by freezing them, cutting them, removing them with a laser, using a prescription cream (imiquimod) to boost the immune system around the warts, or applying a liquid or gel to dissolve them. You may need more than 1 treatment session to make the warts go away. But even after the warts are gone, the virus remains in your skin. The warts may reappear. Be sure to let your healthcare provider know if there's any chance that you could be pregnant before starting treatment.  You can get an HPV vaccine. The vaccine protects against certain types of HPV that cause genital warts and cervical cancer. Even though you have HPV now, being vaccinated could still help protect you from getting other types of HPV in the future. This is true for your partner as well. Ask your healthcare provider if getting vaccinated makes sense for you. Urge your partner to also ask about getting vaccinated.  Home care  Follow these tips to care for yourself at home:  · Apply an ointment or gel prescribed for you exactly as directed. Be careful not to get the ointment or gel on nearby healthy skin.  · After applying the ointment or gel, keep the area clean and dry. Wear loose-fitting cotton underwear to avoid chafing.  · If you have pain after treatment, sit in a tub with a few inches of warm water for 20 minutes. Do this 3 times a day for the next 2 to 3 days. Add a cup of cornstarch or baking soda, or a packet of colloidal oatmeal or astringent solution powder, to the water. This will help soothe your skin.  How to prevent passing on the virus  The HPV virus is passed on to other people by  having sex with someone who is infected. The risk of passing on the virus is greatest when you have warts. But there is a chance of spreading the virus even after treatment, when the wart cant be seen. Condoms offer only limited protection from the warts. This is because HPV can infect skin in the genital area not covered by the condom.  Partners usually have the same type of HPV, so its probably not necessary to refrain from sex with your current partner. But dont have sex with any new partner until all visible warts are gone. If youre a man, tell all current and future sexual partners that you have had genital warts. This way, the woman can be sure to have regular Pap test.  Follow-up care  When you first learn that you have genital warts, you may feel guilty, angry, or emotionally upset. Getting the facts helps put you back in control. Follow up with your healthcare provider or your local public health department. You can get a complete STD screening, including HIV testing. For more information, call the national STD hotline at 409-347-3702. After treatment, you should be rechecked in about 3 months to make sure all warts are gone.  When to seek medical advice  Call your healthcare provider right away if any of these occur after treating a wart:  · Redness or burning on your skin that doesnt stop within a few hours  · Swelling of the skin around the treated area  · Pus draining from the treated area  Otherwise, get prompt medical attention if you have:  · Difficulty urinating or having a bowel movement  · Fluid coming from the vagina or penis  · Skin rash or pain in a joint  Date Last Reviewed: 9/1/2016  © 5545-2321 [employer]. [address] [place] [address], [place] [address]. All rights reserved. This information is not intended as a substitute for professional medical care. Always follow your healthcare professional's instructions.

## 2020-11-07 NOTE — PATIENT INSTRUCTIONS
Take azithromycin 1000mg for one dose.     Follow up if symptoms persist or worsen.       Genital Warts  Genital warts are painless skin bumps in your genital area. You may have a single wart or several grouped together. They can have a flat or rough surface. Genital warts can appear on the penis, scrotum, vagina, vulva, or anus.  Genital warts are caused by the human papillomavirus (HPV). HPV is the most common sexually transmitted disease in the [place] Most people who become infected with HPV will not have any warts, but they can still pass the virus on to another person. HPV is passed on through skin contact with a wart during sex.  It can take 1 to 6 months for warts to appear after you are exposed to the virus. But sometimes a wart may not appear until years later. Genital warts can grow at different rates. Warts may grow and spread faster in people who are pregnant and who have a weak immune system.  About 30 types of HPV can cause genital warts. Most of these types cause no other problems. But a few types of HPV can infect a womans cervix. If a cervical infection with one of these viruses goes undiagnosed and untreated, the woman is at higher risk for cervical cancer. It takes many months to years for signs of cancer to develop from an HPV virus infection of the cervix. Women who have genital warts and women who have a partner with genital warts should get regular Pap tests. These tests look for changes that may lead to cervical cancer.  Most warts go away on their own in 1 to 2 years. But its important to consider treatment. Treatment can:  · Make the warts go away sooner  · Lower the risk of spreading HPV to others  · Lower the risk for cervical cancer if youre a woman  Warts can be treated by freezing them, cutting them, removing them with a laser, using a prescription cream (imiquimod) to boost the immune system around the warts, or applying a liquid or gel to dissolve them. You may need more than 1  treatment session to make the warts go away. But even after the warts are gone, the virus remains in your skin. The warts may reappear. Be sure to let your healthcare provider know if there's any chance that you could be pregnant before starting treatment.  You can get an HPV vaccine. The vaccine protects against certain types of HPV that cause genital warts and cervical cancer. Even though you have HPV now, being vaccinated could still help protect you from getting other types of HPV in the future. This is true for your partner as well. Ask your healthcare provider if getting vaccinated makes sense for you. Urge your partner to also ask about getting vaccinated.  Home care  Follow these tips to care for yourself at home:  · Apply an ointment or gel prescribed for you exactly as directed. Be careful not to get the ointment or gel on nearby healthy skin.  · After applying the ointment or gel, keep the area clean and dry. Wear loose-fitting cotton underwear to avoid chafing.  · If you have pain after treatment, sit in a tub with a few inches of warm water for 20 minutes. Do this 3 times a day for the next 2 to 3 days. Add a cup of cornstarch or baking soda, or a packet of colloidal oatmeal or astringent solution powder, to the water. This will help soothe your skin.  How to prevent passing on the virus  The HPV virus is passed on to other people by having sex with someone who is infected. The risk of passing on the virus is greatest when you have warts. But there is a chance of spreading the virus even after treatment, when the wart cant be seen. Condoms offer only limited protection from the warts. This is because HPV can infect skin in the genital area not covered by the condom.  Partners usually have the same type of HPV, so its probably not necessary to refrain from sex with your current partner. But dont have sex with any new partner until all visible warts are gone. If youre a man, tell all current and  future sexual partners that you have had genital warts. This way, the woman can be sure to have regular Pap test.  Follow-up care  When you first learn that you have genital warts, you may feel guilty, angry, or emotionally upset. Getting the facts helps put you back in control. Follow up with your healthcare provider or your local public health department. You can get a complete STD screening, including HIV testing. For more information, call the national STD hotline at 026-676-8130. After treatment, you should be rechecked in about 3 months to make sure all warts are gone.  When to seek medical advice  Call your healthcare provider right away if any of these occur after treating a wart:  · Redness or burning on your skin that doesnt stop within a few hours  · Swelling of the skin around the treated area  · Pus draining from the treated area  Otherwise, get prompt medical attention if you have:  · Difficulty urinating or having a bowel movement  · Fluid coming from the vagina or penis  · Skin rash or pain in a joint  Date Last Reviewed: 9/1/2016  © 6004-0911 [employer]. [address] [place] [address], [place] [address]. All rights reserved. This information is not intended as a substitute for professional medical care. Always follow your healthcare professional's instructions.

## 2020-11-09 ENCOUNTER — TELEPHONE (OUTPATIENT)
Dept: URGENT CARE | Facility: CLINIC | Age: 60
End: 2020-11-09

## 2020-11-09 NOTE — TELEPHONE ENCOUNTER
[facility] call regarding Mr. Lawson's specimen that we sent out for trich. Lab stated that they could not run his test because they ran out of there supplies to run the test and they are waiting on them to come in. I called Mr. Lawson to talk bout what's going on with his specimen and see how he was feeling but no answer. Left voicemail to call back

## 2020-12-16 DIAGNOSIS — E11.69 TYPE 2 DIABETES MELLITUS WITH OTHER SPECIFIED COMPLICATION, WITHOUT LONG-TERM CURRENT USE OF INSULIN: ICD-10-CM

## 2020-12-16 NOTE — TELEPHONE ENCOUNTER
No new care gaps identified.  Powered by [employer]. Reference number: [id number]. 12/16/2020 12:06:43 PM   CST

## 2020-12-17 RX ORDER — METFORMIN HYDROCHLORIDE 500 MG/1
TABLET ORAL
Qty: 90 TABLET | Refills: 0 | Status: SHIPPED | OUTPATIENT
Start: 2020-12-17 | End: 2021-02-04 | Stop reason: SDUPTHER

## 2020-12-17 NOTE — PROGRESS NOTES
Refill Routing Note   Medication(s) are not appropriate for processing by [facility] for the following reason(s):     - Patient has been hospitalized since the last PCP visit  [facility] action(s):  Defer     Will follow up with your staff to schedule appointment and labs after your decision.    Medication-related problems identified:   Requires appointment  Requires labs  Medication Therapy Plan: CDML. NEEDS APPT(ANNUAL/HOSPITAL FOLLOW UP). LABS(CMP, LIPIDS, A1C) PER WOG; HOSPITAL VISIT(9/10/20-Lumbosacral radiculopathy)    Medication reconciliation completed: No   Automatic Epic Generated Protocol Data:        Requested Prescriptions   Pending Prescriptions Disp Refills    metFORMIN (GLUCOPHAGE) 500 MG tablet [Pharmacy Med Name: metFORMIN HCl 500 MG Oral Tablet] 90 tablet 0     Sig: Take 1 tablet by mouth once daily with breakfast       Endocrinology:  Diabetes - Biguanides Failed - 12/17/2020 12:54 PM        Failed - HBA1C is 8 or below and within 180 days     Hemoglobin A1C   Date Value Ref Range Status   02/17/2020 6.3 (H) 4.0 - 5.6 % Final     Comment:     ADA Screening Guidelines:  5.7-6.4%  Consistent with prediabetes  >or=6.5%  Consistent with diabetes  High levels of fetal hemoglobin interfere with the HbA1C  assay. Heterozygous hemoglobin variants (HbS, HgC, etc)do  not significantly interfere with this assay.   However, presence of multiple variants may affect accuracy.     01/21/2019 6.3 (H) 4.0 - 5.6 % Final     Comment:     ADA Screening Guidelines:  5.7-6.4%  Consistent with prediabetes  >or=6.5%  Consistent with diabetes  High levels of fetal hemoglobin interfere with the HbA1C  assay. Heterozygous hemoglobin variants (HbS, HgC, etc)do  not significantly interfere with this assay.   However, presence of multiple variants may affect accuracy.     04/06/2018 5.8 (H) 4.0 - 5.6 % Final     Comment:     According to ADA guidelines, hemoglobin A1c <7.0% represents  optimal control in non-pregnant  diabetic patients. Different  metrics may apply to specific patient populations.   Standards of Medical Care in Diabetes-2016.  For the purpose of screening for the presence of diabetes:  <5.7%     Consistent with the absence of diabetes  5.7-6.4%  Consistent with increasing risk for diabetes   (prediabetes)  >or=6.5%  Consistent with diabetes  Currently, no consensus exists for use of hemoglobin A1c  for diagnosis of diabetes for children.  This Hemoglobin A1c assay has significant interference with fetal   hemoglobin   (HbF). The results are invalid for patients with abnormal amounts of   HbF,   including those with known Hereditary Persistence   of Fetal Hemoglobin. Heterozygous hemoglobin variants (HbAS, HbAC,   HbAD, HbAE, HbA2) do not significantly interfere with this assay;   however, presence of multiple variants in a sample may impact the %   interference.                Passed - Patient is at least 18 years old        Passed - Office visit in past 12 months or future 90 days     Recent Outpatient Visits            1 month ago Penile discharge    [facility] Tati Naranjo NP    1 month ago Condyloma acuminata    [facility] - Dermatology 11th Fl Julita Garces PA-C    2 months ago Condyloma acuminata    [facility] - Dermatology 11th Fl Julita Garces PA-C    2 months ago Acute costochondritis    [facility]-Cardiology Svcs 3rd Floor Devon Cardenas MD    3 months ago Pacemaker    [id number] Lizabeth Hill NP          Future Appointments              In 2 weeks Sean Covington MD [facility] [address], [facility]    In 1 month [facility] Dain Rodriguez    In 2 months PACEMAKER, ICD [facility] - Cardiology Svcs 3rd Fl, [facility]                Passed - Cr is 1.4 or below and within 360 days     Creatinine   Date Value Ref Range Status   02/17/2020 1.2 0.5 - 1.4 mg/dL Final   01/21/2019 1.1 0.5 -  1.4 mg/dL Final   04/06/2018 1.0 0.5 - 1.4 mg/dL Final              Passed - eGFR is 30 or above and within 360 days     eGFR if non    Date Value Ref Range Status   02/17/2020 >60.0 >60 mL/min/1.73 m^2 Final     Comment:     Calculation used to obtain the estimated glomerular filtration  rate (eGFR) is the CKD-EPI equation.      01/21/2019 >60 >60 mL/min/1.73 m^2 Final     Comment:     Calculation used to obtain the estimated glomerular filtration  rate (eGFR) is the CKD-EPI equation.      04/06/2018 >60.0 >60 mL/min/1.73 m^2 Final     Comment:     Calculation used to obtain the estimated glomerular filtration  rate (eGFR) is the CKD-EPI equation.        eGFR if    Date Value Ref Range Status   02/17/2020 >60.0 >60 mL/min/1.73 m^2 Final   01/21/2019 >60 >60 mL/min/1.73 m^2 Final   04/06/2018 >60.0 >60 mL/min/1.73 m^2 Final                    Appointments  past 12m or future 3m with PCP    Date Provider   Last Visit   2/17/2020 Jarvis Washington MD   Next Visit   Visit date not found Jarvis Washington MD   ED visits in past 90 days: 0        Note composed:1:30 PM 12/17/2020

## 2020-12-18 NOTE — TELEPHONE ENCOUNTER
Provider Staff:     Action is required for this patient.     Please schedule patient for Annual/Hospital follow up and Labs(CMP, LIPIDS, A1C)    Thanks!  [facility]     Appointments  past 12m or future 3m with PCP    Date Provider   Last Visit   2/17/2020 Jarvis Washington MD   Next Visit   Visit date not found Jarvis Washington MD     Note composed:8:37 AM 12/18/2020

## 2020-12-29 ENCOUNTER — PATIENT OUTREACH (OUTPATIENT)
Dept: ADMINISTRATIVE | Facility: OTHER | Age: 60
End: 2020-12-29

## 2020-12-29 NOTE — PROGRESS NOTES
Health Maintenance Due   Topic Date Due    HIV Screening  10/06/1975    Shingles Vaccine (1 of 2) 10/06/2010    Foot Exam  04/06/2019    Eye Exam  04/29/2020    Hemoglobin A1c  08/17/2020    Colorectal Cancer Screening  02/13/2021     Updates were requested from care everywhere.  Chart was reviewed for overdue [facility]active [facility] ([facility]) topics (CRS, Breast Cancer Screening, Eye exam)  Health Maintenance has been updated.  LINKS immunization registry triggered.  Immunizations were reconciled.

## 2020-12-31 ENCOUNTER — OFFICE VISIT (OUTPATIENT)
Dept: UROLOGY | Facility: CLINIC | Age: 60
End: 2020-12-31
Attending: UROLOGY
Payer: COMMERCIAL

## 2020-12-31 VITALS
SYSTOLIC BLOOD PRESSURE: 176 MMHG | DIASTOLIC BLOOD PRESSURE: 95 MMHG | HEART RATE: 106 BPM | HEIGHT: 67 IN | BODY MASS INDEX: 27.62 KG/M2 | WEIGHT: 176 LBS

## 2020-12-31 DIAGNOSIS — A63.0 GENITAL CONDYLOMA, MALE: Primary | ICD-10-CM

## 2020-12-31 PROCEDURE — 3072F PR LOW RISK FOR RETINOPATHY: ICD-10-PCS | Mod: S$GLB,,, | Performed by: UROLOGY

## 2020-12-31 PROCEDURE — 1126F AMNT PAIN NOTED NONE PRSNT: CPT | Mod: S$GLB,,, | Performed by: UROLOGY

## 2020-12-31 PROCEDURE — 3080F DIAST BP >= 90 MM HG: CPT | Mod: CPTII,S$GLB,, | Performed by: UROLOGY

## 2020-12-31 PROCEDURE — 3080F PR MOST RECENT DIASTOLIC BLOOD PRESSURE >= 90 MM HG: ICD-10-PCS | Mod: CPTII,S$GLB,, | Performed by: UROLOGY

## 2020-12-31 PROCEDURE — 99214 PR OFFICE/OUTPT VISIT, EST, LEVL IV, 30-39 MIN: ICD-10-PCS | Mod: S$GLB,,, | Performed by: UROLOGY

## 2020-12-31 PROCEDURE — 3008F BODY MASS INDEX DOCD: CPT | Mod: CPTII,S$GLB,, | Performed by: UROLOGY

## 2020-12-31 PROCEDURE — 3077F SYST BP >= 140 MM HG: CPT | Mod: CPTII,S$GLB,, | Performed by: UROLOGY

## 2020-12-31 PROCEDURE — 1126F PR PAIN SEVERITY QUANTIFIED, NO PAIN PRESENT: ICD-10-PCS | Mod: S$GLB,,, | Performed by: UROLOGY

## 2020-12-31 PROCEDURE — 3008F PR BODY MASS INDEX (BMI) DOCUMENTED: ICD-10-PCS | Mod: CPTII,S$GLB,, | Performed by: UROLOGY

## 2020-12-31 PROCEDURE — 99214 OFFICE O/P EST MOD 30 MIN: CPT | Mod: S$GLB,,, | Performed by: UROLOGY

## 2020-12-31 PROCEDURE — 3072F LOW RISK FOR RETINOPATHY: CPT | Mod: S$GLB,,, | Performed by: UROLOGY

## 2020-12-31 PROCEDURE — 3077F PR MOST RECENT SYSTOLIC BLOOD PRESSURE >= 140 MM HG: ICD-10-PCS | Mod: CPTII,S$GLB,, | Performed by: UROLOGY

## 2021-01-22 ENCOUNTER — CLINICAL SUPPORT (OUTPATIENT)
Dept: OTHER | Facility: CLINIC | Age: 61
End: 2021-01-22
Payer: COMMERCIAL

## 2021-01-22 DIAGNOSIS — Z00.8 ENCOUNTER FOR OTHER GENERAL EXAMINATION: ICD-10-CM

## 2021-01-23 VITALS — HEIGHT: 67 IN | BODY MASS INDEX: 27.57 KG/M2

## 2021-01-23 LAB
GLUCOSE SERPL-MCNC: 134 MG/DL (ref 60–140)
HDLC SERPL-MCNC: 41 MG/DL
POC CHOLESTEROL, LDL (DOCK): 72 MG/DL
POC CHOLESTEROL, TOTAL: 162 MG/DL
TRIGL SERPL-MCNC: 247 MG/DL

## 2021-02-02 ENCOUNTER — LAB VISIT (OUTPATIENT)
Dept: LAB | Facility: HOSPITAL | Age: 61
End: 2021-02-02
Attending: FAMILY MEDICINE
Payer: COMMERCIAL

## 2021-02-02 DIAGNOSIS — I10 HYPERTENSION, ESSENTIAL: ICD-10-CM

## 2021-02-02 LAB
ALBUMIN SERPL BCP-MCNC: 4.4 G/DL (ref 3.5–5.2)
ALP SERPL-CCNC: 66 U/L (ref 55–135)
ALT SERPL W/O P-5'-P-CCNC: 26 U/L (ref 10–44)
ANION GAP SERPL CALC-SCNC: 13 MMOL/L (ref 8–16)
ANION GAP SERPL CALC-SCNC: 13 MMOL/L (ref 8–16)
AST SERPL-CCNC: 25 U/L (ref 10–40)
BILIRUB SERPL-MCNC: 1.1 MG/DL (ref 0.1–1)
BUN SERPL-MCNC: 17 MG/DL (ref 6–20)
BUN SERPL-MCNC: 17 MG/DL (ref 6–20)
CALCIUM SERPL-MCNC: 9.7 MG/DL (ref 8.7–10.5)
CALCIUM SERPL-MCNC: 9.7 MG/DL (ref 8.7–10.5)
CHLORIDE SERPL-SCNC: 104 MMOL/L (ref 95–110)
CHLORIDE SERPL-SCNC: 104 MMOL/L (ref 95–110)
CHOLEST SERPL-MCNC: 136 MG/DL (ref 120–199)
CHOLEST/HDLC SERPL: 3 {RATIO} (ref 2–5)
CO2 SERPL-SCNC: 24 MMOL/L (ref 23–29)
CO2 SERPL-SCNC: 24 MMOL/L (ref 23–29)
CREAT SERPL-MCNC: 1.1 MG/DL (ref 0.5–1.4)
CREAT SERPL-MCNC: 1.1 MG/DL (ref 0.5–1.4)
EST. GFR  (AFRICAN AMERICAN): >60 ML/MIN/1.73 M^2
EST. GFR  (AFRICAN AMERICAN): >60 ML/MIN/1.73 M^2
EST. GFR  (NON AFRICAN AMERICAN): >60 ML/MIN/1.73 M^2
EST. GFR  (NON AFRICAN AMERICAN): >60 ML/MIN/1.73 M^2
ESTIMATED AVG GLUCOSE: 134 MG/DL (ref 68–131)
GLUCOSE SERPL-MCNC: 99 MG/DL (ref 70–110)
GLUCOSE SERPL-MCNC: 99 MG/DL (ref 70–110)
HBA1C MFR BLD: 6.3 % (ref 4–5.6)
HDLC SERPL-MCNC: 45 MG/DL (ref 40–75)
HDLC SERPL: 33.1 % (ref 20–50)
LDLC SERPL CALC-MCNC: 71.6 MG/DL (ref 63–159)
NONHDLC SERPL-MCNC: 91 MG/DL
POTASSIUM SERPL-SCNC: 3.8 MMOL/L (ref 3.5–5.1)
POTASSIUM SERPL-SCNC: 3.8 MMOL/L (ref 3.5–5.1)
PROT SERPL-MCNC: 7.9 G/DL (ref 6–8.4)
SODIUM SERPL-SCNC: 141 MMOL/L (ref 136–145)
SODIUM SERPL-SCNC: 141 MMOL/L (ref 136–145)
TRIGL SERPL-MCNC: 97 MG/DL (ref 30–150)

## 2021-02-02 PROCEDURE — 80053 COMPREHEN METABOLIC PANEL: CPT

## 2021-02-02 PROCEDURE — 36415 COLL VENOUS BLD VENIPUNCTURE: CPT

## 2021-02-02 PROCEDURE — 83036 HEMOGLOBIN GLYCOSYLATED A1C: CPT

## 2021-02-02 PROCEDURE — 80061 LIPID PANEL: CPT

## 2021-02-04 ENCOUNTER — OFFICE VISIT (OUTPATIENT)
Dept: INTERNAL MEDICINE | Facility: CLINIC | Age: 61
End: 2021-02-04
Attending: FAMILY MEDICINE
Payer: COMMERCIAL

## 2021-02-04 ENCOUNTER — LAB VISIT (OUTPATIENT)
Dept: LAB | Facility: HOSPITAL | Age: 61
End: 2021-02-04
Attending: FAMILY MEDICINE
Payer: COMMERCIAL

## 2021-02-04 VITALS
SYSTOLIC BLOOD PRESSURE: 136 MMHG | TEMPERATURE: 98 F | HEIGHT: 67 IN | DIASTOLIC BLOOD PRESSURE: 72 MMHG | WEIGHT: 176.38 LBS | RESPIRATION RATE: 18 BRPM | HEART RATE: 77 BPM | OXYGEN SATURATION: 98 % | BODY MASS INDEX: 27.68 KG/M2

## 2021-02-04 DIAGNOSIS — Z12.5 PROSTATE CANCER SCREENING: ICD-10-CM

## 2021-02-04 DIAGNOSIS — E11.9 TYPE 2 DIABETES MELLITUS WITHOUT COMPLICATION, UNSPECIFIED WHETHER LONG TERM INSULIN USE: ICD-10-CM

## 2021-02-04 DIAGNOSIS — I10 HYPERTENSION, ESSENTIAL: ICD-10-CM

## 2021-02-04 DIAGNOSIS — R91.1 PULMONARY NODULE: ICD-10-CM

## 2021-02-04 DIAGNOSIS — Z00.00 ANNUAL PHYSICAL EXAM: Primary | ICD-10-CM

## 2021-02-04 DIAGNOSIS — Z87.891 PERSONAL HISTORY OF NICOTINE DEPENDENCE: ICD-10-CM

## 2021-02-04 DIAGNOSIS — E11.69 TYPE 2 DIABETES MELLITUS WITH OTHER SPECIFIED COMPLICATION, WITHOUT LONG-TERM CURRENT USE OF INSULIN: ICD-10-CM

## 2021-02-04 DIAGNOSIS — Z12.11 COLON CANCER SCREENING: ICD-10-CM

## 2021-02-04 DIAGNOSIS — E78.5 HYPERLIPIDEMIA, UNSPECIFIED HYPERLIPIDEMIA TYPE: ICD-10-CM

## 2021-02-04 DIAGNOSIS — Z12.2 ENCOUNTER FOR SCREENING FOR MALIGNANT NEOPLASM OF RESPIRATORY ORGANS: ICD-10-CM

## 2021-02-04 DIAGNOSIS — E78.49 OTHER HYPERLIPIDEMIA: ICD-10-CM

## 2021-02-04 DIAGNOSIS — K63.5 POLYP OF COLON, UNSPECIFIED PART OF COLON, UNSPECIFIED TYPE: ICD-10-CM

## 2021-02-04 PROBLEM — Z45.010 PACEMAKER BATTERY DEPLETION: Status: RESOLVED | Noted: 2020-04-30 | Resolved: 2021-02-04

## 2021-02-04 PROCEDURE — 3008F PR BODY MASS INDEX (BMI) DOCUMENTED: ICD-10-PCS | Mod: CPTII,S$GLB,, | Performed by: FAMILY MEDICINE

## 2021-02-04 PROCEDURE — 3078F PR MOST RECENT DIASTOLIC BLOOD PRESSURE < 80 MM HG: ICD-10-PCS | Mod: CPTII,S$GLB,, | Performed by: FAMILY MEDICINE

## 2021-02-04 PROCEDURE — 3075F PR MOST RECENT SYSTOLIC BLOOD PRESS GE 130-139MM HG: ICD-10-PCS | Mod: CPTII,S$GLB,, | Performed by: FAMILY MEDICINE

## 2021-02-04 PROCEDURE — 3044F PR MOST RECENT HEMOGLOBIN A1C LEVEL <7.0%: ICD-10-PCS | Mod: CPTII,S$GLB,, | Performed by: FAMILY MEDICINE

## 2021-02-04 PROCEDURE — 99396 PREV VISIT EST AGE 40-64: CPT | Mod: S$GLB,,, | Performed by: FAMILY MEDICINE

## 2021-02-04 PROCEDURE — 3044F HG A1C LEVEL LT 7.0%: CPT | Mod: CPTII,S$GLB,, | Performed by: FAMILY MEDICINE

## 2021-02-04 PROCEDURE — 36415 COLL VENOUS BLD VENIPUNCTURE: CPT

## 2021-02-04 PROCEDURE — 1125F PR PAIN SEVERITY QUANTIFIED, PAIN PRESENT: ICD-10-PCS | Mod: S$GLB,,, | Performed by: FAMILY MEDICINE

## 2021-02-04 PROCEDURE — 99999 PR PBB SHADOW E&M-EST. PATIENT-LVL IV: CPT | Mod: PBBFAC,,, | Performed by: FAMILY MEDICINE

## 2021-02-04 PROCEDURE — 3008F BODY MASS INDEX DOCD: CPT | Mod: CPTII,S$GLB,, | Performed by: FAMILY MEDICINE

## 2021-02-04 PROCEDURE — 3075F SYST BP GE 130 - 139MM HG: CPT | Mod: CPTII,S$GLB,, | Performed by: FAMILY MEDICINE

## 2021-02-04 PROCEDURE — 3078F DIAST BP <80 MM HG: CPT | Mod: CPTII,S$GLB,, | Performed by: FAMILY MEDICINE

## 2021-02-04 PROCEDURE — 99396 PR PREVENTIVE VISIT,EST,40-64: ICD-10-PCS | Mod: S$GLB,,, | Performed by: FAMILY MEDICINE

## 2021-02-04 PROCEDURE — 99999 PR PBB SHADOW E&M-EST. PATIENT-LVL IV: ICD-10-PCS | Mod: PBBFAC,,, | Performed by: FAMILY MEDICINE

## 2021-02-04 PROCEDURE — 84153 ASSAY OF PSA TOTAL: CPT

## 2021-02-04 PROCEDURE — 1125F AMNT PAIN NOTED PAIN PRSNT: CPT | Mod: S$GLB,,, | Performed by: FAMILY MEDICINE

## 2021-02-04 RX ORDER — LOSARTAN POTASSIUM 50 MG/1
50 TABLET ORAL DAILY
Qty: 90 TABLET | Refills: 3 | Status: SHIPPED | OUTPATIENT
Start: 2021-02-04 | End: 2022-03-09 | Stop reason: SDUPTHER

## 2021-02-04 RX ORDER — METFORMIN HYDROCHLORIDE 500 MG/1
500 TABLET ORAL
Qty: 90 TABLET | Refills: 3 | Status: SHIPPED | OUTPATIENT
Start: 2021-02-04 | End: 2022-03-09 | Stop reason: SDUPTHER

## 2021-02-04 RX ORDER — HYDROCHLOROTHIAZIDE 12.5 MG/1
12.5 TABLET ORAL DAILY
Qty: 90 TABLET | Refills: 3 | Status: SHIPPED | OUTPATIENT
Start: 2021-02-04 | End: 2021-02-17

## 2021-02-04 RX ORDER — ATORVASTATIN CALCIUM 20 MG/1
20 TABLET, FILM COATED ORAL DAILY
Qty: 90 TABLET | Refills: 3 | Status: SHIPPED | OUTPATIENT
Start: 2021-02-04 | End: 2021-04-20

## 2021-02-04 RX ORDER — AMLODIPINE BESYLATE 5 MG/1
5 TABLET ORAL DAILY
Qty: 90 TABLET | Refills: 3 | Status: SHIPPED | OUTPATIENT
Start: 2021-02-04 | End: 2021-04-20

## 2021-02-05 LAB — COMPLEXED PSA SERPL-MCNC: 1.6 NG/ML (ref 0–4)

## 2021-02-08 ENCOUNTER — HOSPITAL ENCOUNTER (OUTPATIENT)
Dept: RADIOLOGY | Facility: HOSPITAL | Age: 61
Discharge: HOME | End: 2021-02-08
Attending: FAMILY MEDICINE
Payer: COMMERCIAL

## 2021-02-08 DIAGNOSIS — Z12.2 ENCOUNTER FOR SCREENING FOR MALIGNANT NEOPLASM OF RESPIRATORY ORGANS: ICD-10-CM

## 2021-02-08 DIAGNOSIS — R91.1 PULMONARY NODULE: ICD-10-CM

## 2021-02-08 PROCEDURE — 71271 CT CHEST LUNG SCREENING LOW DOSE: ICD-10-PCS | Mod: 26,,, | Performed by: RADIOLOGY

## 2021-02-08 PROCEDURE — 71271 CT THORAX LUNG CANCER SCR C-: CPT | Mod: 26,,, | Performed by: RADIOLOGY

## 2021-02-08 PROCEDURE — 71271 CT THORAX LUNG CANCER SCR C-: CPT | Mod: TC

## 2021-02-09 ENCOUNTER — TELEPHONE (OUTPATIENT)
Dept: INTERNAL MEDICINE | Facility: CLINIC | Age: 61
End: 2021-02-09

## 2021-02-09 DIAGNOSIS — Z87.891 PERSONAL HISTORY OF NICOTINE DEPENDENCE: Primary | ICD-10-CM

## 2021-02-09 DIAGNOSIS — Z12.2 ENCOUNTER FOR SCREENING FOR MALIGNANT NEOPLASM OF RESPIRATORY ORGANS: ICD-10-CM

## 2021-02-14 DIAGNOSIS — I10 HYPERTENSION, ESSENTIAL: ICD-10-CM

## 2021-02-17 RX ORDER — HYDROCHLOROTHIAZIDE 12.5 MG/1
TABLET ORAL
Qty: 90 TABLET | Refills: 3 | Status: SHIPPED | OUTPATIENT
Start: 2021-02-17 | End: 2022-02-24

## 2021-03-08 ENCOUNTER — TELEPHONE (OUTPATIENT)
Dept: INTERNAL MEDICINE | Facility: CLINIC | Age: 61
End: 2021-03-08

## 2021-03-09 ENCOUNTER — CLINICAL SUPPORT (OUTPATIENT)
Dept: CARDIOLOGY | Facility: HOSPITAL | Age: 61
End: 2021-03-09
Attending: INTERNAL MEDICINE
Payer: COMMERCIAL

## 2021-03-09 DIAGNOSIS — I49.5 SICK SINUS SYNDROME: ICD-10-CM

## 2021-03-09 DIAGNOSIS — Z95.0 PACEMAKER: ICD-10-CM

## 2021-03-09 PROCEDURE — 93279 PRGRMG DEV EVAL PM/LDLS PM: CPT

## 2021-03-09 PROCEDURE — 93279 PRGRMG DEV EVAL PM/LDLS PM: CPT | Mod: 26,,, | Performed by: INTERNAL MEDICINE

## 2021-03-09 PROCEDURE — 93279 CARDIAC DEVICE CHECK - IN CLINIC & HOSPITAL: ICD-10-PCS | Mod: 26,,, | Performed by: INTERNAL MEDICINE

## 2021-03-25 ENCOUNTER — TELEPHONE (OUTPATIENT)
Dept: INTERNAL MEDICINE | Facility: CLINIC | Age: 61
End: 2021-03-25

## 2021-03-27 ENCOUNTER — TELEPHONE (OUTPATIENT)
Dept: INTERNAL MEDICINE | Facility: CLINIC | Age: 61
End: 2021-03-27

## 2021-03-27 ENCOUNTER — TELEPHONE (OUTPATIENT)
Dept: ENDOSCOPY | Facility: HOSPITAL | Age: 61
End: 2021-03-27

## 2021-03-27 ENCOUNTER — PATIENT OUTREACH (OUTPATIENT)
Dept: ADMINISTRATIVE | Facility: OTHER | Age: 61
End: 2021-03-27

## 2021-03-31 ENCOUNTER — OFFICE VISIT (OUTPATIENT)
Dept: UROLOGY | Facility: CLINIC | Age: 61
End: 2021-03-31
Attending: UROLOGY
Payer: COMMERCIAL

## 2021-03-31 VITALS
HEIGHT: 67 IN | BODY MASS INDEX: 27.62 KG/M2 | DIASTOLIC BLOOD PRESSURE: 87 MMHG | SYSTOLIC BLOOD PRESSURE: 146 MMHG | WEIGHT: 176 LBS | HEART RATE: 75 BPM

## 2021-03-31 DIAGNOSIS — A63.0 HPV (HUMAN PAPILLOMA VIRUS) ANOGENITAL INFECTION: ICD-10-CM

## 2021-03-31 DIAGNOSIS — A63.0 GENITAL CONDYLOMA, MALE: Primary | ICD-10-CM

## 2021-03-31 PROCEDURE — 3079F DIAST BP 80-89 MM HG: CPT | Mod: CPTII,S$GLB,, | Performed by: UROLOGY

## 2021-03-31 PROCEDURE — 99213 PR OFFICE/OUTPT VISIT, EST, LEVL III, 20-29 MIN: ICD-10-PCS | Mod: S$GLB,,, | Performed by: UROLOGY

## 2021-03-31 PROCEDURE — 3008F PR BODY MASS INDEX (BMI) DOCUMENTED: ICD-10-PCS | Mod: CPTII,S$GLB,, | Performed by: UROLOGY

## 2021-03-31 PROCEDURE — 1126F AMNT PAIN NOTED NONE PRSNT: CPT | Mod: S$GLB,,, | Performed by: UROLOGY

## 2021-03-31 PROCEDURE — 1126F PR PAIN SEVERITY QUANTIFIED, NO PAIN PRESENT: ICD-10-PCS | Mod: S$GLB,,, | Performed by: UROLOGY

## 2021-03-31 PROCEDURE — 3077F SYST BP >= 140 MM HG: CPT | Mod: CPTII,S$GLB,, | Performed by: UROLOGY

## 2021-03-31 PROCEDURE — 3077F PR MOST RECENT SYSTOLIC BLOOD PRESSURE >= 140 MM HG: ICD-10-PCS | Mod: CPTII,S$GLB,, | Performed by: UROLOGY

## 2021-03-31 PROCEDURE — 3079F PR MOST RECENT DIASTOLIC BLOOD PRESSURE 80-89 MM HG: ICD-10-PCS | Mod: CPTII,S$GLB,, | Performed by: UROLOGY

## 2021-03-31 PROCEDURE — 3008F BODY MASS INDEX DOCD: CPT | Mod: CPTII,S$GLB,, | Performed by: UROLOGY

## 2021-03-31 PROCEDURE — 99213 OFFICE O/P EST LOW 20 MIN: CPT | Mod: S$GLB,,, | Performed by: UROLOGY

## 2021-04-17 DIAGNOSIS — I10 HYPERTENSION, ESSENTIAL: ICD-10-CM

## 2021-04-17 DIAGNOSIS — E78.5 HYPERLIPIDEMIA, UNSPECIFIED HYPERLIPIDEMIA TYPE: ICD-10-CM

## 2021-04-20 RX ORDER — ATORVASTATIN CALCIUM 20 MG/1
TABLET, FILM COATED ORAL
Qty: 90 TABLET | Refills: 0 | Status: SHIPPED | OUTPATIENT
Start: 2021-04-20 | End: 2021-07-26

## 2021-04-22 RX ORDER — AMLODIPINE BESYLATE 5 MG/1
TABLET ORAL
Qty: 90 TABLET | Refills: 0 | Status: SHIPPED | OUTPATIENT
Start: 2021-04-22 | End: 2022-03-09 | Stop reason: SDUPTHER

## 2021-05-05 DIAGNOSIS — E11.9 TYPE 2 DIABETES MELLITUS WITHOUT COMPLICATION: ICD-10-CM

## 2021-05-12 DIAGNOSIS — E11.9 TYPE 2 DIABETES MELLITUS WITHOUT COMPLICATION: ICD-10-CM

## 2021-05-24 ENCOUNTER — TELEPHONE (OUTPATIENT)
Dept: PAIN MEDICINE | Facility: CLINIC | Age: 61
End: 2021-05-24

## 2021-05-26 ENCOUNTER — TELEPHONE (OUTPATIENT)
Dept: PAIN MEDICINE | Facility: CLINIC | Age: 61
End: 2021-05-26

## 2021-05-27 ENCOUNTER — OFFICE VISIT (OUTPATIENT)
Dept: PAIN MEDICINE | Facility: CLINIC | Age: 61
End: 2021-05-27
Payer: COMMERCIAL

## 2021-05-27 VITALS
HEIGHT: 67 IN | DIASTOLIC BLOOD PRESSURE: 85 MMHG | WEIGHT: 165.38 LBS | RESPIRATION RATE: 18 BRPM | BODY MASS INDEX: 25.96 KG/M2 | HEART RATE: 67 BPM | SYSTOLIC BLOOD PRESSURE: 172 MMHG

## 2021-05-27 DIAGNOSIS — M54.17 LUMBOSACRAL RADICULOPATHY: Primary | ICD-10-CM

## 2021-05-27 DIAGNOSIS — M47.816 LUMBAR SPONDYLOSIS: ICD-10-CM

## 2021-05-27 DIAGNOSIS — M54.16 LUMBAR RADICULOPATHY: ICD-10-CM

## 2021-05-27 PROCEDURE — 99999 PR PBB SHADOW E&M-EST. PATIENT-LVL III: CPT | Mod: PBBFAC,,, | Performed by: NURSE PRACTITIONER

## 2021-05-27 PROCEDURE — 99999 PR PBB SHADOW E&M-EST. PATIENT-LVL III: ICD-10-PCS | Mod: PBBFAC,,, | Performed by: NURSE PRACTITIONER

## 2021-05-27 PROCEDURE — 3008F PR BODY MASS INDEX (BMI) DOCUMENTED: ICD-10-PCS | Mod: CPTII,S$GLB,, | Performed by: NURSE PRACTITIONER

## 2021-05-27 PROCEDURE — 99213 OFFICE O/P EST LOW 20 MIN: CPT | Mod: S$GLB,,, | Performed by: NURSE PRACTITIONER

## 2021-05-27 PROCEDURE — 1125F AMNT PAIN NOTED PAIN PRSNT: CPT | Mod: S$GLB,,, | Performed by: NURSE PRACTITIONER

## 2021-05-27 PROCEDURE — 1125F PR PAIN SEVERITY QUANTIFIED, PAIN PRESENT: ICD-10-PCS | Mod: S$GLB,,, | Performed by: NURSE PRACTITIONER

## 2021-05-27 PROCEDURE — 3008F BODY MASS INDEX DOCD: CPT | Mod: CPTII,S$GLB,, | Performed by: NURSE PRACTITIONER

## 2021-05-27 PROCEDURE — 99213 PR OFFICE/OUTPT VISIT, EST, LEVL III, 20-29 MIN: ICD-10-PCS | Mod: S$GLB,,, | Performed by: NURSE PRACTITIONER

## 2021-05-28 ENCOUNTER — TELEPHONE (OUTPATIENT)
Dept: ADMINISTRATIVE | Facility: OTHER | Age: 61
End: 2021-05-28

## 2021-06-15 ENCOUNTER — HOSPITAL ENCOUNTER (OUTPATIENT)
Facility: OTHER | Age: 61
Discharge: HOME | End: 2021-06-15
Attending: ANESTHESIOLOGY | Admitting: ANESTHESIOLOGY
Payer: COMMERCIAL

## 2021-06-15 VITALS
TEMPERATURE: 99 F | WEIGHT: 165 LBS | RESPIRATION RATE: 16 BRPM | SYSTOLIC BLOOD PRESSURE: 143 MMHG | HEART RATE: 65 BPM | DIASTOLIC BLOOD PRESSURE: 78 MMHG | BODY MASS INDEX: 25.9 KG/M2 | HEIGHT: 67 IN | OXYGEN SATURATION: 95 %

## 2021-06-15 DIAGNOSIS — M54.17 LUMBOSACRAL RADICULOPATHY: ICD-10-CM

## 2021-06-15 DIAGNOSIS — G89.29 CHRONIC PAIN: ICD-10-CM

## 2021-06-15 DIAGNOSIS — M51.37 DDD (DEGENERATIVE DISC DISEASE), LUMBOSACRAL: Primary | ICD-10-CM

## 2021-06-15 LAB — POCT GLUCOSE: 88 MG/DL (ref 70–110)

## 2021-06-15 PROCEDURE — 63600175 PHARM REV CODE 636 W HCPCS: Performed by: ANESTHESIOLOGY

## 2021-06-15 PROCEDURE — 64484 NJX AA&/STRD TFRM EPI L/S EA: CPT | Mod: RT,,, | Performed by: ANESTHESIOLOGY

## 2021-06-15 PROCEDURE — 64484 NJX AA&/STRD TFRM EPI L/S EA: CPT | Mod: RT | Performed by: ANESTHESIOLOGY

## 2021-06-15 PROCEDURE — 64484 PRA INJECT ANES/STEROID FORAMEN LUMBAR/SACRAL W IMG GUIDE ,EA ADD LEVEL: ICD-10-PCS | Mod: RT,,, | Performed by: ANESTHESIOLOGY

## 2021-06-15 PROCEDURE — 25000003 PHARM REV CODE 250: Performed by: ANESTHESIOLOGY

## 2021-06-15 PROCEDURE — 64483 PR EPIDURAL INJ, ANES/STEROID, TRANSFORAMINAL, LUMB/SACR, SNGL LEVL: ICD-10-PCS | Mod: RT,,, | Performed by: ANESTHESIOLOGY

## 2021-06-15 PROCEDURE — 25000003 PHARM REV CODE 250: Performed by: STUDENT IN AN ORGANIZED HEALTH CARE EDUCATION/TRAINING PROGRAM

## 2021-06-15 PROCEDURE — 64483 NJX AA&/STRD TFRM EPI L/S 1: CPT | Mod: RT | Performed by: ANESTHESIOLOGY

## 2021-06-15 PROCEDURE — 64483 NJX AA&/STRD TFRM EPI L/S 1: CPT | Mod: RT,,, | Performed by: ANESTHESIOLOGY

## 2021-06-15 PROCEDURE — 25500020 PHARM REV CODE 255: Performed by: ANESTHESIOLOGY

## 2021-06-15 PROCEDURE — 82947 ASSAY GLUCOSE BLOOD QUANT: CPT | Performed by: ANESTHESIOLOGY

## 2021-06-15 RX ORDER — DEXAMETHASONE SODIUM PHOSPHATE 10 MG/ML
INJECTION INTRAMUSCULAR; INTRAVENOUS
Status: DISCONTINUED | OUTPATIENT
Start: 2021-06-15 | End: 2021-06-15 | Stop reason: HOSPADM

## 2021-06-15 RX ORDER — MIDAZOLAM HYDROCHLORIDE 1 MG/ML
INJECTION INTRAMUSCULAR; INTRAVENOUS
Status: DISCONTINUED | OUTPATIENT
Start: 2021-06-15 | End: 2021-06-15 | Stop reason: HOSPADM

## 2021-06-15 RX ORDER — SODIUM CHLORIDE 9 MG/ML
INJECTION, SOLUTION INTRAVENOUS CONTINUOUS
Status: DISCONTINUED | OUTPATIENT
Start: 2021-06-15 | End: 2021-06-15 | Stop reason: HOSPADM

## 2021-06-15 RX ORDER — BUPIVACAINE HYDROCHLORIDE 2.5 MG/ML
INJECTION, SOLUTION EPIDURAL; INFILTRATION; INTRACAUDAL
Status: DISCONTINUED | OUTPATIENT
Start: 2021-06-15 | End: 2021-06-15 | Stop reason: HOSPADM

## 2021-06-15 RX ORDER — LIDOCAINE HYDROCHLORIDE 10 MG/ML
INJECTION INFILTRATION; PERINEURAL
Status: DISCONTINUED | OUTPATIENT
Start: 2021-06-15 | End: 2021-06-15 | Stop reason: HOSPADM

## 2021-06-15 RX ORDER — FENTANYL CITRATE 50 UG/ML
INJECTION, SOLUTION INTRAMUSCULAR; INTRAVENOUS
Status: DISCONTINUED | OUTPATIENT
Start: 2021-06-15 | End: 2021-06-15 | Stop reason: HOSPADM

## 2021-06-15 RX ADMIN — SODIUM CHLORIDE: 0.9 INJECTION, SOLUTION INTRAVENOUS at 01:06

## 2021-06-21 ENCOUNTER — OFFICE VISIT (OUTPATIENT)
Dept: OPTOMETRY | Facility: CLINIC | Age: 61
End: 2021-06-21
Attending: FAMILY MEDICINE
Payer: COMMERCIAL

## 2021-06-21 DIAGNOSIS — Z00.00 ANNUAL PHYSICAL EXAM: ICD-10-CM

## 2021-06-21 DIAGNOSIS — H25.13 SENILE NUCLEAR SCLEROSIS, BILATERAL: ICD-10-CM

## 2021-06-21 DIAGNOSIS — H52.4 HYPEROPIA WITH ASTIGMATISM AND PRESBYOPIA, BILATERAL: Primary | ICD-10-CM

## 2021-06-21 DIAGNOSIS — H40.013 OAG (OPEN ANGLE GLAUCOMA) SUSPECT, LOW RISK, BILATERAL: ICD-10-CM

## 2021-06-21 DIAGNOSIS — H52.203 HYPEROPIA WITH ASTIGMATISM AND PRESBYOPIA, BILATERAL: Primary | ICD-10-CM

## 2021-06-21 DIAGNOSIS — E11.36 TYPE 2 DIABETES MELLITUS WITH CATARACT: ICD-10-CM

## 2021-06-21 DIAGNOSIS — E11.9 TYPE 2 DIABETES MELLITUS WITHOUT RETINOPATHY: ICD-10-CM

## 2021-06-21 DIAGNOSIS — H52.03 HYPEROPIA WITH ASTIGMATISM AND PRESBYOPIA, BILATERAL: Primary | ICD-10-CM

## 2021-06-21 PROCEDURE — 99999 PR PBB SHADOW E&M-EST. PATIENT-LVL III: CPT | Mod: PBBFAC,,, | Performed by: OPTOMETRIST

## 2021-06-21 PROCEDURE — 92015 DETERMINE REFRACTIVE STATE: CPT | Mod: S$GLB,,, | Performed by: OPTOMETRIST

## 2021-06-21 PROCEDURE — 92015 PR REFRACTION: ICD-10-PCS | Mod: S$GLB,,, | Performed by: OPTOMETRIST

## 2021-06-21 PROCEDURE — 99999 PR PBB SHADOW E&M-EST. PATIENT-LVL III: ICD-10-PCS | Mod: PBBFAC,,, | Performed by: OPTOMETRIST

## 2021-06-21 PROCEDURE — 92014 PR EYE EXAM, EST PATIENT,COMPREHESV: ICD-10-PCS | Mod: S$GLB,,, | Performed by: OPTOMETRIST

## 2021-06-21 PROCEDURE — 92014 COMPRE OPH EXAM EST PT 1/>: CPT | Mod: S$GLB,,, | Performed by: OPTOMETRIST

## 2021-07-01 ENCOUNTER — TELEPHONE (OUTPATIENT)
Dept: PAIN MEDICINE | Facility: CLINIC | Age: 61
End: 2021-07-01

## 2021-07-01 ENCOUNTER — PATIENT OUTREACH (OUTPATIENT)
Dept: ADMINISTRATIVE | Facility: OTHER | Age: 61
End: 2021-07-01

## 2021-07-02 ENCOUNTER — OFFICE VISIT (OUTPATIENT)
Dept: PAIN MEDICINE | Facility: CLINIC | Age: 61
End: 2021-07-02
Payer: COMMERCIAL

## 2021-07-02 VITALS
DIASTOLIC BLOOD PRESSURE: 98 MMHG | BODY MASS INDEX: 25.6 KG/M2 | TEMPERATURE: 98 F | HEIGHT: 67 IN | HEART RATE: 86 BPM | SYSTOLIC BLOOD PRESSURE: 155 MMHG | WEIGHT: 163.13 LBS | OXYGEN SATURATION: 99 % | RESPIRATION RATE: 18 BRPM

## 2021-07-02 DIAGNOSIS — M54.16 LUMBAR RADICULOPATHY: Primary | ICD-10-CM

## 2021-07-02 DIAGNOSIS — M54.9 DORSALGIA, UNSPECIFIED: ICD-10-CM

## 2021-07-02 DIAGNOSIS — M51.37 DDD (DEGENERATIVE DISC DISEASE), LUMBOSACRAL: ICD-10-CM

## 2021-07-02 DIAGNOSIS — M54.17 LUMBOSACRAL RADICULOPATHY: ICD-10-CM

## 2021-07-02 PROCEDURE — 99213 OFFICE O/P EST LOW 20 MIN: CPT | Mod: S$GLB,,, | Performed by: NURSE PRACTITIONER

## 2021-07-02 PROCEDURE — 3008F BODY MASS INDEX DOCD: CPT | Mod: CPTII,S$GLB,, | Performed by: NURSE PRACTITIONER

## 2021-07-02 PROCEDURE — 1125F AMNT PAIN NOTED PAIN PRSNT: CPT | Mod: S$GLB,,, | Performed by: NURSE PRACTITIONER

## 2021-07-02 PROCEDURE — 99999 PR PBB SHADOW E&M-EST. PATIENT-LVL IV: CPT | Mod: PBBFAC,,, | Performed by: NURSE PRACTITIONER

## 2021-07-02 PROCEDURE — 3008F PR BODY MASS INDEX (BMI) DOCUMENTED: ICD-10-PCS | Mod: CPTII,S$GLB,, | Performed by: NURSE PRACTITIONER

## 2021-07-02 PROCEDURE — 99999 PR PBB SHADOW E&M-EST. PATIENT-LVL IV: ICD-10-PCS | Mod: PBBFAC,,, | Performed by: NURSE PRACTITIONER

## 2021-07-02 PROCEDURE — 99213 PR OFFICE/OUTPT VISIT, EST, LEVL III, 20-29 MIN: ICD-10-PCS | Mod: S$GLB,,, | Performed by: NURSE PRACTITIONER

## 2021-07-02 PROCEDURE — 1125F PR PAIN SEVERITY QUANTIFIED, PAIN PRESENT: ICD-10-PCS | Mod: S$GLB,,, | Performed by: NURSE PRACTITIONER

## 2021-07-09 ENCOUNTER — HOSPITAL ENCOUNTER (OUTPATIENT)
Dept: RADIOLOGY | Facility: OTHER | Age: 61
Discharge: HOME | End: 2021-07-09
Attending: NURSE PRACTITIONER
Payer: COMMERCIAL

## 2021-07-09 DIAGNOSIS — M54.9 DORSALGIA, UNSPECIFIED: ICD-10-CM

## 2021-07-09 PROCEDURE — 72131 CT LUMBAR SPINE WITHOUT CONTRAST: ICD-10-PCS | Mod: 26,,, | Performed by: RADIOLOGY

## 2021-07-09 PROCEDURE — 72131 CT LUMBAR SPINE W/O DYE: CPT | Mod: TC

## 2021-07-09 PROCEDURE — 72131 CT LUMBAR SPINE W/O DYE: CPT | Mod: 26,,, | Performed by: RADIOLOGY

## 2021-07-16 ENCOUNTER — OFFICE VISIT (OUTPATIENT)
Dept: URGENT CARE | Facility: CLINIC | Age: 61
End: 2021-07-16
Payer: COMMERCIAL

## 2021-07-16 VITALS
BODY MASS INDEX: 25.9 KG/M2 | WEIGHT: 165 LBS | HEART RATE: 69 BPM | RESPIRATION RATE: 16 BRPM | TEMPERATURE: 97 F | HEIGHT: 67 IN | OXYGEN SATURATION: 97 % | DIASTOLIC BLOOD PRESSURE: 76 MMHG | SYSTOLIC BLOOD PRESSURE: 145 MMHG

## 2021-07-16 DIAGNOSIS — R09.A2 FOREIGN BODY SENSATION IN THROAT: Primary | ICD-10-CM

## 2021-07-16 PROCEDURE — 3008F PR BODY MASS INDEX (BMI) DOCUMENTED: ICD-10-PCS | Mod: CPTII,S$GLB,, | Performed by: NURSE PRACTITIONER

## 2021-07-16 PROCEDURE — 70360 XR NECK SOFT TISSUE: ICD-10-PCS | Mod: S$GLB,,, | Performed by: RADIOLOGY

## 2021-07-16 PROCEDURE — 99214 PR OFFICE/OUTPT VISIT, EST, LEVL IV, 30-39 MIN: ICD-10-PCS | Mod: S$GLB,,, | Performed by: NURSE PRACTITIONER

## 2021-07-16 PROCEDURE — 70360 X-RAY EXAM OF NECK: CPT | Mod: S$GLB,,, | Performed by: RADIOLOGY

## 2021-07-16 PROCEDURE — 99214 OFFICE O/P EST MOD 30 MIN: CPT | Mod: S$GLB,,, | Performed by: NURSE PRACTITIONER

## 2021-07-16 PROCEDURE — 3008F BODY MASS INDEX DOCD: CPT | Mod: CPTII,S$GLB,, | Performed by: NURSE PRACTITIONER

## 2021-07-24 DIAGNOSIS — E78.5 HYPERLIPIDEMIA, UNSPECIFIED HYPERLIPIDEMIA TYPE: ICD-10-CM

## 2021-07-28 RX ORDER — ATORVASTATIN CALCIUM 20 MG/1
TABLET, FILM COATED ORAL
Qty: 90 TABLET | Refills: 0 | Status: SHIPPED | OUTPATIENT
Start: 2021-07-28 | End: 2022-03-09 | Stop reason: SDUPTHER

## 2021-10-08 ENCOUNTER — IMMUNIZATION (OUTPATIENT)
Dept: INTERNAL MEDICINE | Facility: CLINIC | Age: 61
End: 2021-10-08
Payer: COMMERCIAL

## 2021-10-08 DIAGNOSIS — Z23 NEED FOR VACCINATION: Primary | ICD-10-CM

## 2021-10-08 PROCEDURE — 90686 IIV4 VACC NO PRSV 0.5 ML IM: CPT | Mod: S$GLB,,, | Performed by: INTERNAL MEDICINE

## 2021-10-08 PROCEDURE — 90471 IMMUNIZATION ADMIN: CPT | Mod: PBBFAC

## 2021-10-08 PROCEDURE — 91300 COVID-19, MRNA, LNP-S, PF, 30 MCG/0.3 ML DOSE VACCINE: CPT | Mod: PBBFAC | Performed by: INTERNAL MEDICINE

## 2021-10-08 PROCEDURE — 90471 IMMUNIZATION ADMIN: CPT | Mod: S$GLB,,, | Performed by: INTERNAL MEDICINE

## 2021-10-08 PROCEDURE — 0003A COVID-19, MRNA, LNP-S, PF, 30 MCG/0.3 ML DOSE VACCINE: CPT | Mod: CV19,PBBFAC | Performed by: INTERNAL MEDICINE

## 2021-10-08 PROCEDURE — 90471 PR IMMUNIZ ADMIN,1 SINGLE/COMB VAC/TOXOID: ICD-10-PCS | Mod: S$GLB,,, | Performed by: INTERNAL MEDICINE

## 2021-10-08 PROCEDURE — 90686 PR FLU VACCINE, QIIV4, NO PRSV, 0.5 ML, IM: ICD-10-PCS | Mod: S$GLB,,, | Performed by: INTERNAL MEDICINE

## 2021-10-13 DIAGNOSIS — E11.9 TYPE 2 DIABETES MELLITUS WITHOUT COMPLICATION: ICD-10-CM

## 2021-12-07 DIAGNOSIS — I49.5 SICK SINUS SYNDROME: Primary | ICD-10-CM

## 2021-12-13 ENCOUNTER — TELEPHONE (OUTPATIENT)
Dept: ELECTROPHYSIOLOGY | Facility: CLINIC | Age: 61
End: 2021-12-13
Payer: COMMERCIAL

## 2021-12-14 ENCOUNTER — CLINICAL SUPPORT (OUTPATIENT)
Dept: CARDIOLOGY | Facility: HOSPITAL | Age: 61
End: 2021-12-14
Attending: INTERNAL MEDICINE
Payer: COMMERCIAL

## 2021-12-14 ENCOUNTER — TELEPHONE (OUTPATIENT)
Dept: ELECTROPHYSIOLOGY | Facility: CLINIC | Age: 61
End: 2021-12-14

## 2021-12-14 ENCOUNTER — OFFICE VISIT (OUTPATIENT)
Dept: ELECTROPHYSIOLOGY | Facility: CLINIC | Age: 61
End: 2021-12-14
Payer: COMMERCIAL

## 2021-12-14 ENCOUNTER — HOSPITAL ENCOUNTER (OUTPATIENT)
Dept: CARDIOLOGY | Facility: HOSPITAL | Age: 61
Discharge: HOME | End: 2021-12-14
Attending: NURSE PRACTITIONER
Payer: COMMERCIAL

## 2021-12-14 VITALS
HEART RATE: 60 BPM | HEIGHT: 67 IN | BODY MASS INDEX: 24.94 KG/M2 | SYSTOLIC BLOOD PRESSURE: 118 MMHG | WEIGHT: 158.94 LBS | DIASTOLIC BLOOD PRESSURE: 77 MMHG

## 2021-12-14 VITALS
BODY MASS INDEX: 24.96 KG/M2 | DIASTOLIC BLOOD PRESSURE: 77 MMHG | HEIGHT: 67 IN | HEART RATE: 65 BPM | SYSTOLIC BLOOD PRESSURE: 118 MMHG | WEIGHT: 159 LBS

## 2021-12-14 DIAGNOSIS — I49.5 SICK SINUS SYNDROME: ICD-10-CM

## 2021-12-14 DIAGNOSIS — Z95.0 PACEMAKER: ICD-10-CM

## 2021-12-14 DIAGNOSIS — I10 HYPERTENSION, ESSENTIAL: ICD-10-CM

## 2021-12-14 DIAGNOSIS — Z95.0 PACEMAKER: Primary | ICD-10-CM

## 2021-12-14 LAB
ASCENDING AORTA: 3.15 CM
AV INDEX (PROSTH): 0.82
AV MEAN GRADIENT: 3 MMHG
AV PEAK GRADIENT: 7 MMHG
AV VALVE AREA: 2.76 CM2
AV VELOCITY RATIO: 0.76
BSA FOR ECHO PROCEDURE: 1.85 M2
CV ECHO LV RWT: 0.59 CM
DOP CALC AO PEAK VEL: 1.32 M/S
DOP CALC AO VTI: 26.77 CM
DOP CALC LVOT AREA: 3.4 CM2
DOP CALC LVOT DIAMETER: 2.07 CM
DOP CALC LVOT PEAK VEL: 1 M/S
DOP CALC LVOT STROKE VOLUME: 73.97 CM3
DOP CALCLVOT PEAK VEL VTI: 21.99 CM
E WAVE DECELERATION TIME: 156.79 MSEC
E/A RATIO: 1.27
E/E' RATIO: 7.24 M/S
ECHO LV POSTERIOR WALL: 1.22 CM (ref 0.6–1.1)
EJECTION FRACTION: 65 %
FRACTIONAL SHORTENING: 34 % (ref 28–44)
INTERVENTRICULAR SEPTUM: 0.81 CM (ref 0.6–1.1)
IVRT: 82.78 MSEC
LA MAJOR: 5.37 CM
LA MINOR: 5.4 CM
LA WIDTH: 3.62 CM
LEFT ATRIUM SIZE: 3.25 CM
LEFT ATRIUM VOLUME INDEX MOD: 32 ML/M2
LEFT ATRIUM VOLUME INDEX: 29.4 ML/M2
LEFT ATRIUM VOLUME MOD: 58.53 CM3
LEFT ATRIUM VOLUME: 53.85 CM3
LEFT INTERNAL DIMENSION IN SYSTOLE: 2.72 CM (ref 2.1–4)
LEFT VENTRICLE DIASTOLIC VOLUME INDEX: 41.07 ML/M2
LEFT VENTRICLE DIASTOLIC VOLUME: 75.16 ML
LEFT VENTRICLE MASS INDEX: 74 G/M2
LEFT VENTRICLE SYSTOLIC VOLUME INDEX: 15.1 ML/M2
LEFT VENTRICLE SYSTOLIC VOLUME: 27.63 ML
LEFT VENTRICULAR INTERNAL DIMENSION IN DIASTOLE: 4.12 CM (ref 3.5–6)
LEFT VENTRICULAR MASS: 135.94 G
LV LATERAL E/E' RATIO: 7.6 M/S
LV SEPTAL E/E' RATIO: 6.91 M/S
MV A" WAVE DURATION": 17.13 MSEC
MV PEAK A VEL: 0.6 M/S
MV PEAK E VEL: 0.76 M/S
MV STENOSIS PRESSURE HALF TIME: 45.47 MS
MV VALVE AREA P 1/2 METHOD: 4.84 CM2
PISA TR MAX VEL: 2.8 M/S
PULM VEIN S/D RATIO: 1.35
PV PEAK D VEL: 0.52 M/S
PV PEAK S VEL: 0.7 M/S
RA MAJOR: 5.03 CM
RA PRESSURE: 3 MMHG
RA WIDTH: 3.56 CM
RIGHT VENTRICULAR END-DIASTOLIC DIMENSION: 3.32 CM
RV TISSUE DOPPLER FREE WALL SYSTOLIC VELOCITY 1 (APICAL 4 CHAMBER VIEW): 15.82 CM/S
SINUS: 3.21 CM
STJ: 2.88 CM
TDI LATERAL: 0.1 M/S
TDI SEPTAL: 0.11 M/S
TDI: 0.11 M/S
TR MAX PG: 31 MMHG
TRICUSPID ANNULAR PLANE SYSTOLIC EXCURSION: 2.33 CM
TV REST PULMONARY ARTERY PRESSURE: 34 MMHG

## 2021-12-14 PROCEDURE — 93010 ELECTROCARDIOGRAM REPORT: CPT | Mod: S$GLB,,, | Performed by: INTERNAL MEDICINE

## 2021-12-14 PROCEDURE — 93280 CARDIAC DEVICE CHECK - IN CLINIC & HOSPITAL: ICD-10-PCS | Mod: 26,,, | Performed by: INTERNAL MEDICINE

## 2021-12-14 PROCEDURE — 93005 RHYTHM STRIP: ICD-10-PCS | Mod: S$GLB,,, | Performed by: NURSE PRACTITIONER

## 2021-12-14 PROCEDURE — 93306 TTE W/DOPPLER COMPLETE: CPT

## 2021-12-14 PROCEDURE — 93306 ECHO (CUPID ONLY): ICD-10-PCS | Mod: 26,,, | Performed by: INTERNAL MEDICINE

## 2021-12-14 PROCEDURE — 93306 TTE W/DOPPLER COMPLETE: CPT | Mod: 26,,, | Performed by: INTERNAL MEDICINE

## 2021-12-14 PROCEDURE — 93280 PM DEVICE PROGR EVAL DUAL: CPT

## 2021-12-14 PROCEDURE — 99214 PR OFFICE/OUTPT VISIT, EST, LEVL IV, 30-39 MIN: ICD-10-PCS | Mod: S$GLB,,, | Performed by: NURSE PRACTITIONER

## 2021-12-14 PROCEDURE — 4010F ACE/ARB THERAPY RXD/TAKEN: CPT | Mod: CPTII,S$GLB,, | Performed by: NURSE PRACTITIONER

## 2021-12-14 PROCEDURE — 4010F PR ACE/ARB THEARPY RXD/TAKEN: ICD-10-PCS | Mod: CPTII,S$GLB,, | Performed by: NURSE PRACTITIONER

## 2021-12-14 PROCEDURE — 99999 PR PBB SHADOW E&M-EST. PATIENT-LVL III: CPT | Mod: PBBFAC,,, | Performed by: NURSE PRACTITIONER

## 2021-12-14 PROCEDURE — 99214 OFFICE O/P EST MOD 30 MIN: CPT | Mod: S$GLB,,, | Performed by: NURSE PRACTITIONER

## 2021-12-14 PROCEDURE — 93005 ELECTROCARDIOGRAM TRACING: CPT | Mod: S$GLB,,, | Performed by: NURSE PRACTITIONER

## 2021-12-14 PROCEDURE — 93280 PM DEVICE PROGR EVAL DUAL: CPT | Mod: 26,,, | Performed by: INTERNAL MEDICINE

## 2021-12-14 PROCEDURE — 99999 PR PBB SHADOW E&M-EST. PATIENT-LVL III: ICD-10-PCS | Mod: PBBFAC,,, | Performed by: NURSE PRACTITIONER

## 2021-12-14 PROCEDURE — 93010 RHYTHM STRIP: ICD-10-PCS | Mod: S$GLB,,, | Performed by: INTERNAL MEDICINE

## 2021-12-17 DIAGNOSIS — I49.5 SICK SINUS SYNDROME: Primary | ICD-10-CM

## 2022-02-20 DIAGNOSIS — I10 HYPERTENSION, ESSENTIAL: ICD-10-CM

## 2022-02-20 NOTE — TELEPHONE ENCOUNTER
Care Due:                  Date            Visit Type   Department     Provider  --------------------------------------------------------------------------------                                EP                               PRIMARY      [facility] INTERNAL  Last Visit: 02-      CARE ([facility])   MEDICINE       MARTIN BEE                              [facility]                              PRIMARY      [facility] INTERNAL  Next Visit: 03-      CARE ([facility])   MEDICINE       MARTIN BEE                                                            Last  Test          Frequency    Reason                     Performed    Due Date  --------------------------------------------------------------------------------    CMP.........  12 months..  atorvastatin,              02- 01-                             hydroCHLOROthiazide,                             losartan, metFORMIN......    HBA1C.......  6 months...  metFORMIN................  02- 08-    Lipid Panel.  12 months..  atorvastatin.............  02- 01-    Powered by [email or web address]. Reference number: [id number].   2/20/2022 1:07:46 PM CST

## 2022-02-24 RX ORDER — HYDROCHLOROTHIAZIDE 12.5 MG/1
TABLET ORAL
Qty: 90 TABLET | Refills: 0 | Status: SHIPPED | OUTPATIENT
Start: 2022-02-24 | End: 2022-03-09 | Stop reason: SDUPTHER

## 2022-02-27 NOTE — DISCHARGE SUMMARY
Discharge Note  Short Stay      SUMMARY     Admit Date: 10/11/2018    Attending Physician: Josue Paredes MD      Discharge Physician: Josue Paredes MD      Discharge Date: 10/11/2018 9:44 AM    Procedure(s) (LRB):  Injection,steroid,epidural, LUMBAR L5/S1 JEAN MARIE (N/A)    Final Diagnosis: Lumbar radiculopathy [M54.16]    Disposition: Home or self care    Patient Instructions:   Current Discharge Medication List      CONTINUE these medications which have NOT CHANGED    Details   amLODIPine (NORVASC) 5 MG tablet Take 1 tablet (5 mg total) by mouth once daily.  Qty: 90 tablet, Refills: 3      ammonium lactate 12 % Crea Apply twice daily to affected parts both feet as needed.  Qty: 140 g, Refills: 11      aspirin (ECOTRIN) 81 MG EC tablet Take 81 mg by mouth once daily.      atorvastatin (LIPITOR) 20 MG tablet TAKE 1 TABLET BY MOUTH ONCE DAILY  Qty: 30 tablet, Refills: 1    Comments: Please consider 90 day supplies to promote better adherence  Associated Diagnoses: Type 2 diabetes mellitus without complication, without long-term current use of insulin      gabapentin (NEURONTIN) 300 MG capsule TAKE ONE CAPSULE BY MOUTH TWICE DAILY  Qty: 60 capsule, Refills: 5    Comments: Please consider 90 day supplies to promote better adherence  Associated Diagnoses: Diabetic polyneuropathy associated with type 2 diabetes mellitus      lisinopril-hydrochlorothiazide (PRINZIDE,ZESTORETIC) 20-12.5 mg per tablet Take 1 tablet by mouth once daily.  Qty: 90 tablet, Refills: 1    Comments: Please consider 90 day supplies to promote better adherence  Associated Diagnoses: Essential hypertension      metFORMIN (GLUCOPHAGE) 500 MG tablet TAKE ONE TABLET BY MOUTH ONCE DAILY WITH BREAKFAST  Qty: 90 tablet, Refills: 1      triamcinolone acetonide 0.1% (KENALOG) 0.1 % cream Apply topically 2 (two) times daily.  Qty: 80 g, Refills: 1                 Discharge Diagnosis: Lumbar radiculopathy [M54.16]  Condition on Discharge: Stable with no  PMHx of HTN (patient is poor historian, cannot recall taking any antihypertensive medication)  Plan:  Continue carvedilol/Coreg 12 5 mg BID  complications to procedure   Diet on Discharge: Same as before.  Activity: as per instruction sheet.  Discharge to: Home with a responsible adult.  Follow up: 2-4 weeks

## 2022-03-05 DIAGNOSIS — I10 HYPERTENSION, ESSENTIAL: ICD-10-CM

## 2022-03-05 DIAGNOSIS — E11.69 TYPE 2 DIABETES MELLITUS WITH OTHER SPECIFIED COMPLICATION, WITHOUT LONG-TERM CURRENT USE OF INSULIN: ICD-10-CM

## 2022-03-05 DIAGNOSIS — E78.5 HYPERLIPIDEMIA, UNSPECIFIED HYPERLIPIDEMIA TYPE: ICD-10-CM

## 2022-03-05 NOTE — TELEPHONE ENCOUNTER
No new care gaps identified.  Powered by [employer] by [email or web address]. Reference number: [id number].   3/05/2022 12:58:58 PM CST

## 2022-03-08 ENCOUNTER — TELEPHONE (OUTPATIENT)
Dept: INTERNAL MEDICINE | Facility: CLINIC | Age: 62
End: 2022-03-08
Payer: COMMERCIAL

## 2022-03-08 DIAGNOSIS — E78.5 HYPERLIPIDEMIA, UNSPECIFIED HYPERLIPIDEMIA TYPE: ICD-10-CM

## 2022-03-08 DIAGNOSIS — E11.69 TYPE 2 DIABETES MELLITUS WITH OTHER SPECIFIED COMPLICATION, WITHOUT LONG-TERM CURRENT USE OF INSULIN: ICD-10-CM

## 2022-03-08 DIAGNOSIS — I10 HYPERTENSION, ESSENTIAL: ICD-10-CM

## 2022-03-08 NOTE — TELEPHONE ENCOUNTER
----- Message from Britt Borges sent at 3/8/2022  1:23 PM CST -----  Contact: Self   Pt is requesting a status on refill request for amlodipine besylate 5 MG, atorvastatin calcium 20 MG , and metformin HCl 500 MG . Please advise

## 2022-03-09 RX ORDER — ATORVASTATIN CALCIUM 20 MG/1
20 TABLET, FILM COATED ORAL DAILY
Qty: 90 TABLET | Refills: 0 | Status: SHIPPED | OUTPATIENT
Start: 2022-03-09 | End: 2022-06-20

## 2022-03-09 RX ORDER — METFORMIN HYDROCHLORIDE 500 MG/1
500 TABLET ORAL
Qty: 90 TABLET | Refills: 0 | Status: SHIPPED | OUTPATIENT
Start: 2022-03-09 | End: 2022-06-15

## 2022-03-09 RX ORDER — ATORVASTATIN CALCIUM 20 MG/1
TABLET, FILM COATED ORAL
Qty: 30 TABLET | Refills: 0 | OUTPATIENT
Start: 2022-03-09

## 2022-03-09 RX ORDER — METFORMIN HYDROCHLORIDE 500 MG/1
TABLET ORAL
Qty: 90 TABLET | Refills: 0 | OUTPATIENT
Start: 2022-03-09

## 2022-03-09 RX ORDER — AMLODIPINE BESYLATE 5 MG/1
5 TABLET ORAL DAILY
Qty: 90 TABLET | Refills: 0 | Status: SHIPPED | OUTPATIENT
Start: 2022-03-09 | End: 2022-06-15

## 2022-03-09 RX ORDER — LOSARTAN POTASSIUM 50 MG/1
50 TABLET ORAL DAILY
Qty: 90 TABLET | Refills: 0 | Status: SHIPPED | OUTPATIENT
Start: 2022-03-09 | End: 2022-06-15

## 2022-03-09 RX ORDER — HYDROCHLOROTHIAZIDE 12.5 MG/1
12.5 TABLET ORAL DAILY
Qty: 90 TABLET | Refills: 0 | Status: SHIPPED | OUTPATIENT
Start: 2022-03-09 | End: 2022-06-08

## 2022-03-09 RX ORDER — AMLODIPINE BESYLATE 5 MG/1
TABLET ORAL
Qty: 30 TABLET | Refills: 0 | OUTPATIENT
Start: 2022-03-09

## 2022-03-09 NOTE — TELEPHONE ENCOUNTER
Called pt to inform him of med refills no answer LVM to call if pt have any questions or concerns

## 2022-03-09 NOTE — TELEPHONE ENCOUNTER
----- Message from Theresa Kline sent at 3/9/2022  9:40 AM CST -----  Regarding: Medication  Patient states that he needs refills on his medication, he states that the pharmacy has requested refills and so has he with no success.  Please contact patient if there are any questions regarding these refills.  He did not state which medications he was requesting.  Thanks

## 2022-05-27 ENCOUNTER — CLINICAL SUPPORT (OUTPATIENT)
Dept: CARDIOLOGY | Facility: HOSPITAL | Age: 62
End: 2022-05-27
Attending: INTERNAL MEDICINE
Payer: COMMERCIAL

## 2022-05-27 DIAGNOSIS — I49.5 SICK SINUS SYNDROME: ICD-10-CM

## 2022-05-27 DIAGNOSIS — I49.5 SICK SINUS SYNDROME: Primary | ICD-10-CM

## 2022-05-27 PROCEDURE — 93279 CARDIAC DEVICE CHECK - IN CLINIC & HOSPITAL: ICD-10-PCS | Mod: 26,,, | Performed by: INTERNAL MEDICINE

## 2022-05-27 PROCEDURE — 93279 PRGRMG DEV EVAL PM/LDLS PM: CPT | Mod: 26,,, | Performed by: INTERNAL MEDICINE

## 2022-05-27 PROCEDURE — 93279 PRGRMG DEV EVAL PM/LDLS PM: CPT

## 2022-06-05 DIAGNOSIS — I10 HYPERTENSION, ESSENTIAL: ICD-10-CM

## 2022-06-05 NOTE — TELEPHONE ENCOUNTER
Care Due:                  Date            Visit Type   Department     Provider  --------------------------------------------------------------------------------                                EP -                              PRIMARY      [facility] INTERNAL  Last Visit: 02-      [facility]   MEDICINE       MARTIN BEE  Next Visit: None Scheduled  None         None Found                                                            Last  Test          Frequency    Reason                     Performed    Due Date  --------------------------------------------------------------------------------    Office Visit  12 months..  amLODIPine, atorvastatin,   02- 01-                             hydroCHLOROthiazide,                             losartan, metFORMIN......    CMP.........  12 months..  atorvastatin,              02- 01-                             hydroCHLOROthiazide,                             losartan, metFORMIN......    HBA1C.......  6 months...  metFORMIN................  02- 08-    Lipid Panel.  12 months..  atorvastatin.............  02- 01-    [facility] Embedded Care Gaps. Reference number: [id number]. 6/05/2022   2:59:09 PM CDT

## 2022-06-06 ENCOUNTER — TELEPHONE (OUTPATIENT)
Dept: PAIN MEDICINE | Facility: CLINIC | Age: 62
End: 2022-06-06
Payer: COMMERCIAL

## 2022-06-06 NOTE — TELEPHONE ENCOUNTER
This message is for patient in regards to his/her appointment 06/07/22 at 3:40p      [facility] Policy: For the safety of all patients and staff members.     Patient Visitor policy: During this visit we're asking all patients to only have one visitor over the age of 18yrs old to accompany to be seen by  EDGARD Teague. If patient do not required assistance with their visit, we're asking all visitors to remain outside the waiting area.    Upon arriving to your schedule appointment; patients are required to wear a face mask, if patient do not have a face mask one will be provided entering the facility. If you have any questions or concerns please contact [phone]        Pt verbalized understanding and has confirmed appt

## 2022-06-06 NOTE — TELEPHONE ENCOUNTER
Refill Routing Note   Medication(s) are not appropriate for processing by [facility] for the following reason(s):      - Patient has not been seen in over 15 months by PCP  - Required laboratory values are outdated  - Patient has been seen in the ED/Hospital since the last PCP visit    [facility] action(s):  Defer Medication-related problems identified:   Requires labs  Requires appointment        Medication reconciliation completed: No     Appointments  past 12m or future 3m with PCP    Date Provider   Last Visit   2/4/2021 Jarvis Washington MD   Next Visit   Visit date not found Jarvis Washington MD   ED visits in past 90 days: 0        Note composed:2:56 PM 06/06/2022

## 2022-06-07 ENCOUNTER — TELEPHONE (OUTPATIENT)
Dept: CARDIOLOGY | Facility: CLINIC | Age: 62
End: 2022-06-07
Payer: COMMERCIAL

## 2022-06-07 ENCOUNTER — OFFICE VISIT (OUTPATIENT)
Dept: PAIN MEDICINE | Facility: CLINIC | Age: 62
End: 2022-06-07
Payer: COMMERCIAL

## 2022-06-07 VITALS
BODY MASS INDEX: 25.53 KG/M2 | RESPIRATION RATE: 18 BRPM | HEIGHT: 67 IN | HEART RATE: 63 BPM | SYSTOLIC BLOOD PRESSURE: 150 MMHG | DIASTOLIC BLOOD PRESSURE: 86 MMHG | WEIGHT: 162.69 LBS

## 2022-06-07 DIAGNOSIS — M51.36 DDD (DEGENERATIVE DISC DISEASE), LUMBAR: ICD-10-CM

## 2022-06-07 DIAGNOSIS — M54.16 LUMBAR RADICULOPATHY: ICD-10-CM

## 2022-06-07 DIAGNOSIS — M51.37 DDD (DEGENERATIVE DISC DISEASE), LUMBOSACRAL: ICD-10-CM

## 2022-06-07 DIAGNOSIS — M54.17 LUMBOSACRAL RADICULOPATHY: Primary | ICD-10-CM

## 2022-06-07 DIAGNOSIS — R00.2 PALPITATIONS: Primary | ICD-10-CM

## 2022-06-07 DIAGNOSIS — M47.816 LUMBAR SPONDYLOSIS: ICD-10-CM

## 2022-06-07 PROCEDURE — 99999 PR PBB SHADOW E&M-EST. PATIENT-LVL III: ICD-10-PCS | Mod: PBBFAC,,, | Performed by: NURSE PRACTITIONER

## 2022-06-07 PROCEDURE — 3077F PR MOST RECENT SYSTOLIC BLOOD PRESSURE >= 140 MM HG: ICD-10-PCS | Mod: CPTII,S$GLB,, | Performed by: NURSE PRACTITIONER

## 2022-06-07 PROCEDURE — 1159F MED LIST DOCD IN RCRD: CPT | Mod: CPTII,S$GLB,, | Performed by: NURSE PRACTITIONER

## 2022-06-07 PROCEDURE — 4010F ACE/ARB THERAPY RXD/TAKEN: CPT | Mod: CPTII,S$GLB,, | Performed by: NURSE PRACTITIONER

## 2022-06-07 PROCEDURE — 3008F BODY MASS INDEX DOCD: CPT | Mod: CPTII,S$GLB,, | Performed by: NURSE PRACTITIONER

## 2022-06-07 PROCEDURE — 3079F PR MOST RECENT DIASTOLIC BLOOD PRESSURE 80-89 MM HG: ICD-10-PCS | Mod: CPTII,S$GLB,, | Performed by: NURSE PRACTITIONER

## 2022-06-07 PROCEDURE — 1159F PR MEDICATION LIST DOCUMENTED IN MEDICAL RECORD: ICD-10-PCS | Mod: CPTII,S$GLB,, | Performed by: NURSE PRACTITIONER

## 2022-06-07 PROCEDURE — 3079F DIAST BP 80-89 MM HG: CPT | Mod: CPTII,S$GLB,, | Performed by: NURSE PRACTITIONER

## 2022-06-07 PROCEDURE — 3077F SYST BP >= 140 MM HG: CPT | Mod: CPTII,S$GLB,, | Performed by: NURSE PRACTITIONER

## 2022-06-07 PROCEDURE — 3008F PR BODY MASS INDEX (BMI) DOCUMENTED: ICD-10-PCS | Mod: CPTII,S$GLB,, | Performed by: NURSE PRACTITIONER

## 2022-06-07 PROCEDURE — 99213 OFFICE O/P EST LOW 20 MIN: CPT | Mod: S$GLB,,, | Performed by: NURSE PRACTITIONER

## 2022-06-07 PROCEDURE — 99213 PR OFFICE/OUTPT VISIT, EST, LEVL III, 20-29 MIN: ICD-10-PCS | Mod: S$GLB,,, | Performed by: NURSE PRACTITIONER

## 2022-06-07 PROCEDURE — 3072F LOW RISK FOR RETINOPATHY: CPT | Mod: CPTII,S$GLB,, | Performed by: NURSE PRACTITIONER

## 2022-06-07 PROCEDURE — 1160F RVW MEDS BY RX/DR IN RCRD: CPT | Mod: CPTII,S$GLB,, | Performed by: NURSE PRACTITIONER

## 2022-06-07 PROCEDURE — 4010F PR ACE/ARB THEARPY RXD/TAKEN: ICD-10-PCS | Mod: CPTII,S$GLB,, | Performed by: NURSE PRACTITIONER

## 2022-06-07 PROCEDURE — 99999 PR PBB SHADOW E&M-EST. PATIENT-LVL III: CPT | Mod: PBBFAC,,, | Performed by: NURSE PRACTITIONER

## 2022-06-07 PROCEDURE — 1160F PR REVIEW ALL MEDS BY PRESCRIBER/CLIN PHARMACIST DOCUMENTED: ICD-10-PCS | Mod: CPTII,S$GLB,, | Performed by: NURSE PRACTITIONER

## 2022-06-07 PROCEDURE — 3072F PR LOW RISK FOR RETINOPATHY: ICD-10-PCS | Mod: CPTII,S$GLB,, | Performed by: NURSE PRACTITIONER

## 2022-06-07 NOTE — H&P (VIEW-ONLY)
Chronic Pain - Established Visit    Referring Physician: No ref. provider found    Chief Complaint:   Chief Complaint   Patient presents with    Low-back Pain    Leg Pain        SUBJECTIVE: Disclaimer: This note has been generated using voice-recognition software. There may be typographical errors that have been missed during proof-reading    Interval History 6/7/2022:  The patient returns to clinic today for follow up of low back pain. He reports increased low back pain over the last two weeks. He reports low back pain that radiates into the posterior aspect of his right leg to the bottom of his foot. He denies any left leg pain. His pain is worse with prolonged standing and walking. He reports that previous TF JEAN MARIE in 2021 provided 80% relief. He did notice that it took 3-4 weeks to see full benefit. He continues to take Gabapentin. He continues to perform a home exercise routine as prescribed. He denies any weakness. He denies any other health changes. His pain today is 5/10.    Interval history 07/02/2021:  The patient presents for follow-up lower back pain and right leg radiculopathy.  He is status post repeat right-sided TF JEAN MARIE to L5/S1&S1.  He has difficulty quantifying pain relief but states that normally his leg pain is 100% resolved and this time pain persists.  He is not have any recent images.  His medication regimen includes gabapentin 300 mg states this is mildly beneficial or denies any adverse side effects of medication.  Denies any focal loss of bowel, bladder or saddle paresthesias concerning for cauda equina.    Interval history 05/27/2021:  The patient presents for follow-up of lower back pain and bilateral leg radiculopathy.  He has had prior ILESI and TFESI both with 80%  benefit lasting approximately 9 months and pain just now returning. Pain is worse to right side at this time.  He denies any new areas of pain or neurological changes.The patient denies myelopathic symptoms such as  handwriting changes or difficulty with buttons/coins/keys. Denies perineal paresthesias, bowel/bladder dysfunction.    Interval History 9/1/2020:  The patient is here for follow up of back and leg pain.  He is now s/p L5/S1 IL JEAN MARIE with 80% of back pain.  However, he is having significant right leg pain, mainly down the buttock and posterior calf.  He had benefit with right L5/S1 and S1 TF JEAN MARIE in the past and would like to repeat.  He has associated numbness to right calf, worse with walking.  He does have some benefit with Gabapentin.  He had a recent Covid test for work which was negative. His pain today is 5/10.    Interval History 6/9/2020:  The patient is here for follow up of chronic lower back pain.  We have not seen the patient since last year.  He underwent an epidural at that time and says that his pain has been very mild until a few weeks ago.  He is having pain across lower back with radiation into the legs, mainly on the left.  He continues to take gabapentin with some benefit.  The pain bothers him the most with walking and activity.  He had pacemaker replacement on 5/6/20.  He is not currently on blood thinners.  His pain today is 5/10.    Interval History 7/30/2019:  The patient returns for follow up of back pain.  He is s/p repeat left then right L3,4,5 RFAs completed on 6/24/19 with about 80% relief.  His back pain is mild.  He has had increased leg pain recently, worse on the left side.  Previous leg pain was relieved with JEAN MARIE last year.  He would like to repeat this.  His leg pain bothers him mainly at night when trying to sleep.  He stopped Gabapentin when he was not having leg pain but recently restarted.  He is unable to take at night.  His pain today is 6/10.    Interval History 5/14/2019:  The patient returns for follow up.  He previously had benefit with JEAN MARIE for leg pain and RFAs for back pain.  He is having some back pain that has been worsening over the past couple of weeks.  He has  significant pain in the morning.  He has some improvement when he moves around.  He says that cold air aggravates his pain.  He stopped Gabapentin last year when his leg pain improved.  He is not having much leg pain now.  His pain today is 6/10.    Interval History 2/7/2019:  The patient presents for check up of chronic back pain.  He reports doing well since last visit.  He feels that last JEAN MARIE is still providing him benefit.  He has not had any leg pain.  He does still have back pain but states that it is tolerable.  His morning pain is much improved from RFAs last year.  He continues to work and is active.  His pain today is 5/10.    Interval History 12/6/2018:  The patient presents today for follow up.  He continues with pain to the lower back.  He is not having leg pain at this time.  He had some relief with RFAs with the past.  He stopped Gabapentin when his leg pain improved.  He takes Tylenol sparingly with some benefit.  His pain today is 5/10.  The patient denies any bowel or bladder incontinence or signs of saddle paresthesia.  The patient denies any major medical changes since last office visit.    Interval History 10/25/2018:  The patient returns for follow up of back and leg pain.  He is s/p L5/S1 IL JEAN MARIE on 10/11/18 with 80% pain relief for his legs.  He has had increased lower back pain over the past few days which he attributes to weather changes.  He describes it as aching.  He has not been stretching.  He does walk a lot for work.  He cannot take oral NSAIDs due to pacemaker placement.  His pain today is 8/10.     Interval History 9/11/2018:  The patient presents for procedure follow up appointment.  He is s/p left then right L3,4,5 RFA completed on 8/14/18 with 80% pain relief initially.  He has had a little more pain over the past week.  He is now having intermittent radiation into the back of both legs, left greater than right.  He previously had benefit with right sided TF JEAN MARIE.  He is still  taking gabapentin.  His pain today is 5/10.    Interval History 7/25/2018:  The patient returns today for follow up of back pain.  He is s/p bilateral L3,4,5 MBB on 7/5/18 with 100% relief for 2 days.  He previously had benefit with right TF JEAN MARIE for leg pain.  He has not had right leg pain since the epidural.  However, he is reporting lateral left leg pain that has progressed over the past couple of weeks.  He continues to be active and works.  His pain today is 5/10.    Interval History 6/20/2018:  The patient returns today for follow up of lower back and right leg pain.  He is s/p right L5 and S1 TF JEAN MARIE on 6/5/18 with 100% relief of right leg pain.  He has not had any leg pain since the procedure.  He continues to report pain across the lower back.  This is always worse first thing in the morning.  He states that this feels aching and throbbing in nature.  He did have recent lumbar CT scan.  He would like to discuss further procedures.  He continues to work and be active.  His pain today is 5/10.    Interval History 5/22/2018:  The patient returns for follow up of back pain.  He is having radiation down there back of the right leg to the posterior calf.  The back pain is most severe in the morning and he states that this feels like a stiffness.  This improves when he walks.  He has severe leg pain that is intermittent, mainly with activity.  This is his biggest complaint.  He did complete the medrol dose pack recently with limited improvement.  He had XRAYs which show severe loss of disc space at L5/S1.  He has not had a CT scan.  He is taking Gabapentin 300 mg at bedtime.  It is written BID but it makes him drowsy during the day.  He has some benefit with Aleve but has been told to avoid NSAIDs due to history of pacemaker placement.  His pain today is 5/10.     Initial encounter:    Renny TOÑO Young presents to the clinic for the evaluation of lower back and right leg pain. The pain started two months ago and  symptoms have been worsening.    Brief history:    Pain Description:    The pain is located in the lower back and right leg area in the L5/S1 distribution.      At BEST  5/10     At WORST  7/10 on the WORST day.      On average pain is rated as 5/10.     Today the pain is rated as 5/10    The pain is described as aching      Symptoms interfere with daily activity.     Exacerbating factors: Standing and Morning.      Mitigating factors medications.     Patient denies night fever/night sweats, urinary incontinence, bowel incontinence, significant weight loss, significant motor weakness and loss of sensations.  Patient denies any suicidal or homicidal ideations    Pain Medications:  Current:  OTC Tylenol PRN    Tried in Past:  NSAIDs - Aleve  TCA -Never  SNRI -Never  Anti-convulsants - Gabapentin   Muscle Relaxants -Never  Opioids-Never    Physical Therapy/Home Exercise: yes     report:  Reviewed and consistent with medication use as prescribed.    Pain Procedures:   6/5/18 Right L5 and S1 TF JEAN MARIE- significant relief  7/5/18 Bilateral L3,4,5 MBB- 100% relief for 2 days  7/31/18 Left L3,4,5 RFA- 80% relief  8/14/18 Right L3,4,5 RFA- 80% relief  10/11/18 L5/S1 IL JEAN MARIE- 80% relief  6/11/19 Left L3,4,5 RFA- 80% relief  6/25/19 Right L3,4,5 RFA- 80% relief  8/13/19 L5/S1 IL JEAN MARIE- 90% relief for 9 months  6/16/20 L5/S1 IL JEAN MARIE- 80% relief of back pain  6/15/2021- Right L5/S1 and S1 TF JEAN MARIE    Chiropractor -never  Acupuncture - never  TENS unit -never  Spinal decompression -never  Joint replacement -never    Imaging:   CT Lumbar Spine 7/9/2021:  COMPARISON:  Lumbar spine CT May 28, 2018     FINDINGS:  Mild levocurvature of the lumbar spine.  No listhesis.  There is no acute fracture.  The vertebral bodies are normal in height without compression fractures. Posterior elements are intact. There is unchanged severe disc height loss at the L5-S1 level with associated sclerosis and subchondral cystic change within the endplates.   Mild atherosclerotic calcification within the abdominal aorta which is not aneurysmal.  Otherwise, no soft tissue abnormality identified.     T12-L1: The disc is normal in configuration.  There is no facet arthropathy.  There is no neural foraminal stenosis.  There is no spinal canal stenosis.     L1-L2: The disc is normal in configuration.  There is no facet arthropathy.  There is no neuroforaminal stenosis.  There is no spinal canal stenosis.     L2-L3: The disc is normal in configuration.  There is no facet arthropathy.  There is no neuroforaminal stenosis.  There is no spinal canal stenosis.     L3-L4: Minimal diffuse disc bulge.  Mild bilateral facet arthropathy.  There is no neuroforaminal stenosis.  There is no spinal canal stenosis.     L4-L5: There is a diffuse disc bulge.  Mild-to-moderate bilateral facet arthropathy.  There is no neuroforaminal stenosis.  There is no spinal canal stenosis.     L5-S1: There is a circumferential disc osteophyte complex secondary to the severe degenerative disc disease at this level.  Mild to moderate bilateral facet arthropathy.  Unchanged mild bilateral neural foraminal stenosis secondary to diffuse disc osteophyte complex as well as the facet arthropathy.  There is no spinal canal stenosis.     Impression:     Inferior lumbar spine degenerative changes worst at L5-S1 which result in mild bilateral neural foraminal stenosis at this level.  Overall, findings are not significantly changed compared to prior study from May 2018.        Past Medical History:   Diagnosis Date    Colon polyp     Diabetes mellitus, type 2     Hypertension     Pacemaker      Past Surgical History:   Procedure Laterality Date    COLONOSCOPY N/A 2/13/2017    Procedure: COLONOSCOPY;  Surgeon: NILDA Juares MD;  [facility] ([address] [place] [address]);  Service: Endoscopy;  Laterality: N/A;  Do not cancel this order. Patient has Pacemaker in place.     EPIDURAL STEROID INJECTION N/A 8/13/2019     Procedure: INJECTION, STEROID, EPIDURAL IL, L5/S1;  Surgeon: Josue Paredes MD;  Location: [facility];  Service: Pain Management;  Laterality: N/A;  IL JEAN MARIE L5/S1    EPIDURAL STEROID INJECTION N/A 6/16/2020    Procedure: INJECTION, STEROID, EPIDURAL, L5-S1 IL add on @ 2 ok by  Asia;  Surgeon: Josue Paredes MD;  Location: [facility];  Service: Pain Management;  Laterality: N/A;    HERNIA REPAIR      INJECTION OF ANESTHETIC AGENT AROUND NERVE Bilateral 7/5/2018    Procedure: BLOCK, NERVE;  Surgeon: Krystal Mtz MD;  Location: [facility];  Service: Pain Management;  Laterality: Bilateral;  Lumbar Bilateral L3-L4-L5 Medial Branch Block    RADIOFREQUENCY ABLATION Left 6/11/2019    Procedure: RADIOFREQUENCY ABLATION, L3-L4-L5 MEDIAL BRANCH   1 OF 2;  Surgeon: Josue Paredes MD;  Location: [facility];  Service: Pain Management;  Laterality: Left;    RADIOFREQUENCY ABLATION Right 6/25/2019    Procedure: RADIOFREQUENCY ABLATION, L3-L4-L5 MEDIAL BRANCH JING 2 OF 2;  Surgeon: Josue Paredes MD;  Location: [facility];  Service: Pain Management;  Laterality: Right;    REPLACEMENT OF PACEMAKER GENERATOR Left 5/6/2020    Procedure: REPLACEMENT, PULSE GENERATOR, CARDIAC PACEMAKER;  Surgeon: Bradford Spence MD;  Location: [facility] EP LAB;  Service: Cardiology;  Laterality: Left;  EOL/LEAD Mlfx, Gen Change, Poss RA lead rev, SJM, MAC, GP, 3 PREP    REVISION OF PROCEDURE INVOLVING PACEMAKER LEAD Left 5/6/2020    Procedure: REVISION, ELECTRODE LEAD, CARDIAC PACEMAKER;  Surgeon: Bradford Spence MD;  Location: [facility] EP LAB;  Service: Cardiology;  Laterality: Left;    TRANSFORAMINAL EPIDURAL INJECTION OF STEROID Right 6/5/2018    Procedure: INJECTION-STEROID-EPIDURAL-TRANSFORAMINAL;  Surgeon: Josue Paredes MD;  Location: [facility];  Service: Pain Management;  Laterality: Right;  LUMBAR RIGHT L5 AND S1 TRANSFORAMINL JEAN MARIE  [id number]    W/ SEDATION     TRANSFORAMINAL EPIDURAL INJECTION OF STEROID  Right 9/10/2020    Procedure: INJECTION, STEROID, EPIDURAL, TRANSFORAMINAL APPROACH, L5-S1 AND S1;  Surgeon: Josue Paredes MD;  Location: [facility] PAIN [facility]GT;  Service: Pain Management;  Laterality: Right;    TRANSFORAMINAL EPIDURAL INJECTION OF STEROID Right 6/15/2021    Procedure: INJECTION, STEROID, EPIDURAL, TRANSFORAMINAL APPROACH L5-S1 AND S1 need consent;  Surgeon: Josue Paredes MD;  Location: [facility]IN [facility]GT;  Service: Pain Management;  Laterality: Right;     Social History     Socioeconomic History    Marital status:     Number of children: 3   Occupational History    Occupation:    Tobacco Use    Smoking status: Current Every Day Smoker     Packs/day: 0.50     Types: Cigarettes    Smokeless tobacco: Never Used   Substance and Sexual Activity    Alcohol use: Yes     Comment: Beer- Socially    Drug use: No    Sexual activity: Yes     Family History   Problem Relation Age of Onset    Diabetes Mother     Diabetes Father     Kidney disease Father     Melanoma Neg Hx        Review of patient's allergies indicates:  No Known Allergies    Current Outpatient Medications   Medication Sig    amLODIPine (NORVASC) 5 MG tablet Take 1 tablet (5 mg total) by mouth once daily.    aspirin (ECOTRIN) 81 MG EC tablet Take 81 mg by mouth once daily.    atorvastatin (LIPITOR) 20 MG tablet Take 1 tablet (20 mg total) by mouth once daily.    gabapentin (NEURONTIN) 300 MG capsule Take 1 capsule by mouth twice daily    hydroCHLOROthiazide (HYDRODIURIL) 12.5 MG Tab Take 1 tablet (12.5 mg total) by mouth once daily.    losartan (COZAAR) 50 MG tablet Take 1 tablet (50 mg total) by mouth once daily.    metFORMIN (GLUCOPHAGE) 500 MG tablet Take 1 tablet (500 mg total) by mouth daily with breakfast.    podofilox (CONDYLOX) 0.5 % gel Apply topically 2 (two) times daily.     No current facility-administered medications for this visit.       REVIEW OF SYSTEMS:    GENERAL:  No weight loss, malaise or  "fevers.  HEENT:   No recent changes in vision or hearing  NECK:  Negative for lumps, no difficulty with swallowing.  RESPIRATORY:  Negative for cough, wheezing or shortness of breath, patient denies any recent URI.  CARDIOVASCULAR:  Negative for chest pain, leg swelling or palpitations. Pacemaker for SSS.  GI:  Negative for abdominal discomfort, blood in stools or black stools or change in bowel habits.  MUSCULOSKELETAL:  See HPI.  SKIN:  Negative for lesions, rash, and itching.  PSYCH:  No mood disorder or recent psychosocial stressors.  Patients sleep is not disturbed secondary to pain.  HEMATOLOGY/LYMPHOLOGY:  Negative for prolonged bleeding, bruising easily or swollen nodes.  Patient is not currently taking any anti-coagulants  ENDO: DM2 on metformin  NEURO:   No history of headaches, syncope, paralysis, seizures or tremors.  All other reviewed and negative other than HPI.    OBJECTIVE:    BP (!) 150/86 (BP Location: Left arm, Patient Position: Sitting, BP Method: Small (Automatic))   Pulse 63   Resp 18   Ht 5' 7" (1.702 m)   Wt 73.8 kg (162 lb 11.2 oz)   BMI 25.48 kg/m²     PHYSICAL EXAMINATION:    GENERAL: Well appearing, in no acute distress, alert and oriented x3.  PSYCH:  Mood and affect appropriate.  SKIN: Skin color, texture, turgor normal, no rashes or lesions.  HEAD/FACE:  Normocephalic, atraumatic. Cranial nerves grossly intact.  CV: RRR with palpation of the radial artery.  PULM: No evidence of respiratory difficulty, symmetric chest rise.  BACK: Straight leg raising in the sitting position is positive to radicular pain on the right L5 and S1 distribution, negative on the left. There is mild pain with palpation over the facet joints of the lumbar spine bilaterally. Limited ROM with pain on flexion and extension. Positive facet loading bilaterally.   EXTREMITIES: No deformities, edema, or skin discoloration. Good capillary refill.  MUSCULOSKELETAL:There is no pain with palpation over the " sacroiliac joints bilaterally. There is no pain to palpation over the greater trochanteric bursa bilaterally. FABERs test is negative.  FADIRs test is negative.  5/5 strength in right ankle with plantar and dorsiflexion, 5/5 strength in left ankle with plantar and dorsiflexion, 5/5 strength with right knee flexion extension, 5/5 strength with knee flexion extension on the left.  No atrophy or tone abnormalities are noted.  NEURO: Bilateral lower extremity coordination and muscle stretch reflexes are physiologic and symmetric.  Plantar response are downgoing. No clonus.  Decreased sensation to RLE at L5 distribution.  GAIT: Antalgic- ambulates without assistance.      ASSESSMENT: 61 y.o. year old male with lower back pain, consistent with the following diagnoses:    Encounter Diagnoses   Name Primary?    Lumbosacral radiculopathy Yes    Lumbar radiculopathy     Lumbar spondylosis     DDD (degenerative disc disease), lumbosacral     DDD (degenerative disc disease), lumbar        PLAN:     - We discussed that Dr. Paredes has left [facility]. I will have him establish care with Dr. Mtz.     - Previous imaging was reviewed and discussed with the patient today.    - Schedule for right L5/S1 and S1 TF JEAN MARIE. Previous TF JEAN MARIE provided 80% relief.     - Continue Gabapentin.    - The patient will continue a home exercise routine to help with pain and strengthening.    - RTC 2 weeks after above procedure.       The above plan and management options were discussed at length with patient. Patient is in agreement with the above and verbalized understanding.     Krissy Garica  06/07/2022

## 2022-06-08 ENCOUNTER — TELEPHONE (OUTPATIENT)
Dept: ADMINISTRATIVE | Facility: OTHER | Age: 62
End: 2022-06-08
Payer: COMMERCIAL

## 2022-06-08 ENCOUNTER — TELEPHONE (OUTPATIENT)
Dept: PAIN MEDICINE | Facility: CLINIC | Age: 62
End: 2022-06-08
Payer: COMMERCIAL

## 2022-06-08 RX ORDER — HYDROCHLOROTHIAZIDE 12.5 MG/1
TABLET ORAL
Qty: 90 TABLET | Refills: 0 | Status: SHIPPED | OUTPATIENT
Start: 2022-06-08 | End: 2022-07-26 | Stop reason: SDUPTHER

## 2022-06-08 NOTE — TELEPHONE ENCOUNTER
----- Message from Vik Rodriguez sent at 6/8/2022  3:34 PM CDT -----  Name of Who is Calling: ANDRES MONTERROSO        What is the request in detail: The patient is calling to schedule an procedure. Please advise          Can the clinic reply by [facility]: No         What [facility]: [phone]

## 2022-06-09 ENCOUNTER — TELEPHONE (OUTPATIENT)
Dept: PAIN MEDICINE | Facility: CLINIC | Age: 62
End: 2022-06-09
Payer: COMMERCIAL

## 2022-06-09 ENCOUNTER — TELEPHONE (OUTPATIENT)
Dept: ADMINISTRATIVE | Facility: OTHER | Age: 62
End: 2022-06-09
Payer: COMMERCIAL

## 2022-06-09 NOTE — TELEPHONE ENCOUNTER
----- Message from Lizabeth Foss sent at 6/9/2022  1:48 PM CDT -----  Contact: ANDRES MONTERROSO [[id number]]  Type:  Patient Returning Call      Who Called:  ANDRES MONTERROSO [[id number]]      Who Left Message for Patient: Terri Dumont      Does the patient know what this is regarding?: Yes      Would the patient rather a call back or a response via [facility]?  Call back       Best Call Back Number: [phone]      Additional Information:

## 2022-06-14 DIAGNOSIS — I10 HYPERTENSION, ESSENTIAL: ICD-10-CM

## 2022-06-14 DIAGNOSIS — E11.69 TYPE 2 DIABETES MELLITUS WITH OTHER SPECIFIED COMPLICATION, WITHOUT LONG-TERM CURRENT USE OF INSULIN: ICD-10-CM

## 2022-06-14 NOTE — TELEPHONE ENCOUNTER
No new care gaps identified.  [facility] Embedded Care Gaps. Reference number: [id number]. 6/14/2022   6:57:09 PM CDT

## 2022-06-15 RX ORDER — METFORMIN HYDROCHLORIDE 500 MG/1
TABLET ORAL
Qty: 90 TABLET | Refills: 0 | Status: SHIPPED | OUTPATIENT
Start: 2022-06-15 | End: 2022-07-26 | Stop reason: SDUPTHER

## 2022-06-15 RX ORDER — LOSARTAN POTASSIUM 50 MG/1
TABLET ORAL
Qty: 90 TABLET | Refills: 0 | Status: SHIPPED | OUTPATIENT
Start: 2022-06-15 | End: 2022-07-26 | Stop reason: SDUPTHER

## 2022-06-15 RX ORDER — AMLODIPINE BESYLATE 5 MG/1
TABLET ORAL
Qty: 90 TABLET | Refills: 0 | Status: SHIPPED | OUTPATIENT
Start: 2022-06-15 | End: 2022-07-26 | Stop reason: SDUPTHER

## 2022-06-15 NOTE — TELEPHONE ENCOUNTER
Refill Routing Note   Medication(s) are not appropriate for processing by [facility] for the following reason(s):      - Patient has not been seen in over 15 months by PCP  - Required vitals are abnormal  - Patient has been seen in the ED/Hospital since the last PCP visit    [facility] action(s):  Defer          Medication reconciliation completed: No     Appointments  past 12m or future 3m with PCP    Date Provider   Last Visit   2/4/2021 Jarvis Washington MD   Next Visit   6/23/2022 Jarvis Washington MD   ED visits in past 90 days: 0        Note composed:8:09 PM 06/14/2022

## 2022-06-19 DIAGNOSIS — E78.5 HYPERLIPIDEMIA, UNSPECIFIED HYPERLIPIDEMIA TYPE: ICD-10-CM

## 2022-06-19 DIAGNOSIS — I10 HYPERTENSION, ESSENTIAL: ICD-10-CM

## 2022-06-19 NOTE — TELEPHONE ENCOUNTER
No new care gaps identified.  [facility] Embedded Care Gaps. Reference number: [id number]. 6/19/2022   3:39:58 PM CDT

## 2022-06-20 ENCOUNTER — HOSPITAL ENCOUNTER (OUTPATIENT)
Facility: OTHER | Age: 62
Discharge: HOME | End: 2022-06-20
Attending: ANESTHESIOLOGY | Admitting: ANESTHESIOLOGY
Payer: COMMERCIAL

## 2022-06-20 VITALS
OXYGEN SATURATION: 96 % | HEART RATE: 64 BPM | HEIGHT: 67 IN | RESPIRATION RATE: 18 BRPM | WEIGHT: 160 LBS | TEMPERATURE: 99 F | BODY MASS INDEX: 25.11 KG/M2 | SYSTOLIC BLOOD PRESSURE: 132 MMHG | DIASTOLIC BLOOD PRESSURE: 80 MMHG

## 2022-06-20 DIAGNOSIS — M54.17 LUMBOSACRAL RADICULOPATHY: ICD-10-CM

## 2022-06-20 DIAGNOSIS — M51.36 DDD (DEGENERATIVE DISC DISEASE), LUMBAR: ICD-10-CM

## 2022-06-20 DIAGNOSIS — M54.16 LUMBAR RADICULOPATHY: Primary | ICD-10-CM

## 2022-06-20 DIAGNOSIS — G89.29 CHRONIC PAIN: ICD-10-CM

## 2022-06-20 LAB — POCT GLUCOSE: 111 MG/DL (ref 70–110)

## 2022-06-20 PROCEDURE — 64483 NJX AA&/STRD TFRM EPI L/S 1: CPT | Mod: RT,,, | Performed by: ANESTHESIOLOGY

## 2022-06-20 PROCEDURE — 64483 PR EPIDURAL INJ, ANES/STEROID, TRANSFORAMINAL, LUMB/SACR, SNGL LEVL: ICD-10-PCS | Mod: RT,,, | Performed by: ANESTHESIOLOGY

## 2022-06-20 PROCEDURE — 25000003 PHARM REV CODE 250: Performed by: ANESTHESIOLOGY

## 2022-06-20 PROCEDURE — 64484 PRA INJECT ANES/STEROID FORAMEN LUMBAR/SACRAL W IMG GUIDE ,EA ADD LEVEL: ICD-10-PCS | Mod: RT,,, | Performed by: ANESTHESIOLOGY

## 2022-06-20 PROCEDURE — 64483 NJX AA&/STRD TFRM EPI L/S 1: CPT | Mod: RT | Performed by: ANESTHESIOLOGY

## 2022-06-20 PROCEDURE — 63600175 PHARM REV CODE 636 W HCPCS: Performed by: ANESTHESIOLOGY

## 2022-06-20 PROCEDURE — 82947 ASSAY GLUCOSE BLOOD QUANT: CPT | Performed by: ANESTHESIOLOGY

## 2022-06-20 PROCEDURE — 64484 NJX AA&/STRD TFRM EPI L/S EA: CPT | Mod: RT,,, | Performed by: ANESTHESIOLOGY

## 2022-06-20 PROCEDURE — 25500020 PHARM REV CODE 255: Performed by: ANESTHESIOLOGY

## 2022-06-20 PROCEDURE — 64484 NJX AA&/STRD TFRM EPI L/S EA: CPT | Mod: RT | Performed by: ANESTHESIOLOGY

## 2022-06-20 RX ORDER — LIDOCAINE HYDROCHLORIDE 10 MG/ML
INJECTION, SOLUTION EPIDURAL; INFILTRATION; INTRACAUDAL; PERINEURAL
Status: DISCONTINUED | OUTPATIENT
Start: 2022-06-20 | End: 2022-06-20 | Stop reason: HOSPADM

## 2022-06-20 RX ORDER — LIDOCAINE HYDROCHLORIDE 20 MG/ML
INJECTION, SOLUTION INFILTRATION; PERINEURAL
Status: DISCONTINUED | OUTPATIENT
Start: 2022-06-20 | End: 2022-06-20 | Stop reason: HOSPADM

## 2022-06-20 RX ORDER — DEXAMETHASONE SODIUM PHOSPHATE 10 MG/ML
INJECTION INTRAMUSCULAR; INTRAVENOUS
Status: DISCONTINUED | OUTPATIENT
Start: 2022-06-20 | End: 2022-06-20 | Stop reason: HOSPADM

## 2022-06-20 RX ORDER — MIDAZOLAM HYDROCHLORIDE 1 MG/ML
INJECTION INTRAMUSCULAR; INTRAVENOUS
Status: DISCONTINUED | OUTPATIENT
Start: 2022-06-20 | End: 2022-06-20 | Stop reason: HOSPADM

## 2022-06-20 RX ORDER — SODIUM CHLORIDE 9 MG/ML
500 INJECTION, SOLUTION INTRAVENOUS CONTINUOUS
Status: CANCELLED | OUTPATIENT
Start: 2022-06-20

## 2022-06-20 RX ORDER — FENTANYL CITRATE 50 UG/ML
INJECTION, SOLUTION INTRAMUSCULAR; INTRAVENOUS
Status: DISCONTINUED | OUTPATIENT
Start: 2022-06-20 | End: 2022-06-20 | Stop reason: HOSPADM

## 2022-06-20 RX ORDER — ATORVASTATIN CALCIUM 20 MG/1
TABLET, FILM COATED ORAL
Qty: 90 TABLET | Refills: 0 | Status: SHIPPED | OUTPATIENT
Start: 2022-06-20 | End: 2022-07-26 | Stop reason: SDUPTHER

## 2022-06-20 RX ORDER — AMLODIPINE BESYLATE 5 MG/1
TABLET ORAL
Qty: 90 TABLET | Refills: 0 | OUTPATIENT
Start: 2022-06-20

## 2022-06-20 NOTE — DISCHARGE INSTRUCTIONS

## 2022-06-20 NOTE — TELEPHONE ENCOUNTER
Refill Routing Note   Medication(s) are not appropriate for processing by [facility] for the following reason(s):      - Patient has not been seen in over 15 months by PCP  - Required laboratory values are outdated    [facility] action(s):  Quick Discontinue  Defer Medication-related problems identified:   Requires labs  Requires appointment     Medication Therapy Plan: QDC norvasc - request responded. Defer lipitor - overdue OV and labs.  Medication reconciliation completed: No     Appointments  past 12m or future 3m with PCP    Date Provider   Last Visit   2/4/2021 Jarvis Washington MD   Next Visit   6/23/2022 Jarvis Washington MD   ED visits in past 90 days: 0        Note composed:3:02 PM 06/20/2022

## 2022-06-20 NOTE — DISCHARGE SUMMARY
Discharge Note  Short Stay      SUMMARY     Admit Date: 6/20/2022    Attending Physician: Krystal Mtz      Discharge Physician: Krystal Mtz      Discharge Date: 6/20/2022 4:07 PM    Procedure(s) (LRB):  INJECTION, STEROID, EPIDURAL, TRANSFORAMINAL APPROACH, RIGHT L5-S1 AND S1 CONTRAST (Right)    Final Diagnosis: Lumbosacral radiculopathy [M54.17]    Disposition: Home or self care    Patient Instructions:   Current Discharge Medication List      CONTINUE these medications which have NOT CHANGED    Details   amLODIPine (NORVASC) 5 MG tablet Take 1 tablet by mouth once daily  Qty: 90 tablet, Refills: 0    Associated Diagnoses: Hypertension, essential      aspirin (ECOTRIN) 81 MG EC tablet Take 81 mg by mouth once daily.      atorvastatin (LIPITOR) 20 MG tablet Take 1 tablet (20 mg total) by mouth once daily.  Qty: 90 tablet, Refills: 0    Comments: Please delete all prior scripts with same name and strength including on holds.  Associated Diagnoses: Hyperlipidemia, unspecified hyperlipidemia type      gabapentin (NEURONTIN) 300 MG capsule Take 1 capsule by mouth twice daily  Qty: 60 capsule, Refills: 0    Associated Diagnoses: Diabetic polyneuropathy associated with type 2 diabetes mellitus      hydroCHLOROthiazide (HYDRODIURIL) 12.5 MG Tab Take 1 tablet by mouth once daily  Qty: 90 tablet, Refills: 0    Associated Diagnoses: Hypertension, essential      losartan (COZAAR) 50 MG tablet Take 1 tablet by mouth once daily  Qty: 90 tablet, Refills: 0    Associated Diagnoses: Hypertension, essential      metFORMIN (GLUCOPHAGE) 500 MG tablet Take 1 tablet by mouth once daily with breakfast  Qty: 90 tablet, Refills: 0    Associated Diagnoses: Type 2 diabetes mellitus with other specified complication, without long-term current use of insulin      podofilox (CONDYLOX) 0.5 % gel Apply topically 2 (two) times daily.  Qty: 3.5 g, Refills: 2    Associated Diagnoses: Condyloma                 Discharge Diagnosis: Lumbosacral  radiculopathy [M54.17]  Condition on Discharge: Stable with no complications to procedure   Diet on Discharge: Same as before.  Activity: as per instruction sheet.  Discharge to: Home with a responsible adult.  Follow up: 2-4 weeks       Please call my office or pager at [phone] if experienced any weakness or loss of sensation, fever > 101.5, pain uncontrolled with oral medications, persistent nausea/vomiting/or diarrhea, redness or drainage from the incisions, or any other worrisome concerns. If physician on call was not reached or could not communicate with our office for any reason please go to the nearest emergency department

## 2022-06-20 NOTE — OP NOTE
Lumbar Transforaminal Epidural Steroid Injection under Fluoroscopic Guidance    The procedure, risks, benefits, and options were discussed with the patient. There are no contraindications to the procedure. The patent expressed understanding and agreed to the procedure. Informed written consent was obtained prior to the start of the procedure and can be found in the patient's chart.    PATIENT NAME: Renny Young   MRN: [id number]     DATE OF PROCEDURE: 06/20/2022    PROCEDURE:  Right  L5/S1 and S1 Lumbar Transforaminal Epidural Steroid Injection under Fluoroscopic Guidance    PRE-OP DIAGNOSIS: Lumbosacral radiculopathy [M54.17] Lumbar radiculopathy [M54.16]    POST-OP DIAGNOSIS: Same    PHYSICIAN: Krystal Mtz MD    ASSISTANTS: Lilia Snider DO [employer] Pain Fellow  Claudio Leon MD PGY5 Pain fellow     MEDICATIONS INJECTED: Preservative-free Decadron 10mg with 5cc of Lidocaine 1% MPF     LOCAL ANESTHETIC INJECTED: Xylocaine 2%     SEDATION:   Versed 2mg and Fentanyl 25mcg                                                                                                                                                                                     Conscious sedation ordered by M.D. Patient re-evaluation prior to administration of conscious sedation. No changes noted in patient's status from initial evaluation. The patient's vital signs were monitored by RN and patient remained hemodynamically stable throughout the procedure.    Event Time In   Sedation Start 1558   Sedation End 1606       ESTIMATED BLOOD LOSS: None    COMPLICATIONS: None    TECHNIQUE: Time-out was performed to identify the patient and procedure to be performed. With the patient laying in a prone position, the surgical area was prepped and draped in the usual sterile fashion using ChloraPrep and a fenestrated drape.The levels were determined under fluoroscopy guidance. Skin anesthesia was achieved by injecting Lidocaine 2% over the injection sites.  The transforaminal spaces were then approached with a 22 gauge, 3.5 inch spinal quinke needle that was introduced under fluoroscopic guidance in the AP and Lateral views. Once the needle tip was in the area of the transforaminal space, and there was no blood, CSF or paraesthesias, contrast dye Omnipaque (240mg/mL) was injected to confirm placement and there was no vascular runoff. Fluoroscopic imaging in the AP and lateral views revealed a clear outline of the spinal nerve with proximal spread of agent through the neural foramen into the epidural space. 3 mL of the medication mixture listed above was injected slowly at each site. Displacement of the radio opaque contrast after injection of the medication confirmed that the medication went into the area of the transforaminal spaces. The needles were removed and bleeding was nil. A sterile dressing was applied. No specimens collected. The patient tolerated the procedure well.     PAIN BEFORE THE PROCEDURE: 7/10    PAIN AFTER THE PROCEDURE: 0/10    The patient was monitored after the procedure in the recovery area. They were given post-procedure and discharge instructions to follow at home. The patient was discharged in a stable condition.      Krystal Mtz MD

## 2022-06-27 ENCOUNTER — HOSPITAL ENCOUNTER (OUTPATIENT)
Dept: CARDIOLOGY | Facility: CLINIC | Age: 62
Discharge: HOME | End: 2022-06-27
Payer: COMMERCIAL

## 2022-06-27 ENCOUNTER — OFFICE VISIT (OUTPATIENT)
Dept: CARDIOLOGY | Facility: CLINIC | Age: 62
End: 2022-06-27
Payer: COMMERCIAL

## 2022-06-27 VITALS
HEIGHT: 67 IN | SYSTOLIC BLOOD PRESSURE: 154 MMHG | OXYGEN SATURATION: 97 % | HEART RATE: 78 BPM | DIASTOLIC BLOOD PRESSURE: 79 MMHG | BODY MASS INDEX: 25.08 KG/M2 | WEIGHT: 159.81 LBS

## 2022-06-27 DIAGNOSIS — Z95.0 PACEMAKER: ICD-10-CM

## 2022-06-27 DIAGNOSIS — E78.49 OTHER HYPERLIPIDEMIA: ICD-10-CM

## 2022-06-27 DIAGNOSIS — I10 HYPERTENSION, ESSENTIAL: ICD-10-CM

## 2022-06-27 DIAGNOSIS — E11.9 TYPE 2 DIABETES MELLITUS WITHOUT COMPLICATION, UNSPECIFIED WHETHER LONG TERM INSULIN USE: ICD-10-CM

## 2022-06-27 DIAGNOSIS — R07.9 CHEST PAIN, UNSPECIFIED TYPE: Primary | ICD-10-CM

## 2022-06-27 DIAGNOSIS — I49.5 SICK SINUS SYNDROME: ICD-10-CM

## 2022-06-27 DIAGNOSIS — F17.200 SMOKER: ICD-10-CM

## 2022-06-27 DIAGNOSIS — R00.2 PALPITATIONS: ICD-10-CM

## 2022-06-27 PROCEDURE — 1160F RVW MEDS BY RX/DR IN RCRD: CPT | Mod: CPTII,S$GLB,, | Performed by: PHYSICIAN ASSISTANT

## 2022-06-27 PROCEDURE — 99999 PR PBB SHADOW E&M-EST. PATIENT-LVL IV: ICD-10-PCS | Mod: PBBFAC,,, | Performed by: PHYSICIAN ASSISTANT

## 2022-06-27 PROCEDURE — 3078F PR MOST RECENT DIASTOLIC BLOOD PRESSURE < 80 MM HG: ICD-10-PCS | Mod: CPTII,S$GLB,, | Performed by: PHYSICIAN ASSISTANT

## 2022-06-27 PROCEDURE — 99214 OFFICE O/P EST MOD 30 MIN: CPT | Mod: 25,S$GLB,, | Performed by: PHYSICIAN ASSISTANT

## 2022-06-27 PROCEDURE — 93010 ELECTROCARDIOGRAM REPORT: CPT | Mod: S$GLB,,, | Performed by: INTERNAL MEDICINE

## 2022-06-27 PROCEDURE — 93010 EKG 12-LEAD: ICD-10-PCS | Mod: S$GLB,,, | Performed by: INTERNAL MEDICINE

## 2022-06-27 PROCEDURE — 3077F PR MOST RECENT SYSTOLIC BLOOD PRESSURE >= 140 MM HG: ICD-10-PCS | Mod: CPTII,S$GLB,, | Performed by: PHYSICIAN ASSISTANT

## 2022-06-27 PROCEDURE — 4010F PR ACE/ARB THEARPY RXD/TAKEN: ICD-10-PCS | Mod: CPTII,S$GLB,, | Performed by: PHYSICIAN ASSISTANT

## 2022-06-27 PROCEDURE — 3078F DIAST BP <80 MM HG: CPT | Mod: CPTII,S$GLB,, | Performed by: PHYSICIAN ASSISTANT

## 2022-06-27 PROCEDURE — 93005 ELECTROCARDIOGRAM TRACING: CPT | Mod: S$GLB,,, | Performed by: PHYSICIAN ASSISTANT

## 2022-06-27 PROCEDURE — 3072F PR LOW RISK FOR RETINOPATHY: ICD-10-PCS | Mod: CPTII,S$GLB,, | Performed by: PHYSICIAN ASSISTANT

## 2022-06-27 PROCEDURE — 93005 EKG 12-LEAD: ICD-10-PCS | Mod: S$GLB,,, | Performed by: PHYSICIAN ASSISTANT

## 2022-06-27 PROCEDURE — 1159F MED LIST DOCD IN RCRD: CPT | Mod: CPTII,S$GLB,, | Performed by: PHYSICIAN ASSISTANT

## 2022-06-27 PROCEDURE — 3008F BODY MASS INDEX DOCD: CPT | Mod: CPTII,S$GLB,, | Performed by: PHYSICIAN ASSISTANT

## 2022-06-27 PROCEDURE — 99999 PR PBB SHADOW E&M-EST. PATIENT-LVL IV: CPT | Mod: PBBFAC,,, | Performed by: PHYSICIAN ASSISTANT

## 2022-06-27 PROCEDURE — 3077F SYST BP >= 140 MM HG: CPT | Mod: CPTII,S$GLB,, | Performed by: PHYSICIAN ASSISTANT

## 2022-06-27 PROCEDURE — 3072F LOW RISK FOR RETINOPATHY: CPT | Mod: CPTII,S$GLB,, | Performed by: PHYSICIAN ASSISTANT

## 2022-06-27 PROCEDURE — 1160F PR REVIEW ALL MEDS BY PRESCRIBER/CLIN PHARMACIST DOCUMENTED: ICD-10-PCS | Mod: CPTII,S$GLB,, | Performed by: PHYSICIAN ASSISTANT

## 2022-06-27 PROCEDURE — 1159F PR MEDICATION LIST DOCUMENTED IN MEDICAL RECORD: ICD-10-PCS | Mod: CPTII,S$GLB,, | Performed by: PHYSICIAN ASSISTANT

## 2022-06-27 PROCEDURE — 4010F ACE/ARB THERAPY RXD/TAKEN: CPT | Mod: CPTII,S$GLB,, | Performed by: PHYSICIAN ASSISTANT

## 2022-06-27 PROCEDURE — 3008F PR BODY MASS INDEX (BMI) DOCUMENTED: ICD-10-PCS | Mod: CPTII,S$GLB,, | Performed by: PHYSICIAN ASSISTANT

## 2022-06-27 PROCEDURE — 99214 PR OFFICE/OUTPT VISIT, EST, LEVL IV, 30-39 MIN: ICD-10-PCS | Mod: 25,S$GLB,, | Performed by: PHYSICIAN ASSISTANT

## 2022-06-27 NOTE — PROGRESS NOTES
"    General Cardiology Clinic Note  Reason for Visit: Chest Pain  Last Clinic Visit: 9/30/2020 with Dr. Cardenas    HPI:   Renny Young is a 61 y.o. male who presents for Establish Care, Follow-up, Chest Pain, and Pacemaker Check    Problems:  SSS s/p PPM (RV lead off)  Hypertension  Hyperlipidemia  DM   Smoker    HPI  Patient presents for chest pain around his pacemaker for at least a month. He states he had his device reprogrammed 1-2 months ago, which caused him to develop chest discomfort, so the settings were changed back, but the chest pain never went away. He states it feels like a "soreness" around his pacemaker that occurs at least every other day. Lasts at least a few minutes. Radiates into his left shoulder. Has been stable since onset. Unclear whether it is worse with exertion. He states it is not tender to the touch. No overlying skin changes. He did have a subjective fever a couple weeks ago. Overall has difficulty describing it in detail. He also reports being more fatigued than usual. He has never experienced this before. He wanted to see EP but was not able to get an appointment. He denies SOB, pedal edema, lightheadedness, syncope, sustained palpitations.     ROS:      Review of Systems   Constitutional: Negative for diaphoresis, malaise/fatigue, weight gain and weight loss.   HENT: Negative for nosebleeds.    Eyes: Negative for vision loss in left eye, vision loss in right eye and visual disturbance.   Cardiovascular: Positive for chest pain. Negative for claudication, dyspnea on exertion, irregular heartbeat, leg swelling, near-syncope, orthopnea, palpitations, paroxysmal nocturnal dyspnea and syncope.   Respiratory: Negative for cough, shortness of breath, sleep disturbances due to breathing, snoring and wheezing.    Hematologic/Lymphatic: Negative for bleeding problem. Does not bruise/bleed easily.   Skin: Negative for poor wound healing and rash.   Musculoskeletal: Negative for muscle cramps and " myalgias.   Gastrointestinal: Negative for bloating, abdominal pain, diarrhea, heartburn, melena, nausea and vomiting.   Genitourinary: Negative for hematuria and nocturia.   Neurological: Negative for brief paralysis, dizziness, headaches, light-headedness, numbness and weakness.   Psychiatric/Behavioral: Negative for depression.   Allergic/Immunologic: Negative for hives.       PMH:     Past Medical History:   Diagnosis Date    Colon polyp     Diabetes mellitus, type 2     Hypertension     Pacemaker      Past Surgical History:   Procedure Laterality Date    COLONOSCOPY N/A 2/13/2017    Procedure: COLONOSCOPY;  Surgeon: NILDA Juares MD;  Location: [facility] ([address] [place] [address]);  Service: Endoscopy;  Laterality: N/A;  Do not cancel this order. Patient has Pacemaker in place.     EPIDURAL STEROID INJECTION N/A 8/13/2019    Procedure: INJECTION, STEROID, EPIDURAL IL, L5/S1;  Surgeon: Josue Paredes MD;  Location: [facility];  Service: Pain Management;  Laterality: N/A;  IL JEAN MARIE L5/S1    EPIDURAL STEROID INJECTION N/A 6/16/2020    Procedure: INJECTION, STEROID, EPIDURAL, L5-S1 IL add on @ 2 ok by  Harrison;  Surgeon: Josue Paredes MD;  Location: [facility];  Service: Pain Management;  Laterality: N/A;    HERNIA REPAIR      INJECTION OF ANESTHETIC AGENT AROUND NERVE Bilateral 7/5/2018    Procedure: BLOCK, NERVE;  Surgeon: Krystal Mtz MD;  Location: [facility];  Service: Pain Management;  Laterality: Bilateral;  Lumbar Bilateral L3-L4-L5 Medial Branch Block    RADIOFREQUENCY ABLATION Left 6/11/2019    Procedure: RADIOFREQUENCY ABLATION, L3-L4-L5 MEDIAL BRANCH   1 OF 2;  Surgeon: Josue Paredes MD;  Location: [facility];  Service: Pain Management;  Laterality: Left;    RADIOFREQUENCY ABLATION Right 6/25/2019    Procedure: RADIOFREQUENCY ABLATION, L3-L4-L5 MEDIAL BRANCH JING 2 OF 2;  Surgeon: Josue Paredes MD;  Location: [facility];  Service: Pain Management;  Laterality: Right;     REPLACEMENT OF PACEMAKER GENERATOR Left 5/6/2020    Procedure: REPLACEMENT, PULSE GENERATOR, CARDIAC PACEMAKER;  Surgeon: Bradford Spence MD;  Location: [facility];  Service: Cardiology;  Laterality: Left;  EOL/LEAD Mlfx, Gen Change, Poss RA lead rev, SJM, MAC, GP, 3 PREP    REVISION OF PROCEDURE INVOLVING PACEMAKER LEAD Left 5/6/2020    Procedure: REVISION, ELECTRODE LEAD, CARDIAC PACEMAKER;  Surgeon: Bradford Spence MD;  Location: [facility];  Service: Cardiology;  Laterality: Left;    TRANSFORAMINAL EPIDURAL INJECTION OF STEROID Right 6/5/2018    Procedure: INJECTION-STEROID-EPIDURAL-TRANSFORAMINAL;  Surgeon: Josue Paredes MD;  Location: [facility];  Service: Pain Management;  Laterality: Right;  LUMBAR RIGHT L5 AND S1 TRANSFORAMINL JEAN MARIE  [id number]    W/ SEDATION     TRANSFORAMINAL EPIDURAL INJECTION OF STEROID Right 9/10/2020    Procedure: INJECTION, STEROID, EPIDURAL, TRANSFORAMINAL APPROACH, L5-S1 AND S1;  Surgeon: Josue Paredes MD;  Location: [facility];  Service: Pain Management;  Laterality: Right;    TRANSFORAMINAL EPIDURAL INJECTION OF STEROID Right 6/15/2021    Procedure: INJECTION, STEROID, EPIDURAL, TRANSFORAMINAL APPROACH L5-S1 AND S1 need consent;  Surgeon: Josue Paredes MD;  Location: [facility];  Service: Pain Management;  Laterality: Right;    TRANSFORAMINAL EPIDURAL INJECTION OF STEROID Right 6/20/2022    Procedure: INJECTION, STEROID, EPIDURAL, TRANSFORAMINAL APPROACH, RIGHT L5-S1 AND S1 CONTRAST;  Surgeon: Krystal Mtz MD;  Location: [facility];  Service: Pain Management;  Laterality: Right;     Allergies:   Review of patient's allergies indicates:  No Known Allergies  Medications:     Current Outpatient Medications on File Prior to Visit   Medication Sig Dispense Refill    amLODIPine (NORVASC) 5 MG tablet Take 1 tablet by mouth once daily 90 tablet 0    aspirin (ECOTRIN) 81 MG EC tablet Take 81 mg by mouth once daily.      atorvastatin (LIPITOR) 20 MG tablet  "Take 1 tablet by mouth once daily 90 tablet 0    gabapentin (NEURONTIN) 300 MG capsule Take 1 capsule by mouth twice daily 60 capsule 0    hydroCHLOROthiazide (HYDRODIURIL) 12.5 MG Tab Take 1 tablet by mouth once daily 90 tablet 0    losartan (COZAAR) 50 MG tablet Take 1 tablet by mouth once daily 90 tablet 0    metFORMIN (GLUCOPHAGE) 500 MG tablet Take 1 tablet by mouth once daily with breakfast 90 tablet 0    podofilox (CONDYLOX) 0.5 % gel Apply topically 2 (two) times daily. 3.5 g 2     No current facility-administered medications on file prior to visit.     Social History:     Social History     Tobacco Use    Smoking status: Current Every Day Smoker     Packs/day: 0.50     Types: Cigarettes    Smokeless tobacco: Never Used   Substance Use Topics    Alcohol use: Yes     Comment: Beer- Socially     Family History:     Family History   Problem Relation Age of Onset    Diabetes Mother     Diabetes Father     Kidney disease Father     Melanoma Neg Hx      Physical Exam:   BP (!) 154/79 (BP Location: Left arm, Patient Position: Sitting, BP Method: Medium (Automatic))   Pulse 78   Ht 5' 7" (1.702 m)   Wt 72.5 kg (159 lb 13.3 oz)   SpO2 97%   BMI 25.03 kg/m²        Physical Exam  Vitals and nursing note reviewed.   Constitutional:       General: He is not in acute distress.     Appearance: Normal appearance.   HENT:      Head: Normocephalic and atraumatic.      Nose: Nose normal.   Eyes:      General: No scleral icterus.     Extraocular Movements: Extraocular movements intact.      Conjunctiva/sclera: Conjunctivae normal.   Neck:      Thyroid: No thyromegaly.      Vascular: No carotid bruit or JVD.   Cardiovascular:      Rate and Rhythm: Normal rate and regular rhythm.      Pulses: Normal pulses.      Heart sounds: Normal heart sounds. No murmur heard.    No friction rub. No gallop.   Pulmonary:      Effort: Pulmonary effort is normal.      Breath sounds: Normal breath sounds. No wheezing, rhonchi or " rales.   Chest:      Chest wall: No tenderness.      Comments: LUCW slightly tender to touch around PPM  Abdominal:      General: Bowel sounds are normal. There is no distension.      Palpations: Abdomen is soft.      Tenderness: There is no abdominal tenderness.   Musculoskeletal:      Cervical back: Neck supple.      Right lower leg: No edema.      Left lower leg: No edema.   Skin:     General: Skin is warm and dry.      Coloration: Skin is not pale.      Findings: No erythema or rash.      Nails: There is no clubbing.   Neurological:      Mental Status: He is alert and oriented to person, place, and time.   Psychiatric:         Mood and Affect: Mood and affect normal.         Behavior: Behavior normal.          Labs:     Lab Results   Component Value Date     02/02/2021     02/02/2021    K 3.8 02/02/2021    K 3.8 02/02/2021     02/02/2021     02/02/2021    CO2 24 02/02/2021    CO2 24 02/02/2021    BUN 17 02/02/2021    BUN 17 02/02/2021    CREATININE 1.1 02/02/2021    CREATININE 1.1 02/02/2021    ANIONGAP 13 02/02/2021    ANIONGAP 13 02/02/2021     Lab Results   Component Value Date    HGBA1C 6.3 (H) 02/02/2021     Lab Results   Component Value Date    BNP <10 09/28/2015    Lab Results   Component Value Date    WBC 8.23 05/06/2020    HGB 16.3 05/06/2020    HCT 52.1 05/06/2020     05/06/2020    GRAN 5.2 05/06/2020    GRAN 62.5 05/06/2020     Lab Results   Component Value Date    CHOL 136 02/02/2021    HDL 45 02/02/2021    LDLCALC 71.6 02/02/2021    TRIG 97 02/02/2021          Imaging:     TTE 12/14/21  · The estimated ejection fraction is 65%.  · The left ventricle is normal in size with concentric remodeling and normal systolic function.  · Normal left ventricular diastolic function.  · Normal right ventricular size with normal right ventricular systolic function.  · There is a lead/wire present in the right ventricle  · Mild tricuspid regurgitation.  · The estimated PA systolic  pressure is 34 mmHg.  · Normal central venous pressure (3 mmHg).  · There is no pericardial effusion    Lower extremity arterial ultrasound 4/13/2018  Normal SAMUEL   No stenosis seen.       EKG 6/27/2022: NSR, nonspecific T wave changes, no change from prior    Assessment:     1. Chest pain, unspecified type    2. Sick sinus syndrome    3. Pacemaker    4. Hypertension, essential    5. Other hyperlipidemia    6. Type 2 diabetes mellitus without complication, unspecified whether long term insulin use    7. Smoker      Plan:     Chest Pain  Vague, nonspecific symptoms. He is very concerned that the chest pain is directly related to his PPM. He does have multiple risk factors for CAD (HTN, DM, HLD, smoker)  Will schedule for stress echo given risk factors and new onset chest pain    SSS s/p PPM  Will send note to EP about patient's concern    Hypertension  Elevated today. Previously controlled. Recommended monitoring at home.   Continue Amlodipine 5 mg daily, HCTZ 12.5 mg daily, Losartan 50 mg daily     Hyperlipidemia   Continue Lipitor 20 mg daily     Smoker  Recommend smoking cessation      Signed:  Ruchi Mera PA-C  Cardiology   [phone] - General  6/27/2022 2:43 PM

## 2022-06-30 ENCOUNTER — HOSPITAL ENCOUNTER (OUTPATIENT)
Dept: CARDIOLOGY | Facility: HOSPITAL | Age: 62
Discharge: HOME | End: 2022-06-30
Attending: PHYSICIAN ASSISTANT
Payer: COMMERCIAL

## 2022-06-30 VITALS — BODY MASS INDEX: 24.96 KG/M2 | HEIGHT: 67 IN | WEIGHT: 159 LBS

## 2022-06-30 DIAGNOSIS — R07.9 CHEST PAIN, UNSPECIFIED TYPE: ICD-10-CM

## 2022-06-30 LAB
ASCENDING AORTA: 3.06 CM
BSA FOR ECHO PROCEDURE: 1.85 M2
CV ECHO LV RWT: 0.54 CM
CV STRESS BASE HR: 66 BPM
DIASTOLIC BLOOD PRESSURE: 79 MMHG
E WAVE DECELERATION TIME: 198.13 MSEC
E/A RATIO: 0.85
E/E' RATIO: 7.75 M/S
ECHO LV POSTERIOR WALL: 1.13 CM (ref 0.6–1.1)
EJECTION FRACTION: 60 %
FRACTIONAL SHORTENING: 32 % (ref 28–44)
INTERVENTRICULAR SEPTUM: 1.06 CM (ref 0.6–1.1)
LA MAJOR: 4.1 CM
LA MINOR: 3.82 CM
LA WIDTH: 3.25 CM
LEFT ATRIUM SIZE: 2.93 CM
LEFT ATRIUM VOLUME INDEX MOD: 20 ML/M2
LEFT ATRIUM VOLUME INDEX: 17.5 ML/M2
LEFT ATRIUM VOLUME MOD: 36.64 CM3
LEFT ATRIUM VOLUME: 32.01 CM3
LEFT INTERNAL DIMENSION IN SYSTOLE: 2.89 CM (ref 2.1–4)
LEFT VENTRICLE DIASTOLIC VOLUME INDEX: 43.48 ML/M2
LEFT VENTRICLE DIASTOLIC VOLUME: 79.56 ML
LEFT VENTRICLE MASS INDEX: 86 G/M2
LEFT VENTRICLE SYSTOLIC VOLUME INDEX: 17.4 ML/M2
LEFT VENTRICLE SYSTOLIC VOLUME: 31.93 ML
LEFT VENTRICULAR INTERNAL DIMENSION IN DIASTOLE: 4.22 CM (ref 3.5–6)
LEFT VENTRICULAR MASS: 157.2 G
LV LATERAL E/E' RATIO: 7.75 M/S
LV SEPTAL E/E' RATIO: 7.75 M/S
MV A" WAVE DURATION": 10.85 MSEC
MV PEAK A VEL: 0.73 M/S
MV PEAK E VEL: 0.62 M/S
MV STENOSIS PRESSURE HALF TIME: 57.46 MS
MV VALVE AREA P 1/2 METHOD: 3.83 CM2
OHS CV CPX 1 MINUTE RECOVERY HEART RATE: 89 BPM
OHS CV CPX 85 PERCENT MAX PREDICTED HEART RATE MALE: 135
OHS CV CPX ESTIMATED METS: 12
OHS CV CPX MAX PREDICTED HEART RATE: 159
OHS CV CPX PATIENT IS FEMALE: 0
OHS CV CPX PATIENT IS MALE: 1
OHS CV CPX PEAK DIASTOLIC BLOOD PRESSURE: 102 MMHG
OHS CV CPX PEAK HEAR RATE: 141 BPM
OHS CV CPX PEAK RATE PRESSURE PRODUCT: ABNORMAL
OHS CV CPX PEAK SYSTOLIC BLOOD PRESSURE: 229 MMHG
OHS CV CPX PERCENT MAX PREDICTED HEART RATE ACHIEVED: 89
OHS CV CPX RATE PRESSURE PRODUCT PRESENTING: 9372
PISA TR MAX VEL: 2.65 M/S
PULM VEIN S/D RATIO: 0.85
PV PEAK D VEL: 0.4 M/S
PV PEAK S VEL: 0.34 M/S
RA MAJOR: 3.85 CM
RA PRESSURE: 3 MMHG
RA WIDTH: 3.02 CM
RIGHT VENTRICULAR END-DIASTOLIC DIMENSION: 2.66 CM
RV TISSUE DOPPLER FREE WALL SYSTOLIC VELOCITY 1 (APICAL 4 CHAMBER VIEW): 11.89 CM/S
SINUS: 2.7 CM
STJ: 2.81 CM
STRESS ECHO POST EXERCISE DUR MIN: 7 MINUTES
STRESS ECHO POST EXERCISE DUR SEC: 1 SECONDS
SYSTOLIC BLOOD PRESSURE: 142 MMHG
TDI LATERAL: 0.08 M/S
TDI SEPTAL: 0.08 M/S
TDI: 0.08 M/S
TR MAX PG: 28 MMHG
TRICUSPID ANNULAR PLANE SYSTOLIC EXCURSION: 1.95 CM
TV REST PULMONARY ARTERY PRESSURE: 31 MMHG

## 2022-06-30 PROCEDURE — 93351 STRESS ECHO (CUPID ONLY): ICD-10-PCS | Mod: 26,,, | Performed by: INTERNAL MEDICINE

## 2022-06-30 PROCEDURE — 93351 STRESS TTE COMPLETE: CPT | Mod: 26,,, | Performed by: INTERNAL MEDICINE

## 2022-06-30 PROCEDURE — 93351 STRESS TTE COMPLETE: CPT

## 2022-07-05 ENCOUNTER — TELEPHONE (OUTPATIENT)
Dept: CARDIOLOGY | Facility: CLINIC | Age: 62
End: 2022-07-05
Payer: COMMERCIAL

## 2022-07-05 NOTE — TELEPHONE ENCOUNTER
----- Message from Ruchi Mera PA-C sent at 7/5/2022 10:18 AM CDT -----  Please let patient know that his stress test showed normal heart function and no signs of any blockages in his heart arteries.

## 2022-07-06 ENCOUNTER — TELEPHONE (OUTPATIENT)
Dept: PAIN MEDICINE | Facility: CLINIC | Age: 62
End: 2022-07-06
Payer: COMMERCIAL

## 2022-07-06 NOTE — TELEPHONE ENCOUNTER
Staff called pt to confirm appt on 07/07 pt stated he will like to cancel appt and will call if needed, pt verbalized understanding and confirmed SG

## 2022-07-26 ENCOUNTER — OFFICE VISIT (OUTPATIENT)
Dept: INTERNAL MEDICINE | Facility: CLINIC | Age: 62
End: 2022-07-26
Attending: FAMILY MEDICINE
Payer: COMMERCIAL

## 2022-07-26 ENCOUNTER — LAB VISIT (OUTPATIENT)
Dept: LAB | Facility: HOSPITAL | Age: 62
End: 2022-07-26
Attending: FAMILY MEDICINE
Payer: COMMERCIAL

## 2022-07-26 VITALS
DIASTOLIC BLOOD PRESSURE: 75 MMHG | OXYGEN SATURATION: 99 % | HEIGHT: 67 IN | SYSTOLIC BLOOD PRESSURE: 139 MMHG | BODY MASS INDEX: 25.26 KG/M2 | WEIGHT: 160.94 LBS | HEART RATE: 73 BPM

## 2022-07-26 DIAGNOSIS — Z87.891 PERSONAL HISTORY OF NICOTINE DEPENDENCE: ICD-10-CM

## 2022-07-26 DIAGNOSIS — Z12.5 PROSTATE CANCER SCREENING: ICD-10-CM

## 2022-07-26 DIAGNOSIS — Z95.0 PACEMAKER: ICD-10-CM

## 2022-07-26 DIAGNOSIS — E11.69 TYPE 2 DIABETES MELLITUS WITH OTHER SPECIFIED COMPLICATION, WITHOUT LONG-TERM CURRENT USE OF INSULIN: ICD-10-CM

## 2022-07-26 DIAGNOSIS — E11.9 TYPE 2 DIABETES MELLITUS WITHOUT COMPLICATION, UNSPECIFIED WHETHER LONG TERM INSULIN USE: ICD-10-CM

## 2022-07-26 DIAGNOSIS — E78.5 HYPERLIPIDEMIA, UNSPECIFIED HYPERLIPIDEMIA TYPE: ICD-10-CM

## 2022-07-26 DIAGNOSIS — Z00.00 ANNUAL PHYSICAL EXAM: Primary | ICD-10-CM

## 2022-07-26 DIAGNOSIS — L90.5 SCAR: ICD-10-CM

## 2022-07-26 DIAGNOSIS — I10 HYPERTENSION, ESSENTIAL: ICD-10-CM

## 2022-07-26 DIAGNOSIS — K63.5 POLYP OF COLON, UNSPECIFIED PART OF COLON, UNSPECIFIED TYPE: ICD-10-CM

## 2022-07-26 DIAGNOSIS — F17.200 NEEDS SMOKING CESSATION EDUCATION: ICD-10-CM

## 2022-07-26 DIAGNOSIS — Z12.11 COLON CANCER SCREENING: ICD-10-CM

## 2022-07-26 DIAGNOSIS — Z00.00 ANNUAL PHYSICAL EXAM: ICD-10-CM

## 2022-07-26 DIAGNOSIS — I49.5 SICK SINUS SYNDROME: ICD-10-CM

## 2022-07-26 PROBLEM — G89.29 CHRONIC PAIN: Status: RESOLVED | Noted: 2018-07-31 | Resolved: 2022-07-26

## 2022-07-26 PROBLEM — Z45.010 PACEMAKER AT END OF BATTERY LIFE: Status: RESOLVED | Noted: 2020-05-06 | Resolved: 2022-07-26

## 2022-07-26 LAB
ALBUMIN SERPL BCP-MCNC: 4.6 G/DL (ref 3.5–5.2)
ALP SERPL-CCNC: 73 U/L (ref 55–135)
ALT SERPL W/O P-5'-P-CCNC: 25 U/L (ref 10–44)
ANION GAP SERPL CALC-SCNC: 9 MMOL/L (ref 8–16)
AST SERPL-CCNC: 23 U/L (ref 10–40)
BILIRUB SERPL-MCNC: 1 MG/DL (ref 0.1–1)
BUN SERPL-MCNC: 16 MG/DL (ref 8–23)
CALCIUM SERPL-MCNC: 10.1 MG/DL (ref 8.7–10.5)
CHLORIDE SERPL-SCNC: 103 MMOL/L (ref 95–110)
CHOLEST SERPL-MCNC: 147 MG/DL (ref 120–199)
CHOLEST/HDLC SERPL: 2.5 {RATIO} (ref 2–5)
CO2 SERPL-SCNC: 28 MMOL/L (ref 23–29)
COMPLEXED PSA SERPL-MCNC: 1.3 NG/ML (ref 0–4)
CREAT SERPL-MCNC: 1.1 MG/DL (ref 0.5–1.4)
EST. GFR  (AFRICAN AMERICAN): >60 ML/MIN/1.73 M^2
EST. GFR  (NON AFRICAN AMERICAN): >60 ML/MIN/1.73 M^2
ESTIMATED AVG GLUCOSE: 137 MG/DL (ref 68–131)
GLUCOSE SERPL-MCNC: 95 MG/DL (ref 70–110)
HBA1C MFR BLD: 6.4 % (ref 4–5.6)
HDLC SERPL-MCNC: 60 MG/DL (ref 40–75)
HDLC SERPL: 40.8 % (ref 20–50)
LDLC SERPL CALC-MCNC: 67.2 MG/DL (ref 63–159)
NONHDLC SERPL-MCNC: 87 MG/DL
POTASSIUM SERPL-SCNC: 4.1 MMOL/L (ref 3.5–5.1)
PROT SERPL-MCNC: 7.8 G/DL (ref 6–8.4)
SODIUM SERPL-SCNC: 140 MMOL/L (ref 136–145)
TRIGL SERPL-MCNC: 99 MG/DL (ref 30–150)

## 2022-07-26 PROCEDURE — 1160F RVW MEDS BY RX/DR IN RCRD: CPT | Mod: CPTII,S$GLB,, | Performed by: FAMILY MEDICINE

## 2022-07-26 PROCEDURE — 36415 COLL VENOUS BLD VENIPUNCTURE: CPT | Performed by: FAMILY MEDICINE

## 2022-07-26 PROCEDURE — 4010F ACE/ARB THERAPY RXD/TAKEN: CPT | Mod: CPTII,S$GLB,, | Performed by: FAMILY MEDICINE

## 2022-07-26 PROCEDURE — 1160F PR REVIEW ALL MEDS BY PRESCRIBER/CLIN PHARMACIST DOCUMENTED: ICD-10-PCS | Mod: CPTII,S$GLB,, | Performed by: FAMILY MEDICINE

## 2022-07-26 PROCEDURE — 3008F BODY MASS INDEX DOCD: CPT | Mod: CPTII,S$GLB,, | Performed by: FAMILY MEDICINE

## 2022-07-26 PROCEDURE — 3078F PR MOST RECENT DIASTOLIC BLOOD PRESSURE < 80 MM HG: ICD-10-PCS | Mod: CPTII,S$GLB,, | Performed by: FAMILY MEDICINE

## 2022-07-26 PROCEDURE — 99999 PR PBB SHADOW E&M-EST. PATIENT-LVL V: ICD-10-PCS | Mod: PBBFAC,,, | Performed by: FAMILY MEDICINE

## 2022-07-26 PROCEDURE — 80061 LIPID PANEL: CPT | Performed by: FAMILY MEDICINE

## 2022-07-26 PROCEDURE — 3072F PR LOW RISK FOR RETINOPATHY: ICD-10-PCS | Mod: CPTII,S$GLB,, | Performed by: FAMILY MEDICINE

## 2022-07-26 PROCEDURE — 1159F PR MEDICATION LIST DOCUMENTED IN MEDICAL RECORD: ICD-10-PCS | Mod: CPTII,S$GLB,, | Performed by: FAMILY MEDICINE

## 2022-07-26 PROCEDURE — 3008F PR BODY MASS INDEX (BMI) DOCUMENTED: ICD-10-PCS | Mod: CPTII,S$GLB,, | Performed by: FAMILY MEDICINE

## 2022-07-26 PROCEDURE — 99396 PREV VISIT EST AGE 40-64: CPT | Mod: S$GLB,,, | Performed by: FAMILY MEDICINE

## 2022-07-26 PROCEDURE — 3078F DIAST BP <80 MM HG: CPT | Mod: CPTII,S$GLB,, | Performed by: FAMILY MEDICINE

## 2022-07-26 PROCEDURE — 80053 COMPREHEN METABOLIC PANEL: CPT | Performed by: FAMILY MEDICINE

## 2022-07-26 PROCEDURE — 99999 PR PBB SHADOW E&M-EST. PATIENT-LVL V: CPT | Mod: PBBFAC,,, | Performed by: FAMILY MEDICINE

## 2022-07-26 PROCEDURE — 1159F MED LIST DOCD IN RCRD: CPT | Mod: CPTII,S$GLB,, | Performed by: FAMILY MEDICINE

## 2022-07-26 PROCEDURE — 83036 HEMOGLOBIN GLYCOSYLATED A1C: CPT | Performed by: FAMILY MEDICINE

## 2022-07-26 PROCEDURE — 3075F PR MOST RECENT SYSTOLIC BLOOD PRESS GE 130-139MM HG: ICD-10-PCS | Mod: CPTII,S$GLB,, | Performed by: FAMILY MEDICINE

## 2022-07-26 PROCEDURE — 3072F LOW RISK FOR RETINOPATHY: CPT | Mod: CPTII,S$GLB,, | Performed by: FAMILY MEDICINE

## 2022-07-26 PROCEDURE — 3075F SYST BP GE 130 - 139MM HG: CPT | Mod: CPTII,S$GLB,, | Performed by: FAMILY MEDICINE

## 2022-07-26 PROCEDURE — 4010F PR ACE/ARB THEARPY RXD/TAKEN: ICD-10-PCS | Mod: CPTII,S$GLB,, | Performed by: FAMILY MEDICINE

## 2022-07-26 PROCEDURE — 99396 PR PREVENTIVE VISIT,EST,40-64: ICD-10-PCS | Mod: S$GLB,,, | Performed by: FAMILY MEDICINE

## 2022-07-26 PROCEDURE — 84153 ASSAY OF PSA TOTAL: CPT | Performed by: FAMILY MEDICINE

## 2022-07-26 RX ORDER — ATORVASTATIN CALCIUM 20 MG/1
20 TABLET, FILM COATED ORAL DAILY
Qty: 90 TABLET | Refills: 1 | Status: SHIPPED | OUTPATIENT
Start: 2022-07-26 | End: 2023-06-12 | Stop reason: SDUPTHER

## 2022-07-26 RX ORDER — LOSARTAN POTASSIUM AND HYDROCHLOROTHIAZIDE 12.5; 1 MG/1; MG/1
1 TABLET ORAL DAILY
Qty: 90 TABLET | Refills: 1 | Status: SHIPPED | OUTPATIENT
Start: 2022-07-26 | End: 2023-02-20

## 2022-07-26 RX ORDER — AMLODIPINE BESYLATE 5 MG/1
5 TABLET ORAL DAILY
Qty: 90 TABLET | Refills: 1 | Status: SHIPPED | OUTPATIENT
Start: 2022-07-26 | End: 2023-02-20 | Stop reason: SDUPTHER

## 2022-07-26 RX ORDER — METFORMIN HYDROCHLORIDE 500 MG/1
500 TABLET ORAL
Qty: 90 TABLET | Refills: 1 | Status: SHIPPED | OUTPATIENT
Start: 2022-07-26 | End: 2023-03-31 | Stop reason: SDUPTHER

## 2022-07-26 NOTE — PATIENT INSTRUCTIONS
Schedule lab orders for today.      If not contacted in a couple weeks by colonoscopy scheduling department - call Colonoscopy Scheduling Number - [phone].     Information about cholesterol, high blood pressure and healthy diet and activity recommendations can be found at the following links on the Internet:    http://www.nhlbi.nih.gov/health/health-topics/topics/hbc  http://www.nhlbi.nih.gov/health/educational/lose_wt/index.htm  Http://www.nhlbi.nih.gov/files/docs/public/heart/hbp_low.pdf  http://www.heart.org/HEARTORG/  http://diabetes.org/  https://www.cdc.gov/  Https://healthfinder.gov/  https://health.gov/dietaryguidelines/2015/guidelines/  https://health.gov/paguidelines/second-edition/pdf/Physical_Activity_Guidelines_2nd_edition.pdf

## 2022-07-26 NOTE — PROGRESS NOTES
Subjective:       Patient ID: Renny Young is a 61 y.o. male.    Chief Complaint: Follow-up and Medication Problem    Established patient for an annual wellness check/physical exam and also chronic disease management. Specific complaints - see dictation and please see ROS.  P, S, Fm, Soc Hx's; Meds, allergies reviewed and reconciled.  Health maintenance file reviewed and addressed items due. Recent applicable lab, imaging and cardiovascular results reviewed.  Problem list items reviewed and modified or added entries (in the overview section) may not be transcribed into this encounter note due to note writer format.    Expressed concern about blood pressure medications.  He had been on lisinopril/HCT in the past which was changed due to the [facility] article concerning lung cancer risk.  Ultimately his Hyzaar was split due to availability.  He thinks this is not working as well.    Concern about area on penis that was frozen for HPV.  States it is firm at this time concerned about scarring?    Review of Systems   Constitutional: Negative for appetite change, chills, diaphoresis, fatigue and fever.   HENT: Negative for congestion, postnasal drip, rhinorrhea, sore throat and trouble swallowing.    Eyes: Negative for visual disturbance.   Respiratory: Negative for cough, choking, chest tightness, shortness of breath and wheezing.    Cardiovascular: Negative for chest pain and leg swelling.   Gastrointestinal: Negative for abdominal distention, abdominal pain, diarrhea, nausea and vomiting.   Genitourinary: Negative for difficulty urinating and hematuria.   Musculoskeletal: Negative for arthralgias and myalgias.   Skin: Negative for rash.   Neurological: Negative for weakness, light-headedness and headaches.   Psychiatric/Behavioral: Negative for confusion and dysphoric mood.       Objective:      Physical Exam  Vitals and nursing note reviewed.   Constitutional:       Appearance: He is well-developed. He is not diaphoretic.    Eyes:      General: No scleral icterus.  Neck:      Thyroid: No thyromegaly.      Vascular: No carotid bruit or JVD.      Trachea: No tracheal deviation.   Cardiovascular:      Rate and Rhythm: Normal rate and regular rhythm.      Pulses:           Dorsalis pedis pulses are 2+ on the right side and 2+ on the left side.      Heart sounds: Normal heart sounds. No murmur heard.    No friction rub. No gallop.   Pulmonary:      Effort: Pulmonary effort is normal. No respiratory distress.      Breath sounds: Normal breath sounds. No wheezing or rales.   Abdominal:      General: There is no distension.      Palpations: Abdomen is soft. There is no mass.      Tenderness: There is no abdominal tenderness. There is no guarding or rebound.   Genitourinary:      Musculoskeletal:      Cervical back: Normal range of motion and neck supple.   Feet:      Right foot:      Protective Sensation: 3 sites tested. 3 sites sensed.      Skin integrity: No skin breakdown.      Left foot:      Protective Sensation: 3 sites tested. 3 sites sensed.      Skin integrity: No skin breakdown.   Lymphadenopathy:      Cervical: No cervical adenopathy.   Skin:     General: Skin is warm and dry.      Findings: No erythema or rash.   Neurological:      Mental Status: He is alert and oriented to person, place, and time.      Cranial Nerves: No cranial nerve deficit.      Motor: No tremor.      Coordination: Coordination normal.      Gait: Gait normal.   Psychiatric:         Behavior: Behavior normal.         Thought Content: Thought content normal.         Judgment: Judgment normal.       Dictation #1  MRN:[id number]  CSN:[id number]     Assessment:       1. Annual physical exam    2. Hypertension, essential    3. Hyperlipidemia, unspecified hyperlipidemia type    4. Sick sinus syndrome    5. Pacemaker    6. Type 2 diabetes mellitus without complication, unspecified whether long term insulin use    7. Personal history of nicotine dependence    8. Polyp of  colon, unspecified part of colon, unspecified type    9. Prostate cancer screening    10. Colon cancer screening    11. Type 2 diabetes mellitus with other specified complication, without long-term current use of insulin    12. Scar        Plan:     Medication List with Changes/Refills   New Medications    LOSARTAN-HYDROCHLOROTHIAZIDE 100-12.5 MG (HYZAAR) 100-12.5 MG TAB    Take 1 tablet by mouth once daily.   Current Medications    ASPIRIN (ECOTRIN) 81 MG EC TABLET    Take 81 mg by mouth once daily.    GABAPENTIN (NEURONTIN) 300 MG CAPSULE    Take 1 capsule by mouth twice daily    PODOFILOX (CONDYLOX) 0.5 % GEL    Apply topically 2 (two) times daily.   Changed and/or Refilled Medications    Modified Medication Previous Medication    AMLODIPINE (NORVASC) 5 MG TABLET amLODIPine (NORVASC) 5 MG tablet       Take 1 tablet (5 mg total) by mouth once daily.    Take 1 tablet by mouth once daily    ATORVASTATIN (LIPITOR) 20 MG TABLET atorvastatin (LIPITOR) 20 MG tablet       Take 1 tablet (20 mg total) by mouth once daily.    Take 1 tablet by mouth once daily    METFORMIN (GLUCOPHAGE) 500 MG TABLET metFORMIN (GLUCOPHAGE) 500 MG tablet       Take 1 tablet (500 mg total) by mouth daily with breakfast.    Take 1 tablet by mouth once daily with breakfast   Discontinued Medications    HYDROCHLOROTHIAZIDE (HYDRODIURIL) 12.5 MG TAB    Take 1 tablet by mouth once daily    LOSARTAN (COZAAR) 50 MG TABLET    Take 1 tablet by mouth once daily     Renny was seen today for follow-up and medication problem.    Diagnoses and all orders for this visit:    Annual physical exam  -     Hemoglobin A1C; Future  -     Comprehensive Metabolic Panel; Future  -     Lipid Panel; Future  -     PSA, Screening; Future  -     Microalbumin/Creatinine Ratio, Urine; Future    Hypertension, essential  -     losartan-hydrochlorothiazide 100-12.5 mg (HYZAAR) 100-12.5 mg Tab; Take 1 tablet by mouth once daily.  -     amLODIPine (NORVASC) 5 MG tablet; Take 1  tablet (5 mg total) by mouth once daily.    Hyperlipidemia, unspecified hyperlipidemia type  -     Lipid Panel; Future  -     atorvastatin (LIPITOR) 20 MG tablet; Take 1 tablet (20 mg total) by mouth once daily.    Sick sinus syndrome    Pacemaker    Type 2 diabetes mellitus without complication, unspecified whether long term insulin use  -     Hemoglobin A1C; Future  -     Comprehensive Metabolic Panel; Future  -     Microalbumin/Creatinine Ratio, Urine; Future  -     Ambulatory referral/consult to Optometry; Future    Personal history of nicotine dependence    Polyp of colon, unspecified part of colon, unspecified type  -     Case Request Endoscopy: COLONOSCOPY    Prostate cancer screening  -     PSA, Screening; Future    Colon cancer screening  -     Case Request Endoscopy: COLONOSCOPY    Type 2 diabetes mellitus with other specified complication, without long-term current use of insulin  -     metFORMIN (GLUCOPHAGE) 500 MG tablet; Take 1 tablet (500 mg total) by mouth daily with breakfast.    Scar  -     Ambulatory referral/consult to Urology; Future      See meds, orders, follow up, routing and instructions sections of encounter and AVS. Discussed with patient and provided on AVS.    Discussed diet and exercise and links provided on AVS for detailed information.    Diabetes Management Status    Statin: Taking  ACE/ARB: Taking    Screening or Prevention Patient's value Goal Complete/Controlled?   HgA1C Testing and Control   Lab Results   Component Value Date    HGBA1C 6.3 (H) 02/02/2021      Annually/Less than 8% No   Lipid profile : 02/02/2021 Annually No   LDL control Lab Results   Component Value Date    LDLCALC 71.6 02/02/2021    Annually/Less than 100 mg/dl  No   Nephropathy screening Lab Results   Component Value Date    LABMICR 13.0 02/17/2020     Lab Results   Component Value Date    PROTEINUA Negative 04/06/2017     No results found for: UTPCR   Annually No   Blood pressure BP Readings from Last 1  Encounters:   07/26/22 139/75    Less than 140/90 Yes   Dilated retinal exam : 06/21/2021 Annually No   Foot exam   : 07/26/2022 Annually No       Re-consult a day blood pressure medicines as listed above.  Continue to monitor.  Check laboratory today.  Referral to urology for re-evaluation of HPV site.  If no solution to patient's concern then consider a dermatology consult, asked him to follow-up with me on this issue.

## 2022-08-09 ENCOUNTER — TELEPHONE (OUTPATIENT)
Dept: INTERNAL MEDICINE | Facility: CLINIC | Age: 62
End: 2022-08-09
Payer: COMMERCIAL

## 2022-08-09 NOTE — TELEPHONE ENCOUNTER
----- Message from Cinthya Godoy sent at 8/9/2022  7:41 AM CDT -----  Contact: [phone]  Please call patient.

## 2022-08-10 ENCOUNTER — HOSPITAL ENCOUNTER (OUTPATIENT)
Dept: RADIOLOGY | Facility: HOSPITAL | Age: 62
Discharge: HOME | End: 2022-08-10
Attending: FAMILY MEDICINE
Payer: COMMERCIAL

## 2022-08-10 ENCOUNTER — OFFICE VISIT (OUTPATIENT)
Dept: INTERNAL MEDICINE | Facility: CLINIC | Age: 62
End: 2022-08-10
Attending: FAMILY MEDICINE
Payer: COMMERCIAL

## 2022-08-10 VITALS
SYSTOLIC BLOOD PRESSURE: 118 MMHG | BODY MASS INDEX: 25.26 KG/M2 | OXYGEN SATURATION: 97 % | DIASTOLIC BLOOD PRESSURE: 80 MMHG | HEIGHT: 67 IN | WEIGHT: 160.94 LBS | HEART RATE: 82 BPM

## 2022-08-10 DIAGNOSIS — B97.7 HPV (HUMAN PAPILLOMA VIRUS) INFECTION: Chronic | ICD-10-CM

## 2022-08-10 DIAGNOSIS — M75.102 ROTATOR CUFF SYNDROME OF LEFT SHOULDER: Primary | ICD-10-CM

## 2022-08-10 DIAGNOSIS — M75.102 ROTATOR CUFF SYNDROME OF LEFT SHOULDER: ICD-10-CM

## 2022-08-10 DIAGNOSIS — E11.9 TYPE 2 DIABETES MELLITUS WITHOUT COMPLICATION, UNSPECIFIED WHETHER LONG TERM INSULIN USE: ICD-10-CM

## 2022-08-10 PROCEDURE — 3008F PR BODY MASS INDEX (BMI) DOCUMENTED: ICD-10-PCS | Mod: CPTII,S$GLB,, | Performed by: FAMILY MEDICINE

## 2022-08-10 PROCEDURE — 3079F PR MOST RECENT DIASTOLIC BLOOD PRESSURE 80-89 MM HG: ICD-10-PCS | Mod: CPTII,S$GLB,, | Performed by: FAMILY MEDICINE

## 2022-08-10 PROCEDURE — 99213 OFFICE O/P EST LOW 20 MIN: CPT | Mod: S$GLB,,, | Performed by: FAMILY MEDICINE

## 2022-08-10 PROCEDURE — 3044F PR MOST RECENT HEMOGLOBIN A1C LEVEL <7.0%: ICD-10-PCS | Mod: CPTII,S$GLB,, | Performed by: FAMILY MEDICINE

## 2022-08-10 PROCEDURE — 99213 PR OFFICE/OUTPT VISIT, EST, LEVL III, 20-29 MIN: ICD-10-PCS | Mod: S$GLB,,, | Performed by: FAMILY MEDICINE

## 2022-08-10 PROCEDURE — 3066F PR DOCUMENTATION OF TREATMENT FOR NEPHROPATHY: ICD-10-PCS | Mod: CPTII,S$GLB,, | Performed by: FAMILY MEDICINE

## 2022-08-10 PROCEDURE — 4010F PR ACE/ARB THEARPY RXD/TAKEN: ICD-10-PCS | Mod: CPTII,S$GLB,, | Performed by: FAMILY MEDICINE

## 2022-08-10 PROCEDURE — 3066F NEPHROPATHY DOC TX: CPT | Mod: CPTII,S$GLB,, | Performed by: FAMILY MEDICINE

## 2022-08-10 PROCEDURE — 3079F DIAST BP 80-89 MM HG: CPT | Mod: CPTII,S$GLB,, | Performed by: FAMILY MEDICINE

## 2022-08-10 PROCEDURE — 99999 PR PBB SHADOW E&M-EST. PATIENT-LVL V: CPT | Mod: PBBFAC,,, | Performed by: FAMILY MEDICINE

## 2022-08-10 PROCEDURE — 73030 X-RAY EXAM OF SHOULDER: CPT | Mod: TC,LT

## 2022-08-10 PROCEDURE — 3044F HG A1C LEVEL LT 7.0%: CPT | Mod: CPTII,S$GLB,, | Performed by: FAMILY MEDICINE

## 2022-08-10 PROCEDURE — 1159F MED LIST DOCD IN RCRD: CPT | Mod: CPTII,S$GLB,, | Performed by: FAMILY MEDICINE

## 2022-08-10 PROCEDURE — 1160F RVW MEDS BY RX/DR IN RCRD: CPT | Mod: CPTII,S$GLB,, | Performed by: FAMILY MEDICINE

## 2022-08-10 PROCEDURE — 4010F ACE/ARB THERAPY RXD/TAKEN: CPT | Mod: CPTII,S$GLB,, | Performed by: FAMILY MEDICINE

## 2022-08-10 PROCEDURE — 3072F PR LOW RISK FOR RETINOPATHY: ICD-10-PCS | Mod: CPTII,S$GLB,, | Performed by: FAMILY MEDICINE

## 2022-08-10 PROCEDURE — 73030 XR SHOULDER COMPLETE 2 OR MORE VIEWS LEFT: ICD-10-PCS | Mod: 26,LT,, | Performed by: RADIOLOGY

## 2022-08-10 PROCEDURE — 99999 PR PBB SHADOW E&M-EST. PATIENT-LVL V: ICD-10-PCS | Mod: PBBFAC,,, | Performed by: FAMILY MEDICINE

## 2022-08-10 PROCEDURE — 3072F LOW RISK FOR RETINOPATHY: CPT | Mod: CPTII,S$GLB,, | Performed by: FAMILY MEDICINE

## 2022-08-10 PROCEDURE — 3074F PR MOST RECENT SYSTOLIC BLOOD PRESSURE < 130 MM HG: ICD-10-PCS | Mod: CPTII,S$GLB,, | Performed by: FAMILY MEDICINE

## 2022-08-10 PROCEDURE — 3008F BODY MASS INDEX DOCD: CPT | Mod: CPTII,S$GLB,, | Performed by: FAMILY MEDICINE

## 2022-08-10 PROCEDURE — 3074F SYST BP LT 130 MM HG: CPT | Mod: CPTII,S$GLB,, | Performed by: FAMILY MEDICINE

## 2022-08-10 PROCEDURE — 1159F PR MEDICATION LIST DOCUMENTED IN MEDICAL RECORD: ICD-10-PCS | Mod: CPTII,S$GLB,, | Performed by: FAMILY MEDICINE

## 2022-08-10 PROCEDURE — 3061F NEG MICROALBUMINURIA REV: CPT | Mod: CPTII,S$GLB,, | Performed by: FAMILY MEDICINE

## 2022-08-10 PROCEDURE — 3061F PR NEG MICROALBUMINURIA RESULT DOCUMENTED/REVIEW: ICD-10-PCS | Mod: CPTII,S$GLB,, | Performed by: FAMILY MEDICINE

## 2022-08-10 PROCEDURE — 1160F PR REVIEW ALL MEDS BY PRESCRIBER/CLIN PHARMACIST DOCUMENTED: ICD-10-PCS | Mod: CPTII,S$GLB,, | Performed by: FAMILY MEDICINE

## 2022-08-10 PROCEDURE — 73030 X-RAY EXAM OF SHOULDER: CPT | Mod: 26,LT,, | Performed by: RADIOLOGY

## 2022-08-10 NOTE — PROGRESS NOTES
Subjective:       Patient ID: Renny Young is a 61 y.o. male.    Chief Complaint: Shoulder Pain (In pain 2-3 weeks) and Medication Problem    3 week his of L shoulder pain. Started when pulling pallet 3 weeks ago. Worse with overhead lifting and at night.    Shoulder Pain   The pain is present in the left shoulder. This is a new problem. The current episode started 1 to 4 weeks ago. The problem occurs intermittently. The problem has been waxing and waning. The pain is mild. Pertinent negatives include no fever or headaches. The symptoms are aggravated by activity. He has tried acetaminophen for the symptoms. The treatment provided no relief.     Review of Systems   Constitutional: Negative for appetite change, chills, diaphoresis, fatigue and fever.   HENT: Negative for congestion, postnasal drip, rhinorrhea, sore throat and trouble swallowing.    Eyes: Negative for visual disturbance.   Respiratory: Negative for cough, choking, chest tightness, shortness of breath and wheezing.    Cardiovascular: Negative for chest pain and leg swelling.   Gastrointestinal: Negative for abdominal distention, abdominal pain, diarrhea, nausea and vomiting.   Genitourinary: Negative for difficulty urinating and hematuria.   Musculoskeletal: Positive for arthralgias. Negative for myalgias.   Skin: Positive for rash.   Neurological: Negative for weakness, light-headedness and headaches.   Psychiatric/Behavioral: Negative for confusion and dysphoric mood.       Objective:      Physical Exam  Vitals and nursing note reviewed.   Constitutional:       General: He is not in acute distress.     Appearance: He is well-developed.   Neck:      Thyroid: No thyroid mass.   Pulmonary:      Effort: Pulmonary effort is normal.   Musculoskeletal:      Right shoulder: No swelling, deformity, effusion, laceration, tenderness, bony tenderness or crepitus. Normal range of motion. Normal strength. Normal pulse.      Left shoulder: Tenderness present. No  swelling, deformity, effusion, laceration, bony tenderness or crepitus. Decreased range of motion. Normal strength. Normal pulse.      Right hand: No swelling or tenderness. Normal range of motion. Normal strength. Normal strength of finger abduction, thumb/finger opposition and wrist extension. Normal sensation. Normal sensation of the ulnar distribution, median distribution and radial distribution. Normal capillary refill.      Left hand: No swelling or tenderness. Normal range of motion. Normal strength. Normal strength of finger abduction, thumb/finger opposition and wrist extension. Normal sensation. Normal sensation of the ulnar distribution, median distribution and radial distribution. Normal capillary refill.      Cervical back: Full passive range of motion without pain, normal range of motion and neck supple. No spasms or tenderness.      Right lower leg: No edema.      Left lower leg: No edema.      Comments: Pain with overhead motion and + impingment. Speed's causes pain in forearm (not bicep)   Skin:     General: Skin is warm and dry.      Findings: No rash.   Neurological:      Mental Status: He is alert and oriented to person, place, and time.      Cranial Nerves: No cranial nerve deficit.      Sensory: No sensory deficit.      Coordination: Coordination normal.      Gait: Gait normal.      Deep Tendon Reflexes:      Reflex Scores:       Tricep reflexes are 2+ on the right side and 2+ on the left side.       Bicep reflexes are 2+ on the right side and 2+ on the left side.       Brachioradialis reflexes are 2+ on the right side and 2+ on the left side.  Psychiatric:         Behavior: Behavior normal.         Thought Content: Thought content normal.         Judgment: Judgment normal.         Assessment:       1. Rotator cuff syndrome of left shoulder    2. HPV (human papilloma virus) infection        Plan:     Medication List with Changes/Refills   Current Medications    AMLODIPINE (NORVASC) 5 MG TABLET     Take 1 tablet (5 mg total) by mouth once daily.    ASPIRIN (ECOTRIN) 81 MG EC TABLET    Take 81 mg by mouth once daily.    ATORVASTATIN (LIPITOR) 20 MG TABLET    Take 1 tablet (20 mg total) by mouth once daily.    GABAPENTIN (NEURONTIN) 300 MG CAPSULE    Take 1 capsule by mouth twice daily    LOSARTAN-HYDROCHLOROTHIAZIDE 100-12.5 MG (HYZAAR) 100-12.5 MG TAB    Take 1 tablet by mouth once daily.    METFORMIN (GLUCOPHAGE) 500 MG TABLET    Take 1 tablet (500 mg total) by mouth daily with breakfast.    PODOFILOX (CONDYLOX) 0.5 % GEL    Apply topically 2 (two) times daily.     Renny was seen today for shoulder pain and medication problem.    Diagnoses and all orders for this visit:    Rotator cuff syndrome of left shoulder  -     Ambulatory referral/consult to Physical/Occupational Therapy; Future  -     X-Ray Shoulder 2 or More Views Left; Future  -     Ambulatory referral/consult to Sports Medicine; Future    HPV (human papilloma virus) infection  Comments:  initial treatment in derm - ? residual scar      See meds, orders, follow up, routing and instructions sections of encounter and AVS. Discussed with patient and provided on AVS.    Was unable to schedule with  dept last visit - will have checkout re-schedule.

## 2022-08-10 NOTE — PATIENT INSTRUCTIONS
Physical Therapy/Occupational Therapy number to schedule appointments - [phone].     Information about cholesterol, high blood pressure and healthy diet and activity recommendations can be found at the following links on the Internet:    http://www.nhlbi.nih.gov/health/health-topics/topics/hbc  http://www.nhlbi.nih.gov/health/educational/lose_wt/index.htm  Http://www.nhlbi.nih.gov/files/docs/public/heart/hbp_low.pdf  http://www.heart.org/HEARTORG/  http://diabetes.org/  https://www.cdc.gov/  Https://healthfinder.gov/  https://health.gov/dietaryguidelines/2015/guidelines/  https://health.gov/paguidelines/second-edition/pdf/Physical_Activity_Guidelines_2nd_edition.pdf

## 2022-08-18 ENCOUNTER — OFFICE VISIT (OUTPATIENT)
Dept: ORTHOPEDICS | Facility: CLINIC | Age: 62
End: 2022-08-18
Attending: FAMILY MEDICINE
Payer: COMMERCIAL

## 2022-08-18 VITALS — WEIGHT: 153.25 LBS | HEIGHT: 67 IN | BODY MASS INDEX: 24.05 KG/M2

## 2022-08-18 DIAGNOSIS — M75.102 ROTATOR CUFF SYNDROME OF LEFT SHOULDER: ICD-10-CM

## 2022-08-18 DIAGNOSIS — M75.82 ROTATOR CUFF TENDONITIS, LEFT: Primary | ICD-10-CM

## 2022-08-18 PROCEDURE — 99999 PR PBB SHADOW E&M-EST. PATIENT-LVL III: CPT | Mod: PBBFAC,,, | Performed by: PHYSICIAN ASSISTANT

## 2022-08-18 PROCEDURE — 3008F BODY MASS INDEX DOCD: CPT | Mod: CPTII,S$GLB,, | Performed by: PHYSICIAN ASSISTANT

## 2022-08-18 PROCEDURE — 3066F PR DOCUMENTATION OF TREATMENT FOR NEPHROPATHY: ICD-10-PCS | Mod: CPTII,S$GLB,, | Performed by: PHYSICIAN ASSISTANT

## 2022-08-18 PROCEDURE — 3044F HG A1C LEVEL LT 7.0%: CPT | Mod: CPTII,S$GLB,, | Performed by: PHYSICIAN ASSISTANT

## 2022-08-18 PROCEDURE — 3066F NEPHROPATHY DOC TX: CPT | Mod: CPTII,S$GLB,, | Performed by: PHYSICIAN ASSISTANT

## 2022-08-18 PROCEDURE — 99203 PR OFFICE/OUTPT VISIT, NEW, LEVL III, 30-44 MIN: ICD-10-PCS | Mod: 25,S$GLB,, | Performed by: PHYSICIAN ASSISTANT

## 2022-08-18 PROCEDURE — 3072F PR LOW RISK FOR RETINOPATHY: ICD-10-PCS | Mod: CPTII,S$GLB,, | Performed by: PHYSICIAN ASSISTANT

## 2022-08-18 PROCEDURE — 3061F PR NEG MICROALBUMINURIA RESULT DOCUMENTED/REVIEW: ICD-10-PCS | Mod: CPTII,S$GLB,, | Performed by: PHYSICIAN ASSISTANT

## 2022-08-18 PROCEDURE — 3061F NEG MICROALBUMINURIA REV: CPT | Mod: CPTII,S$GLB,, | Performed by: PHYSICIAN ASSISTANT

## 2022-08-18 PROCEDURE — 1159F MED LIST DOCD IN RCRD: CPT | Mod: CPTII,S$GLB,, | Performed by: PHYSICIAN ASSISTANT

## 2022-08-18 PROCEDURE — 3072F LOW RISK FOR RETINOPATHY: CPT | Mod: CPTII,S$GLB,, | Performed by: PHYSICIAN ASSISTANT

## 2022-08-18 PROCEDURE — 1160F PR REVIEW ALL MEDS BY PRESCRIBER/CLIN PHARMACIST DOCUMENTED: ICD-10-PCS | Mod: CPTII,S$GLB,, | Performed by: PHYSICIAN ASSISTANT

## 2022-08-18 PROCEDURE — 4010F ACE/ARB THERAPY RXD/TAKEN: CPT | Mod: CPTII,S$GLB,, | Performed by: PHYSICIAN ASSISTANT

## 2022-08-18 PROCEDURE — 99999 PR PBB SHADOW E&M-EST. PATIENT-LVL III: ICD-10-PCS | Mod: PBBFAC,,, | Performed by: PHYSICIAN ASSISTANT

## 2022-08-18 PROCEDURE — 20610 DRAIN/INJ JOINT/BURSA W/O US: CPT | Mod: LT,S$GLB,, | Performed by: PHYSICIAN ASSISTANT

## 2022-08-18 PROCEDURE — 20610 LARGE JOINT ASPIRATION/INJECTION: L SUBACROMIAL BURSA: ICD-10-PCS | Mod: LT,S$GLB,, | Performed by: PHYSICIAN ASSISTANT

## 2022-08-18 PROCEDURE — 4010F PR ACE/ARB THEARPY RXD/TAKEN: ICD-10-PCS | Mod: CPTII,S$GLB,, | Performed by: PHYSICIAN ASSISTANT

## 2022-08-18 PROCEDURE — 3044F PR MOST RECENT HEMOGLOBIN A1C LEVEL <7.0%: ICD-10-PCS | Mod: CPTII,S$GLB,, | Performed by: PHYSICIAN ASSISTANT

## 2022-08-18 PROCEDURE — 99203 OFFICE O/P NEW LOW 30 MIN: CPT | Mod: 25,S$GLB,, | Performed by: PHYSICIAN ASSISTANT

## 2022-08-18 PROCEDURE — 1160F RVW MEDS BY RX/DR IN RCRD: CPT | Mod: CPTII,S$GLB,, | Performed by: PHYSICIAN ASSISTANT

## 2022-08-18 PROCEDURE — 3008F PR BODY MASS INDEX (BMI) DOCUMENTED: ICD-10-PCS | Mod: CPTII,S$GLB,, | Performed by: PHYSICIAN ASSISTANT

## 2022-08-18 PROCEDURE — 1159F PR MEDICATION LIST DOCUMENTED IN MEDICAL RECORD: ICD-10-PCS | Mod: CPTII,S$GLB,, | Performed by: PHYSICIAN ASSISTANT

## 2022-08-18 RX ADMIN — TRIAMCINOLONE ACETONIDE 40 MG: 40 INJECTION, SUSPENSION INTRA-ARTICULAR; INTRAMUSCULAR at 03:08

## 2022-08-19 RX ORDER — TRIAMCINOLONE ACETONIDE 40 MG/ML
40 INJECTION, SUSPENSION INTRA-ARTICULAR; INTRAMUSCULAR
Status: DISCONTINUED | OUTPATIENT
Start: 2022-08-18 | End: 2022-08-19 | Stop reason: HOSPADM

## 2022-08-19 NOTE — PROGRESS NOTES
SUBJECTIVE:     Chief Complaint & History of Present Illness:  Renny Young is a 61 y.o. year old male who presents today with constant left shoulder pain that started 4 weeks ago.  He is Right hand dominant.  He thinks he strained it while pulling a heavy pallet.  The pain is located in the  lateral and  upper arm aspect of the shoulder.  The pain is described as achy.  It is aggravated by reaching, lifting, overhead activity.  Previous treatments include acetaminophen and rest which have provided minimal relief.  There is not a history of previous injury or surgery to the shoulder.      Review of patient's allergies indicates:  No Known Allergies      Current Outpatient Medications   Medication Sig Dispense Refill    amLODIPine (NORVASC) 5 MG tablet Take 1 tablet (5 mg total) by mouth once daily. 90 tablet 1    aspirin (ECOTRIN) 81 MG EC tablet Take 81 mg by mouth once daily.      atorvastatin (LIPITOR) 20 MG tablet Take 1 tablet (20 mg total) by mouth once daily. 90 tablet 1    gabapentin (NEURONTIN) 300 MG capsule Take 1 capsule by mouth twice daily 60 capsule 0    losartan-hydrochlorothiazide 100-12.5 mg (HYZAAR) 100-12.5 mg Tab Take 1 tablet by mouth once daily. 90 tablet 1    metFORMIN (GLUCOPHAGE) 500 MG tablet Take 1 tablet (500 mg total) by mouth daily with breakfast. 90 tablet 1    podofilox (CONDYLOX) 0.5 % gel Apply topically 2 (two) times daily. 3.5 g 2     No current facility-administered medications for this visit.       Past Medical History:   Diagnosis Date    Colon polyp     Diabetes mellitus, type 2     Hypertension     Pacemaker     Pacemaker at end of battery life 5/6/2020       Past Surgical History:   Procedure Laterality Date    COLONOSCOPY N/A 2/13/2017    Procedure: COLONOSCOPY;  Surgeon: NILDA Juares MD;  Location: [facility] ([address] [place] [address]);  Service: Endoscopy;  Laterality: N/A;  Do not cancel this order. Patient has Pacemaker in place.     EPIDURAL STEROID INJECTION N/A  8/13/2019    Procedure: INJECTION, STEROID, EPIDURAL IL, L5/S1;  Surgeon: Josue Paredes MD;  Location: [facility] PAIN MGT;  Service: Pain Management;  Laterality: N/A;  IL JEAN MARIE L5/S1    EPIDURAL STEROID INJECTION N/A 6/16/2020    Procedure: INJECTION, STEROID, EPIDURAL, L5-S1 IL add on @ 2 ok by  [place];  Surgeon: Josue Paredes MD;  Location: [facility] PAIN [facility]GT;  Service: Pain Management;  Laterality: N/A;    HERNIA REPAIR      INJECTION OF ANESTHETIC AGENT AROUND NERVE Bilateral 7/5/2018    Procedure: BLOCK, NERVE;  Surgeon: Krystal Mtz MD;  Location: [facility]IN [facility];  Service: Pain Management;  Laterality: Bilateral;  Lumbar Bilateral L3-L4-L5 Medial Branch Block    RADIOFREQUENCY ABLATION Left 6/11/2019    Procedure: RADIOFREQUENCY ABLATION, L3-L4-L5 MEDIAL BRANCH   1 OF 2;  Surgeon: Josue Paredes MD;  Location: [facility]IN [facility];  Service: Pain Management;  Laterality: Left;    RADIOFREQUENCY ABLATION Right 6/25/2019    Procedure: RADIOFREQUENCY ABLATION, L3-L4-L5 MEDIAL BRANCH JING 2 OF 2;  Surgeon: Josue Paredes MD;  Location: [facility];  Service: Pain Management;  Laterality: Right;    REPLACEMENT OF PACEMAKER GENERATOR Left 5/6/2020    Procedure: REPLACEMENT, PULSE GENERATOR, CARDIAC PACEMAKER;  Surgeon: Bradford Spence MD;  Location: [facility] EP LAB;  Service: Cardiology;  Laterality: Left;  EOL/LEAD Mlfx, Gen Change, Poss RA lead rev, SJM, MAC, GP, 3 PREP    REVISION OF PROCEDURE INVOLVING PACEMAKER LEAD Left 5/6/2020    Procedure: REVISION, ELECTRODE LEAD, CARDIAC PACEMAKER;  Surgeon: Bradford Spence MD;  Location: [facility] EP LAB;  Service: Cardiology;  Laterality: Left;    TRANSFORAMINAL EPIDURAL INJECTION OF STEROID Right 6/5/2018    Procedure: INJECTION-STEROID-EPIDURAL-TRANSFORAMINAL;  Surgeon: Josue Paredes MD;  Location: [facility] PAIN [facility]GT;  Service: Pain Management;  Laterality: Right;  LUMBAR RIGHT L5 AND S1 TRANSFORAMINL JEAN MARIE  [id number]    W/ SEDATION     TRANSFORAMINAL EPIDURAL INJECTION OF  STEROID Right 9/10/2020    Procedure: INJECTION, STEROID, EPIDURAL, TRANSFORAMINAL APPROACH, L5-S1 AND S1;  Surgeon: Josue Paredes MD;  Location: [facility] PAIN MGT;  Service: Pain Management;  Laterality: Right;    TRANSFORAMINAL EPIDURAL INJECTION OF STEROID Right 6/15/2021    Procedure: INJECTION, STEROID, EPIDURAL, TRANSFORAMINAL APPROACH L5-S1 AND S1 need consent;  Surgeon: Josue Paredes MD;  Location: [facility] PAIN [facility]GT;  Service: Pain Management;  Laterality: Right;    TRANSFORAMINAL EPIDURAL INJECTION OF STEROID Right 6/20/2022    Procedure: INJECTION, STEROID, EPIDURAL, TRANSFORAMINAL APPROACH, RIGHT L5-S1 AND S1 CONTRAST;  Surgeon: Krystal Mtz MD;  Location: [facility] PAIN [facility]GT;  Service: Pain Management;  Laterality: Right;     Review of Systems:  ROS:  Constitutional: no fever or chills  Eyes: no visual changes  ENT: no nasal congestion or sore throat  Respiratory: no cough or shortness of breath  Cardiovascular: no chest pain or palpitations  Musculoskeletal: no arthralgias or myalgias  Neurological: no seizures or tremors  Behavioral/Psych: no auditory or visual hallucinations      OBJECTIVE:     PHYSICAL EXAM:  General: Weight: 69.5 kg (153 lb 3.5 oz) Body mass index is 24 kg/m².  Patient is alert, awake and oriented to time, place and person. Mood and affect are appropriate.  Patient does not appear to be in any distress, denies any constitutional symptoms and appears stated age.   HEENT: Pupils are equal and round, sclera are not injected. External examination of ears and nose reveals no abnormalities. Cranial nerves II-X are grossly intact  Neck: examination demonstrates painless active range of motion. Spurling's sign is negative  Skin: no rashes, abrasions or open wounds on the affected extremity   Resp: No respiratory distress or audible wheezing   Psych:  normal mood and behavior  CV: 2+  pulses, all extremities warm and well perfused   Left Shoulder   Skin intact  No warmth or erythema  No  atrophy  Tenderness: lateral acromial, posterior acromial  Range of motion is painful   ROM: forward flexion 160, external rotation 50/50, internal rotation L1  Shoulder Strength: biceps 5/5, triceps 5/5, abduction 5/5, adduction 5/5  positive for impingement sign, sensory exam normal and motor exam normal  Special Tests:    Crossbody test: negative    Neer's positive  Hawkin's positive    Maria C's positive  Drop arm negative  Belly press negative  IMAGING:  X-rays of the left shoulder, personally reviewed by me, demonstrate mild degenerative changes.  No fracture or dislocation.    ASSESSMENT/PLAN:   61 y.o. year old male with left shoulder rotator cuff tendonitis    Plan: Discussed with the patient at length all the different treatment options available for his left shoulder including anti-inflammatories, acetaminophen, rest, ice, Physical therapy, occasional cortisone injections for temporary relief, and shoulder arthroscopy    - He elected to proceed with left shoulder CSI today  - Rest, ice, activity modification  - HEP  - Follow up in 4 weeks

## 2022-08-19 NOTE — PROCEDURES
Large Joint Aspiration/Injection: L subacromial bursa    Date/Time: 8/18/2022 3:30 PM  Performed by: Amy Campbell PA-C  Authorized by: Amy Campbell PA-C     Consent Done?:  Yes (Verbal)  Indications:  Pain  Prep: patient was prepped and draped in usual sterile fashion    Local anesthetic:  Topical anesthetic    Details:  Needle Size:  22 G  Approach:  Posterior  Location:  Shoulder  Site:  L subacromial bursa  Medications:  40 mg triamcinolone acetonide 40 mg/mL  Patient tolerance:  Patient tolerated the procedure well with no immediate complications

## 2022-09-13 ENCOUNTER — OFFICE VISIT (OUTPATIENT)
Dept: PAIN MEDICINE | Facility: CLINIC | Age: 62
End: 2022-09-13
Payer: COMMERCIAL

## 2022-09-13 VITALS
HEART RATE: 72 BPM | TEMPERATURE: 97 F | HEIGHT: 67 IN | DIASTOLIC BLOOD PRESSURE: 83 MMHG | BODY MASS INDEX: 25.08 KG/M2 | SYSTOLIC BLOOD PRESSURE: 129 MMHG | WEIGHT: 159.81 LBS

## 2022-09-13 DIAGNOSIS — M47.816 LUMBAR SPONDYLOSIS: ICD-10-CM

## 2022-09-13 DIAGNOSIS — M54.17 LUMBOSACRAL RADICULOPATHY: Primary | ICD-10-CM

## 2022-09-13 PROCEDURE — 99999 PR PBB SHADOW E&M-EST. PATIENT-LVL III: ICD-10-PCS | Mod: PBBFAC,,, | Performed by: NURSE PRACTITIONER

## 2022-09-13 PROCEDURE — 4010F ACE/ARB THERAPY RXD/TAKEN: CPT | Mod: CPTII,S$GLB,, | Performed by: NURSE PRACTITIONER

## 2022-09-13 PROCEDURE — 1160F PR REVIEW ALL MEDS BY PRESCRIBER/CLIN PHARMACIST DOCUMENTED: ICD-10-PCS | Mod: CPTII,S$GLB,, | Performed by: NURSE PRACTITIONER

## 2022-09-13 PROCEDURE — 3079F PR MOST RECENT DIASTOLIC BLOOD PRESSURE 80-89 MM HG: ICD-10-PCS | Mod: CPTII,S$GLB,, | Performed by: NURSE PRACTITIONER

## 2022-09-13 PROCEDURE — 3061F PR NEG MICROALBUMINURIA RESULT DOCUMENTED/REVIEW: ICD-10-PCS | Mod: CPTII,S$GLB,, | Performed by: NURSE PRACTITIONER

## 2022-09-13 PROCEDURE — 3008F PR BODY MASS INDEX (BMI) DOCUMENTED: ICD-10-PCS | Mod: CPTII,S$GLB,, | Performed by: NURSE PRACTITIONER

## 2022-09-13 PROCEDURE — 1159F PR MEDICATION LIST DOCUMENTED IN MEDICAL RECORD: ICD-10-PCS | Mod: CPTII,S$GLB,, | Performed by: NURSE PRACTITIONER

## 2022-09-13 PROCEDURE — 3044F PR MOST RECENT HEMOGLOBIN A1C LEVEL <7.0%: ICD-10-PCS | Mod: CPTII,S$GLB,, | Performed by: NURSE PRACTITIONER

## 2022-09-13 PROCEDURE — 3008F BODY MASS INDEX DOCD: CPT | Mod: CPTII,S$GLB,, | Performed by: NURSE PRACTITIONER

## 2022-09-13 PROCEDURE — 3072F PR LOW RISK FOR RETINOPATHY: ICD-10-PCS | Mod: CPTII,S$GLB,, | Performed by: NURSE PRACTITIONER

## 2022-09-13 PROCEDURE — 4010F PR ACE/ARB THEARPY RXD/TAKEN: ICD-10-PCS | Mod: CPTII,S$GLB,, | Performed by: NURSE PRACTITIONER

## 2022-09-13 PROCEDURE — 1159F MED LIST DOCD IN RCRD: CPT | Mod: CPTII,S$GLB,, | Performed by: NURSE PRACTITIONER

## 2022-09-13 PROCEDURE — 99213 PR OFFICE/OUTPT VISIT, EST, LEVL III, 20-29 MIN: ICD-10-PCS | Mod: S$GLB,,, | Performed by: NURSE PRACTITIONER

## 2022-09-13 PROCEDURE — 3072F LOW RISK FOR RETINOPATHY: CPT | Mod: CPTII,S$GLB,, | Performed by: NURSE PRACTITIONER

## 2022-09-13 PROCEDURE — 1160F RVW MEDS BY RX/DR IN RCRD: CPT | Mod: CPTII,S$GLB,, | Performed by: NURSE PRACTITIONER

## 2022-09-13 PROCEDURE — 3074F SYST BP LT 130 MM HG: CPT | Mod: CPTII,S$GLB,, | Performed by: NURSE PRACTITIONER

## 2022-09-13 PROCEDURE — 3066F PR DOCUMENTATION OF TREATMENT FOR NEPHROPATHY: ICD-10-PCS | Mod: CPTII,S$GLB,, | Performed by: NURSE PRACTITIONER

## 2022-09-13 PROCEDURE — 99213 OFFICE O/P EST LOW 20 MIN: CPT | Mod: S$GLB,,, | Performed by: NURSE PRACTITIONER

## 2022-09-13 PROCEDURE — 3079F DIAST BP 80-89 MM HG: CPT | Mod: CPTII,S$GLB,, | Performed by: NURSE PRACTITIONER

## 2022-09-13 PROCEDURE — 3074F PR MOST RECENT SYSTOLIC BLOOD PRESSURE < 130 MM HG: ICD-10-PCS | Mod: CPTII,S$GLB,, | Performed by: NURSE PRACTITIONER

## 2022-09-13 PROCEDURE — 3044F HG A1C LEVEL LT 7.0%: CPT | Mod: CPTII,S$GLB,, | Performed by: NURSE PRACTITIONER

## 2022-09-13 PROCEDURE — 99999 PR PBB SHADOW E&M-EST. PATIENT-LVL III: CPT | Mod: PBBFAC,,, | Performed by: NURSE PRACTITIONER

## 2022-09-13 PROCEDURE — 3061F NEG MICROALBUMINURIA REV: CPT | Mod: CPTII,S$GLB,, | Performed by: NURSE PRACTITIONER

## 2022-09-13 PROCEDURE — 3066F NEPHROPATHY DOC TX: CPT | Mod: CPTII,S$GLB,, | Performed by: NURSE PRACTITIONER

## 2022-09-13 NOTE — H&P (VIEW-ONLY)
Chronic Pain - Established Visit    Referring Physician: No ref. provider found    Chief Complaint:   No chief complaint on file.       SUBJECTIVE: Disclaimer: This note has been generated using voice-recognition software. There may be typographical errors that have been missed during proof-reading    Interval History 9/13/2022:  Renny is here to discuss worsened back and leg pain. The pain radiates from the lower back into the back of both legs to anterior shin and great toe. He has associated numbness. He is diabetic but states that he has not had major problems with his sugars recently. No recent trauma. He previously underwent right L5/S1 and S1 TF JEAN MARIE with 80% relief for about 2 months. During that time he was able to ambulate and work without limitation by pain. Today, he again reports severe pain which is affecting his daily activities. He has been in PT exercises for the past 15 weeks without much benefit of symptoms. He has tried ice and heat without benefit. He continues to take Gabapentin which provides some benefit. His overall pain today is 8/10.    Interval History 6/7/2022:  The patient returns to clinic today for follow up of low back pain. He reports increased low back pain over the last two weeks. He reports low back pain that radiates into the posterior aspect of his right leg to the bottom of his foot. He denies any left leg pain. His pain is worse with prolonged standing and walking. He reports that previous TF JEAN MARIE in 2021 provided 80% relief. He did notice that it took 3-4 weeks to see full benefit. He continues to take Gabapentin. He continues to perform a home exercise routine as prescribed. He denies any weakness. He denies any other health changes. His pain today is 5/10.    Interval history 07/02/2021:  The patient presents for follow-up lower back pain and right leg radiculopathy.  He is status post repeat right-sided TF JEAN MARIE to L5/S1&S1.  He has difficulty quantifying pain relief but  states that normally his leg pain is 100% resolved and this time pain persists.  He is not have any recent images.  His medication regimen includes gabapentin 300 mg states this is mildly beneficial or denies any adverse side effects of medication.  Denies any focal loss of bowel, bladder or saddle paresthesias concerning for cauda equina.    Interval history 05/27/2021:  The patient presents for follow-up of lower back pain and bilateral leg radiculopathy.  He has had prior ILESI and TFESI both with 80%  benefit lasting approximately 9 months and pain just now returning. Pain is worse to right side at this time.  He denies any new areas of pain or neurological changes.The patient denies myelopathic symptoms such as handwriting changes or difficulty with buttons/coins/keys. Denies perineal paresthesias, bowel/bladder dysfunction.    Interval History 9/1/2020:  The patient is here for follow up of back and leg pain.  He is now s/p L5/S1 IL JEAN MARIE with 80% of back pain.  However, he is having significant right leg pain, mainly down the buttock and posterior calf.  He had benefit with right L5/S1 and S1 TF JEAN MARIE in the past and would like to repeat.  He has associated numbness to right calf, worse with walking.  He does have some benefit with Gabapentin.  He had a recent Covid test for work which was negative. His pain today is 5/10.    Interval History 6/9/2020:  The patient is here for follow up of chronic lower back pain.  We have not seen the patient since last year.  He underwent an epidural at that time and says that his pain has been very mild until a few weeks ago.  He is having pain across lower back with radiation into the legs, mainly on the left.  He continues to take gabapentin with some benefit.  The pain bothers him the most with walking and activity.  He had pacemaker replacement on 5/6/20.  He is not currently on blood thinners.  His pain today is 5/10.    Interval History 7/30/2019:  The patient returns  for follow up of back pain.  He is s/p repeat left then right L3,4,5 RFAs completed on 6/24/19 with about 80% relief.  His back pain is mild.  He has had increased leg pain recently, worse on the left side.  Previous leg pain was relieved with JEAN MARIE last year.  He would like to repeat this.  His leg pain bothers him mainly at night when trying to sleep.  He stopped Gabapentin when he was not having leg pain but recently restarted.  He is unable to take at night.  His pain today is 6/10.    Interval History 5/14/2019:  The patient returns for follow up.  He previously had benefit with JEAN MARIE for leg pain and RFAs for back pain.  He is having some back pain that has been worsening over the past couple of weeks.  He has significant pain in the morning.  He has some improvement when he moves around.  He says that cold air aggravates his pain.  He stopped Gabapentin last year when his leg pain improved.  He is not having much leg pain now.  His pain today is 6/10.    Interval History 2/7/2019:  The patient presents for check up of chronic back pain.  He reports doing well since last visit.  He feels that last JEAN MARIE is still providing him benefit.  He has not had any leg pain.  He does still have back pain but states that it is tolerable.  His morning pain is much improved from RFAs last year.  He continues to work and is active.  His pain today is 5/10.    Interval History 12/6/2018:  The patient presents today for follow up.  He continues with pain to the lower back.  He is not having leg pain at this time.  He had some relief with RFAs with the past.  He stopped Gabapentin when his leg pain improved.  He takes Tylenol sparingly with some benefit.  His pain today is 5/10.  The patient denies any bowel or bladder incontinence or signs of saddle paresthesia.  The patient denies any major medical changes since last office visit.    Interval History 10/25/2018:  The patient returns for follow up of back and leg pain.  He is s/p  L5/S1 IL JEAN MARIE on 10/11/18 with 80% pain relief for his legs.  He has had increased lower back pain over the past few days which he attributes to weather changes.  He describes it as aching.  He has not been stretching.  He does walk a lot for work.  He cannot take oral NSAIDs due to pacemaker placement.  His pain today is 8/10.     Interval History 9/11/2018:  The patient presents for procedure follow up appointment.  He is s/p left then right L3,4,5 RFA completed on 8/14/18 with 80% pain relief initially.  He has had a little more pain over the past week.  He is now having intermittent radiation into the back of both legs, left greater than right.  He previously had benefit with right sided TF JEAN MARIE.  He is still taking gabapentin.  His pain today is 5/10.    Interval History 7/25/2018:  The patient returns today for follow up of back pain.  He is s/p bilateral L3,4,5 MBB on 7/5/18 with 100% relief for 2 days.  He previously had benefit with right TF JEAN MARIE for leg pain.  He has not had right leg pain since the epidural.  However, he is reporting lateral left leg pain that has progressed over the past couple of weeks.  He continues to be active and works.  His pain today is 5/10.    Interval History 6/20/2018:  The patient returns today for follow up of lower back and right leg pain.  He is s/p right L5 and S1 TF JEAN MARIE on 6/5/18 with 100% relief of right leg pain.  He has not had any leg pain since the procedure.  He continues to report pain across the lower back.  This is always worse first thing in the morning.  He states that this feels aching and throbbing in nature.  He did have recent lumbar CT scan.  He would like to discuss further procedures.  He continues to work and be active.  His pain today is 5/10.    Interval History 5/22/2018:  The patient returns for follow up of back pain.  He is having radiation down there back of the right leg to the posterior calf.  The back pain is most severe in the morning and he  states that this feels like a stiffness.  This improves when he walks.  He has severe leg pain that is intermittent, mainly with activity.  This is his biggest complaint.  He did complete the medrol dose pack recently with limited improvement.  He had XRAYs which show severe loss of disc space at L5/S1.  He has not had a CT scan.  He is taking Gabapentin 300 mg at bedtime.  It is written BID but it makes him drowsy during the day.  He has some benefit with Aleve but has been told to avoid NSAIDs due to history of pacemaker placement.  His pain today is 5/10.     Initial encounter:    Renny Young presents to the clinic for the evaluation of lower back and right leg pain. The pain started two months ago and symptoms have been worsening.    Brief history:    Pain Description:    The pain is located in the lower back and right leg area in the L5/S1 distribution.      At BEST  5/10     At WORST  7/10 on the WORST day.      On average pain is rated as 5/10.     Today the pain is rated as 5/10    The pain is described as aching      Symptoms interfere with daily activity.     Exacerbating factors: Standing and Morning.      Mitigating factors medications.     Patient denies night fever/night sweats, urinary incontinence, bowel incontinence, significant weight loss, significant motor weakness and loss of sensations.  Patient denies any suicidal or homicidal ideations    Pain Medications:  Current:  OTC Tylenol PRN  Gabapentin 300 mg BID    Tried in Past:  NSAIDs - Aleve  TCA -Never  SNRI -Never  Anti-convulsants - Gabapentin   Muscle Relaxants -Never  Opioids-Never    Physical Therapy/Home Exercise: yes     report:  Reviewed and consistent with medication use as prescribed.    Pain Procedures:   6/5/18 Right L5 and S1 TF JEAN MARIE- significant relief  7/5/18 Bilateral L3,4,5 MBB- 100% relief for 2 days  7/31/18 Left L3,4,5 RFA- 80% relief  8/14/18 Right L3,4,5 RFA- 80% relief  10/11/18 L5/S1 IL JEAN MARIE- 80% relief  6/11/19 Left  L3,4,5 RFA- 80% relief  6/25/19 Right L3,4,5 RFA- 80% relief  8/13/19 L5/S1 IL JEAN MARIE- 90% relief for 9 months  6/16/20 L5/S1 IL JEAN MARIE- 80% relief of back pain  6/15/2021- Right L5/S1 and S1 TF JEAN MARIE  6/20/22 Right L5 and S1 TF JEAN MARIE- 80% relief for 2 months    Chiropractor -never  Acupuncture - never  TENS unit -never  Spinal decompression -never  Joint replacement -never    Imaging:   CT Lumbar Spine 7/9/2021:  COMPARISON:  Lumbar spine CT May 28, 2018     FINDINGS:  Mild levocurvature of the lumbar spine.  No listhesis.  There is no acute fracture.  The vertebral bodies are normal in height without compression fractures. Posterior elements are intact. There is unchanged severe disc height loss at the L5-S1 level with associated sclerosis and subchondral cystic change within the endplates.  Mild atherosclerotic calcification within the abdominal aorta which is not aneurysmal.  Otherwise, no soft tissue abnormality identified.     T12-L1: The disc is normal in configuration.  There is no facet arthropathy.  There is no neural foraminal stenosis.  There is no spinal canal stenosis.     L1-L2: The disc is normal in configuration.  There is no facet arthropathy.  There is no neuroforaminal stenosis.  There is no spinal canal stenosis.     L2-L3: The disc is normal in configuration.  There is no facet arthropathy.  There is no neuroforaminal stenosis.  There is no spinal canal stenosis.     L3-L4: Minimal diffuse disc bulge.  Mild bilateral facet arthropathy.  There is no neuroforaminal stenosis.  There is no spinal canal stenosis.     L4-L5: There is a diffuse disc bulge.  Mild-to-moderate bilateral facet arthropathy.  There is no neuroforaminal stenosis.  There is no spinal canal stenosis.     L5-S1: There is a circumferential disc osteophyte complex secondary to the severe degenerative disc disease at this level.  Mild to moderate bilateral facet arthropathy.  Unchanged mild bilateral neural foraminal stenosis secondary to  diffuse disc osteophyte complex as well as the facet arthropathy.  There is no spinal canal stenosis.     Impression:     Inferior lumbar spine degenerative changes worst at L5-S1 which result in mild bilateral neural foraminal stenosis at this level.  Overall, findings are not significantly changed compared to prior study from May 2018.        Past Medical History:   Diagnosis Date    Colon polyp     Diabetes mellitus, type 2     Hypertension     Pacemaker     Pacemaker at end of battery life 5/6/2020     Past Surgical History:   Procedure Laterality Date    COLONOSCOPY N/A 2/13/2017    Procedure: COLONOSCOPY;  Surgeon: NILDA Juares MD;  Location: [facility] ([address] [place] [address]);  Service: Endoscopy;  Laterality: N/A;  Do not cancel this order. Patient has Pacemaker in place.     EPIDURAL STEROID INJECTION N/A 8/13/2019    Procedure: INJECTION, STEROID, EPIDURAL IL, L5/S1;  Surgeon: Josue Paredes MD;  Location: [facility] PAIN [facility]GT;  Service: Pain Management;  Laterality: N/A;  IL JEAN MARIE L5/S1    EPIDURAL STEROID INJECTION N/A 6/16/2020    Procedure: INJECTION, STEROID, EPIDURAL, L5-S1 IL add on @ 2 ok by  Elsah;  Surgeon: Josue Paredes MD;  Location: [facility] PAIN [facility]GT;  Service: Pain Management;  Laterality: N/A;    HERNIA REPAIR      INJECTION OF ANESTHETIC AGENT AROUND NERVE Bilateral 7/5/2018    Procedure: BLOCK, NERVE;  Surgeon: Krystal Mtz MD;  Location: [facility]IN [facility]GT;  Service: Pain Management;  Laterality: Bilateral;  Lumbar Bilateral L3-L4-L5 Medial Branch Block    RADIOFREQUENCY ABLATION Left 6/11/2019    Procedure: RADIOFREQUENCY ABLATION, L3-L4-L5 MEDIAL BRANCH   1 OF 2;  Surgeon: Josue Paredes MD;  Location: [facility];  Service: Pain Management;  Laterality: Left;    RADIOFREQUENCY ABLATION Right 6/25/2019    Procedure: RADIOFREQUENCY ABLATION, L3-L4-L5 MEDIAL BRANCH JING 2 OF 2;  Surgeon: Josue Paredes MD;  Location: [facility]IN [facility];  Service: Pain Management;  Laterality: Right;    REPLACEMENT OF  PACEMAKER GENERATOR Left 5/6/2020    Procedure: REPLACEMENT, PULSE GENERATOR, CARDIAC PACEMAKER;  Surgeon: Bradford Spence MD;  Location: [facility] EP LAB;  Service: Cardiology;  Laterality: Left;  EOL/LEAD Mlfx, Gen Change, Poss RA lead rev, SJM, MAC, GP, 3 PREP    REVISION OF PROCEDURE INVOLVING PACEMAKER LEAD Left 5/6/2020    Procedure: REVISION, ELECTRODE LEAD, CARDIAC PACEMAKER;  Surgeon: Bradford Spence MD;  Location: [facility] EP LAB;  Service: Cardiology;  Laterality: Left;    TRANSFORAMINAL EPIDURAL INJECTION OF STEROID Right 6/5/2018    Procedure: INJECTION-STEROID-EPIDURAL-TRANSFORAMINAL;  Surgeon: Josue Paredes MD;  Location: [facility] PAIN [facility]GT;  Service: Pain Management;  Laterality: Right;  LUMBAR RIGHT L5 AND S1 TRANSFORAMINL JEAN MARIE  [id number]    W/ SEDATION     TRANSFORAMINAL EPIDURAL INJECTION OF STEROID Right 9/10/2020    Procedure: INJECTION, STEROID, EPIDURAL, TRANSFORAMINAL APPROACH, L5-S1 AND S1;  Surgeon: Josue Paredes MD;  Location: [facility] PAIN [facility]GT;  Service: Pain Management;  Laterality: Right;    TRANSFORAMINAL EPIDURAL INJECTION OF STEROID Right 6/15/2021    Procedure: INJECTION, STEROID, EPIDURAL, TRANSFORAMINAL APPROACH L5-S1 AND S1 need consent;  Surgeon: Josue Paredes MD;  Location: [facility] PAIN [facility]GT;  Service: Pain Management;  Laterality: Right;    TRANSFORAMINAL EPIDURAL INJECTION OF STEROID Right 6/20/2022    Procedure: INJECTION, STEROID, EPIDURAL, TRANSFORAMINAL APPROACH, RIGHT L5-S1 AND S1 CONTRAST;  Surgeon: Krystal Mtz MD;  Location: [facility] PAIN [facility]GT;  Service: Pain Management;  Laterality: Right;     Social History     Socioeconomic History    Marital status:     Number of children: 3   Occupational History    Occupation:    Tobacco Use    Smoking status: Every Day     Packs/day: 0.50     Types: Cigarettes    Smokeless tobacco: Never   Substance and Sexual Activity    Alcohol use: Yes     Comment: Beer- Socially    Drug use: No    Sexual activity: Yes     Family History    Problem Relation Age of Onset    Diabetes Mother     Diabetes Father     Kidney disease Father     Melanoma Neg Hx        Review of patient's allergies indicates:  No Known Allergies    Current Outpatient Medications   Medication Sig    amLODIPine (NORVASC) 5 MG tablet Take 1 tablet (5 mg total) by mouth once daily.    aspirin (ECOTRIN) 81 MG EC tablet Take 81 mg by mouth once daily.    atorvastatin (LIPITOR) 20 MG tablet Take 1 tablet (20 mg total) by mouth once daily.    gabapentin (NEURONTIN) 300 MG capsule Take 1 capsule by mouth twice daily    losartan-hydrochlorothiazide 100-12.5 mg (HYZAAR) 100-12.5 mg Tab Take 1 tablet by mouth once daily.    metFORMIN (GLUCOPHAGE) 500 MG tablet Take 1 tablet (500 mg total) by mouth daily with breakfast.    podofilox (CONDYLOX) 0.5 % gel Apply topically 2 (two) times daily.     No current facility-administered medications for this visit.       REVIEW OF SYSTEMS:    GENERAL:  No weight loss, malaise or fevers.  HEENT:   No recent changes in vision or hearing  NECK:  Negative for lumps, no difficulty with swallowing.  RESPIRATORY:  Negative for cough, wheezing or shortness of breath, patient denies any recent URI.  CARDIOVASCULAR:  Negative for chest pain, leg swelling or palpitations. Pacemaker for SSS.  GI:  Negative for abdominal discomfort, blood in stools or black stools or change in bowel habits.  MUSCULOSKELETAL:  See HPI.  SKIN:  Negative for lesions, rash, and itching.  PSYCH:  No mood disorder or recent psychosocial stressors.  Patients sleep is not disturbed secondary to pain.  HEMATOLOGY/LYMPHOLOGY:  Negative for prolonged bleeding, bruising easily or swollen nodes.  Patient is not currently taking any anti-coagulants  ENDO: DM2 on metformin  NEURO:   No history of headaches, syncope, paralysis, seizures or tremors.  All other reviewed and negative other than HPI.    OBJECTIVE:    /83 (BP Location: Right arm, Patient Position: Sitting, BP Method: Medium  "(Automatic))   Pulse 72   Temp 97.3 °F (36.3 °C)   Ht 5' 7" (1.702 m)   Wt 72.5 kg (159 lb 13.3 oz)   BMI 25.03 kg/m²     PHYSICAL EXAMINATION:    GENERAL: Well appearing, in no acute distress, alert and oriented x3.  PSYCH:  Mood and affect appropriate.  SKIN: Skin color, texture, turgor normal, no rashes or lesions.  HEAD/FACE:  Normocephalic, atraumatic. Cranial nerves grossly intact.  CV: RRR with palpation of the radial artery.  PULM: No evidence of respiratory difficulty, symmetric chest rise.  BACK: Straight leg raising in the sitting position is positive to radicular pain at a bilateral L5 distribution. There is no pain with palpation over the facet joints of the lumbar spine bilaterally. Limited ROM with pain on flexion and extension. Mildly positive facet loading bilaterally.   EXTREMITIES: No deformities, edema, or skin discoloration. Good capillary refill.  MUSCULOSKELETAL:There is no pain with palpation over the sacroiliac joints bilaterally. There is no pain to palpation over the greater trochanteric bursa bilaterally. FABERs test is negative.  FADIRs test is negative.  5/5 strength in right ankle with plantar and dorsiflexion, 5/5 strength in left ankle with plantar and dorsiflexion, 5/5 strength with right knee flexion extension, 5/5 strength with knee flexion extension on the left. Bilateral EHL is 4/5.  No atrophy or tone abnormalities are noted.  NEURO: Bilateral lower extremity coordination and muscle stretch reflexes are physiologic and symmetric.  Plantar response are downgoing. No clonus.  Decreased sensation to BLE at L5 distribution.  GAIT: Antalgic- ambulates without assistance.      ASSESSMENT: 61 y.o. year old male with lower back pain, consistent with the following diagnoses:    Encounter Diagnoses   Name Primary?    Lumbosacral radiculopathy Yes    Lumbar spondylosis          PLAN:     - Previous imaging was reviewed and discussed with the patient today. Symptoms consistent with " L5/S1 disc bulge and bilateral NF narrowing.    - Schedule for bilateral L5/S1 TF JEAN MARIE to help with above symptoms.    - Continue Gabapentin 300 mg BID. He is unable to tolerate higher dosages.    - The patient will continue a home exercise routine to help with pain and strengthening.    - RTC 2 weeks after above procedure.       The above plan and management options were discussed at length with patient. Patient is in agreement with the above and verbalized understanding.     Trista Oviedo  09/13/2022

## 2022-09-13 NOTE — PROGRESS NOTES
Chronic Pain - Established Visit    Referring Physician: No ref. provider found    Chief Complaint:   No chief complaint on file.       SUBJECTIVE: Disclaimer: This note has been generated using voice-recognition software. There may be typographical errors that have been missed during proof-reading    Interval History 9/13/2022:  Renny is here to discuss worsened back and leg pain. The pain radiates from the lower back into the back of both legs to anterior shin and great toe. He has associated numbness. He is diabetic but states that he has not had major problems with his sugars recently. No recent trauma. He previously underwent right L5/S1 and S1 TF JEAN MARIE with 80% relief for about 2 months. During that time he was able to ambulate and work without limitation by pain. Today, he again reports severe pain which is affecting his daily activities. He has been in PT exercises for the past 15 weeks without much benefit of symptoms. He has tried ice and heat without benefit. He continues to take Gabapentin which provides some benefit. His overall pain today is 8/10.    Interval History 6/7/2022:  The patient returns to clinic today for follow up of low back pain. He reports increased low back pain over the last two weeks. He reports low back pain that radiates into the posterior aspect of his right leg to the bottom of his foot. He denies any left leg pain. His pain is worse with prolonged standing and walking. He reports that previous TF JEAN MARIE in 2021 provided 80% relief. He did notice that it took 3-4 weeks to see full benefit. He continues to take Gabapentin. He continues to perform a home exercise routine as prescribed. He denies any weakness. He denies any other health changes. His pain today is 5/10.    Interval history 07/02/2021:  The patient presents for follow-up lower back pain and right leg radiculopathy.  He is status post repeat right-sided TF JEAN MARIE to L5/S1&S1.  He has difficulty quantifying pain relief but  states that normally his leg pain is 100% resolved and this time pain persists.  He is not have any recent images.  His medication regimen includes gabapentin 300 mg states this is mildly beneficial or denies any adverse side effects of medication.  Denies any focal loss of bowel, bladder or saddle paresthesias concerning for cauda equina.    Interval history 05/27/2021:  The patient presents for follow-up of lower back pain and bilateral leg radiculopathy.  He has had prior ILESI and TFESI both with 80%  benefit lasting approximately 9 months and pain just now returning. Pain is worse to right side at this time.  He denies any new areas of pain or neurological changes.The patient denies myelopathic symptoms such as handwriting changes or difficulty with buttons/coins/keys. Denies perineal paresthesias, bowel/bladder dysfunction.    Interval History 9/1/2020:  The patient is here for follow up of back and leg pain.  He is now s/p L5/S1 IL JEAN MARIE with 80% of back pain.  However, he is having significant right leg pain, mainly down the buttock and posterior calf.  He had benefit with right L5/S1 and S1 TF JEAN MARIE in the past and would like to repeat.  He has associated numbness to right calf, worse with walking.  He does have some benefit with Gabapentin.  He had a recent Covid test for work which was negative. His pain today is 5/10.    Interval History 6/9/2020:  The patient is here for follow up of chronic lower back pain.  We have not seen the patient since last year.  He underwent an epidural at that time and says that his pain has been very mild until a few weeks ago.  He is having pain across lower back with radiation into the legs, mainly on the left.  He continues to take gabapentin with some benefit.  The pain bothers him the most with walking and activity.  He had pacemaker replacement on 5/6/20.  He is not currently on blood thinners.  His pain today is 5/10.    Interval History 7/30/2019:  The patient returns  for follow up of back pain.  He is s/p repeat left then right L3,4,5 RFAs completed on 6/24/19 with about 80% relief.  His back pain is mild.  He has had increased leg pain recently, worse on the left side.  Previous leg pain was relieved with JEAN MARIE last year.  He would like to repeat this.  His leg pain bothers him mainly at night when trying to sleep.  He stopped Gabapentin when he was not having leg pain but recently restarted.  He is unable to take at night.  His pain today is 6/10.    Interval History 5/14/2019:  The patient returns for follow up.  He previously had benefit with JEAN MARIE for leg pain and RFAs for back pain.  He is having some back pain that has been worsening over the past couple of weeks.  He has significant pain in the morning.  He has some improvement when he moves around.  He says that cold air aggravates his pain.  He stopped Gabapentin last year when his leg pain improved.  He is not having much leg pain now.  His pain today is 6/10.    Interval History 2/7/2019:  The patient presents for check up of chronic back pain.  He reports doing well since last visit.  He feels that last JEAN MARIE is still providing him benefit.  He has not had any leg pain.  He does still have back pain but states that it is tolerable.  His morning pain is much improved from RFAs last year.  He continues to work and is active.  His pain today is 5/10.    Interval History 12/6/2018:  The patient presents today for follow up.  He continues with pain to the lower back.  He is not having leg pain at this time.  He had some relief with RFAs with the past.  He stopped Gabapentin when his leg pain improved.  He takes Tylenol sparingly with some benefit.  His pain today is 5/10.  The patient denies any bowel or bladder incontinence or signs of saddle paresthesia.  The patient denies any major medical changes since last office visit.    Interval History 10/25/2018:  The patient returns for follow up of back and leg pain.  He is s/p  L5/S1 IL JEAN MARIE on 10/11/18 with 80% pain relief for his legs.  He has had increased lower back pain over the past few days which he attributes to weather changes.  He describes it as aching.  He has not been stretching.  He does walk a lot for work.  He cannot take oral NSAIDs due to pacemaker placement.  His pain today is 8/10.     Interval History 9/11/2018:  The patient presents for procedure follow up appointment.  He is s/p left then right L3,4,5 RFA completed on 8/14/18 with 80% pain relief initially.  He has had a little more pain over the past week.  He is now having intermittent radiation into the back of both legs, left greater than right.  He previously had benefit with right sided TF JEAN MARIE.  He is still taking gabapentin.  His pain today is 5/10.    Interval History 7/25/2018:  The patient returns today for follow up of back pain.  He is s/p bilateral L3,4,5 MBB on 7/5/18 with 100% relief for 2 days.  He previously had benefit with right TF JEAN MARIE for leg pain.  He has not had right leg pain since the epidural.  However, he is reporting lateral left leg pain that has progressed over the past couple of weeks.  He continues to be active and works.  His pain today is 5/10.    Interval History 6/20/2018:  The patient returns today for follow up of lower back and right leg pain.  He is s/p right L5 and S1 TF JEAN MARIE on 6/5/18 with 100% relief of right leg pain.  He has not had any leg pain since the procedure.  He continues to report pain across the lower back.  This is always worse first thing in the morning.  He states that this feels aching and throbbing in nature.  He did have recent lumbar CT scan.  He would like to discuss further procedures.  He continues to work and be active.  His pain today is 5/10.    Interval History 5/22/2018:  The patient returns for follow up of back pain.  He is having radiation down there back of the right leg to the posterior calf.  The back pain is most severe in the morning and he  states that this feels like a stiffness.  This improves when he walks.  He has severe leg pain that is intermittent, mainly with activity.  This is his biggest complaint.  He did complete the medrol dose pack recently with limited improvement.  He had XRAYs which show severe loss of disc space at L5/S1.  He has not had a CT scan.  He is taking Gabapentin 300 mg at bedtime.  It is written BID but it makes him drowsy during the day.  He has some benefit with Aleve but has been told to avoid NSAIDs due to history of pacemaker placement.  His pain today is 5/10.     Initial encounter:    Renny Young presents to the clinic for the evaluation of lower back and right leg pain. The pain started two months ago and symptoms have been worsening.    Brief history:    Pain Description:    The pain is located in the lower back and right leg area in the L5/S1 distribution.      At BEST  5/10     At WORST  7/10 on the WORST day.      On average pain is rated as 5/10.     Today the pain is rated as 5/10    The pain is described as aching      Symptoms interfere with daily activity.     Exacerbating factors: Standing and Morning.      Mitigating factors medications.     Patient denies night fever/night sweats, urinary incontinence, bowel incontinence, significant weight loss, significant motor weakness and loss of sensations.  Patient denies any suicidal or homicidal ideations    Pain Medications:  Current:  OTC Tylenol PRN  Gabapentin 300 mg BID    Tried in Past:  NSAIDs - Aleve  TCA -Never  SNRI -Never  Anti-convulsants - Gabapentin   Muscle Relaxants -Never  Opioids-Never    Physical Therapy/Home Exercise: yes     report:  Reviewed and consistent with medication use as prescribed.    Pain Procedures:   6/5/18 Right L5 and S1 TF JEAN MARIE- significant relief  7/5/18 Bilateral L3,4,5 MBB- 100% relief for 2 days  7/31/18 Left L3,4,5 RFA- 80% relief  8/14/18 Right L3,4,5 RFA- 80% relief  10/11/18 L5/S1 IL JEAN MARIE- 80% relief  6/11/19 Left  L3,4,5 RFA- 80% relief  6/25/19 Right L3,4,5 RFA- 80% relief  8/13/19 L5/S1 IL JEAN MARIE- 90% relief for 9 months  6/16/20 L5/S1 IL JEAN MARIE- 80% relief of back pain  6/15/2021- Right L5/S1 and S1 TF JEAN MARIE  6/20/22 Right L5 and S1 TF JEAN MARIE- 80% relief for 2 months    Chiropractor -never  Acupuncture - never  TENS unit -never  Spinal decompression -never  Joint replacement -never    Imaging:   CT Lumbar Spine 7/9/2021:  COMPARISON:  Lumbar spine CT May 28, 2018     FINDINGS:  Mild levocurvature of the lumbar spine.  No listhesis.  There is no acute fracture.  The vertebral bodies are normal in height without compression fractures. Posterior elements are intact. There is unchanged severe disc height loss at the L5-S1 level with associated sclerosis and subchondral cystic change within the endplates.  Mild atherosclerotic calcification within the abdominal aorta which is not aneurysmal.  Otherwise, no soft tissue abnormality identified.     T12-L1: The disc is normal in configuration.  There is no facet arthropathy.  There is no neural foraminal stenosis.  There is no spinal canal stenosis.     L1-L2: The disc is normal in configuration.  There is no facet arthropathy.  There is no neuroforaminal stenosis.  There is no spinal canal stenosis.     L2-L3: The disc is normal in configuration.  There is no facet arthropathy.  There is no neuroforaminal stenosis.  There is no spinal canal stenosis.     L3-L4: Minimal diffuse disc bulge.  Mild bilateral facet arthropathy.  There is no neuroforaminal stenosis.  There is no spinal canal stenosis.     L4-L5: There is a diffuse disc bulge.  Mild-to-moderate bilateral facet arthropathy.  There is no neuroforaminal stenosis.  There is no spinal canal stenosis.     L5-S1: There is a circumferential disc osteophyte complex secondary to the severe degenerative disc disease at this level.  Mild to moderate bilateral facet arthropathy.  Unchanged mild bilateral neural foraminal stenosis secondary to  diffuse disc osteophyte complex as well as the facet arthropathy.  There is no spinal canal stenosis.     Impression:     Inferior lumbar spine degenerative changes worst at L5-S1 which result in mild bilateral neural foraminal stenosis at this level.  Overall, findings are not significantly changed compared to prior study from May 2018.        Past Medical History:   Diagnosis Date    Colon polyp     Diabetes mellitus, type 2     Hypertension     Pacemaker     Pacemaker at end of battery life 5/6/2020     Past Surgical History:   Procedure Laterality Date    COLONOSCOPY N/A 2/13/2017    Procedure: COLONOSCOPY;  Surgeon: NILDA Juares MD;  Location: [facility] ([address] [place] [address]);  Service: Endoscopy;  Laterality: N/A;  Do not cancel this order. Patient has Pacemaker in place.     EPIDURAL STEROID INJECTION N/A 8/13/2019    Procedure: INJECTION, STEROID, EPIDURAL IL, L5/S1;  Surgeon: Josue Paredes MD;  Location: [facility] PAIN MGT;  Service: Pain Management;  Laterality: N/A;  IL JEAN MARIE L5/S1    EPIDURAL STEROID INJECTION N/A 6/16/2020    Procedure: INJECTION, STEROID, EPIDURAL, L5-S1 IL add on @ 2 ok by  [place];  Surgeon: Josue Paredes MD;  Location: [facility] PAIN [facility]GT;  Service: Pain Management;  Laterality: N/A;    HERNIA REPAIR      INJECTION OF ANESTHETIC AGENT AROUND NERVE Bilateral 7/5/2018    Procedure: BLOCK, NERVE;  Surgeon: Krystal Mtz MD;  Location: [facility] PAIN [facility]GT;  Service: Pain Management;  Laterality: Bilateral;  Lumbar Bilateral L3-L4-L5 Medial Branch Block    RADIOFREQUENCY ABLATION Left 6/11/2019    Procedure: RADIOFREQUENCY ABLATION, L3-L4-L5 MEDIAL BRANCH   1 OF 2;  Surgeon: Josue Paredes MD;  Location: [facility] PAIN [facility]GT;  Service: Pain Management;  Laterality: Left;    RADIOFREQUENCY ABLATION Right 6/25/2019    Procedure: RADIOFREQUENCY ABLATION, L3-L4-L5 MEDIAL BRANCH JING 2 OF 2;  Surgeon: Josue Paredes MD;  Location: [facility]IN [facility];  Service: Pain Management;  Laterality: Right;    REPLACEMENT OF  PACEMAKER GENERATOR Left 5/6/2020    Procedure: REPLACEMENT, PULSE GENERATOR, CARDIAC PACEMAKER;  Surgeon: Bradford Spence MD;  Location: [facility] EP LAB;  Service: Cardiology;  Laterality: Left;  EOL/LEAD Mlfx, Gen Change, Poss RA lead rev, SJM, MAC, GP, 3 PREP    REVISION OF PROCEDURE INVOLVING PACEMAKER LEAD Left 5/6/2020    Procedure: REVISION, ELECTRODE LEAD, CARDIAC PACEMAKER;  Surgeon: Bradford Spence MD;  Location: [facility] EP LAB;  Service: Cardiology;  Laterality: Left;    TRANSFORAMINAL EPIDURAL INJECTION OF STEROID Right 6/5/2018    Procedure: INJECTION-STEROID-EPIDURAL-TRANSFORAMINAL;  Surgeon: Josue Paredes MD;  Location: [facility] PAIN [facility]GT;  Service: Pain Management;  Laterality: Right;  LUMBAR RIGHT L5 AND S1 TRANSFORAMINL JEAN MARIE  [id number]    W/ SEDATION     TRANSFORAMINAL EPIDURAL INJECTION OF STEROID Right 9/10/2020    Procedure: INJECTION, STEROID, EPIDURAL, TRANSFORAMINAL APPROACH, L5-S1 AND S1;  Surgeon: Josue Paredes MD;  Location: [facility] PAIN [facility]GT;  Service: Pain Management;  Laterality: Right;    TRANSFORAMINAL EPIDURAL INJECTION OF STEROID Right 6/15/2021    Procedure: INJECTION, STEROID, EPIDURAL, TRANSFORAMINAL APPROACH L5-S1 AND S1 need consent;  Surgeon: Josue Paredes MD;  Location: [facility] PAIN [facility]GT;  Service: Pain Management;  Laterality: Right;    TRANSFORAMINAL EPIDURAL INJECTION OF STEROID Right 6/20/2022    Procedure: INJECTION, STEROID, EPIDURAL, TRANSFORAMINAL APPROACH, RIGHT L5-S1 AND S1 CONTRAST;  Surgeon: Krystal Mtz MD;  Location: [facility]IN [facility];  Service: Pain Management;  Laterality: Right;     Social History     Socioeconomic History    Marital status:     Number of children: 3   Occupational History    Occupation:    Tobacco Use    Smoking status: Every Day     Packs/day: 0.50     Types: Cigarettes    Smokeless tobacco: Never   Substance and Sexual Activity    Alcohol use: Yes     Comment: Beer- Socially    Drug use: No    Sexual activity: Yes     Family History    Problem Relation Age of Onset    Diabetes Mother     Diabetes Father     Kidney disease Father     Melanoma Neg Hx        Review of patient's allergies indicates:  No Known Allergies    Current Outpatient Medications   Medication Sig    amLODIPine (NORVASC) 5 MG tablet Take 1 tablet (5 mg total) by mouth once daily.    aspirin (ECOTRIN) 81 MG EC tablet Take 81 mg by mouth once daily.    atorvastatin (LIPITOR) 20 MG tablet Take 1 tablet (20 mg total) by mouth once daily.    gabapentin (NEURONTIN) 300 MG capsule Take 1 capsule by mouth twice daily    losartan-hydrochlorothiazide 100-12.5 mg (HYZAAR) 100-12.5 mg Tab Take 1 tablet by mouth once daily.    metFORMIN (GLUCOPHAGE) 500 MG tablet Take 1 tablet (500 mg total) by mouth daily with breakfast.    podofilox (CONDYLOX) 0.5 % gel Apply topically 2 (two) times daily.     No current facility-administered medications for this visit.       REVIEW OF SYSTEMS:    GENERAL:  No weight loss, malaise or fevers.  HEENT:   No recent changes in vision or hearing  NECK:  Negative for lumps, no difficulty with swallowing.  RESPIRATORY:  Negative for cough, wheezing or shortness of breath, patient denies any recent URI.  CARDIOVASCULAR:  Negative for chest pain, leg swelling or palpitations. Pacemaker for SSS.  GI:  Negative for abdominal discomfort, blood in stools or black stools or change in bowel habits.  MUSCULOSKELETAL:  See HPI.  SKIN:  Negative for lesions, rash, and itching.  PSYCH:  No mood disorder or recent psychosocial stressors.  Patients sleep is not disturbed secondary to pain.  HEMATOLOGY/LYMPHOLOGY:  Negative for prolonged bleeding, bruising easily or swollen nodes.  Patient is not currently taking any anti-coagulants  ENDO: DM2 on metformin  NEURO:   No history of headaches, syncope, paralysis, seizures or tremors.  All other reviewed and negative other than HPI.    OBJECTIVE:    /83 (BP Location: Right arm, Patient Position: Sitting, BP Method: Medium  "(Automatic))   Pulse 72   Temp 97.3 °F (36.3 °C)   Ht 5' 7" (1.702 m)   Wt 72.5 kg (159 lb 13.3 oz)   BMI 25.03 kg/m²     PHYSICAL EXAMINATION:    GENERAL: Well appearing, in no acute distress, alert and oriented x3.  PSYCH:  Mood and affect appropriate.  SKIN: Skin color, texture, turgor normal, no rashes or lesions.  HEAD/FACE:  Normocephalic, atraumatic. Cranial nerves grossly intact.  CV: RRR with palpation of the radial artery.  PULM: No evidence of respiratory difficulty, symmetric chest rise.  BACK: Straight leg raising in the sitting position is positive to radicular pain at a bilateral L5 distribution. There is no pain with palpation over the facet joints of the lumbar spine bilaterally. Limited ROM with pain on flexion and extension. Mildly positive facet loading bilaterally.   EXTREMITIES: No deformities, edema, or skin discoloration. Good capillary refill.  MUSCULOSKELETAL:There is no pain with palpation over the sacroiliac joints bilaterally. There is no pain to palpation over the greater trochanteric bursa bilaterally. FABERs test is negative.  FADIRs test is negative.  5/5 strength in right ankle with plantar and dorsiflexion, 5/5 strength in left ankle with plantar and dorsiflexion, 5/5 strength with right knee flexion extension, 5/5 strength with knee flexion extension on the left. Bilateral EHL is 4/5.  No atrophy or tone abnormalities are noted.  NEURO: Bilateral lower extremity coordination and muscle stretch reflexes are physiologic and symmetric.  Plantar response are downgoing. No clonus.  Decreased sensation to BLE at L5 distribution.  GAIT: Antalgic- ambulates without assistance.      ASSESSMENT: 61 y.o. year old male with lower back pain, consistent with the following diagnoses:    Encounter Diagnoses   Name Primary?    Lumbosacral radiculopathy Yes    Lumbar spondylosis          PLAN:     - Previous imaging was reviewed and discussed with the patient today. Symptoms consistent with " L5/S1 disc bulge and bilateral NF narrowing.    - Schedule for bilateral L5/S1 TF JEAN MARIE to help with above symptoms.    - Continue Gabapentin 300 mg BID. He is unable to tolerate higher dosages.    - The patient will continue a home exercise routine to help with pain and strengthening.    - RTC 2 weeks after above procedure.       The above plan and management options were discussed at length with patient. Patient is in agreement with the above and verbalized understanding.     Trista Oviedo  09/13/2022

## 2022-09-16 ENCOUNTER — IMMUNIZATION (OUTPATIENT)
Dept: INTERNAL MEDICINE | Facility: CLINIC | Age: 62
End: 2022-09-16
Payer: COMMERCIAL

## 2022-09-16 PROCEDURE — 90471 FLU VACCINE (QUAD) GREATER THAN OR EQUAL TO 3YO PRESERVATIVE FREE IM: ICD-10-PCS | Mod: S$GLB,,, | Performed by: INTERNAL MEDICINE

## 2022-09-16 PROCEDURE — 90471 IMMUNIZATION ADMIN: CPT | Mod: S$GLB,,, | Performed by: INTERNAL MEDICINE

## 2022-09-16 PROCEDURE — 90686 IIV4 VACC NO PRSV 0.5 ML IM: CPT | Mod: S$GLB,,, | Performed by: INTERNAL MEDICINE

## 2022-09-16 PROCEDURE — 90686 FLU VACCINE (QUAD) GREATER THAN OR EQUAL TO 3YO PRESERVATIVE FREE IM: ICD-10-PCS | Mod: S$GLB,,, | Performed by: INTERNAL MEDICINE

## 2022-09-24 DIAGNOSIS — K63.5 POLYP OF COLON, UNSPECIFIED PART OF COLON, UNSPECIFIED TYPE: Primary | ICD-10-CM

## 2022-09-26 ENCOUNTER — HOSPITAL ENCOUNTER (OUTPATIENT)
Facility: OTHER | Age: 62
Discharge: HOME | End: 2022-09-26
Attending: ANESTHESIOLOGY | Admitting: ANESTHESIOLOGY
Payer: COMMERCIAL

## 2022-09-26 VITALS
SYSTOLIC BLOOD PRESSURE: 128 MMHG | HEIGHT: 67 IN | RESPIRATION RATE: 16 BRPM | BODY MASS INDEX: 24.33 KG/M2 | WEIGHT: 155 LBS | TEMPERATURE: 98 F | OXYGEN SATURATION: 97 % | HEART RATE: 62 BPM | DIASTOLIC BLOOD PRESSURE: 78 MMHG

## 2022-09-26 DIAGNOSIS — M54.16 LUMBAR RADICULOPATHY: Primary | ICD-10-CM

## 2022-09-26 DIAGNOSIS — G89.29 CHRONIC PAIN: ICD-10-CM

## 2022-09-26 LAB — POCT GLUCOSE: 93 MG/DL (ref 70–110)

## 2022-09-26 PROCEDURE — 64483 NJX AA&/STRD TFRM EPI L/S 1: CPT | Mod: 50 | Performed by: ANESTHESIOLOGY

## 2022-09-26 PROCEDURE — 99152 PR MOD CONSCIOUS SEDATION, SAME PHYS, 5+ YRS, FIRST 15 MIN: ICD-10-PCS | Mod: ,,, | Performed by: ANESTHESIOLOGY

## 2022-09-26 PROCEDURE — 99152 MOD SED SAME PHYS/QHP 5/>YRS: CPT | Mod: ,,, | Performed by: ANESTHESIOLOGY

## 2022-09-26 PROCEDURE — 25000003 PHARM REV CODE 250: Performed by: ANESTHESIOLOGY

## 2022-09-26 PROCEDURE — 63600175 PHARM REV CODE 636 W HCPCS: Performed by: ANESTHESIOLOGY

## 2022-09-26 PROCEDURE — 64483 NJX AA&/STRD TFRM EPI L/S 1: CPT | Mod: 50,,, | Performed by: ANESTHESIOLOGY

## 2022-09-26 PROCEDURE — 25500020 PHARM REV CODE 255: Performed by: ANESTHESIOLOGY

## 2022-09-26 PROCEDURE — 99152 MOD SED SAME PHYS/QHP 5/>YRS: CPT | Performed by: ANESTHESIOLOGY

## 2022-09-26 PROCEDURE — 25000003 PHARM REV CODE 250: Performed by: STUDENT IN AN ORGANIZED HEALTH CARE EDUCATION/TRAINING PROGRAM

## 2022-09-26 PROCEDURE — 64483 PR EPIDURAL INJ, ANES/STEROID, TRANSFORAMINAL, LUMB/SACR, SNGL LEVL: ICD-10-PCS | Mod: 50,,, | Performed by: ANESTHESIOLOGY

## 2022-09-26 RX ORDER — SODIUM CHLORIDE 9 MG/ML
500 INJECTION, SOLUTION INTRAVENOUS CONTINUOUS
Status: DISCONTINUED | OUTPATIENT
Start: 2022-09-26 | End: 2022-09-26 | Stop reason: HOSPADM

## 2022-09-26 RX ORDER — LIDOCAINE HYDROCHLORIDE 10 MG/ML
INJECTION, SOLUTION EPIDURAL; INFILTRATION; INTRACAUDAL; PERINEURAL
Status: DISCONTINUED | OUTPATIENT
Start: 2022-09-26 | End: 2022-09-26 | Stop reason: HOSPADM

## 2022-09-26 RX ORDER — FENTANYL CITRATE 50 UG/ML
INJECTION, SOLUTION INTRAMUSCULAR; INTRAVENOUS
Status: DISCONTINUED | OUTPATIENT
Start: 2022-09-26 | End: 2022-09-26 | Stop reason: HOSPADM

## 2022-09-26 RX ORDER — MIDAZOLAM HYDROCHLORIDE 1 MG/ML
INJECTION INTRAMUSCULAR; INTRAVENOUS
Status: DISCONTINUED | OUTPATIENT
Start: 2022-09-26 | End: 2022-09-26 | Stop reason: HOSPADM

## 2022-09-26 RX ORDER — LIDOCAINE HYDROCHLORIDE 20 MG/ML
INJECTION, SOLUTION INFILTRATION; PERINEURAL
Status: DISCONTINUED | OUTPATIENT
Start: 2022-09-26 | End: 2022-09-26 | Stop reason: HOSPADM

## 2022-09-26 RX ORDER — DEXAMETHASONE SODIUM PHOSPHATE 10 MG/ML
INJECTION INTRAMUSCULAR; INTRAVENOUS
Status: DISCONTINUED | OUTPATIENT
Start: 2022-09-26 | End: 2022-09-26 | Stop reason: HOSPADM

## 2022-09-26 NOTE — DISCHARGE SUMMARY
Discharge Note  Short Stay      SUMMARY     Admit Date: 9/26/2022    Attending Physician: Krystal Mtz      Discharge Physician: Krystal Mtz      Discharge Date: 9/26/2022 3:11 PM    Procedure(s) (LRB):  INJECTION, STEROID, EPIDURAL, TRANSFORAMINAL APPROACH, BILATERAL L5-S1 CONTRAST (Bilateral)    Final Diagnosis: Lumbosacral radiculopathy [M54.17]    Disposition: Home or self care    Patient Instructions:   Current Discharge Medication List        CONTINUE these medications which have NOT CHANGED    Details   amLODIPine (NORVASC) 5 MG tablet Take 1 tablet (5 mg total) by mouth once daily.  Qty: 90 tablet, Refills: 1    Comments: .  Associated Diagnoses: Hypertension, essential      aspirin (ECOTRIN) 81 MG EC tablet Take 81 mg by mouth once daily.      atorvastatin (LIPITOR) 20 MG tablet Take 1 tablet (20 mg total) by mouth once daily.  Qty: 90 tablet, Refills: 1    Associated Diagnoses: Hyperlipidemia, unspecified hyperlipidemia type      gabapentin (NEURONTIN) 300 MG capsule Take 1 capsule by mouth twice daily  Qty: 60 capsule, Refills: 0    Associated Diagnoses: Diabetic polyneuropathy associated with type 2 diabetes mellitus      losartan-hydrochlorothiazide 100-12.5 mg (HYZAAR) 100-12.5 mg Tab Take 1 tablet by mouth once daily.  Qty: 90 tablet, Refills: 1    Comments: .  Associated Diagnoses: Hypertension, essential      metFORMIN (GLUCOPHAGE) 500 MG tablet Take 1 tablet (500 mg total) by mouth daily with breakfast.  Qty: 90 tablet, Refills: 1    Associated Diagnoses: Type 2 diabetes mellitus with other specified complication, without long-term current use of insulin      podofilox (CONDYLOX) 0.5 % gel Apply topically 2 (two) times daily.  Qty: 3.5 g, Refills: 2    Associated Diagnoses: Condyloma                 Discharge Diagnosis: Lumbosacral radiculopathy [M54.17]  Condition on Discharge: Stable with no complications to procedure   Diet on Discharge: Same as before.  Activity: as per instruction  sheet.  Discharge to: Home with a responsible adult.  Follow up: 2-4 weeks       Please call my office or pager at [phone] if experienced any weakness or loss of sensation, fever > 101.5, pain uncontrolled with oral medications, persistent nausea/vomiting/or diarrhea, redness or drainage from the incisions, or any other worrisome concerns. If physician on call was not reached or could not communicate with our office for any reason please go to the nearest emergency department

## 2022-09-26 NOTE — DISCHARGE INSTRUCTIONS

## 2022-09-26 NOTE — OP NOTE
Lumbar Transforaminal Epidural Steroid Injection under Fluoroscopic Guidance    The procedure, risks, benefits, and options were discussed with the patient. There are no contraindications to the procedure. The patent expressed understanding and agreed to the procedure. Informed written consent was obtained prior to the start of the procedure and can be found in the patient's chart.    PATIENT NAME: Renny Young   MRN: [id number]     DATE OF PROCEDURE: 09/26/2022    PROCEDURE:  Bilateral  L5/S1 Lumbar Transforaminal Epidural Steroid Injection under Fluoroscopic Guidance    PRE-OP DIAGNOSIS: Lumbosacral radiculopathy [M54.17] Lumbar radiculopathy [M54.16]    POST-OP DIAGNOSIS: Same    PHYSICIAN: Krystal Mtz MD    ASSISTANTS: Fede Jonas MD     MEDICATIONS INJECTED: Preservative-free Decadron 10mg with 5cc of Lidocaine 1% MPF     LOCAL ANESTHETIC INJECTED: Xylocaine 2%     SEDATION: Versed 2mg and Fentanyl 25mcg                                                                                                                                                                                     Conscious sedation ordered by M.D. Patient re-evaluation prior to administration of conscious sedation. No changes noted in patient's status from initial evaluation. The patient's vital signs were monitored by RN and patient remained hemodynamically stable throughout the procedure.    Event Time In   Sedation Start 1455   Sedation End 1507       ESTIMATED BLOOD LOSS: None    COMPLICATIONS: None    TECHNIQUE: Time-out was performed to identify the patient and procedure to be performed. With the patient laying in a prone position, the surgical area was prepped and draped in the usual sterile fashion using ChloraPrep and a fenestrated drape.The levels were determined under fluoroscopy guidance. Skin anesthesia was achieved by injecting Lidocaine 2% over the injection sites. The transforaminal spaces were then approached with a 22 gauge,  3.5 inch spinal quinke needle that was introduced under fluoroscopic guidance in the AP and Lateral views. Once the needle tip was in the area of the transforaminal space, and there was no blood, CSF or paraesthesias, contrast dye Omnipaque (240mg/mL) was injected to confirm placement and there was no vascular runoff. Fluoroscopic imaging in the AP and lateral views revealed a clear outline of the spinal nerve with proximal spread of agent through the neural foramen into the epidural space. 3 mL of the medication mixture listed above was injected slowly at each site. Displacement of the radio opaque contrast after injection of the medication confirmed that the medication went into the area of the transforaminal spaces. The needles were removed and bleeding was nil. A sterile dressing was applied. No specimens collected. The patient tolerated the procedure well.     PAIN BEFORE THE PROCEDURE: 9-10/10    PAIN AFTER THE PROCEDURE: 0/10    The patient was monitored after the procedure in the recovery area. They were given post-procedure and discharge instructions to follow at home. The patient was discharged in a stable condition.        Krystal Mtz MD

## 2022-09-28 ENCOUNTER — OFFICE VISIT (OUTPATIENT)
Dept: UROLOGY | Facility: CLINIC | Age: 62
End: 2022-09-28
Attending: FAMILY MEDICINE
Payer: COMMERCIAL

## 2022-09-28 VITALS
BODY MASS INDEX: 25.11 KG/M2 | SYSTOLIC BLOOD PRESSURE: 159 MMHG | DIASTOLIC BLOOD PRESSURE: 94 MMHG | WEIGHT: 160 LBS | HEART RATE: 72 BPM | HEIGHT: 67 IN

## 2022-09-28 DIAGNOSIS — E11.42 DIABETIC POLYNEUROPATHY ASSOCIATED WITH TYPE 2 DIABETES MELLITUS: Primary | ICD-10-CM

## 2022-09-28 DIAGNOSIS — E11.9 TYPE 2 DIABETES MELLITUS WITHOUT COMPLICATION, UNSPECIFIED WHETHER LONG TERM INSULIN USE: ICD-10-CM

## 2022-09-28 DIAGNOSIS — L90.5 SCAR: ICD-10-CM

## 2022-09-28 DIAGNOSIS — A63.0 CONDYLOMA ACUMINATA: ICD-10-CM

## 2022-09-28 PROCEDURE — 3080F DIAST BP >= 90 MM HG: CPT | Mod: CPTII,S$GLB,, | Performed by: UROLOGY

## 2022-09-28 PROCEDURE — 1160F PR REVIEW ALL MEDS BY PRESCRIBER/CLIN PHARMACIST DOCUMENTED: ICD-10-PCS | Mod: CPTII,S$GLB,, | Performed by: UROLOGY

## 2022-09-28 PROCEDURE — 1160F RVW MEDS BY RX/DR IN RCRD: CPT | Mod: CPTII,S$GLB,, | Performed by: UROLOGY

## 2022-09-28 PROCEDURE — 3072F PR LOW RISK FOR RETINOPATHY: ICD-10-PCS | Mod: CPTII,S$GLB,, | Performed by: UROLOGY

## 2022-09-28 PROCEDURE — 3044F PR MOST RECENT HEMOGLOBIN A1C LEVEL <7.0%: ICD-10-PCS | Mod: CPTII,S$GLB,, | Performed by: UROLOGY

## 2022-09-28 PROCEDURE — 4010F PR ACE/ARB THEARPY RXD/TAKEN: ICD-10-PCS | Mod: CPTII,S$GLB,, | Performed by: UROLOGY

## 2022-09-28 PROCEDURE — 3044F HG A1C LEVEL LT 7.0%: CPT | Mod: CPTII,S$GLB,, | Performed by: UROLOGY

## 2022-09-28 PROCEDURE — 3061F NEG MICROALBUMINURIA REV: CPT | Mod: CPTII,S$GLB,, | Performed by: UROLOGY

## 2022-09-28 PROCEDURE — 99213 OFFICE O/P EST LOW 20 MIN: CPT | Mod: S$GLB,,, | Performed by: UROLOGY

## 2022-09-28 PROCEDURE — 3080F PR MOST RECENT DIASTOLIC BLOOD PRESSURE >= 90 MM HG: ICD-10-PCS | Mod: CPTII,S$GLB,, | Performed by: UROLOGY

## 2022-09-28 PROCEDURE — 3008F PR BODY MASS INDEX (BMI) DOCUMENTED: ICD-10-PCS | Mod: CPTII,S$GLB,, | Performed by: UROLOGY

## 2022-09-28 PROCEDURE — 99999 PR PBB SHADOW E&M-EST. PATIENT-LVL III: CPT | Mod: PBBFAC,,, | Performed by: UROLOGY

## 2022-09-28 PROCEDURE — 3066F NEPHROPATHY DOC TX: CPT | Mod: CPTII,S$GLB,, | Performed by: UROLOGY

## 2022-09-28 PROCEDURE — 3077F SYST BP >= 140 MM HG: CPT | Mod: CPTII,S$GLB,, | Performed by: UROLOGY

## 2022-09-28 PROCEDURE — 3077F PR MOST RECENT SYSTOLIC BLOOD PRESSURE >= 140 MM HG: ICD-10-PCS | Mod: CPTII,S$GLB,, | Performed by: UROLOGY

## 2022-09-28 PROCEDURE — 3008F BODY MASS INDEX DOCD: CPT | Mod: CPTII,S$GLB,, | Performed by: UROLOGY

## 2022-09-28 PROCEDURE — 4010F ACE/ARB THERAPY RXD/TAKEN: CPT | Mod: CPTII,S$GLB,, | Performed by: UROLOGY

## 2022-09-28 PROCEDURE — 3072F LOW RISK FOR RETINOPATHY: CPT | Mod: CPTII,S$GLB,, | Performed by: UROLOGY

## 2022-09-28 PROCEDURE — 3066F PR DOCUMENTATION OF TREATMENT FOR NEPHROPATHY: ICD-10-PCS | Mod: CPTII,S$GLB,, | Performed by: UROLOGY

## 2022-09-28 PROCEDURE — 99999 PR PBB SHADOW E&M-EST. PATIENT-LVL III: ICD-10-PCS | Mod: PBBFAC,,, | Performed by: UROLOGY

## 2022-09-28 PROCEDURE — 1159F MED LIST DOCD IN RCRD: CPT | Mod: CPTII,S$GLB,, | Performed by: UROLOGY

## 2022-09-28 PROCEDURE — 3061F PR NEG MICROALBUMINURIA RESULT DOCUMENTED/REVIEW: ICD-10-PCS | Mod: CPTII,S$GLB,, | Performed by: UROLOGY

## 2022-09-28 PROCEDURE — 1159F PR MEDICATION LIST DOCUMENTED IN MEDICAL RECORD: ICD-10-PCS | Mod: CPTII,S$GLB,, | Performed by: UROLOGY

## 2022-09-28 PROCEDURE — 99213 PR OFFICE/OUTPT VISIT, EST, LEVL III, 20-29 MIN: ICD-10-PCS | Mod: S$GLB,,, | Performed by: UROLOGY

## 2022-09-28 RX ORDER — HYDROCHLOROTHIAZIDE 12.5 MG/1
12.5 TABLET ORAL DAILY
COMMUNITY
Start: 2022-09-26 | End: 2022-12-27

## 2022-09-28 NOTE — PROGRESS NOTES
"Urology - [facility]  Clinic Note    SUBJECTIVE:     Chief Complaint: condyloma    History of Present Illness:  Renny Young is a 61 y.o. male who presents to clinic for condyoma. He is established to our clinic to our clinic. Referral from Jarvis Washington MD     Had condyloma that was treated with cryo by dermatology. Patient feels like it got worse. It really bothers him.       Anticoagulation:  No    OBJECTIVE:     Estimated body mass index is 25.06 kg/m² as calculated from the following:    Height as of this encounter: 5' 7" (1.702 m).    Weight as of this encounter: 72.6 kg (160 lb).    Vital Signs (Most Recent)  Pulse: 72 (09/28/22 1223)  BP: (!) 159/94 (09/28/22 1223)    Physical Exam  Genitourinary:     Comments: Has skin lesions/sclerosis very firm dorsal foreskin.       Lab Results   Component Value Date    BUN 16 07/26/2022    CREATININE 1.1 07/26/2022    WBC 8.23 05/06/2020    HGB 16.3 05/06/2020    HCT 52.1 05/06/2020     05/06/2020    AST 23 07/26/2022    ALT 25 07/26/2022    ALKPHOS 73 07/26/2022    ALBUMIN 4.6 07/26/2022    HGBA1C 6.4 (H) 07/26/2022        Lab Results   Component Value Date    PSA 1.3 07/26/2022    PSA 1.6 02/04/2021    PSA 0.79 02/17/2020    PSA 1.0 01/21/2019    PSA 1.2 04/06/2018    PSA 0.98 08/13/2016    PSA 0.75 06/13/2015       Imaging:none        ASSESSMENT     1. Diabetic polyneuropathy associated with type 2 diabetes mellitus    2. Scar    3. Condyloma acuminata    4. Type 2 diabetes mellitus without complication, unspecified whether long term insulin use      PLAN:   Diagnoses and all orders for this visit:    Diabetic polyneuropathy associated with type 2 diabetes mellitus    Scar  -     Ambulatory referral/consult to Urology    Condyloma acuminata  -     Excision of Benign Lesion; Future    Type 2 diabetes mellitus without complication, unspecified whether long term insulin use    Plan for skin excision of condyloma.     Shanell Cuenca MD "     Letter to Jarvis Washington MD

## 2022-09-30 ENCOUNTER — TELEPHONE (OUTPATIENT)
Dept: ELECTROPHYSIOLOGY | Facility: CLINIC | Age: 62
End: 2022-09-30
Payer: COMMERCIAL

## 2022-09-30 NOTE — TELEPHONE ENCOUNTER
Called pt to confirm appointment on Monday with Dr. Spence. Pt verbally confirmed appointment date, time, and location, and thanked me for calling.

## 2022-10-03 ENCOUNTER — CLINICAL SUPPORT (OUTPATIENT)
Dept: CARDIOLOGY | Facility: HOSPITAL | Age: 62
End: 2022-10-03
Attending: INTERNAL MEDICINE
Payer: COMMERCIAL

## 2022-10-03 ENCOUNTER — OFFICE VISIT (OUTPATIENT)
Dept: ELECTROPHYSIOLOGY | Facility: CLINIC | Age: 62
End: 2022-10-03
Payer: COMMERCIAL

## 2022-10-03 ENCOUNTER — HOSPITAL ENCOUNTER (OUTPATIENT)
Dept: CARDIOLOGY | Facility: CLINIC | Age: 62
Discharge: HOME | End: 2022-10-03
Payer: COMMERCIAL

## 2022-10-03 VITALS
BODY MASS INDEX: 25.06 KG/M2 | SYSTOLIC BLOOD PRESSURE: 100 MMHG | HEIGHT: 67 IN | WEIGHT: 159.63 LBS | DIASTOLIC BLOOD PRESSURE: 70 MMHG | HEART RATE: 67 BPM

## 2022-10-03 DIAGNOSIS — I49.5 SICK SINUS SYNDROME: Primary | ICD-10-CM

## 2022-10-03 DIAGNOSIS — I49.5 SICK SINUS SYNDROME: ICD-10-CM

## 2022-10-03 DIAGNOSIS — Z95.0 PACEMAKER: ICD-10-CM

## 2022-10-03 DIAGNOSIS — E78.2 MIXED HYPERLIPIDEMIA: ICD-10-CM

## 2022-10-03 DIAGNOSIS — I10 HYPERTENSION, ESSENTIAL: ICD-10-CM

## 2022-10-03 PROCEDURE — 99213 OFFICE O/P EST LOW 20 MIN: CPT | Mod: GC,S$GLB,, | Performed by: INTERNAL MEDICINE

## 2022-10-03 PROCEDURE — 1159F MED LIST DOCD IN RCRD: CPT | Mod: CPTII,S$GLB,, | Performed by: INTERNAL MEDICINE

## 2022-10-03 PROCEDURE — 3072F PR LOW RISK FOR RETINOPATHY: ICD-10-PCS | Mod: CPTII,S$GLB,, | Performed by: INTERNAL MEDICINE

## 2022-10-03 PROCEDURE — 3061F NEG MICROALBUMINURIA REV: CPT | Mod: CPTII,S$GLB,, | Performed by: INTERNAL MEDICINE

## 2022-10-03 PROCEDURE — 3044F HG A1C LEVEL LT 7.0%: CPT | Mod: CPTII,S$GLB,, | Performed by: INTERNAL MEDICINE

## 2022-10-03 PROCEDURE — 3072F LOW RISK FOR RETINOPATHY: CPT | Mod: CPTII,S$GLB,, | Performed by: INTERNAL MEDICINE

## 2022-10-03 PROCEDURE — 3061F PR NEG MICROALBUMINURIA RESULT DOCUMENTED/REVIEW: ICD-10-PCS | Mod: CPTII,S$GLB,, | Performed by: INTERNAL MEDICINE

## 2022-10-03 PROCEDURE — 93010 ELECTROCARDIOGRAM REPORT: CPT | Mod: S$GLB,,, | Performed by: INTERNAL MEDICINE

## 2022-10-03 PROCEDURE — 93279 PRGRMG DEV EVAL PM/LDLS PM: CPT

## 2022-10-03 PROCEDURE — 1160F PR REVIEW ALL MEDS BY PRESCRIBER/CLIN PHARMACIST DOCUMENTED: ICD-10-PCS | Mod: CPTII,S$GLB,, | Performed by: INTERNAL MEDICINE

## 2022-10-03 PROCEDURE — 1160F RVW MEDS BY RX/DR IN RCRD: CPT | Mod: CPTII,S$GLB,, | Performed by: INTERNAL MEDICINE

## 2022-10-03 PROCEDURE — 93279 PRGRMG DEV EVAL PM/LDLS PM: CPT | Mod: 26,,, | Performed by: INTERNAL MEDICINE

## 2022-10-03 PROCEDURE — 3066F NEPHROPATHY DOC TX: CPT | Mod: CPTII,S$GLB,, | Performed by: INTERNAL MEDICINE

## 2022-10-03 PROCEDURE — 3078F PR MOST RECENT DIASTOLIC BLOOD PRESSURE < 80 MM HG: ICD-10-PCS | Mod: CPTII,S$GLB,, | Performed by: INTERNAL MEDICINE

## 2022-10-03 PROCEDURE — 3074F PR MOST RECENT SYSTOLIC BLOOD PRESSURE < 130 MM HG: ICD-10-PCS | Mod: CPTII,S$GLB,, | Performed by: INTERNAL MEDICINE

## 2022-10-03 PROCEDURE — 4010F PR ACE/ARB THEARPY RXD/TAKEN: ICD-10-PCS | Mod: CPTII,S$GLB,, | Performed by: INTERNAL MEDICINE

## 2022-10-03 PROCEDURE — 3008F PR BODY MASS INDEX (BMI) DOCUMENTED: ICD-10-PCS | Mod: CPTII,S$GLB,, | Performed by: INTERNAL MEDICINE

## 2022-10-03 PROCEDURE — 3078F DIAST BP <80 MM HG: CPT | Mod: CPTII,S$GLB,, | Performed by: INTERNAL MEDICINE

## 2022-10-03 PROCEDURE — 93005 RHYTHM STRIP: ICD-10-PCS | Mod: S$GLB,,, | Performed by: INTERNAL MEDICINE

## 2022-10-03 PROCEDURE — 93279 CARDIAC DEVICE CHECK - IN CLINIC & HOSPITAL: ICD-10-PCS | Mod: 26,,, | Performed by: INTERNAL MEDICINE

## 2022-10-03 PROCEDURE — 99999 PR PBB SHADOW E&M-EST. PATIENT-LVL III: CPT | Mod: PBBFAC,,, | Performed by: INTERNAL MEDICINE

## 2022-10-03 PROCEDURE — 4010F ACE/ARB THERAPY RXD/TAKEN: CPT | Mod: CPTII,S$GLB,, | Performed by: INTERNAL MEDICINE

## 2022-10-03 PROCEDURE — 3066F PR DOCUMENTATION OF TREATMENT FOR NEPHROPATHY: ICD-10-PCS | Mod: CPTII,S$GLB,, | Performed by: INTERNAL MEDICINE

## 2022-10-03 PROCEDURE — 1159F PR MEDICATION LIST DOCUMENTED IN MEDICAL RECORD: ICD-10-PCS | Mod: CPTII,S$GLB,, | Performed by: INTERNAL MEDICINE

## 2022-10-03 PROCEDURE — 3044F PR MOST RECENT HEMOGLOBIN A1C LEVEL <7.0%: ICD-10-PCS | Mod: CPTII,S$GLB,, | Performed by: INTERNAL MEDICINE

## 2022-10-03 PROCEDURE — 99999 PR PBB SHADOW E&M-EST. PATIENT-LVL III: ICD-10-PCS | Mod: PBBFAC,,, | Performed by: INTERNAL MEDICINE

## 2022-10-03 PROCEDURE — 93010 RHYTHM STRIP: ICD-10-PCS | Mod: S$GLB,,, | Performed by: INTERNAL MEDICINE

## 2022-10-03 PROCEDURE — 3008F BODY MASS INDEX DOCD: CPT | Mod: CPTII,S$GLB,, | Performed by: INTERNAL MEDICINE

## 2022-10-03 PROCEDURE — 99213 PR OFFICE/OUTPT VISIT, EST, LEVL III, 20-29 MIN: ICD-10-PCS | Mod: GC,S$GLB,, | Performed by: INTERNAL MEDICINE

## 2022-10-03 PROCEDURE — 3074F SYST BP LT 130 MM HG: CPT | Mod: CPTII,S$GLB,, | Performed by: INTERNAL MEDICINE

## 2022-10-03 PROCEDURE — 93005 ELECTROCARDIOGRAM TRACING: CPT | Mod: S$GLB,,, | Performed by: INTERNAL MEDICINE

## 2022-10-03 NOTE — PROGRESS NOTES
Subjective:     HPI:    Mr. Young is a 61 y.o. male with SSS, PPM (2009), DM, and HTN here for annual follow up.     Renny Young has a history of hypertension and sick sinus syndrome. He had a [employer] dual chamber pacemaker implanted at [facility] in 7/2009 for syncope. Mr. Young describes that following implantation, he experienced discomfort when his RV lead was paced. Apparently his RV lead output was lowered, which improved his symptoms. At his last in 10/2016, he had GI complaints which he attributed to his RV pacing lead. At his bequest, Dr. Spence changed his programming to an AAI pacing mode at that time.    Device check performed in 4/2020 indicated his device is at EOS (reached KELLY on 10/3/2019, and consistent with early battery depletion based on past measurements). Additionally his atrial lead impedence, which had historically been stable, was measured in the office at 968-1500 ohms (3x above normal), with 38% RA pacing. Pacemaker generator change performed in 5/2020--no abnormalities with RA lead seen.    Echo in 12/2021 showed EF 65%. Exercise stress echo in 6/2022 was negative for ischemia.     No complaints today. Overall feeling well. Reports his wife is hospitalized at [facility] currently.    I reviewed today's device interrogation which shows 10% AP with rare short runs of AT. Lead impendence stable.     My interpretation of today's ECG is NSR at 67 bpm, ARS 84, Qtc 418     Review of Systems   Constitutional: Negative for malaise/fatigue.   Cardiovascular:  Negative for chest pain, dyspnea on exertion, irregular heartbeat, leg swelling and palpitations.   Respiratory:  Negative for shortness of breath.    Hematologic/Lymphatic: Negative for bleeding problem.   Skin:  Negative for rash.   Musculoskeletal:  Negative for myalgias.   Gastrointestinal:  Negative for heartburn, hematemesis, hematochezia and nausea.   Genitourinary:  Negative for hematuria.   Neurological:  Negative for  "light-headedness.   Psychiatric/Behavioral:  Negative for altered mental status.    Allergic/Immunologic: Negative for persistent infections.   Objective:        /70   Pulse 67   Ht 5' 7" (1.702 m)   Wt 72.4 kg (159 lb 9.8 oz)   BMI 25.00 kg/m²     Physical Exam  Vitals and nursing note reviewed.   Constitutional:       Appearance: Normal appearance. He is well-developed.   HENT:      Head: Normocephalic.      Nose: Nose normal.   Eyes:      Pupils: Pupils are equal, round, and reactive to light.   Cardiovascular:      Rate and Rhythm: Normal rate and regular rhythm.   Pulmonary:      Effort: No respiratory distress.      Breath sounds: Normal breath sounds.   Chest:      Comments: Device to LUCW. Incision and pocket in good repair.  Musculoskeletal:         General: Normal range of motion.   Skin:     General: Skin is warm and dry.      Findings: No erythema.   Neurological:      Mental Status: He is alert and oriented to person, place, and time.   Psychiatric:         Speech: Speech normal.         Behavior: Behavior normal.         Lab Results   Component Value Date     07/26/2022    K 4.1 07/26/2022    BUN 16 07/26/2022    CREATININE 1.1 07/26/2022    ALT 25 07/26/2022    AST 23 07/26/2022    HGB 16.3 05/06/2020    HCT 52.1 05/06/2020    TSH 1.194 04/06/2017    LDLCALC 67.2 07/26/2022           Assessment:     1. Sick sinus syndrome    2. Pacemaker    3. Hypertension, essential    4. Mixed hyperlipidemia      Plan:     In summary, Renny Young  is a 61 y.o. male with SSS, PPM (2009), DM, and HTN here for annual follow up.     Mr. Young is doing well from a device perspective with stable lead and device function. No sustained arrhythmia noted. RV lead off 2/2 belching.    Plan is for regular device checks, follow-up 1 year.    Thank you for allowing me to participate in the care of this patient. Please do not hesitate to call me with any questions or concerns.    "

## 2022-10-19 ENCOUNTER — PROCEDURE VISIT (OUTPATIENT)
Dept: UROLOGY | Facility: CLINIC | Age: 62
End: 2022-10-19
Payer: COMMERCIAL

## 2022-10-19 ENCOUNTER — TELEPHONE (OUTPATIENT)
Dept: UROLOGY | Facility: CLINIC | Age: 62
End: 2022-10-19
Payer: COMMERCIAL

## 2022-10-19 ENCOUNTER — TELEPHONE (OUTPATIENT)
Dept: PAIN MEDICINE | Facility: CLINIC | Age: 62
End: 2022-10-19
Payer: COMMERCIAL

## 2022-10-19 VITALS
HEIGHT: 67 IN | SYSTOLIC BLOOD PRESSURE: 153 MMHG | RESPIRATION RATE: 17 BRPM | BODY MASS INDEX: 24.48 KG/M2 | WEIGHT: 156 LBS | HEART RATE: 66 BPM | TEMPERATURE: 99 F | DIASTOLIC BLOOD PRESSURE: 76 MMHG

## 2022-10-19 DIAGNOSIS — A63.0 CONDYLOMA ACUMINATA: ICD-10-CM

## 2022-10-19 PROCEDURE — 88342 IMHCHEM/IMCYTCHM 1ST ANTB: CPT | Mod: 26,,, | Performed by: PATHOLOGY

## 2022-10-19 PROCEDURE — 88341 IMHCHEM/IMCYTCHM EA ADD ANTB: CPT | Performed by: PATHOLOGY

## 2022-10-19 PROCEDURE — 88305 TISSUE EXAM BY PATHOLOGIST: CPT | Mod: 26,,, | Performed by: PATHOLOGY

## 2022-10-19 PROCEDURE — 88305 TISSUE EXAM BY PATHOLOGIST: CPT | Performed by: PATHOLOGY

## 2022-10-19 PROCEDURE — 88305 TISSUE EXAM BY PATHOLOGIST: ICD-10-PCS | Mod: 26,,, | Performed by: PATHOLOGY

## 2022-10-19 PROCEDURE — 54060 PR DESTR PENIS LESN,SIMPL,SURG EXCIS: ICD-10-PCS | Mod: S$GLB,,, | Performed by: UROLOGY

## 2022-10-19 PROCEDURE — 88341 IMHCHEM/IMCYTCHM EA ADD ANTB: CPT | Mod: 26,,, | Performed by: PATHOLOGY

## 2022-10-19 PROCEDURE — 88341 PR IHC OR ICC EACH ADD'L SINGLE ANTIBODY  STAINPR: ICD-10-PCS | Mod: 26,,, | Performed by: PATHOLOGY

## 2022-10-19 PROCEDURE — 88342 CHG IMMUNOCYTOCHEMISTRY: ICD-10-PCS | Mod: 26,,, | Performed by: PATHOLOGY

## 2022-10-19 PROCEDURE — 54060 EXCISION OF PENIS LESION(S): CPT | Mod: S$GLB,,, | Performed by: UROLOGY

## 2022-10-19 PROCEDURE — 88342 IMHCHEM/IMCYTCHM 1ST ANTB: CPT | Performed by: PATHOLOGY

## 2022-10-19 RX ORDER — LIDOCAINE HYDROCHLORIDE 20 MG/ML
10 INJECTION, SOLUTION INFILTRATION; PERINEURAL
Status: COMPLETED | OUTPATIENT
Start: 2022-10-19 | End: 2022-10-19

## 2022-10-19 RX ADMIN — LIDOCAINE HYDROCHLORIDE 10 ML: 20 INJECTION, SOLUTION INFILTRATION; PERINEURAL at 10:10

## 2022-10-19 NOTE — PROCEDURES
Excision of Benign Lesion    Date/Time: 10/19/2022 9:30 AM  Performed by: Shanell Cuenca MD  Authorized by: Shanell Cuenca MD     Timeout: prior to procedure the correct patient, procedure, and site was verified    Prep: patient was prepped and draped in usual sterile fashion    Local anesthesia used?: Yes    Anesthesia:  Local infiltration  Local anesthetic:  Lidocaine 2% without epinephrine   I was present for the entire procedure.  Anesthesia:  Local infiltration  Local anesthetic:  Lidocaine 2% without epinephrine  Excision type:  Skin  Malignancy:  Benign  Excision size (cm):  1  Incision type:  Elliptical  Specimens?: Yes     Specimens submitted to pathology.   Hemostasis was obtained.  Estimated blood loss (cc):  1  Wound closure:  Simple  Sutures:  Other (comments)  Sterile dressings:  Gauze  Post-op diagnosis:  Same as pre-op diagnosis.   Needle, instrument, and sponge counts were correct.   Patient tolerated the procedure well with no immediate complications.   Post-operative instructions were provided for the patient.   Patient was discharged and will follow up for wound check and pathology results. and Patient was discharged and will follow up for wound check.  Comments:      Pre Procedure Diagnosis: dorsal penile condyloma midline    Post Procedure Diagnosis: Same    Procedure: Excision of dorsal penile condyloma    Anesthesia: 2% lidocaine with epinephrine - 1cc    Specimen: sent    Complications: none    Procedure: The risks and benefits were explained to the patient and consent was obtained.  The foreskin was shaved, prepped and draped in a sterile fashion.  The condyloma was identified. 1cc of 2% lidocaine without epinephrine was used for local analgesia. An elliptical incision was made.  The condyloma was removed taking care to stay very superficial due to the location over the urethra.  Electrocautery was used for hemostasis sparingly. The incision was closed with a 3.0 chromic  interrupted. The patient tolerated the procedure well.  Follow up in 7-10 days.   Bacitracin to skin

## 2022-10-19 NOTE — TELEPHONE ENCOUNTER
Mariah called patient to inform him that audi Oviedo wouldn't be in office on 10/21/22. Staff reschedule patient on 11/04/2022 at 3:00 pm with audi [place].

## 2022-10-19 NOTE — TELEPHONE ENCOUNTER
I spoke with patient who agreed to 's f/u plan below.    Follow up 1 week in clinic Wednesday afternoon. 1:30 overbook 1:30 slot

## 2022-10-19 NOTE — PATIENT INSTRUCTIONS
Excision Fulguration Penile Lesion  Discharge Instructions    You have had a procedure that will require time to properly heal. Follow the instructions you have been given on how to care for yourself once you are home. Below is additional information to help in your recovery.    ACTIVITY AND DIET  There are no restrictions in activity. Start doing again the things you did before the procedure.  You may experience a slight discomfort after the procedure. This will diminish within a few days.  Continue your normal diet. You may eat the same foods you hate before your procedure.    MEDICATIONS AND SITE CARE  Resume all other previous medications from your prescribing physician.  Continue any pre-procedure antibiotics until they are all gone.  Keep the area clean and apply antimicrobial ointment 2-3 times per day.  If you have a suture in place, it will dissolve. You do not need to return to the clinic for removal.    SIGNS AND SYMPTOMS TO REPORT TO THE DOCTOR  Chills or fever greater than 101° F within 24 hours of procedure.  Pain or discomfort that continues beyond 72 hours of procedure.  Call your doctor with any questions or concerns.    For any emergency situation, call 911 immediately or go to your nearest emergency room.    [facility]  [phone]

## 2022-10-26 ENCOUNTER — OFFICE VISIT (OUTPATIENT)
Dept: UROLOGY | Facility: CLINIC | Age: 62
End: 2022-10-26
Payer: COMMERCIAL

## 2022-10-26 VITALS
BODY MASS INDEX: 23.53 KG/M2 | HEART RATE: 110 BPM | WEIGHT: 149.94 LBS | SYSTOLIC BLOOD PRESSURE: 146 MMHG | DIASTOLIC BLOOD PRESSURE: 93 MMHG | HEIGHT: 67 IN

## 2022-10-26 DIAGNOSIS — A63.0 CONDYLOMA ACUMINATA: Primary | ICD-10-CM

## 2022-10-26 PROCEDURE — 3072F LOW RISK FOR RETINOPATHY: CPT | Mod: CPTII,S$GLB,, | Performed by: UROLOGY

## 2022-10-26 PROCEDURE — 3080F DIAST BP >= 90 MM HG: CPT | Mod: CPTII,S$GLB,, | Performed by: UROLOGY

## 2022-10-26 PROCEDURE — 3066F NEPHROPATHY DOC TX: CPT | Mod: CPTII,S$GLB,, | Performed by: UROLOGY

## 2022-10-26 PROCEDURE — 99999 PR PBB SHADOW E&M-EST. PATIENT-LVL III: CPT | Mod: PBBFAC,,, | Performed by: UROLOGY

## 2022-10-26 PROCEDURE — 1159F PR MEDICATION LIST DOCUMENTED IN MEDICAL RECORD: ICD-10-PCS | Mod: CPTII,S$GLB,, | Performed by: UROLOGY

## 2022-10-26 PROCEDURE — 3044F HG A1C LEVEL LT 7.0%: CPT | Mod: CPTII,S$GLB,, | Performed by: UROLOGY

## 2022-10-26 PROCEDURE — 99999 PR PBB SHADOW E&M-EST. PATIENT-LVL III: ICD-10-PCS | Mod: PBBFAC,,, | Performed by: UROLOGY

## 2022-10-26 PROCEDURE — 99024 PR POST-OP FOLLOW-UP VISIT: ICD-10-PCS | Mod: S$GLB,,, | Performed by: UROLOGY

## 2022-10-26 PROCEDURE — 3044F PR MOST RECENT HEMOGLOBIN A1C LEVEL <7.0%: ICD-10-PCS | Mod: CPTII,S$GLB,, | Performed by: UROLOGY

## 2022-10-26 PROCEDURE — 3061F PR NEG MICROALBUMINURIA RESULT DOCUMENTED/REVIEW: ICD-10-PCS | Mod: CPTII,S$GLB,, | Performed by: UROLOGY

## 2022-10-26 PROCEDURE — 3077F SYST BP >= 140 MM HG: CPT | Mod: CPTII,S$GLB,, | Performed by: UROLOGY

## 2022-10-26 PROCEDURE — 99024 POSTOP FOLLOW-UP VISIT: CPT | Mod: S$GLB,,, | Performed by: UROLOGY

## 2022-10-26 PROCEDURE — 4010F PR ACE/ARB THEARPY RXD/TAKEN: ICD-10-PCS | Mod: CPTII,S$GLB,, | Performed by: UROLOGY

## 2022-10-26 PROCEDURE — 1160F PR REVIEW ALL MEDS BY PRESCRIBER/CLIN PHARMACIST DOCUMENTED: ICD-10-PCS | Mod: CPTII,S$GLB,, | Performed by: UROLOGY

## 2022-10-26 PROCEDURE — 3077F PR MOST RECENT SYSTOLIC BLOOD PRESSURE >= 140 MM HG: ICD-10-PCS | Mod: CPTII,S$GLB,, | Performed by: UROLOGY

## 2022-10-26 PROCEDURE — 3061F NEG MICROALBUMINURIA REV: CPT | Mod: CPTII,S$GLB,, | Performed by: UROLOGY

## 2022-10-26 PROCEDURE — 3080F PR MOST RECENT DIASTOLIC BLOOD PRESSURE >= 90 MM HG: ICD-10-PCS | Mod: CPTII,S$GLB,, | Performed by: UROLOGY

## 2022-10-26 PROCEDURE — 3072F PR LOW RISK FOR RETINOPATHY: ICD-10-PCS | Mod: CPTII,S$GLB,, | Performed by: UROLOGY

## 2022-10-26 PROCEDURE — 1159F MED LIST DOCD IN RCRD: CPT | Mod: CPTII,S$GLB,, | Performed by: UROLOGY

## 2022-10-26 PROCEDURE — 4010F ACE/ARB THERAPY RXD/TAKEN: CPT | Mod: CPTII,S$GLB,, | Performed by: UROLOGY

## 2022-10-26 PROCEDURE — 3066F PR DOCUMENTATION OF TREATMENT FOR NEPHROPATHY: ICD-10-PCS | Mod: CPTII,S$GLB,, | Performed by: UROLOGY

## 2022-10-26 PROCEDURE — 1160F RVW MEDS BY RX/DR IN RCRD: CPT | Mod: CPTII,S$GLB,, | Performed by: UROLOGY

## 2022-10-26 NOTE — PROGRESS NOTES
S/p condyloma excision 1 week ago.     Looks great. Incision healing well.     Bacitracin bid x 1 more week.   No sexual activity for 3 weeks.   F/u path.

## 2022-10-27 LAB
COMMENT: NORMAL
FINAL PATHOLOGIC DIAGNOSIS: NORMAL
GROSS: NORMAL
Lab: NORMAL

## 2022-11-01 ENCOUNTER — TELEPHONE (OUTPATIENT)
Dept: UROLOGY | Facility: CLINIC | Age: 62
End: 2022-11-01
Payer: COMMERCIAL

## 2022-11-01 NOTE — TELEPHONE ENCOUNTER
----- Message from Shanell Cuneca MD sent at 10/28/2022  8:21 AM CDT -----  Please let patient know that lesions showed condyloma as expected

## 2022-11-03 ENCOUNTER — TELEPHONE (OUTPATIENT)
Dept: PAIN MEDICINE | Facility: CLINIC | Age: 62
End: 2022-11-03
Payer: COMMERCIAL

## 2022-12-28 NOTE — Clinical Note
"Magen Barone, I saw Mr. Young today for chest pain, which he states started after his pacemaker settings were changed (I couldn't find documentation of when exactly this happened). He apparently wanted to see you or Dr. Spence but couldn't get an appt in EP, so made an appt with me. He is convinced the chest pain is directly related to his pacemaker. It is extremely vague, nonspecific CP. I ordered a stress test basically because he has risk factors and has never had an ischemic eval, and the CP is new. He was pretty dissatisfied with this plan and really wanted to have his pacemaker "checked". Anyway, he wanted you to be updated. Is there any way he could see someone in EP for this? "
No Vaccines Administered.

## 2023-02-08 DIAGNOSIS — E11.9 TYPE 2 DIABETES MELLITUS WITHOUT COMPLICATION: ICD-10-CM

## 2023-02-14 ENCOUNTER — PATIENT OUTREACH (OUTPATIENT)
Dept: ADMINISTRATIVE | Facility: HOSPITAL | Age: 63
End: 2023-02-14
Payer: COMMERCIAL

## 2023-02-14 NOTE — PROGRESS NOTES
Health Maintenance Due   Topic Date Due    HIV Screening  Never done    Shingles Vaccine (1 of 2) Never done    Pneumococcal Vaccines (Age 0-64) (2 - PCV) 10/25/2019    COVID-19 Vaccine (4 - Booster for Pfizer series) 12/03/2021    Colorectal Cancer Screening  02/13/2022    Eye Exam  06/21/2022    Hemoglobin A1c  01/26/2023       HM updated. Triggered LINKS and Care Everywhere. Letter mailed out for lab reminder.    Pam Floyd LPN   Clinical Care Coordinator  Primary Care and Wellness

## 2023-02-18 DIAGNOSIS — I10 HYPERTENSION, ESSENTIAL: ICD-10-CM

## 2023-02-18 NOTE — TELEPHONE ENCOUNTER
No new care gaps identified.  [facility] Embedded Care Gaps. Reference number: [id number]. 2/18/2023   11:27:22 AM CST

## 2023-02-20 RX ORDER — LOSARTAN POTASSIUM AND HYDROCHLOROTHIAZIDE 12.5; 1 MG/1; MG/1
1 TABLET ORAL DAILY
Qty: 90 TABLET | Refills: 0 | Status: SHIPPED | OUTPATIENT
Start: 2023-02-20 | End: 2023-04-21

## 2023-02-20 RX ORDER — AMLODIPINE BESYLATE 5 MG/1
5 TABLET ORAL DAILY
Qty: 90 TABLET | Refills: 0 | Status: SHIPPED | OUTPATIENT
Start: 2023-02-20 | End: 2023-06-12 | Stop reason: SDUPTHER

## 2023-02-20 NOTE — TELEPHONE ENCOUNTER
Please contact patient - he should only be taknig the combo losartan/HCT tab once a day.    HCT is still in his chart - should not still be taking that.    Thank you

## 2023-02-20 NOTE — TELEPHONE ENCOUNTER
Refill Routing Note   Medication(s) are not appropriate for processing by [facility] for the following reason(s):       Required vitals abnormal    [facility] action(s):  Defer         Appointments  past 12m or future 3m with PCP    Date Provider   Last Visit   8/10/2022 Jarvis Washington MD   Next Visit   Visit date not found Jarvis Washington MD   ED visits in past 90 days: 0        Note composed:2:59 AM 02/20/2023

## 2023-02-20 NOTE — TELEPHONE ENCOUNTER
Called the pt to discuss test results and recommendations. I left the pt a detailed VM regarding his medication and which one he should be taking. I asked the pt to call back if he had any questions.

## 2023-02-22 ENCOUNTER — APPOINTMENT (OUTPATIENT)
Dept: RADIOLOGY | Facility: OTHER | Age: 63
End: 2023-02-22
Attending: PODIATRIST
Payer: COMMERCIAL

## 2023-02-22 ENCOUNTER — OFFICE VISIT (OUTPATIENT)
Dept: PODIATRY | Facility: CLINIC | Age: 63
End: 2023-02-22
Payer: COMMERCIAL

## 2023-02-22 VITALS
BODY MASS INDEX: 23.53 KG/M2 | HEART RATE: 93 BPM | SYSTOLIC BLOOD PRESSURE: 174 MMHG | WEIGHT: 149.94 LBS | HEIGHT: 67 IN | DIASTOLIC BLOOD PRESSURE: 84 MMHG

## 2023-02-22 DIAGNOSIS — M79.672 FOOT PAIN, BILATERAL: ICD-10-CM

## 2023-02-22 DIAGNOSIS — M79.671 FOOT PAIN, BILATERAL: ICD-10-CM

## 2023-02-22 DIAGNOSIS — L84 CORN OR CALLUS: ICD-10-CM

## 2023-02-22 DIAGNOSIS — M19.079 ARTHRITIS OF BIG TOE: Primary | ICD-10-CM

## 2023-02-22 DIAGNOSIS — M24.573 EQUINUS CONTRACTURE OF ANKLE: ICD-10-CM

## 2023-02-22 DIAGNOSIS — M19.079 ARTHRITIS OF BIG TOE: ICD-10-CM

## 2023-02-22 DIAGNOSIS — E11.42 TYPE 2 DIABETES MELLITUS WITH DIABETIC POLYNEUROPATHY, UNSPECIFIED WHETHER LONG TERM INSULIN USE: ICD-10-CM

## 2023-02-22 PROCEDURE — 3008F PR BODY MASS INDEX (BMI) DOCUMENTED: ICD-10-PCS | Mod: CPTII,S$GLB,, | Performed by: PODIATRIST

## 2023-02-22 PROCEDURE — 1159F PR MEDICATION LIST DOCUMENTED IN MEDICAL RECORD: ICD-10-PCS | Mod: CPTII,S$GLB,, | Performed by: PODIATRIST

## 2023-02-22 PROCEDURE — 3008F BODY MASS INDEX DOCD: CPT | Mod: CPTII,S$GLB,, | Performed by: PODIATRIST

## 2023-02-22 PROCEDURE — 73630 X-RAY EXAM OF FOOT: CPT | Mod: TC,50

## 2023-02-22 PROCEDURE — 99999 PR PBB SHADOW E&M-EST. PATIENT-LVL III: CPT | Mod: PBBFAC,,, | Performed by: PODIATRIST

## 2023-02-22 PROCEDURE — 73630 X-RAY EXAM OF FOOT: CPT | Mod: 26,,, | Performed by: RADIOLOGY

## 2023-02-22 PROCEDURE — 73630 XR FOOT COMPLETE 3 VIEW BILATERAL: ICD-10-PCS | Mod: 26,,, | Performed by: RADIOLOGY

## 2023-02-22 PROCEDURE — 3079F PR MOST RECENT DIASTOLIC BLOOD PRESSURE 80-89 MM HG: ICD-10-PCS | Mod: CPTII,S$GLB,, | Performed by: PODIATRIST

## 2023-02-22 PROCEDURE — 3079F DIAST BP 80-89 MM HG: CPT | Mod: CPTII,S$GLB,, | Performed by: PODIATRIST

## 2023-02-22 PROCEDURE — 99214 PR OFFICE/OUTPT VISIT, EST, LEVL IV, 30-39 MIN: ICD-10-PCS | Mod: S$GLB,,, | Performed by: PODIATRIST

## 2023-02-22 PROCEDURE — 99214 OFFICE O/P EST MOD 30 MIN: CPT | Mod: S$GLB,,, | Performed by: PODIATRIST

## 2023-02-22 PROCEDURE — 3077F SYST BP >= 140 MM HG: CPT | Mod: CPTII,S$GLB,, | Performed by: PODIATRIST

## 2023-02-22 PROCEDURE — 1159F MED LIST DOCD IN RCRD: CPT | Mod: CPTII,S$GLB,, | Performed by: PODIATRIST

## 2023-02-22 PROCEDURE — 3077F PR MOST RECENT SYSTOLIC BLOOD PRESSURE >= 140 MM HG: ICD-10-PCS | Mod: CPTII,S$GLB,, | Performed by: PODIATRIST

## 2023-02-22 PROCEDURE — 99999 PR PBB SHADOW E&M-EST. PATIENT-LVL III: ICD-10-PCS | Mod: PBBFAC,,, | Performed by: PODIATRIST

## 2023-02-22 RX ORDER — AMMONIUM LACTATE 12 G/100G
CREAM TOPICAL
Qty: 140 G | Refills: 11 | Status: SHIPPED | OUTPATIENT
Start: 2023-02-22 | End: 2023-08-11

## 2023-02-22 RX ORDER — LIDOCAINE HYDROCHLORIDE 20 MG/ML
JELLY TOPICAL
Qty: 30 ML | Refills: 2 | Status: SHIPPED | OUTPATIENT
Start: 2023-02-22 | End: 2023-08-11

## 2023-02-22 NOTE — PROGRESS NOTES
Subjective:      Patient ID: Renny Young is a 62 y.o. male.    Chief Complaint: Foot Pain (Bilateral foot pain)    Diabetes, increased risk amputation needing evaluation/management/optomization of foot care.    Callus both feet.  Gradual onset, worsening over the past several weeks.  Aggravated by increased weight-bearing prolonged standing some shoes.  Self maintenance with pumice stone gives good relief.    Cc2 deep aching throbbing pain left big toe - indicates the interphalangeal joint of the left hallux-gradual onset, worsening over the past several months.  Aggravated by increased weight-bearing prolonged standing some shoes.  No prior medical treatment.  No self-treatment.  Patient denies trauma and surgery both feet.          Review of Systems   Constitutional: Negative for chills, diaphoresis, fever, malaise/fatigue and night sweats.   Cardiovascular:  Negative for claudication, cyanosis, leg swelling and syncope.   Skin:  Positive for suspicious lesions. Negative for color change, dry skin, nail changes, rash and unusual hair distribution.   Musculoskeletal:  Positive for joint pain. Negative for falls, joint swelling, muscle cramps, muscle weakness and stiffness.   Gastrointestinal:  Negative for constipation, diarrhea, nausea and vomiting.   Neurological:  Positive for sensory change. Negative for brief paralysis, disturbances in coordination, focal weakness, numbness, paresthesias and tremors.         Objective:      Physical Exam  Constitutional:       General: He is not in acute distress.     Appearance: He is well-developed. He is not diaphoretic.   Cardiovascular:      Pulses:           Popliteal pulses are 2+ on the right side and 2+ on the left side.        Dorsalis pedis pulses are 2+ on the right side and 2+ on the left side.        Posterior tibial pulses are 2+ on the right side and 2+ on the left side.      Comments: Capillary refill 3 seconds all toes/distal feet, all toes/both feet warm to  touch.      Negative lymphadenopathy bilateral popliteal fossa and tarsal tunnel.      Negavie lower extremity edema bilateral.    Musculoskeletal:      Right ankle: No swelling, deformity, ecchymosis or lacerations. Normal range of motion. Normal pulse.      Right Achilles Tendon: Normal. No defects. Zafar's test negative.      Comments: Patient has pain to palpation and end range of motion of the left hallux interphalangeal joint with visible periarticular exostoses but without loss of function signs of acute trauma.      Ankle dorsiflexion decreased at <10 degrees bilateral with moderate increase with knee flexion bilateral.,nus    Reduced range of motion right and left 1st mtpj without periarticular exostoses, and reduced range of motion with no current crepitus.       Lymphadenopathy:      Lower Body: No right inguinal adenopathy. No left inguinal adenopathy.      Comments: Negative lymphadenopathy bilateral popliteal fossa and tarsal tunnel.    Negative lymphangitic streaking bilateral feet/ankles/legs.   Skin:     General: Skin is warm and dry.      Capillary Refill: Capillary refill takes 2 to 3 seconds.      Coloration: Skin is not pale.      Findings: No abrasion, bruising, burn, ecchymosis, erythema, laceration, lesion or rash.      Nails: There is no clubbing.      Comments: Calluses of the big toe IPJ is, plantar medial right arch, lateral left 2nd PIPJ consist entirely focal hyperkeratotic tissue without open skin drainage pus tracking fluctuance malodor signs of infection.  These are generally well maintained not symptomatic with home care with a pumice stone.      Otherwise,Skin is normal age and health appropriate color, turgor, texture, and temperature bilateral lower extremities without ulceration, hyperpigmentation, discoloration, masses nodules or cords palpated.  No ecchymosis, erythema, edema, or cardinal signs of infection bilateral lower extremities.       Neurological:      Mental  Status: He is alert and oriented to person, place, and time.      Sensory: Sensory deficit present.      Motor: No tremor, atrophy or abnormal muscle tone.      Gait: Gait normal.      Comments: Decreased/absent vibratory sensation bilateral feet to 128Hz tuning fork.    Negative tinel sign to percussion sural, superficial peroneal, deep peroneal, saphenous, and posterior tibial nerves right and left ankles and feet.     Psychiatric:         Behavior: Behavior is cooperative.           Assessment:       Encounter Diagnoses   Name Primary?    Arthritis of big toe Yes    Foot pain, bilateral     Equinus contracture of ankle     Type 2 diabetes mellitus with diabetic polyneuropathy, unspecified whether long term insulin use     Corn or callus          Plan:       Renny was seen today for foot pain.    Diagnoses and all orders for this visit:    Arthritis of big toe  -     DIABETIC SHOES FOR HOME USE  -     X-Ray Foot Complete Bilateral; Future    Foot pain, bilateral  -     DIABETIC SHOES FOR HOME USE  -     X-Ray Foot Complete Bilateral; Future    Equinus contracture of ankle  -     DIABETIC SHOES FOR HOME USE  -     X-Ray Foot Complete Bilateral; Future    Type 2 diabetes mellitus with diabetic polyneuropathy, unspecified whether long term insulin use  -     DIABETIC SHOES FOR HOME USE  -     X-Ray Foot Complete Bilateral; Future    Corn or callus  -     DIABETIC SHOES FOR HOME USE  -     X-Ray Foot Complete Bilateral; Future    Other orders  -     LIDOcaine HCL 2% (XYLOCAINE) 2 % jelly; Apply topically as needed. Apply topically once nightly to affected part of foot/feet.  -     ammonium lactate 12 % Crea; Apply twice daily to affected parts both feet as needed.      I counseled the patient on his conditions, their implications and medical management.        Patient will stretch the tendo achilles complex three times daily as demonstrated in the office.  Literature was dispensed illustrating proper stretching  technique.    Rx DM shoes, inserts    Lidocaine gel    Xrays both feet.      Lac hydrin  Pumice stone maintenance of calluses at home as presently effective.    One month.          No follow-ups on file.

## 2023-03-31 DIAGNOSIS — E11.69 TYPE 2 DIABETES MELLITUS WITH OTHER SPECIFIED COMPLICATION, WITHOUT LONG-TERM CURRENT USE OF INSULIN: ICD-10-CM

## 2023-03-31 NOTE — TELEPHONE ENCOUNTER
----- Message from Manuela Godoy sent at 3/31/2023 11:48 AM CDT -----  Contact: [phone]  Per pt, the pharmacy has reached out twice and he has reached out as well in regards to a refill on his metFORMIN (GLUCOPHAGE) 500 MG tablet 90 tablet. Pt is using   [address] [address]  Phone: [phone] Fax: [phone]. Per pt, please call and let him know it has been sent.               Thank  you

## 2023-03-31 NOTE — TELEPHONE ENCOUNTER
No new care gaps identified.  [facility] Embedded Care Gaps. Reference number: [id number]. 3/31/2023   1:30:45 PM CDT

## 2023-04-01 DIAGNOSIS — E11.69 TYPE 2 DIABETES MELLITUS WITH OTHER SPECIFIED COMPLICATION, WITHOUT LONG-TERM CURRENT USE OF INSULIN: ICD-10-CM

## 2023-04-01 NOTE — TELEPHONE ENCOUNTER
No new care gaps identified.  [facility] Embedded Care Gaps. Reference number: [id number]. 4/01/2023   6:03:29 PM CDT   no

## 2023-04-02 RX ORDER — METFORMIN HYDROCHLORIDE 500 MG/1
TABLET ORAL
Qty: 90 TABLET | Refills: 0 | OUTPATIENT
Start: 2023-04-02

## 2023-04-02 RX ORDER — METFORMIN HYDROCHLORIDE 500 MG/1
500 TABLET ORAL
Qty: 90 TABLET | Refills: 0 | Status: SHIPPED | OUTPATIENT
Start: 2023-04-02 | End: 2023-06-12 | Stop reason: SDUPTHER

## 2023-04-02 NOTE — TELEPHONE ENCOUNTER
Quick DC. Request already responded to by other means (e.g. phone or fax)   Refill Authorization Note   Renny Hector  is requesting a refill authorization.  Brief Assessment and Rationale for Refill:  Quick Discontinue  Medication Therapy Plan:  Receipt confirmed by pharmacy (4/2/2023  9:03 AM CDT)    Medication Reconciliation Completed:  No      Comments:     Note composed:4:19 PM 04/02/2023

## 2023-04-03 ENCOUNTER — CLINICAL SUPPORT (OUTPATIENT)
Dept: CARDIOLOGY | Facility: HOSPITAL | Age: 63
End: 2023-04-03
Attending: INTERNAL MEDICINE
Payer: COMMERCIAL

## 2023-04-03 DIAGNOSIS — I49.5 SICK SINUS SYNDROME: ICD-10-CM

## 2023-04-03 PROCEDURE — 93280 PM DEVICE PROGR EVAL DUAL: CPT | Mod: 26,,, | Performed by: INTERNAL MEDICINE

## 2023-04-03 PROCEDURE — 93280 PM DEVICE PROGR EVAL DUAL: CPT

## 2023-04-03 PROCEDURE — 93280 CARDIAC DEVICE CHECK - IN CLINIC & HOSPITAL: ICD-10-PCS | Mod: 26,,, | Performed by: INTERNAL MEDICINE

## 2023-06-05 DIAGNOSIS — E11.42 DIABETIC POLYNEUROPATHY ASSOCIATED WITH TYPE 2 DIABETES MELLITUS: ICD-10-CM

## 2023-06-06 RX ORDER — GABAPENTIN 300 MG/1
CAPSULE ORAL
Qty: 60 CAPSULE | Refills: 0 | Status: SHIPPED | OUTPATIENT
Start: 2023-06-06 | End: 2023-08-14

## 2023-06-12 ENCOUNTER — OFFICE VISIT (OUTPATIENT)
Dept: INTERNAL MEDICINE | Facility: CLINIC | Age: 63
End: 2023-06-12
Attending: FAMILY MEDICINE
Payer: COMMERCIAL

## 2023-06-12 ENCOUNTER — LAB VISIT (OUTPATIENT)
Dept: LAB | Facility: HOSPITAL | Age: 63
End: 2023-06-12
Attending: FAMILY MEDICINE
Payer: COMMERCIAL

## 2023-06-12 VITALS
OXYGEN SATURATION: 96 % | HEART RATE: 80 BPM | WEIGHT: 160 LBS | HEIGHT: 67 IN | SYSTOLIC BLOOD PRESSURE: 120 MMHG | DIASTOLIC BLOOD PRESSURE: 64 MMHG | BODY MASS INDEX: 25.11 KG/M2 | TEMPERATURE: 99 F

## 2023-06-12 DIAGNOSIS — L29.9 ITCHING: ICD-10-CM

## 2023-06-12 DIAGNOSIS — I49.5 SICK SINUS SYNDROME: ICD-10-CM

## 2023-06-12 DIAGNOSIS — G89.29 CHRONIC RIGHT SHOULDER PAIN: ICD-10-CM

## 2023-06-12 DIAGNOSIS — K63.5 POLYP OF COLON, UNSPECIFIED PART OF COLON, UNSPECIFIED TYPE: ICD-10-CM

## 2023-06-12 DIAGNOSIS — E11.69 TYPE 2 DIABETES MELLITUS WITH OTHER SPECIFIED COMPLICATION, WITHOUT LONG-TERM CURRENT USE OF INSULIN: ICD-10-CM

## 2023-06-12 DIAGNOSIS — Z00.00 ANNUAL PHYSICAL EXAM: ICD-10-CM

## 2023-06-12 DIAGNOSIS — E78.5 HYPERLIPIDEMIA, UNSPECIFIED HYPERLIPIDEMIA TYPE: ICD-10-CM

## 2023-06-12 DIAGNOSIS — Z12.5 PROSTATE CANCER SCREENING: ICD-10-CM

## 2023-06-12 DIAGNOSIS — Z12.11 COLON CANCER SCREENING: ICD-10-CM

## 2023-06-12 DIAGNOSIS — M54.16 LUMBAR RADICULOPATHY: ICD-10-CM

## 2023-06-12 DIAGNOSIS — Z00.00 ANNUAL PHYSICAL EXAM: Primary | ICD-10-CM

## 2023-06-12 DIAGNOSIS — M47.816 LUMBAR SPONDYLOSIS: ICD-10-CM

## 2023-06-12 DIAGNOSIS — I10 HYPERTENSION, ESSENTIAL: ICD-10-CM

## 2023-06-12 DIAGNOSIS — Z87.891 PERSONAL HISTORY OF NICOTINE DEPENDENCE: ICD-10-CM

## 2023-06-12 DIAGNOSIS — M25.511 CHRONIC RIGHT SHOULDER PAIN: ICD-10-CM

## 2023-06-12 PROBLEM — B97.7 HPV (HUMAN PAPILLOMA VIRUS) INFECTION: Chronic | Status: RESOLVED | Noted: 2022-08-10 | Resolved: 2023-06-12

## 2023-06-12 PROBLEM — E11.9 TYPE 2 DIABETES MELLITUS WITHOUT COMPLICATION: Status: RESOLVED | Noted: 2017-04-06 | Resolved: 2023-06-12

## 2023-06-12 LAB
ALBUMIN SERPL BCP-MCNC: 4.4 G/DL (ref 3.5–5.2)
ALP SERPL-CCNC: 68 U/L (ref 55–135)
ALT SERPL W/O P-5'-P-CCNC: 24 U/L (ref 10–44)
ANION GAP SERPL CALC-SCNC: 7 MMOL/L (ref 8–16)
AST SERPL-CCNC: 21 U/L (ref 10–40)
BASOPHILS # BLD AUTO: 0.06 K/UL (ref 0–0.2)
BASOPHILS NFR BLD: 0.6 % (ref 0–1.9)
BILIRUB SERPL-MCNC: 1.1 MG/DL (ref 0.1–1)
BUN SERPL-MCNC: 20 MG/DL (ref 8–23)
CALCIUM SERPL-MCNC: 10.5 MG/DL (ref 8.7–10.5)
CHLORIDE SERPL-SCNC: 106 MMOL/L (ref 95–110)
CHOLEST SERPL-MCNC: 131 MG/DL (ref 120–199)
CHOLEST/HDLC SERPL: 2.4 {RATIO} (ref 2–5)
CO2 SERPL-SCNC: 30 MMOL/L (ref 23–29)
COMPLEXED PSA SERPL-MCNC: 1.8 NG/ML (ref 0–4)
CREAT SERPL-MCNC: 1.4 MG/DL (ref 0.5–1.4)
DIFFERENTIAL METHOD: ABNORMAL
EOSINOPHIL # BLD AUTO: 0.2 K/UL (ref 0–0.5)
EOSINOPHIL NFR BLD: 1.8 % (ref 0–8)
ERYTHROCYTE [DISTWIDTH] IN BLOOD BY AUTOMATED COUNT: 15.1 % (ref 11.5–14.5)
EST. GFR  (NO RACE VARIABLE): 56.8 ML/MIN/1.73 M^2
ESTIMATED AVG GLUCOSE: 128 MG/DL (ref 68–131)
GLUCOSE SERPL-MCNC: 105 MG/DL (ref 70–110)
HBA1C MFR BLD: 6.1 % (ref 4–5.6)
HCT VFR BLD AUTO: 50.5 % (ref 40–54)
HDLC SERPL-MCNC: 55 MG/DL (ref 40–75)
HDLC SERPL: 42 % (ref 20–50)
HGB BLD-MCNC: 16 G/DL (ref 14–18)
IMM GRANULOCYTES # BLD AUTO: 0.03 K/UL (ref 0–0.04)
IMM GRANULOCYTES NFR BLD AUTO: 0.3 % (ref 0–0.5)
LDLC SERPL CALC-MCNC: 53.2 MG/DL (ref 63–159)
LYMPHOCYTES # BLD AUTO: 2.7 K/UL (ref 1–4.8)
LYMPHOCYTES NFR BLD: 27.2 % (ref 18–48)
MCH RBC QN AUTO: 29.6 PG (ref 27–31)
MCHC RBC AUTO-ENTMCNC: 31.7 G/DL (ref 32–36)
MCV RBC AUTO: 93 FL (ref 82–98)
MONOCYTES # BLD AUTO: 1.1 K/UL (ref 0.3–1)
MONOCYTES NFR BLD: 10.9 % (ref 4–15)
NEUTROPHILS # BLD AUTO: 5.8 K/UL (ref 1.8–7.7)
NEUTROPHILS NFR BLD: 59.2 % (ref 38–73)
NONHDLC SERPL-MCNC: 76 MG/DL
NRBC BLD-RTO: 0 /100 WBC
PLATELET # BLD AUTO: 261 K/UL (ref 150–450)
PMV BLD AUTO: 11 FL (ref 9.2–12.9)
POTASSIUM SERPL-SCNC: 4.3 MMOL/L (ref 3.5–5.1)
PROT SERPL-MCNC: 7.7 G/DL (ref 6–8.4)
RBC # BLD AUTO: 5.41 M/UL (ref 4.6–6.2)
SODIUM SERPL-SCNC: 143 MMOL/L (ref 136–145)
TRIGL SERPL-MCNC: 114 MG/DL (ref 30–150)
WBC # BLD AUTO: 9.76 K/UL (ref 3.9–12.7)

## 2023-06-12 PROCEDURE — 3008F PR BODY MASS INDEX (BMI) DOCUMENTED: ICD-10-PCS | Mod: CPTII,S$GLB,, | Performed by: FAMILY MEDICINE

## 2023-06-12 PROCEDURE — 80061 LIPID PANEL: CPT | Performed by: FAMILY MEDICINE

## 2023-06-12 PROCEDURE — 86592 SYPHILIS TEST NON-TREP QUAL: CPT | Performed by: FAMILY MEDICINE

## 2023-06-12 PROCEDURE — 84153 ASSAY OF PSA TOTAL: CPT | Performed by: FAMILY MEDICINE

## 2023-06-12 PROCEDURE — 1159F PR MEDICATION LIST DOCUMENTED IN MEDICAL RECORD: ICD-10-PCS | Mod: CPTII,S$GLB,, | Performed by: FAMILY MEDICINE

## 2023-06-12 PROCEDURE — 3078F DIAST BP <80 MM HG: CPT | Mod: CPTII,S$GLB,, | Performed by: FAMILY MEDICINE

## 2023-06-12 PROCEDURE — 3074F PR MOST RECENT SYSTOLIC BLOOD PRESSURE < 130 MM HG: ICD-10-PCS | Mod: CPTII,S$GLB,, | Performed by: FAMILY MEDICINE

## 2023-06-12 PROCEDURE — 3008F BODY MASS INDEX DOCD: CPT | Mod: CPTII,S$GLB,, | Performed by: FAMILY MEDICINE

## 2023-06-12 PROCEDURE — 1160F RVW MEDS BY RX/DR IN RCRD: CPT | Mod: CPTII,S$GLB,, | Performed by: FAMILY MEDICINE

## 2023-06-12 PROCEDURE — 3074F SYST BP LT 130 MM HG: CPT | Mod: CPTII,S$GLB,, | Performed by: FAMILY MEDICINE

## 2023-06-12 PROCEDURE — 85025 COMPLETE CBC W/AUTO DIFF WBC: CPT | Performed by: FAMILY MEDICINE

## 2023-06-12 PROCEDURE — 1160F PR REVIEW ALL MEDS BY PRESCRIBER/CLIN PHARMACIST DOCUMENTED: ICD-10-PCS | Mod: CPTII,S$GLB,, | Performed by: FAMILY MEDICINE

## 2023-06-12 PROCEDURE — 99396 PREV VISIT EST AGE 40-64: CPT | Mod: S$GLB,,, | Performed by: FAMILY MEDICINE

## 2023-06-12 PROCEDURE — 99999 PR PBB SHADOW E&M-EST. PATIENT-LVL V: ICD-10-PCS | Mod: PBBFAC,,, | Performed by: FAMILY MEDICINE

## 2023-06-12 PROCEDURE — 80053 COMPREHEN METABOLIC PANEL: CPT | Performed by: FAMILY MEDICINE

## 2023-06-12 PROCEDURE — 3078F PR MOST RECENT DIASTOLIC BLOOD PRESSURE < 80 MM HG: ICD-10-PCS | Mod: CPTII,S$GLB,, | Performed by: FAMILY MEDICINE

## 2023-06-12 PROCEDURE — 99999 PR PBB SHADOW E&M-EST. PATIENT-LVL V: CPT | Mod: PBBFAC,,, | Performed by: FAMILY MEDICINE

## 2023-06-12 PROCEDURE — 99396 PR PREVENTIVE VISIT,EST,40-64: ICD-10-PCS | Mod: S$GLB,,, | Performed by: FAMILY MEDICINE

## 2023-06-12 PROCEDURE — 36415 COLL VENOUS BLD VENIPUNCTURE: CPT | Performed by: FAMILY MEDICINE

## 2023-06-12 PROCEDURE — 83036 HEMOGLOBIN GLYCOSYLATED A1C: CPT | Performed by: FAMILY MEDICINE

## 2023-06-12 PROCEDURE — 1159F MED LIST DOCD IN RCRD: CPT | Mod: CPTII,S$GLB,, | Performed by: FAMILY MEDICINE

## 2023-06-12 RX ORDER — ATORVASTATIN CALCIUM 20 MG/1
20 TABLET, FILM COATED ORAL DAILY
Qty: 90 TABLET | Refills: 3 | Status: SHIPPED | OUTPATIENT
Start: 2023-06-12

## 2023-06-12 RX ORDER — MINERAL OIL
180 ENEMA (ML) RECTAL DAILY
Qty: 30 TABLET | Refills: 1 | Status: SHIPPED | OUTPATIENT
Start: 2023-06-12 | End: 2023-08-11

## 2023-06-12 RX ORDER — AMLODIPINE BESYLATE 5 MG/1
5 TABLET ORAL DAILY
Qty: 90 TABLET | Refills: 3 | Status: SHIPPED | OUTPATIENT
Start: 2023-06-12

## 2023-06-12 RX ORDER — METFORMIN HYDROCHLORIDE 500 MG/1
500 TABLET ORAL
Qty: 90 TABLET | Refills: 1 | Status: SHIPPED | OUTPATIENT
Start: 2023-06-12

## 2023-06-12 RX ORDER — LOSARTAN POTASSIUM AND HYDROCHLOROTHIAZIDE 12.5; 1 MG/1; MG/1
1 TABLET ORAL DAILY
Qty: 90 TABLET | Refills: 3 | Status: SHIPPED | OUTPATIENT
Start: 2023-06-12

## 2023-06-12 NOTE — PROGRESS NOTES
Subjective:       Patient ID: Renny Young is a 62 y.o. male.    Chief Complaint: Annual Exam (Skin been itching really bad lately)    Established patient for an annual wellness check/physical exam and also chronic disease management. Specific complaints - see dictation, M*model entries and please see ROS.  Past, Surgical, Family, Social Histories; Medications, Allergies reviewed and reconciled.  Health maintenance file reviewed and addressed items due. Recent applicable lab, imaging and cardiovascular results reviewed.  Problem list items reviewed and modified or added entries (in the overview section) may not be transcribed into this encounter note due to note writer format.      Mentioned itching for least a week.  No rash.  Mentioned this several times.  Mentioned he wanted to get a shot.  Some shoulder pain persisting.  Also had a transient episode of area on penis which is resolved.  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  Mentions that wife  of sarcoidosis in January.    No swelling, cough or congestion noted.      Review of Systems   Constitutional:  Negative for appetite change, chills, diaphoresis, fatigue and fever.   HENT:  Negative for congestion, postnasal drip, rhinorrhea, sore throat and trouble swallowing.    Eyes:  Negative for visual disturbance.   Respiratory:  Negative for cough, choking, chest tightness, shortness of breath and wheezing.    Cardiovascular:  Negative for chest pain and leg swelling.   Gastrointestinal:  Negative for abdominal distention, abdominal pain, diarrhea, nausea and vomiting.   Genitourinary:  Negative for difficulty urinating and hematuria.   Musculoskeletal:  Negative for arthralgias and myalgias.   Skin:  Negative for rash.        Itching, forearms and thighs, no rash   Neurological:  Negative for weakness, light-headedness and headaches.   Psychiatric/Behavioral:  Negative for confusion and dysphoric mood.       Objective:      Physical Exam  Vitals and nursing note reviewed.   Constitutional:       Appearance: He is well-developed. He is not diaphoretic.   Eyes:      General: No scleral icterus.  Neck:      Thyroid: No thyromegaly.      Vascular: No carotid bruit or JVD.      Trachea: No tracheal deviation.   Cardiovascular:      Rate and Rhythm: Normal rate and regular rhythm.      Heart sounds: Normal heart sounds. No murmur heard.    No friction rub. No gallop.   Pulmonary:      Effort: Pulmonary effort is normal. No respiratory distress.      Breath sounds: Normal breath sounds. No wheezing or rales.   Abdominal:      General: There is no distension.      Palpations: Abdomen is soft. There is no mass.      Tenderness: There is no abdominal tenderness. There is no guarding or rebound.   Genitourinary:      Musculoskeletal:      Cervical back: Normal range of motion and neck supple.   Lymphadenopathy:      Cervical: No cervical adenopathy.   Skin:     General: Skin is warm and dry.      Findings: No erythema or rash.   Neurological:      Mental Status: He is alert and oriented to person, place, and time.      Cranial Nerves: No cranial nerve deficit.      Motor: No tremor.      Coordination: Coordination normal.      Gait: Gait normal.   Psychiatric:         Behavior: Behavior normal.         Thought Content: Thought content normal.         Judgment: Judgment normal.       Assessment:       1. Annual physical exam    2. Type 2 diabetes mellitus with other specified complication, without long-term current use of insulin    3. Hypertension, essential    4. Hyperlipidemia, unspecified hyperlipidemia type    5. Prostate cancer screening    6. Personal history of nicotine dependence    7. Colon cancer screening    8. Sick sinus syndrome    9. Polyp of colon, unspecified part of colon, unspecified type    10. Itching    11. Lumbar spondylosis    12. Chronic right shoulder pain    13. Lumbar radiculopathy        Plan:      Medication List with Changes/Refills   New Medications    FEXOFENADINE (ALLEGRA) 180 MG TABLET    Take 1 tablet (180 mg total) by mouth once daily.   Current Medications    AMMONIUM LACTATE 12 % CREA    Apply twice daily to affected parts both feet as needed.    ASPIRIN (ECOTRIN) 81 MG EC TABLET    Take 81 mg by mouth once daily.    GABAPENTIN (NEURONTIN) 300 MG CAPSULE    Take 1 capsule by mouth twice daily    LIDOCAINE HCL 2% (XYLOCAINE) 2 % JELLY    Apply topically as needed. Apply topically once nightly to affected part of foot/feet.    PODOFILOX (CONDYLOX) 0.5 % GEL    Apply topically 2 (two) times daily.   Changed and/or Refilled Medications    Modified Medication Previous Medication    AMLODIPINE (NORVASC) 5 MG TABLET amLODIPine (NORVASC) 5 MG tablet       Take 1 tablet (5 mg total) by mouth once daily.    Take 1 tablet (5 mg total) by mouth once daily.    ATORVASTATIN (LIPITOR) 20 MG TABLET atorvastatin (LIPITOR) 20 MG tablet       Take 1 tablet (20 mg total) by mouth once daily.    Take 1 tablet (20 mg total) by mouth once daily.    LOSARTAN-HYDROCHLOROTHIAZIDE 100-12.5 MG (HYZAAR) 100-12.5 MG TAB losartan-hydrochlorothiazide 100-12.5 mg (HYZAAR) 100-12.5 mg Tab       Take 1 tablet by mouth once daily.    Take 1 tablet by mouth once daily    METFORMIN (GLUCOPHAGE) 500 MG TABLET metFORMIN (GLUCOPHAGE) 500 MG tablet       Take 1 tablet (500 mg total) by mouth daily with breakfast.    Take 1 tablet (500 mg total) by mouth daily with breakfast.   Discontinued Medications    METFORMIN (GLUCOPHAGE) 500 MG TABLET    Take 1 tablet by mouth once daily with breakfast     1. Annual physical exam  -     Hemoglobin A1C; Standing  -     Comprehensive Metabolic Panel; Standing  -     Lipid Panel; Standing  -     Microalbumin/Creatinine Ratio, Urine; Standing  -     PSA, Screening; Standing  -     RPR; Future; Expected date: 06/12/2023  -     CBC Auto Differential; Future; Expected date: 06/12/2023    2. Type 2  diabetes mellitus with other specified complication, without long-term current use of insulin  -     Hemoglobin A1C; Standing  -     Comprehensive Metabolic Panel; Standing  -     Microalbumin/Creatinine Ratio, Urine; Standing  -     metFORMIN (GLUCOPHAGE) 500 MG tablet; Take 1 tablet (500 mg total) by mouth daily with breakfast.  Dispense: 90 tablet; Refill: 1  -     Ambulatory referral/consult to Optometry; Future; Expected date: 06/19/2023  -     Cancel: Ambulatory referral/consult to Podiatry; Future; Expected date: 06/19/2023    3. Hypertension, essential  -     amLODIPine (NORVASC) 5 MG tablet; Take 1 tablet (5 mg total) by mouth once daily.  Dispense: 90 tablet; Refill: 3  -     losartan-hydrochlorothiazide 100-12.5 mg (HYZAAR) 100-12.5 mg Tab; Take 1 tablet by mouth once daily.  Dispense: 90 tablet; Refill: 3    4. Hyperlipidemia, unspecified hyperlipidemia type  -     Lipid Panel; Standing  -     atorvastatin (LIPITOR) 20 MG tablet; Take 1 tablet (20 mg total) by mouth once daily.  Dispense: 90 tablet; Refill: 3    5. Prostate cancer screening  -     PSA, Screening; Standing    6. Personal history of nicotine dependence  Overview:  30 yrs - under 1 PPD, still active (2023)    Orders:  -     CT Chest Lung Screening Low Dose; Standing    7. Colon cancer screening  -     Ambulatory referral/consult to Endo Procedure ; Future; Expected date: 06/13/2023    8. Sick sinus syndrome  Overview:  -[employer] dual chamber pacemaker implanted at [facility] in 7/2009 for syncope  -followed by [facility]ology      9. Polyp of colon, unspecified part of colon, unspecified type  -     Ambulatory referral/consult to Endo Procedure ; Future; Expected date: 06/13/2023    10. Itching  Comments:  2/3 weeks, no rash  Orders:  -     RPR; Future; Expected date: 06/12/2023  -     fexofenadine (ALLEGRA) 180 MG tablet; Take 1 tablet (180 mg total) by mouth once daily.  Dispense: 30 tablet; Refill: 1  -     Ambulatory  referral/consult to Allergy; Future; Expected date: 06/19/2023  -     CBC Auto Differential; Future; Expected date: 06/12/2023    11. Lumbar spondylosis  -     Ambulatory referral/consult to Pain Clinic; Future; Expected date: 06/19/2023    12. Chronic right shoulder pain  -     Ambulatory referral/consult to Sports Medicine; Future; Expected date: 06/19/2023    13. Lumbar radiculopathy  -     Ambulatory referral/consult to Pain Clinic; Future; Expected date: 06/19/2023      See meds, orders, follow up, routing and instructions sections of encounter and AVS. Discussed with patient and provided on AVS.    Discussed diet and exercise as therapeutic modalities for metabolic and other conditions. Provided patient information, which are included as links on the AVS for detailed information.    Lab Results   Component Value Date     07/26/2022    K 4.1 07/26/2022     07/26/2022    BUN 16 07/26/2022    CREATININE 1.1 07/26/2022    GLU 95 07/26/2022    HGBA1C 6.4 (H) 07/26/2022    AST 23 07/26/2022    ALT 25 07/26/2022    ALBUMIN 4.6 07/26/2022    PROT 7.8 07/26/2022    BILITOT 1.0 07/26/2022    CHOL 147 07/26/2022    HDL 60 07/26/2022    LDLCALC 67.2 07/26/2022    TRIG 99 07/26/2022    WBC 8.23 05/06/2020    HGB 16.3 05/06/2020    HCT 52.1 05/06/2020     05/06/2020    PSA 1.3 07/26/2022    TSH 1.194 04/06/2017    BNP <10 09/28/2015         Diabetes Management Status    Statin: Taking  ACE/ARB: Taking    Screening or Prevention Patient's value Goal Complete/Controlled?   HgA1C Testing and Control   Lab Results   Component Value Date    HGBA1C 6.4 (H) 07/26/2022      Annually/Less than 8% Yes   Lipid profile : 07/26/2022 Annually Yes   LDL control Lab Results   Component Value Date    LDLCALC 67.2 07/26/2022    Annually/Less than 100 mg/dl  Yes   Nephropathy screening Lab Results   Component Value Date    LABMICR 14.0 07/26/2022     Lab Results   Component Value Date    PROTEINUA Negative 04/06/2017     No  results found for: UTPCR   Annually Yes   Blood pressure BP Readings from Last 1 Encounters:   06/12/23 120/64    Less than 140/90 Yes   Dilated retinal exam : 06/21/2021 Annually No   Foot exam   : 02/22/2023 Annually Yes     Unable to explain itching symptom at this time.  No rash or other findings.  No edema, no oropharyngeal complaints.  Suggest trial of Allegra and allergy immunology consult.  I do not think this is from his ARB or other medications given the acuity.  Suggest an allergy immunology consult to start, asked him to follow-up with me sometime soon if continues.

## 2023-06-12 NOTE — PATIENT INSTRUCTIONS
Schedule lab orders for today.      If not contacted in a couple weeks by colonoscopy scheduling department - call Colonoscopy Scheduling Number - [phone].       Information about cholesterol, high blood pressure and healthy diet and activity recommendations can be found at the following links on the Internet:    Cholesterol  http://www.nhlbi.nih.gov/health/health-topics/topics/hbc    Weight loss  http://www.nhlbi.nih.gov/health/educational/lose_wt/index.htm    High Blood Pressure  Http://www.nhlbi.nih.gov/files/docs/public/heart/hbp_low.pdf    Cardiovascular General  http://www.heart.org/HEARTORG/    Diabetes General  http://diabetes.org/    CDC  https://www.cdc.gov/    Healthfinder  Https://healthfinder.gov/    Dietary Guide - Comprehensive  https://health.gov/dietaryguidelines/2015/guidelines/    Physical Activity and Exercise  https://health.gov/paguidelines/second-edition/pdf/Physical_Activity_Guidelines_2nd_edition.pdf    Choosing Wisely  https://www.choosingwisely.org/patient-resources/

## 2023-06-13 LAB — RPR SER QL: NORMAL

## 2023-06-15 ENCOUNTER — HOSPITAL ENCOUNTER (OUTPATIENT)
Dept: RADIOLOGY | Facility: HOSPITAL | Age: 63
Discharge: HOME | End: 2023-06-15
Attending: ORTHOPAEDIC SURGERY
Payer: COMMERCIAL

## 2023-06-15 ENCOUNTER — OFFICE VISIT (OUTPATIENT)
Dept: SPORTS MEDICINE | Facility: CLINIC | Age: 63
End: 2023-06-15
Attending: FAMILY MEDICINE
Payer: COMMERCIAL

## 2023-06-15 VITALS
BODY MASS INDEX: 25.58 KG/M2 | DIASTOLIC BLOOD PRESSURE: 82 MMHG | HEART RATE: 94 BPM | WEIGHT: 163 LBS | SYSTOLIC BLOOD PRESSURE: 136 MMHG | HEIGHT: 67 IN

## 2023-06-15 DIAGNOSIS — G89.29 CHRONIC RIGHT SHOULDER PAIN: ICD-10-CM

## 2023-06-15 DIAGNOSIS — M25.512 ACUTE PAIN OF LEFT SHOULDER: ICD-10-CM

## 2023-06-15 DIAGNOSIS — M75.102 ROTATOR CUFF SYNDROME, LEFT: Primary | ICD-10-CM

## 2023-06-15 DIAGNOSIS — M25.511 CHRONIC RIGHT SHOULDER PAIN: ICD-10-CM

## 2023-06-15 PROCEDURE — 3044F HG A1C LEVEL LT 7.0%: CPT | Mod: CPTII,S$GLB,, | Performed by: ORTHOPAEDIC SURGERY

## 2023-06-15 PROCEDURE — 99204 PR OFFICE/OUTPT VISIT, NEW, LEVL IV, 45-59 MIN: ICD-10-PCS | Mod: 25,S$GLB,, | Performed by: ORTHOPAEDIC SURGERY

## 2023-06-15 PROCEDURE — 3066F NEPHROPATHY DOC TX: CPT | Mod: CPTII,S$GLB,, | Performed by: ORTHOPAEDIC SURGERY

## 2023-06-15 PROCEDURE — 99999 PR PBB SHADOW E&M-EST. PATIENT-LVL V: ICD-10-PCS | Mod: PBBFAC,,, | Performed by: ORTHOPAEDIC SURGERY

## 2023-06-15 PROCEDURE — 1159F MED LIST DOCD IN RCRD: CPT | Mod: CPTII,S$GLB,, | Performed by: ORTHOPAEDIC SURGERY

## 2023-06-15 PROCEDURE — 1160F PR REVIEW ALL MEDS BY PRESCRIBER/CLIN PHARMACIST DOCUMENTED: ICD-10-PCS | Mod: CPTII,S$GLB,, | Performed by: ORTHOPAEDIC SURGERY

## 2023-06-15 PROCEDURE — 1160F RVW MEDS BY RX/DR IN RCRD: CPT | Mod: CPTII,S$GLB,, | Performed by: ORTHOPAEDIC SURGERY

## 2023-06-15 PROCEDURE — 3061F PR NEG MICROALBUMINURIA RESULT DOCUMENTED/REVIEW: ICD-10-PCS | Mod: CPTII,S$GLB,, | Performed by: ORTHOPAEDIC SURGERY

## 2023-06-15 PROCEDURE — 3008F BODY MASS INDEX DOCD: CPT | Mod: CPTII,S$GLB,, | Performed by: ORTHOPAEDIC SURGERY

## 2023-06-15 PROCEDURE — 20610 DRAIN/INJ JOINT/BURSA W/O US: CPT | Mod: LT,S$GLB,, | Performed by: ORTHOPAEDIC SURGERY

## 2023-06-15 PROCEDURE — 3066F PR DOCUMENTATION OF TREATMENT FOR NEPHROPATHY: ICD-10-PCS | Mod: CPTII,S$GLB,, | Performed by: ORTHOPAEDIC SURGERY

## 2023-06-15 PROCEDURE — 20610 LARGE JOINT ASPIRATION/INJECTION: L SUBACROMIAL BURSA: ICD-10-PCS | Mod: LT,S$GLB,, | Performed by: ORTHOPAEDIC SURGERY

## 2023-06-15 PROCEDURE — 99204 OFFICE O/P NEW MOD 45 MIN: CPT | Mod: 25,S$GLB,, | Performed by: ORTHOPAEDIC SURGERY

## 2023-06-15 PROCEDURE — 3061F NEG MICROALBUMINURIA REV: CPT | Mod: CPTII,S$GLB,, | Performed by: ORTHOPAEDIC SURGERY

## 2023-06-15 PROCEDURE — 1159F PR MEDICATION LIST DOCUMENTED IN MEDICAL RECORD: ICD-10-PCS | Mod: CPTII,S$GLB,, | Performed by: ORTHOPAEDIC SURGERY

## 2023-06-15 PROCEDURE — 3075F PR MOST RECENT SYSTOLIC BLOOD PRESS GE 130-139MM HG: ICD-10-PCS | Mod: CPTII,S$GLB,, | Performed by: ORTHOPAEDIC SURGERY

## 2023-06-15 PROCEDURE — 3008F PR BODY MASS INDEX (BMI) DOCUMENTED: ICD-10-PCS | Mod: CPTII,S$GLB,, | Performed by: ORTHOPAEDIC SURGERY

## 2023-06-15 PROCEDURE — 3044F PR MOST RECENT HEMOGLOBIN A1C LEVEL <7.0%: ICD-10-PCS | Mod: CPTII,S$GLB,, | Performed by: ORTHOPAEDIC SURGERY

## 2023-06-15 PROCEDURE — 3075F SYST BP GE 130 - 139MM HG: CPT | Mod: CPTII,S$GLB,, | Performed by: ORTHOPAEDIC SURGERY

## 2023-06-15 PROCEDURE — 99999 PR PBB SHADOW E&M-EST. PATIENT-LVL V: CPT | Mod: PBBFAC,,, | Performed by: ORTHOPAEDIC SURGERY

## 2023-06-15 PROCEDURE — 3079F DIAST BP 80-89 MM HG: CPT | Mod: CPTII,S$GLB,, | Performed by: ORTHOPAEDIC SURGERY

## 2023-06-15 PROCEDURE — 3079F PR MOST RECENT DIASTOLIC BLOOD PRESSURE 80-89 MM HG: ICD-10-PCS | Mod: CPTII,S$GLB,, | Performed by: ORTHOPAEDIC SURGERY

## 2023-06-15 RX ADMIN — TRIAMCINOLONE ACETONIDE 80 MG: 40 INJECTION, SUSPENSION INTRA-ARTICULAR; INTRAMUSCULAR at 04:06

## 2023-06-15 NOTE — PROGRESS NOTES
Subjective:     Chief Complaint: Renny Young is a 62 y.o. male who had concerns including Pain of the Right Shoulder.    HPI    Patient presents to clinic with chronic left shoulder pain x several years. Patient states the pain began gradually and is localized along the lateral aspect of the shoulder. He denies any DEANNA or traumatic event.  Pain is 5/10 and made worse with raising the arm higher than shoulder height.  He states he previously received a CSI sometime ago which did give him some relief.  He is not attempted PT before.  He has attempted multiple conservative measures that include activity modification, ice & elevation, and oral medications (ibuprofen), with little relief. He denies any mechanical symptoms to include locking and catching or instability.  Is affecting ADLs and limiting desired level of activity. Denies numbness, tingling, radiation, and inability to bear weight. He is here today to discuss treatment options.    No previous surgeries or trauma on bilateral shoulders    Review of Systems   Constitutional: Negative.   HENT: Negative.     Eyes: Negative.    Cardiovascular: Negative.    Respiratory: Negative.     Endocrine: Negative.    Hematologic/Lymphatic: Negative.    Skin: Negative.    Musculoskeletal:  Positive for joint pain, muscle weakness and stiffness. Negative for falls and joint swelling.   Neurological: Negative.    Psychiatric/Behavioral: Negative.     Allergic/Immunologic: Negative.      Pain Related Questions  Over the past 3 days, what was your average pain during activity? (I.e. running, jogging, walking, climbing stairs, getting dressed, ect.): 5  Over the past 3 days, what was your highest pain level?: 5  Over the past 3 days, what was your lowest pain level? : 5    Other  Was the patient's HEIGHT measured or patient reported?: Patient Reported  Was the patient's WEIGHT measured or patient reported?: Measured    Objective:     General: Renny is well-developed,  well-nourished, appears stated age, in no acute distress, alert and oriented to time, place and person.     General    Nursing note and vitals reviewed.  Constitutional: He is oriented to person, place, and time. He appears well-developed and well-nourished. No distress.   HENT:   Head: Normocephalic and atraumatic.   Nose: Nose normal.   Eyes: EOM are normal.   Cardiovascular:  Intact distal pulses.            Pulmonary/Chest: Effort normal. No respiratory distress.   Neurological: He is alert and oriented to person, place, and time.   Psychiatric: He has a normal mood and affect. His behavior is normal. Judgment and thought content normal.         Right Shoulder Exam     Inspection/Observation   Swelling: absent  Bruising: absent  Scars: absent  Deformity: absent  Scapular Winging: absent  Scapular Dyskinesia: negative  Atrophy: absent    Tenderness   The patient is tender to palpation of the greater tuberosity.    Range of Motion   Active abduction:  140   Passive abduction:  160   Forward Flexion:  160   Forward Elevation: 160  External Rotation 0 degrees:  60   Internal rotation 0 degrees:  Mid thoracic     Tests & Signs   Drop arm: negative  Sarabia test: positive  Impingement: positive  Rotator Cuff Painful Arc/Range: mild  Belly Press: negative  Active Compression Test (Ansley's Sign): negative  Speed's Test: negative  Bear Hug: positive    Other   Sensation: normal    Left Shoulder Exam   Left shoulder exam is normal.    Inspection/Observation   Swelling: absent  Bruising: absent  Scars: absent  Deformity: absent  Scapular Winging: absent  Scapular Dyskinesia: negative  Atrophy: absent    Tenderness   The patient is experiencing no tenderness.     Range of Motion   Active abduction:  170   Passive abduction:  170   Forward Flexion:  180   Forward Elevation: 180  External Rotation 0 degrees:  60   Internal rotation 0 degrees:  Mid thoracic     Other   Sensation: normal       Muscle Strength   Right Upper  Extremity   Shoulder Abduction: 5/5   Shoulder Internal Rotation: 5/5   Shoulder External Rotation: 5/5   Supraspinatus: 4/5   Subscapularis: 4/5   Biceps: 4/5   Left Upper Extremity  Shoulder Abduction: 5/5   Shoulder Internal Rotation: 5/5   Shoulder External Rotation: 5/5   Supraspinatus: 5/5   Subscapularis: 5/5   Biceps: 5/5     Vascular Exam     Right Pulses      Radial:                    2+      Left Pulses      Radial:                    2+      Capillary Refill  Right Hand: normal capillary refill  Left Hand: normal capillary refill      Radiographs left shoulder     My interpretation:     Mild joint space narrowing within the glenohumeral joint    No signs of severe DJD, fracture, or any other bony abnormalities noted      Assessment:     Encounter Diagnoses   Name Primary?    Acute pain of left shoulder     Rotator cuff syndrome, left Yes        Plan:     1. I made the decision to order new imaging of the extremity or extremities evaluated. I independently reviewed and interpreted the radiographs and/or MRIs today. These images were shown to the patient where I then discussed my findings in detail.    2. We discussed at length different treatment options including conservative vs surgical management. These include anti-inflammatories, acetaminophen, rest, ice, heat, formal physical therapy including strengthening and stretching exercises, home exercise programs, dry needling, and finally surgical intervention.  I explained to the patient that based on his symptoms and physical exam findings, it is possibly could have at least a partial-thickness rotator cuff tear.  Discussed both nonoperative and operative management moving forward.  Non operative would consist of formal physical therapy as well as repeat CSI.  At this time, patient would prefer to pursue these options before considering surgery.      3. Ambulatory referral for formal physical therapy at [facility]  with focus on Rotator cuff and  Periscapular strengthening    4. Subacromial CSI of the left shoulder administered today.  Patient tolerated the procedure well without any adverse effects or complications.    5. RTC to see Michael Adan PA-C in 8 weeks for follow-up.  Will reassess shoulder pain and determine if MRI is warranted at that time.      All of the patient's questions were answered. Patient was advised to call the clinic or contact me through the patient portal for any questions or concerns.       Medical Dictation software was used during the dictation of portions or the entirety of this medical record.  Phonetic or grammatic errors may exist due to the use of this software. For clarification, refer to the author of the document.       Patient questionnaires may have been collected.

## 2023-06-20 RX ORDER — TRIAMCINOLONE ACETONIDE 40 MG/ML
80 INJECTION, SUSPENSION INTRA-ARTICULAR; INTRAMUSCULAR
Status: DISCONTINUED | OUTPATIENT
Start: 2023-06-15 | End: 2023-06-20 | Stop reason: HOSPADM

## 2023-06-20 NOTE — PROCEDURES
Large Joint Aspiration/Injection: L subacromial bursa    Date/Time: 6/15/2023 4:00 PM  Performed by: Bart Adan PA-C  Authorized by: Bart Adan PA-C     Consent Done?:  Yes (Verbal)  Indications:  Pain  Site marked: the procedure site was marked    Timeout: prior to procedure the correct patient, procedure, and site was verified      Local anesthesia used?: Yes    Local anesthetic:  Lidocaine spray  Approach:  Posterior  Location:  Shoulder  Site:  L subacromial bursa  Medications:  80 mg triamcinolone acetonide 40 mg/mL  Patient tolerance:  Patient tolerated the procedure well with no immediate complications

## 2023-06-24 ENCOUNTER — OFFICE VISIT (OUTPATIENT)
Dept: URGENT CARE | Facility: CLINIC | Age: 63
End: 2023-06-24
Payer: COMMERCIAL

## 2023-06-24 VITALS
HEIGHT: 67 IN | DIASTOLIC BLOOD PRESSURE: 96 MMHG | OXYGEN SATURATION: 99 % | HEART RATE: 78 BPM | BODY MASS INDEX: 25.58 KG/M2 | TEMPERATURE: 99 F | WEIGHT: 163 LBS | SYSTOLIC BLOOD PRESSURE: 170 MMHG | RESPIRATION RATE: 17 BRPM

## 2023-06-24 DIAGNOSIS — B35.6 JOCK ITCH: Primary | ICD-10-CM

## 2023-06-24 DIAGNOSIS — Z11.3 SCREEN FOR STD (SEXUALLY TRANSMITTED DISEASE): ICD-10-CM

## 2023-06-24 DIAGNOSIS — F17.200 NEEDS SMOKING CESSATION EDUCATION: ICD-10-CM

## 2023-06-24 LAB
BILIRUB UR QL STRIP: NEGATIVE
GLUCOSE UR QL STRIP: NEGATIVE
HAV IGM SERPL QL IA: NORMAL
HBV CORE IGM SERPL QL IA: NORMAL
HBV SURFACE AG SERPL QL IA: NORMAL
HCV AB SERPL QL IA: NORMAL
HIV 1+2 AB+HIV1 P24 AG SERPL QL IA: NORMAL
KETONES UR QL STRIP: NEGATIVE
LEUKOCYTE ESTERASE UR QL STRIP: NEGATIVE
PH, POC UA: 5.5 (ref 5–8)
POC BLOOD, URINE: POSITIVE
POC NITRATES, URINE: NEGATIVE
PROT UR QL STRIP: NEGATIVE
SP GR UR STRIP: 1.02 (ref 1–1.03)
UROBILINOGEN UR STRIP-ACNC: NORMAL (ref 0.3–2.2)

## 2023-06-24 PROCEDURE — 99214 OFFICE O/P EST MOD 30 MIN: CPT | Mod: S$GLB,,, | Performed by: FAMILY MEDICINE

## 2023-06-24 PROCEDURE — 87661 TRICHOMONAS VAGINALIS AMPLIF: CPT | Performed by: FAMILY MEDICINE

## 2023-06-24 PROCEDURE — 87389 HIV-1 AG W/HIV-1&-2 AB AG IA: CPT | Performed by: FAMILY MEDICINE

## 2023-06-24 PROCEDURE — 87591 N.GONORRHOEAE DNA AMP PROB: CPT | Performed by: FAMILY MEDICINE

## 2023-06-24 PROCEDURE — 81003 POCT URINALYSIS, DIPSTICK, AUTOMATED, W/O SCOPE: ICD-10-PCS | Mod: QW,S$GLB,, | Performed by: FAMILY MEDICINE

## 2023-06-24 PROCEDURE — 99214 PR OFFICE/OUTPT VISIT, EST, LEVL IV, 30-39 MIN: ICD-10-PCS | Mod: S$GLB,,, | Performed by: FAMILY MEDICINE

## 2023-06-24 PROCEDURE — 80074 ACUTE HEPATITIS PANEL: CPT | Performed by: FAMILY MEDICINE

## 2023-06-24 PROCEDURE — 81003 URINALYSIS AUTO W/O SCOPE: CPT | Mod: QW,S$GLB,, | Performed by: FAMILY MEDICINE

## 2023-06-24 RX ORDER — FLUCONAZOLE 150 MG/1
150 TABLET ORAL DAILY
Qty: 1 TABLET | Refills: 0 | Status: SHIPPED | OUTPATIENT
Start: 2023-06-24 | End: 2023-06-25

## 2023-06-24 RX ORDER — CLOTRIMAZOLE 1 %
CREAM (GRAM) TOPICAL 2 TIMES DAILY
Qty: 12 G | Refills: 0 | Status: SHIPPED | OUTPATIENT
Start: 2023-06-24 | End: 2023-08-11

## 2023-06-24 RX ORDER — TERCONAZOLE 4 MG/G
1 CREAM VAGINAL NIGHTLY
Qty: 7 G | Refills: 0 | Status: SHIPPED | OUTPATIENT
Start: 2023-06-24 | End: 2023-06-24

## 2023-06-24 NOTE — PROGRESS NOTES
"Subjective:      Patient ID: Renny Young is a 62 y.o. male.    Vitals:  height is 5' 7" (1.702 m) and weight is 73.9 kg (163 lb). His oral temperature is 99.1 °F (37.3 °C). His blood pressure is 170/96 (abnormal) and his pulse is 78. His respiration is 17 and oxygen saturation is 99%.     Chief Complaint: Dysuria    Pt presents today w/ complaints of dysuria; onset approx a week ago. Pt states a discomfort in the groin area when urinating; pt denies any hematuria; burning or frequency. Pt c/o of pressure ,itching and pain. Pt reports Hx of diabetes and hypertension.  Pt didn't try any treatment at home.     Dysuria   This is a new problem. The current episode started in the past 7 days. The problem occurs every urination. The problem has been unchanged. The quality of the pain is described as aching. The pain is at a severity of 7/10. The pain is moderate. He is Sexually active. There is No history of pyelonephritis. Associated symptoms include a discharge. Pertinent negatives include no behavior changes, chills, flank pain, frequency, hematuria, hesitancy, nausea, possible pregnancy, sweats, urgency, vomiting, weight loss, bubble bath use, constipation, rash or withholding. He has tried nothing for the symptoms. His past medical history is significant for diabetes mellitus and hypertension. There is no history of catheterization, diabetes insipidus, genitourinary reflux, kidney stones, recurrent UTIs, a single kidney, STD, urinary stasis or a urological procedure.     Constitution: Negative for chills.   Gastrointestinal:  Negative for nausea, vomiting and constipation.   Genitourinary:  Positive for dysuria. Negative for frequency, urgency, flank pain and hematuria.   Skin:  Negative for rash.    Objective:     Physical Exam   Constitutional: He is oriented to person, place, and time.   HENT:   Head: Normocephalic.   Ears:   Right Ear: External ear normal.   Left Ear: External ear normal.   Nose: Nose normal. " "  Mouth/Throat: Mucous membranes are moist.   Eyes: Conjunctivae are normal.   Cardiovascular: Normal rate.   Pulmonary/Chest: Effort normal.   Musculoskeletal: Normal range of motion.         General: Normal range of motion.   Neurological: He is alert and oriented to person, place, and time.   Skin: Skin is dry.   Psychiatric: His behavior is normal.     Assessment:     1. Jock itch    2. Screen for STD (sexually transmitted disease)      Results for orders placed or performed in visit on 06/24/23   POCT Urinalysis, Dipstick, Automated, W/O Scope   Result Value Ref Range    POC Blood, Urine Positive (A) Negative    POC Bilirubin, Urine Negative Negative    POC Urobilinogen, Urine Normal 0.3 - 2.2    POC Ketones, Urine Negative Negative    POC Protein, Urine Negative Negative    POC Nitrates, Urine Negative Negative    POC Glucose, Urine Negative Negative    pH, UA 5.5 5 - 8    POC Specific Gravity, Urine 1.025 1.003 - 1.029    POC Leukocytes, Urine Negative Negative       Plan:       Jock itch  -     POCT Urinalysis, Dipstick, Automated, W/O Scope  -     fluconazole (DIFLUCAN) 150 MG Tab; Take 1 tablet (150 mg total) by mouth once daily. for 1 day  Dispense: 1 tablet; Refill: 0  -     Discontinue: terconazole (TERAZOL 7) 0.4 % Crea; Place 1 applicator vaginally every evening. Apply around penile shaft of affected area.  Dispense: 7 g; Refill: 0  -     clotrimazole (LOTRIMIN) 1 % cream; Apply topically 2 (two) times daily.  Dispense: 12 g; Refill: 0    Screen for STD (sexually transmitted disease)  -     HIV 1/2 Ag/Ab (4th Gen)  -     RPR  -     C. trachomatis/N. gonorrhoeae by AMP DNA [facility]; Urine  -     Hepatitis Panel, Acute  -     Trichomonas vaginalis, RNA, Qual, Urine    Pt forgot to take his bp pill this am. Advised to do so.   Concerned he caught something from his "lady friend", penile shaft and corona irritation/itch. Also elects for screening.     Clinic will follow up with results.     RTC as needed. "   \    Patient a difficult stick. Unable to get syphilis. He refused another attempt.

## 2023-06-26 ENCOUNTER — OFFICE VISIT (OUTPATIENT)
Dept: PAIN MEDICINE | Facility: CLINIC | Age: 63
End: 2023-06-26
Payer: COMMERCIAL

## 2023-06-26 VITALS
BODY MASS INDEX: 25.26 KG/M2 | DIASTOLIC BLOOD PRESSURE: 80 MMHG | SYSTOLIC BLOOD PRESSURE: 134 MMHG | WEIGHT: 160.94 LBS | HEART RATE: 81 BPM | HEIGHT: 67 IN

## 2023-06-26 DIAGNOSIS — M47.816 LUMBAR SPONDYLOSIS: ICD-10-CM

## 2023-06-26 DIAGNOSIS — M54.16 LUMBAR RADICULOPATHY: Primary | ICD-10-CM

## 2023-06-26 PROCEDURE — 99999 PR PBB SHADOW E&M-EST. PATIENT-LVL IV: CPT | Mod: PBBFAC,,, | Performed by: NURSE PRACTITIONER

## 2023-06-26 PROCEDURE — 99213 OFFICE O/P EST LOW 20 MIN: CPT | Mod: S$GLB,,, | Performed by: NURSE PRACTITIONER

## 2023-06-26 PROCEDURE — 3008F PR BODY MASS INDEX (BMI) DOCUMENTED: ICD-10-PCS | Mod: CPTII,S$GLB,, | Performed by: NURSE PRACTITIONER

## 2023-06-26 PROCEDURE — 3066F NEPHROPATHY DOC TX: CPT | Mod: CPTII,S$GLB,, | Performed by: NURSE PRACTITIONER

## 2023-06-26 PROCEDURE — 1160F RVW MEDS BY RX/DR IN RCRD: CPT | Mod: CPTII,S$GLB,, | Performed by: NURSE PRACTITIONER

## 2023-06-26 PROCEDURE — 3044F HG A1C LEVEL LT 7.0%: CPT | Mod: CPTII,S$GLB,, | Performed by: NURSE PRACTITIONER

## 2023-06-26 PROCEDURE — 3061F NEG MICROALBUMINURIA REV: CPT | Mod: CPTII,S$GLB,, | Performed by: NURSE PRACTITIONER

## 2023-06-26 PROCEDURE — 1159F MED LIST DOCD IN RCRD: CPT | Mod: CPTII,S$GLB,, | Performed by: NURSE PRACTITIONER

## 2023-06-26 PROCEDURE — 3079F PR MOST RECENT DIASTOLIC BLOOD PRESSURE 80-89 MM HG: ICD-10-PCS | Mod: CPTII,S$GLB,, | Performed by: NURSE PRACTITIONER

## 2023-06-26 PROCEDURE — 1160F PR REVIEW ALL MEDS BY PRESCRIBER/CLIN PHARMACIST DOCUMENTED: ICD-10-PCS | Mod: CPTII,S$GLB,, | Performed by: NURSE PRACTITIONER

## 2023-06-26 PROCEDURE — 99213 PR OFFICE/OUTPT VISIT, EST, LEVL III, 20-29 MIN: ICD-10-PCS | Mod: S$GLB,,, | Performed by: NURSE PRACTITIONER

## 2023-06-26 PROCEDURE — 3075F PR MOST RECENT SYSTOLIC BLOOD PRESS GE 130-139MM HG: ICD-10-PCS | Mod: CPTII,S$GLB,, | Performed by: NURSE PRACTITIONER

## 2023-06-26 PROCEDURE — 3008F BODY MASS INDEX DOCD: CPT | Mod: CPTII,S$GLB,, | Performed by: NURSE PRACTITIONER

## 2023-06-26 PROCEDURE — 3075F SYST BP GE 130 - 139MM HG: CPT | Mod: CPTII,S$GLB,, | Performed by: NURSE PRACTITIONER

## 2023-06-26 PROCEDURE — 3061F PR NEG MICROALBUMINURIA RESULT DOCUMENTED/REVIEW: ICD-10-PCS | Mod: CPTII,S$GLB,, | Performed by: NURSE PRACTITIONER

## 2023-06-26 PROCEDURE — 3066F PR DOCUMENTATION OF TREATMENT FOR NEPHROPATHY: ICD-10-PCS | Mod: CPTII,S$GLB,, | Performed by: NURSE PRACTITIONER

## 2023-06-26 PROCEDURE — 3044F PR MOST RECENT HEMOGLOBIN A1C LEVEL <7.0%: ICD-10-PCS | Mod: CPTII,S$GLB,, | Performed by: NURSE PRACTITIONER

## 2023-06-26 PROCEDURE — 1159F PR MEDICATION LIST DOCUMENTED IN MEDICAL RECORD: ICD-10-PCS | Mod: CPTII,S$GLB,, | Performed by: NURSE PRACTITIONER

## 2023-06-26 PROCEDURE — 3079F DIAST BP 80-89 MM HG: CPT | Mod: CPTII,S$GLB,, | Performed by: NURSE PRACTITIONER

## 2023-06-26 PROCEDURE — 99999 PR PBB SHADOW E&M-EST. PATIENT-LVL IV: ICD-10-PCS | Mod: PBBFAC,,, | Performed by: NURSE PRACTITIONER

## 2023-06-26 NOTE — PROGRESS NOTES
Chronic Pain - Established Visit    Referring Physician: Jarvis Washington MD    Chief Complaint:   No chief complaint on file.         SUBJECTIVE: Disclaimer: This note has been generated using voice-recognition software. There may be typographical errors that have been missed during proof-reading    Interval History 6/26/2023:  The patient is here to discuss worsened lower back and leg pain. I last saw him in September at which time he underwent bilateral L5/S1 TF JEAN MARIE on 9/26/22 with 90% relief for 8 months. He says that his pain was minimal during that time. He was able to work without pain disturbance. He has continues with PT exercises 3 days per week for the past 9 months. His pain has been worsening over the past month. He would like to repeat the procedure. He also says that his wife passed away since previous encounter due to long-standing history of sarcoidosis. He has understandably been upset about this. His pain today is 5/10.     Interval History 9/13/2022:  Renny is here to discuss worsened back and leg pain. The pain radiates from the lower back into the back of both legs to anterior shin and great toe. He has associated numbness. He is diabetic but states that he has not had major problems with his sugars recently. No recent trauma. He previously underwent right L5/S1 and S1 TF JEAN MARIE with 80% relief for about 2 months. During that time he was able to ambulate and work without limitation by pain. Today, he again reports severe pain which is affecting his daily activities. He has been in PT exercises for the past 15 weeks without much benefit of symptoms. He has tried ice and heat without benefit. He continues to take Gabapentin which provides some benefit. His overall pain today is 8/10.    Interval History 6/7/2022:  The patient returns to clinic today for follow up of low back pain. He reports increased low back pain over the last two weeks. He reports low back pain that radiates into the  posterior aspect of his right leg to the bottom of his foot. He denies any left leg pain. His pain is worse with prolonged standing and walking. He reports that previous TF JEAN MARIE in 2021 provided 80% relief. He did notice that it took 3-4 weeks to see full benefit. He continues to take Gabapentin. He continues to perform a home exercise routine as prescribed. He denies any weakness. He denies any other health changes. His pain today is 5/10.    Interval history 07/02/2021:  The patient presents for follow-up lower back pain and right leg radiculopathy.  He is status post repeat right-sided TF JEAN MARIE to L5/S1&S1.  He has difficulty quantifying pain relief but states that normally his leg pain is 100% resolved and this time pain persists.  He is not have any recent images.  His medication regimen includes gabapentin 300 mg states this is mildly beneficial or denies any adverse side effects of medication.  Denies any focal loss of bowel, bladder or saddle paresthesias concerning for cauda equina.    Interval history 05/27/2021:  The patient presents for follow-up of lower back pain and bilateral leg radiculopathy.  He has had prior ILESI and TFESI both with 80%  benefit lasting approximately 9 months and pain just now returning. Pain is worse to right side at this time.  He denies any new areas of pain or neurological changes.The patient denies myelopathic symptoms such as handwriting changes or difficulty with buttons/coins/keys. Denies perineal paresthesias, bowel/bladder dysfunction.    Interval History 9/1/2020:  The patient is here for follow up of back and leg pain.  He is now s/p L5/S1 IL JEAN MARIE with 80% of back pain.  However, he is having significant right leg pain, mainly down the buttock and posterior calf.  He had benefit with right L5/S1 and S1 TF JEAN MARIE in the past and would like to repeat.  He has associated numbness to right calf, worse with walking.  He does have some benefit with Gabapentin.  He had a recent  Covid test for work which was negative. His pain today is 5/10.    Interval History 6/9/2020:  The patient is here for follow up of chronic lower back pain.  We have not seen the patient since last year.  He underwent an epidural at that time and says that his pain has been very mild until a few weeks ago.  He is having pain across lower back with radiation into the legs, mainly on the left.  He continues to take gabapentin with some benefit.  The pain bothers him the most with walking and activity.  He had pacemaker replacement on 5/6/20.  He is not currently on blood thinners.  His pain today is 5/10.    Interval History 7/30/2019:  The patient returns for follow up of back pain.  He is s/p repeat left then right L3,4,5 RFAs completed on 6/24/19 with about 80% relief.  His back pain is mild.  He has had increased leg pain recently, worse on the left side.  Previous leg pain was relieved with JEAN MARIE last year.  He would like to repeat this.  His leg pain bothers him mainly at night when trying to sleep.  He stopped Gabapentin when he was not having leg pain but recently restarted.  He is unable to take at night.  His pain today is 6/10.    Interval History 5/14/2019:  The patient returns for follow up.  He previously had benefit with JEAN MARIE for leg pain and RFAs for back pain.  He is having some back pain that has been worsening over the past couple of weeks.  He has significant pain in the morning.  He has some improvement when he moves around.  He says that cold air aggravates his pain.  He stopped Gabapentin last year when his leg pain improved.  He is not having much leg pain now.  His pain today is 6/10.    Interval History 2/7/2019:  The patient presents for check up of chronic back pain.  He reports doing well since last visit.  He feels that last JEAN MARIE is still providing him benefit.  He has not had any leg pain.  He does still have back pain but states that it is tolerable.  His morning pain is much improved from  RFAs last year.  He continues to work and is active.  His pain today is 5/10.    Interval History 12/6/2018:  The patient presents today for follow up.  He continues with pain to the lower back.  He is not having leg pain at this time.  He had some relief with RFAs with the past.  He stopped Gabapentin when his leg pain improved.  He takes Tylenol sparingly with some benefit.  His pain today is 5/10.  The patient denies any bowel or bladder incontinence or signs of saddle paresthesia.  The patient denies any major medical changes since last office visit.    Interval History 10/25/2018:  The patient returns for follow up of back and leg pain.  He is s/p L5/S1 IL JEAN MARIE on 10/11/18 with 80% pain relief for his legs.  He has had increased lower back pain over the past few days which he attributes to weather changes.  He describes it as aching.  He has not been stretching.  He does walk a lot for work.  He cannot take oral NSAIDs due to pacemaker placement.  His pain today is 8/10.     Interval History 9/11/2018:  The patient presents for procedure follow up appointment.  He is s/p left then right L3,4,5 RFA completed on 8/14/18 with 80% pain relief initially.  He has had a little more pain over the past week.  He is now having intermittent radiation into the back of both legs, left greater than right.  He previously had benefit with right sided TF JEAN MARIE.  He is still taking gabapentin.  His pain today is 5/10.    Interval History 7/25/2018:  The patient returns today for follow up of back pain.  He is s/p bilateral L3,4,5 MBB on 7/5/18 with 100% relief for 2 days.  He previously had benefit with right TF JEAN MARIE for leg pain.  He has not had right leg pain since the epidural.  However, he is reporting lateral left leg pain that has progressed over the past couple of weeks.  He continues to be active and works.  His pain today is 5/10.    Interval History 6/20/2018:  The patient returns today for follow up of lower back and  right leg pain.  He is s/p right L5 and S1 TF JEAN MARIE on 6/5/18 with 100% relief of right leg pain.  He has not had any leg pain since the procedure.  He continues to report pain across the lower back.  This is always worse first thing in the morning.  He states that this feels aching and throbbing in nature.  He did have recent lumbar CT scan.  He would like to discuss further procedures.  He continues to work and be active.  His pain today is 5/10.    Interval History 5/22/2018:  The patient returns for follow up of back pain.  He is having radiation down there back of the right leg to the posterior calf.  The back pain is most severe in the morning and he states that this feels like a stiffness.  This improves when he walks.  He has severe leg pain that is intermittent, mainly with activity.  This is his biggest complaint.  He did complete the medrol dose pack recently with limited improvement.  He had XRAYs which show severe loss of disc space at L5/S1.  He has not had a CT scan.  He is taking Gabapentin 300 mg at bedtime.  It is written BID but it makes him drowsy during the day.  He has some benefit with Aleve but has been told to avoid NSAIDs due to history of pacemaker placement.  His pain today is 5/10.     Initial encounter:    Renny Young presents to the clinic for the evaluation of lower back and right leg pain. The pain started two months ago and symptoms have been worsening.    Brief history:    Pain Description:    The pain is located in the lower back and right leg area in the L5/S1 distribution.      At BEST  5/10     At WORST  7/10 on the WORST day.      On average pain is rated as 5/10.     Today the pain is rated as 5/10    The pain is described as aching      Symptoms interfere with daily activity.     Exacerbating factors: Standing and Morning.      Mitigating factors medications.     Patient denies night fever/night sweats, urinary incontinence, bowel incontinence, significant weight loss,  significant motor weakness and loss of sensations.  Patient denies any suicidal or homicidal ideations    Pain Medications:  Current:  OTC Tylenol PRN  Gabapentin 300 mg BID    Tried in Past:  NSAIDs - Aleve  TCA -Never  SNRI -Never  Anti-convulsants - Gabapentin   Muscle Relaxants -Never  Opioids-Never    Physical Therapy/Home Exercise: yes     report:  Reviewed and consistent with medication use as prescribed.    Pain Procedures:   6/5/18 Right L5 and S1 TF JEAN MARIE- significant relief  7/5/18 Bilateral L3,4,5 MBB- 100% relief for 2 days  7/31/18 Left L3,4,5 RFA- 80% relief  8/14/18 Right L3,4,5 RFA- 80% relief  10/11/18 L5/S1 IL JEAN MARIE- 80% relief  6/11/19 Left L3,4,5 RFA- 80% relief  6/25/19 Right L3,4,5 RFA- 80% relief  8/13/19 L5/S1 IL JEAN MARIE- 90% relief for 9 months  6/16/20 L5/S1 IL JEAN MARIE- 80% relief of back pain  6/15/2021- Right L5/S1 and S1 TF JEAN MARIE  6/20/22 Right L5 and S1 TF JEAN MARIE- 80% relief for 2 months  9/26/22 Bilateral L5/S1 TF JEAN MARIE- 90% relief for 9 months    Chiropractor -never  Acupuncture - never  TENS unit -never  Spinal decompression -never  Joint replacement -never    Imaging:   CT Lumbar Spine 7/9/2021:  COMPARISON:  Lumbar spine CT May 28, 2018     FINDINGS:  Mild levocurvature of the lumbar spine.  No listhesis.  There is no acute fracture.  The vertebral bodies are normal in height without compression fractures. Posterior elements are intact. There is unchanged severe disc height loss at the L5-S1 level with associated sclerosis and subchondral cystic change within the endplates.  Mild atherosclerotic calcification within the abdominal aorta which is not aneurysmal.  Otherwise, no soft tissue abnormality identified.     T12-L1: The disc is normal in configuration.  There is no facet arthropathy.  There is no neural foraminal stenosis.  There is no spinal canal stenosis.     L1-L2: The disc is normal in configuration.  There is no facet arthropathy.  There is no neuroforaminal stenosis.  There is no  spinal canal stenosis.     L2-L3: The disc is normal in configuration.  There is no facet arthropathy.  There is no neuroforaminal stenosis.  There is no spinal canal stenosis.     L3-L4: Minimal diffuse disc bulge.  Mild bilateral facet arthropathy.  There is no neuroforaminal stenosis.  There is no spinal canal stenosis.     L4-L5: There is a diffuse disc bulge.  Mild-to-moderate bilateral facet arthropathy.  There is no neuroforaminal stenosis.  There is no spinal canal stenosis.     L5-S1: There is a circumferential disc osteophyte complex secondary to the severe degenerative disc disease at this level.  Mild to moderate bilateral facet arthropathy.  Unchanged mild bilateral neural foraminal stenosis secondary to diffuse disc osteophyte complex as well as the facet arthropathy.  There is no spinal canal stenosis.     Impression:     Inferior lumbar spine degenerative changes worst at L5-S1 which result in mild bilateral neural foraminal stenosis at this level.  Overall, findings are not significantly changed compared to prior study from May 2018.        Past Medical History:   Diagnosis Date    Colon polyp     Diabetes mellitus, type 2     Hypertension     Pacemaker     Pacemaker at end of battery life 5/6/2020    Smoker 6/10/2015    Offered cessation program and declined     Past Surgical History:   Procedure Laterality Date    COLONOSCOPY N/A 2/13/2017    Procedure: COLONOSCOPY;  Surgeon: NILDA Juares MD;  Location: [address];  Service: Endoscopy;  Laterality: N/A;  Do not cancel this order. Patient has Pacemaker in place.     EPIDURAL STEROID INJECTION N/A 8/13/2019    Procedure: INJECTION, STEROID, EPIDURAL IL, L5/S1;  Surgeon: Josue Paredes MD;  [facility];  Service: Pain Management;  Laterality: N/A;  IL JEAN MARIE L5/S1    EPIDURAL STEROID INJECTION N/A 6/16/2020    Procedure: INJECTION, STEROID, EPIDURAL, L5-S1 IL add on @ 2 ok by  Asia;  Surgeon: Josue Paredes MD;  Location:  [facility];  Service: Pain Management;  Laterality: N/A;    HERNIA REPAIR      INJECTION OF ANESTHETIC AGENT AROUND NERVE Bilateral 7/5/2018    Procedure: BLOCK, NERVE;  Surgeon: Krystal Mtz MD;  Location: [facility];  Service: Pain Management;  Laterality: Bilateral;  Lumbar Bilateral L3-L4-L5 Medial Branch Block    RADIOFREQUENCY ABLATION Left 6/11/2019    Procedure: RADIOFREQUENCY ABLATION, L3-L4-L5 MEDIAL BRANCH   1 OF 2;  Surgeon: Josue Paredes MD;  Location: [facility];  Service: Pain Management;  Laterality: Left;    RADIOFREQUENCY ABLATION Right 6/25/2019    Procedure: RADIOFREQUENCY ABLATION, L3-L4-L5 MEDIAL BRANCH JING 2 OF 2;  Surgeon: Josue Paredes MD;  Location: [facility];  Service: Pain Management;  Laterality: Right;    REPLACEMENT OF PACEMAKER GENERATOR Left 5/6/2020    Procedure: REPLACEMENT, PULSE GENERATOR, CARDIAC PACEMAKER;  Surgeon: Bradford Spence MD;  Location: [facility] EP [facility];  Service: Cardiology;  Laterality: Left;  EOL/LEAD Mlfx, Gen Change, Poss RA lead rev, SJM, MAC, GP, 3 PREP    REVISION OF PROCEDURE INVOLVING PACEMAKER LEAD Left 5/6/2020    Procedure: REVISION, ELECTRODE LEAD, CARDIAC PACEMAKER;  Surgeon: Bradford Spence MD;  Location: [facility] EP LAB;  Service: Cardiology;  Laterality: Left;    TRANSFORAMINAL EPIDURAL INJECTION OF STEROID Right 6/5/2018    Procedure: INJECTION-STEROID-EPIDURAL-TRANSFORAMINAL;  Surgeon: Josue Paredes MD;  Location: [facility];  Service: Pain Management;  Laterality: Right;  LUMBAR RIGHT L5 AND S1 TRANSFORAMINL JEAN MARIE  [id number]    W/ SEDATION     TRANSFORAMINAL EPIDURAL INJECTION OF STEROID Right 9/10/2020    Procedure: INJECTION, STEROID, EPIDURAL, TRANSFORAMINAL APPROACH, L5-S1 AND S1;  Surgeon: Josue Paredes MD;  Location: [facility];  Service: Pain Management;  Laterality: Right;    TRANSFORAMINAL EPIDURAL INJECTION OF STEROID Right 6/15/2021    Procedure: INJECTION, STEROID, EPIDURAL, TRANSFORAMINAL APPROACH L5-S1 AND  S1 need consent;  Surgeon: Josue Paredes MD;  Location: [facility];  Service: Pain Management;  Laterality: Right;    TRANSFORAMINAL EPIDURAL INJECTION OF STEROID Right 6/20/2022    Procedure: INJECTION, STEROID, EPIDURAL, TRANSFORAMINAL APPROACH, RIGHT L5-S1 AND S1 CONTRAST;  Surgeon: Krystal Mtz MD;  Location: [facility];  Service: Pain Management;  Laterality: Right;    TRANSFORAMINAL EPIDURAL INJECTION OF STEROID Bilateral 9/26/2022    Procedure: INJECTION, STEROID, EPIDURAL, TRANSFORAMINAL APPROACH, BILATERAL L5-S1 CONTRAST;  Surgeon: Krystal Mtz MD;  Location: [facility];  Service: Pain Management;  Laterality: Bilateral;     Social History     Socioeconomic History    Marital status:     Number of children: 3   Occupational History    Occupation:    Tobacco Use    Smoking status: Every Day     Packs/day: 0.50     Types: Cigarettes    Smokeless tobacco: Never   Substance and Sexual Activity    Alcohol use: Yes     Comment: Beer- Socially    Drug use: No    Sexual activity: Yes     Family History   Problem Relation Age of Onset    Diabetes Mother     Diabetes Father     Kidney disease Father     Melanoma Neg Hx        Review of patient's allergies indicates:  No Known Allergies    Current Outpatient Medications   Medication Sig    amLODIPine (NORVASC) 5 MG tablet Take 1 tablet (5 mg total) by mouth once daily.    ammonium lactate 12 % Crea Apply twice daily to affected parts both feet as needed.    aspirin (ECOTRIN) 81 MG EC tablet Take 81 mg by mouth once daily.    atorvastatin (LIPITOR) 20 MG tablet Take 1 tablet (20 mg total) by mouth once daily.    clotrimazole (LOTRIMIN) 1 % cream Apply topically 2 (two) times daily.    fexofenadine (ALLEGRA) 180 MG tablet Take 1 tablet (180 mg total) by mouth once daily.    gabapentin (NEURONTIN) 300 MG capsule Take 1 capsule by mouth twice daily    LIDOcaine HCL 2% (XYLOCAINE) 2 % jelly Apply topically as needed. Apply topically once nightly  "to affected part of foot/feet.    losartan-hydrochlorothiazide 100-12.5 mg (HYZAAR) 100-12.5 mg Tab Take 1 tablet by mouth once daily.    metFORMIN (GLUCOPHAGE) 500 MG tablet Take 1 tablet (500 mg total) by mouth daily with breakfast.    podofilox (CONDYLOX) 0.5 % gel Apply topically 2 (two) times daily.     No current facility-administered medications for this visit.       REVIEW OF SYSTEMS:    GENERAL:  No weight loss, malaise or fevers.  HEENT:   No recent changes in vision or hearing  NECK:  Negative for lumps, no difficulty with swallowing.  RESPIRATORY:  Negative for cough, wheezing or shortness of breath, patient denies any recent URI.  CARDIOVASCULAR:  Negative for chest pain, leg swelling or palpitations. Pacemaker for SSS.  GI:  Negative for abdominal discomfort, blood in stools or black stools or change in bowel habits.  MUSCULOSKELETAL:  See HPI.  SKIN:  Negative for lesions, rash, and itching.  PSYCH:  No mood disorder or recent psychosocial stressors.  Patients sleep is not disturbed secondary to pain.  HEMATOLOGY/LYMPHOLOGY:  Negative for prolonged bleeding, bruising easily or swollen nodes.  Patient is not currently taking any anti-coagulants  ENDO: DM2 on metformin  NEURO:   No history of headaches, syncope, paralysis, seizures or tremors.  All other reviewed and negative other than HPI.    OBJECTIVE:    /80 (BP Location: Right arm, Patient Position: Sitting, BP Method: Medium (Automatic))   Pulse 81   Ht 5' 7" (1.702 m)   Wt 73 kg (160 lb 15 oz)   BMI 25.21 kg/m²     PHYSICAL EXAMINATION:    GENERAL: Well appearing, in no acute distress, alert and oriented x3.  PSYCH:  Mood and affect appropriate.  SKIN: Skin color, texture, turgor normal, no rashes or lesions.  HEAD/FACE:  Normocephalic, atraumatic. Cranial nerves grossly intact.  CV: RRR with palpation of the radial artery.  PULM: No evidence of respiratory difficulty, symmetric chest rise.  BACK: Straight leg raising in the sitting " position is positive to radicular pain at a bilateral L5 distribution. There is no pain with palpation over the facet joints of the lumbar spine bilaterally. Limited ROM with pain on flexion > extension. Mildly positive facet loading bilaterally.   EXTREMITIES: No deformities, edema, or skin discoloration. Good capillary refill.  MUSCULOSKELETAL:There is no pain with palpation over the sacroiliac joints bilaterally. There is no pain to palpation over the greater trochanteric bursa bilaterally. FABERs test is negative.  FADIRs test is negative.  5/5 strength in right ankle with plantar and dorsiflexion, 5/5 strength in left ankle with plantar and dorsiflexion, 5/5 strength with right knee flexion extension, 5/5 strength with knee flexion extension on the left. Bilateral EHL is 4/5.  No atrophy or tone abnormalities are noted.  NEURO: Bilateral lower extremity coordination and muscle stretch reflexes are physiologic and symmetric.  Plantar response are downgoing. No clonus.  Decreased sensation to BLE at L5 distribution.  GAIT: Antalgic- ambulates without assistance.      ASSESSMENT: 62 y.o. year old male with lower back pain, consistent with the following diagnoses:    Encounter Diagnoses   Name Primary?    Lumbar spondylosis     Lumbar radiculopathy          PLAN:     - Previous imaging was reviewed and discussed with the patient today. Symptoms consistent with L5/S1 disc bulge and bilateral NF narrowing.    - Schedule for bilateral L5/S1 TF JEAN MARIE . Previous provided 90% for 8 months.    - Continue Gabapentin 300 mg BID. He is unable to tolerate higher dosages.    - The patient will continue a home exercise routine to help with pain and strengthening.    - RTC 2 weeks after above procedure.       The above plan and management options were discussed at length with patient. Patient is in agreement with the above and verbalized understanding.     Trista Oviedo  06/26/2023

## 2023-06-26 NOTE — H&P (VIEW-ONLY)
Chronic Pain - Established Visit    Referring Physician: Jarvis Washington MD    Chief Complaint:   No chief complaint on file.         SUBJECTIVE: Disclaimer: This note has been generated using voice-recognition software. There may be typographical errors that have been missed during proof-reading    Interval History 6/26/2023:  The patient is here to discuss worsened lower back and leg pain. I last saw him in September at which time he underwent bilateral L5/S1 TF JEAN MARIE on 9/26/22 with 90% relief for 8 months. He says that his pain was minimal during that time. He was able to work without pain disturbance. He has continues with PT exercises 3 days per week for the past 9 months. His pain has been worsening over the past month. He would like to repeat the procedure. He also says that his wife passed away since previous encounter due to long-standing history of sarcoidosis. He has understandably been upset about this. His pain today is 5/10.     Interval History 9/13/2022:  Renny is here to discuss worsened back and leg pain. The pain radiates from the lower back into the back of both legs to anterior shin and great toe. He has associated numbness. He is diabetic but states that he has not had major problems with his sugars recently. No recent trauma. He previously underwent right L5/S1 and S1 TF JEAN MARIE with 80% relief for about 2 months. During that time he was able to ambulate and work without limitation by pain. Today, he again reports severe pain which is affecting his daily activities. He has been in PT exercises for the past 15 weeks without much benefit of symptoms. He has tried ice and heat without benefit. He continues to take Gabapentin which provides some benefit. His overall pain today is 8/10.    Interval History 6/7/2022:  The patient returns to clinic today for follow up of low back pain. He reports increased low back pain over the last two weeks. He reports low back pain that radiates into the  posterior aspect of his right leg to the bottom of his foot. He denies any left leg pain. His pain is worse with prolonged standing and walking. He reports that previous TF JEAN MARIE in 2021 provided 80% relief. He did notice that it took 3-4 weeks to see full benefit. He continues to take Gabapentin. He continues to perform a home exercise routine as prescribed. He denies any weakness. He denies any other health changes. His pain today is 5/10.    Interval history 07/02/2021:  The patient presents for follow-up lower back pain and right leg radiculopathy.  He is status post repeat right-sided TF JEAN MARIE to L5/S1&S1.  He has difficulty quantifying pain relief but states that normally his leg pain is 100% resolved and this time pain persists.  He is not have any recent images.  His medication regimen includes gabapentin 300 mg states this is mildly beneficial or denies any adverse side effects of medication.  Denies any focal loss of bowel, bladder or saddle paresthesias concerning for cauda equina.    Interval history 05/27/2021:  The patient presents for follow-up of lower back pain and bilateral leg radiculopathy.  He has had prior ILESI and TFESI both with 80%  benefit lasting approximately 9 months and pain just now returning. Pain is worse to right side at this time.  He denies any new areas of pain or neurological changes.The patient denies myelopathic symptoms such as handwriting changes or difficulty with buttons/coins/keys. Denies perineal paresthesias, bowel/bladder dysfunction.    Interval History 9/1/2020:  The patient is here for follow up of back and leg pain.  He is now s/p L5/S1 IL JEAN MARIE with 80% of back pain.  However, he is having significant right leg pain, mainly down the buttock and posterior calf.  He had benefit with right L5/S1 and S1 TF JEAN MARIE in the past and would like to repeat.  He has associated numbness to right calf, worse with walking.  He does have some benefit with Gabapentin.  He had a recent  Covid test for work which was negative. His pain today is 5/10.    Interval History 6/9/2020:  The patient is here for follow up of chronic lower back pain.  We have not seen the patient since last year.  He underwent an epidural at that time and says that his pain has been very mild until a few weeks ago.  He is having pain across lower back with radiation into the legs, mainly on the left.  He continues to take gabapentin with some benefit.  The pain bothers him the most with walking and activity.  He had pacemaker replacement on 5/6/20.  He is not currently on blood thinners.  His pain today is 5/10.    Interval History 7/30/2019:  The patient returns for follow up of back pain.  He is s/p repeat left then right L3,4,5 RFAs completed on 6/24/19 with about 80% relief.  His back pain is mild.  He has had increased leg pain recently, worse on the left side.  Previous leg pain was relieved with JEAN MARIE last year.  He would like to repeat this.  His leg pain bothers him mainly at night when trying to sleep.  He stopped Gabapentin when he was not having leg pain but recently restarted.  He is unable to take at night.  His pain today is 6/10.    Interval History 5/14/2019:  The patient returns for follow up.  He previously had benefit with JEAN MARIE for leg pain and RFAs for back pain.  He is having some back pain that has been worsening over the past couple of weeks.  He has significant pain in the morning.  He has some improvement when he moves around.  He says that cold air aggravates his pain.  He stopped Gabapentin last year when his leg pain improved.  He is not having much leg pain now.  His pain today is 6/10.    Interval History 2/7/2019:  The patient presents for check up of chronic back pain.  He reports doing well since last visit.  He feels that last JEAN MARIE is still providing him benefit.  He has not had any leg pain.  He does still have back pain but states that it is tolerable.  His morning pain is much improved from  RFAs last year.  He continues to work and is active.  His pain today is 5/10.    Interval History 12/6/2018:  The patient presents today for follow up.  He continues with pain to the lower back.  He is not having leg pain at this time.  He had some relief with RFAs with the past.  He stopped Gabapentin when his leg pain improved.  He takes Tylenol sparingly with some benefit.  His pain today is 5/10.  The patient denies any bowel or bladder incontinence or signs of saddle paresthesia.  The patient denies any major medical changes since last office visit.    Interval History 10/25/2018:  The patient returns for follow up of back and leg pain.  He is s/p L5/S1 IL JEAN MARIE on 10/11/18 with 80% pain relief for his legs.  He has had increased lower back pain over the past few days which he attributes to weather changes.  He describes it as aching.  He has not been stretching.  He does walk a lot for work.  He cannot take oral NSAIDs due to pacemaker placement.  His pain today is 8/10.     Interval History 9/11/2018:  The patient presents for procedure follow up appointment.  He is s/p left then right L3,4,5 RFA completed on 8/14/18 with 80% pain relief initially.  He has had a little more pain over the past week.  He is now having intermittent radiation into the back of both legs, left greater than right.  He previously had benefit with right sided TF JEAN MARIE.  He is still taking gabapentin.  His pain today is 5/10.    Interval History 7/25/2018:  The patient returns today for follow up of back pain.  He is s/p bilateral L3,4,5 MBB on 7/5/18 with 100% relief for 2 days.  He previously had benefit with right TF JEAN MARIE for leg pain.  He has not had right leg pain since the epidural.  However, he is reporting lateral left leg pain that has progressed over the past couple of weeks.  He continues to be active and works.  His pain today is 5/10.    Interval History 6/20/2018:  The patient returns today for follow up of lower back and  right leg pain.  He is s/p right L5 and S1 TF JEAN MARIE on 6/5/18 with 100% relief of right leg pain.  He has not had any leg pain since the procedure.  He continues to report pain across the lower back.  This is always worse first thing in the morning.  He states that this feels aching and throbbing in nature.  He did have recent lumbar CT scan.  He would like to discuss further procedures.  He continues to work and be active.  His pain today is 5/10.    Interval History 5/22/2018:  The patient returns for follow up of back pain.  He is having radiation down there back of the right leg to the posterior calf.  The back pain is most severe in the morning and he states that this feels like a stiffness.  This improves when he walks.  He has severe leg pain that is intermittent, mainly with activity.  This is his biggest complaint.  He did complete the medrol dose pack recently with limited improvement.  He had XRAYs which show severe loss of disc space at L5/S1.  He has not had a CT scan.  He is taking Gabapentin 300 mg at bedtime.  It is written BID but it makes him drowsy during the day.  He has some benefit with Aleve but has been told to avoid NSAIDs due to history of pacemaker placement.  His pain today is 5/10.     Initial encounter:    Renny Young presents to the clinic for the evaluation of lower back and right leg pain. The pain started two months ago and symptoms have been worsening.    Brief history:    Pain Description:    The pain is located in the lower back and right leg area in the L5/S1 distribution.      At BEST  5/10     At WORST  7/10 on the WORST day.      On average pain is rated as 5/10.     Today the pain is rated as 5/10    The pain is described as aching      Symptoms interfere with daily activity.     Exacerbating factors: Standing and Morning.      Mitigating factors medications.     Patient denies night fever/night sweats, urinary incontinence, bowel incontinence, significant weight loss,  significant motor weakness and loss of sensations.  Patient denies any suicidal or homicidal ideations    Pain Medications:  Current:  OTC Tylenol PRN  Gabapentin 300 mg BID    Tried in Past:  NSAIDs - Aleve  TCA -Never  SNRI -Never  Anti-convulsants - Gabapentin   Muscle Relaxants -Never  Opioids-Never    Physical Therapy/Home Exercise: yes     report:  Reviewed and consistent with medication use as prescribed.    Pain Procedures:   6/5/18 Right L5 and S1 TF JEAN MARIE- significant relief  7/5/18 Bilateral L3,4,5 MBB- 100% relief for 2 days  7/31/18 Left L3,4,5 RFA- 80% relief  8/14/18 Right L3,4,5 RFA- 80% relief  10/11/18 L5/S1 IL JEAN MARIE- 80% relief  6/11/19 Left L3,4,5 RFA- 80% relief  6/25/19 Right L3,4,5 RFA- 80% relief  8/13/19 L5/S1 IL JEAN MARIE- 90% relief for 9 months  6/16/20 L5/S1 IL JEAN MARIE- 80% relief of back pain  6/15/2021- Right L5/S1 and S1 TF JEAN MARIE  6/20/22 Right L5 and S1 TF JEAN MARIE- 80% relief for 2 months  9/26/22 Bilateral L5/S1 TF JEAN MARIE- 90% relief for 9 months    Chiropractor -never  Acupuncture - never  TENS unit -never  Spinal decompression -never  Joint replacement -never    Imaging:   CT Lumbar Spine 7/9/2021:  COMPARISON:  Lumbar spine CT May 28, 2018     FINDINGS:  Mild levocurvature of the lumbar spine.  No listhesis.  There is no acute fracture.  The vertebral bodies are normal in height without compression fractures. Posterior elements are intact. There is unchanged severe disc height loss at the L5-S1 level with associated sclerosis and subchondral cystic change within the endplates.  Mild atherosclerotic calcification within the abdominal aorta which is not aneurysmal.  Otherwise, no soft tissue abnormality identified.     T12-L1: The disc is normal in configuration.  There is no facet arthropathy.  There is no neural foraminal stenosis.  There is no spinal canal stenosis.     L1-L2: The disc is normal in configuration.  There is no facet arthropathy.  There is no neuroforaminal stenosis.  There is no  spinal canal stenosis.     L2-L3: The disc is normal in configuration.  There is no facet arthropathy.  There is no neuroforaminal stenosis.  There is no spinal canal stenosis.     L3-L4: Minimal diffuse disc bulge.  Mild bilateral facet arthropathy.  There is no neuroforaminal stenosis.  There is no spinal canal stenosis.     L4-L5: There is a diffuse disc bulge.  Mild-to-moderate bilateral facet arthropathy.  There is no neuroforaminal stenosis.  There is no spinal canal stenosis.     L5-S1: There is a circumferential disc osteophyte complex secondary to the severe degenerative disc disease at this level.  Mild to moderate bilateral facet arthropathy.  Unchanged mild bilateral neural foraminal stenosis secondary to diffuse disc osteophyte complex as well as the facet arthropathy.  There is no spinal canal stenosis.     Impression:     Inferior lumbar spine degenerative changes worst at L5-S1 which result in mild bilateral neural foraminal stenosis at this level.  Overall, findings are not significantly changed compared to prior study from May 2018.        Past Medical History:   Diagnosis Date    Colon polyp     Diabetes mellitus, type 2     Hypertension     Pacemaker     Pacemaker at end of battery life 5/6/2020    Smoker 6/10/2015    Offered cessation program and declined     Past Surgical History:   Procedure Laterality Date    COLONOSCOPY N/A 2/13/2017    Procedure: COLONOSCOPY;  Surgeon: NILDA Juares MD;  Location: [address];  Service: Endoscopy;  Laterality: N/A;  Do not cancel this order. Patient has Pacemaker in place.     EPIDURAL STEROID INJECTION N/A 8/13/2019    Procedure: INJECTION, STEROID, EPIDURAL IL, L5/S1;  Surgeon: Josue Paredes MD;  [facility];  Service: Pain Management;  Laterality: N/A;  IL JEAN MARIE L5/S1    EPIDURAL STEROID INJECTION N/A 6/16/2020    Procedure: INJECTION, STEROID, EPIDURAL, L5-S1 IL add on @ 2 ok by  [place];  Surgeon: Josue Paredes MD;  Location:  [facility];  Service: Pain Management;  Laterality: N/A;    HERNIA REPAIR      INJECTION OF ANESTHETIC AGENT AROUND NERVE Bilateral 7/5/2018    Procedure: BLOCK, NERVE;  Surgeon: Krystal Mtz MD;  Location: [facility];  Service: Pain Management;  Laterality: Bilateral;  Lumbar Bilateral L3-L4-L5 Medial Branch Block    RADIOFREQUENCY ABLATION Left 6/11/2019    Procedure: RADIOFREQUENCY ABLATION, L3-L4-L5 MEDIAL BRANCH   1 OF 2;  Surgeon: Josue Paredes MD;  Location: [facility];  Service: Pain Management;  Laterality: Left;    RADIOFREQUENCY ABLATION Right 6/25/2019    Procedure: RADIOFREQUENCY ABLATION, L3-L4-L5 MEDIAL BRANCH JING 2 OF 2;  Surgeon: Josue Paredes MD;  Location: [facility];  Service: Pain Management;  Laterality: Right;    REPLACEMENT OF PACEMAKER GENERATOR Left 5/6/2020    Procedure: REPLACEMENT, PULSE GENERATOR, CARDIAC PACEMAKER;  Surgeon: Bradford Spence MD;  Location: [facility];  Service: Cardiology;  Laterality: Left;  EOL/LEAD Mlfx, Gen Change, Poss RA lead rev, SJM, MAC, GP, 3 PREP    REVISION OF PROCEDURE INVOLVING PACEMAKER LEAD Left 5/6/2020    Procedure: REVISION, ELECTRODE LEAD, CARDIAC PACEMAKER;  Surgeon: Bradford Spence MD;  Location: [facility];  Service: Cardiology;  Laterality: Left;    TRANSFORAMINAL EPIDURAL INJECTION OF STEROID Right 6/5/2018    Procedure: INJECTION-STEROID-EPIDURAL-TRANSFORAMINAL;  Surgeon: Josue Paredes MD;  Location: [facility];  Service: Pain Management;  Laterality: Right;  LUMBAR RIGHT L5 AND S1 TRANSFORAMINL JEAN MARIE  [id number]    W/ SEDATION     TRANSFORAMINAL EPIDURAL INJECTION OF STEROID Right 9/10/2020    Procedure: INJECTION, STEROID, EPIDURAL, TRANSFORAMINAL APPROACH, L5-S1 AND S1;  Surgeon: Josue Paredes MD;  Location: [facility];  Service: Pain Management;  Laterality: Right;    TRANSFORAMINAL EPIDURAL INJECTION OF STEROID Right 6/15/2021    Procedure: INJECTION, STEROID, EPIDURAL, TRANSFORAMINAL APPROACH L5-S1 AND  S1 need consent;  Surgeon: Josue Paredes MD;  Location: [facility];  Service: Pain Management;  Laterality: Right;    TRANSFORAMINAL EPIDURAL INJECTION OF STEROID Right 6/20/2022    Procedure: INJECTION, STEROID, EPIDURAL, TRANSFORAMINAL APPROACH, RIGHT L5-S1 AND S1 CONTRAST;  Surgeon: Krystal Mtz MD;  Location: [facility];  Service: Pain Management;  Laterality: Right;    TRANSFORAMINAL EPIDURAL INJECTION OF STEROID Bilateral 9/26/2022    Procedure: INJECTION, STEROID, EPIDURAL, TRANSFORAMINAL APPROACH, BILATERAL L5-S1 CONTRAST;  Surgeon: Krystal Mtz MD;  Location: [facility];  Service: Pain Management;  Laterality: Bilateral;     Social History     Socioeconomic History    Marital status:     Number of children: 3   Occupational History    Occupation:    Tobacco Use    Smoking status: Every Day     Packs/day: 0.50     Types: Cigarettes    Smokeless tobacco: Never   Substance and Sexual Activity    Alcohol use: Yes     Comment: Beer- Socially    Drug use: No    Sexual activity: Yes     Family History   Problem Relation Age of Onset    Diabetes Mother     Diabetes Father     Kidney disease Father     Melanoma Neg Hx        Review of patient's allergies indicates:  No Known Allergies    Current Outpatient Medications   Medication Sig    amLODIPine (NORVASC) 5 MG tablet Take 1 tablet (5 mg total) by mouth once daily.    ammonium lactate 12 % Crea Apply twice daily to affected parts both feet as needed.    aspirin (ECOTRIN) 81 MG EC tablet Take 81 mg by mouth once daily.    atorvastatin (LIPITOR) 20 MG tablet Take 1 tablet (20 mg total) by mouth once daily.    clotrimazole (LOTRIMIN) 1 % cream Apply topically 2 (two) times daily.    fexofenadine (ALLEGRA) 180 MG tablet Take 1 tablet (180 mg total) by mouth once daily.    gabapentin (NEURONTIN) 300 MG capsule Take 1 capsule by mouth twice daily    LIDOcaine HCL 2% (XYLOCAINE) 2 % jelly Apply topically as needed. Apply topically once nightly  "to affected part of foot/feet.    losartan-hydrochlorothiazide 100-12.5 mg (HYZAAR) 100-12.5 mg Tab Take 1 tablet by mouth once daily.    metFORMIN (GLUCOPHAGE) 500 MG tablet Take 1 tablet (500 mg total) by mouth daily with breakfast.    podofilox (CONDYLOX) 0.5 % gel Apply topically 2 (two) times daily.     No current facility-administered medications for this visit.       REVIEW OF SYSTEMS:    GENERAL:  No weight loss, malaise or fevers.  HEENT:   No recent changes in vision or hearing  NECK:  Negative for lumps, no difficulty with swallowing.  RESPIRATORY:  Negative for cough, wheezing or shortness of breath, patient denies any recent URI.  CARDIOVASCULAR:  Negative for chest pain, leg swelling or palpitations. Pacemaker for SSS.  GI:  Negative for abdominal discomfort, blood in stools or black stools or change in bowel habits.  MUSCULOSKELETAL:  See HPI.  SKIN:  Negative for lesions, rash, and itching.  PSYCH:  No mood disorder or recent psychosocial stressors.  Patients sleep is not disturbed secondary to pain.  HEMATOLOGY/LYMPHOLOGY:  Negative for prolonged bleeding, bruising easily or swollen nodes.  Patient is not currently taking any anti-coagulants  ENDO: DM2 on metformin  NEURO:   No history of headaches, syncope, paralysis, seizures or tremors.  All other reviewed and negative other than HPI.    OBJECTIVE:    /80 (BP Location: Right arm, Patient Position: Sitting, BP Method: Medium (Automatic))   Pulse 81   Ht 5' 7" (1.702 m)   Wt 73 kg (160 lb 15 oz)   BMI 25.21 kg/m²     PHYSICAL EXAMINATION:    GENERAL: Well appearing, in no acute distress, alert and oriented x3.  PSYCH:  Mood and affect appropriate.  SKIN: Skin color, texture, turgor normal, no rashes or lesions.  HEAD/FACE:  Normocephalic, atraumatic. Cranial nerves grossly intact.  CV: RRR with palpation of the radial artery.  PULM: No evidence of respiratory difficulty, symmetric chest rise.  BACK: Straight leg raising in the sitting " position is positive to radicular pain at a bilateral L5 distribution. There is no pain with palpation over the facet joints of the lumbar spine bilaterally. Limited ROM with pain on flexion > extension. Mildly positive facet loading bilaterally.   EXTREMITIES: No deformities, edema, or skin discoloration. Good capillary refill.  MUSCULOSKELETAL:There is no pain with palpation over the sacroiliac joints bilaterally. There is no pain to palpation over the greater trochanteric bursa bilaterally. FABERs test is negative.  FADIRs test is negative.  5/5 strength in right ankle with plantar and dorsiflexion, 5/5 strength in left ankle with plantar and dorsiflexion, 5/5 strength with right knee flexion extension, 5/5 strength with knee flexion extension on the left. Bilateral EHL is 4/5.  No atrophy or tone abnormalities are noted.  NEURO: Bilateral lower extremity coordination and muscle stretch reflexes are physiologic and symmetric.  Plantar response are downgoing. No clonus.  Decreased sensation to BLE at L5 distribution.  GAIT: Antalgic- ambulates without assistance.      ASSESSMENT: 62 y.o. year old male with lower back pain, consistent with the following diagnoses:    Encounter Diagnoses   Name Primary?    Lumbar spondylosis     Lumbar radiculopathy          PLAN:     - Previous imaging was reviewed and discussed with the patient today. Symptoms consistent with L5/S1 disc bulge and bilateral NF narrowing.    - Schedule for bilateral L5/S1 TF JEAN MARIE . Previous provided 90% for 8 months.    - Continue Gabapentin 300 mg BID. He is unable to tolerate higher dosages.    - The patient will continue a home exercise routine to help with pain and strengthening.    - RTC 2 weeks after above procedure.       The above plan and management options were discussed at length with patient. Patient is in agreement with the above and verbalized understanding.     Trista Oviedo  06/26/2023

## 2023-06-27 ENCOUNTER — HOSPITAL ENCOUNTER (OUTPATIENT)
Dept: RADIOLOGY | Facility: HOSPITAL | Age: 63
Discharge: HOME | End: 2023-06-27
Attending: FAMILY MEDICINE
Payer: COMMERCIAL

## 2023-06-27 DIAGNOSIS — Z87.891 PERSONAL HISTORY OF NICOTINE DEPENDENCE: ICD-10-CM

## 2023-06-27 LAB
C TRACH DNA SPEC QL NAA+PROBE: NOT DETECTED
N GONORRHOEA DNA SPEC QL NAA+PROBE: NOT DETECTED

## 2023-06-27 PROCEDURE — 71271 CT THORAX LUNG CANCER SCR C-: CPT | Mod: 26,,, | Performed by: RADIOLOGY

## 2023-06-27 PROCEDURE — 71271 CT THORAX LUNG CANCER SCR C-: CPT | Mod: TC

## 2023-06-27 PROCEDURE — 71271 CT CHEST LUNG SCREENING LOW DOSE: ICD-10-PCS | Mod: 26,,, | Performed by: RADIOLOGY

## 2023-06-28 ENCOUNTER — TELEPHONE (OUTPATIENT)
Dept: INTERNAL MEDICINE | Facility: CLINIC | Age: 63
End: 2023-06-28
Payer: COMMERCIAL

## 2023-06-28 ENCOUNTER — TELEPHONE (OUTPATIENT)
Dept: URGENT CARE | Facility: CLINIC | Age: 63
End: 2023-06-28
Payer: COMMERCIAL

## 2023-06-28 LAB
T VAGINALIS RRNA SPEC QL NAA+PROBE: NOT DETECTED
TRICHOMONAS VAGINALIS RNA, QUAL, SOURCE: NORMAL

## 2023-06-28 NOTE — PROGRESS NOTES
Please CALL patient and report that there were no new concerning areas on chest CT scan. Any previously reported abnormalities remain unchanged or stable. I recommend a repeat scan in 1 year to continue surveillance.     Please schedule follow up scan in 1 year - see standing orders. Thank you

## 2023-06-28 NOTE — TELEPHONE ENCOUNTER
Results for orders placed or performed in visit on 06/24/23   C. trachomatis/N. gonorrhoeae by AMP DNA [facility]; Urine    Specimen: Genital   Result Value Ref Range    Chlamydia, Amplified DNA Not Detected Not Detected    N gonorrhoeae, amplified DNA Not Detected Not Detected   Trichomonas vaginalis, RNA, Qual, Urine    Specimen: Urine   Result Value Ref Range    Trichomonas vaginalis RNA, Qual, source See Below     Trichomonas vaginalis, QL, TMA Not detected    HIV 1/2 Ag/Ab (4th Gen)   Result Value Ref Range    HIV 1/2 Ag/Ab Non-reactive Non-reactive   Hepatitis Panel, Acute   Result Value Ref Range    Hepatitis B Surface Ag Non-reactive Non-reactive    Hep B C IgM Non-reactive Non-reactive    Hep A IgM Non-reactive Non-reactive    Hepatitis C Ab Non-reactive Non-reactive   POCT Urinalysis, Dipstick, Automated, W/O Scope   Result Value Ref Range    POC Blood, Urine Positive (A) Negative    POC Bilirubin, Urine Negative Negative    POC Urobilinogen, Urine Normal 0.3 - 2.2    POC Ketones, Urine Negative Negative    POC Protein, Urine Negative Negative    POC Nitrates, Urine Negative Negative    POC Glucose, Urine Negative Negative    pH, UA 5.5 5 - 8    POC Specific Gravity, Urine 1.025 1.003 - 1.029    POC Leukocytes, Urine Negative Negative       Spoke with patient secondary to lab results, lab results unremarkable.  Patient states he feels better.  Reports discomfort to groin has improved. He denies fever, chills, dysuria, flank pain, abdominal pain, chest pain, or SOB.

## 2023-06-28 NOTE — TELEPHONE ENCOUNTER
----- Message from Jarvis Washington MD sent at 6/28/2023 11:55 AM CDT -----  Please CALL patient and report that there were no new concerning areas on chest CT scan. Any previously reported abnormalities remain unchanged or stable. I recommend a repeat scan in 1 year to continue surveillance.     Please schedule follow up scan in 1 year - see standing orders. Thank you

## 2023-06-29 ENCOUNTER — TELEPHONE (OUTPATIENT)
Dept: ADMINISTRATIVE | Facility: OTHER | Age: 63
End: 2023-06-29
Payer: COMMERCIAL

## 2023-06-29 NOTE — TELEPHONE ENCOUNTER
Called and discussed test results and recommendations with the pt. The pt expressed understanding.     Recall placed

## 2023-07-13 ENCOUNTER — HOSPITAL ENCOUNTER (OUTPATIENT)
Facility: OTHER | Age: 63
Discharge: HOME | End: 2023-07-13
Attending: ANESTHESIOLOGY | Admitting: ANESTHESIOLOGY
Payer: COMMERCIAL

## 2023-07-13 VITALS
HEART RATE: 67 BPM | HEIGHT: 67 IN | WEIGHT: 160 LBS | SYSTOLIC BLOOD PRESSURE: 168 MMHG | RESPIRATION RATE: 16 BRPM | DIASTOLIC BLOOD PRESSURE: 70 MMHG | TEMPERATURE: 98 F | OXYGEN SATURATION: 97 % | BODY MASS INDEX: 25.11 KG/M2

## 2023-07-13 DIAGNOSIS — G89.29 CHRONIC PAIN: ICD-10-CM

## 2023-07-13 LAB — POCT GLUCOSE: 110 MG/DL (ref 70–110)

## 2023-07-13 PROCEDURE — 82947 ASSAY GLUCOSE BLOOD QUANT: CPT | Performed by: ANESTHESIOLOGY

## 2023-07-13 PROCEDURE — 64483 PR EPIDURAL INJ, ANES/STEROID, TRANSFORAMINAL, LUMB/SACR, SNGL LEVL: ICD-10-PCS | Mod: 50,,, | Performed by: ANESTHESIOLOGY

## 2023-07-13 PROCEDURE — 64483 NJX AA&/STRD TFRM EPI L/S 1: CPT | Mod: 50,,, | Performed by: ANESTHESIOLOGY

## 2023-07-13 PROCEDURE — 63600175 PHARM REV CODE 636 W HCPCS: Performed by: ANESTHESIOLOGY

## 2023-07-13 PROCEDURE — 64483 NJX AA&/STRD TFRM EPI L/S 1: CPT | Mod: LT | Performed by: ANESTHESIOLOGY

## 2023-07-13 PROCEDURE — 25000003 PHARM REV CODE 250: Performed by: ANESTHESIOLOGY

## 2023-07-13 PROCEDURE — 25500020 PHARM REV CODE 255: Performed by: ANESTHESIOLOGY

## 2023-07-13 RX ORDER — LIDOCAINE HYDROCHLORIDE 10 MG/ML
INJECTION, SOLUTION EPIDURAL; INFILTRATION; INTRACAUDAL; PERINEURAL
Status: DISCONTINUED | OUTPATIENT
Start: 2023-07-13 | End: 2023-07-13 | Stop reason: HOSPADM

## 2023-07-13 RX ORDER — MIDAZOLAM HYDROCHLORIDE 1 MG/ML
INJECTION INTRAMUSCULAR; INTRAVENOUS
Status: DISCONTINUED | OUTPATIENT
Start: 2023-07-13 | End: 2023-07-13 | Stop reason: HOSPADM

## 2023-07-13 RX ORDER — FENTANYL CITRATE 50 UG/ML
INJECTION, SOLUTION INTRAMUSCULAR; INTRAVENOUS
Status: DISCONTINUED | OUTPATIENT
Start: 2023-07-13 | End: 2023-07-13 | Stop reason: HOSPADM

## 2023-07-13 RX ORDER — DEXAMETHASONE SODIUM PHOSPHATE 10 MG/ML
INJECTION INTRAMUSCULAR; INTRAVENOUS
Status: DISCONTINUED | OUTPATIENT
Start: 2023-07-13 | End: 2023-07-13 | Stop reason: HOSPADM

## 2023-07-13 RX ORDER — SODIUM CHLORIDE 9 MG/ML
INJECTION, SOLUTION INTRAVENOUS CONTINUOUS
Status: DISCONTINUED | OUTPATIENT
Start: 2023-07-13 | End: 2023-07-13 | Stop reason: HOSPADM

## 2023-07-13 RX ORDER — LIDOCAINE HYDROCHLORIDE 20 MG/ML
INJECTION, SOLUTION INFILTRATION; PERINEURAL
Status: DISCONTINUED | OUTPATIENT
Start: 2023-07-13 | End: 2023-07-13 | Stop reason: HOSPADM

## 2023-07-13 NOTE — PROGRESS NOTES
Subjective:       Patient ID: Renny Young is a 56 y.o. male.    Chief Complaint: Flank Pain    HPI  Review of Systems   Constitutional: Negative for chills, fatigue and fever.   HENT: Negative for congestion and trouble swallowing.    Eyes: Negative for redness.   Respiratory: Negative for cough, chest tightness and shortness of breath.    Cardiovascular: Negative for chest pain, palpitations and leg swelling.   Gastrointestinal: Positive for abdominal distention and abdominal pain. Negative for blood in stool.   Genitourinary: Negative for hematuria.   Musculoskeletal: Positive for back pain. Negative for arthralgias, gait problem, joint swelling, myalgias and neck pain.   Skin: Negative for color change and rash.   Neurological: Negative for tremors, speech difficulty, weakness, numbness and headaches.   Hematological: Negative for adenopathy. Does not bruise/bleed easily.   Psychiatric/Behavioral: Negative for behavioral problems, confusion and sleep disturbance. The patient is not nervous/anxious.        Objective:      Physical Exam   Constitutional: He appears well-developed and well-nourished.   HENT:   Head: Normocephalic and atraumatic.   Right Ear: Tympanic membrane, external ear and ear canal normal.   Left Ear: Tympanic membrane, external ear and ear canal normal.   Mouth/Throat: Oropharynx is clear and moist.   Eyes: Pupils are equal, round, and reactive to light. No scleral icterus.   Neck: Normal range of motion. Neck supple. Carotid bruit is not present. No thyromegaly present.   Cardiovascular: Normal rate, regular rhythm, normal heart sounds and intact distal pulses.  Exam reveals no gallop and no friction rub.    No murmur heard.  Pulmonary/Chest: Effort normal and breath sounds normal.   Abdominal: Soft. Bowel sounds are normal. He exhibits no distension and no mass. There is no tenderness. There is no rebound and no guarding. Hernia confirmed negative in the right inguinal area and confirmed  negative in the left inguinal area.   Genitourinary: Testes normal and penis normal. Right testis shows no mass and no tenderness. Left testis shows no mass and no tenderness.   Musculoskeletal: Normal range of motion. He exhibits no edema or tenderness.   Lymphadenopathy:     He has no cervical adenopathy.        Right: No inguinal adenopathy present.        Left: No inguinal adenopathy present.   Neurological: He is alert. He has normal strength. He displays normal reflexes. No cranial nerve deficit or sensory deficit. Coordination and gait normal.   Skin: Skin is warm and dry.   Psychiatric: He has a normal mood and affect. His behavior is normal. Judgment and thought content normal.   Nursing note and vitals reviewed.      Assessment:       1. Flank pain    2. Weight loss    3. Smoker    4. Hypertension, essential    5. Type 2 diabetes mellitus without complication, unspecified long term insulin use status    6. Hyperlipidemia, unspecified hyperlipidemia type    7. Pacemaker    8. Essential hypertension        Plan:   Renny was seen today for flank pain.    Diagnoses and all orders for this visit:    Flank pain  -     US Abdomen Complete; Future  -     Comprehensive metabolic panel; Future  -     CBC Without Differential; Future  -     Urinalysis  -     Urinalysis Microscopic    Weight loss  -     CT Chest Lung Screening Low Dose; Future  -     US Abdomen Complete; Future  -     Comprehensive metabolic panel; Future  -     CBC Without Differential; Future  -     Hemoglobin A1c; Future    Smoker  -     CT Chest Lung Screening Low Dose; Future    Hypertension, essential    Type 2 diabetes mellitus without complication, unspecified long term insulin use status  -     TSH; Future    Hyperlipidemia, unspecified hyperlipidemia type    Pacemaker    Essential hypertension  -     amlodipine (NORVASC) 10 MG tablet; Take 0.5 tablets (5 mg total) by mouth once daily.    Other orders  -     docusate sodium (COLACE) 100 MG  capsule; Take 1 capsule (100 mg total) by mouth 2 (two) times daily.      See meds, orders, follow up, routing and instructions sections of encounter.  A 56-year-old here to follow up.  His blood pressure was a bit low, we will drop   his Norvasc to half.    He is here with chief complaint of left flank pain, 2 weeks, with urinary   problems or complaints, possible back pain and weight loss, indeterminate   duration and amount.    No nausea, vomiting, diarrhea, fever or chills.  No night sweats.    Recent colonoscopy.  The patient states he has had some sort of elimination   changes, possibly constipation since then.    RECOMMENDATIONS:  Abdominal ultrasound.  Check urinalysis and lab.  Long-term   smoker screening CT chest.  Follow up after above studies and reduction of   amlodipine to 5 mg daily dosing.      CARLA/MERVIN  dd: 04/06/2017 17:11:02 (CDT)  td: 04/07/2017 08:15:06 (CDT)  Doc ID   #[id number]  Job ID #[id number]    CC:        Advancement-Rotation Flap Text: The defect edges were debeveled with a #15 scalpel blade.  Given the location of the defect, shape of the defect and the proximity to free margins an advancement-rotation flap was deemed most appropriate.  Using a sterile surgical marker, an appropriate flap was drawn incorporating the defect and placing the expected incisions within the relaxed skin tension lines where possible. The area thus outlined was incised deep to adipose tissue with a #15 scalpel blade.  The skin margins were undermined to an appropriate distance in all directions utilizing iris scissors.

## 2023-07-13 NOTE — OP NOTE
Lumbar Transforaminal Epidural Steroid Injection under Fluoroscopic Guidance    The procedure, risks, benefits, and options were discussed with the patient. There are no contraindications to the procedure. The patent expressed understanding and agreed to the procedure. Informed written consent was obtained prior to the start of the procedure and can be found in the patient's chart.    PATIENT NAME: Renny Young   MRN: [id number]     DATE OF PROCEDURE: 07/13/2023    PROCEDURE:  Bilateral  L5/S1 Lumbar Transforaminal Epidural Steroid Injection under Fluoroscopic Guidance    PRE-OP DIAGNOSIS: Lumbar radiculopathy [M54.16] Lumbar radiculopathy [M54.16]    POST-OP DIAGNOSIS: Same    PHYSICIAN: Krystal Mtz MD    ASSISTANTS: Jarvis Beard MD Fellow    MEDICATIONS INJECTED: Preservative-free Decadron 10mg with 5cc of Lidocaine 1% MPF     LOCAL ANESTHETIC INJECTED: Xylocaine 2%     SEDATION: Versed 2mg and Fentanyl 25mcg                                                                                                                                                                                     Conscious sedation ordered by M.D. Patient re-evaluation prior to administration of conscious sedation. No changes noted in patient's status from initial evaluation. The patient's vital signs were monitored by RN and patient remained hemodynamically stable throughout the procedure.    Event Time In   Sedation Start 1134   Sedation End 1138       ESTIMATED BLOOD LOSS: None    COMPLICATIONS: None      TECHNIQUE: Time-out was performed to identify the patient and procedure to be performed. With the patient laying in a prone position, the surgical area was prepped and draped in the usual sterile fashion using ChloraPrep and a fenestrated drape.The levels were determined under fluoroscopy guidance. Skin anesthesia was achieved by injecting Lidocaine 2% over the injection sites. The transforaminal spaces were then approached with a 22  gauge, 3.5 inch spinal quinke needle that was introduced under fluoroscopic guidance in the AP and Lateral views. Once the needle tip was in the area of the transforaminal space, and there was no blood, CSF or paraesthesias, contrast dye Omnipaque (300mg/mL) was injected to confirm placement and there was no vascular runoff. Fluoroscopic imaging in the AP and lateral views revealed a clear outline of the spinal nerve with proximal spread of agent through the neural foramen into the epidural space. 3 mL of the medication mixture listed above was injected slowly at each site. Displacement of the radio opaque contrast after injection of the medication confirmed that the medication went into the area of the transforaminal spaces. The needles were removed and bleeding was nil. A sterile dressing was applied. No specimens collected. The patient tolerated the procedure well.     Pre-procedure pain: 8/10    Post-procedure pain: 0/10    The patient was monitored after the procedure in the recovery area. They were given post-procedure and discharge instructions to follow at home. The patient was discharged in a stable condition.    Jarvis Beard MD    I reviewed and edited the fellow's note. I conducted my own interview and physical examination. I agree with the findings. I was present and supervising all critical portions of the procedure.

## 2023-07-13 NOTE — DISCHARGE INSTRUCTIONS

## 2023-07-13 NOTE — DISCHARGE SUMMARY
Discharge Note  Short Stay      SUMMARY     Admit Date: 7/13/2023    Attending Physician: Krystal Mtz MD      Discharge Physician: Jarvis Beard MD      Discharge Date: 7/13/2023 11:40 AM    Procedure(s) (LRB):  INJECTION, STEROID, EPIDURAL, TRANSFORAMINAL APPROACH, L5-S1 BILATERAL (Bilateral)    Final Diagnosis: Lumbar radiculopathy [M54.16]    Disposition: Home or self care    Patient Instructions:   Current Discharge Medication List        CONTINUE these medications which have NOT CHANGED    Details   amLODIPine (NORVASC) 5 MG tablet Take 1 tablet (5 mg total) by mouth once daily.  Qty: 90 tablet, Refills: 3    Comments: .  Associated Diagnoses: Hypertension, essential      ammonium lactate 12 % Crea Apply twice daily to affected parts both feet as needed.  Qty: 140 g, Refills: 11      aspirin (ECOTRIN) 81 MG EC tablet Take 81 mg by mouth once daily.      atorvastatin (LIPITOR) 20 MG tablet Take 1 tablet (20 mg total) by mouth once daily.  Qty: 90 tablet, Refills: 3    Associated Diagnoses: Hyperlipidemia, unspecified hyperlipidemia type      clotrimazole (LOTRIMIN) 1 % cream Apply topically 2 (two) times daily.  Qty: 12 g, Refills: 0    Associated Diagnoses: Jock itch      fexofenadine (ALLEGRA) 180 MG tablet Take 1 tablet (180 mg total) by mouth once daily.  Qty: 30 tablet, Refills: 1    Associated Diagnoses: Itching      gabapentin (NEURONTIN) 300 MG capsule Take 1 capsule by mouth twice daily  Qty: 60 capsule, Refills: 0    Associated Diagnoses: Diabetic polyneuropathy associated with type 2 diabetes mellitus      LIDOcaine HCL 2% (XYLOCAINE) 2 % jelly Apply topically as needed. Apply topically once nightly to affected part of foot/feet.  Qty: 30 mL, Refills: 2      losartan-hydrochlorothiazide 100-12.5 mg (HYZAAR) 100-12.5 mg Tab Take 1 tablet by mouth once daily.  Qty: 90 tablet, Refills: 3    Comments: .  Associated Diagnoses: Hypertension, essential      metFORMIN (GLUCOPHAGE) 500 MG tablet Take 1  tablet (500 mg total) by mouth daily with breakfast.  Qty: 90 tablet, Refills: 1    Associated Diagnoses: Type 2 diabetes mellitus with other specified complication, without long-term current use of insulin      podofilox (CONDYLOX) 0.5 % gel Apply topically 2 (two) times daily.  Qty: 3.5 g, Refills: 2    Associated Diagnoses: Condyloma                 Discharge Diagnosis: Lumbar radiculopathy [M54.16]  Condition on Discharge: Stable with no complications to procedure   Diet on Discharge: Same as before.  Activity: as per instruction sheet.  Discharge to: Home with a responsible adult.  Follow up: 2-4 weeks       Please call my office or pager at [phone] if experienced any weakness or loss of sensation, fever > 101.5, pain uncontrolled with oral medications, persistent nausea/vomiting/or diarrhea, redness or drainage from the incisions, or any other worrisome concerns. If physician on call was not reached or could not communicate with our office for any reason please go to the nearest emergency department

## 2023-07-18 ENCOUNTER — OFFICE VISIT (OUTPATIENT)
Dept: URGENT CARE | Facility: CLINIC | Age: 63
End: 2023-07-18
Payer: COMMERCIAL

## 2023-07-18 VITALS
OXYGEN SATURATION: 97 % | RESPIRATION RATE: 16 BRPM | BODY MASS INDEX: 25.11 KG/M2 | WEIGHT: 160 LBS | SYSTOLIC BLOOD PRESSURE: 155 MMHG | HEART RATE: 87 BPM | DIASTOLIC BLOOD PRESSURE: 90 MMHG | HEIGHT: 67 IN | TEMPERATURE: 98 F

## 2023-07-18 DIAGNOSIS — L29.3 ITCHING OF MALE GENITALIA: Primary | ICD-10-CM

## 2023-07-18 PROCEDURE — 99214 OFFICE O/P EST MOD 30 MIN: CPT | Mod: S$GLB,,, | Performed by: NURSE PRACTITIONER

## 2023-07-18 PROCEDURE — 99214 PR OFFICE/OUTPT VISIT, EST, LEVL IV, 30-39 MIN: ICD-10-PCS | Mod: S$GLB,,, | Performed by: NURSE PRACTITIONER

## 2023-07-18 RX ORDER — METRONIDAZOLE 500 MG/1
500 TABLET ORAL EVERY 12 HOURS
Qty: 14 TABLET | Refills: 0 | Status: SHIPPED | OUTPATIENT
Start: 2023-07-18 | End: 2023-07-25

## 2023-07-18 RX ORDER — TERCONAZOLE 4 MG/G
CREAM VAGINAL
COMMUNITY
Start: 2023-06-24

## 2023-07-18 NOTE — PATIENT INSTRUCTIONS
Recommendation given for STD screening today, patient refused.  Reports he will take current medication and will follow up with his PCP and or return if symptoms do not resolve.  Advised on safe sexual practices, use condoms.      You must understand that you've received an Urgent Care treatment only and that you may be released before all your medical problems are known or treated. You, the patient, will arrange for follow up care as instructed.  Follow up with your PCP or specialty clinic as directed in the next 1-2 weeks if not improved or as needed.  You can call [phone] to schedule an appointment with the appropriate provider.  If your condition worsens we recommend that you receive another evaluation at the emergency room immediately or contact your primary medical clinics after hours call service to discuss your concerns.  Please return here or go to the Emergency Department for any concerns or worsening of condition.    If you were prescribed a narcotic or controlled medication, do not drive or operate heavy equipment or machinery while taking these medications.    Thank you for choosing [facility]!    Our goal in the Urgent Care is to always provide outstanding medical care. You may receive a survey by mail or e-mail in the next week regarding your experience today. We would greatly appreciate you completing and returning the survey. Your feedback provides us with a way to recognize our staff who provide very good care, and it helps us learn how to improve when your experience was below our aspiration of excellence.      We appreciate you trusting us with your medical care. We hope you feel better soon. We will be happy to take care of you for all of your future medical needs.    Sincerely,    Gilbert Gallegos DNP, FNP-C

## 2023-07-18 NOTE — PROGRESS NOTES
"Subjective:      Patient ID: Renny Young is a 62 y.o. male.    Vitals:  height is 5' 7" (1.702 m) and weight is 72.6 kg (160 lb). His temperature is 97.8 °F (36.6 °C). His blood pressure is 155/90 (abnormal) and his pulse is 87. His respiration is 16 and oxygen saturation is 97%.     Chief Complaint: Recurrent Skin Infections    62-year-old male presents with a complaint of penile itching.  Patient states he was seen June 24, 2023 secondary to similar complaint as well as scrotal irritation.  STD panel, unremarkable June 24, 2023.  Patient states he was recently informed by his current partner, female, that she is being treated for bacterial vaginosis.  He reports glans itching.  Patient denies erythema to glans. dysuria, hematuria, penile discharge, scrotal pain, fever or chills.  Advised patient on STD screening, patient respectfully refused today.  He is amenable for bacterial vaginosis treatment, discussed on adverse effects.    Penis Pain  The patient's primary symptoms include genital itching. The patient's pertinent negatives include no genital injury, genital lesions, pelvic pain, penile discharge, penile pain, priapism, scrotal swelling or testicular pain. This is a recurrent problem. The current episode started yesterday. The patient is experiencing no pain. Pertinent negatives include no abdominal pain, chest pain, chills, dysuria, fever, flank pain, frequency, hematuria, nausea, painful intercourse, urgency or vomiting. Nothing aggravates the symptoms. He has tried nothing for the symptoms. He is sexually active. He inconsistently uses condoms. It is unknown whether or not his partner has an STD. There is no history of BPH, chlamydia, erectile dysfunction, gonorrhea, herpes simplex, HIV, prostatitis, syphilis or varicocele.     Constitution: Negative for chills, fever and generalized weakness.   Cardiovascular:  Negative for chest pain and palpitations.   Gastrointestinal:  Negative for abdominal pain, " nausea and vomiting.   Genitourinary:  Negative for dysuria, frequency, urgency, flank pain, hematuria, genital sore, penile discharge, penile pain, scrotal swelling, testicular pain and pelvic pain.        Penial itching   Musculoskeletal:  Negative for back pain.    Objective:     Physical Exam   Constitutional: He is oriented to person, place, and time. He appears well-developed.  Non-toxic appearance. He does not appear ill. No distress.   HENT:   Head: Normocephalic and atraumatic.   Ears:   Right Ear: External ear normal.   Left Ear: External ear normal.   Nose: Nose normal. No nasal deformity. No epistaxis.   Mouth/Throat: Oropharynx is clear and moist and mucous membranes are normal.   Eyes: Conjunctivae and lids are normal.   Neck: Trachea normal and phonation normal. Neck supple.   Cardiovascular: Normal rate, regular rhythm and normal heart sounds.   Pulmonary/Chest: Effort normal and breath sounds normal.   Abdominal: Normal appearance and bowel sounds are normal. He exhibits no distension. Soft. There is no abdominal tenderness.   Genitourinary:         Comments: Patient refused physical exam secondary to penile irritation     Musculoskeletal: Normal range of motion.         General: Normal range of motion.   Neurological: He is alert and oriented to person, place, and time. He has normal reflexes.   Skin: Skin is warm, dry, intact and not diaphoretic.   Psychiatric: His speech is normal and behavior is normal. Judgment and thought content normal.   Nursing note and vitals reviewed.    Assessment:     1. Itching of male genitalia        Plan:       Itching of male genitalia  -     metroNIDAZOLE (FLAGYL) 500 MG tablet; Take 1 tablet (500 mg total) by mouth every 12 (twelve) hours. for 7 days  Dispense: 14 tablet; Refill: 0    Flagyl prescribed secondary to bacterial vaginosis exposure per patient.  Recommendation given for STD screening today, patient refused.  Reports he will take current medication and  will follow up with his PCP and or return if symptoms do not resolve.  Advised on safe sexual practices, use condoms.

## 2023-08-09 ENCOUNTER — TELEPHONE (OUTPATIENT)
Dept: INTERNAL MEDICINE | Facility: CLINIC | Age: 63
End: 2023-08-09
Payer: COMMERCIAL

## 2023-08-09 NOTE — TELEPHONE ENCOUNTER
Returned pt call. Pt states he started with itching all over body 1 week ago. Pt denies rash. Pt states he has not recently changed soaps or detergents. Pt requesting first available appt, pt scheduled.

## 2023-08-09 NOTE — TELEPHONE ENCOUNTER
----- Message from Krissy Smallwood sent at 8/9/2023  1:32 PM CDT -----  Contact: [phone] Patient  [email or web address]     Patient is calling for Medical Advice regarding:Itchy all over body    How long has patient had these symptoms: 1 week ago    Pharmacy name and phone#:   [id number] - [facility] [address]  [address] [facility] [address] [facility] [address]  Phone: [phone] Fax: [phone]          Would like response via [employer]:  Call Back. Pt would like to be seen please.     Comments:

## 2023-08-11 ENCOUNTER — OFFICE VISIT (OUTPATIENT)
Dept: INTERNAL MEDICINE | Facility: CLINIC | Age: 63
End: 2023-08-11
Attending: FAMILY MEDICINE
Payer: COMMERCIAL

## 2023-08-11 ENCOUNTER — LAB VISIT (OUTPATIENT)
Dept: LAB | Facility: HOSPITAL | Age: 63
End: 2023-08-11
Attending: FAMILY MEDICINE
Payer: COMMERCIAL

## 2023-08-11 VITALS
HEIGHT: 66 IN | BODY MASS INDEX: 26.08 KG/M2 | DIASTOLIC BLOOD PRESSURE: 80 MMHG | WEIGHT: 162.25 LBS | SYSTOLIC BLOOD PRESSURE: 139 MMHG | OXYGEN SATURATION: 97 % | HEART RATE: 75 BPM

## 2023-08-11 DIAGNOSIS — I10 HYPERTENSION, ESSENTIAL: ICD-10-CM

## 2023-08-11 DIAGNOSIS — L29.9 PRURITUS: ICD-10-CM

## 2023-08-11 DIAGNOSIS — R30.0 DYSURIA: ICD-10-CM

## 2023-08-11 DIAGNOSIS — L29.9 PRURITUS: Primary | ICD-10-CM

## 2023-08-11 DIAGNOSIS — L29.9 ITCHING: ICD-10-CM

## 2023-08-11 LAB
BILIRUB UR QL STRIP: NEGATIVE
CLARITY UR REFRACT.AUTO: CLEAR
COLOR UR AUTO: YELLOW
GLUCOSE UR QL STRIP: NEGATIVE
HGB UR QL STRIP: NEGATIVE
KETONES UR QL STRIP: NEGATIVE
LEUKOCYTE ESTERASE UR QL STRIP: NEGATIVE
MICROSCOPIC COMMENT: NORMAL
NITRITE UR QL STRIP: NEGATIVE
PH UR STRIP: 5 [PH] (ref 5–8)
PROT UR QL STRIP: NEGATIVE
RBC #/AREA URNS AUTO: 1 /HPF (ref 0–4)
SP GR UR STRIP: 1.02 (ref 1–1.03)
SQUAMOUS #/AREA URNS AUTO: 2 /HPF
URN SPEC COLLECT METH UR: NORMAL
WBC #/AREA URNS AUTO: 1 /HPF (ref 0–5)

## 2023-08-11 PROCEDURE — 3075F PR MOST RECENT SYSTOLIC BLOOD PRESS GE 130-139MM HG: ICD-10-PCS | Mod: CPTII,S$GLB,, | Performed by: FAMILY MEDICINE

## 2023-08-11 PROCEDURE — 1159F MED LIST DOCD IN RCRD: CPT | Mod: CPTII,S$GLB,, | Performed by: FAMILY MEDICINE

## 2023-08-11 PROCEDURE — 1159F PR MEDICATION LIST DOCUMENTED IN MEDICAL RECORD: ICD-10-PCS | Mod: CPTII,S$GLB,, | Performed by: FAMILY MEDICINE

## 2023-08-11 PROCEDURE — 3044F HG A1C LEVEL LT 7.0%: CPT | Mod: CPTII,S$GLB,, | Performed by: FAMILY MEDICINE

## 2023-08-11 PROCEDURE — 3075F SYST BP GE 130 - 139MM HG: CPT | Mod: CPTII,S$GLB,, | Performed by: FAMILY MEDICINE

## 2023-08-11 PROCEDURE — 81001 URINALYSIS AUTO W/SCOPE: CPT | Performed by: FAMILY MEDICINE

## 2023-08-11 PROCEDURE — 99999 PR PBB SHADOW E&M-EST. PATIENT-LVL V: ICD-10-PCS | Mod: PBBFAC,,, | Performed by: FAMILY MEDICINE

## 2023-08-11 PROCEDURE — 3066F NEPHROPATHY DOC TX: CPT | Mod: CPTII,S$GLB,, | Performed by: FAMILY MEDICINE

## 2023-08-11 PROCEDURE — 3079F PR MOST RECENT DIASTOLIC BLOOD PRESSURE 80-89 MM HG: ICD-10-PCS | Mod: CPTII,S$GLB,, | Performed by: FAMILY MEDICINE

## 2023-08-11 PROCEDURE — 3066F PR DOCUMENTATION OF TREATMENT FOR NEPHROPATHY: ICD-10-PCS | Mod: CPTII,S$GLB,, | Performed by: FAMILY MEDICINE

## 2023-08-11 PROCEDURE — 3008F BODY MASS INDEX DOCD: CPT | Mod: CPTII,S$GLB,, | Performed by: FAMILY MEDICINE

## 2023-08-11 PROCEDURE — 3044F PR MOST RECENT HEMOGLOBIN A1C LEVEL <7.0%: ICD-10-PCS | Mod: CPTII,S$GLB,, | Performed by: FAMILY MEDICINE

## 2023-08-11 PROCEDURE — 99999 PR PBB SHADOW E&M-EST. PATIENT-LVL V: CPT | Mod: PBBFAC,,, | Performed by: FAMILY MEDICINE

## 2023-08-11 PROCEDURE — 3061F PR NEG MICROALBUMINURIA RESULT DOCUMENTED/REVIEW: ICD-10-PCS | Mod: CPTII,S$GLB,, | Performed by: FAMILY MEDICINE

## 2023-08-11 PROCEDURE — 99214 PR OFFICE/OUTPT VISIT, EST, LEVL IV, 30-39 MIN: ICD-10-PCS | Mod: S$GLB,,, | Performed by: FAMILY MEDICINE

## 2023-08-11 PROCEDURE — 99214 OFFICE O/P EST MOD 30 MIN: CPT | Mod: S$GLB,,, | Performed by: FAMILY MEDICINE

## 2023-08-11 PROCEDURE — 1160F PR REVIEW ALL MEDS BY PRESCRIBER/CLIN PHARMACIST DOCUMENTED: ICD-10-PCS | Mod: CPTII,S$GLB,, | Performed by: FAMILY MEDICINE

## 2023-08-11 PROCEDURE — 87086 URINE CULTURE/COLONY COUNT: CPT | Performed by: FAMILY MEDICINE

## 2023-08-11 PROCEDURE — 3008F PR BODY MASS INDEX (BMI) DOCUMENTED: ICD-10-PCS | Mod: CPTII,S$GLB,, | Performed by: FAMILY MEDICINE

## 2023-08-11 PROCEDURE — 3061F NEG MICROALBUMINURIA REV: CPT | Mod: CPTII,S$GLB,, | Performed by: FAMILY MEDICINE

## 2023-08-11 PROCEDURE — 1160F RVW MEDS BY RX/DR IN RCRD: CPT | Mod: CPTII,S$GLB,, | Performed by: FAMILY MEDICINE

## 2023-08-11 PROCEDURE — 87088 URINE BACTERIA CULTURE: CPT | Performed by: FAMILY MEDICINE

## 2023-08-11 PROCEDURE — 3079F DIAST BP 80-89 MM HG: CPT | Mod: CPTII,S$GLB,, | Performed by: FAMILY MEDICINE

## 2023-08-11 RX ORDER — HYDROXYZINE HYDROCHLORIDE 10 MG/1
10 TABLET, FILM COATED ORAL NIGHTLY PRN
Qty: 30 TABLET | Refills: 1 | Status: SHIPPED | OUTPATIENT
Start: 2023-08-11

## 2023-08-11 NOTE — PROGRESS NOTES
Subjective:       Patient ID: Renny Young is a 62 y.o. male.    Chief Complaint: Recurrent Skin Infections    same day urgent care presents - itching throughout.  Mainly groin area and penis.  Two urgent care visits recently with significant workup.  He did not initially tell me but may have had exposure to?  BV.  On urinalysis and other tests were negative in the last couple of months.  Describes pain.  Does not describe discharge or swelling.  Testicles not involved.  Also states itching to arms without rash.  That tends to come and go.  He is adamant about getting treated today.        Review of Systems   Constitutional:  Negative for appetite change, chills, diaphoresis, fatigue and fever.   HENT:  Negative for congestion, postnasal drip, rhinorrhea, sore throat and trouble swallowing.    Eyes:  Negative for visual disturbance.   Respiratory:  Negative for cough, choking, chest tightness, shortness of breath and wheezing.    Cardiovascular:  Negative for chest pain and leg swelling.   Gastrointestinal:  Negative for abdominal distention, abdominal pain, diarrhea, nausea and vomiting.   Genitourinary:  Positive for dysuria and penile pain. Negative for difficulty urinating and hematuria.   Musculoskeletal:  Negative for arthralgias and myalgias.   Skin:  Negative for rash.   Neurological:  Negative for weakness, light-headedness and headaches.   Psychiatric/Behavioral:  Negative for confusion and dysphoric mood.        Objective:      Physical Exam  Vitals and nursing note reviewed.   Constitutional:       General: He is not in acute distress.     Appearance: He is well-developed.   Pulmonary:      Effort: Pulmonary effort is normal.   Genitourinary:     Penis: Normal and uncircumcised.       Testes:         Right: Mass or tenderness not present.         Left: Mass or tenderness not present.   Musculoskeletal:      Cervical back: Neck supple.      Right lower leg: No edema.      Left lower leg: No edema.    Skin:     General: Skin is warm and dry.      Findings: No rash.   Neurological:      Mental Status: He is alert and oriented to person, place, and time.   Psychiatric:         Behavior: Behavior normal.         Thought Content: Thought content normal.         Judgment: Judgment normal.         Assessment:       1. Pruritus    2. Dysuria    3. Hypertension, essential    4. Itching        Plan:     Medication List with Changes/Refills   New Medications    HYDROXYZINE HCL (ATARAX) 10 MG TAB    Take 1 tablet (10 mg total) by mouth nightly as needed (itching).   Current Medications    AMLODIPINE (NORVASC) 5 MG TABLET    Take 1 tablet (5 mg total) by mouth once daily.    ASPIRIN (ECOTRIN) 81 MG EC TABLET    Take 81 mg by mouth once daily.    ATORVASTATIN (LIPITOR) 20 MG TABLET    Take 1 tablet (20 mg total) by mouth once daily.    GABAPENTIN (NEURONTIN) 300 MG CAPSULE    Take 1 capsule by mouth twice daily    LOSARTAN-HYDROCHLOROTHIAZIDE 100-12.5 MG (HYZAAR) 100-12.5 MG TAB    Take 1 tablet by mouth once daily.    METFORMIN (GLUCOPHAGE) 500 MG TABLET    Take 1 tablet (500 mg total) by mouth daily with breakfast.    TERCONAZOLE (TERAZOL 7) 0.4 % CREA    SMARTSI Applicator Vaginal Every Evening   Discontinued Medications    AMMONIUM LACTATE 12 % CREA    Apply twice daily to affected parts both feet as needed.    CLOTRIMAZOLE (LOTRIMIN) 1 % CREAM    Apply topically 2 (two) times daily.    FEXOFENADINE (ALLEGRA) 180 MG TABLET    Take 1 tablet (180 mg total) by mouth once daily.    LIDOCAINE HCL 2% (XYLOCAINE) 2 % JELLY    Apply topically as needed. Apply topically once nightly to affected part of foot/feet.    PODOFILOX (CONDYLOX) 0.5 % GEL    Apply topically 2 (two) times daily.     1. Pruritus  -     Ambulatory referral/consult to Urology; Future; Expected date: 2023  -     Urinalysis; Future; Expected date: 2023  -     Urinalysis Microscopic; Future; Expected date: 2023  -     Urine culture; Future;  Expected date: 08/11/2023  -     C. trachomatis/N. gonorrhoeae by AMP DNA; Future; Expected date: 08/11/2023  -     Trichomonas vaginalis, RNA, Qual, Urine  -     Ambulatory referral/consult to Dermatology; Future; Expected date: 08/18/2023    2. Dysuria  -     Ambulatory referral/consult to Urology; Future; Expected date: 08/18/2023  -     Urinalysis; Future; Expected date: 08/11/2023  -     Urinalysis Microscopic; Future; Expected date: 08/11/2023  -     Urine culture; Future; Expected date: 08/11/2023  -     C. trachomatis/N. gonorrhoeae by AMP DNA; Future; Expected date: 08/11/2023  -     Trichomonas vaginalis, RNA, Qual, Urine    3. Hypertension, essential    4. Itching  -     Ambulatory referral/consult to Dermatology; Future; Expected date: 08/18/2023  -     hydrOXYzine HCL (ATARAX) 10 MG Tab; Take 1 tablet (10 mg total) by mouth nightly as needed (itching).  Dispense: 30 tablet; Refill: 1      See meds, orders, follow up, routing and instructions sections of encounter and AVS. Discussed with patient and provided on AVS.    He has a normal examination today with no skin lesions, urticaria, whelps, no penile lesions or discharge.  No evidence of paraphimosis, foreskin is not swollen at this time.    Unclear diagnosis.  Hydroxyzine sedation warnings were provided.  Without diagnosis un sure what to treat.  Suggested topical hydrogen peroxide for cleansing.    Lengthy visit time today exceeding 25 minutes including counseling, history and examination.

## 2023-08-13 ENCOUNTER — TELEPHONE (OUTPATIENT)
Dept: INTERNAL MEDICINE | Facility: CLINIC | Age: 63
End: 2023-08-13
Payer: COMMERCIAL

## 2023-08-13 DIAGNOSIS — E11.42 DIABETIC POLYNEUROPATHY ASSOCIATED WITH TYPE 2 DIABETES MELLITUS: ICD-10-CM

## 2023-08-14 ENCOUNTER — TELEPHONE (OUTPATIENT)
Dept: INTERNAL MEDICINE | Facility: CLINIC | Age: 63
End: 2023-08-14
Payer: COMMERCIAL

## 2023-08-14 RX ORDER — GABAPENTIN 300 MG/1
CAPSULE ORAL
Qty: 60 CAPSULE | Refills: 1 | Status: SHIPPED | OUTPATIENT
Start: 2023-08-14 | End: 2024-01-04

## 2023-08-14 NOTE — PROGRESS NOTES
"Subjective:       Renny Young is a 62 y.o. male who is an established patient with [facility], though is new to me was seen  for evaluation of penile itching.      Last saw Dr Cuenca 10/2022 s/p condyloma excision. Previously treated with cryo by derm and Condylox topical.     New to me. Referred for penile/groin itching/burning. Also with itching on arms. Given hydroxyzine. He has also been referred to dermatology (scheduled in Oct). He was recommended to use hydrogen peroxide to clean penis.     STI testing ordered in 8/23 recently but not collected, done in 6/23 that was negative. Recent RPR neg. Recent UA neg. UCx at that time likely contaminant (LG diptheroids and coag neg staph). He was given Flagyl from urgent care due to sexual exposure.     He now reports stinging in penis - intermittent. Present on outside of penis. No penile discharge.     The following portions of the patient's history were reviewed and updated as appropriate: allergies, current medications, past family history, past medical history, past social history, past surgical history and problem list.    Review of Systems  Twelve point review of systems completed. Pertinent positive and negatives listed in HPI.      Objective:    Vitals: BP (!) 174/84 (BP Location: Right arm, Patient Position: Sitting, BP Method: Large (Automatic))   Pulse 78   Resp 16   Ht 5' 7" (1.702 m)   Wt 74.6 kg (164 lb 8.8 oz)   SpO2 98%   BMI 25.77 kg/m²     Physical Exam   General: well developed, well nourished in no acute distress  Head: normocephalic, atraumatic  Neck: supple, trachea midline, no obvious enlargement of thyroid  HEENT: EOMI, mucus membranes moist, sclera anicteric, no hearing impairment  Lungs: symmetric expansion, non-labored breathing  Neuro: alert and oriented x 3, no gross deficits  Psych: normal judgment and insight, normal mood/affect and non-anxious  Genitourinary:   Penis -  no plaques or lesions. Well healed incision in " "ventral corona. Otherwise normal in area he shows itching present (around corona sulcus). Urethra - orthotopic location without stenosis.     HOLLY: deferred      Lab Review   Urine analysis today in clinic shows no urine     Lab Results   Component Value Date    WBC 9.76 06/12/2023    HGB 16.0 06/12/2023    HCT 50.5 06/12/2023    MCV 93 06/12/2023     06/12/2023     Lab Results   Component Value Date    CREATININE 1.4 06/12/2023    BUN 20 06/12/2023     Lab Results   Component Value Date    PSA 1.8 06/12/2023     No results found for: "PSADIAG"    Imaging  NA       Assessment/Plan:      1. Pruritus    - Unsure etiology   - Normal exam   - Lotrisone cream   - Follow up with dermatology     2. Dysuria    - No urine given today   - Hydrate   - Avoid bladder irritants       Follow up PRN         "

## 2023-08-15 LAB
BACTERIA UR CULT: ABNORMAL
BACTERIA UR CULT: ABNORMAL

## 2023-08-16 ENCOUNTER — OFFICE VISIT (OUTPATIENT)
Dept: UROLOGY | Facility: CLINIC | Age: 63
End: 2023-08-16
Attending: UROLOGY
Payer: COMMERCIAL

## 2023-08-16 VITALS
RESPIRATION RATE: 16 BRPM | SYSTOLIC BLOOD PRESSURE: 174 MMHG | HEART RATE: 78 BPM | OXYGEN SATURATION: 98 % | DIASTOLIC BLOOD PRESSURE: 84 MMHG | BODY MASS INDEX: 25.83 KG/M2 | WEIGHT: 164.56 LBS | HEIGHT: 67 IN

## 2023-08-16 DIAGNOSIS — L29.9 PRURITUS: Primary | ICD-10-CM

## 2023-08-16 DIAGNOSIS — R30.0 DYSURIA: ICD-10-CM

## 2023-08-16 PROCEDURE — 3077F PR MOST RECENT SYSTOLIC BLOOD PRESSURE >= 140 MM HG: ICD-10-PCS | Mod: CPTII,S$GLB,, | Performed by: UROLOGY

## 2023-08-16 PROCEDURE — 99214 PR OFFICE/OUTPT VISIT, EST, LEVL IV, 30-39 MIN: ICD-10-PCS | Mod: S$GLB,,, | Performed by: UROLOGY

## 2023-08-16 PROCEDURE — 3066F PR DOCUMENTATION OF TREATMENT FOR NEPHROPATHY: ICD-10-PCS | Mod: CPTII,S$GLB,, | Performed by: UROLOGY

## 2023-08-16 PROCEDURE — 3079F DIAST BP 80-89 MM HG: CPT | Mod: CPTII,S$GLB,, | Performed by: UROLOGY

## 2023-08-16 PROCEDURE — 3079F PR MOST RECENT DIASTOLIC BLOOD PRESSURE 80-89 MM HG: ICD-10-PCS | Mod: CPTII,S$GLB,, | Performed by: UROLOGY

## 2023-08-16 PROCEDURE — 3008F PR BODY MASS INDEX (BMI) DOCUMENTED: ICD-10-PCS | Mod: CPTII,S$GLB,, | Performed by: UROLOGY

## 2023-08-16 PROCEDURE — 3066F NEPHROPATHY DOC TX: CPT | Mod: CPTII,S$GLB,, | Performed by: UROLOGY

## 2023-08-16 PROCEDURE — 3061F NEG MICROALBUMINURIA REV: CPT | Mod: CPTII,S$GLB,, | Performed by: UROLOGY

## 2023-08-16 PROCEDURE — 3061F PR NEG MICROALBUMINURIA RESULT DOCUMENTED/REVIEW: ICD-10-PCS | Mod: CPTII,S$GLB,, | Performed by: UROLOGY

## 2023-08-16 PROCEDURE — 3077F SYST BP >= 140 MM HG: CPT | Mod: CPTII,S$GLB,, | Performed by: UROLOGY

## 2023-08-16 PROCEDURE — 3044F PR MOST RECENT HEMOGLOBIN A1C LEVEL <7.0%: ICD-10-PCS | Mod: CPTII,S$GLB,, | Performed by: UROLOGY

## 2023-08-16 PROCEDURE — 99214 OFFICE O/P EST MOD 30 MIN: CPT | Mod: S$GLB,,, | Performed by: UROLOGY

## 2023-08-16 PROCEDURE — 1160F PR REVIEW ALL MEDS BY PRESCRIBER/CLIN PHARMACIST DOCUMENTED: ICD-10-PCS | Mod: CPTII,S$GLB,, | Performed by: UROLOGY

## 2023-08-16 PROCEDURE — 3044F HG A1C LEVEL LT 7.0%: CPT | Mod: CPTII,S$GLB,, | Performed by: UROLOGY

## 2023-08-16 PROCEDURE — 1159F PR MEDICATION LIST DOCUMENTED IN MEDICAL RECORD: ICD-10-PCS | Mod: CPTII,S$GLB,, | Performed by: UROLOGY

## 2023-08-16 PROCEDURE — 1160F RVW MEDS BY RX/DR IN RCRD: CPT | Mod: CPTII,S$GLB,, | Performed by: UROLOGY

## 2023-08-16 PROCEDURE — 1159F MED LIST DOCD IN RCRD: CPT | Mod: CPTII,S$GLB,, | Performed by: UROLOGY

## 2023-08-16 PROCEDURE — 3008F BODY MASS INDEX DOCD: CPT | Mod: CPTII,S$GLB,, | Performed by: UROLOGY

## 2023-08-16 RX ORDER — CLOTRIMAZOLE AND BETAMETHASONE DIPROPIONATE 10; .64 MG/G; MG/G
CREAM TOPICAL 2 TIMES DAILY
Qty: 45 G | Refills: 1 | Status: SHIPPED | OUTPATIENT
Start: 2023-08-16

## 2023-08-17 ENCOUNTER — TELEPHONE (OUTPATIENT)
Dept: INTERNAL MEDICINE | Facility: CLINIC | Age: 63
End: 2023-08-17
Payer: COMMERCIAL

## 2023-08-17 DIAGNOSIS — N39.0 URINARY TRACT INFECTION WITHOUT HEMATURIA, SITE UNSPECIFIED: Primary | ICD-10-CM

## 2023-08-17 RX ORDER — DOXYCYCLINE HYCLATE 100 MG
100 TABLET ORAL 2 TIMES DAILY
Qty: 14 TABLET | Refills: 0 | Status: SHIPPED | OUTPATIENT
Start: 2023-08-17 | End: 2023-08-24

## 2023-08-18 NOTE — TELEPHONE ENCOUNTER
Called and spoke to pt. Reviewed results note from PCP, pt verbalized understating. Pt will start antibiotic.

## 2023-08-18 NOTE — TELEPHONE ENCOUNTER
Please contact patient and let him know that the culture indicated a possibe infection, versus contamination in the sample.    I would like to try an antibiotic for about 7 days - doxycycline twice a day for 7 days.    Script called in to pharmacy. Take pills with  larger glass of water.    Thank you

## 2023-08-28 ENCOUNTER — TELEPHONE (OUTPATIENT)
Dept: ENDOSCOPY | Facility: HOSPITAL | Age: 63
End: 2023-08-28

## 2023-08-28 ENCOUNTER — CLINICAL SUPPORT (OUTPATIENT)
Dept: ENDOSCOPY | Facility: HOSPITAL | Age: 63
End: 2023-08-28
Attending: FAMILY MEDICINE
Payer: COMMERCIAL

## 2023-08-28 DIAGNOSIS — Z12.11 COLON CANCER SCREENING: ICD-10-CM

## 2023-08-28 DIAGNOSIS — K63.5 POLYP OF COLON, UNSPECIFIED PART OF COLON, UNSPECIFIED TYPE: ICD-10-CM

## 2023-08-28 NOTE — TELEPHONE ENCOUNTER
Contacted the patient to schedule an endoscopy procedure(s) colonoscopy. Pt not able to schedule at this time, December schedule not open, Pt requesting follow up phone call, follow up PAT appointment scheduled.

## 2023-10-19 ENCOUNTER — CLINICAL SUPPORT (OUTPATIENT)
Dept: ENDOSCOPY | Facility: HOSPITAL | Age: 63
End: 2023-10-19
Attending: FAMILY MEDICINE

## 2023-10-19 ENCOUNTER — TELEPHONE (OUTPATIENT)
Dept: ENDOSCOPY | Facility: HOSPITAL | Age: 63
End: 2023-10-19
Payer: COMMERCIAL

## 2023-10-19 VITALS — BODY MASS INDEX: 25.74 KG/M2 | WEIGHT: 164 LBS | HEIGHT: 67 IN

## 2023-10-19 DIAGNOSIS — Z12.11 COLON CANCER SCREENING: ICD-10-CM

## 2023-10-19 DIAGNOSIS — K63.5 POLYP OF COLON, UNSPECIFIED PART OF COLON, UNSPECIFIED TYPE: ICD-10-CM

## 2023-10-19 RX ORDER — POLYETHYLENE GLYCOL 3350, SODIUM SULFATE ANHYDROUS, SODIUM BICARBONATE, SODIUM CHLORIDE, POTASSIUM CHLORIDE 236; 22.74; 6.74; 5.86; 2.97 G/4L; G/4L; G/4L; G/4L; G/4L
4 POWDER, FOR SOLUTION ORAL ONCE
Qty: 4000 ML | Refills: 0 | Status: SHIPPED | OUTPATIENT
Start: 2023-10-19 | End: 2023-10-19

## 2023-10-19 NOTE — TELEPHONE ENCOUNTER
Mailed to pt along with DM Med instruction sheet.        Colonoscopy Procedure Prep Instructions    Date of procedure: 1/26/24 Arrive at: 8:00 AM    Location of Department:   [facility] [address] [place] [address]  Take the [facility]vators to 2nd Floor Outpatient Surgery    As soon as possible:   your prep from pharmacy and over the counter DULCOLAX LAXATIVE TABLETS            On the day before your procedure   What You CAN do:   You may have clear liquids ONLY-see below for list.     Liquids That Are OK to Drink:   Water  Sports drinks (Gatorade, Power-Aid)  Coffee or tea (no cream or nondairy creamer)  Clear juices without pulp (apple, white grape)  Gelatin desserts (no fruit or toppings)  Clear soda (sprite, coke, ginger ale)  Chicken broth (until 12 midnight the night before procedure)    What You CANNOT do:   Do not EAT solid food, drink milk or anything   colored red.  Do not drink alcohol.  Do not take oral medications within 1 hour of starting   each dose of prep.  No gum chewing or candy morning of procedure                       Note:   (Please disregard the insert instructions from pharmacy).  PEG Bowel Prep is indicated for cleansing of the colon as a preparation for colonoscopy in adults.   Be sure to tell your doctor about all the medicines you take, including prescription and non-prescription medicines, vitamins, and herbal supplements. PEG Bowel Prep may affect how other medicines work.  Medication taken by mouth may not be absorbed properly when taken within 1 hour before the start of each dose of PEG Bowel Prep.    It is not uncommon to experience some abdominal cramping, nausea and/or vomiting when taking the prep. If you have nausea and/or vomiting while taking the prep, stop drinking for 20 to 30 minutes then continue.      How to take prep:    PEG Bowel Prep is a (2-day) prep.    One (1) bottle of prep are required for a complete preparation for colonoscopy.  Dilute the solution concentrate as directed prior to use. You must drink water with each dose of prep, and additional water after each dose.    DOSE 1--Day Before Colonoscopy 1/25/24     Drink at least 6 to 8 glasses of clear liquids from time you wake up until you begin your prep and then continue until bedtime to avoid dehydration.     12:00 pm (NOON) Mix your entire container of prep with lukewarm water and refrigerate. Take four (4) Dulcolax (Bisacodyl) tablets with at least 8 ounces or more of clear liquids.       6:00 pm:    You must complete Steps 1 and 2 below before going to bed:    Step 1-Drink half the liquid in the container within one (1) hour.   Step 2-Refrigerate the remaining half of the liquid for dose 2. See below when to begin this step.                       IMPORTANT: If you experience preparation-related symptoms (for example, nausea, bloating, or cramping), stop, or slow the rate of drinking the additional water until your symptoms decrease.    DOSE 2--Day of the Colonoscopy 1/26/24 at 2-3 AM.    For this dose, repeat Step 1 shown above using the remaining half of the liquid prep.   You may continue drinking water/clear liquids until   4 hours before your colonoscopy or as directed by the scheduling nurse  5:00 AM.    For more information about your procedure, please watch this informational video. It is important to watch this animated consent video prior to your arrival.   If you haven't watched the video prior to arriving, you are required to watch it during admission which can cause delays.     Options for viewing:  Using a keyboard:  press and hold the control tab (Ctrl) and left mouse click to follow link          Colonoscopy Instructional Video                                                        OR    Type link address into your web browser's address bar:  [email or web address]    Using a mobile phone: tap on web address/link.     Comments:        IMPORTANT  INFORMATION TO KNOW BEFORE YOUR PROCEDURE    [facility] 2nd Floor    If your procedure requires the administration of anesthesia, it is necessary for a responsible adult to drive you home. (Medical Transportation, Uber, Lyft, Taxi, etc. may ONLY be used if a responsible adult is present to accompany you home.  The responsible adult CAN'T be the  of the service).      person must be available to return to pick you up within 30 minutes of being notified of discharge.     Due to the limited socially distant seating in our waiting room, please limit your guest (1) who accompany you for this procedure. If someone accompanies you for this procedure into the facility:    Consider having them proceed to an area that is socially distant other than the lobby until a member of the medical team contacts them to provide an update after the procedure.     Also, please consider being dropped off and picked up from the facility.      Please bring a picture ID, insurance card, & copayment    Take Medications as directed below:      If you begin taking any blood thinning medications, please contact the  listed below as soon as possible.    If you are diabetic see the attached instruction sheet regarding your medication.     If you take HEART, BLOOD PRESSURE, SEIZURE, PAIN, LUNG (including inhalers/nebulizers), ANTI-REJECTION (transplant patients), or PSYCHIATRIC medications, please take at your regular times with a sip of water or as directed by the scheduling nurse.     Important contact information:    Endoscopy [phone] Hours of operation Monday-Friday 8:00-4:30pm.    Questions about insurance or financial obligations call [phone] or [phone].    If you have questions regarding the prep or need to reschedule, please call [phone]. After hours questions requiring immediate assistance, contact [facility] nurse line at [phone] or  [phone].   NOTE:     On occasion, unforeseen circumstances may cause a delay in your procedure start time. We respect your time and appreciate your patience during these circumstances.      Comments:      Are you ready for your Colonoscopy?      __ If you take blood thinners, have you stopped taking them according to your doctor's instructions before your procedures?  __ Have you stopped eating solid foods and followed a clear liquid diet a full day before your procedure or followed the diet       guidelines indicated in your instructions? REMINDER: NO BROTH AFTER MIDNIGHT THE DAY BEFORE PROCEDURE.   __ Have you completed all your prep solution? PLEASE DO NOT FOLLOW the insert/or box instructions from pharmacy)  __ Have you taken your blood pressure, heart, seizure, or other essential medications the morning of your procedure?  __ Have you planned for a ride to and from procedure?  (Medical Transportation, Uber, Lyft, Taxi, etc. may ONLY be used if a responsible adult is present to accompany you home). The responsible adult CANNOT be the  of the service.       Questions or Concerns?  Please call us!  [phone] (M-F) [phone] (Nights and weekends)    Listed below are some helpful tips:    Please bring protective cases for eyewear and hearing aids-wear comfortable clothing/shoes.  Follow prep instructions closely so you don't have to do it twice.  Bowel prep is prescription used to clean out the colon before a colonoscopy. The prep increases movement of your colon by causing you to have diarrhea (loose stools). Cleaning out stool from the colon helps your doctor to see in your colon clearly during this procedure.   It is important to stay hydrated before, during and after bowel prep to prevent loss of fluid (dehydration). You can have water and your choice of clear liquids. Reminder: these liquids you will drink in addition to the bowel prep.     Preparing the mixture:    First, mix the prep with  water.  Make the taste better by adding a sugar free drink mix (Crystal Light) can improve the taste of your prep.   Use a large bore (opening) straw. Place towards the back of mouth (throat) as tolerated.  Prepare a prep mixture that is lightly chilled, but not ice-cold. Drinking a large amount of ice-cold liquid can make you feel very ill.  Avoid drinking any RED beverages or eating popsicles with this color for the 24 hours leading up to your procedure. This color can look like blood in the colon.    Consuming the prep:    Take your time. If you feel ill, take a 15-minute break from drinking the prep mixture  Combat hunger and dehydration with clear liquids. Options like JELL-O, or popsicles will help.  Settle in with good reading material. The goal is to clean out 6 feet of colon, so you can plan on spending a good deal of time in the bathroom. Have some good reading material on-hand or an iPad ready to keep yourself entertained!  Keep yourself comfortable. We recommend applying personal hygiene wipes, Tucks pads and a soothing ointment, like A&D ointment, Desitin, or Vaseline to your bottom before starting and as needed to protect your skin.

## 2023-10-19 NOTE — PLAN OF CARE
Spoke to pt to schedule procedure(s) Colonoscopy       Physician to perform procedure(s) Dr. ABRAHAM Avitia  Date of Procedure (s) 1/26/24  Arrival Time 8:00 AM  Time of Procedure(s) 9:00 AM   Location of Procedure(s) [place] 2nd Floor  Type of Rx Prep sent to patient: PEG  Instructions provided to patient via [facility]   Patient was informed on the following information and verbalized understanding. Screening questionnaire reviewed with patient and complete. If procedure requires anesthesia, a responsible adult needs to be present to accompany the patient home, patient cannot drive after receiving anesthesia. Appointment details are tentative, especially check-in time. Patient will receive a prep-op call 4 days prior to confirm check-in time for procedure. If applicable the patient should contact their pharmacy to verify Rx for procedure prep is ready for pick-up. Patient was advised to call the scheduling department at [phone] if pharmacy states no Rx is available. Patient was advised to call the endoscopy scheduling department if any questions or concerns arise.      [facility] Endoscopy Scheduling Department

## 2023-10-19 NOTE — TELEPHONE ENCOUNTER
Spoke to pt to schedule procedure(s) Colonoscopy       Physician to perform procedure(s) Dr. ABRAHAM Avitia  Date of Procedure (s) 1/26/24  Arrival Time 8:00 AM  Time of Procedure(s) 9:00 AM   Location of Procedure(s) [place] 2nd Floor  Type of Rx Prep sent to patient: PEG  Instructions provided to [facility] via [facility]  [facility]   Patient was informed on the following information and verbalized understanding. Screening questionnaire reviewed with patient and complete. If procedure requires anesthesia, a responsible adult needs to be present to accompany the patient home, patient cannot drive after receiving anesthesia. Appointment details are tentative, especially check-in time. Patient will receive a prep-op call 4 days prior to confirm check-in time for procedure. If applicable the patient should contact their pharmacy to verify Rx for procedure prep is ready for pick-up. Patient was advised to call the scheduling department at [phone] if pharmacy states no Rx is available. Patient was advised to call the endoscopy scheduling department if any questions or concerns arise.      [facility] Scheduling Department

## 2023-10-31 ENCOUNTER — CLINICAL SUPPORT (OUTPATIENT)
Dept: CARDIOLOGY | Facility: HOSPITAL | Age: 63
End: 2023-10-31
Attending: INTERNAL MEDICINE

## 2023-10-31 DIAGNOSIS — I49.5 SICK SINUS SYNDROME: Primary | ICD-10-CM

## 2023-10-31 DIAGNOSIS — I49.5 SICK SINUS SYNDROME: ICD-10-CM

## 2023-10-31 PROCEDURE — 93279 CARDIAC DEVICE CHECK - IN CLINIC & HOSPITAL: ICD-10-PCS | Mod: 26,,, | Performed by: INTERNAL MEDICINE

## 2023-10-31 PROCEDURE — 93279 PRGRMG DEV EVAL PM/LDLS PM: CPT

## 2023-10-31 PROCEDURE — 93279 PRGRMG DEV EVAL PM/LDLS PM: CPT | Mod: 26,,, | Performed by: INTERNAL MEDICINE

## 2023-11-01 ENCOUNTER — OFFICE VISIT (OUTPATIENT)
Dept: ELECTROPHYSIOLOGY | Facility: CLINIC | Age: 63
End: 2023-11-01

## 2023-11-01 VITALS
SYSTOLIC BLOOD PRESSURE: 169 MMHG | HEIGHT: 67 IN | DIASTOLIC BLOOD PRESSURE: 84 MMHG | WEIGHT: 164.88 LBS | HEART RATE: 74 BPM | BODY MASS INDEX: 25.88 KG/M2

## 2023-11-01 DIAGNOSIS — I49.5 SICK SINUS SYNDROME: ICD-10-CM

## 2023-11-01 DIAGNOSIS — Z95.0 PACEMAKER: Primary | ICD-10-CM

## 2023-11-01 DIAGNOSIS — I10 HYPERTENSION, ESSENTIAL: ICD-10-CM

## 2023-11-01 PROCEDURE — 99999 PR PBB SHADOW E&M-EST. PATIENT-LVL III: CPT | Mod: PBBFAC,,, | Performed by: NURSE PRACTITIONER

## 2023-11-01 PROCEDURE — 93010 RHYTHM STRIP: ICD-10-PCS | Mod: S$PBB,,, | Performed by: INTERNAL MEDICINE

## 2023-11-01 PROCEDURE — 93005 ELECTROCARDIOGRAM TRACING: CPT | Mod: PBBFAC | Performed by: INTERNAL MEDICINE

## 2023-11-01 PROCEDURE — 99213 OFFICE O/P EST LOW 20 MIN: CPT | Mod: PBBFAC | Performed by: NURSE PRACTITIONER

## 2023-11-01 PROCEDURE — 93010 ELECTROCARDIOGRAM REPORT: CPT | Mod: S$PBB,,, | Performed by: INTERNAL MEDICINE

## 2023-11-01 PROCEDURE — 99999 PR PBB SHADOW E&M-EST. PATIENT-LVL III: ICD-10-PCS | Mod: PBBFAC,,, | Performed by: NURSE PRACTITIONER

## 2023-11-01 PROCEDURE — 99214 OFFICE O/P EST MOD 30 MIN: CPT | Mod: S$PBB,,, | Performed by: NURSE PRACTITIONER

## 2023-11-01 PROCEDURE — 99214 PR OFFICE/OUTPT VISIT, EST, LEVL IV, 30-39 MIN: ICD-10-PCS | Mod: S$PBB,,, | Performed by: NURSE PRACTITIONER

## 2023-11-01 NOTE — PROGRESS NOTES
Mr. Young is a patient of Dr. Spence and was last seen in clinic 10/3/2022.      Subjective:   Patient ID:  Renny Young is a 63 y.o. male who presents for follow up of Pacemaker Check  .     HPI:    Mr. Young is a 63 y.o. male with SSS, PPM (2009), DM, and HTN here for annual follow up.     Background:    Renny Young has a history of hypertension and sick sinus syndrome. He had a [employer] dual chamber pacemaker implanted at [facility] in 7/2009 for syncope. Mr. Young describes that following implantation, he experienced discomfort when his RV lead was paced. Apparently his RV lead output was lowered, which improved his symptoms. At his last in 10/2016, he had GI complaints which he attributed to his RV pacing lead. At his bequest, Dr. Spence changed his programming to an AAI pacing mode at that time.    Device check performed in 4/2020 indicated his device is at EOS (reached KELLY on 10/3/2019, and consistent with early battery depletion based on past measurements). Additionally his atrial lead impedence, which had historically been stable, was measured in the office at 968-1500 ohms (3x above normal), with 38% RA pacing. Pacemaker generator change performed in 5/2020--no abnormalities with RA lead seen.    Echo in 12/2021 showed EF 65%. Exercise stress echo in 6/2022 was negative for ischemia.     10/2022:   No complaints today. Overall feeling well. Reports his wife is hospitalized at [facility] currently.  Device interrogation which shows 10% AP with rare short runs of AT. Lead impendence stable.   ECG is NSR at 67 bpm, ARS 84, Qtc 418  RV lead off 2/2 belching.  Plan is for regular device checks, follow-up 1 year.    Update (11/01/2023):    Today he says overall he is feeling well.  A few episodes of brief LH. Brief palps. Belching improved after RV lead off. No exertional CP, URRUTIA, syncope reported.    Device Interrogation (11/1/2023) reveals an intrinsic SR with stable lead and device function. AMSx3, max 98  seconds.  He paces 10% in the RA. Estimated battery longevity 7.7 years. AAU mode.    I have personally reviewed the patient's EKG today, which shows sinus rhythm at 74bpm. KY interval is 142. QRS is 80. QTc is 461.    Relevant Cardiac Test Results:    MANN (6/30/2022):  The patient exercised for 7 minutes and 1 seconds on a high ramp protocol, corresponding to a functional capacity of 12 METS, achieving a peak heart rate of 141 bpm, which is 89% of the age predicted maximum heart rate.  The patient's exercise capacity was normal.  There were no arrhythmias during stress.  The test was stopped because the patient experienced fatigue.  The ECG portion of this study is negative for myocardial ischemia.  The left ventricle is normal in size with concentric remodeling and normal systolic function.  The estimated ejection fraction is 60%.  Normal left ventricular diastolic function.  Normal right ventricular size with normal right ventricular systolic function.  The estimated PA systolic pressure is 31 mmHg.  Normal central venous pressure (3 mmHg).  The stress echo portion of this study is negative for myocardial ischemia.    Current Outpatient Medications   Medication Sig    amLODIPine (NORVASC) 5 MG tablet Take 1 tablet (5 mg total) by mouth once daily.    aspirin (ECOTRIN) 81 MG EC tablet Take 81 mg by mouth once daily.    atorvastatin (LIPITOR) 20 MG tablet Take 1 tablet (20 mg total) by mouth once daily.    clotrimazole-betamethasone 1-0.05% (LOTRISONE) cream Apply topically 2 (two) times daily.    gabapentin (NEURONTIN) 300 MG capsule Take 1 capsule by mouth twice daily    hydrOXYzine HCL (ATARAX) 10 MG Tab Take 1 tablet (10 mg total) by mouth nightly as needed (itching).    losartan-hydrochlorothiazide 100-12.5 mg (HYZAAR) 100-12.5 mg Tab Take 1 tablet by mouth once daily.    metFORMIN (GLUCOPHAGE) 500 MG tablet Take 1 tablet (500 mg total) by mouth daily with breakfast.    terconazole (TERAZOL 7) 0.4 % Crea  "SMARTSI Applicator Vaginal Every Evening     No current facility-administered medications for this visit.       Review of Systems   Constitutional: Negative for malaise/fatigue.   Cardiovascular:  Positive for palpitations (brief). Negative for chest pain, dyspnea on exertion, irregular heartbeat and leg swelling.   Respiratory:  Negative for shortness of breath.    Hematologic/Lymphatic: Negative for bleeding problem.   Skin:  Negative for rash.   Musculoskeletal:  Negative for myalgias.   Gastrointestinal:  Negative for hematemesis, hematochezia and nausea.   Genitourinary:  Negative for hematuria.   Neurological:  Positive for light-headedness (brief).   Psychiatric/Behavioral:  Negative for altered mental status.    Allergic/Immunologic: Negative for persistent infections.       Objective:          BP (!) 169/84   Pulse 74   Ht 5' 7" (1.702 m)   Wt 74.8 kg (164 lb 14.5 oz)   BMI 25.83 kg/m²     Physical Exam  Vitals and nursing note reviewed.   Constitutional:       Appearance: Normal appearance. He is well-developed.   HENT:      Head: Normocephalic.      Nose: Nose normal.   Eyes:      Pupils: Pupils are equal, round, and reactive to light.   Cardiovascular:      Rate and Rhythm: Normal rate and regular rhythm.   Pulmonary:      Effort: No respiratory distress.      Breath sounds: Normal breath sounds.   Chest:      Comments: Device to LUCW. Incision and pocket in good repair.    Musculoskeletal:         General: Normal range of motion.   Skin:     General: Skin is warm and dry.      Findings: No erythema.   Neurological:      Mental Status: He is alert and oriented to person, place, and time.   Psychiatric:         Speech: Speech normal.         Behavior: Behavior normal.           Lab Results   Component Value Date     2023    K 4.3 2023    BUN 20 2023    CREATININE 1.4 2023    ALT 24 2023    AST 21 2023    HGB 16.0 2023    HCT 50.5 2023    TSH " 1.194 04/06/2017    LDLCALC 53.2 (L) 06/12/2023             Assessment:     1. Pacemaker    2. Sick sinus syndrome    3. Hypertension, essential      Plan:     In summary, Mr. Young is a 63 y.o. male with SSS, PPM (2009), DM, and HTN here for annual follow up.   Mr. Young is doing well from a device perspective with stable lead and device function. No sustained arrhythmia noted.   AAI mode - V lead off due to belching. No CHF symptoms. BPs elevated but he says he has not taken his BP meds this morning yet.    Continue current medication regimen and device settings.   Follow up in device clinic as scheduled. Q6Mo. Pt declined remote monitoring.  Follow up in EP clinic in 1 year, sooner as needed.     *A copy of this note has been sent to Dr. Spence*    Follow up in about 1 year (around 11/1/2024).    ------------------------------------------------------------------    SARAH Sahu, NP-C  Cardiac Electrophysiology

## 2023-11-07 LAB
OHS CV AF BURDEN PERCENT: < 1
OHS CV DC REMOTE DEVICE TYPE: NORMAL
OHS CV RV PACING PERCENT: 0 %

## 2023-11-14 ENCOUNTER — HOSPITAL ENCOUNTER (EMERGENCY)
Facility: HOSPITAL | Age: 63
Discharge: HOME | End: 2023-11-14
Attending: EMERGENCY MEDICINE

## 2023-11-14 ENCOUNTER — TELEPHONE (OUTPATIENT)
Dept: INTERNAL MEDICINE | Facility: CLINIC | Age: 63
End: 2023-11-14
Payer: COMMERCIAL

## 2023-11-14 VITALS
DIASTOLIC BLOOD PRESSURE: 84 MMHG | TEMPERATURE: 99 F | RESPIRATION RATE: 22 BRPM | OXYGEN SATURATION: 99 % | SYSTOLIC BLOOD PRESSURE: 174 MMHG | HEART RATE: 68 BPM

## 2023-11-14 DIAGNOSIS — R07.9 CHEST PAIN: ICD-10-CM

## 2023-11-14 DIAGNOSIS — R42 EPISODIC LIGHTHEADEDNESS: ICD-10-CM

## 2023-11-14 DIAGNOSIS — I10 HYPERTENSION, UNSPECIFIED TYPE: Primary | ICD-10-CM

## 2023-11-14 LAB
ALBUMIN SERPL BCP-MCNC: 3.9 G/DL (ref 3.5–5.2)
ALP SERPL-CCNC: 55 U/L (ref 55–135)
ALT SERPL W/O P-5'-P-CCNC: 18 U/L (ref 10–44)
ANION GAP SERPL CALC-SCNC: 10 MMOL/L (ref 8–16)
AST SERPL-CCNC: 22 U/L (ref 10–40)
BASOPHILS # BLD AUTO: 0.07 K/UL (ref 0–0.2)
BASOPHILS NFR BLD: 0.7 % (ref 0–1.9)
BILIRUB SERPL-MCNC: 1.6 MG/DL (ref 0.1–1)
BNP SERPL-MCNC: <10 PG/ML (ref 0–99)
BUN SERPL-MCNC: 21 MG/DL (ref 8–23)
CALCIUM SERPL-MCNC: 9.4 MG/DL (ref 8.7–10.5)
CHLORIDE SERPL-SCNC: 107 MMOL/L (ref 95–110)
CO2 SERPL-SCNC: 24 MMOL/L (ref 23–29)
CREAT SERPL-MCNC: 1 MG/DL (ref 0.5–1.4)
DIFFERENTIAL METHOD: ABNORMAL
EOSINOPHIL # BLD AUTO: 0.2 K/UL (ref 0–0.5)
EOSINOPHIL NFR BLD: 2 % (ref 0–8)
ERYTHROCYTE [DISTWIDTH] IN BLOOD BY AUTOMATED COUNT: 15 % (ref 11.5–14.5)
EST. GFR  (NO RACE VARIABLE): >60 ML/MIN/1.73 M^2
GLUCOSE SERPL-MCNC: 88 MG/DL (ref 70–110)
HCT VFR BLD AUTO: 50 % (ref 40–54)
HGB BLD-MCNC: 16.8 G/DL (ref 14–18)
IMM GRANULOCYTES # BLD AUTO: 0.02 K/UL (ref 0–0.04)
IMM GRANULOCYTES NFR BLD AUTO: 0.2 % (ref 0–0.5)
LYMPHOCYTES # BLD AUTO: 3 K/UL (ref 1–4.8)
LYMPHOCYTES NFR BLD: 28.1 % (ref 18–48)
MCH RBC QN AUTO: 30 PG (ref 27–31)
MCHC RBC AUTO-ENTMCNC: 33.6 G/DL (ref 32–36)
MCV RBC AUTO: 89 FL (ref 82–98)
MONOCYTES # BLD AUTO: 1.1 K/UL (ref 0.3–1)
MONOCYTES NFR BLD: 10 % (ref 4–15)
NEUTROPHILS # BLD AUTO: 6.3 K/UL (ref 1.8–7.7)
NEUTROPHILS NFR BLD: 59 % (ref 38–73)
NRBC BLD-RTO: 0 /100 WBC
PLATELET # BLD AUTO: 289 K/UL (ref 150–450)
PMV BLD AUTO: 10.5 FL (ref 9.2–12.9)
POC CARDIAC TROPONIN I: 0 NG/ML (ref 0–0.08)
POC CARDIAC TROPONIN I: 0.01 NG/ML (ref 0–0.08)
POTASSIUM SERPL-SCNC: 3.8 MMOL/L (ref 3.5–5.1)
PROT SERPL-MCNC: 7 G/DL (ref 6–8.4)
RBC # BLD AUTO: 5.6 M/UL (ref 4.6–6.2)
SAMPLE: NORMAL
SAMPLE: NORMAL
SODIUM SERPL-SCNC: 141 MMOL/L (ref 136–145)
TROPONIN I SERPL DL<=0.01 NG/ML-MCNC: 0.01 NG/ML (ref 0–0.03)
TROPONIN I SERPL DL<=0.01 NG/ML-MCNC: <0.006 NG/ML (ref 0–0.03)
WBC # BLD AUTO: 10.65 K/UL (ref 3.9–12.7)

## 2023-11-14 PROCEDURE — 93010 ELECTROCARDIOGRAM REPORT: CPT | Mod: ,,, | Performed by: INTERNAL MEDICINE

## 2023-11-14 PROCEDURE — 84484 ASSAY OF TROPONIN QUANT: CPT

## 2023-11-14 PROCEDURE — 83880 ASSAY OF NATRIURETIC PEPTIDE: CPT

## 2023-11-14 PROCEDURE — 25000003 PHARM REV CODE 250

## 2023-11-14 PROCEDURE — 25500020 PHARM REV CODE 255: Performed by: EMERGENCY MEDICINE

## 2023-11-14 PROCEDURE — 96360 HYDRATION IV INFUSION INIT: CPT

## 2023-11-14 PROCEDURE — 80053 COMPREHEN METABOLIC PANEL: CPT | Performed by: EMERGENCY MEDICINE

## 2023-11-14 PROCEDURE — 99285 EMERGENCY DEPT VISIT HI MDM: CPT | Mod: 25

## 2023-11-14 PROCEDURE — 85025 COMPLETE CBC W/AUTO DIFF WBC: CPT

## 2023-11-14 PROCEDURE — 93010 EKG 12-LEAD: ICD-10-PCS | Mod: ,,, | Performed by: INTERNAL MEDICINE

## 2023-11-14 PROCEDURE — 93005 ELECTROCARDIOGRAM TRACING: CPT

## 2023-11-14 RX ORDER — CLONIDINE HYDROCHLORIDE 0.1 MG/1
0.1 TABLET ORAL ONCE
Status: COMPLETED | OUTPATIENT
Start: 2023-11-14 | End: 2023-11-14

## 2023-11-14 RX ORDER — CLONIDINE HYDROCHLORIDE 0.1 MG/1
0.1 TABLET ORAL ONCE
Status: DISCONTINUED | OUTPATIENT
Start: 2023-11-14 | End: 2023-11-14

## 2023-11-14 RX ORDER — CLONIDINE HYDROCHLORIDE 0.1 MG/1
0.1 TABLET ORAL EVERY 6 HOURS PRN
Qty: 20 TABLET | Refills: 0 | Status: SHIPPED | OUTPATIENT
Start: 2023-11-14 | End: 2023-11-19

## 2023-11-14 RX ADMIN — SODIUM CHLORIDE 500 ML: 9 INJECTION, SOLUTION INTRAVENOUS at 01:11

## 2023-11-14 RX ADMIN — IOHEXOL 75 ML: 350 INJECTION, SOLUTION INTRAVENOUS at 04:11

## 2023-11-14 RX ADMIN — CLONIDINE HYDROCHLORIDE 0.1 MG: 0.1 TABLET ORAL at 01:11

## 2023-11-14 NOTE — TELEPHONE ENCOUNTER
----- Message from Kajal Castro sent at 11/14/2023  2:33 PM CST -----  Contact: self  [phone]  Pt requesting a call stated was in ER last nigh with high BP and requesting a call to adjust meds.    Please call and advise

## 2023-11-14 NOTE — ED PROVIDER NOTES
"Encounter Date: 11/14/2023       History     Chief Complaint   Patient presents with    Chest Pain     Pt states since Friday he has been experiencing chest pain, weakness and short of breath. Pt states he has a pacemaker and had it checked Friday and he thinks "they messed with something"     HPI  Renny Young is a 63 y.o. male with a hx of sick sinus syndrome and a pacemaker who presented with chest pain and lightheadedness onset 3 days ago. He had an appoint for pacemaker interrogation 4 days ago. He reports feeling lightheaded while driving few days after his appointment. His symptoms are similar to what he experienced prior to the pacemaker placement. He feels soreness directing over his pacemaker site. He reports continuous belching which is similar to his symptoms few years ago when he had a problem with is pacemaker leads. He endorsed multiple year history of 1 PPD tobacco use.    Review of patient's allergies indicates:  No Known Allergies  Past Medical History:   Diagnosis Date    Colon polyp     Diabetes mellitus, type 2     Hypertension     Pacemaker     Pacemaker at end of battery life 5/6/2020    Smoker 6/10/2015    Offered cessation program and declined     Past Surgical History:   Procedure Laterality Date    COLONOSCOPY N/A 2/13/2017    Procedure: COLONOSCOPY;  Surgeon: NILDA Juares MD;  Location: [facility] ([address] [place] [address]);  Service: Endoscopy;  Laterality: N/A;  Do not cancel this order. Patient has Pacemaker in place.     EPIDURAL STEROID INJECTION N/A 8/13/2019    Procedure: INJECTION, STEROID, EPIDURAL IL, L5/S1;  Surgeon: Josue Paredes MD;  Location: [facility] PAIN MGT;  Service: Pain Management;  Laterality: N/A;  IL JEAN MARIE L5/S1    EPIDURAL STEROID INJECTION N/A 6/16/2020    Procedure: INJECTION, STEROID, EPIDURAL, L5-S1 IL add on @ 2 ok by  Asia;  Surgeon: Josue Paredes MD;  Location: [facility] PAIN MGT;  Service: Pain Management;  Laterality: N/A;    HERNIA REPAIR      INJECTION OF ANESTHETIC " AGENT AROUND NERVE Bilateral 7/5/2018    Procedure: BLOCK, NERVE;  Surgeon: Krystal Mtz MD;  Location: [facility] PAIN MGT;  Service: Pain Management;  Laterality: Bilateral;  Lumbar Bilateral L3-L4-L5 Medial Branch Block    RADIOFREQUENCY ABLATION Left 6/11/2019    Procedure: RADIOFREQUENCY ABLATION, L3-L4-L5 MEDIAL BRANCH   1 OF 2;  Surgeon: Josue Paredes MD;  Location: [facility] PAIN MGT;  Service: Pain Management;  Laterality: Left;    RADIOFREQUENCY ABLATION Right 6/25/2019    Procedure: RADIOFREQUENCY ABLATION, L3-L4-L5 MEDIAL BRANCH JING 2 OF 2;  Surgeon: Josue Paredes MD;  Location: [facility] PAIN MGT;  Service: Pain Management;  Laterality: Right;    REPLACEMENT OF PACEMAKER GENERATOR Left 5/6/2020    Procedure: REPLACEMENT, PULSE GENERATOR, CARDIAC PACEMAKER;  Surgeon: Bradford Spence MD;  Location: [facility] EP LAB;  Service: Cardiology;  Laterality: Left;  EOL/LEAD Mlfx, Gen Change, Poss RA lead rev, SJM, MAC, GP, 3 PREP    REVISION OF PROCEDURE INVOLVING PACEMAKER LEAD Left 5/6/2020    Procedure: REVISION, ELECTRODE LEAD, CARDIAC PACEMAKER;  Surgeon: Bradford Spence MD;  Location: [facility] EP LAB;  Service: Cardiology;  Laterality: Left;    TRANSFORAMINAL EPIDURAL INJECTION OF STEROID Right 6/5/2018    Procedure: INJECTION-STEROID-EPIDURAL-TRANSFORAMINAL;  Surgeon: Josue Paredes MD;  Location: [facility] PAIN MGT;  Service: Pain Management;  Laterality: Right;  LUMBAR RIGHT L5 AND S1 TRANSFORAMINL JEAN MARIE  [id number]    W/ SEDATION     TRANSFORAMINAL EPIDURAL INJECTION OF STEROID Right 9/10/2020    Procedure: INJECTION, STEROID, EPIDURAL, TRANSFORAMINAL APPROACH, L5-S1 AND S1;  Surgeon: Josue Paredes MD;  Location: [facility] PAIN MGT;  Service: Pain Management;  Laterality: Right;    TRANSFORAMINAL EPIDURAL INJECTION OF STEROID Right 6/15/2021    Procedure: INJECTION, STEROID, EPIDURAL, TRANSFORAMINAL APPROACH L5-S1 AND S1 need consent;  Surgeon: Josue Paredes MD;  Location: [facility] PAIN MGT;  Service: Pain Management;   Laterality: Right;    TRANSFORAMINAL EPIDURAL INJECTION OF STEROID Right 6/20/2022    Procedure: INJECTION, STEROID, EPIDURAL, TRANSFORAMINAL APPROACH, RIGHT L5-S1 AND S1 CONTRAST;  Surgeon: Krystal Mtz MD;  Location: [facility] PAIN MGT;  Service: Pain Management;  Laterality: Right;    TRANSFORAMINAL EPIDURAL INJECTION OF STEROID Bilateral 9/26/2022    Procedure: INJECTION, STEROID, EPIDURAL, TRANSFORAMINAL APPROACH, BILATERAL L5-S1 CONTRAST;  Surgeon: Krystal Mtz MD;  Location: [facility] PAIN MGT;  Service: Pain Management;  Laterality: Bilateral;    TRANSFORAMINAL EPIDURAL INJECTION OF STEROID Bilateral 7/13/2023    Procedure: INJECTION, STEROID, EPIDURAL, TRANSFORAMINAL APPROACH, L5-S1 BILATERAL;  Surgeon: Krystal Mtz MD;  Location: [facility] PAIN MGT;  Service: Pain Management;  Laterality: Bilateral;     Family History   Problem Relation Age of Onset    Diabetes Mother     Diabetes Father     Kidney disease Father     Melanoma Neg Hx      Social History     Tobacco Use    Smoking status: Every Day     Current packs/day: 0.50     Types: Cigarettes    Smokeless tobacco: Never   Substance Use Topics    Alcohol use: Yes     Comment: Beer- Socially    Drug use: No     Review of Systems  See HPI  Physical Exam     Initial Vitals [11/14/23 0011]   BP Pulse Resp Temp SpO2   (!) 215/102 69 18 98.3 °F (36.8 °C) 98 %      MAP       --         Physical Exam    Constitutional: He appears well-developed and well-nourished.   HENT:   Head: Normocephalic and atraumatic.   Eyes: EOM are normal. Pupils are equal, round, and reactive to light.   Neck: Neck supple.   Normal range of motion.  Cardiovascular:  Normal rate, regular rhythm and normal heart sounds.     Exam reveals no friction rub.       No murmur heard.  Pulmonary/Chest: Breath sounds normal. No respiratory distress. He has no wheezes. He has no rhonchi. He has no rales. He exhibits no tenderness.   Abdominal: Abdomen is soft. Bowel sounds are normal. He exhibits no distension.  There is no abdominal tenderness. There is no rebound and no guarding.   Musculoskeletal:         General: Normal range of motion.      Cervical back: Normal range of motion and neck supple.     Skin: Skin is warm and dry.         ED Course   Procedures  Labs Reviewed   CBC W/ AUTO DIFFERENTIAL - Abnormal; Notable for the following components:       Result Value    RDW 15.0 (*)     Mono # 1.1 (*)     All other components within normal limits   TROPONIN I   B-TYPE NATRIURETIC PEPTIDE   TROPONIN ISTAT   TROPONIN I   COMPREHENSIVE METABOLIC PANEL   POCT TROPONIN   POCT TROPONIN          Imaging Results              X-Ray Chest AP Portable (Final result)  Result time 11/14/23 01:51:57      Final result by Amina Naranjo MD (11/14/23 01:51:57)                   Impression:      No acute intrathoracic abnormality detected.      Electronically signed by: Amina Naranjo  Date:    11/14/2023  Time:    01:51               Narrative:    EXAMINATION:  AP PORTABLE CHEST    CLINICAL HISTORY:  Chest pain, unspecified    TECHNIQUE:  AP portable chest radiograph was submitted.    COMPARISON:  09/28/2015    FINDINGS:  There is a left subclavian pacer.  AP portable chest radiograph demonstrates a cardiac silhouette within normal limits.  There is no focal consolidation, pneumothorax, or pleural effusion.                                       Medications   sodium chloride 0.9% bolus 500 mL 500 mL (0 mLs Intravenous Stopped 11/14/23 0159)   cloNIDine tablet 0.1 mg (0.1 mg Oral Given 11/14/23 0145)     Medical Decision Making  Differentials:     Renny Young is a 63 y.o. male with a hx of sick sinus syndrome and a pacemaker who presented with chest pain and lightheadedness onset 3 days ago. Blood pressure improved after administration of clonidine. Lightheadedness and belching continues to improve. Ordered CTA PE studies to rule out structural cause of patient's symptoms.    Amount and/or Complexity of Data Reviewed  Labs:  ordered.  Radiology: ordered.    Risk  Prescription drug management.                               Clinical Impression:   Final diagnoses:  [R07.9] Chest pain               Fox Mackey MD  Resident  11/14/23 0254       Fox Mackey MD  Resident  11/14/23 0255

## 2023-11-14 NOTE — ED NOTES
"Renny Young, a 63 y.o. male presents to the ED w/ complaint of Chest Pain   C/o chest pain and weakness. Pt states his pacemaker was checked Friday "and they messed something up". Pt states that he's been belching more than normal    Triage note:  Chief Complaint   Patient presents with    Chest Pain     Pt states since Friday he has been experiencing chest pain, weakness and short of breath. Pt states he has a pacemaker and had it checked Friday and he thinks "they messed with something"     Review of patient's allergies indicates:  No Known Allergies  Past Medical History:   Diagnosis Date    Colon polyp     Diabetes mellitus, type 2     Hypertension     Pacemaker     Pacemaker at end of battery life 5/6/2020    Smoker 6/10/2015    Offered cessation program and declined     "

## 2023-11-14 NOTE — TELEPHONE ENCOUNTER
Pt requesting a call stated was in ER last night with high BP and requesting a call to adjust med.

## 2023-11-16 ENCOUNTER — OFFICE VISIT (OUTPATIENT)
Dept: OPTOMETRY | Facility: CLINIC | Age: 63
End: 2023-11-16

## 2023-11-16 DIAGNOSIS — H52.4 HYPEROPIA OF BOTH EYES WITH ASTIGMATISM AND PRESBYOPIA: ICD-10-CM

## 2023-11-16 DIAGNOSIS — H52.203 HYPEROPIA OF BOTH EYES WITH ASTIGMATISM AND PRESBYOPIA: ICD-10-CM

## 2023-11-16 DIAGNOSIS — E11.69 TYPE 2 DIABETES MELLITUS WITH OTHER SPECIFIED COMPLICATION, WITHOUT LONG-TERM CURRENT USE OF INSULIN: Primary | ICD-10-CM

## 2023-11-16 DIAGNOSIS — H52.03 HYPEROPIA OF BOTH EYES WITH ASTIGMATISM AND PRESBYOPIA: ICD-10-CM

## 2023-11-16 DIAGNOSIS — H25.13 NUCLEAR SCLEROSIS, BILATERAL: ICD-10-CM

## 2023-11-16 PROCEDURE — 92014 PR EYE EXAM, EST PATIENT,COMPREHESV: ICD-10-PCS | Mod: S$PBB,,, | Performed by: OPTOMETRIST

## 2023-11-16 PROCEDURE — 92015 PR REFRACTION: ICD-10-PCS | Mod: ,,, | Performed by: OPTOMETRIST

## 2023-11-16 PROCEDURE — 92015 DETERMINE REFRACTIVE STATE: CPT | Mod: ,,, | Performed by: OPTOMETRIST

## 2023-11-16 PROCEDURE — 99999 PR PBB SHADOW E&M-EST. PATIENT-LVL III: ICD-10-PCS | Mod: PBBFAC,,, | Performed by: OPTOMETRIST

## 2023-11-16 PROCEDURE — 99213 OFFICE O/P EST LOW 20 MIN: CPT | Mod: PBBFAC | Performed by: OPTOMETRIST

## 2023-11-16 PROCEDURE — 99999 PR PBB SHADOW E&M-EST. PATIENT-LVL III: CPT | Mod: PBBFAC,,, | Performed by: OPTOMETRIST

## 2023-11-16 PROCEDURE — 92014 COMPRE OPH EXAM EST PT 1/>: CPT | Mod: S$PBB,,, | Performed by: OPTOMETRIST

## 2023-11-16 NOTE — TELEPHONE ENCOUNTER
Pt states BP is fluctuating, he was feeling bad, headache, lightheaded that's why he went to the ER. Should pt schedule to come in to be evaluated?     Yes

## 2023-11-16 NOTE — PROGRESS NOTES
HPI    Annual Diabetic Eye Exam   Pt states Vision is stable, requests refraction     Pt denies F/F   Pt denies Dry/ Itchy/ Burning  Gtt: No    DM DX;ed   BS: 169  Hemoglobin A1C       Date                     Value               Ref Range             Status                06/12/2023               6.1 (H)             4.0 - 5.6 %           Final                   07/26/2022               6.4 (H)             4.0 - 5.6 %           Final                  02/02/2021               6.3 (H)             4.0 - 5.6 %           Final                  Last edited by Harley Duffy, OD on 11/16/2023  3:16 PM.            Assessment /Plan     For exam results, see Encounter Report.    Type 2 diabetes mellitus with other specified complication, without long-term current use of insulin  -     Ambulatory referral/consult to Optometry  -No retinopathy noted today.  Continued control with primary care physician and annual comprehensive eye exam.    Nuclear sclerosis, bilateral  -Educated patient on presence of cataracts at today's exam, monitor at annual dilated fundus exam. 5+ years surgical estimate.    Hyperopia of both eyes with astigmatism and presbyopia  Eyeglass Final Rx       Eyeglass Final Rx         Sphere Cylinder Axis Dist VA Add    Right +1.75 +0.50 180 20/25 +2.50    Left +1.75 +0.50 080 20/30 +2.50      Expiration Date: 11/16/2024                      RTC 1 yr

## 2023-11-30 NOTE — TELEPHONE ENCOUNTER
Pt states he will wait for appt w/  Dr. Washington on 12/12/23.  Will call or go to ER if felling worse.

## 2024-01-03 DIAGNOSIS — E11.42 DIABETIC POLYNEUROPATHY ASSOCIATED WITH TYPE 2 DIABETES MELLITUS: ICD-10-CM

## 2024-01-04 RX ORDER — GABAPENTIN 300 MG/1
CAPSULE ORAL
Qty: 60 CAPSULE | Refills: 1 | Status: SHIPPED | OUTPATIENT
Start: 2024-01-04

## 2024-01-19 DIAGNOSIS — Z12.11 SCREEN FOR COLON CANCER: Primary | ICD-10-CM

## 2024-01-24 ENCOUNTER — TELEPHONE (OUTPATIENT)
Dept: GASTROENTEROLOGY | Facility: CLINIC | Age: 64
End: 2024-01-24
Payer: COMMERCIAL

## 2024-01-24 NOTE — TELEPHONE ENCOUNTER
----- Message from Saray Gallegos sent at 1/24/2024  2:11 PM CST -----  Contact: pt  Type:  Procedure Reschedule  Appointment Request    Caller is requesting a sooner appointment.  Caller declined first available appointment listed below.  Caller will not accept being placed on the waitlist and is requesting a message be sent to doctor.  Name of Caller:pt   When is the first available appointment?01/26  Symptoms:procedure   Would the patient rather a call back or a response via [facility]? Call  Best Call Back Number:[phone]  Additional Information:

## 2024-01-25 ENCOUNTER — TELEPHONE (OUTPATIENT)
Dept: ENDOSCOPY | Facility: HOSPITAL | Age: 64
End: 2024-01-25
Payer: COMMERCIAL

## 2024-01-25 VITALS — BODY MASS INDEX: 25.74 KG/M2 | WEIGHT: 164 LBS | HEIGHT: 67 IN

## 2024-01-25 NOTE — TELEPHONE ENCOUNTER
Colonoscopy Procedure Prep Instructions    Date of procedure: 5/7/24 Arrive at: 10:00 AM    Location of Department:   [facility] [address] [facility] [place] [address]  Take the [facility] Elevators to 4th Floor Endoscopy Lab    As soon as possible:   your prep from pharmacy and over the counter DULCOLAX LAXATIVE TABLETS            On the day before your procedure   What You CAN do:   You may have clear liquids ONLY-see below for list.     Liquids That Are OK to Drink:   Water  Sports drinks (Gatorade, Power-Aid)  Coffee or tea (no cream or nondairy creamer)  Clear juices without pulp (apple, white grape)  Gelatin desserts (no fruit or toppings)  Clear soda (sprite, coke, ginger ale)  Chicken broth (until 12 midnight the night before procedure)    What You CANNOT do:   Do not EAT solid food, drink milk or anything   colored red.  Do not drink alcohol.  Do not take oral medications within 1 hour of starting   each dose of prep.  No gum chewing or candy morning of procedure                       Note:   (Please disregard the insert instructions from pharmacy).  PEG Bowel Prep is indicated for cleansing of the colon as a preparation for colonoscopy in adults.   Be sure to tell your doctor about all the medicines you take, including prescription and non-prescription medicines, vitamins, and herbal supplements. PEG Bowel Prep may affect how other medicines work.  Medication taken by mouth may not be absorbed properly when taken within 1 hour before the start of each dose of PEG Bowel Prep.    It is not uncommon to experience some abdominal cramping, nausea and/or vomiting when taking the prep. If you have nausea and/or vomiting while taking the prep, stop drinking for 20 to 30 minutes then continue.      How to take prep:    PEG Bowel Prep is a (2-day) prep.    One (1) bottle of prep are required for a complete preparation for colonoscopy. Dilute the solution concentrate as directed prior to use.  You must drink water with each dose of prep, and additional water after each dose.    DOSE 1--Day Before Colonoscopy 5/6/24     Drink at least 6 to 8 glasses of clear liquids from time you wake up until you begin your prep and then continue until bedtime to avoid dehydration.     12:00 pm (NOON) Mix your entire container of prep with lukewarm water and refrigerate. Take four (4) Dulcolax (Bisacodyl) tablets with at least 8 ounces or more of clear liquids.       6:00 pm:    You must complete Steps 1 and 2 below before going to bed:    Step 1-Drink half the liquid in the container within one (1) hour.   Step 2-Refrigerate the remaining half of the liquid for dose 2. See below when to begin this step.                       IMPORTANT: If you experience preparation-related symptoms (for example, nausea, bloating, or cramping), stop, or slow the rate of drinking the additional water until your symptoms decrease.    DOSE 2--Day of the Colonoscopy 5/7/24 at 2-3 AM.    For this dose, repeat Step 1 shown above using the remaining half of the liquid prep.   You may continue drinking water/clear liquids until   4 hours before your colonoscopy or as directed by the scheduling nurse  7:00 AM.    For more information about your procedure, please watch this informational video. It is important to watch this animated consent video prior to your arrival.   If you haven't watched the video prior to arriving, you are required to watch it during admission which can cause delays.     Options for viewing:  Using a keyboard:  press and hold the control tab (Ctrl) and left mouse click to follow link          Colonoscopy Instructional Video                                                        OR    Type link address into your web browser's address bar:  [email or web address]    Using a mobile phone: tap on web address/link.     Comments:         IMPORTANT INFORMATION TO KNOW BEFORE YOUR PROCEDURE    [facility] 4th Floor    If your procedure requires the administration of anesthesia, it is necessary for a responsible adult to drive you home. (Medical Transportation, Uber, Lyft, Taxi, etc. may ONLY be used if a responsible adult is present to accompany you home.  The responsible adult CAN'T be the  of the service).      person must be available to return to pick you up within 15 minutes of being notified of discharge.     Due to the limited socially distant seating in our waiting room, please limit your guest (1) who accompany you for this procedure. If someone accompanies you for this procedure into the facility:    Consider having them proceed to an area that is socially distant other than the lobby until a member of the medical team contacts them to provide an update after the procedure.     Also, please consider being dropped off and picked up from the facility.      Please bring a picture ID, insurance card, & copayment    Take Medications as directed below:      If you begin taking any blood thinning medications or injectable weight loss/diabetes medications (other than insulin) , please contact the endoscopy scheduling department listed below as soon as possible    If you are diabetic see the attached instruction sheet regarding your medication.     If you take HEART, BLOOD PRESSURE, SEIZURE, PAIN, LUNG (including inhalers/nebulizers), ANTI-REJECTION (transplant patients), or PSYCHIATRIC medications, please take at your regular times with a sip of water or as directed by the scheduling nurse.     Important contact information:    Endoscopy [phone] Hours of operation Monday-Friday 8:00-4:30pm.    Questions about insurance or financial obligations call [phone] or [phone].    If you have questions regarding the prep or need to reschedule, please call [phone]. After hours questions requiring immediate assistance, contact [facility] nurse line at  [phone] or [phone].   NOTE:     On occasion, unforeseen circumstances may cause a delay in your procedure start time. We respect your time and appreciate your patience during these circumstances.      Comments:          If you are unsure about any of these instructions, call [facility] at [phone].    Oral Medicine Day of Prep Day of Procedure   Glyburide  Glucotrol (Glipizide)  Amaryl (Glimepiride) Do NOT take. Do NOT take morning of procedure. If you take twice daily, take with dinner.   Glucophage  Glumetza  Fortamet (Metformin) Take as prescribed. Do NOT take morning of procedure. Take when you start eating again.   Januvia (Sitaglipitin)  Nesina (Aloglipitin)  Onglyza (Saxaglipitin)  Tradjenta (Linaglipitin) Take as prescribed. Do NOT take morning of procedure. Take when you start eating again.   Invokana (Canagliflozin)  Farxiga (Dapagliflozin)  Jardiance(Empagliflozin) Stop 2 days before prep. Do NOT take morning of procedure. Take when you start eating again.   Actos (Pioglitazone) Take as prescribed. Do NOT take morning of procedure. Take when you start eating again.        If you have a combination oral medicine and one medicine is listed as DO NOT TAKE- then do not take the medicine.     If you take these injectables weekly, they must be held for 7 days prior to your procedure.  Injectable Medicine Day of Prep Day of Procedure   Adlyxin (Lixisenatide)  Byetta (Exenatide)   Bydureon (Exenatide XL)  Ozempic (Semaglutide)  Trulicity (Dulaglutide)  Victoza (Liraglutide)  Mounjaro (Tirzepatide) Do NOT take Adlyxin, Byetta or Victoza. May take Ozempic, Trulicity and Bydureon. Do NOT take Adlyxin, Byetta or Victoza the morning of procedure. Take when you start eating meals again. May take Ozempic, Trulicity, and Bydureon after procedure.       Combination Injectable Medicine Day of Prep Day of Procedure   Soliqua  Xultophy Inject 50% of usual dose. Inject 50% of usual dose.     It is  important to monitor your blood sugar while doing the bowel preparation. On the day of your bowel prep, when you are on a clear liquid diet, you may drink beverages with sugar as your source of glucose. Be sure to mix the prep with water or sugar free liquid only. Below are instructions on how to adjust your diabetic medications prior to your scheduled procedure. Call the healthcare provider who manages your diabetes if you have questions.     Insulin for Type 1   Diabetes Mellitus Day of Prep Day of Procedure   Basaglar  Lantus  Levemir  Toujeo  Tresiba  Semglee If you use in the morning, take as prescribed. If you use in the evening, inject 70% of dose. If you take in the morning, inject 80% of dose. If you take in the evenings, inject usual dose.    Afrezza  Apidra  Humalog 100  Humalog 200  Novolog Count carbs and adjust dose accordingly. If not carb counting, take 25% of usual meal dose. May use correction dose every 4 hours. Do NOT use once sugar-free clear liquid prep is started. Do NOT take morning of procedure. Take when you start eating again.   Insulin Pump Count carbs and adjust your dose as needed. May use temp basal rate at 70% after sugar-free clear liquid prep is started until midnight. May use temp basal rate at 70% starting at midnight until after procedure.     Insulin for Type 2 Diabetes Mellitus Day of Prep Day of Procedure   Basaglar  Lantus  Levemir  Toujeo  Tresiba If you use in the morning, take as prescribed. If you use in the evening, inject 70% of dose. If take in morning, inject 80% of dose. If take in evenings, resume usual dose.    Afrezza  Apidra  Fiasp  Humalog 100  Humalog 200  Novolog  Inject 50% of dose with clear liquid diet. Do NOT use once sugar-free clear liquid prep is started. If you are using a correction scale, take dose every 4 hours as needed. Do NOT take morning of procedure. Take when you start eating meals again.   NPH Inject 50% of breakfast and dinner doses with  clear liquid diet.  Do NOT take morning of procedure. Take usual dose when you eat dinner.   Regular Inject 50% of breakfast dose and do NOT take dinner dose once sugar-free clear liquid prep has started. If using correction scale, may take dose every 6 hours as needed. Do NOT take morning of procedure. Take usual dose when you eat dinner.   Novolog Mix 70/30  Humalog Mix 75/25  Humalog Mix 50/50 Inject 50% of breakfast dose. Inject 25% of dinner dose. Do NOT take breakfast dose. Take usual dose when you eat dinner.   U500 Take 50% of AM or breakfast unit letha dose. Take 25% of lunch or dinner or PM unit letha dose. Do NOT take morning of procedure. Take when you start eating again.   V-Go Continue to wear your V-Go device. DO NOT click once sugar-free clear liquid prep is started. Continue to wear your V-Go device. Resume clicks with meals.     Are you ready for your Colonoscopy?      __ If you take blood thinners,weight loss or diabetic injectable medications, have you stopped taking them according to your doctor's instructions before your procedures?  __ Have you stopped eating solid foods and followed a clear liquid diet a full day before your procedure or followed the diet       guidelines indicated in your instructions?       REMINDER: NO BROTH AFTER MIDNIGHT THE DAY BEFORE PROCEDURE.   __ Have you completed all your prep solution? PLEASE DO NOT FOLLOW the insert/or box instructions from pharmacy)  __ Have you taken your blood pressure, heart, seizure, or other essential medications the morning of your procedure?  __ Have you planned for a ride to and from procedure?  (Medical Transportation, Uber, Lyft, Taxi, etc. may ONLY be used if a responsible adult is present to accompany you home). The responsible adult CANNOT be the  of the service.  person must be available to return and pick you up within 15 minutes of being notified of discharge.           Questions or Concerns?  Please call us!   [phone] (M-F) [phone] (Nights and weekends)    Listed below are some helpful tips:    Please bring protective cases for eyewear and hearing aids-wear comfortable clothing/shoes.  Follow prep instructions closely so you don't have to do it twice.  Bowel prep is prescription used to clean out the colon before a colonoscopy. The prep increases movement of your colon by causing you to have diarrhea (loose stools). Cleaning out stool from the colon helps your doctor to see in your colon clearly during this procedure.   It is important to stay hydrated before, during and after bowel prep to prevent loss of fluid (dehydration). You can have water and your choice of clear liquids. Reminder: these liquids you will drink in addition to the bowel prep.     Preparing the mixture:    First, mix the prep with water.  Make the taste better by adding a sugar free drink mix (Crystal Light) can improve the taste of your prep.   Use a large bore (opening) straw. Place towards the back of mouth (throat) as tolerated.  Prepare a prep mixture that is lightly chilled, but not ice-cold. Drinking a large amount of ice-cold liquid can make you feel very ill.  Avoid drinking any RED beverages or eating popsicles with this color for the 24 hours leading up to your procedure. This color can look like blood in the colon.    Consuming the prep:    Take your time. If you feel ill, take a 15-minute break from drinking the prep mixture  Combat hunger and dehydration with clear liquids. Options like JELL-O, or popsicles will help.  Settle in with good reading material. The goal is to clean out 6 feet of colon, so you can plan on spending a good deal of time in the bathroom. Have some good reading material on-hand or an iPad ready to keep yourself entertained!  Keep yourself comfortable. We recommend applying personal hygiene wipes, Tucks pads and a soothing ointment, like A&D ointment, Desitin, or Vaseline to your bottom before starting and  as needed to protect your skin.

## 2024-01-25 NOTE — TELEPHONE ENCOUNTER
Spoke to patient to reschedule procedure(s) Colonoscopy       Physician to perform procedure(s) Dr. DIDI Awad  Date of Procedure (s) 5/7/24  Arrival Time 10:00 AM  Time of Procedure(s) 11:00 AM   Location of Procedure(s) [facility] 4th Floor  Type of Rx Prep sent to patient: PEG  Instructions provided to patient via Postal Mail    Patient was informed on the following information and verbalized understanding. Screening questionnaire reviewed with patient and complete. If procedure requires anesthesia, a responsible adult needs to be present to accompany the patient home, patient cannot drive after receiving anesthesia. Appointment details are tentative, especially check-in time. Patient will receive a prep-op call 7 days prior to confirm check-in time for procedure. If applicable the patient should contact their pharmacy to verify Rx for procedure prep is ready for pick-up. Patient was advised to call the scheduling department at [phone] if pharmacy states no Rx is available. Patient was advised to call the endoscopy scheduling department if any questions or concerns arise.      [facility] Scheduling Department

## 2024-02-03 NOTE — DISCHARGE INSTRUCTIONS
Thank you for allowing us to care for you today. You may receive a survey about the care we provided. Your feedback is valuable and helps us provide excellent care throughout the community.     Home Care Instructions for Pain Management:    1. DIET:   You may resume your normal diet today.   2. BATHING:   You may shower with luke warm water. No tub baths or anything that will soak injection sites under water for the next 24 hours.  3. DRESSING:   You may remove your bandage today.   4. ACTIVITY LEVEL:   You may resume your normal activities 24 hrs after your procedure. Nothing strenuous today.  5. MEDICATIONS:   You may resume your normal medications today. To restart blood thinners, ask your doctor.  6. DRIVING    If you have received any sedatives by mouth today, you may not drive for 12 hours.    If you have received any sedation through your IV, you may not drive for 24 hrs.   7. SPECIAL INSTRUCTIONS:   No heat to the injection site for 24 hrs including, hot bath or shower, heating pad, moist heat, or hot tubs.    Use ice pack to injection site for any pain or discomfort.  Apply ice packs for 20 minute intervals as needed.    IF you have diabetes, be sure to monitor your blood sugar more closely. IF your injection contained steroids your blood sugar levels may become higher than normal.    If you are still having pain upon discharge:  Your pain may improve over the next 48 hours. The anesthetic (numbing medication) works immediately to 48 hours. IF your injection contained a steroid (anti-inflammatory medication), it takes approximately 3 days to start feeling relief and 7-10 days to see your greatest results from the medication. It is possible you may need subsequent injections. This would be discussed at your follow up appointment with pain management or your referring doctor.      PLEASE CALL YOUR DOCTOR IF:  1. Redness or swelling around the injection site.  2. Fever of 101 degrees or more  3. Drainage  BFM paged re: patients HR consistently in 130s.  Jumps to 160s at times.  Bps on softer side (102/77).  Discussed with Attending.  Will trial one time metoprolol 5mg IV.  If no results, will contact cardiology for further direction. Rut Mayberry RN   (pus) from the injection site.  4. For any continuous bleeding (some dried blood over the incision is normal.)    FOR EMERGENCIES:   If any unusual problems or difficulties occur during clinic hours, call [phone] or 206. Adult Procedural Sedation Instructions    Recovery After Procedural Sedation (Adult)  You have been given medicine by vein to make you sleep during your surgery. This may have included both a pain medicine and sleeping medicine. Most of the effects have worn off. But you may still have some drowsiness for the next 6 to 8 hours.  Home care  Follow these guidelines when you get home:  · For the next 8 hours, you should be watched by a responsible adult. This person should make sure your condition is not getting worse.  · Don't drink any alcohol for the next 24 hours.  · Don't drive, operate dangerous machinery, or make important business or personal decisions during the next 24 hours.  Note: Your healthcare provider may tell you not to take any medicine by mouth for pain or sleep in the next 4 hours. These medicines may react with the medicines you were given in the hospital. This could cause a much stronger response than usual.  Follow-up care  Follow up with your healthcare provider if you are not alert and back to your usual level of activity within 12 hours.  When to seek medical advice  Call your healthcare provider right away if any of these occur:  · Drowsiness gets worse  · Weakness or dizziness gets worse  · Repeated vomiting  · You can't be awakened   Date Last Reviewed: 10/18/2016  © 6248-3756 [employer] [address] [place] [address], [place] [address]. All rights reserved. This information is not intended as a substitute for professional medical care. Always follow your healthcare professional's instructions.

## 2024-02-20 ENCOUNTER — TELEPHONE (OUTPATIENT)
Dept: INTERNAL MEDICINE | Facility: CLINIC | Age: 64
End: 2024-02-20
Payer: COMMERCIAL

## 2024-02-20 NOTE — TELEPHONE ENCOUNTER
----- Message from Winston Ness sent at 2/20/2024 12:11 PM CST -----  Contact: self  Pt would like top get a Flu shot and he's retired insurance is changed would like to know if he can get     it from his provider      Please Call    Contact

## 2024-04-06 DIAGNOSIS — E11.69 TYPE 2 DIABETES MELLITUS WITH OTHER SPECIFIED COMPLICATION, WITHOUT LONG-TERM CURRENT USE OF INSULIN: ICD-10-CM

## 2024-04-06 NOTE — TELEPHONE ENCOUNTER
Care Due:                  Date            Visit Type   Department     Provider  --------------------------------------------------------------------------------                                EP -                              PRIMARY      [facility] INTERNAL  Last Visit: 08-      CARE ([facility])   MEDICINE       MARTIN BEE  Next Visit: None Scheduled  None         None Found                                                            Last  Test          Frequency    Reason                     Performed    Due Date  --------------------------------------------------------------------------------    HBA1C.......  6 months...  metFORMIN................  06-   12-    Lipid Panel.  12 months..  atorvastatin.............  06- 06-    Health [facility] Embedded Care Due Messages. Reference number: [id number].   4/06/2024 10:52:47 AM CDT

## 2024-04-07 RX ORDER — METFORMIN HYDROCHLORIDE 500 MG/1
500 TABLET ORAL
Qty: 90 TABLET | Refills: 0 | Status: SHIPPED | OUTPATIENT
Start: 2024-04-07

## 2024-05-01 ENCOUNTER — CLINICAL SUPPORT (OUTPATIENT)
Dept: CARDIOLOGY | Facility: HOSPITAL | Age: 64
End: 2024-05-01
Attending: INTERNAL MEDICINE
Payer: MEDICAID

## 2024-05-01 DIAGNOSIS — I49.5 SICK SINUS SYNDROME: ICD-10-CM

## 2024-05-01 PROCEDURE — 93279 PRGRMG DEV EVAL PM/LDLS PM: CPT | Mod: 26,,, | Performed by: INTERNAL MEDICINE

## 2024-05-01 PROCEDURE — 93279 PRGRMG DEV EVAL PM/LDLS PM: CPT

## 2024-05-03 LAB
OHS CV AF BURDEN PERCENT: < 1
OHS CV DC REMOTE DEVICE TYPE: NORMAL

## 2024-05-06 ENCOUNTER — TELEPHONE (OUTPATIENT)
Dept: ENDOSCOPY | Facility: HOSPITAL | Age: 64
End: 2024-05-06
Payer: MEDICAID

## 2024-05-24 DIAGNOSIS — E11.42 DIABETIC POLYNEUROPATHY ASSOCIATED WITH TYPE 2 DIABETES MELLITUS: ICD-10-CM

## 2024-05-24 RX ORDER — GABAPENTIN 300 MG/1
CAPSULE ORAL
Qty: 60 CAPSULE | Refills: 0 | Status: SHIPPED | OUTPATIENT
Start: 2024-05-24

## 2024-06-24 DIAGNOSIS — I10 HYPERTENSION, ESSENTIAL: ICD-10-CM

## 2024-06-24 NOTE — TELEPHONE ENCOUNTER
Care Due:                  Date            Visit Type   Department     Provider  --------------------------------------------------------------------------------                                EP -                              PRIMARY      [facility] INTERNAL  Last Visit: 08-      CARE ([facility])   MEDICINE       MARTIN BEE  Next Visit: None Scheduled  None         None Found                                                            Last  Test          Frequency    Reason                     Performed    Due Date  --------------------------------------------------------------------------------    HBA1C.......  6 months...  metFORMIN................  06-   12-    Lipid Panel.  12 months..  atorvastatin.............  06- 06-    Health [facility] Embedded Care Due Messages. Reference number: [id number].   6/24/2024 3:13:25 PM CDT

## 2024-06-24 NOTE — TELEPHONE ENCOUNTER
Refill Routing Note   Medication(s) are not appropriate for processing by [facility] for the following reason(s):        ED/Hospital Visit since last OV with provider  Required vitals abnormal: 174/84     [facility] action(s):  Defer     Requires labs : Yes - A1C & lipid panel outdated            Appointments  past 12m or future 3m with PCP    Date Provider   Last Visit   8/11/2023 Jarvis Washington MD   Next Visit   Visit date not found Jarvis Washington MD   ED visits in past 90 days: 0        Note composed:3:45 PM 06/24/2024

## 2024-06-25 RX ORDER — LOSARTAN POTASSIUM AND HYDROCHLOROTHIAZIDE 12.5; 1 MG/1; MG/1
1 TABLET ORAL DAILY
Qty: 90 TABLET | Refills: 0 | Status: SHIPPED | OUTPATIENT
Start: 2024-06-25

## 2024-06-26 DIAGNOSIS — L29.9 ITCHING: ICD-10-CM

## 2024-06-26 NOTE — TELEPHONE ENCOUNTER
Refill Routing Note   Medication(s) are not appropriate for processing by [facility] for the following reason(s):        Outside of protocol    [facility] action(s):  Route             Appointments  past 12m or future 3m with PCP    Date Provider   Last Visit   8/11/2023 Jarvis Washington MD   Next Visit   Visit date not found Jarvis Washington MD   ED visits in past 90 days: 0        Note composed:1:24 PM 06/26/2024

## 2024-06-27 RX ORDER — HYDROXYZINE HYDROCHLORIDE 10 MG/1
10 TABLET, FILM COATED ORAL NIGHTLY PRN
Qty: 30 TABLET | Refills: 0 | Status: SHIPPED | OUTPATIENT
Start: 2024-06-27

## 2024-07-01 DIAGNOSIS — E11.69 TYPE 2 DIABETES MELLITUS WITH OTHER SPECIFIED COMPLICATION, WITHOUT LONG-TERM CURRENT USE OF INSULIN: ICD-10-CM

## 2024-07-01 NOTE — TELEPHONE ENCOUNTER
No care due was identified.  [facility] Embedded Care Due Messages. Reference number: [id number].   7/01/2024 3:25:20 PM CDT

## 2024-07-01 NOTE — TELEPHONE ENCOUNTER
Refill Routing Note   Medication(s) are not appropriate for processing by [facility] for the following reason(s):        Required labs outdated    [facility] action(s):  Defer               Appointments  past 12m or future 3m with PCP    Date Provider   Last Visit   8/11/2023 Jarvis Washington MD   Next Visit   Visit date not found Jarvis Washington MD   ED visits in past 90 days: 0        Note composed:4:58 PM 07/01/2024            swing-through gait

## 2024-07-03 RX ORDER — METFORMIN HYDROCHLORIDE 500 MG/1
500 TABLET ORAL
Qty: 90 TABLET | Refills: 0 | Status: SHIPPED | OUTPATIENT
Start: 2024-07-03

## 2024-07-08 ENCOUNTER — OFFICE VISIT (OUTPATIENT)
Dept: URGENT CARE | Facility: CLINIC | Age: 64
End: 2024-07-08
Payer: MEDICAID

## 2024-07-08 VITALS
RESPIRATION RATE: 18 BRPM | HEART RATE: 79 BPM | DIASTOLIC BLOOD PRESSURE: 82 MMHG | HEIGHT: 67 IN | TEMPERATURE: 98 F | BODY MASS INDEX: 25.74 KG/M2 | SYSTOLIC BLOOD PRESSURE: 158 MMHG | WEIGHT: 164 LBS | OXYGEN SATURATION: 98 %

## 2024-07-08 DIAGNOSIS — K12.1 MOUTH ULCER: Primary | ICD-10-CM

## 2024-07-08 DIAGNOSIS — T78.40XA ALLERGIC REACTION, INITIAL ENCOUNTER: ICD-10-CM

## 2024-07-08 DIAGNOSIS — R60.0 LIP EDEMA: ICD-10-CM

## 2024-07-08 DIAGNOSIS — B37.0 ORAL CANDIDIASIS: ICD-10-CM

## 2024-07-08 DIAGNOSIS — F17.200 NEEDS SMOKING CESSATION EDUCATION: ICD-10-CM

## 2024-07-08 DIAGNOSIS — T63.441A BEE STING, ACCIDENTAL OR UNINTENTIONAL, INITIAL ENCOUNTER: ICD-10-CM

## 2024-07-08 PROCEDURE — 99213 OFFICE O/P EST LOW 20 MIN: CPT | Mod: S$GLB,,,

## 2024-07-08 RX ORDER — DEXAMETHASONE SODIUM PHOSPHATE 100 MG/10ML
10 INJECTION INTRAMUSCULAR; INTRAVENOUS
Status: COMPLETED | OUTPATIENT
Start: 2024-07-08 | End: 2024-07-08

## 2024-07-08 RX ORDER — NYSTATIN 100000 [USP'U]/ML
6 SUSPENSION ORAL 4 TIMES DAILY
Qty: 240 ML | Refills: 0 | Status: SHIPPED | OUTPATIENT
Start: 2024-07-08 | End: 2024-07-18

## 2024-07-08 RX ADMIN — DEXAMETHASONE SODIUM PHOSPHATE 10 MG: 100 INJECTION INTRAMUSCULAR; INTRAVENOUS at 12:07

## 2024-07-08 NOTE — PATIENT INSTRUCTIONS
Please drink plenty of fluids.  Please get plenty of rest.  Please return here or go to the Emergency Department for any concerns or worsening of condition.  Use nystatin  swish and spit first before trying magic mouthwash. CALL [phone] TO SCHEDULE APPOINTMENT WITH SPECIALIST IF YOU HAVE NOT RECEIVED A CALL BY EITHER TODAY OR BY TOMORROW for ENT to get second opinion. Please watch blood sugar the next couple of days.   Please take over the counter Pepcid or Zantac as directed for the next 24-72hours as needed.  If you were given a steroid shot in the clinic and have also been given a prescription for a steroid such as Prednisone or a Medrol Dose Pack, please begin taking them tomorrow.  If you have a localized reaction it is ok to apply topical Benadryl and/or Cortaid as directed to the affected area.  Please take over the counter Benadryl as directed for the next 24-72 hours as needed for itching unless it makes you sleepy, then you can take over the counter Allegra or Claritin or Zyrtec as directed during the daytime hours as these generally do not cause drowsiness.  Please follow up with your primary care doctor or specialist as needed.    If you  smoke, please stop smoking.

## 2024-07-08 NOTE — PROGRESS NOTES
"Subjective:      Patient ID: Renny Young is a 63 y.o. male.    Vitals:  height is 5' 7" (1.702 m) and weight is 74.4 kg (164 lb). His temperature is 98 °F (36.7 °C). His blood pressure is 158/82 (abnormal) and his pulse is 79. His respiration is 18 and oxygen saturation is 98%.     Chief Complaint: Lip Lesion and Insect Bite    63-year-old male presents to the clinic today with chief complaint of lip lesions and bee sting/swelling on his lips. Symptoms started 2 weeks ago with painless lip lesions from unknown source and notes it worsened yesterday when he got stung by a bee on the back of his head.   Patient has not taken any medication for sx. Denies any change in laundry detergent, soap/body products, hair products, face products, lotion, perfume, etc.  Denies any changed in diet or medications. Denies hx of seasonal, food, or environmental allergies. Denies any difficulty swallowing or breathing.  Denies any recent ill exposures. Denies any recent travel.  Denies numbness or tingling. Denies radiation of pain. Denies fever, chills, body aches, chest pain, shortness of breath, wheezing, abdominal pain, nausea, vomiting, diarrhea, or rashes.     Mouth Lesions   The current episode started more than 2 weeks ago. The onset is undetermined. The problem occurs rarely. The problem has been unchanged. The problem is mild. Nothing relieves the symptoms. Nothing aggravates the symptoms. Associated symptoms include mouth sores. Pertinent negatives include no fever, no decreased vision, no double vision, no eye itching, no photophobia, no abdominal pain, no constipation, no diarrhea, no nausea, no vomiting, no congestion, no ear discharge, no ear pain, no headaches, no hearing loss, no rhinorrhea, no sore throat, no stridor, no swollen glands, no neck pain, no cough, no wheezing, no rash, no eye discharge, no eye pain and no eye redness.       Constitution: Negative for activity change, appetite change, chills, sweating, " fatigue, fever and generalized weakness.   HENT:  Positive for mouth sores and facial swelling. Negative for ear pain, ear discharge, foreign body in ear, tinnitus, hearing loss, dental problem, drooling, tongue pain, tongue lesion, facial trauma, congestion, nosebleeds, foreign body in nose, postnasal drip, sinus pain, sinus pressure, sore throat, trouble swallowing and voice change.    Neck: Negative for neck pain, neck stiffness and neck swelling.   Cardiovascular:  Negative for chest pain, leg swelling, palpitations and sob on exertion.   Eyes:  Negative for eye discharge, eye itching, eye pain, eye redness, photophobia and double vision.   Respiratory:  Negative for chest tightness, cough, sputum production, shortness of breath, stridor, wheezing and asthma.    Gastrointestinal:  Negative for abdominal pain, nausea, vomiting, constipation and diarrhea.   Genitourinary:  Negative for dysuria, frequency, urgency, urine decreased, flank pain and hematuria.   Musculoskeletal:  Negative for pain, pain with walking and muscle ache.   Skin:  Negative for rash, erythema and bruising.   Allergic/Immunologic: Negative for environmental allergies, seasonal allergies, food allergies, asthma, chronic cough, sneezing and flu shot.   Neurological:  Negative for dizziness, light-headedness, passing out, coordination disturbances, loss of balance, headaches, disorientation and altered mental status.   Psychiatric/Behavioral:  Negative for altered mental status, disorientation, confusion, agitation and nervous/anxious. The patient is not nervous/anxious.       Objective:     Physical Exam   Constitutional: He is oriented to person, place, and time. He appears well-developed.   HENT:   Head: Normocephalic and atraumatic. Head is without abrasion, without contusion and without laceration.       Ears:   Right Ear: External ear normal.   Left Ear: External ear normal.   Nose: Nose normal.   Mouth/Throat: Uvula is midline, oropharynx  is clear and moist and mucous membranes are normal. He has dentures. Oral lesions (inside of upper/lower lip, white ulceratied and riased lesions.) present. No oropharyngeal exudate, posterior oropharyngeal edema, posterior oropharyngeal erythema, tonsillar abscesses or cobblestoning. Tonsils are 0 on the right. Tonsils are 0 on the left.   Lip edema noted.       Comments: Lip edema noted.   Eyes: Conjunctivae, EOM and lids are normal. Pupils are equal, round, and reactive to light.   Neck: Trachea normal and phonation normal. Neck supple.   Cardiovascular: Normal rate, regular rhythm and normal heart sounds.   Pulmonary/Chest: Effort normal and breath sounds normal. No stridor. No respiratory distress.   Musculoskeletal: Normal range of motion.         General: Normal range of motion.   Neurological: He is alert and oriented to person, place, and time.   Skin: Skin is warm, dry, intact and no rash. Capillary refill takes less than 2 seconds. No abrasion, No burn, No bruising, No erythema and No ecchymosis   Psychiatric: His speech is normal and behavior is normal. Judgment and thought content normal.   Nursing note and vitals reviewed.      Assessment:     1. Mouth ulcer    2. Oral candidiasis    3. Lip edema    4. Allergic reaction, initial encounter    5. Bee sting, accidental or unintentional, initial encounter        Plan:       Mouth ulcer  -     Ambulatory referral/consult to ENT  -     (Magic mouthwash) 1:1:1 diphenhydrAMINE(Benadryl) 12.5mg/5ml liq, aluminum & magnesium hydroxide-simethicone (Maalox), LIDOcaine viscous 2%; Swish and spit 15 mLs every 4 (four) hours as needed (as needed). for mouth sores  Dispense: 100 mL; Refill: 0    Oral candidiasis  -     Ambulatory referral/consult to ENT  -     nystatin (MYCOSTATIN) 100,000 unit/mL suspension; Take 6 mLs (600,000 Units total) by mouth 4 (four) times daily. for 10 days  Dispense: 240 mL; Refill: 0    Lip edema  -     dexAMETHasone injection 10  mg    Allergic reaction, initial encounter  -     dexAMETHasone injection 10 mg    Bee sting, accidental or unintentional, initial encounter  -     dexAMETHasone injection 10 mg        We had shared decision making for patient's treatment. We discussed side effects/alternatives/benefits/risk and patient would like to proceed with treatment plan. We also discussed other OTC treatment recommendations. Patient was counseled, explained with the test results meaning, expected course, and answered all of questions. Patient can take OTC Acetaminophen (Tylenol) and/or Ibuprofen (Motrin) as needed for pain relief and/or fever relief. Continue to drink plenty of fluids. Follow up with PCP/ENT in the next 1-2 weeks as needed.  Gave patient strict ER/urgent care precautions in case symptoms worsen or if any new concerns arise.

## 2024-07-25 DIAGNOSIS — E78.5 HYPERLIPIDEMIA, UNSPECIFIED HYPERLIPIDEMIA TYPE: ICD-10-CM

## 2024-07-25 DIAGNOSIS — E11.42 DIABETIC POLYNEUROPATHY ASSOCIATED WITH TYPE 2 DIABETES MELLITUS: ICD-10-CM

## 2024-07-25 RX ORDER — ATORVASTATIN CALCIUM 20 MG/1
20 TABLET, FILM COATED ORAL
Qty: 90 TABLET | Refills: 0 | Status: SHIPPED | OUTPATIENT
Start: 2024-07-25

## 2024-07-25 RX ORDER — GABAPENTIN 300 MG/1
CAPSULE ORAL
Qty: 60 CAPSULE | Refills: 0 | Status: SHIPPED | OUTPATIENT
Start: 2024-07-25

## 2024-07-25 NOTE — TELEPHONE ENCOUNTER
No care due was identified.  [facility] Embedded Care Due Messages. Reference number: [id number].   7/25/2024 2:45:52 PM CDT

## 2024-07-25 NOTE — TELEPHONE ENCOUNTER
Refill Routing Note   Medication(s) are not appropriate for processing by [facility] for the following reason(s):        Required labs outdated    [facility] action(s):  Defer             Appointments  past 12m or future 3m with PCP    Date Provider   Last Visit   8/11/2023 Jarvis Washington MD   Next Visit   Visit date not found Jarvis Washington MD   ED visits in past 90 days: 0        Note composed:6:29 PM 07/25/2024

## 2024-07-30 ENCOUNTER — OFFICE VISIT (OUTPATIENT)
Dept: URGENT CARE | Facility: CLINIC | Age: 64
End: 2024-07-30
Payer: MEDICAID

## 2024-07-30 VITALS
HEART RATE: 76 BPM | RESPIRATION RATE: 20 BRPM | BODY MASS INDEX: 23.56 KG/M2 | TEMPERATURE: 98 F | WEIGHT: 150.13 LBS | DIASTOLIC BLOOD PRESSURE: 80 MMHG | OXYGEN SATURATION: 96 % | SYSTOLIC BLOOD PRESSURE: 168 MMHG | HEIGHT: 67 IN

## 2024-07-30 DIAGNOSIS — K13.0 LESION OF LIP: Primary | ICD-10-CM

## 2024-07-30 PROCEDURE — 99213 OFFICE O/P EST LOW 20 MIN: CPT | Mod: S$GLB,,, | Performed by: NURSE PRACTITIONER

## 2024-07-30 PROCEDURE — 87529 HSV DNA AMP PROBE: CPT | Performed by: NURSE PRACTITIONER

## 2024-07-30 RX ORDER — VALACYCLOVIR HYDROCHLORIDE 1 G/1
1000 TABLET, FILM COATED ORAL 3 TIMES DAILY
Qty: 21 TABLET | Refills: 0 | Status: SHIPPED | OUTPATIENT
Start: 2024-07-30 | End: 2024-08-06

## 2024-07-30 RX ORDER — VALACYCLOVIR HYDROCHLORIDE 1 G/1
1000 TABLET, FILM COATED ORAL 3 TIMES DAILY
Qty: 21 TABLET | Refills: 0 | Status: SHIPPED | OUTPATIENT
Start: 2024-07-30 | End: 2024-07-30

## 2024-07-30 NOTE — PROGRESS NOTES
"Subjective:      Patient ID: Renny Young is a 63 y.o. male.    Vitals:  height is 5' 7" (1.702 m) and weight is 68.1 kg (150 lb 2.1 oz). His oral temperature is 98.3 °F (36.8 °C). His blood pressure is 168/80 (abnormal) and his pulse is 76. His respiration is 20 and oxygen saturation is 96%.     Chief Complaint: Mouth Lesions    Pt present with lip sore stated he was here 2 weeks ago for the same symptoms, but the symptoms have not improved. Pt really stressed about his mouth and stated he had been kissing someone who told him he had acne on her lips.   Provider note below:  This is a 63 y.o. male who presents today with a chief complaint of lip sore on his upper lip that he has for 2 weeks, patient reports he was seen here for same symptoms 2-3 weeks ago, patient reports the lip sore does burn and itch with sometimes tingling sensation in it, patient reports this started after he gets someone told the he has acne on her lip, but does not specify what does they have on her lip and patient is concerned about the sore that is not resolving, denies any drainage or discharge, patient reports he used the medications prescribed but it did not help for his lip sore, denies fever, body aches or chills, denies cough, wheezing or shortness of breath, denies nausea, vomiting, diarrhea or abdominal pain, denies chest pain or dizziness positional lightheadedness, denies sore throat or trouble swallowing, denies loss of taste or smell, or any other symptoms           Mouth Lesions   The current episode started more than 2 weeks ago. The onset is undetermined. The problem occurs continuously. The problem has been unchanged. The problem is moderate. Nothing relieves the symptoms. Nothing aggravates the symptoms. Associated symptoms include mouth sores. Pertinent negatives include no congestion, no headaches and no sore throat.       HENT:  Positive for mouth sores. Negative for congestion and sore throat.    Neurological:  " Negative for headaches.      Past Medical History:   Diagnosis Date    Colon polyp     Diabetes mellitus, type 2     Hypertension     Pacemaker     Pacemaker at end of battery life 5/6/2020    Smoker 6/10/2015    Offered cessation program and declined       Objective:     Physical Exam   Constitutional: He is oriented to person, place, and time. He appears well-developed. He is cooperative.  Non-toxic appearance. He does not appear ill. No distress.   HENT:   Head: Normocephalic and atraumatic.   Ears:   Right Ear: Hearing, tympanic membrane, external ear and ear canal normal.   Left Ear: Hearing, tympanic membrane, external ear and ear canal normal.   Nose: Nose normal. No mucosal edema, rhinorrhea or nasal deformity. No epistaxis. Right sinus exhibits no maxillary sinus tenderness and no frontal sinus tenderness. Left sinus exhibits no maxillary sinus tenderness and no frontal sinus tenderness.   Mouth/Throat: Uvula is midline, oropharynx is clear and moist and mucous membranes are normal. No trismus in the jaw. Normal dentition. No uvula swelling. No oropharyngeal exudate, posterior oropharyngeal edema, posterior oropharyngeal erythema, tonsillar abscesses or cobblestoning.   Lip lesion to left upper lip, no active drainage or discharge noted, no lip edema or swelling noted            Comments: Lip lesion to left upper lip, no active drainage or discharge noted, no lip edema or swelling noted        Eyes: Conjunctivae and lids are normal. No scleral icterus.   Neck: Trachea normal and phonation normal. Neck supple. No edema present. No erythema present. No neck rigidity present.   Cardiovascular: Normal rate, regular rhythm, normal heart sounds and normal pulses.   Pulmonary/Chest: Effort normal and breath sounds normal. No respiratory distress. He has no decreased breath sounds. He has no rhonchi.   Abdominal: Normal appearance.   Musculoskeletal: Normal range of motion.         General: No deformity. Normal  range of motion.   Neurological: He is alert and oriented to person, place, and time. He exhibits normal muscle tone. Coordination normal.   Skin: Skin is warm, dry, intact, not diaphoretic and not pale.   Psychiatric: His speech is normal and behavior is normal. Judgment and thought content normal.   Nursing note and vitals reviewed.        Patient in no acute distress.  Vitals reassuring.  Discussed results/diagnosis/plan in depth with patient in clinic. Strict precautions given to patient to monitor for worsening signs and symptoms. Advised to follow up with primary.All questions answered. Strict ER precautions given. If your symptoms worsens or fail to improve you should go to the Emergency Room. Discharge and follow-up instructions given verbally/printed. Discharge and follow-up instructions discussed with the patient who expressed understanding and willingness to comply with my recommendations.Patient voiced understanding and in agreement with current treatment plan.     Please be advised this text was dictated with [employer] software and may contain errors due to translation.   Assessment:     1. Lesion of lip        Plan:       Lesion of lip  -     HSV by Rapid PCR, Non-Blood [facility]; Other (Specify) (lip)    Other orders  -     Discontinue: valACYclovir (VALTREX) 1000 MG tablet; Take 1 tablet (1,000 mg total) by mouth 3 (three) times daily. for 7 days  Dispense: 21 tablet; Refill: 0  -     valACYclovir (VALTREX) 1000 MG tablet; Take 1 tablet (1,000 mg total) by mouth 3 (three) times daily. for 7 days  Dispense: 21 tablet; Refill: 0          Medical Decision Making:   History:   Old Medical Records: I decided to obtain old medical records.  Old Records Summarized: records from clinic visits and records from previous admission(s).  Urgent Care Management:  Patient in no acute distress.  Vitals reassuring.  On exam, patient is nontoxic appearing and afebrile.  Lungs CTA.  Physical examination consistent with 1  localized lesion to his left upper lip.  Patient reports exposure to someone that he kissed and after that noticed this lesion that is not resolving reports burning and tingling at times to the lesion.  Denies any drainage or discharge,.  Patient reports the other person told him that they have some kind of acne and taking medication for it, patient reports he believes it is not techniques something else that the partner has and not give him exact but they have on the lip that the taking medication for by mouth also.  Discussed different differentials with patient and based on his providing history and the lip lesion symptoms, most likely cold sore.  Will treat him with Valtrex and also discussed with patient then we can sent off the HSV swab.  Patient agreed and would like to get the swab and the treatment for the possible cold sore as well.  Medication prescribed and over-the-counter medication discussed with patient at length.  Proper hydration advised.  I reiterated the importance of further evaluation if no improvement symptoms and follow-up with primary. Patient voiced understanding and in agreement with current treatment plan.               Patient Instructions   If your condition worsens or fails to improve we recommend that you receive another evaluation at the ER immediately or contact your PCP to discuss your concerns or return here. You must understand that you've received an urgent care treatment only and that you may be released before all your medical problems are known or treated. You the patient will arrange for followup care as instructed.    If you were prescribed antibiotics, please take them to completion.  If you were prescribed a narcotic medication, do not drive or operate heavy equipment or machinery while taking these medications.  Please follow up with your primary care doctor or specialist as needed.  If you  smoke, please stop smoking.

## 2024-08-01 LAB
HSV1 DNA SPEC QL NAA+PROBE: NEGATIVE
HSV2 DNA SPEC QL NAA+PROBE: NEGATIVE
SPECIMEN SOURCE: NORMAL

## 2024-08-02 ENCOUNTER — TELEPHONE (OUTPATIENT)
Dept: URGENT CARE | Facility: CLINIC | Age: 64
End: 2024-08-02
Payer: MEDICAID

## 2024-08-02 NOTE — TELEPHONE ENCOUNTER
Results for orders placed or performed in visit on 07/30/24   HSV by Rapid PCR, Non-Blood [facility]; Other (Specify) (lip)   Result Value Ref Range    HSV PCR Source Other, Lip     HSV1, PCR Negative Negative    HSV2, PCR Negative Negative       Spoke with patient discuss negative HSV results.  Patient with lesion of lip at the time of visit prescribe Valtrex.  Patient reports his lip lesion is getting better.  Advised to complete the course of Valtrex prescribed and follow up with primary. Patient voiced understanding and in agreement with current treatment plan.

## 2024-08-14 ENCOUNTER — OFFICE VISIT (OUTPATIENT)
Dept: URGENT CARE | Facility: CLINIC | Age: 64
End: 2024-08-14
Payer: MEDICAID

## 2024-08-14 VITALS
TEMPERATURE: 98 F | HEART RATE: 82 BPM | BODY MASS INDEX: 23.56 KG/M2 | HEIGHT: 67 IN | OXYGEN SATURATION: 97 % | RESPIRATION RATE: 19 BRPM | DIASTOLIC BLOOD PRESSURE: 100 MMHG | SYSTOLIC BLOOD PRESSURE: 168 MMHG | WEIGHT: 150.13 LBS

## 2024-08-14 DIAGNOSIS — K12.0 ORAL APHTHOUS ULCER: Primary | ICD-10-CM

## 2024-08-14 PROCEDURE — 99213 OFFICE O/P EST LOW 20 MIN: CPT | Mod: S$GLB,,, | Performed by: NURSE PRACTITIONER

## 2024-08-14 NOTE — PATIENT INSTRUCTIONS
Good oral hygiene.  Increase fluids.  Follow up with your Primary Care and ENT if symptoms persist.  Return here or go to the ER as needed for worsening condition.

## 2024-08-14 NOTE — PROGRESS NOTES
"Subjective:      Patient ID: Renny Young is a 63 y.o. male.    Vitals:  height is 5' 7" (1.702 m) and weight is 68.1 kg (150 lb 2.1 oz). His oral temperature is 98.3 °F (36.8 °C). His blood pressure is 168/100 (abnormal) and his pulse is 82. His respiration is 19 and oxygen saturation is 97%.     Chief Complaint: LIP ISSUE    Pt comes in with redness under the top lip, possible cold sore, sx started Monday, denies at home tx     Mouth Lesions   The current episode started 5 to 7 days ago. The onset was gradual. The problem has been unchanged. Nothing relieves the symptoms. Associated symptoms include mouth sores. Pertinent negatives include no decreased vision, no double vision, no eye itching, no photophobia, no congestion, no ear discharge, no ear pain, no headaches, no hearing loss, no rhinorrhea, no sore throat, no stridor, no swollen glands, no eye discharge, no eye pain and no eye redness.       HENT:  Positive for mouth sores. Negative for ear pain, ear discharge, hearing loss, congestion and sore throat.    Eyes:  Negative for eye discharge, eye itching, eye pain, eye redness, photophobia and double vision.   Respiratory:  Negative for stridor.    Skin:  Negative for erythema.   Neurological:  Negative for headaches.      Objective:     Physical Exam   Constitutional: He is oriented to person, place, and time. He appears well-developed.  Non-toxic appearance. He does not appear ill. No distress.   HENT:   Head: Normocephalic and atraumatic. Head is without abrasion, without contusion and without laceration.   Ears:   Right Ear: External ear normal.   Left Ear: External ear normal.   Nose: Nose normal.   Mouth/Throat: Uvula is midline, oropharynx is clear and moist and mucous membranes are normal. Mucous membranes are moist. Oral lesions (flat white rounded lesion to left inner lip that appears like a canker sore) present. No oropharyngeal exudate or posterior oropharyngeal erythema.   Eyes: Conjunctivae, " EOM and lids are normal. Pupils are equal, round, and reactive to light.   Neck: Trachea normal and phonation normal. Neck supple.   Cardiovascular: Normal rate, regular rhythm and normal heart sounds.   Pulmonary/Chest: Effort normal and breath sounds normal. No stridor. No respiratory distress.   Musculoskeletal: Normal range of motion.         General: Normal range of motion.   Neurological: He is alert and oriented to person, place, and time.   Skin: Skin is warm, dry, intact, not diaphoretic and no rash. Capillary refill takes less than 2 seconds. No abrasion, No burn, No bruising, No erythema and No ecchymosis   Psychiatric: His speech is normal and behavior is normal. Judgment and thought content normal.   Nursing note and vitals reviewed.      Assessment:     1. Oral aphthous ulcer        Plan:     Chart reviewed.  Hx of similar oral lesion but not to this area but on inside of the lip like this two weeks ago and was swabbed for HSV which was negative.  That lesion resolved.  No hx of hsv or exposure.      Oral aphthous ulcer        Patient Instructions   Good oral hygiene.  Increase fluids.  Follow up with your Primary Care and ENT if symptoms persist.  Return here or go to the ER as needed for worsening condition.

## 2024-09-27 DIAGNOSIS — I10 HYPERTENSION, ESSENTIAL: ICD-10-CM

## 2024-09-27 NOTE — TELEPHONE ENCOUNTER
Care Due:                  Date            Visit Type   Department     Provider  --------------------------------------------------------------------------------                                EP                               PRIMARY      [facility] INTERNAL  Last Visit: 08-      CARE ([facility])   MEDICINE       MARTIN BEE                              [facility]                              PRIMARY      [facility] INTERNAL  Next Visit: 10-      CARE ([facility])   MEDICINE       MARTIN BEE                                                            Last  Test          Frequency    Reason                     Performed    Due Date  --------------------------------------------------------------------------------    CMP.........  12 months..  atorvastatin,              11- 11-                             losartan-hydrochlorothiaz                             mario, metFORMIN...........    HBA1C.......  6 months...  metFORMIN................  06-   12-    Lipid Panel.  12 months..  atorvastatin.............  06- 06-    [facility] Embedded Care Due Messages. Reference number: [id number].   9/27/2024 2:30:57 PM CDT

## 2024-09-28 NOTE — TELEPHONE ENCOUNTER
Refill Routing Note   Medication(s) are not appropriate for processing by [facility] for the following reason(s):        ED/Hospital Visit since last OV with provider  Required vitals abnormal    [facility] action(s):  Defer     Requires labs : Yes      Medication Therapy Plan: FOVS      Appointments  past 12m or future 3m with PCP    Date Provider   Last Visit   Visit date not found Jarvis Washington MD   Next Visit   10/2/2024 Jarvis Washington MD   ED visits in past 90 days: 0        Note composed:6:04 PM 09/28/2024

## 2024-09-30 RX ORDER — LOSARTAN POTASSIUM AND HYDROCHLOROTHIAZIDE 12.5; 1 MG/1; MG/1
1 TABLET ORAL DAILY
Qty: 90 TABLET | Refills: 0 | Status: SHIPPED | OUTPATIENT
Start: 2024-09-30

## 2024-10-01 NOTE — OP NOTE
Lumbar Medial nerve branch block radiofrequency ablation Under Fluoroscopy     Time-out taken to identify patient and procedure side prior to starting the procedure.     06/25/2019    PROCEDURE: Right radiofrequency ablation of the the medial branch nerves at the   transverse process of  L4, L5 and sacral ala    2)Conscious sedation provided by MD     REASON FOR PROCEDURE: Lumbar spondylosis [M47.816]     PHYSICIAN: Josue Paredes MD     ASSISTANTS: Dylan Roblero MD    MEDICATIONS INJECTED: 0.25% Bupivicaine total 6mL     LOCAL ANESTHETIC USED: Xylocaine 1% 1mL / site     ESTIMATED BLOOD LOSS: None.     COMPLICATIONS: None.     Interval history: Patient reports that he had complete relief of pain for the day of the procedure, we will proceed with the RFA     TECHNIQUE: Laying in a prone position, the patient was prepped and draped in the usual sterile fashion using ChloraPrep and fenestrated drape. The level was determined under fluoroscopic guidance. Local anesthetic was given by going down to the hub of the 27-gauge 1.25in needle and raising a wheel. A 18-gauge 10mm curved active tip needle was introduced to the anatomic local of the medial branch at each of the above levels using fluoroscopy. Then sensory and motor testing was performed to confirm that the needle tips were in the correct location. Then after negative aspiration, 2 mL of 0.25% bupivacaine was injected into each level. This was followed by pulsed dose RFA.  This was followed by thermal lesioning at 80 degrees celsius for 90 seconds.     Conscious sedation provided by M.D   The patient was monitored with continuous pulse oximetry, EKG, and intermittent blood pressure monitors. The patient was hemodynamically stable throughout the entire process was responsive to voice, and breathing spontaneously. Supplemental O2 was provided at 2L/min via nasal cannula. Patient was comfortable for the duration of the procedure. (See nurse documentation and case  log for sedation time)    There was a total of 2mg IV Midazolam and 50mcg Fentanyl titrated for the procedure    The patient tolerated the procedure well. Did not have any procedure related motor deficit at the conclusion of the procedure    The patient was monitored after the procedure. Patient was given post procedure and discharge instructions to follow at home. We will see the patient back in two weeks or the patient may call to inform of status. The patient was discharged in a stable condition            Patient

## 2024-10-03 DIAGNOSIS — I10 HYPERTENSION, ESSENTIAL: ICD-10-CM

## 2024-10-03 DIAGNOSIS — E11.42 DIABETIC POLYNEUROPATHY ASSOCIATED WITH TYPE 2 DIABETES MELLITUS: ICD-10-CM

## 2024-10-03 RX ORDER — AMLODIPINE BESYLATE 5 MG/1
5 TABLET ORAL DAILY
Qty: 90 TABLET | Refills: 0 | Status: SHIPPED | OUTPATIENT
Start: 2024-10-03 | End: 2024-11-07 | Stop reason: SDUPTHER

## 2024-10-03 RX ORDER — GABAPENTIN 300 MG/1
CAPSULE ORAL
Qty: 60 CAPSULE | Refills: 0 | Status: SHIPPED | OUTPATIENT
Start: 2024-10-03

## 2024-10-03 NOTE — TELEPHONE ENCOUNTER
Refill Routing Note   Medication(s) are not appropriate for processing by [facility] for the following reason(s):        ED/Hospital Visit since last OV with provider  Required vitals abnormal    [facility] action(s):  Defer               Appointments  past 12m or future 3m with PCP    Date Provider   Last Visit   Visit date not found Jarvis Washington MD   Next Visit   11/7/2024 Jarvis Washington MD   ED visits in past 90 days: 0        Note composed:12:47 PM 10/03/2024

## 2024-10-03 NOTE — TELEPHONE ENCOUNTER
No care due was identified.  [facility] Embedded Care Due Messages. Reference number: [id number].   10/03/2024 10:04:33 AM CDT

## 2024-10-03 NOTE — TELEPHONE ENCOUNTER
----- Message from Cindy sent at 10/3/2024  9:54 AM CDT -----  Regarding: Refills  Contact: [phone]  Type:  RX Refill Request    1.  Who Called: PT   Refill or New Rx: Refills   RX Name and Strength: amLODIPine (NORVASC) 5 MG tablet 90 tablet  How is the patient currently taking it? (ex. 1XDay): 1 x a day   Is this a 30 day or 90 day RX: 90  Preferred Pharmacy with phone number: [address] [place] [address] Phone: [phone] Fax: [phone]    2. atorvastatin (LIPITOR) 20 MG tablet 90 tablet    3. gabapentin (NEURONTIN) 300 MG capsule 90 capsule    4. hydrOXYzine HCL (ATARAX) 10 MG Tab 90 tablet    5. metFORMIN (GLUCOPHAGE) 500 MG tablet 90 tablet

## 2024-10-08 DIAGNOSIS — E11.69 TYPE 2 DIABETES MELLITUS WITH OTHER SPECIFIED COMPLICATION, WITHOUT LONG-TERM CURRENT USE OF INSULIN: ICD-10-CM

## 2024-10-09 RX ORDER — METFORMIN HYDROCHLORIDE 500 MG/1
500 TABLET ORAL
Qty: 90 TABLET | Refills: 0 | Status: SHIPPED | OUTPATIENT
Start: 2024-10-09

## 2024-10-09 NOTE — TELEPHONE ENCOUNTER
No care due was identified.  [facility] Embedded Care Due Messages. Reference number: [id number].   10/08/2024 10:47:18 AM CDT  
Refill Routing Note   Medication(s) are not appropriate for processing by [facility] for the following reason(s):        Required labs outdated    [facility] action(s):  Defer             Appointments  past 12m or future 3m with PCP    Date Provider   Last Visit   8/11/2023 Jarvis Washington MD   Next Visit   11/7/2024 Jarvis Washington MD   ED visits in past 90 days: 0        Note composed:7:35 PM 10/08/2024               
IV discontinued, cath removed intact

## 2024-10-29 DIAGNOSIS — I49.5 SICK SINUS SYNDROME: Primary | ICD-10-CM

## 2024-10-30 ENCOUNTER — CLINICAL SUPPORT (OUTPATIENT)
Dept: CARDIOLOGY | Facility: HOSPITAL | Age: 64
End: 2024-10-30
Attending: INTERNAL MEDICINE
Payer: COMMERCIAL

## 2024-10-30 DIAGNOSIS — I49.5 SICK SINUS SYNDROME: ICD-10-CM

## 2024-10-30 PROCEDURE — 93279 PRGRMG DEV EVAL PM/LDLS PM: CPT

## 2024-10-30 PROCEDURE — 93279 PRGRMG DEV EVAL PM/LDLS PM: CPT | Mod: 26,,, | Performed by: INTERNAL MEDICINE

## 2024-11-01 LAB
OHS CV AF BURDEN PERCENT: < 1
OHS CV DC REMOTE DEVICE TYPE: NORMAL

## 2024-11-06 NOTE — PROGRESS NOTES
Mr. Young is a patient of Dr. Spence and was last seen in clinic 11/1/2023.      Subjective:   Patient ID:  Renny Young is a 64 y.o. male who presents for follow up of Pacemaker Check  .     HPI:    Mr. Young is a 64 y.o. male with SSS, PPM (2009), DM, and HTN here for annual follow up.     Background:    Renny Young has a history of hypertension and sick sinus syndrome. He had a [employer] dual chamber pacemaker implanted at [facility] in 7/2009 for syncope. Mr. Young describes that following implantation, he experienced discomfort when his RV lead was paced. Apparently his RV lead output was lowered, which improved his symptoms. At his last in 10/2016, he had GI complaints which he attributed to his RV pacing lead. At his bequest, Dr. Spence changed his programming to an AAI pacing mode at that time.    Device check performed in 4/2020 indicated his device is at EOS (reached KELLY on 10/3/2019, and consistent with early battery depletion based on past measurements). Additionally his atrial lead impedence, which had historically been stable, was measured in the office at 968-1500 ohms (3x above normal), with 38% RA pacing. Pacemaker generator change performed in 5/2020--no abnormalities with RA lead seen.    Echo in 12/2021 showed EF 65%. Exercise stress echo in 6/2022 was negative for ischemia.     10/2022:   No complaints today. Overall feeling well. Reports his wife is hospitalized at [facility] currently.  Device interrogation which shows 10% AP with rare short runs of AT. Lead impendence stable.   ECG is NSR at 67 bpm, ARS 84, Qtc 418  RV lead off 2/2 belching.  Plan is for regular device checks, follow-up 1 year.    11/1/2023: Mr. Young is doing well from a device perspective with stable lead and device function. No sustained arrhythmia noted. AAI mode - V lead off due to belching. No CHF symptoms. BPs elevated but he says he has not taken his BP meds this morning yet.      Update (11/11/2024):    Today he  complains of chest ache around site. Comes and goes on some days. He says it is worse over the past month but this has been a chronic complaint. No worsening URRUTIA, palps, LH, syncope reported.    Device Interrogation (10/30/2024 - in clinic) reveals an intrinsic SR with stable lead and device function. No arrhythmias or treated episodes were noted. He paces 21% in the RA. Estimated battery longevity 6.9 years.     I have personally reviewed the patient's EKG today, which shows APVS at 60bpm. IL interval is 154. QRS is 76. QTc is 406.    Relevant Cardiac Test Results:    MANN (6/30/2022):  The patient exercised for 7 minutes and 1 seconds on a high ramp protocol, corresponding to a functional capacity of 12 METS, achieving a peak heart rate of 141 bpm, which is 89% of the age predicted maximum heart rate.  The patient's exercise capacity was normal.  There were no arrhythmias during stress.  The test was stopped because the patient experienced fatigue.  The ECG portion of this study is negative for myocardial ischemia.  The left ventricle is normal in size with concentric remodeling and normal systolic function.  The estimated ejection fraction is 60%.  Normal left ventricular diastolic function.  Normal right ventricular size with normal right ventricular systolic function.  The estimated PA systolic pressure is 31 mmHg.  Normal central venous pressure (3 mmHg).  The stress echo portion of this study is negative for myocardial ischemia.    Current Outpatient Medications   Medication Sig    (Magic mouthwash) 1:1:1 diphenhydrAMINE(Benadryl) 12.5mg/5ml liq, aluminum & magnesium hydroxide-simethicone (Maalox), LIDOcaine viscous 2% Swish and spit 15 mLs every 4 (four) hours as needed (as needed). for mouth sores (Patient not taking: Reported on 7/30/2024)    amLODIPine (NORVASC) 5 MG tablet Take 1 tablet (5 mg total) by mouth once daily.    aspirin (ECOTRIN) 81 MG EC tablet Take 81 mg by mouth once daily.    atorvastatin  "(LIPITOR) 20 MG tablet Take 1 tablet by mouth once daily    cloNIDine (CATAPRES) 0.1 MG tablet Take 1 tablet (0.1 mg total) by mouth every 6 (six) hours as needed (for blood pressures greater than 170/100 mm Hg).    clotrimazole-betamethasone 1-0.05% (LOTRISONE) cream Apply topically 2 (two) times daily.    gabapentin (NEURONTIN) 300 MG capsule Take 1 capsule by mouth twice daily    hydrOXYzine HCL (ATARAX) 10 MG Tab TAKE 1 TABLET BY MOUTH NIGHTLY AS NEEDED FOR ITCHING    losartan-hydrochlorothiazide 100-12.5 mg (HYZAAR) 100-12.5 mg Tab Take 1 tablet by mouth once daily    metFORMIN (GLUCOPHAGE) 500 MG tablet Take 1 tablet by mouth once daily with breakfast    terconazole (TERAZOL 7) 0.4 % Crea SMARTSI Applicator Vaginal Every Evening    valACYclovir (VALTREX) 1000 MG tablet Take 1 tablet (1,000 mg total) by mouth 3 (three) times daily. for 7 days     No current facility-administered medications for this visit.       Review of Systems   Constitutional: Negative for malaise/fatigue.   Cardiovascular:  Positive for chest pain (atypical). Negative for dyspnea on exertion, irregular heartbeat, leg swelling and palpitations.   Respiratory:  Negative for shortness of breath.    Hematologic/Lymphatic: Negative for bleeding problem.   Skin:  Negative for rash.   Musculoskeletal:  Negative for myalgias.   Gastrointestinal:  Negative for hematemesis, hematochezia and nausea.        Chronic belching     Genitourinary:  Negative for hematuria.   Neurological:  Negative for light-headedness.   Psychiatric/Behavioral:  Negative for altered mental status.    Allergic/Immunologic: Negative for persistent infections.       Objective:          BP (!) 149/76   Pulse 60   Ht 5' 7" (1.702 m)   Wt 70.5 kg (155 lb 6.8 oz)   BMI 24.34 kg/m²     Physical Exam  Vitals and nursing note reviewed.   Constitutional:       Appearance: Normal appearance. He is well-developed.   HENT:      Head: Normocephalic.      Nose: Nose normal.   Eyes: "      Pupils: Pupils are equal, round, and reactive to light.   Cardiovascular:      Rate and Rhythm: Normal rate and regular rhythm.   Pulmonary:      Effort: No respiratory distress.   Chest:      Comments: Device to LUCW. Incision and pocket in good repair.    Musculoskeletal:         General: Normal range of motion.   Skin:     General: Skin is warm and dry.      Findings: No erythema.   Neurological:      Mental Status: He is alert and oriented to person, place, and time.   Psychiatric:         Speech: Speech normal.         Behavior: Behavior normal.           Lab Results   Component Value Date     11/07/2024    K 4.0 11/07/2024    BUN 15 11/07/2024    CREATININE 1.1 11/07/2024    ALT 25 11/07/2024    AST 25 11/07/2024    HGB 16.8 11/14/2023    HCT 50.0 11/14/2023    TSH 1.194 04/06/2017    LDLCALC 59.8 (L) 11/07/2024             Assessment:     1. Pacemaker    2. Sick sinus syndrome    3. Hypertension, essential      Plan:     In summary, Mr. Young is a 64 y.o. male with SSS, PPM (2009), DM, and HTN here for annual follow up.   Mr. Young is doing well from a device perspective with stable lead and device function. No sustained arrhythmia noted.   21% A paced. AAI mode - V lead off due to belching. Chronic chest discomfort around device site. No c/w exertion. Site appears WNL.   Scheduled for inguinal hernia repair later this month. Has appt with general cardiology for surgical clearance. No contraindication to proceeding from an arrhythmia standpoint.    Continue current medication regimen and device settings.   Follow up in device clinic as scheduled. (In clinic q6mo - pt declines remote monitoring)  Follow up in EP clinic in 1 year, sooner as needed.     *A copy of this note has been sent to Dr. Spence*    Follow up in about 1 year (around 11/11/2025).    ------------------------------------------------------------------    SARAH Sahu, NP-C  Cardiac Electrophysiology

## 2024-11-07 ENCOUNTER — OFFICE VISIT (OUTPATIENT)
Dept: SURGERY | Facility: CLINIC | Age: 64
End: 2024-11-07
Attending: SPECIALIST
Payer: COMMERCIAL

## 2024-11-07 ENCOUNTER — OFFICE VISIT (OUTPATIENT)
Dept: INTERNAL MEDICINE | Facility: CLINIC | Age: 64
End: 2024-11-07
Attending: FAMILY MEDICINE
Payer: COMMERCIAL

## 2024-11-07 ENCOUNTER — LAB VISIT (OUTPATIENT)
Dept: LAB | Facility: HOSPITAL | Age: 64
End: 2024-11-07
Attending: FAMILY MEDICINE
Payer: COMMERCIAL

## 2024-11-07 VITALS
WEIGHT: 156.5 LBS | DIASTOLIC BLOOD PRESSURE: 70 MMHG | OXYGEN SATURATION: 98 % | SYSTOLIC BLOOD PRESSURE: 135 MMHG | HEART RATE: 54 BPM | BODY MASS INDEX: 24.56 KG/M2 | HEIGHT: 67 IN

## 2024-11-07 VITALS
OXYGEN SATURATION: 99 % | HEIGHT: 67 IN | SYSTOLIC BLOOD PRESSURE: 149 MMHG | HEART RATE: 62 BPM | WEIGHT: 156 LBS | DIASTOLIC BLOOD PRESSURE: 78 MMHG | BODY MASS INDEX: 24.48 KG/M2

## 2024-11-07 DIAGNOSIS — E11.69 TYPE 2 DIABETES MELLITUS WITH OTHER SPECIFIED COMPLICATION, WITHOUT LONG-TERM CURRENT USE OF INSULIN: ICD-10-CM

## 2024-11-07 DIAGNOSIS — I49.5 SICK SINUS SYNDROME: ICD-10-CM

## 2024-11-07 DIAGNOSIS — R91.1 PULMONARY NODULE: ICD-10-CM

## 2024-11-07 DIAGNOSIS — K40.90 UNILATERAL INGUINAL HERNIA WITHOUT OBSTRUCTION OR GANGRENE, RECURRENCE NOT SPECIFIED: ICD-10-CM

## 2024-11-07 DIAGNOSIS — I10 HYPERTENSION, ESSENTIAL: ICD-10-CM

## 2024-11-07 DIAGNOSIS — Z12.5 PROSTATE CANCER SCREENING: ICD-10-CM

## 2024-11-07 DIAGNOSIS — E78.5 HYPERLIPIDEMIA, UNSPECIFIED HYPERLIPIDEMIA TYPE: ICD-10-CM

## 2024-11-07 DIAGNOSIS — Z87.891 PERSONAL HISTORY OF NICOTINE DEPENDENCE: ICD-10-CM

## 2024-11-07 DIAGNOSIS — Z12.11 COLON CANCER SCREENING: ICD-10-CM

## 2024-11-07 DIAGNOSIS — R07.9 CHEST PAIN, UNSPECIFIED TYPE: ICD-10-CM

## 2024-11-07 DIAGNOSIS — E78.2 MIXED HYPERLIPIDEMIA: ICD-10-CM

## 2024-11-07 DIAGNOSIS — Z00.00 ANNUAL PHYSICAL EXAM: ICD-10-CM

## 2024-11-07 DIAGNOSIS — E11.42 DIABETIC POLYNEUROPATHY ASSOCIATED WITH TYPE 2 DIABETES MELLITUS: ICD-10-CM

## 2024-11-07 DIAGNOSIS — K40.90 NON-RECURRENT UNILATERAL INGUINAL HERNIA WITHOUT OBSTRUCTION OR GANGRENE: Primary | ICD-10-CM

## 2024-11-07 DIAGNOSIS — Z95.0 PACEMAKER: ICD-10-CM

## 2024-11-07 DIAGNOSIS — Z00.00 ANNUAL PHYSICAL EXAM: Primary | ICD-10-CM

## 2024-11-07 PROBLEM — M25.511 CHRONIC RIGHT SHOULDER PAIN: Status: RESOLVED | Noted: 2023-06-12 | Resolved: 2024-11-07

## 2024-11-07 PROBLEM — G89.29 CHRONIC RIGHT SHOULDER PAIN: Status: RESOLVED | Noted: 2023-06-12 | Resolved: 2024-11-07

## 2024-11-07 LAB
ALBUMIN SERPL BCP-MCNC: 4.1 G/DL (ref 3.5–5.2)
ALP SERPL-CCNC: 58 U/L (ref 40–150)
ALT SERPL W/O P-5'-P-CCNC: 25 U/L (ref 10–44)
ANION GAP SERPL CALC-SCNC: 8 MMOL/L (ref 8–16)
AST SERPL-CCNC: 25 U/L (ref 10–40)
BILIRUB SERPL-MCNC: 1.2 MG/DL (ref 0.1–1)
BUN SERPL-MCNC: 15 MG/DL (ref 8–23)
CALCIUM SERPL-MCNC: 9.7 MG/DL (ref 8.7–10.5)
CHLORIDE SERPL-SCNC: 107 MMOL/L (ref 95–110)
CHOLEST SERPL-MCNC: 130 MG/DL (ref 120–199)
CHOLEST/HDLC SERPL: 2.4 {RATIO} (ref 2–5)
CO2 SERPL-SCNC: 24 MMOL/L (ref 23–29)
COMPLEXED PSA SERPL-MCNC: 1.5 NG/ML (ref 0–4)
CREAT SERPL-MCNC: 1.1 MG/DL (ref 0.5–1.4)
EST. GFR  (NO RACE VARIABLE): >60 ML/MIN/1.73 M^2
ESTIMATED AVG GLUCOSE: 123 MG/DL (ref 68–131)
GLUCOSE SERPL-MCNC: 104 MG/DL (ref 70–110)
HBA1C MFR BLD: 5.9 % (ref 4–5.6)
HDLC SERPL-MCNC: 55 MG/DL (ref 40–75)
HDLC SERPL: 42.3 % (ref 20–50)
LDLC SERPL CALC-MCNC: 59.8 MG/DL (ref 63–159)
NONHDLC SERPL-MCNC: 75 MG/DL
POTASSIUM SERPL-SCNC: 4 MMOL/L (ref 3.5–5.1)
PROT SERPL-MCNC: 7.4 G/DL (ref 6–8.4)
SODIUM SERPL-SCNC: 139 MMOL/L (ref 136–145)
TRIGL SERPL-MCNC: 76 MG/DL (ref 30–150)

## 2024-11-07 PROCEDURE — 1159F MED LIST DOCD IN RCRD: CPT | Mod: CPTII,S$GLB,, | Performed by: FAMILY MEDICINE

## 2024-11-07 PROCEDURE — 36415 COLL VENOUS BLD VENIPUNCTURE: CPT | Performed by: FAMILY MEDICINE

## 2024-11-07 PROCEDURE — 3078F DIAST BP <80 MM HG: CPT | Mod: CPTII,S$GLB,, | Performed by: FAMILY MEDICINE

## 2024-11-07 PROCEDURE — 3008F BODY MASS INDEX DOCD: CPT | Mod: CPTII,S$GLB,, | Performed by: FAMILY MEDICINE

## 2024-11-07 PROCEDURE — 99999 PR PBB SHADOW E&M-EST. PATIENT-LVL V: CPT | Mod: PBBFAC,,, | Performed by: SURGERY

## 2024-11-07 PROCEDURE — 3072F LOW RISK FOR RETINOPATHY: CPT | Mod: CPTII,S$GLB,, | Performed by: FAMILY MEDICINE

## 2024-11-07 PROCEDURE — 99999 PR PBB SHADOW E&M-EST. PATIENT-LVL V: CPT | Mod: PBBFAC,,, | Performed by: FAMILY MEDICINE

## 2024-11-07 PROCEDURE — 1160F RVW MEDS BY RX/DR IN RCRD: CPT | Mod: CPTII,S$GLB,, | Performed by: FAMILY MEDICINE

## 2024-11-07 PROCEDURE — 83036 HEMOGLOBIN GLYCOSYLATED A1C: CPT | Performed by: FAMILY MEDICINE

## 2024-11-07 PROCEDURE — 80061 LIPID PANEL: CPT | Performed by: FAMILY MEDICINE

## 2024-11-07 PROCEDURE — 99396 PREV VISIT EST AGE 40-64: CPT | Mod: S$GLB,,, | Performed by: FAMILY MEDICINE

## 2024-11-07 PROCEDURE — 80053 COMPREHEN METABOLIC PANEL: CPT | Performed by: FAMILY MEDICINE

## 2024-11-07 PROCEDURE — 3075F SYST BP GE 130 - 139MM HG: CPT | Mod: CPTII,S$GLB,, | Performed by: FAMILY MEDICINE

## 2024-11-07 PROCEDURE — 84153 ASSAY OF PSA TOTAL: CPT | Performed by: FAMILY MEDICINE

## 2024-11-07 RX ORDER — LOSARTAN POTASSIUM AND HYDROCHLOROTHIAZIDE 12.5; 1 MG/1; MG/1
1 TABLET ORAL DAILY
Qty: 90 TABLET | Refills: 3 | Status: SHIPPED | OUTPATIENT
Start: 2024-11-07

## 2024-11-07 RX ORDER — ATORVASTATIN CALCIUM 20 MG/1
20 TABLET, FILM COATED ORAL DAILY
Qty: 90 TABLET | Refills: 3 | Status: SHIPPED | OUTPATIENT
Start: 2024-11-07

## 2024-11-07 RX ORDER — METFORMIN HYDROCHLORIDE 500 MG/1
500 TABLET ORAL
Qty: 90 TABLET | Refills: 3 | Status: SHIPPED | OUTPATIENT
Start: 2024-11-07

## 2024-11-07 RX ORDER — AMLODIPINE BESYLATE 5 MG/1
5 TABLET ORAL DAILY
Qty: 90 TABLET | Refills: 3 | Status: SHIPPED | OUTPATIENT
Start: 2024-11-07

## 2024-11-07 NOTE — H&P (VIEW-ONLY)
History & Physical    SUBJECTIVE:     History of Present Illness:  Patient is a 64 y.o. male referred for evaluation of left inguinal hernia.  Several weeks ago patient noticed a bulge in his left inguinal area.  It was not causing very much discomfort.  He knew it was a hernia as he had a previous right inguinal hernia repair.  He is interested in repair before it becomes  painful or enlarges.    Review of patient's allergies indicates:  No Known Allergies    Current Outpatient Medications   Medication Sig Dispense Refill    amLODIPine (NORVASC) 5 MG tablet Take 1 tablet (5 mg total) by mouth once daily. 90 tablet 3    aspirin (ECOTRIN) 81 MG EC tablet Take 81 mg by mouth once daily.      atorvastatin (LIPITOR) 20 MG tablet Take 1 tablet (20 mg total) by mouth once daily. 90 tablet 3    gabapentin (NEURONTIN) 300 MG capsule Take 1 capsule by mouth twice daily 60 capsule 0    hydrOXYzine HCL (ATARAX) 10 MG Tab TAKE 1 TABLET BY MOUTH NIGHTLY AS NEEDED FOR ITCHING 30 tablet 0    losartan-hydrochlorothiazide 100-12.5 mg (HYZAAR) 100-12.5 mg Tab Take 1 tablet by mouth once daily. 90 tablet 3    metFORMIN (GLUCOPHAGE) 500 MG tablet Take 1 tablet (500 mg total) by mouth daily with breakfast. 90 tablet 3    cloNIDine (CATAPRES) 0.1 MG tablet Take 1 tablet (0.1 mg total) by mouth every 6 (six) hours as needed (for blood pressures greater than 170/100 mm Hg). (Patient not taking: Reported on 11/7/2024) 20 tablet 0     No current facility-administered medications for this visit.       Past Medical History:   Diagnosis Date    Colon polyp     Diabetes mellitus, type 2     Hypertension     Pacemaker     Pacemaker at end of battery life 5/6/2020    Smoker 6/10/2015    Offered cessation program and declined     Past Surgical History:   Procedure Laterality Date    COLONOSCOPY N/A 2/13/2017    Procedure: COLONOSCOPY;  Surgeon: NILDA Juares MD;  Location: [facility] ([address] [place] [address]);  Service: Endoscopy;  Laterality: N/A;  Do not  cancel this order. Patient has Pacemaker in place.     EPIDURAL STEROID INJECTION N/A 8/13/2019    Procedure: INJECTION, STEROID, EPIDURAL IL, L5/S1;  Surgeon: Josue Paredes MD;  Location: [facility] PAIN MGT;  Service: Pain Management;  Laterality: N/A;  IL JEAN MARIE L5/S1    EPIDURAL STEROID INJECTION N/A 6/16/2020    Procedure: INJECTION, STEROID, EPIDURAL, L5-S1 IL add on @ 2 ok by  [place];  Surgeon: Josue Paredes MD;  Location: [facility] PAIN [facility]GT;  Service: Pain Management;  Laterality: N/A;    HERNIA REPAIR      INJECTION OF ANESTHETIC AGENT AROUND NERVE Bilateral 7/5/2018    Procedure: BLOCK, NERVE;  Surgeon: Krystal Mtz MD;  Location: [facility] PAIN MGT;  Service: Pain Management;  Laterality: Bilateral;  Lumbar Bilateral L3-L4-L5 Medial Branch Block    RADIOFREQUENCY ABLATION Left 6/11/2019    Procedure: RADIOFREQUENCY ABLATION, L3-L4-L5 MEDIAL BRANCH   1 OF 2;  Surgeon: Josue Paredes MD;  Location: [facility] PAIN [facility]GT;  Service: Pain Management;  Laterality: Left;    RADIOFREQUENCY ABLATION Right 6/25/2019    Procedure: RADIOFREQUENCY ABLATION, L3-L4-L5 MEDIAL BRANCH JING 2 OF 2;  Surgeon: Josue Paredes MD;  Location: [facility]IN [facility]GT;  Service: Pain Management;  Laterality: Right;    REPLACEMENT OF PACEMAKER GENERATOR Left 5/6/2020    Procedure: REPLACEMENT, PULSE GENERATOR, CARDIAC PACEMAKER;  Surgeon: Bradford Spence MD;  Location: [facility] EP LAB;  Service: Cardiology;  Laterality: Left;  EOL/LEAD Mlfx, Gen Change, Poss RA lead rev, SJM, MAC, GP, 3 PREP    REVISION OF PROCEDURE INVOLVING PACEMAKER LEAD Left 5/6/2020    Procedure: REVISION, ELECTRODE LEAD, CARDIAC PACEMAKER;  Surgeon: Bradford Spence MD;  Location: [facility] EP LAB;  Service: Cardiology;  Laterality: Left;    TRANSFORAMINAL EPIDURAL INJECTION OF STEROID Right 6/5/2018    Procedure: INJECTION-STEROID-EPIDURAL-TRANSFORAMINAL;  Surgeon: Josue Paredes MD;  Location: [facility] PAIN [facility]GT;  Service: Pain Management;  Laterality: Right;  LUMBAR RIGHT L5 AND S1  TRANSFORAMINL JEAN MARIE  [id number]    W/ SEDATION     TRANSFORAMINAL EPIDURAL INJECTION OF STEROID Right 9/10/2020    Procedure: INJECTION, STEROID, EPIDURAL, TRANSFORAMINAL APPROACH, L5-S1 AND S1;  Surgeon: Josue Paredes MD;  Location: [facility] PAIN MGT;  Service: Pain Management;  Laterality: Right;    TRANSFORAMINAL EPIDURAL INJECTION OF STEROID Right 6/15/2021    Procedure: INJECTION, STEROID, EPIDURAL, TRANSFORAMINAL APPROACH L5-S1 AND S1 need consent;  Surgeon: Josue Paredes MD;  Location: [facility] PAIN MGT;  Service: Pain Management;  Laterality: Right;    TRANSFORAMINAL EPIDURAL INJECTION OF STEROID Right 6/20/2022    Procedure: INJECTION, STEROID, EPIDURAL, TRANSFORAMINAL APPROACH, RIGHT L5-S1 AND S1 CONTRAST;  Surgeon: Krystal Mtz MD;  Location: [facility] PAIN MGT;  Service: Pain Management;  Laterality: Right;    TRANSFORAMINAL EPIDURAL INJECTION OF STEROID Bilateral 9/26/2022    Procedure: INJECTION, STEROID, EPIDURAL, TRANSFORAMINAL APPROACH, BILATERAL L5-S1 CONTRAST;  Surgeon: Krystal Mtz MD;  Location: [facility] PAIN MGT;  Service: Pain Management;  Laterality: Bilateral;    TRANSFORAMINAL EPIDURAL INJECTION OF STEROID Bilateral 7/13/2023    Procedure: INJECTION, STEROID, EPIDURAL, TRANSFORAMINAL APPROACH, L5-S1 BILATERAL;  Surgeon: Krystal Mtz MD;  Location: [facility] PAIN MGT;  Service: Pain Management;  Laterality: Bilateral;     Family History   Problem Relation Name Age of Onset    Diabetes Mother      Diabetes Father      Kidney disease Father      Melanoma Neg Hx       Social History     Tobacco Use    Smoking status: Every Day     Current packs/day: 0.50     Types: Cigarettes    Smokeless tobacco: Never   Substance Use Topics    Alcohol use: Yes     Comment: Beer- Socially    Drug use: No        Review of Systems:  Review of Systems   Constitutional:  Negative for appetite change, fatigue, fever and unexpected weight change.   HENT:  Negative for sore throat and trouble swallowing.    Eyes: Negative.   "  Respiratory:  Negative for cough, shortness of breath and wheezing.    Cardiovascular:  Negative for chest pain and leg swelling.   Gastrointestinal:  Negative for abdominal distention, abdominal pain, blood in stool, constipation, diarrhea, nausea and vomiting.   Endocrine: Negative.    Genitourinary: Negative.    Musculoskeletal:  Negative for back pain.   Skin: Negative.  Negative for rash.   Allergic/Immunologic: Negative.    Neurological: Negative.    Hematological: Negative.    Psychiatric/Behavioral:  Negative for confusion.        OBJECTIVE:     Vital Signs (Most Recent)  Pulse: 62 (11/07/24 1011)  BP: (!) 149/78 (11/07/24 1011)  SpO2: 99 % (11/07/24 1011)  5' 7" (1.702 m)  70.8 kg (156 lb)     Physical Exam:  Physical Exam  Constitutional:       General: He is not in acute distress.     Appearance: Normal appearance.   Eyes:      General: No scleral icterus.  Cardiovascular:      Rate and Rhythm: Normal rate and regular rhythm.   Pulmonary:      Breath sounds: Normal breath sounds.   Abdominal:      General: There is no distension.      Palpations: Abdomen is soft.      Tenderness: There is no abdominal tenderness.      Comments: Reducible bulge at the left external ring.  No bulge at the right external ring with Valsalva standing.  testes are descended and nontender.  No bulges at the femoral space bilaterally   Musculoskeletal:         General: Normal range of motion.      Cervical back: Neck supple.   Lymphadenopathy:      Cervical: No cervical adenopathy.   Skin:     General: Skin is warm and dry.      Findings: No rash.   Neurological:      General: No focal deficit present.      Mental Status: He is alert.   Psychiatric:         Mood and Affect: Mood normal.         Laboratory      Diagnostic Results:      ASSESSMENT/PLAN:     Left inguinal hernia repair, initial, reducible, nonrecurrent    PLAN:Plan     Open inguinal hernia repair with mesh      We discussed various options of repair including open " repair with mesh and robotic repair with mesh.  He has decided on open hernia repair with mesh.  Feel that we could have a lighter anesthesia with this which may be good since he has sick sinus syndrome and a pacemaker     The general risks of the operation were described to the patient in detail and included on an informed consent sheet, which I reviewed with the patient and which the patient has signed.  Illustrations were provided for clarity.  The patient was offered the opportunity to ask questions, on multiple occasions, and after continued discussion had none additional.  The patient is ready to proceed with the operation.  Call the office or report to the emergency department if symptoms worsen prior to the operation

## 2024-11-07 NOTE — PROGRESS NOTES
Subjective:       Patient ID: Renny Young is a 64 y.o. male.    Chief Complaint: Follow-up    Established patient for an annual wellness check/physical exam and also chronic disease management. Specific complaints - see dictation, M*model entries and please see ROS.  Past, Surgical, Family, Social Histories; Medications, Allergies reviewed and reconciled.  Health maintenance file reviewed and addressed items due. Recent applicable lab, imaging and cardiovascular results reviewed.  Problem list items reviewed and modified or added entries (in the overview section) may not be transcribed into this encounter note due to note writer format.      Followed in [facility].    CP month ago, vague. ER 11/2023. Last gen cardiology visit in 6/2022 w/ nonischemic MANN.    Thinks has hernia on L.    Didn't get CS last year.          Review of Systems   Constitutional:  Negative for appetite change, chills, diaphoresis, fatigue and fever.   HENT:  Negative for congestion, postnasal drip, rhinorrhea, sore throat and trouble swallowing.    Eyes:  Negative for visual disturbance.   Respiratory:  Negative for cough, choking, chest tightness, shortness of breath and wheezing.    Cardiovascular:  Positive for chest pain. Negative for leg swelling.   Gastrointestinal:  Negative for abdominal distention, abdominal pain, diarrhea, nausea and vomiting.        L groin 'knot'   Genitourinary:  Negative for difficulty urinating and hematuria.   Musculoskeletal:  Negative for arthralgias and myalgias.   Skin:  Negative for rash.   Neurological:  Negative for weakness, light-headedness and headaches.   Psychiatric/Behavioral:  Negative for confusion and dysphoric mood.        Objective:      Physical Exam  Vitals and nursing note reviewed.   Constitutional:       Appearance: He is well-developed. He is not diaphoretic.   HENT:      Head: Normocephalic and atraumatic.   Eyes:      General: No scleral icterus.     Conjunctiva/sclera: Conjunctivae normal.    Cardiovascular:      Rate and Rhythm: Normal rate and regular rhythm.      Heart sounds: Normal heart sounds. No murmur heard.     No friction rub. No gallop.   Pulmonary:      Effort: Pulmonary effort is normal. No respiratory distress.      Breath sounds: Normal breath sounds. No wheezing or rales.   Abdominal:      General: There is no distension.      Tenderness: There is no abdominal tenderness.      Hernia: A hernia is present.   Musculoskeletal:         General: No deformity.      Cervical back: Normal range of motion and neck supple.   Skin:     General: Skin is warm and dry.      Findings: No erythema or rash.   Neurological:      Mental Status: He is alert and oriented to person, place, and time.      Cranial Nerves: No cranial nerve deficit.      Motor: No tremor.      Coordination: Coordination normal.      Gait: Gait normal.   Psychiatric:         Behavior: Behavior normal.         Thought Content: Thought content normal.         Judgment: Judgment normal.         Assessment:       1. Annual physical exam    2. Diabetic polyneuropathy associated with type 2 diabetes mellitus    3. Type 2 diabetes mellitus with other specified complication, without long-term current use of insulin    4. Sick sinus syndrome    5. Pacemaker    6. Hypertension, essential    7. Mixed hyperlipidemia    8. Prostate cancer screening    9. Colon cancer screening    10. Personal history of nicotine dependence    11. Pulmonary nodule    12. Chest pain, unspecified type    13. Unilateral inguinal hernia without obstruction or gangrene, recurrence not specified    14. Hyperlipidemia, unspecified hyperlipidemia type        Plan:     Medication List with Changes/Refills   Current Medications    ASPIRIN (ECOTRIN) 81 MG EC TABLET    Take 81 mg by mouth once daily.    CLONIDINE (CATAPRES) 0.1 MG TABLET    Take 1 tablet (0.1 mg total) by mouth every 6 (six) hours as needed (for blood pressures greater than 170/100 mm Hg).    GABAPENTIN  (NEURONTIN) 300 MG CAPSULE    Take 1 capsule by mouth twice daily    HYDROXYZINE HCL (ATARAX) 10 MG TAB    TAKE 1 TABLET BY MOUTH NIGHTLY AS NEEDED FOR ITCHING   Changed and/or Refilled Medications    Modified Medication Previous Medication    AMLODIPINE (NORVASC) 5 MG TABLET amLODIPine (NORVASC) 5 MG tablet       Take 1 tablet (5 mg total) by mouth once daily.    Take 1 tablet (5 mg total) by mouth once daily.    ATORVASTATIN (LIPITOR) 20 MG TABLET atorvastatin (LIPITOR) 20 MG tablet       Take 1 tablet (20 mg total) by mouth once daily.    Take 1 tablet by mouth once daily    LOSARTAN-HYDROCHLOROTHIAZIDE 100-12.5 MG (HYZAAR) 100-12.5 MG TAB losartan-hydrochlorothiazide 100-12.5 mg (HYZAAR) 100-12.5 mg Tab       Take 1 tablet by mouth once daily.    Take 1 tablet by mouth once daily    METFORMIN (GLUCOPHAGE) 500 MG TABLET metFORMIN (GLUCOPHAGE) 500 MG tablet       Take 1 tablet (500 mg total) by mouth daily with breakfast.    Take 1 tablet by mouth once daily with breakfast   Discontinued Medications    (MAGIC MOUTHWASH) 1:1:1 DIPHENHYDRAMINE(BENADRYL) 12.5MG/5ML LIQ, ALUMINUM & MAGNESIUM HYDROXIDE-SIMETHICONE (MAALOX), LIDOCAINE VISCOUS 2%    Swish and spit 15 mLs every 4 (four) hours as needed (as needed). for mouth sores    CLOTRIMAZOLE-BETAMETHASONE 1-0.05% (LOTRISONE) CREAM    Apply topically 2 (two) times daily.    TERCONAZOLE (TERAZOL 7) 0.4 % CREA    SMARTSI Applicator Vaginal Every Evening    VALACYCLOVIR (VALTREX) 1000 MG TABLET    Take 1 tablet (1,000 mg total) by mouth 3 (three) times daily. for 7 days     1. Annual physical exam  -     Hemoglobin A1C; Future; Expected date: 2024  -     Comprehensive Metabolic Panel; Future; Expected date: 2024  -     Lipid Panel; Future; Expected date: 2024  -     PSA, Screening; Future; Expected date: 2024  -     Microalbumin/Creatinine Ratio, Urine; Future; Expected date: 2024    2. Diabetic polyneuropathy associated with type 2  diabetes mellitus  Assessment & Plan:  -2/22/2023 podiatry decr vibratory      3. Type 2 diabetes mellitus with other specified complication, without long-term current use of insulin  Overview:  -peripheral neuropathy per 2/22/2023 podiatry decr vibratory    Orders:  -     Hemoglobin A1C; Future; Expected date: 11/07/2024  -     Comprehensive Metabolic Panel; Future; Expected date: 11/07/2024  -     Microalbumin/Creatinine Ratio, Urine; Future; Expected date: 11/07/2024  -     Ambulatory referral/consult to Optometry; Future; Expected date: 11/14/2024  -     Ambulatory referral/consult to Podiatry; Future; Expected date: 11/14/2024  -     metFORMIN (GLUCOPHAGE) 500 MG tablet; Take 1 tablet (500 mg total) by mouth daily with breakfast.  Dispense: 90 tablet; Refill: 3    4. Sick sinus syndrome  Overview:  -[employer] dual chamber pacemaker implanted at [facility] in 7/2009 for syncope  -followed by [facility] cardiology      5. Pacemaker  Overview:  [employer] dual chamber pacemaker implanted at [facility] in 7/2009 for syncope    Orders:  -     EKG 12-lead; Future    6. Hypertension, essential  -     amLODIPine (NORVASC) 5 MG tablet; Take 1 tablet (5 mg total) by mouth once daily.  Dispense: 90 tablet; Refill: 3  -     losartan-hydrochlorothiazide 100-12.5 mg (HYZAAR) 100-12.5 mg Tab; Take 1 tablet by mouth once daily.  Dispense: 90 tablet; Refill: 3    7. Mixed hyperlipidemia  -     Lipid Panel; Future; Expected date: 11/07/2024    8. Prostate cancer screening  -     PSA, Screening; Future; Expected date: 11/07/2024    9. Colon cancer screening  -     Ambulatory referral/consult to Endo Procedure ; Future; Expected date: 11/08/2024    10. Personal history of nicotine dependence  Overview:  30 yrs - under 1 PPD, still active (2024)    Orders:  -     CT Chest Lung Screening Low Dose; Future; Expected date: 11/07/2024    11. Pulmonary nodule  Assessment & Plan:  -on LDCT 2023, stable, but not mentioned on 2023  CTA    Orders:  -     CT Chest Lung Screening Low Dose; Future; Expected date: 11/07/2024    12. Chest pain, unspecified type  -     Ambulatory referral/consult to Cardiology; Future; Expected date: 11/14/2024  -     EKG 12-lead; Future    13. Unilateral inguinal hernia without obstruction or gangrene, recurrence not specified  -     Ambulatory referral/consult to General Surgery; Future; Expected date: 11/14/2024    14. Hyperlipidemia, unspecified hyperlipidemia type  -     atorvastatin (LIPITOR) 20 MG tablet; Take 1 tablet (20 mg total) by mouth once daily.  Dispense: 90 tablet; Refill: 3      See meds, orders, follow up, routing and instructions sections of encounter and AVS. Discussed with patient and provided on AVS.    Discussed diet and exercise as therapeutic modalities for metabolic and other conditions. Provided patient information, which are included as links on the AVS for detailed information.    Lab Results   Component Value Date     11/14/2023    K 3.8 11/14/2023     11/14/2023    BUN 21 11/14/2023    CREATININE 1.0 11/14/2023    GLU 88 11/14/2023    HGBA1C 6.1 (H) 06/12/2023    AST 22 11/14/2023    ALT 18 11/14/2023    ALBUMIN 3.9 11/14/2023    PROT 7.0 11/14/2023    BILITOT 1.6 (H) 11/14/2023    CHOL 131 06/12/2023    HDL 55 06/12/2023    LDLCALC 53.2 (L) 06/12/2023    TRIG 114 06/12/2023    WBC 10.65 11/14/2023    HGB 16.8 11/14/2023    HCT 50.0 11/14/2023     11/14/2023    PSA 1.8 06/12/2023    TSH 1.194 04/06/2017    BNP <10 11/14/2023         Diabetes Management Status    Statin: Taking  ACE/ARB: Taking    Screening or Prevention Patient's value Goal Complete/Controlled?   HgA1C Testing and Control   Lab Results   Component Value Date    HGBA1C 6.1 (H) 06/12/2023      Annually/Less than 8% No   Lipid profile : 06/12/2023 Annually No   LDL control Lab Results   Component Value Date    LDLCALC 53.2 (L) 06/12/2023    Annually/Less than 100 mg/dl  No   Nephropathy screening Lab  "Results   Component Value Date    LABMICR 25.0 06/12/2023     Lab Results   Component Value Date    PROTEINUA Negative 08/11/2023     No results found for: "UTPCR"   Annually No   Blood pressure BP Readings from Last 1 Encounters:   11/07/24 135/70    Less than 140/90 Yes   Dilated retinal exam : 11/16/2023 Annually Yes   Foot exam   : 02/22/2023 Annually No       For any CP, go to ER. Will refer to gen cardiology. Sees [facility] on Monday.  "

## 2024-11-07 NOTE — PATIENT INSTRUCTIONS
Schedule lab orders for today.      If not contacted in a couple weeks by colonoscopy scheduling department - call Colonoscopy Scheduling Number - [phone].

## 2024-11-07 NOTE — PROGRESS NOTES
History & Physical    SUBJECTIVE:     History of Present Illness:  Patient is a 64 y.o. male referred for evaluation of left inguinal hernia.  Several weeks ago patient noticed a bulge in his left inguinal area.  It was not causing very much discomfort.  He knew it was a hernia as he had a previous right inguinal hernia repair.  He is interested in repair before it becomes  painful or enlarges.    Review of patient's allergies indicates:  No Known Allergies    Current Outpatient Medications   Medication Sig Dispense Refill    amLODIPine (NORVASC) 5 MG tablet Take 1 tablet (5 mg total) by mouth once daily. 90 tablet 3    aspirin (ECOTRIN) 81 MG EC tablet Take 81 mg by mouth once daily.      atorvastatin (LIPITOR) 20 MG tablet Take 1 tablet (20 mg total) by mouth once daily. 90 tablet 3    gabapentin (NEURONTIN) 300 MG capsule Take 1 capsule by mouth twice daily 60 capsule 0    hydrOXYzine HCL (ATARAX) 10 MG Tab TAKE 1 TABLET BY MOUTH NIGHTLY AS NEEDED FOR ITCHING 30 tablet 0    losartan-hydrochlorothiazide 100-12.5 mg (HYZAAR) 100-12.5 mg Tab Take 1 tablet by mouth once daily. 90 tablet 3    metFORMIN (GLUCOPHAGE) 500 MG tablet Take 1 tablet (500 mg total) by mouth daily with breakfast. 90 tablet 3    cloNIDine (CATAPRES) 0.1 MG tablet Take 1 tablet (0.1 mg total) by mouth every 6 (six) hours as needed (for blood pressures greater than 170/100 mm Hg). (Patient not taking: Reported on 11/7/2024) 20 tablet 0     No current facility-administered medications for this visit.       Past Medical History:   Diagnosis Date    Colon polyp     Diabetes mellitus, type 2     Hypertension     Pacemaker     Pacemaker at end of battery life 5/6/2020    Smoker 6/10/2015    Offered cessation program and declined     Past Surgical History:   Procedure Laterality Date    COLONOSCOPY N/A 2/13/2017    Procedure: COLONOSCOPY;  Surgeon: NILDA Juares MD;  Location: [facility] ([address] [place] [address]);  Service: Endoscopy;  Laterality: N/A;  Do not  cancel this order. Patient has Pacemaker in place.     EPIDURAL STEROID INJECTION N/A 8/13/2019    Procedure: INJECTION, STEROID, EPIDURAL IL, L5/S1;  Surgeon: Josue Paredes MD;  Location: [facility];  Service: Pain Management;  Laterality: N/A;  IL JEAN MARIE L5/S1    EPIDURAL STEROID INJECTION N/A 6/16/2020    Procedure: INJECTION, STEROID, EPIDURAL, L5-S1 IL add on @ 2 ok by  Clyman;  Surgeon: Josue Paredes MD;  Location: [facility];  Service: Pain Management;  Laterality: N/A;    HERNIA REPAIR      INJECTION OF ANESTHETIC AGENT AROUND NERVE Bilateral 7/5/2018    Procedure: BLOCK, NERVE;  Surgeon: Krystal Mtz MD;  Location: [facility];  Service: Pain Management;  Laterality: Bilateral;  Lumbar Bilateral L3-L4-L5 Medial Branch Block    RADIOFREQUENCY ABLATION Left 6/11/2019    Procedure: RADIOFREQUENCY ABLATION, L3-L4-L5 MEDIAL BRANCH   1 OF 2;  Surgeon: Josue Paredes MD;  Location: [facility];  Service: Pain Management;  Laterality: Left;    RADIOFREQUENCY ABLATION Right 6/25/2019    Procedure: RADIOFREQUENCY ABLATION, L3-L4-L5 MEDIAL BRANCH JING 2 OF 2;  Surgeon: Josue Paredes MD;  Location: [facility];  Service: Pain Management;  Laterality: Right;    REPLACEMENT OF PACEMAKER GENERATOR Left 5/6/2020    Procedure: REPLACEMENT, PULSE GENERATOR, CARDIAC PACEMAKER;  Surgeon: Bradford Spence MD;  Location: [facility];  Service: Cardiology;  Laterality: Left;  EOL/LEAD Mlfx, Gen Change, Poss RA lead rev, SJM, MAC, GP, 3 PREP    REVISION OF PROCEDURE INVOLVING PACEMAKER LEAD Left 5/6/2020    Procedure: REVISION, ELECTRODE LEAD, CARDIAC PACEMAKER;  Surgeon: Bradford Spence MD;  Location: [facility] EP LAB;  Service: Cardiology;  Laterality: Left;    TRANSFORAMINAL EPIDURAL INJECTION OF STEROID Right 6/5/2018    Procedure: INJECTION-STEROID-EPIDURAL-TRANSFORAMINAL;  Surgeon: Josue Paredes MD;  Location: [facility];  Service: Pain Management;  Laterality: Right;  LUMBAR RIGHT L5 AND S1  TRANSFORAMINL JEAN MARIE  [id number]    W/ SEDATION     TRANSFORAMINAL EPIDURAL INJECTION OF STEROID Right 9/10/2020    Procedure: INJECTION, STEROID, EPIDURAL, TRANSFORAMINAL APPROACH, L5-S1 AND S1;  Surgeon: Josue Paredes MD;  Location: [facility];  Service: Pain Management;  Laterality: Right;    TRANSFORAMINAL EPIDURAL INJECTION OF STEROID Right 6/15/2021    Procedure: INJECTION, STEROID, EPIDURAL, TRANSFORAMINAL APPROACH L5-S1 AND S1 need consent;  Surgeon: Josue Paredes MD;  Location: [facility];  Service: Pain Management;  Laterality: Right;    TRANSFORAMINAL EPIDURAL INJECTION OF STEROID Right 6/20/2022    Procedure: INJECTION, STEROID, EPIDURAL, TRANSFORAMINAL APPROACH, RIGHT L5-S1 AND S1 CONTRAST;  Surgeon: Krystal Mtz MD;  Location: [facility];  Service: Pain Management;  Laterality: Right;    TRANSFORAMINAL EPIDURAL INJECTION OF STEROID Bilateral 9/26/2022    Procedure: INJECTION, STEROID, EPIDURAL, TRANSFORAMINAL APPROACH, BILATERAL L5-S1 CONTRAST;  Surgeon: Krystal Mtz MD;  Location: [facility];  Service: Pain Management;  Laterality: Bilateral;    TRANSFORAMINAL EPIDURAL INJECTION OF STEROID Bilateral 7/13/2023    Procedure: INJECTION, STEROID, EPIDURAL, TRANSFORAMINAL APPROACH, L5-S1 BILATERAL;  Surgeon: Krystal Mtz MD;  Location: [facility];  Service: Pain Management;  Laterality: Bilateral;     Family History   Problem Relation Name Age of Onset    Diabetes Mother      Diabetes Father      Kidney disease Father      Melanoma Neg Hx       Social History     Tobacco Use    Smoking status: Every Day     Current packs/day: 0.50     Types: Cigarettes    Smokeless tobacco: Never   Substance Use Topics    Alcohol use: Yes     Comment: Beer- Socially    Drug use: No        Review of Systems:  Review of Systems   Constitutional:  Negative for appetite change, fatigue, fever and unexpected weight change.   HENT:  Negative for sore throat and trouble swallowing.    Eyes: Negative.   "  Respiratory:  Negative for cough, shortness of breath and wheezing.    Cardiovascular:  Negative for chest pain and leg swelling.   Gastrointestinal:  Negative for abdominal distention, abdominal pain, blood in stool, constipation, diarrhea, nausea and vomiting.   Endocrine: Negative.    Genitourinary: Negative.    Musculoskeletal:  Negative for back pain.   Skin: Negative.  Negative for rash.   Allergic/Immunologic: Negative.    Neurological: Negative.    Hematological: Negative.    Psychiatric/Behavioral:  Negative for confusion.        OBJECTIVE:     Vital Signs (Most Recent)  Pulse: 62 (11/07/24 1011)  BP: (!) 149/78 (11/07/24 1011)  SpO2: 99 % (11/07/24 1011)  5' 7" (1.702 m)  70.8 kg (156 lb)     Physical Exam:  Physical Exam  Constitutional:       General: He is not in acute distress.     Appearance: Normal appearance.   Eyes:      General: No scleral icterus.  Cardiovascular:      Rate and Rhythm: Normal rate and regular rhythm.   Pulmonary:      Breath sounds: Normal breath sounds.   Abdominal:      General: There is no distension.      Palpations: Abdomen is soft.      Tenderness: There is no abdominal tenderness.      Comments: Reducible bulge at the left external ring.  No bulge at the right external ring with Valsalva standing.  testes are descended and nontender.  No bulges at the femoral space bilaterally   Musculoskeletal:         General: Normal range of motion.      Cervical back: Neck supple.   Lymphadenopathy:      Cervical: No cervical adenopathy.   Skin:     General: Skin is warm and dry.      Findings: No rash.   Neurological:      General: No focal deficit present.      Mental Status: He is alert.   Psychiatric:         Mood and Affect: Mood normal.         Laboratory      Diagnostic Results:      ASSESSMENT/PLAN:     Left inguinal hernia repair, initial, reducible, nonrecurrent    PLAN:Plan     Open inguinal hernia repair with mesh      We discussed various options of repair including open " repair with mesh and robotic repair with mesh.  He has decided on open hernia repair with mesh.  Feel that we could have a lighter anesthesia with this which may be good since he has sick sinus syndrome and a pacemaker     The general risks of the operation were described to the patient in detail and included on an informed consent sheet, which I reviewed with the patient and which the patient has signed.  Illustrations were provided for clarity.  The patient was offered the opportunity to ask questions, on multiple occasions, and after continued discussion had none additional.  The patient is ready to proceed with the operation.  Call the office or report to the emergency department if symptoms worsen prior to the operation

## 2024-11-08 ENCOUNTER — TELEPHONE (OUTPATIENT)
Dept: CARDIOLOGY | Facility: CLINIC | Age: 64
End: 2024-11-08
Payer: COMMERCIAL

## 2024-11-08 ENCOUNTER — ANESTHESIA EVENT (OUTPATIENT)
Dept: SURGERY | Facility: OTHER | Age: 64
End: 2024-11-08
Payer: COMMERCIAL

## 2024-11-08 NOTE — TELEPHONE ENCOUNTER
----- Message from Cherelle Ornelas sent at 11/8/2024  3:13 PM CST -----  Regarding: FW: needs cardiology visit prior to surgery  Patient is taken care of.  I rescheduled.     Thankscherelle  ----- Message -----  From: Cherelle Ornelas RN  Sent: 11/8/2024   2:52 PM CST  To: Alexandrea Dempsey RN; #  Subject: FW: needs cardiology visit prior to surgery      Hi Fannie,    Please check if we can get the pt in with [facility] next week.  If not, I can help if needed.   If you can, Please follow up with Alexandrea so she can let anesthesia know the patient is scheduled.  Patient is an established patient with [facility].  Also saw Dr. Cardenas.    Thanks,  Cherelle  ----- Message -----  From: Alexandrea Dempsey RN  Sent: 11/8/2024  11:10 AM CST  To: [facility] Pre Admit Staff; #  Subject: needs cardiology visit prior to surgery          Patient scheduled for inguinal hernia repair with Dr. Craft at [facility] on 11/18.    Has upcoming appts with MICHAEL (11/11) and Sean, Dr. Fierro (12/3).  Chart reviewed per Anesthesia MD (Dr. Jose Rafael Felder) - requesting Cardiology visit prior to surgery.  Can the appt with Dr. Fierro be moved earlier?    If not, surgery will need to be postponed.    Please let me know.  Thanks,  Alexandrea WEN  PreAdmit  [phone]   steri-strips

## 2024-11-08 NOTE — TELEPHONE ENCOUNTER
Patient called and rescheduled with a sooner appt with Dr. Fierro.  He is actually an established patient and is having sx 11/18 at [facility].  Reviewed upcoming appt date and time and I asked patient to write it down. Patient repeated instructions and verbalized understanding.

## 2024-11-09 ENCOUNTER — CLINICAL SUPPORT (OUTPATIENT)
Dept: ENDOSCOPY | Facility: HOSPITAL | Age: 64
End: 2024-11-09
Attending: FAMILY MEDICINE
Payer: COMMERCIAL

## 2024-11-09 ENCOUNTER — TELEPHONE (OUTPATIENT)
Dept: ENDOSCOPY | Facility: HOSPITAL | Age: 64
End: 2024-11-09

## 2024-11-09 DIAGNOSIS — Z12.11 COLON CANCER SCREENING: ICD-10-CM

## 2024-11-09 RX ORDER — SODIUM, POTASSIUM,MAG SULFATES 17.5-3.13G
1 SOLUTION, RECONSTITUTED, ORAL ORAL DAILY
Qty: 1 KIT | Refills: 0 | Status: SHIPPED | OUTPATIENT
Start: 2024-11-09 | End: 2024-11-11

## 2024-11-09 NOTE — TELEPHONE ENCOUNTER
Referral for procedure from PAT appointment      Spoke to patient to schedule procedure(s) Colonoscopy       Physician to perform procedure(s) Dr. ANA Duran  Date of Procedure (s) 1/9/25  Arrival Time 8:20 AM  Time of Procedure(s) 9:20 AM   Location of Procedure(s) [address]  Type of Rx Prep sent to patient: Suprep  Instructions provided to patient via Postal Mail    Patient was informed on the following information and verbalized understanding. Screening questionnaire reviewed with patient and complete. If procedure requires anesthesia, a responsible adult needs to be present to accompany the patient home, patient cannot drive after receiving anesthesia. Appointment details are tentative, especially check-in time. Patient will receive a prep-op call 7 days prior to confirm check-in time for procedure. If applicable the patient should contact their pharmacy to verify Rx for procedure prep is ready for pick-up. Patient was advised to call the scheduling department at [phone] if pharmacy states no Rx is available. Patient was advised to call the endoscopy scheduling department if any questions or concerns arise.       Endoscopy Scheduling Department          Colonoscopy Procedure Prep Instructions      Date of procedure: 1/9/25 Arrive at: 8:20 AM       Location of Department:   [facility] [address] [place] [address]  Take the [facility] Elevators to 4th Floor Endoscopy Lab    As soon as possible:   your prep from pharmacy and over the counter DULCOLAX LAXATIVE TABLETS     On the day before your procedure   What You CAN do:   You may have clear liquids ONLY -see below for list.     Liquids That Are OK to Drink:   Water  Sports drinks (Gatorade, Power-Aid)  Coffee or tea (no cream or nondairy creamer)  Clear juices without pulp (apple, white grape)  Gelatin desserts (no fruit or toppings)  Clear soda (sprite, coke, ginger ale)  Chicken broth (until 12 midnight the night  before procedure)      What You CANNOT do:   Do not EAT solid food, drink milk or anything   colored red.  Do not drink alcohol.  Do not take oral medications within 1 hour of starting   each dose of SUPREP.  No gum chewing or candy morning of procedure.      Note:   (Please disregard the insert instructions from pharmacy).  SUPREP Bowel Prep Kit is indicated for cleansing of the colon as a preparation for colonoscopy in adults.   Be sure to tell your doctor about all the medicines you take, including prescription and non-prescription medicines, vitamins, and herbal supplements. SUPREP Bowel Prep Kit may affect how other medicines work.  Medication taken by mouth may not be absorbed properly when taken within 1 hour before the start of each dose of SUPREP Bowel Prep Kit.    It is not uncommon to experience some abdominal cramping, nausea and/or vomiting when taking the prep. If you have nausea and/or vomiting while taking the prep, stop drinking for 20 to 30 minutes then continue.    How to take prep:    SUPREP Bowel Prep Kit is a (2-day) prep.   Both 6-ounce bottles are required for a complete preparation for colonoscopy. Dilute the solution concentrate as directed prior to use. You must drink water with each dose of SUPREP, and additional water after each dose.    DOSE 1--Day Before Colonoscopy 1/8/25    Drink at least 6 to 8 glasses of clear liquids from time you wake up until you begin your prep and then continue until bedtime to avoid dehydration.     12:00 pm (NOON) Take four (4) Dulcolax (Bisacodyl) tablets with at least 8 ounces or more of clear liquids.      6:00 pm:    You must complete Steps 1 through 4 using one (1) 6-ounce bottle before going to bed as shown below:    Step 1-Pour ONE (1) 6-ounce bottle of SUPREP liquid into the mixing container.  Step 2-Add cool drinking water to the 16-ounce line on the container and mix.  Step 3-Drink ALL the liquid in the container.  Step 4-You must drink two (2)  more 16-ounce containers of water over the next 1 hour.  IMPORTANT: If you experience preparation-related symptoms (for example, nausea, bloating, or cramping), stop, or slow the rate of drinking the additional water until your symptoms decrease.    DOSE 2--Day of the Colonoscopy 1/9/25 at 2-3 AM.    For this dose, repeat Steps 1 through 4 shown above using the other 6-ounce bottle.   You may continue drinking water/clear liquids until   2 hours before your colonoscopy or as directed by the scheduling nurse  7:20 AM       For information about your procedure, two (2) things to view prior to colonoscopy:  Please watch this informational video. It is important to watch this animated consent video prior to your arrival. If you haven't watched the video prior to arriving, you are required to watch it during admission which can causes delays.    Options for viewing:   Using a keyboard:  press and hold the control tab (Ctrl) and left mouse click to follow links.           Colonoscopy Instructional Video                                                                                   OR    Type link address into your web browser's address bar:  [email or web address]      Educational Booklet with pictures:      Colonoscopy Prep - Liquid           IMPORTANT INFORMATION TO KNOW BEFORE YOUR PROCEDURE    [facility] [place] 4th Floor         If your procedure requires the administration of anesthesia, it is necessary for a responsible adult to drive you home. (Medical Transportation, Uber, Lyft, Taxi, etc. may ONLY be used if a responsible adult is present to accompany you home.  The responsible adult CAN'T be the  of the service).      person must be available to return to pick you up within 15 minutes of being notified of discharge.       Please bring a picture ID, insurance card, & copayment      Take Medications as directed below:          If you begin taking any blood  thinning medications, injectable weight loss/diabetes medications (other than insulin) , or Adipex (Phentermine) please contact the endoscopy scheduling department listed below as soon as possible.    If you are diabetic see the attached instruction sheet regarding your medication.     If you take HEART, BLOOD PRESSURE, SEIZURE, PAIN, LUNG (including inhalers/nebulizers), ANTI-REJECTION (transplant patients), or PSYCHIATRIC medications, please take at your regular times with a sip of water or as directed by the scheduling nurse.     Important contact information:    Endoscopy [phone] Hours of operation Monday-Friday 8:00-4:30pm.    Questions about insurance or financial obligations call [phone] or [phone].    If you have questions regarding the prep or need to reschedule, please call [phone]. After hours questions requiring immediate assistance, contact [facility] On-Call nurse line at [phone] or [phone].   NOTE:     On occasion, unforeseen circumstances may cause a delay in your procedure start time. We respect your time and appreciate your patience during these circumstances.      Comments:     Diabetes Medication Instructions    If you are unsure about any of these instructions, call [facility] at [phone].                          Oral Medicine  Day of Prep  Day of Procedure           Glyburide    Do NOT take morning of procedure. If you         Glucotrol (Glipizide)  Do NOT take. take twice daily, take with dinner.         Amaryl (Glimepiride)                                Glucophage    Do NOT take morning of procedure. Take         Glumetza  Take as  when you start eating again.          Fortamet (Metformin)  prescribed.                              Januvia (Sitaglipitin)    Do NOT take morning of procedure. Take         Nesina (Aloglipitin)    when you start eating again.          Onglyza (Saxaglipitin)  Take as              Tradjenta (Linaglipitin)   prescribed.                              Invokana (Canagliflozin)               Farxiga (Dapagliflozin)  Stop 2 days  Do NOT take morning of procedure. Take         Jardiance(Empagliflozin) before prep.  when you start eating again.                          Actos (Pioglitazone)  Take as  Do NOT take morning of procedure. Take           prescribed.  when you start eating again.                          Injectable & Combination Daily Dose  Weekly Dose           Medicine                Adlyxin (Lixisenatide)  If you take these For weekly dose, hold dose at least 8 days prior        Byetta (Exenatide)  medications daily to appointment. You may take the dose after your        Bydureon (Exenatide XL) on the day of your procedure.            Ozempic (Semaglutide)  appointment hold              Trulicity (Dulaglutide)  that dose until              Victoza (Liraglutide)  after your              Mounjaro (Tirzepatide)  procedure.              Jessica Montgomery                It is important to monitor your blood sugar while doing the bowel preparation. On the day of your bowel   prep, when you are on a clear liquid diet, you may drink beverages with sugar as your source of glucose.   Be sure to mix the prep with water or sugar free liquid only. Below are instructions on how to adjust your   diabetic medications prior to your scheduled procedure. Call the healthcare provider who manages your   diabetes if you have questions.

## 2024-11-11 ENCOUNTER — HOSPITAL ENCOUNTER (OUTPATIENT)
Dept: CARDIOLOGY | Facility: CLINIC | Age: 64
Discharge: HOME | End: 2024-11-11
Payer: COMMERCIAL

## 2024-11-11 ENCOUNTER — OFFICE VISIT (OUTPATIENT)
Dept: ELECTROPHYSIOLOGY | Facility: CLINIC | Age: 64
End: 2024-11-11
Payer: COMMERCIAL

## 2024-11-11 VITALS
SYSTOLIC BLOOD PRESSURE: 149 MMHG | WEIGHT: 155.44 LBS | DIASTOLIC BLOOD PRESSURE: 76 MMHG | BODY MASS INDEX: 24.4 KG/M2 | HEIGHT: 67 IN | HEART RATE: 60 BPM

## 2024-11-11 DIAGNOSIS — I48.0 PAROXYSMAL ATRIAL FIBRILLATION: Primary | ICD-10-CM

## 2024-11-11 DIAGNOSIS — I49.5 SICK SINUS SYNDROME: ICD-10-CM

## 2024-11-11 DIAGNOSIS — Z95.0 PACEMAKER: Primary | ICD-10-CM

## 2024-11-11 DIAGNOSIS — I10 HYPERTENSION, ESSENTIAL: ICD-10-CM

## 2024-11-11 LAB
OHS QRS DURATION: 76 MS
OHS QTC CALCULATION: 406 MS

## 2024-11-11 PROCEDURE — 1159F MED LIST DOCD IN RCRD: CPT | Mod: CPTII,S$GLB,, | Performed by: NURSE PRACTITIONER

## 2024-11-11 PROCEDURE — 3072F LOW RISK FOR RETINOPATHY: CPT | Mod: CPTII,S$GLB,, | Performed by: NURSE PRACTITIONER

## 2024-11-11 PROCEDURE — 93010 ELECTROCARDIOGRAM REPORT: CPT | Mod: S$GLB,,, | Performed by: STUDENT IN AN ORGANIZED HEALTH CARE EDUCATION/TRAINING PROGRAM

## 2024-11-11 PROCEDURE — 99214 OFFICE O/P EST MOD 30 MIN: CPT | Mod: S$GLB,,, | Performed by: NURSE PRACTITIONER

## 2024-11-11 PROCEDURE — 99999 PR PBB SHADOW E&M-EST. PATIENT-LVL IV: CPT | Mod: PBBFAC,,, | Performed by: NURSE PRACTITIONER

## 2024-11-11 PROCEDURE — 93005 ELECTROCARDIOGRAM TRACING: CPT | Mod: S$GLB,,, | Performed by: INTERNAL MEDICINE

## 2024-11-11 PROCEDURE — 1160F RVW MEDS BY RX/DR IN RCRD: CPT | Mod: CPTII,S$GLB,, | Performed by: NURSE PRACTITIONER

## 2024-11-11 PROCEDURE — 3061F NEG MICROALBUMINURIA REV: CPT | Mod: CPTII,S$GLB,, | Performed by: NURSE PRACTITIONER

## 2024-11-11 PROCEDURE — 3077F SYST BP >= 140 MM HG: CPT | Mod: CPTII,S$GLB,, | Performed by: NURSE PRACTITIONER

## 2024-11-11 PROCEDURE — 3066F NEPHROPATHY DOC TX: CPT | Mod: CPTII,S$GLB,, | Performed by: NURSE PRACTITIONER

## 2024-11-11 PROCEDURE — 3008F BODY MASS INDEX DOCD: CPT | Mod: CPTII,S$GLB,, | Performed by: NURSE PRACTITIONER

## 2024-11-11 PROCEDURE — 3044F HG A1C LEVEL LT 7.0%: CPT | Mod: CPTII,S$GLB,, | Performed by: NURSE PRACTITIONER

## 2024-11-11 PROCEDURE — 3078F DIAST BP <80 MM HG: CPT | Mod: CPTII,S$GLB,, | Performed by: NURSE PRACTITIONER

## 2024-11-12 ENCOUNTER — OFFICE VISIT (OUTPATIENT)
Dept: PODIATRY | Facility: CLINIC | Age: 64
End: 2024-11-12
Payer: COMMERCIAL

## 2024-11-12 ENCOUNTER — HOSPITAL ENCOUNTER (OUTPATIENT)
Dept: RADIOLOGY | Facility: HOSPITAL | Age: 64
Discharge: HOME | End: 2024-11-12
Attending: FAMILY MEDICINE
Payer: COMMERCIAL

## 2024-11-12 VITALS
HEART RATE: 77 BPM | SYSTOLIC BLOOD PRESSURE: 151 MMHG | BODY MASS INDEX: 24.4 KG/M2 | WEIGHT: 155.44 LBS | DIASTOLIC BLOOD PRESSURE: 85 MMHG | HEIGHT: 67 IN

## 2024-11-12 DIAGNOSIS — R91.1 PULMONARY NODULE: ICD-10-CM

## 2024-11-12 DIAGNOSIS — E11.42 TYPE 2 DIABETES MELLITUS WITH DIABETIC POLYNEUROPATHY, UNSPECIFIED WHETHER LONG TERM INSULIN USE: Primary | ICD-10-CM

## 2024-11-12 DIAGNOSIS — L84 CORN OR CALLUS: ICD-10-CM

## 2024-11-12 DIAGNOSIS — Z87.891 PERSONAL HISTORY OF NICOTINE DEPENDENCE: ICD-10-CM

## 2024-11-12 DIAGNOSIS — E11.69 TYPE 2 DIABETES MELLITUS WITH OTHER SPECIFIED COMPLICATION, WITHOUT LONG-TERM CURRENT USE OF INSULIN: ICD-10-CM

## 2024-11-12 PROCEDURE — 71271 CT THORAX LUNG CANCER SCR C-: CPT | Mod: 26,,, | Performed by: STUDENT IN AN ORGANIZED HEALTH CARE EDUCATION/TRAINING PROGRAM

## 2024-11-12 PROCEDURE — 99999 PR PBB SHADOW E&M-EST. PATIENT-LVL III: CPT | Mod: PBBFAC,,, | Performed by: PODIATRIST

## 2024-11-12 PROCEDURE — 71271 CT THORAX LUNG CANCER SCR C-: CPT | Mod: TC

## 2024-11-12 RX ORDER — UREA 40 %
CREAM (GRAM) TOPICAL DAILY
Qty: 85 G | Refills: 11 | Status: SHIPPED | OUTPATIENT
Start: 2024-11-12

## 2024-11-12 NOTE — PROGRESS NOTES
Subjective:      Patient ID: Renny Young is a 64 y.o. male.    Chief Complaint: Diabetic Foot Exam    Diabetes, increased risk amputation needing evaluation/management/optomization of foot care.    Callus both feet.  Gradual onset, worsening over the past several weeks.  Aggravated by increased weight-bearing prolonged standing some shoes.  Self maintenance with pumice stone gives good relief.    Chief Complaint   Patient presents with    Diabetic Foot Exam     Casual shoes both feet      Review of Systems   Constitutional: Negative for chills, diaphoresis, fever, malaise/fatigue and night sweats.   Cardiovascular:  Negative for claudication, cyanosis, leg swelling and syncope.   Skin:  Positive for suspicious lesions. Negative for color change, dry skin, nail changes, rash and unusual hair distribution.   Musculoskeletal:  Positive for joint pain. Negative for falls, joint swelling, muscle cramps, muscle weakness and stiffness.   Gastrointestinal:  Negative for constipation, diarrhea, nausea and vomiting.   Neurological:  Positive for sensory change. Negative for brief paralysis, disturbances in coordination, focal weakness, numbness, paresthesias and tremors.           Objective:      Physical Exam  Constitutional:       General: He is not in acute distress.     Appearance: He is well-developed. He is not diaphoretic.   Cardiovascular:      Pulses:           Popliteal pulses are 2+ on the right side and 2+ on the left side.        Dorsalis pedis pulses are 2+ on the right side and 2+ on the left side.        Posterior tibial pulses are 2+ on the right side and 2+ on the left side.      Comments: Capillary refill 3 seconds all toes/distal feet, all toes/both feet warm to touch.      Negative lymphadenopathy bilateral popliteal fossa and tarsal tunnel.      Negavie lower extremity edema bilateral.    Musculoskeletal:      Right ankle: No swelling, deformity, ecchymosis or lacerations. Normal range of motion. Normal  pulse.      Right Achilles Tendon: Normal. No defects. Zafar's test negative.      Comments: Patient has pain to palpation and end range of motion of the left hallux interphalangeal joint with visible periarticular exostoses but without loss of function signs of acute trauma.      Ankle dorsiflexion decreased at <10 degrees bilateral with moderate increase with knee flexion bilateral.,nus    Reduced range of motion right and left 1st mtpj without periarticular exostoses, and reduced range of motion with no current crepitus.       Lymphadenopathy:      Lower Body: No right inguinal adenopathy. No left inguinal adenopathy.      Comments: Negative lymphadenopathy bilateral popliteal fossa and tarsal tunnel.    Negative lymphangitic streaking bilateral feet/ankles/legs.   Skin:     General: Skin is warm and dry.      Capillary Refill: Capillary refill takes 2 to 3 seconds.      Coloration: Skin is not pale.      Findings: No abrasion, bruising, burn, ecchymosis, erythema, laceration, lesion or rash.      Nails: There is no clubbing.      Comments: Calluses of the big toe IPJ is, plantar medial right arch, lateral left 2nd PIPJ consist entirely focal hyperkeratotic tissue without open skin drainage pus tracking fluctuance malodor signs of infection.  These are generally well maintained not symptomatic with home care with a pumice stone.      Otherwise,Skin is normal age and health appropriate color, turgor, texture, and temperature bilateral lower extremities without ulceration, hyperpigmentation, discoloration, masses nodules or cords palpated.  No ecchymosis, erythema, edema, or cardinal signs of infection bilateral lower extremities.       Neurological:      Mental Status: He is alert and oriented to person, place, and time.      Sensory: Sensory deficit present.      Motor: No tremor, atrophy or abnormal muscle tone.      Gait: Gait normal.      Comments: Decreased/absent vibratory sensation bilateral feet to  128Hz tuning fork.    Negative tinel sign to percussion sural, superficial peroneal, deep peroneal, saphenous, and posterior tibial nerves right and left ankles and feet.     Psychiatric:         Behavior: Behavior is cooperative.             Assessment:       Encounter Diagnoses   Name Primary?    Type 2 diabetes mellitus with other specified complication, without long-term current use of insulin     Type 2 diabetes mellitus with diabetic polyneuropathy, unspecified whether long term insulin use Yes    Corn or callus          Plan:       Renny was seen today for diabetic foot exam.    Diagnoses and all orders for this visit:    Type 2 diabetes mellitus with diabetic polyneuropathy, unspecified whether long term insulin use    Type 2 diabetes mellitus with other specified complication, without long-term current use of insulin  -     Ambulatory referral/consult to Podiatry    [place] or callus    Other orders  -     urea (CARMOL) 40 % Crea; Apply topically once daily.      I counseled the patient on his conditions, their implications and medical management.    With the patient's permission, I debrided hyperkeratotic lesion(s) as above totaling       2         to, not  Including dermis with sterile #15 blade.  Patient tolerated the procedure well and related significant relief.      Diabetes, increased risk amputation needing evaluation/management/optomization of foot care.    Inspect feet multiple times daily for signs of occurrence/recurrence ulceration. '    Declines diabetic shoes.  Discussed and patient verbally acknowledges the increased risk of ulceration, infection, amputaiton, sepsis, and death.    Urea    Pumice stone maintenance of calluses at home as presently effective.    One month.          No follow-ups on file.

## 2024-11-13 ENCOUNTER — OFFICE VISIT (OUTPATIENT)
Dept: CARDIOLOGY | Facility: CLINIC | Age: 64
End: 2024-11-13
Payer: COMMERCIAL

## 2024-11-13 VITALS
HEIGHT: 67 IN | WEIGHT: 156.75 LBS | SYSTOLIC BLOOD PRESSURE: 130 MMHG | OXYGEN SATURATION: 97 % | HEART RATE: 76 BPM | DIASTOLIC BLOOD PRESSURE: 80 MMHG | BODY MASS INDEX: 24.6 KG/M2

## 2024-11-13 DIAGNOSIS — R07.9 CHEST PAIN, UNSPECIFIED TYPE: ICD-10-CM

## 2024-11-13 PROCEDURE — 99999 PR PBB SHADOW E&M-EST. PATIENT-LVL IV: CPT | Mod: PBBFAC,,, | Performed by: STUDENT IN AN ORGANIZED HEALTH CARE EDUCATION/TRAINING PROGRAM

## 2024-11-13 RX ORDER — ACETAMINOPHEN 500 MG
1000 TABLET ORAL
OUTPATIENT
Start: 2024-11-13 | End: 2024-11-13

## 2024-11-13 RX ORDER — LIDOCAINE HYDROCHLORIDE 10 MG/ML
0.5 INJECTION, SOLUTION EPIDURAL; INFILTRATION; INTRACAUDAL; PERINEURAL ONCE
OUTPATIENT
Start: 2024-11-13 | End: 2024-11-13

## 2024-11-13 RX ORDER — PREGABALIN 75 MG/1
75 CAPSULE ORAL ONCE
OUTPATIENT
Start: 2024-11-13 | End: 2024-11-13

## 2024-11-13 RX ORDER — SODIUM CHLORIDE, SODIUM LACTATE, POTASSIUM CHLORIDE, CALCIUM CHLORIDE 600; 310; 30; 20 MG/100ML; MG/100ML; MG/100ML; MG/100ML
INJECTION, SOLUTION INTRAVENOUS CONTINUOUS
OUTPATIENT
Start: 2024-11-13

## 2024-11-13 NOTE — PROGRESS NOTES
PCP - Jarvis Washington MD  Referring Physician:     Subjective:   Patient ID:  Renny Young is a 64 y.o. male with past medical history of:   SSS s/p PPM (RV lead off)  Hypertension  Hyperlipidemia  DM   Smoker    Here for preoperative evaluation prior to hernia repair on 11/18/24.  Patient reports that he has been doing very well overall.  He denies having any chest pain.  He has some chronic discomfort around his pacemaker site but he was seen by the electrophysiology team for this several days ago and they are not concerned.  He is still smoking.  We had a long discussion about the need to stop smoking.  He is able to climb 2 flights of steps without difficulty.  He does not exercise much but states that he will start doing this.  He is on metformin for his diabetes.    EKG from 11/11/24 independently interpreted by me shows atrial paced rhythm    History:     Social History     Tobacco Use    Smoking status: Every Day     Current packs/day: 0.50     Types: Cigarettes    Smokeless tobacco: Never   Substance Use Topics    Alcohol use: Yes     Comment: Beer- Socially     Family History   Problem Relation Name Age of Onset    Diabetes Mother      Diabetes Father      Kidney disease Father      Melanoma Neg Hx         Meds:   Review of patient's allergies indicates:  No Known Allergies    Current Outpatient Medications:     amLODIPine (NORVASC) 5 MG tablet, Take 1 tablet (5 mg total) by mouth once daily., Disp: 90 tablet, Rfl: 3    aspirin (ECOTRIN) 81 MG EC tablet, Take 81 mg by mouth once daily., Disp: , Rfl:     atorvastatin (LIPITOR) 20 MG tablet, Take 1 tablet (20 mg total) by mouth once daily., Disp: 90 tablet, Rfl: 3    gabapentin (NEURONTIN) 300 MG capsule, Take 1 capsule by mouth twice daily, Disp: 60 capsule, Rfl: 0    hydrOXYzine HCL (ATARAX) 10 MG Tab, TAKE 1 TABLET BY MOUTH NIGHTLY AS NEEDED FOR ITCHING, Disp: 30 tablet, Rfl: 0    losartan-hydrochlorothiazide 100-12.5 mg (HYZAAR) 100-12.5 mg Tab,  "Take 1 tablet by mouth once daily., Disp: 90 tablet, Rfl: 3    metFORMIN (GLUCOPHAGE) 500 MG tablet, Take 1 tablet (500 mg total) by mouth daily with breakfast., Disp: 90 tablet, Rfl: 3    urea (CARMOL) 40 % Crea, Apply topically once daily., Disp: 85 g, Rfl: 11    cloNIDine (CATAPRES) 0.1 MG tablet, Take 1 tablet (0.1 mg total) by mouth every 6 (six) hours as needed (for blood pressures greater than 170/100 mm Hg). (Patient not taking: Reported on 11/7/2024), Disp: 20 tablet, Rfl: 0      Objective:   /80   Pulse 76   Ht 5' 7" (1.702 m)   Wt 71.1 kg (156 lb 12 oz)   SpO2 97%   BMI 24.55 kg/m²     Physical Exam  Gen: No apparent distress, resting comfortably  HEENT: Pupils equal and reactive to light  Cardio: Regular rate, point of maximal impulse not displaced, no murmur noted, 2+ radial pulses bilaterally, 2+ DP pulses bilaterally  Resp: CTAB, no wheezing  Abd: Soft, non-tender, non-distended  Skin: Warm, dry, no peripheral edema noted  Neuro: Alert and oriented x3  Psych: Normal mood and affect      Labs:     Lab Results   Component Value Date     11/07/2024    K 4.0 11/07/2024     11/07/2024    CO2 24 11/07/2024    BUN 15 11/07/2024    CREATININE 1.1 11/07/2024    ANIONGAP 8 11/07/2024     Lab Results   Component Value Date    HGBA1C 5.9 (H) 11/07/2024     Lab Results   Component Value Date    BNP <10 11/14/2023    BNP <10 09/28/2015       Lab Results   Component Value Date    WBC 10.65 11/14/2023    HGB 16.8 11/14/2023    HCT 50.0 11/14/2023     11/14/2023    GRAN 6.3 11/14/2023    GRAN 59.0 11/14/2023     Lab Results   Component Value Date    CHOL 130 11/07/2024    HDL 55 11/07/2024    LDLCALC 59.8 (L) 11/07/2024    TRIG 76 11/07/2024       Lab Results   Component Value Date     11/07/2024    K 4.0 11/07/2024     11/07/2024    CO2 24 11/07/2024    BUN 15 11/07/2024    CREATININE 1.1 11/07/2024    ANIONGAP 8 11/07/2024     Lab Results   Component Value Date    HGBA1C 5.9 " (H) 11/07/2024     Lab Results   Component Value Date    BNP <10 11/14/2023    BNP <10 09/28/2015    Lab Results   Component Value Date    WBC 10.65 11/14/2023    HGB 16.8 11/14/2023    HCT 50.0 11/14/2023     11/14/2023    GRAN 6.3 11/14/2023    GRAN 59.0 11/14/2023     Lab Results   Component Value Date    CHOL 130 11/07/2024    HDL 55 11/07/2024    LDLCALC 59.8 (L) 11/07/2024    TRIG 76 11/07/2024                Cardiovascular Imaging:     Echo 12/14/21:  The estimated ejection fraction is 65%.  The left ventricle is normal in size with concentric remodeling and normal systolic function.  Normal left ventricular diastolic function.  Normal right ventricular size with normal right ventricular systolic function.  There is a lead/wire present in the right ventricle  Mild tricuspid regurgitation.  The estimated PA systolic pressure is 34 mmHg.  Normal central venous pressure (3 mmHg).  There is no pericardial effusion       Stress test 6/30/22:  The patient exercised for 7 minutes and 1 seconds on a high ramp protocol, corresponding to a functional capacity of 12 METS, achieving a peak heart rate of 141 bpm, which is 89% of the age predicted maximum heart rate.  The patient's exercise capacity was normal.  There were no arrhythmias during stress.  The test was stopped because the patient experienced fatigue.  The ECG portion of this study is negative for myocardial ischemia.  The left ventricle is normal in size with concentric remodeling and normal systolic function.  The estimated ejection fraction is 60%.  Normal left ventricular diastolic function.  Normal right ventricular size with normal right ventricular systolic function.  The estimated PA systolic pressure is 31 mmHg.  Normal central venous pressure (3 mmHg).  The stress echo portion of this study is negative for myocardial ischemia.     LHC:    Assessment & Plan:     Preoperative cardiovascular evaluation prior to surgery  Scheduled for inguinal  hernia repair on 11/18/24  He is low risk by RCRI (score of 0, 3.9% risk of MACE at 30 days) for an intermediate risk procedure.  He has no active unstable conditions and can complete greater than 4 METS. No further testing indicated. Hold ASA starting today. Re-start at surgeon's discretion post-operatively.    RTC in one year.      Signed:  Michael Fierro MD  [facility]

## 2024-11-14 ENCOUNTER — HOSPITAL ENCOUNTER (OUTPATIENT)
Dept: PREADMISSION TESTING | Facility: OTHER | Age: 64
Discharge: HOME | End: 2024-11-14
Attending: SURGERY
Payer: COMMERCIAL

## 2024-11-14 VITALS
HEART RATE: 68 BPM | BODY MASS INDEX: 24.48 KG/M2 | WEIGHT: 156 LBS | HEIGHT: 67 IN | DIASTOLIC BLOOD PRESSURE: 77 MMHG | OXYGEN SATURATION: 99 % | SYSTOLIC BLOOD PRESSURE: 164 MMHG | TEMPERATURE: 98 F | RESPIRATION RATE: 18 BRPM

## 2024-11-14 NOTE — ANESTHESIA PREPROCEDURE EVALUATION
11/14/2024  Renny Young is a 64 y.o., male.      Pre-op Assessment    I have reviewed the Patient Summary Reports.     I have reviewed the Nursing Notes. I have reviewed the NPO Status.   I have reviewed the Medications.     Review of Systems  Anesthesia Hx:  No problems with previous Anesthesia             Denies Family Hx of Anesthesia complications.    Denies Personal Hx of Anesthesia complications.                    Social:  Smoker       Hematology/Oncology:  Hematology Normal   Oncology Normal                                   EENT/Dental:  EENT/Dental Normal           Cardiovascular:    Pacemaker Hypertension           hyperlipidemia    Pacer                           Pulmonary:  Pulmonary Normal                       Renal/:  Renal/ Normal                 Hepatic/GI:  Hepatic/GI Normal                    Musculoskeletal:  Arthritis          Spine Disorders: lumbar Degenerative disease and Chronic Pain           Neurological:  Neurology Normal                                      Endocrine:  Diabetes, type 2           Dermatological:  Skin Normal    Psych:  Psychiatric Normal                  Physical Exam  General: Well nourished and Alert    Airway:  Mallampati: II   Mouth Opening: Normal  Tongue: Normal    Dental:  Intact      Anesthesia Plan  Type of Anesthesia, risks & benefits discussed:    Anesthesia Type: Gen ETT  Intra-op Monitoring Plan: Standard ASA Monitors  Post Op Pain Control Plan: multimodal analgesia  Induction:  IV  Airway Plan: Video  Informed Consent: Informed consent signed with the Patient and all parties understand the risks and agree with anesthesia plan.  All questions answered.   ASA Score: 3  Anesthesia Plan Notes: Cards clearance in [facility] from [facility]- Pacemaker  Lab in [facility]    Ready For Surgery From Anesthesia Perspective.     .

## 2024-11-14 NOTE — DISCHARGE INSTRUCTIONS
Information to Prepare you for your Surgery    PRE-ADMIT TESTING -  [phone]   -Pam  [address] [place] [facility] [address]  [facility] OF [facility]      Your surgery has been scheduled at [facility]. We are pleased to have the opportunity to serve you. For Further Information please call [phone].    On the day of surgery please report to the Information Desk on the 1st floor.    CONTACT YOUR PHYSICIAN'S OFFICE THE DAY PRIOR TO YOUR SURGERY TO OBTAIN YOUR ARRIVAL TIME.     The evening before surgery do not eat anything after 9 p.m. ( this includes hard candy, chewing gum and mints).  You may only have water from 9pm until you leave your house.   AT LEAST 12-24 OUNCES BEFORE YOU LEAVE YOU HOUSE IN THE MORNING TO COME TO SURGERY.    DO NOT DRINK ANY LIQUIDS ON THE WAY TO THE HOSPITAL.      If you are a diabetic-drink only water prior to surgery.    Outpatient Surgery- May allow 2 adult (18 and older) Support Persons (1 being the designated ) for all surgical/procedural patients. A breastfeeding mother will be allowed her infant and 2 adult Support Persons. No one under the age of 18 will be allowed in the building. No swapping out of visitors in the [facility].      SPECIAL MEDICATION INSTRUCTIONS: TAKE medications checked off by the Anesthesiologist on your Medication List.    Please bring blood pressure medications and diabetes medications  the day of surgery.      Surgery Patients:    If you take ASPIRIN - Your PHYSICIAN/SURGEON will need to inform you IF/OR when you need to stop taking aspirin prior to your surgery.     The week prior to surgery do not ot take any medications containing IBUPROFEN or NSAIDS ( Advil, Motrin, Goodys, BC, Aleve, Naproxen,  Ketorolac, Meloxicam, Mobic, Toradol,etc) If you are not sure if you should take a medicine please call your surgeon's office.  Ok to take Tylenol    Do Not Wear any  make-up (especially eye make-up) to surgery. Please remove any false eyelashes or eyelash extensions. If you arrive the day of surgery with makeup/eyelashes on you will be required to remove prior to surgery. (There is a risk of corneal abrasions if eye makeup/eyelash extensions are not removed)      Leave all valuables at home.   Do Not wear any jewelry or watches, including any metal in body piercings. Jewelry must be removed prior to coming to the hospital.  There is a possibility that rings that are unable to be removed may be cut off if they are on the surgical extremity.    Please remove all hair extensions, wigs, clips and any other metal accessories/ ornaments from your hair.  These items may pose a flammable/fire risk in Surgery and must be removed.    Do not shave your surgical area at least 5 days prior to your surgery. The surgical prep will be performed at the hospital according to Infection Control regulations.    Contact Lens must be removed before surgery. Either do not wear the contact lens or bring a case and solution for storage.  Please bring a container for eyeglasses or dentures as required.  Bring any paperwork your physician has provided, such as consent forms,  history and physicals, doctor's orders, etc.   Bring comfortable clothes that are loose fitting to wear upon discharge. Take into consideration the type of surgery being performed.  Maintain your diet as advised per your physician the day prior to surgery.      Adequate rest the night before surgery is advised.   Park in the Parking lot behind the hospital or in the [place] [place] [facility] across the street from the parking lot. Parking is complimentary.  If you will be discharged the same day as your procedure, please arrange for a responsible adult to drive you home or to accompany you if traveling by taxi.   YOU WILL NOT BE PERMITTED TO DRIVE OR TO LEAVE THE HOSPITAL ALONE AFTER SURGERY.   If you are being discharged the same day,  it is strongly recommended that you arrange for someone to remain with you for the first 24 hrs following your surgery.    The Surgeon will speak to your family/visitor after your surgery regarding the outcome of your surgery and post op care.  The Surgeon may speak to you after your surgery, but there is a possibility you may not remember the details.  Please check with your family members regarding the conversation with the Surgeon.    We strongly recommend whoever is bringing you home be present for discharge instructions.  This will ensure a thorough understanding for your post op home care.    If the patient has fever, cough, or signs/symptoms of Flu or Covid please do not come in for your surgery. Contact your surgeon and your primary care physician for further instructions.           Thank you for your cooperation.  The Staff of [facility].            Bathing Instructions with Hibiclens or Dial Soap    Shower the evening before and morning of your procedure with Chlorhexidine (Hibiclens)  do not use Chlorhexidine on your face or genitals. Do not get in your eyes.  Wash your face with water and your regular face wash/soap  Use your regular shampoo  Apply Chlorhexidine (Hibiclens) directly on your skin or on a wet washcloth and wash gently. When showering: Move away from the shower stream when applying Chlorhexidine (Hibiclens) to avoid rinsing off too soon.  Rinse thoroughly with warm water  Do not dilute Chlorhexidine (Hibiclens)   Dry off as usual, do not use any deodorant, powder, body lotions, perfume, after shave or cologne.     Thanks ,   Pam

## 2024-11-15 ENCOUNTER — TELEPHONE (OUTPATIENT)
Dept: CARDIOLOGY | Facility: CLINIC | Age: 64
End: 2024-11-15
Payer: COMMERCIAL

## 2024-11-18 ENCOUNTER — HOSPITAL ENCOUNTER (OUTPATIENT)
Facility: OTHER | Age: 64
Discharge: HOME | End: 2024-11-18
Attending: SURGERY | Admitting: SURGERY
Payer: COMMERCIAL

## 2024-11-18 ENCOUNTER — ANESTHESIA (OUTPATIENT)
Dept: SURGERY | Facility: OTHER | Age: 64
End: 2024-11-18
Payer: COMMERCIAL

## 2024-11-18 DIAGNOSIS — K40.90 INGUINAL HERNIA OF LEFT SIDE WITHOUT OBSTRUCTION OR GANGRENE: ICD-10-CM

## 2024-11-18 LAB
POCT GLUCOSE: 105 MG/DL (ref 70–110)
POCT GLUCOSE: 108 MG/DL (ref 70–110)

## 2024-11-18 PROCEDURE — 63600175 PHARM REV CODE 636 W HCPCS: Performed by: ANESTHESIOLOGY

## 2024-11-18 PROCEDURE — 63600175 PHARM REV CODE 636 W HCPCS: Performed by: SURGERY

## 2024-11-18 PROCEDURE — 76942 ECHO GUIDE FOR BIOPSY: CPT | Performed by: ANESTHESIOLOGY

## 2024-11-18 PROCEDURE — C1729 CATH, DRAINAGE: HCPCS | Performed by: SURGERY

## 2024-11-18 PROCEDURE — 49505 PRP I/HERN INIT REDUC >5 YR: CPT | Mod: LT,,, | Performed by: SURGERY

## 2024-11-18 PROCEDURE — 25000003 PHARM REV CODE 250: Performed by: ANESTHESIOLOGY

## 2024-11-18 PROCEDURE — 63600175 PHARM REV CODE 636 W HCPCS

## 2024-11-18 PROCEDURE — 71000016 HC POSTOP RECOV ADDL HR: Performed by: SURGERY

## 2024-11-18 PROCEDURE — 37000008 HC ANESTHESIA 1ST 15 MINUTES: Performed by: SURGERY

## 2024-11-18 PROCEDURE — 36000707: Performed by: SURGERY

## 2024-11-18 PROCEDURE — 37000009 HC ANESTHESIA EA ADD 15 MINS: Performed by: SURGERY

## 2024-11-18 PROCEDURE — 63600175 PHARM REV CODE 636 W HCPCS: Performed by: NURSE ANESTHETIST, CERTIFIED REGISTERED

## 2024-11-18 PROCEDURE — 36000706: Performed by: SURGERY

## 2024-11-18 PROCEDURE — 71000033 HC RECOVERY, INTIAL HOUR: Performed by: SURGERY

## 2024-11-18 PROCEDURE — 25000242 PHARM REV CODE 250 ALT 637 W/ HCPCS: Performed by: ANESTHESIOLOGY

## 2024-11-18 PROCEDURE — C1781 MESH (IMPLANTABLE): HCPCS | Performed by: SURGERY

## 2024-11-18 PROCEDURE — 25000003 PHARM REV CODE 250: Performed by: NURSE ANESTHETIST, CERTIFIED REGISTERED

## 2024-11-18 PROCEDURE — 82962 GLUCOSE BLOOD TEST: CPT | Performed by: SURGERY

## 2024-11-18 PROCEDURE — 71000015 HC POSTOP RECOV 1ST HR: Performed by: SURGERY

## 2024-11-18 DEVICE — IMPLANTABLE DEVICE: Type: IMPLANTABLE DEVICE | Site: GROIN | Status: FUNCTIONAL

## 2024-11-18 RX ORDER — HYDROMORPHONE HYDROCHLORIDE 2 MG/ML
0.4 INJECTION, SOLUTION INTRAMUSCULAR; INTRAVENOUS; SUBCUTANEOUS EVERY 5 MIN PRN
Status: DISCONTINUED | OUTPATIENT
Start: 2024-11-18 | End: 2024-11-18 | Stop reason: HOSPADM

## 2024-11-18 RX ORDER — LIDOCAINE HYDROCHLORIDE 10 MG/ML
0.5 INJECTION, SOLUTION EPIDURAL; INFILTRATION; INTRACAUDAL; PERINEURAL ONCE
Status: DISCONTINUED | OUTPATIENT
Start: 2024-11-18 | End: 2024-11-18 | Stop reason: HOSPADM

## 2024-11-18 RX ORDER — MEPERIDINE HYDROCHLORIDE 25 MG/ML
12.5 INJECTION INTRAMUSCULAR; INTRAVENOUS; SUBCUTANEOUS ONCE AS NEEDED
Status: DISCONTINUED | OUTPATIENT
Start: 2024-11-18 | End: 2024-11-18 | Stop reason: HOSPADM

## 2024-11-18 RX ORDER — ALBUTEROL SULFATE 2.5 MG/.5ML
2.5 SOLUTION RESPIRATORY (INHALATION)
Status: COMPLETED | OUTPATIENT
Start: 2024-11-18 | End: 2024-11-18

## 2024-11-18 RX ORDER — GLUCAGON 1 MG
1 KIT INJECTION
Status: DISCONTINUED | OUTPATIENT
Start: 2024-11-18 | End: 2024-11-18 | Stop reason: HOSPADM

## 2024-11-18 RX ORDER — ACETAMINOPHEN 500 MG
1000 TABLET ORAL
Status: COMPLETED | OUTPATIENT
Start: 2024-11-18 | End: 2024-11-18

## 2024-11-18 RX ORDER — ACETAMINOPHEN 500 MG
1000 TABLET ORAL EVERY 6 HOURS PRN
COMMUNITY

## 2024-11-18 RX ORDER — PROPOFOL 10 MG/ML
VIAL (ML) INTRAVENOUS CONTINUOUS PRN
Status: DISCONTINUED | OUTPATIENT
Start: 2024-11-18 | End: 2024-11-18

## 2024-11-18 RX ORDER — PROPOFOL 10 MG/ML
VIAL (ML) INTRAVENOUS
Status: DISCONTINUED | OUTPATIENT
Start: 2024-11-18 | End: 2024-11-18

## 2024-11-18 RX ORDER — SODIUM CHLORIDE, SODIUM LACTATE, POTASSIUM CHLORIDE, CALCIUM CHLORIDE 600; 310; 30; 20 MG/100ML; MG/100ML; MG/100ML; MG/100ML
INJECTION, SOLUTION INTRAVENOUS CONTINUOUS
Status: DISCONTINUED | OUTPATIENT
Start: 2024-11-18 | End: 2024-11-18 | Stop reason: HOSPADM

## 2024-11-18 RX ORDER — OXYCODONE HYDROCHLORIDE 5 MG/1
5 TABLET ORAL
Status: DISCONTINUED | OUTPATIENT
Start: 2024-11-18 | End: 2024-11-18 | Stop reason: HOSPADM

## 2024-11-18 RX ORDER — PHENYLEPHRINE HYDROCHLORIDE 10 MG/ML
INJECTION INTRAVENOUS
Status: DISCONTINUED | OUTPATIENT
Start: 2024-11-18 | End: 2024-11-18

## 2024-11-18 RX ORDER — LIDOCAINE HYDROCHLORIDE 20 MG/ML
INJECTION INTRAVENOUS
Status: DISCONTINUED | OUTPATIENT
Start: 2024-11-18 | End: 2024-11-18

## 2024-11-18 RX ORDER — ROPIVACAINE HYDROCHLORIDE 5 MG/ML
INJECTION, SOLUTION EPIDURAL; INFILTRATION; PERINEURAL
Status: COMPLETED | OUTPATIENT
Start: 2024-11-18 | End: 2024-11-18

## 2024-11-18 RX ORDER — OXYCODONE AND ACETAMINOPHEN 5; 325 MG/1; MG/1
1 TABLET ORAL EVERY 4 HOURS PRN
Qty: 15 TABLET | Refills: 0 | Status: SHIPPED | OUTPATIENT
Start: 2024-11-18

## 2024-11-18 RX ORDER — PREGABALIN 75 MG/1
75 CAPSULE ORAL ONCE
Status: COMPLETED | OUTPATIENT
Start: 2024-11-18 | End: 2024-11-18

## 2024-11-18 RX ORDER — SODIUM CHLORIDE 0.9 % (FLUSH) 0.9 %
3 SYRINGE (ML) INJECTION
Status: DISCONTINUED | OUTPATIENT
Start: 2024-11-18 | End: 2024-11-18 | Stop reason: HOSPADM

## 2024-11-18 RX ORDER — FENTANYL CITRATE 50 UG/ML
INJECTION, SOLUTION INTRAMUSCULAR; INTRAVENOUS
Status: DISCONTINUED | OUTPATIENT
Start: 2024-11-18 | End: 2024-11-18

## 2024-11-18 RX ORDER — SODIUM CHLORIDE 9 MG/ML
INJECTION, SOLUTION INTRAVENOUS CONTINUOUS
Status: DISCONTINUED | OUTPATIENT
Start: 2024-11-18 | End: 2024-11-18 | Stop reason: HOSPADM

## 2024-11-18 RX ORDER — CEFAZOLIN 2 G/1
2 INJECTION, POWDER, FOR SOLUTION INTRAMUSCULAR; INTRAVENOUS
Status: COMPLETED | OUTPATIENT
Start: 2024-11-18 | End: 2024-11-18

## 2024-11-18 RX ORDER — PROCHLORPERAZINE EDISYLATE 5 MG/ML
5 INJECTION INTRAMUSCULAR; INTRAVENOUS EVERY 30 MIN PRN
Status: DISCONTINUED | OUTPATIENT
Start: 2024-11-18 | End: 2024-11-18 | Stop reason: HOSPADM

## 2024-11-18 RX ORDER — LIDOCAINE HYDROCHLORIDE 10 MG/ML
INJECTION, SOLUTION EPIDURAL; INFILTRATION; INTRACAUDAL; PERINEURAL
Status: DISCONTINUED | OUTPATIENT
Start: 2024-11-18 | End: 2024-11-18 | Stop reason: HOSPADM

## 2024-11-18 RX ADMIN — PHENYLEPHRINE HYDROCHLORIDE 100 MCG: 10 INJECTION INTRAVENOUS at 08:11

## 2024-11-18 RX ADMIN — LIDOCAINE HYDROCHLORIDE 40 MG: 20 INJECTION, SOLUTION INTRAVENOUS at 07:11

## 2024-11-18 RX ADMIN — SODIUM CHLORIDE: 0.9 INJECTION, SOLUTION INTRAVENOUS at 07:11

## 2024-11-18 RX ADMIN — PROPOFOL 40 MG: 10 INJECTION, EMULSION INTRAVENOUS at 07:11

## 2024-11-18 RX ADMIN — PHENYLEPHRINE HYDROCHLORIDE 0.4 MCG/KG/MIN: 10 INJECTION INTRAVENOUS at 08:11

## 2024-11-18 RX ADMIN — PHENYLEPHRINE HYDROCHLORIDE 200 MCG: 10 INJECTION INTRAVENOUS at 08:11

## 2024-11-18 RX ADMIN — ACETAMINOPHEN 1000 MG: 500 TABLET, FILM COATED ORAL at 05:11

## 2024-11-18 RX ADMIN — FENTANYL CITRATE 100 MCG: 50 INJECTION, SOLUTION INTRAMUSCULAR; INTRAVENOUS at 07:11

## 2024-11-18 RX ADMIN — ROPIVACAINE HYDROCHLORIDE 20 ML: 5 INJECTION, SOLUTION EPIDURAL; INFILTRATION; PERINEURAL at 07:11

## 2024-11-18 RX ADMIN — CEFAZOLIN 2 G: 2 INJECTION, POWDER, FOR SOLUTION INTRAMUSCULAR; INTRAVENOUS at 07:11

## 2024-11-18 RX ADMIN — PREGABALIN 75 MG: 75 CAPSULE ORAL at 05:11

## 2024-11-18 RX ADMIN — PROPOFOL 125 MCG/KG/MIN: 10 INJECTION, EMULSION INTRAVENOUS at 07:11

## 2024-11-18 RX ADMIN — ALBUTEROL SULFATE 2.5 MG: 2.5 SOLUTION RESPIRATORY (INHALATION) at 06:11

## 2024-11-18 RX ADMIN — SODIUM CHLORIDE: 0.9 INJECTION, SOLUTION INTRAVENOUS at 08:11

## 2024-11-18 RX ADMIN — OXYCODONE HYDROCHLORIDE 5 MG: 5 TABLET ORAL at 09:11

## 2024-11-18 NOTE — TRANSFER OF CARE
"Anesthesia Transfer of Care Note    Patient: Renny Young    Procedure(s) Performed: Procedure(s) (LRB):  REPAIR, HERNIA, INGUINAL, WITHOUT HISTORY OF PRIOR REPAIR, AGE 5 YEARS OR OLDER (Left)    Patient location: PACU    Anesthesia Type: regional    Transport from OR: Transported from OR on 2-3 L/min O2 by NC with adequate spontaneous ventilation    Post pain: adequate analgesia    Post assessment: no apparent anesthetic complications and tolerated procedure well    Post vital signs: stable    Level of consciousness: awake    Nausea/Vomiting: no nausea/vomiting    Complications: none    Transfer of care protocol was followed      Last vitals: Visit Vitals  BP (!) 107/57   Pulse 60   Temp 36.3 °C (97.4 °F) (Temporal)   Resp 20   Ht 5' 7" (1.702 m)   Wt 70.8 kg (156 lb)   SpO2 99%   BMI 24.43 kg/m²     "

## 2024-11-18 NOTE — PLAN OF CARE
Patient prefers their mother, Salma, not present at bedside receive call from the doctor, she only has a landline and cannot receive text messages. Patient's brother Harsh will be the ride home, I called and confirmed he will be available at discharge and provided a timeline for the day, received the welcome text but prefers to be called when patient is ready for .

## 2024-11-18 NOTE — ANESTHESIA PROCEDURE NOTES
Left ilioinguinal block    Patient location during procedure: holding area    Reason for block: primary anesthetic    Diagnosis: Left inguinal hernia   Timeout: 11/18/2024 7:35 AM   End time: 11/18/2024 7:38 AM    Staffing  Authorizing Provider: Todd Kennedy MD  Performing Provider: Todd Kennedy MD    Staffing  Performed by: Tdod Kennedy MD  Authorized by: Todd Kennedy MD    Preanesthetic Checklist  Completed: patient identified, IV checked, site marked, risks and benefits discussed, surgical consent, monitors and equipment checked, pre-op evaluation and timeout performed  Peripheral Block  Patient position: supine  Prep: ChloraPrep and site prepped and draped  Patient monitoring: heart rate and continuous pulse ox  Block type: ilioinguinal/iliohypogastric  Laterality: left  Injection technique: single shot  Needle  Needle type: Echogenic   Needle gauge: 20 G  Needle length: 4 in  Needle localization: anatomical landmarks and ultrasound guidance   -ultrasound image captured on disc.  Assessment  Injection assessment: negative aspiration, negative parasthesia and local visualized surrounding nerve  Paresthesia pain: none  Heart rate change: no  Slow fractionated injection: yes  Pain Tolerance: comfortable throughout block and no complaints  Medications:    Medications: ropivacaine (NAROPIN) injection 0.5% - Perineural   20 mL - 11/18/2024 7:38:00 AM    Additional Notes

## 2024-11-18 NOTE — PLAN OF CARE
Renny Young has met all discharge criteria from Phase II. Vital Signs are stable, ambulating  without difficulty. Discharge instructions given, patient verbalized understanding. Discharged from facility via wheelchair in stable condition.

## 2024-11-18 NOTE — OP NOTE
DATE OF PROCEDURE: 11/18/2024      PREOPERATIVE DIAGNOSIS:  Left inguinal hernia  POSTOPERATIVE DIAGNOSIS:  Same    PROCEDURE PERFORMED:  Open left inguinal hernia repair with mesh    ATTENDING SURGEON: Titus Craft MD.       ANESTHESIA:  Local regional MAC    ESTIMATED BLOOD LOSS: 5 mL.     FINDINGS:  Primarily indirect with some direct weakness both repaired    SPECIMENs:  None    DRAINS: None.     COMPLICATIONS: None.     PROCEDURE IN DETAIL:    I identified the patient and marked the correct site in Preoperative Holding.  Anesthesia performed a regional block.    Patient was then brought  back to the Operating Room. Placed supine on the operating table and padded appropriately. Monitors were applied and IV sedation was continued.  The patient's abdomen was prepped and draped in the standard sterile surgical fashion. A time-out was performed and all team members present agreed this was the correct procedure on the correct patient. We also confirmed administration of appropriate preoperative antibiotics.    Additional local anesthesia was injected at the incision site ..  Incision is made in the left lower quadrant directly over the right inguinal canal.  It is taken down through subcutaneous fat and Mario's fascia with electrocautery.  External oblique aponeurosis was exposed and opened for 5 mm.  The underside is then cleared and it is then opened down through the external ring with Metzenbaum scissors.  Blunt dissection was used to separate the surrounding tissue from the external oblique.  Ilioinguinal and iliohypogastric nerves are both identified for their course.  They are preserved and protected throughout the case.  Shelving edge of the inguinal ligament is exposed as well as the pubic tubercle.  Spermatic cord is dissected bluntly and encircled with a Penrose drain at the level of tubercle.  Direct space shows some laxity but no true hernia.  Transversalis is sewn to itself with a running 3-0 Vicryl to  reinforce this area prior to mesh placement.  Blunt dissection on the anterior medial portion of the cord reveals a hernia sac.  There were significant adhesions to the spermatic cord.  The adhesions are taken down with blunt dissection and occasional cautery.  As it is now free from the spermatic cord all the way up under the internal oblique and into the preperitoneal space.The stump was allowed to fall back into the preperitoneal space.  A rectangular shape a Ethicon mesh is selected.  It was slit laterally to accommodate the cord.  It was repaired lateral to the cord with a running 2 0 Prolene suture overlapping the mesh to itself by approximately 1 cm.  This recreates the internal ring which accommodates the cord and the tip of the surgeon's finger appropriately without congestion of the vasculature.  Mesh is laid down flatly onto the floor of the inguinal canal.  Inferior edge of the mesh was sewn to the shelving edge of the inguinal ligament with a running 2 0 Prolene suture.  The remainder of the mesh is secured to the rectus fascia medially and the internal oblique superiorly and laterally with interrupted 3-0 Vicryl sutures away from the previously identified nerves.  Some were placed at the direct space.  Hemostasis is excellent.  External oblique aponeurosis is closed with a running 2-0 Vicryl suture.  Mario's fascia was closed with the running 3-0 Vicryl suture.  Dermis and skin is closed in subcuticular fashion with a running 3-0 Stratafix.  Mastisol and Steri-Strips were placed.

## 2024-11-18 NOTE — ANESTHESIA POSTPROCEDURE EVALUATION
Anesthesia Post Evaluation    Patient: Renny Young    Procedure(s) Performed: Procedure(s) (LRB):  REPAIR, HERNIA, INGUINAL, WITHOUT HISTORY OF PRIOR REPAIR, AGE 5 YEARS OR OLDER (Left)    Final Anesthesia Type: regional      Patient location during evaluation: PACU  Patient participation: Yes- Able to Participate  Level of consciousness: awake and alert  Post-procedure vital signs: reviewed and stable  Pain management: adequate  Airway patency: patent    PONV status at discharge: No PONV  Anesthetic complications: no      Cardiovascular status: blood pressure returned to baseline  Respiratory status: unassisted  Hydration status: euvolemic  Follow-up not needed.              Vitals Value Taken Time   /73 11/18/24 0946   Temp 36.6 °C (97.8 °F) 11/18/24 0945   Pulse 60 11/18/24 0948   Resp 18 11/18/24 0945   SpO2 97 % 11/18/24 0948   Vitals shown include unfiled device data.      Event Time   Out of Recovery 09:49:15         Pain/Gill Score: Pain Rating Prior to Med Admin: 4 (11/18/2024  9:34 AM)  Gill Score: 10 (11/18/2024  9:45 AM)

## 2024-11-18 NOTE — DISCHARGE SUMMARY
[facility] ([place])  Discharge Note  Short Stay    Procedure(s) (LRB):  REPAIR, HERNIA, INGUINAL, WITHOUT HISTORY OF PRIOR REPAIR, AGE 5 YEARS OR OLDER (Left)      OUTCOME: Patient tolerated treatment/procedure well without complication and is now ready for discharge.    DISPOSITION: Home or Self Care    FINAL DIAGNOSIS:  <principal problem not specified> left inguinal hernia    FOLLOWUP: In clinic 2 weeks    DISCHARGE INSTRUCTIONS:  No discharge procedures on file.  See orders    TIME SPENT ON DISCHARGE: 20 minutes

## 2024-11-18 NOTE — ANESTHESIA PROCEDURE NOTES
Left transversalis fascia block    Patient location during procedure: holding area    Reason for block: primary anesthetic    Diagnosis: Left inguinal hernia   Start time: 11/18/2024 7:39 AM   End time: 11/18/2024 7:42 AM    Staffing  Authorizing Provider: Todd Kennedy MD  Performing Provider: Todd Kennedy MD    Staffing  Performed by: Todd Kennedy MD  Authorized by: Todd Kennedy MD    Preanesthetic Checklist  Completed: patient identified, IV checked, site marked, risks and benefits discussed, surgical consent, monitors and equipment checked, pre-op evaluation and timeout performed  Peripheral Block  Patient position: right lateral decubitus  Prep: ChloraPrep and site prepped and draped  Patient monitoring: heart rate and continuous pulse ox  Block type: Other (transversalis fascia block)  Laterality: left  Injection technique: single shot  Needle  Needle type: Echogenic   Needle gauge: 21 G  Needle length: 4 in  Needle localization: anatomical landmarks and ultrasound guidance   -ultrasound image captured on disc.  Assessment  Injection assessment: negative aspiration, negative parasthesia and local visualized surrounding nerve  Paresthesia pain: none  Heart rate change: no  Slow fractionated injection: yes  Pain Tolerance: comfortable throughout block and no complaints  Medications:    Medications: ropivacaine (NAROPIN) injection 0.5% - Perineural   20 mL - 11/18/2024 7:42:00 AM    Additional Notes

## 2024-11-19 VITALS
HEART RATE: 60 BPM | RESPIRATION RATE: 18 BRPM | SYSTOLIC BLOOD PRESSURE: 167 MMHG | WEIGHT: 156 LBS | DIASTOLIC BLOOD PRESSURE: 78 MMHG | OXYGEN SATURATION: 98 % | TEMPERATURE: 98 F | HEIGHT: 67 IN | BODY MASS INDEX: 24.48 KG/M2

## 2024-12-03 ENCOUNTER — TELEPHONE (OUTPATIENT)
Dept: PAIN MEDICINE | Facility: OTHER | Age: 64
End: 2024-12-03
Payer: COMMERCIAL

## 2024-12-03 ENCOUNTER — OFFICE VISIT (OUTPATIENT)
Dept: PAIN MEDICINE | Facility: CLINIC | Age: 64
End: 2024-12-03
Attending: ANESTHESIOLOGY
Payer: COMMERCIAL

## 2024-12-03 ENCOUNTER — OFFICE VISIT (OUTPATIENT)
Dept: SURGERY | Facility: CLINIC | Age: 64
End: 2024-12-03
Payer: COMMERCIAL

## 2024-12-03 VITALS
SYSTOLIC BLOOD PRESSURE: 181 MMHG | OXYGEN SATURATION: 99 % | DIASTOLIC BLOOD PRESSURE: 84 MMHG | TEMPERATURE: 98 F | SYSTOLIC BLOOD PRESSURE: 152 MMHG | HEART RATE: 66 BPM | BODY MASS INDEX: 24.45 KG/M2 | DIASTOLIC BLOOD PRESSURE: 84 MMHG | HEART RATE: 60 BPM | WEIGHT: 156.06 LBS

## 2024-12-03 DIAGNOSIS — M54.16 LUMBAR RADICULOPATHY: Primary | ICD-10-CM

## 2024-12-03 DIAGNOSIS — M51.362 DEGENERATION OF INTERVERTEBRAL DISC OF LUMBAR REGION WITH DISCOGENIC BACK PAIN AND LOWER EXTREMITY PAIN: ICD-10-CM

## 2024-12-03 DIAGNOSIS — K40.90 INGUINAL HERNIA OF LEFT SIDE WITHOUT OBSTRUCTION OR GANGRENE: ICD-10-CM

## 2024-12-03 DIAGNOSIS — Z87.19 STATUS POST LEFT INGUINAL HERNIA REPAIR: Primary | ICD-10-CM

## 2024-12-03 DIAGNOSIS — Z98.890 STATUS POST LEFT INGUINAL HERNIA REPAIR: Primary | ICD-10-CM

## 2024-12-03 PROCEDURE — 1159F MED LIST DOCD IN RCRD: CPT | Mod: CPTII,S$GLB,, | Performed by: SURGERY

## 2024-12-03 PROCEDURE — 3079F DIAST BP 80-89 MM HG: CPT | Mod: CPTII,S$GLB,, | Performed by: ANESTHESIOLOGY

## 2024-12-03 PROCEDURE — 3061F NEG MICROALBUMINURIA REV: CPT | Mod: CPTII,S$GLB,, | Performed by: SURGERY

## 2024-12-03 PROCEDURE — 3066F NEPHROPATHY DOC TX: CPT | Mod: CPTII,S$GLB,, | Performed by: SURGERY

## 2024-12-03 PROCEDURE — 3061F NEG MICROALBUMINURIA REV: CPT | Mod: CPTII,S$GLB,, | Performed by: ANESTHESIOLOGY

## 2024-12-03 PROCEDURE — 1160F RVW MEDS BY RX/DR IN RCRD: CPT | Mod: CPTII,S$GLB,, | Performed by: SURGERY

## 2024-12-03 PROCEDURE — 3072F LOW RISK FOR RETINOPATHY: CPT | Mod: CPTII,S$GLB,, | Performed by: ANESTHESIOLOGY

## 2024-12-03 PROCEDURE — 99999 PR PBB SHADOW E&M-EST. PATIENT-LVL III: CPT | Mod: PBBFAC,,, | Performed by: ANESTHESIOLOGY

## 2024-12-03 PROCEDURE — 3079F DIAST BP 80-89 MM HG: CPT | Mod: CPTII,S$GLB,, | Performed by: SURGERY

## 2024-12-03 PROCEDURE — 3072F LOW RISK FOR RETINOPATHY: CPT | Mod: CPTII,S$GLB,, | Performed by: SURGERY

## 2024-12-03 PROCEDURE — 3044F HG A1C LEVEL LT 7.0%: CPT | Mod: CPTII,S$GLB,, | Performed by: ANESTHESIOLOGY

## 2024-12-03 PROCEDURE — 99999 PR PBB SHADOW E&M-EST. PATIENT-LVL III: CPT | Mod: PBBFAC,,, | Performed by: SURGERY

## 2024-12-03 PROCEDURE — 3044F HG A1C LEVEL LT 7.0%: CPT | Mod: CPTII,S$GLB,, | Performed by: SURGERY

## 2024-12-03 PROCEDURE — 3077F SYST BP >= 140 MM HG: CPT | Mod: CPTII,S$GLB,, | Performed by: SURGERY

## 2024-12-03 PROCEDURE — 3008F BODY MASS INDEX DOCD: CPT | Mod: CPTII,S$GLB,, | Performed by: ANESTHESIOLOGY

## 2024-12-03 PROCEDURE — 3066F NEPHROPATHY DOC TX: CPT | Mod: CPTII,S$GLB,, | Performed by: ANESTHESIOLOGY

## 2024-12-03 PROCEDURE — 3077F SYST BP >= 140 MM HG: CPT | Mod: CPTII,S$GLB,, | Performed by: ANESTHESIOLOGY

## 2024-12-03 PROCEDURE — 99024 POSTOP FOLLOW-UP VISIT: CPT | Mod: S$GLB,,, | Performed by: SURGERY

## 2024-12-03 PROCEDURE — 1160F RVW MEDS BY RX/DR IN RCRD: CPT | Mod: CPTII,S$GLB,, | Performed by: ANESTHESIOLOGY

## 2024-12-03 PROCEDURE — 99214 OFFICE O/P EST MOD 30 MIN: CPT | Mod: S$GLB,,, | Performed by: ANESTHESIOLOGY

## 2024-12-03 PROCEDURE — 1159F MED LIST DOCD IN RCRD: CPT | Mod: CPTII,S$GLB,, | Performed by: ANESTHESIOLOGY

## 2024-12-03 NOTE — H&P (VIEW-ONLY)
Chronic Pain - Established Visit    Referring Physician: No ref. provider found    Chief Complaint:   Chief Complaint   Patient presents with    Low-back Pain     Right side          SUBJECTIVE: Disclaimer: This note has been generated using voice-recognition software. There may be typographical errors that have been missed during proof-reading    Interval History 12/3/2024:  The patient is here to discuss progressively worsening lower back pain. Last received his L5/S1 TF JEAN MARIE on 07/23/2024 so he thinks its time for another injection. Denies any motor weakness or loss of sensation. Recently had hernia repair on 11/18/2024.    Interval History 6/26/2023:  The patient is here to discuss worsened lower back and leg pain. I last saw him in September at which time he underwent bilateral L5/S1 TF JEAN MARIE on 9/26/22 with 90% relief for 8 months. He says that his pain was minimal during that time. He was able to work without pain disturbance. He has continues with PT exercises 3 days per week for the past 9 months. His pain has been worsening over the past month. He would like to repeat the procedure. He also says that his wife passed away since previous encounter due to long-standing history of sarcoidosis. He has understandably been upset about this. His pain today is 5/10.     Interval History 9/13/2022:  Renny is here to discuss worsened back and leg pain. The pain radiates from the lower back into the back of both legs to anterior shin and great toe. He has associated numbness. He is diabetic but states that he has not had major problems with his sugars recently. No recent trauma. He previously underwent right L5/S1 and S1 TF JEAN MARIE with 80% relief for about 2 months. During that time he was able to ambulate and work without limitation by pain. Today, he again reports severe pain which is affecting his daily activities. He has been in PT exercises for the past 15 weeks without much benefit of symptoms. He has tried ice and  heat without benefit. He continues to take Gabapentin which provides some benefit. His overall pain today is 8/10.    Interval History 6/7/2022:  The patient returns to clinic today for follow up of low back pain. He reports increased low back pain over the last two weeks. He reports low back pain that radiates into the posterior aspect of his right leg to the bottom of his foot. He denies any left leg pain. His pain is worse with prolonged standing and walking. He reports that previous TF JEAN MARIE in 2021 provided 80% relief. He did notice that it took 3-4 weeks to see full benefit. He continues to take Gabapentin. He continues to perform a home exercise routine as prescribed. He denies any weakness. He denies any other health changes. His pain today is 5/10.    Interval history 07/02/2021:  The patient presents for follow-up lower back pain and right leg radiculopathy.  He is status post repeat right-sided TF JEAN MARIE to L5/S1&S1.  He has difficulty quantifying pain relief but states that normally his leg pain is 100% resolved and this time pain persists.  He is not have any recent images.  His medication regimen includes gabapentin 300 mg states this is mildly beneficial or denies any adverse side effects of medication.  Denies any focal loss of bowel, bladder or saddle paresthesias concerning for cauda equina.    Interval history 05/27/2021:  The patient presents for follow-up of lower back pain and bilateral leg radiculopathy.  He has had prior ILESI and TFESI both with 80%  benefit lasting approximately 9 months and pain just now returning. Pain is worse to right side at this time.  He denies any new areas of pain or neurological changes.The patient denies myelopathic symptoms such as handwriting changes or difficulty with buttons/coins/keys. Denies perineal paresthesias, bowel/bladder dysfunction.    Interval History 9/1/2020:  The patient is here for follow up of back and leg pain.  He is now s/p L5/S1 IL JEAN MARIE with 80%  of back pain.  However, he is having significant right leg pain, mainly down the buttock and posterior calf.  He had benefit with right L5/S1 and S1 TF JEAN MARIE in the past and would like to repeat.  He has associated numbness to right calf, worse with walking.  He does have some benefit with Gabapentin.  He had a recent Covid test for work which was negative. His pain today is 5/10.    Interval History 6/9/2020:  The patient is here for follow up of chronic lower back pain.  We have not seen the patient since last year.  He underwent an epidural at that time and says that his pain has been very mild until a few weeks ago.  He is having pain across lower back with radiation into the legs, mainly on the left.  He continues to take gabapentin with some benefit.  The pain bothers him the most with walking and activity.  He had pacemaker replacement on 5/6/20.  He is not currently on blood thinners.  His pain today is 5/10.    Interval History 7/30/2019:  The patient returns for follow up of back pain.  He is s/p repeat left then right L3,4,5 RFAs completed on 6/24/19 with about 80% relief.  His back pain is mild.  He has had increased leg pain recently, worse on the left side.  Previous leg pain was relieved with JEAN MARIE last year.  He would like to repeat this.  His leg pain bothers him mainly at night when trying to sleep.  He stopped Gabapentin when he was not having leg pain but recently restarted.  He is unable to take at night.  His pain today is 6/10.    Interval History 5/14/2019:  The patient returns for follow up.  He previously had benefit with JEAN MARIE for leg pain and RFAs for back pain.  He is having some back pain that has been worsening over the past couple of weeks.  He has significant pain in the morning.  He has some improvement when he moves around.  He says that cold air aggravates his pain.  He stopped Gabapentin last year when his leg pain improved.  He is not having much leg pain now.  His pain today is  6/10.    Interval History 2/7/2019:  The patient presents for check up of chronic back pain.  He reports doing well since last visit.  He feels that last JEAN MARIE is still providing him benefit.  He has not had any leg pain.  He does still have back pain but states that it is tolerable.  His morning pain is much improved from RFAs last year.  He continues to work and is active.  His pain today is 5/10.    Interval History 12/6/2018:  The patient presents today for follow up.  He continues with pain to the lower back.  He is not having leg pain at this time.  He had some relief with RFAs with the past.  He stopped Gabapentin when his leg pain improved.  He takes Tylenol sparingly with some benefit.  His pain today is 5/10.  The patient denies any bowel or bladder incontinence or signs of saddle paresthesia.  The patient denies any major medical changes since last office visit.    Interval History 10/25/2018:  The patient returns for follow up of back and leg pain.  He is s/p L5/S1 IL JEAN MARIE on 10/11/18 with 80% pain relief for his legs.  He has had increased lower back pain over the past few days which he attributes to weather changes.  He describes it as aching.  He has not been stretching.  He does walk a lot for work.  He cannot take oral NSAIDs due to pacemaker placement.  His pain today is 8/10.     Interval History 9/11/2018:  The patient presents for procedure follow up appointment.  He is s/p left then right L3,4,5 RFA completed on 8/14/18 with 80% pain relief initially.  He has had a little more pain over the past week.  He is now having intermittent radiation into the back of both legs, left greater than right.  He previously had benefit with right sided TF JEAN MARIE.  He is still taking gabapentin.  His pain today is 5/10.    Interval History 7/25/2018:  The patient returns today for follow up of back pain.  He is s/p bilateral L3,4,5 MBB on 7/5/18 with 100% relief for 2 days.  He previously had benefit with right TF  JEAN MARIE for leg pain.  He has not had right leg pain since the epidural.  However, he is reporting lateral left leg pain that has progressed over the past couple of weeks.  He continues to be active and works.  His pain today is 5/10.    Interval History 6/20/2018:  The patient returns today for follow up of lower back and right leg pain.  He is s/p right L5 and S1 TF JEAN MARIE on 6/5/18 with 100% relief of right leg pain.  He has not had any leg pain since the procedure.  He continues to report pain across the lower back.  This is always worse first thing in the morning.  He states that this feels aching and throbbing in nature.  He did have recent lumbar CT scan.  He would like to discuss further procedures.  He continues to work and be active.  His pain today is 5/10.    Interval History 5/22/2018:  The patient returns for follow up of back pain.  He is having radiation down there back of the right leg to the posterior calf.  The back pain is most severe in the morning and he states that this feels like a stiffness.  This improves when he walks.  He has severe leg pain that is intermittent, mainly with activity.  This is his biggest complaint.  He did complete the medrol dose pack recently with limited improvement.  He had XRAYs which show severe loss of disc space at L5/S1.  He has not had a CT scan.  He is taking Gabapentin 300 mg at bedtime.  It is written BID but it makes him drowsy during the day.  He has some benefit with Aleve but has been told to avoid NSAIDs due to history of pacemaker placement.  His pain today is 5/10.     Initial encounter:    Renny TOÑO Young presents to the clinic for the evaluation of lower back and right leg pain. The pain started two months ago and symptoms have been worsening.    Brief history:    Pain Description:    The pain is located in the lower back and right leg area in the L5/S1 distribution.      At BEST  5/10     At WORST  7/10 on the WORST day.      On average pain is rated as  5/10.     Today the pain is rated as 5/10    The pain is described as aching      Symptoms interfere with daily activity.     Exacerbating factors: Standing and Morning.      Mitigating factors medications.     Patient denies night fever/night sweats, urinary incontinence, bowel incontinence, significant weight loss, significant motor weakness and loss of sensations.  Patient denies any suicidal or homicidal ideations    Pain Medications:  Current:  OTC Tylenol PRN  Gabapentin 300 mg BID    Tried in Past:  NSAIDs - Aleve  TCA -Never  SNRI -Never  Anti-convulsants - Gabapentin   Muscle Relaxants -Never  Opioids-Never    Physical Therapy/Home Exercise: yes     report:  Reviewed and consistent with medication use as prescribed.    Pain Procedures:   6/5/18 Right L5 and S1 TF JEAN MARIE- significant relief  7/5/18 Bilateral L3,4,5 MBB- 100% relief for 2 days  7/31/18 Left L3,4,5 RFA- 80% relief  8/14/18 Right L3,4,5 RFA- 80% relief  10/11/18 L5/S1 IL JEAN MARIE- 80% relief  6/11/19 Left L3,4,5 RFA- 80% relief  6/25/19 Right L3,4,5 RFA- 80% relief  8/13/19 L5/S1 IL JEAN MARIE- 90% relief for 9 months  6/16/20 L5/S1 IL JEAN MARIE- 80% relief of back pain  6/15/2021- Right L5/S1 and S1 TF JEAN MARIE  6/20/22 Right L5 and S1 TF JEAN MARIE- 80% relief for 2 months  9/26/22 Bilateral L5/S1 TF JEAN MARIE- 90% relief for 9 months  7/23/24 Bialteral L5/S1 TF JEAN MARIE- 80% relief    Chiropractor -never  Acupuncture - never  TENS unit -never  Spinal decompression -never  Joint replacement -never    Imaging:   CT Lumbar Spine 7/9/2021:  COMPARISON:  Lumbar spine CT May 28, 2018     FINDINGS:  Mild levocurvature of the lumbar spine.  No listhesis.  There is no acute fracture.  The vertebral bodies are normal in height without compression fractures. Posterior elements are intact. There is unchanged severe disc height loss at the L5-S1 level with associated sclerosis and subchondral cystic change within the endplates.  Mild atherosclerotic calcification within the abdominal aorta  which is not aneurysmal.  Otherwise, no soft tissue abnormality identified.     T12-L1: The disc is normal in configuration.  There is no facet arthropathy.  There is no neural foraminal stenosis.  There is no spinal canal stenosis.     L1-L2: The disc is normal in configuration.  There is no facet arthropathy.  There is no neuroforaminal stenosis.  There is no spinal canal stenosis.     L2-L3: The disc is normal in configuration.  There is no facet arthropathy.  There is no neuroforaminal stenosis.  There is no spinal canal stenosis.     L3-L4: Minimal diffuse disc bulge.  Mild bilateral facet arthropathy.  There is no neuroforaminal stenosis.  There is no spinal canal stenosis.     L4-L5: There is a diffuse disc bulge.  Mild-to-moderate bilateral facet arthropathy.  There is no neuroforaminal stenosis.  There is no spinal canal stenosis.     L5-S1: There is a circumferential disc osteophyte complex secondary to the severe degenerative disc disease at this level.  Mild to moderate bilateral facet arthropathy.  Unchanged mild bilateral neural foraminal stenosis secondary to diffuse disc osteophyte complex as well as the facet arthropathy.  There is no spinal canal stenosis.     Impression:     Inferior lumbar spine degenerative changes worst at L5-S1 which result in mild bilateral neural foraminal stenosis at this level.  Overall, findings are not significantly changed compared to prior study from May 2018.        Past Medical History:   Diagnosis Date    Colon polyp     Diabetes mellitus, type 2     Full dentures     Hyperlipidemia     Hypertension     Pacemaker     Pacemaker at end of battery life 05/06/2020    Smoker 06/10/2015    Offered cessation program and declined    Wears prescription eyeglasses      Past Surgical History:   Procedure Laterality Date    COLONOSCOPY N/A 2/13/2017    Procedure: COLONOSCOPY;  Surgeon: NILDA Juares MD;  Location: [facility] ([address] [place] [address]);  Service: Endoscopy;  Laterality: N/A;   Do not cancel this order. Patient has Pacemaker in place.     EPIDURAL STEROID INJECTION N/A 8/13/2019    Procedure: INJECTION, STEROID, EPIDURAL IL, L5/S1;  Surgeon: Josue Paredes MD;  Location: [facility] PAIN MGT;  Service: Pain Management;  Laterality: N/A;  IL JEAN MARIE L5/S1    EPIDURAL STEROID INJECTION N/A 6/16/2020    Procedure: INJECTION, STEROID, EPIDURAL, L5-S1 IL add on @ 2 ok by  [place];  Surgeon: Josue Paredes MD;  Location: [facility] PAIN [facility]GT;  Service: Pain Management;  Laterality: N/A;    HERNIA REPAIR      INJECTION OF ANESTHETIC AGENT AROUND NERVE Bilateral 7/5/2018    Procedure: BLOCK, NERVE;  Surgeon: Krystal Mtz MD;  Location: [facility] PAIN [facility]GT;  Service: Pain Management;  Laterality: Bilateral;  Lumbar Bilateral L3-L4-L5 Medial Branch Block    RADIOFREQUENCY ABLATION Left 6/11/2019    Procedure: RADIOFREQUENCY ABLATION, L3-L4-L5 MEDIAL BRANCH   1 OF 2;  Surgeon: Josue Paredes MD;  Location: [facility] PAIN [facility]GT;  Service: Pain Management;  Laterality: Left;    RADIOFREQUENCY ABLATION Right 6/25/2019    Procedure: RADIOFREQUENCY ABLATION, L3-L4-L5 MEDIAL BRANCH JING 2 OF 2;  Surgeon: Josue Paredes MD;  Location: [facility] PAIN [facility]GT;  Service: Pain Management;  Laterality: Right;    REPAIR, HERNIA, INGUINAL, WITHOUT HISTORY OF PRIOR REPAIR, AGE 5 YEARS OR OLDER Left 11/18/2024    Procedure: REPAIR, HERNIA, INGUINAL, WITHOUT HISTORY OF PRIOR REPAIR, AGE 5 YEARS OR OLDER;  Surgeon: Titus Craft MD;  Location: [facility] OR;  Service: General;  Laterality: Left;    REPLACEMENT OF PACEMAKER GENERATOR Left 5/6/2020    Procedure: REPLACEMENT, PULSE GENERATOR, CARDIAC PACEMAKER;  Surgeon: Bradford Spence MD;  Location: [facility] EP LAB;  Service: Cardiology;  Laterality: Left;  EOL/LEAD Mlfx, Gen Change, Poss RA lead rev, SJM, MAC, GP, 3 PREP    REVISION OF PROCEDURE INVOLVING PACEMAKER LEAD Left 5/6/2020    Procedure: REVISION, ELECTRODE LEAD, CARDIAC PACEMAKER;  Surgeon: Bradford Spence MD;  Location: [facility] EP LAB;   Service: Cardiology;  Laterality: Left;    TRANSFORAMINAL EPIDURAL INJECTION OF STEROID Right 6/5/2018    Procedure: INJECTION-STEROID-EPIDURAL-TRANSFORAMINAL;  Surgeon: Josue Paredes MD;  Location: [facility]IN [facility]GT;  Service: Pain Management;  Laterality: Right;  LUMBAR RIGHT L5 AND S1 TRANSFORAMINL JEAN MARIE  [id number]    W/ SEDATION     TRANSFORAMINAL EPIDURAL INJECTION OF STEROID Right 9/10/2020    Procedure: INJECTION, STEROID, EPIDURAL, TRANSFORAMINAL APPROACH, L5-S1 AND S1;  Surgeon: Josue Paredes MD;  Location: [facility] PAIN MGT;  Service: Pain Management;  Laterality: Right;    TRANSFORAMINAL EPIDURAL INJECTION OF STEROID Right 6/15/2021    Procedure: INJECTION, STEROID, EPIDURAL, TRANSFORAMINAL APPROACH L5-S1 AND S1 need consent;  Surgeon: Josue Paredes MD;  Location: [facility]IN [facility];  Service: Pain Management;  Laterality: Right;    TRANSFORAMINAL EPIDURAL INJECTION OF STEROID Right 6/20/2022    Procedure: INJECTION, STEROID, EPIDURAL, TRANSFORAMINAL APPROACH, RIGHT L5-S1 AND S1 CONTRAST;  Surgeon: Krystal Mtz MD;  Location: [facility] PAIN MGT;  Service: Pain Management;  Laterality: Right;    TRANSFORAMINAL EPIDURAL INJECTION OF STEROID Bilateral 9/26/2022    Procedure: INJECTION, STEROID, EPIDURAL, TRANSFORAMINAL APPROACH, BILATERAL L5-S1 CONTRAST;  Surgeon: Krystal Mtz MD;  Location: [facility] PAIN MGT;  Service: Pain Management;  Laterality: Bilateral;    TRANSFORAMINAL EPIDURAL INJECTION OF STEROID Bilateral 7/13/2023    Procedure: INJECTION, STEROID, EPIDURAL, TRANSFORAMINAL APPROACH, L5-S1 BILATERAL;  Surgeon: Krystal Mtz MD;  Location: [facility] PAIN [facility]GT;  Service: Pain Management;  Laterality: Bilateral;     Social History     Socioeconomic History    Marital status:     Number of children: 3   Occupational History    Occupation:    Tobacco Use    Smoking status: Every Day     Current packs/day: 0.75     Average packs/day: 0.8 packs/day for 46.9 years (35.2 ttl pk-yrs)     Types: Cigarettes      Start date: 1978     Passive exposure: Never    Smokeless tobacco: Never   Substance and Sexual Activity    Alcohol use: Yes     Comment: Beer- Socially    Drug use: No    Sexual activity: Yes     Family History   Problem Relation Name Age of Onset    Diabetes Mother      Diabetes Father      Kidney disease Father      Melanoma Neg Hx         Review of patient's allergies indicates:  No Known Allergies    Current Outpatient Medications   Medication Sig    acetaminophen (TYLENOL) 500 MG tablet Take 1,000 mg by mouth every 6 (six) hours as needed for Pain.    amLODIPine (NORVASC) 5 MG tablet Take 1 tablet (5 mg total) by mouth once daily.    aspirin (ECOTRIN) 81 MG EC tablet Take 81 mg by mouth once daily.    atorvastatin (LIPITOR) 20 MG tablet Take 1 tablet (20 mg total) by mouth once daily.    gabapentin (NEURONTIN) 300 MG capsule Take 1 capsule by mouth twice daily    hydrOXYzine HCL (ATARAX) 10 MG Tab TAKE 1 TABLET BY MOUTH NIGHTLY AS NEEDED FOR ITCHING    losartan-hydrochlorothiazide 100-12.5 mg (HYZAAR) 100-12.5 mg Tab Take 1 tablet by mouth once daily.    metFORMIN (GLUCOPHAGE) 500 MG tablet Take 1 tablet (500 mg total) by mouth daily with breakfast.    oxyCODONE-acetaminophen (PERCOCET) 5-325 mg per tablet Take 1 tablet by mouth every 4 (four) hours as needed for Pain.    urea (CARMOL) 40 % Crea Apply topically once daily.     No current facility-administered medications for this visit.       REVIEW OF SYSTEMS:    GENERAL:  No weight loss, malaise or fevers.  HEENT:   No recent changes in vision or hearing  NECK:  Negative for lumps, no difficulty with swallowing.  RESPIRATORY:  Negative for cough, wheezing or shortness of breath, patient denies any recent URI.  CARDIOVASCULAR:  Negative for chest pain, leg swelling or palpitations. Pacemaker for SSS.  GI:  Negative for abdominal discomfort, blood in stools or black stools or change in bowel habits.  MUSCULOSKELETAL:  See HPI.  SKIN:  Negative for lesions,  rash, and itching.  PSYCH:  No mood disorder or recent psychosocial stressors.  Patients sleep is not disturbed secondary to pain.  HEMATOLOGY/LYMPHOLOGY:  Negative for prolonged bleeding, bruising easily or swollen nodes.  Patient is not currently taking any anti-coagulants  ENDO: DM2 on metformin  NEURO:   No history of headaches, syncope, paralysis, seizures or tremors.  All other reviewed and negative other than HPI.    OBJECTIVE:    BP (!) 152/84 (Patient Position: Sitting)   Pulse 60   Temp 98.2 °F (36.8 °C)   Wt 70.8 kg (156 lb 1.4 oz)   BMI 24.45 kg/m²     PHYSICAL EXAMINATION:    GENERAL: Well appearing, in no acute distress, alert and oriented x3.  PSYCH:  Mood and affect appropriate.  SKIN: Skin color, texture, turgor normal, no rashes or lesions.  HEAD/FACE:  Normocephalic, atraumatic. Cranial nerves grossly intact.  CV: RRR with palpation of the radial artery.  PULM: No evidence of respiratory difficulty, symmetric chest rise.  BACK: Straight leg raising in the sitting position is positive to radicular pain at a bilateral L5 distribution. There is no pain with palpation over the facet joints of the lumbar spine bilaterally. Limited ROM with pain on flexion > extension. Mildly positive facet loading bilaterally.   EXTREMITIES: No deformities, edema, or skin discoloration. Good capillary refill.  MUSCULOSKELETAL:There is no pain with palpation over the sacroiliac joints bilaterally. There is no pain to palpation over the greater trochanteric bursa bilaterally. FABERs test is negative.  FADIRs test is negative. No atrophy or tone abnormalities are noted.  NEURO: Bilateral lower extremity coordination and muscle stretch reflexes are physiologic and symmetric.  Plantar response are downgoing. No clonus.   GAIT: Antalgic- ambulates without assistance.      ASSESSMENT: 64 y.o. year old male with lower back pain, consistent with the following diagnoses:    Encounter Diagnoses   Name Primary?    Lumbar  radiculopathy Yes    Degeneration of intervertebral disc of lumbar region with discogenic back pain and lower extremity pain          PLAN:   We discussed with the patient the assessment and recommendations. The following is the plan we agreed on:   - Schedule for bilateral L5/S1 TF JEAN MARIE after receiving clearance from hernia surgeon  - Continue home pain medications as prescribed  - The patient will continue a home exercise routine to help with pain and strengthening.  - RTC 2 weeks after above procedure.       The above plan and management options were discussed at length with patient. Patient is in agreement with the above and verbalized understanding.     Nora Javier  12/03/2024    I have personally taken the history and examined this patient and agree with the resident's note as stated above.

## 2024-12-03 NOTE — TELEPHONE ENCOUNTER
----- Message from Titus Craft MD sent at 12/3/2024  1:54 PM CST -----  Regarding: RE: Surgery Clearance Request  This is Dr. Craft he is cleared to have transforaminal injection from general surgery standpoint  Thanks  ----- Message -----  From: Portia Page LPN  Sent: 12/3/2024  12:20 PM CST  To: Titus Craft MD  Subject: Surgery Clearance Request                        Good afternoon,    Patient will be scheduled to have a procedure with Dr. Mtz, Bilateral L5/S1 Transforaminal Epidural Steroid Injection . Staff is requesting clearance from previous Hernia Surgery to move forward with procedure. Please advise.    Thank you,  Portia

## 2024-12-03 NOTE — PROGRESS NOTES
HPI:  The patient is doing well status post left open inguinal hernia repair .    Occasional soreness with movement but otherwise no pain.  Feels better than before surgery    PHYSICAL EXAM:  Physical Exam     In NAD  Incision healed well without erythema with appropriate underlying ridge.  No bulge at the external ring with Valsalva standing      ASSESSMENT:    No sign of hernia recurrence 2 weeks status post repair           PLAN:    Continue no heavy lifting or strenuous activity for another month.  Patient was offered a return visit but chooses only if he is having a problem

## 2024-12-03 NOTE — PROGRESS NOTES
Chronic Pain - Established Visit    Referring Physician: No ref. provider found    Chief Complaint:   Chief Complaint   Patient presents with    Low-back Pain     Right side          SUBJECTIVE: Disclaimer: This note has been generated using voice-recognition software. There may be typographical errors that have been missed during proof-reading    Interval History 12/3/2024:  The patient is here to discuss progressively worsening lower back pain. Last received his L5/S1 TF JEAN MARIE on 07/23/2024 so he thinks its time for another injection. Denies any motor weakness or loss of sensation. Recently had hernia repair on 11/18/2024.    Interval History 6/26/2023:  The patient is here to discuss worsened lower back and leg pain. I last saw him in September at which time he underwent bilateral L5/S1 TF JEAN MARIE on 9/26/22 with 90% relief for 8 months. He says that his pain was minimal during that time. He was able to work without pain disturbance. He has continues with PT exercises 3 days per week for the past 9 months. His pain has been worsening over the past month. He would like to repeat the procedure. He also says that his wife passed away since previous encounter due to long-standing history of sarcoidosis. He has understandably been upset about this. His pain today is 5/10.     Interval History 9/13/2022:  Renny is here to discuss worsened back and leg pain. The pain radiates from the lower back into the back of both legs to anterior shin and great toe. He has associated numbness. He is diabetic but states that he has not had major problems with his sugars recently. No recent trauma. He previously underwent right L5/S1 and S1 TF JEAN MARIE with 80% relief for about 2 months. During that time he was able to ambulate and work without limitation by pain. Today, he again reports severe pain which is affecting his daily activities. He has been in PT exercises for the past 15 weeks without much benefit of symptoms. He has tried ice and  heat without benefit. He continues to take Gabapentin which provides some benefit. His overall pain today is 8/10.    Interval History 6/7/2022:  The patient returns to clinic today for follow up of low back pain. He reports increased low back pain over the last two weeks. He reports low back pain that radiates into the posterior aspect of his right leg to the bottom of his foot. He denies any left leg pain. His pain is worse with prolonged standing and walking. He reports that previous TF JEAN MARIE in 2021 provided 80% relief. He did notice that it took 3-4 weeks to see full benefit. He continues to take Gabapentin. He continues to perform a home exercise routine as prescribed. He denies any weakness. He denies any other health changes. His pain today is 5/10.    Interval history 07/02/2021:  The patient presents for follow-up lower back pain and right leg radiculopathy.  He is status post repeat right-sided TF JEAN MARIE to L5/S1&S1.  He has difficulty quantifying pain relief but states that normally his leg pain is 100% resolved and this time pain persists.  He is not have any recent images.  His medication regimen includes gabapentin 300 mg states this is mildly beneficial or denies any adverse side effects of medication.  Denies any focal loss of bowel, bladder or saddle paresthesias concerning for cauda equina.    Interval history 05/27/2021:  The patient presents for follow-up of lower back pain and bilateral leg radiculopathy.  He has had prior ILESI and TFESI both with 80%  benefit lasting approximately 9 months and pain just now returning. Pain is worse to right side at this time.  He denies any new areas of pain or neurological changes.The patient denies myelopathic symptoms such as handwriting changes or difficulty with buttons/coins/keys. Denies perineal paresthesias, bowel/bladder dysfunction.    Interval History 9/1/2020:  The patient is here for follow up of back and leg pain.  He is now s/p L5/S1 IL JEAN MARIE with 80%  of back pain.  However, he is having significant right leg pain, mainly down the buttock and posterior calf.  He had benefit with right L5/S1 and S1 TF JEAN MARIE in the past and would like to repeat.  He has associated numbness to right calf, worse with walking.  He does have some benefit with Gabapentin.  He had a recent Covid test for work which was negative. His pain today is 5/10.    Interval History 6/9/2020:  The patient is here for follow up of chronic lower back pain.  We have not seen the patient since last year.  He underwent an epidural at that time and says that his pain has been very mild until a few weeks ago.  He is having pain across lower back with radiation into the legs, mainly on the left.  He continues to take gabapentin with some benefit.  The pain bothers him the most with walking and activity.  He had pacemaker replacement on 5/6/20.  He is not currently on blood thinners.  His pain today is 5/10.    Interval History 7/30/2019:  The patient returns for follow up of back pain.  He is s/p repeat left then right L3,4,5 RFAs completed on 6/24/19 with about 80% relief.  His back pain is mild.  He has had increased leg pain recently, worse on the left side.  Previous leg pain was relieved with JEAN MARIE last year.  He would like to repeat this.  His leg pain bothers him mainly at night when trying to sleep.  He stopped Gabapentin when he was not having leg pain but recently restarted.  He is unable to take at night.  His pain today is 6/10.    Interval History 5/14/2019:  The patient returns for follow up.  He previously had benefit with JEAN MARIE for leg pain and RFAs for back pain.  He is having some back pain that has been worsening over the past couple of weeks.  He has significant pain in the morning.  He has some improvement when he moves around.  He says that cold air aggravates his pain.  He stopped Gabapentin last year when his leg pain improved.  He is not having much leg pain now.  His pain today is  6/10.    Interval History 2/7/2019:  The patient presents for check up of chronic back pain.  He reports doing well since last visit.  He feels that last JEAN MARIE is still providing him benefit.  He has not had any leg pain.  He does still have back pain but states that it is tolerable.  His morning pain is much improved from RFAs last year.  He continues to work and is active.  His pain today is 5/10.    Interval History 12/6/2018:  The patient presents today for follow up.  He continues with pain to the lower back.  He is not having leg pain at this time.  He had some relief with RFAs with the past.  He stopped Gabapentin when his leg pain improved.  He takes Tylenol sparingly with some benefit.  His pain today is 5/10.  The patient denies any bowel or bladder incontinence or signs of saddle paresthesia.  The patient denies any major medical changes since last office visit.    Interval History 10/25/2018:  The patient returns for follow up of back and leg pain.  He is s/p L5/S1 IL JEAN MARIE on 10/11/18 with 80% pain relief for his legs.  He has had increased lower back pain over the past few days which he attributes to weather changes.  He describes it as aching.  He has not been stretching.  He does walk a lot for work.  He cannot take oral NSAIDs due to pacemaker placement.  His pain today is 8/10.     Interval History 9/11/2018:  The patient presents for procedure follow up appointment.  He is s/p left then right L3,4,5 RFA completed on 8/14/18 with 80% pain relief initially.  He has had a little more pain over the past week.  He is now having intermittent radiation into the back of both legs, left greater than right.  He previously had benefit with right sided TF JEAN MARIE.  He is still taking gabapentin.  His pain today is 5/10.    Interval History 7/25/2018:  The patient returns today for follow up of back pain.  He is s/p bilateral L3,4,5 MBB on 7/5/18 with 100% relief for 2 days.  He previously had benefit with right TF  JEAN MARIE for leg pain.  He has not had right leg pain since the epidural.  However, he is reporting lateral left leg pain that has progressed over the past couple of weeks.  He continues to be active and works.  His pain today is 5/10.    Interval History 6/20/2018:  The patient returns today for follow up of lower back and right leg pain.  He is s/p right L5 and S1 TF JEAN MARIE on 6/5/18 with 100% relief of right leg pain.  He has not had any leg pain since the procedure.  He continues to report pain across the lower back.  This is always worse first thing in the morning.  He states that this feels aching and throbbing in nature.  He did have recent lumbar CT scan.  He would like to discuss further procedures.  He continues to work and be active.  His pain today is 5/10.    Interval History 5/22/2018:  The patient returns for follow up of back pain.  He is having radiation down there back of the right leg to the posterior calf.  The back pain is most severe in the morning and he states that this feels like a stiffness.  This improves when he walks.  He has severe leg pain that is intermittent, mainly with activity.  This is his biggest complaint.  He did complete the medrol dose pack recently with limited improvement.  He had XRAYs which show severe loss of disc space at L5/S1.  He has not had a CT scan.  He is taking Gabapentin 300 mg at bedtime.  It is written BID but it makes him drowsy during the day.  He has some benefit with Aleve but has been told to avoid NSAIDs due to history of pacemaker placement.  His pain today is 5/10.     Initial encounter:    Renny TOÑO Young presents to the clinic for the evaluation of lower back and right leg pain. The pain started two months ago and symptoms have been worsening.    Brief history:    Pain Description:    The pain is located in the lower back and right leg area in the L5/S1 distribution.      At BEST  5/10     At WORST  7/10 on the WORST day.      On average pain is rated as  5/10.     Today the pain is rated as 5/10    The pain is described as aching      Symptoms interfere with daily activity.     Exacerbating factors: Standing and Morning.      Mitigating factors medications.     Patient denies night fever/night sweats, urinary incontinence, bowel incontinence, significant weight loss, significant motor weakness and loss of sensations.  Patient denies any suicidal or homicidal ideations    Pain Medications:  Current:  OTC Tylenol PRN  Gabapentin 300 mg BID    Tried in Past:  NSAIDs - Aleve  TCA -Never  SNRI -Never  Anti-convulsants - Gabapentin   Muscle Relaxants -Never  Opioids-Never    Physical Therapy/Home Exercise: yes     report:  Reviewed and consistent with medication use as prescribed.    Pain Procedures:   6/5/18 Right L5 and S1 TF JEAN MARIE- significant relief  7/5/18 Bilateral L3,4,5 MBB- 100% relief for 2 days  7/31/18 Left L3,4,5 RFA- 80% relief  8/14/18 Right L3,4,5 RFA- 80% relief  10/11/18 L5/S1 IL JEAN MARIE- 80% relief  6/11/19 Left L3,4,5 RFA- 80% relief  6/25/19 Right L3,4,5 RFA- 80% relief  8/13/19 L5/S1 IL JEAN MARIE- 90% relief for 9 months  6/16/20 L5/S1 IL JEAN MARIE- 80% relief of back pain  6/15/2021- Right L5/S1 and S1 TF JEAN MARIE  6/20/22 Right L5 and S1 TF JEAN MARIE- 80% relief for 2 months  9/26/22 Bilateral L5/S1 TF JEAN MARIE- 90% relief for 9 months  7/23/24 Bialteral L5/S1 TF JEAN MARIE- 80% relief    Chiropractor -never  Acupuncture - never  TENS unit -never  Spinal decompression -never  Joint replacement -never    Imaging:   CT Lumbar Spine 7/9/2021:  COMPARISON:  Lumbar spine CT May 28, 2018     FINDINGS:  Mild levocurvature of the lumbar spine.  No listhesis.  There is no acute fracture.  The vertebral bodies are normal in height without compression fractures. Posterior elements are intact. There is unchanged severe disc height loss at the L5-S1 level with associated sclerosis and subchondral cystic change within the endplates.  Mild atherosclerotic calcification within the abdominal aorta  which is not aneurysmal.  Otherwise, no soft tissue abnormality identified.     T12-L1: The disc is normal in configuration.  There is no facet arthropathy.  There is no neural foraminal stenosis.  There is no spinal canal stenosis.     L1-L2: The disc is normal in configuration.  There is no facet arthropathy.  There is no neuroforaminal stenosis.  There is no spinal canal stenosis.     L2-L3: The disc is normal in configuration.  There is no facet arthropathy.  There is no neuroforaminal stenosis.  There is no spinal canal stenosis.     L3-L4: Minimal diffuse disc bulge.  Mild bilateral facet arthropathy.  There is no neuroforaminal stenosis.  There is no spinal canal stenosis.     L4-L5: There is a diffuse disc bulge.  Mild-to-moderate bilateral facet arthropathy.  There is no neuroforaminal stenosis.  There is no spinal canal stenosis.     L5-S1: There is a circumferential disc osteophyte complex secondary to the severe degenerative disc disease at this level.  Mild to moderate bilateral facet arthropathy.  Unchanged mild bilateral neural foraminal stenosis secondary to diffuse disc osteophyte complex as well as the facet arthropathy.  There is no spinal canal stenosis.     Impression:     Inferior lumbar spine degenerative changes worst at L5-S1 which result in mild bilateral neural foraminal stenosis at this level.  Overall, findings are not significantly changed compared to prior study from May 2018.        Past Medical History:   Diagnosis Date    Colon polyp     Diabetes mellitus, type 2     Full dentures     Hyperlipidemia     Hypertension     Pacemaker     Pacemaker at end of battery life 05/06/2020    Smoker 06/10/2015    Offered cessation program and declined    Wears prescription eyeglasses      Past Surgical History:   Procedure Laterality Date    COLONOSCOPY N/A 2/13/2017    Procedure: COLONOSCOPY;  Surgeon: NILDA Juares MD;  Location: [facility] ([address] [place] [address]);  Service: Endoscopy;  Laterality: N/A;   Do not cancel this order. Patient has Pacemaker in place.     EPIDURAL STEROID INJECTION N/A 8/13/2019    Procedure: INJECTION, STEROID, EPIDURAL IL, L5/S1;  Surgeon: Josue Paredes MD;  Location: [facility], operated by [facility];  Service: Pain Management;  Laterality: N/A;  IL JEAN MARIE L5/S1    EPIDURAL STEROID INJECTION N/A 6/16/2020    Procedure: INJECTION, STEROID, EPIDURAL, L5-S1 IL add on @ 2 ok by  [place];  Surgeon: Josue Paredes MD;  Location: [facility], operated [facility];  Service: Pain Management;  Laterality: N/A;    HERNIA REPAIR      INJECTION OF ANESTHETIC AGENT AROUND NERVE Bilateral 7/5/2018    Procedure: BLOCK, NERVE;  Surgeon: Krystal Mtz MD;  Location: [facility], operated [facility];  Service: Pain Management;  Laterality: Bilateral;  Lumbar Bilateral L3-L4-L5 Medial Branch Block    RADIOFREQUENCY ABLATION Left 6/11/2019    Procedure: RADIOFREQUENCY ABLATION, L3-L4-L5 MEDIAL BRANCH   1 OF 2;  Surgeon: Josue Paredes MD;  Location: [facility] operated [facility];  Service: Pain Management;  Laterality: Left;    RADIOFREQUENCY ABLATION Right 6/25/2019    Procedure: RADIOFREQUENCY ABLATION, L3-L4-L5 MEDIAL BRANCH JING 2 OF 2;  Surgeon: Josue Paredes MD;  Location: [facility], operated [facility];  Service: Pain Management;  Laterality: Right;    REPAIR, HERNIA, INGUINAL, WITHOUT HISTORY OF PRIOR REPAIR, AGE 5 YEARS OR OLDER Left 11/18/2024    Procedure: REPAIR, HERNIA, INGUINAL, WITHOUT HISTORY OF PRIOR REPAIR, AGE 5 YEARS OR OLDER;  Surgeon: Titus Craft MD;  Location: [facility] operated [facility];  Service: General;  Laterality: Left;    REPLACEMENT OF PACEMAKER GENERATOR Left 5/6/2020    Procedure: REPLACEMENT, PULSE GENERATOR, CARDIAC PACEMAKER;  Surgeon: Bradford Spence MD;  Location: [facility];  Service: Cardiology;  Laterality: Left;  EOL/LEAD Mlfx, Gen Change, Poss RA lead rev, SJM, MAC, GP, 3 PREP    REVISION OF PROCEDURE INVOLVING PACEMAKER LEAD Left 5/6/2020    Procedure: REVISION, ELECTRODE LEAD, CARDIAC PACEMAKER;  Surgeon: Bradford Spence MD;  Location: [facility] EP [facility];   Service: Cardiology;  Laterality: Left;    TRANSFORAMINAL EPIDURAL INJECTION OF STEROID Right 6/5/2018    Procedure: INJECTION-STEROID-EPIDURAL-TRANSFORAMINAL;  Surgeon: Josue Paredes MD;  Location: [facility];  Service: Pain Management;  Laterality: Right;  LUMBAR RIGHT L5 AND S1 TRANSFORAMINL JEAN MARIE  [id number]    W/ SEDATION     TRANSFORAMINAL EPIDURAL INJECTION OF STEROID Right 9/10/2020    Procedure: INJECTION, STEROID, EPIDURAL, TRANSFORAMINAL APPROACH, L5-S1 AND S1;  Surgeon: Josue Paredes MD;  Location: [facility];  Service: Pain Management;  Laterality: Right;    TRANSFORAMINAL EPIDURAL INJECTION OF STEROID Right 6/15/2021    Procedure: INJECTION, STEROID, EPIDURAL, TRANSFORAMINAL APPROACH L5-S1 AND S1 need consent;  Surgeon: Josue Paredes MD;  Location: [facility];  Service: Pain Management;  Laterality: Right;    TRANSFORAMINAL EPIDURAL INJECTION OF STEROID Right 6/20/2022    Procedure: INJECTION, STEROID, EPIDURAL, TRANSFORAMINAL APPROACH, RIGHT L5-S1 AND S1 CONTRAST;  Surgeon: Krystal Mtz MD;  Location: [facility];  Service: Pain Management;  Laterality: Right;    TRANSFORAMINAL EPIDURAL INJECTION OF STEROID Bilateral 9/26/2022    Procedure: INJECTION, STEROID, EPIDURAL, TRANSFORAMINAL APPROACH, BILATERAL L5-S1 CONTRAST;  Surgeon: Krystal Mtz MD;  Location: [facility];  Service: Pain Management;  Laterality: Bilateral;    TRANSFORAMINAL EPIDURAL INJECTION OF STEROID Bilateral 7/13/2023    Procedure: INJECTION, STEROID, EPIDURAL, TRANSFORAMINAL APPROACH, L5-S1 BILATERAL;  Surgeon: Krystal Mtz MD;  Location: [facility];  Service: Pain Management;  Laterality: Bilateral;     Social History     Socioeconomic History    Marital status:     Number of children: 3   Occupational History    Occupation:    Tobacco Use    Smoking status: Every Day     Current packs/day: 0.75     Average packs/day: 0.8 packs/day for 46.9 years (35.2 ttl pk-yrs)     Types: Cigarettes      Start date: 1978     Passive exposure: Never    Smokeless tobacco: Never   Substance and Sexual Activity    Alcohol use: Yes     Comment: Beer- Socially    Drug use: No    Sexual activity: Yes     Family History   Problem Relation Name Age of Onset    Diabetes Mother      Diabetes Father      Kidney disease Father      Melanoma Neg Hx         Review of patient's allergies indicates:  No Known Allergies    Current Outpatient Medications   Medication Sig    acetaminophen (TYLENOL) 500 MG tablet Take 1,000 mg by mouth every 6 (six) hours as needed for Pain.    amLODIPine (NORVASC) 5 MG tablet Take 1 tablet (5 mg total) by mouth once daily.    aspirin (ECOTRIN) 81 MG EC tablet Take 81 mg by mouth once daily.    atorvastatin (LIPITOR) 20 MG tablet Take 1 tablet (20 mg total) by mouth once daily.    gabapentin (NEURONTIN) 300 MG capsule Take 1 capsule by mouth twice daily    hydrOXYzine HCL (ATARAX) 10 MG Tab TAKE 1 TABLET BY MOUTH NIGHTLY AS NEEDED FOR ITCHING    losartan-hydrochlorothiazide 100-12.5 mg (HYZAAR) 100-12.5 mg Tab Take 1 tablet by mouth once daily.    metFORMIN (GLUCOPHAGE) 500 MG tablet Take 1 tablet (500 mg total) by mouth daily with breakfast.    oxyCODONE-acetaminophen (PERCOCET) 5-325 mg per tablet Take 1 tablet by mouth every 4 (four) hours as needed for Pain.    urea (CARMOL) 40 % Crea Apply topically once daily.     No current facility-administered medications for this visit.       REVIEW OF SYSTEMS:    GENERAL:  No weight loss, malaise or fevers.  HEENT:   No recent changes in vision or hearing  NECK:  Negative for lumps, no difficulty with swallowing.  RESPIRATORY:  Negative for cough, wheezing or shortness of breath, patient denies any recent URI.  CARDIOVASCULAR:  Negative for chest pain, leg swelling or palpitations. Pacemaker for SSS.  GI:  Negative for abdominal discomfort, blood in stools or black stools or change in bowel habits.  MUSCULOSKELETAL:  See HPI.  SKIN:  Negative for lesions,  rash, and itching.  PSYCH:  No mood disorder or recent psychosocial stressors.  Patients sleep is not disturbed secondary to pain.  HEMATOLOGY/LYMPHOLOGY:  Negative for prolonged bleeding, bruising easily or swollen nodes.  Patient is not currently taking any anti-coagulants  ENDO: DM2 on metformin  NEURO:   No history of headaches, syncope, paralysis, seizures or tremors.  All other reviewed and negative other than HPI.    OBJECTIVE:    BP (!) 152/84 (Patient Position: Sitting)   Pulse 60   Temp 98.2 °F (36.8 °C)   Wt 70.8 kg (156 lb 1.4 oz)   BMI 24.45 kg/m²     PHYSICAL EXAMINATION:    GENERAL: Well appearing, in no acute distress, alert and oriented x3.  PSYCH:  Mood and affect appropriate.  SKIN: Skin color, texture, turgor normal, no rashes or lesions.  HEAD/FACE:  Normocephalic, atraumatic. Cranial nerves grossly intact.  CV: RRR with palpation of the radial artery.  PULM: No evidence of respiratory difficulty, symmetric chest rise.  BACK: Straight leg raising in the sitting position is positive to radicular pain at a bilateral L5 distribution. There is no pain with palpation over the facet joints of the lumbar spine bilaterally. Limited ROM with pain on flexion > extension. Mildly positive facet loading bilaterally.   EXTREMITIES: No deformities, edema, or skin discoloration. Good capillary refill.  MUSCULOSKELETAL:There is no pain with palpation over the sacroiliac joints bilaterally. There is no pain to palpation over the greater trochanteric bursa bilaterally. FABERs test is negative.  FADIRs test is negative. No atrophy or tone abnormalities are noted.  NEURO: Bilateral lower extremity coordination and muscle stretch reflexes are physiologic and symmetric.  Plantar response are downgoing. No clonus.   GAIT: Antalgic- ambulates without assistance.      ASSESSMENT: 64 y.o. year old male with lower back pain, consistent with the following diagnoses:    Encounter Diagnoses   Name Primary?    Lumbar  radiculopathy Yes    Degeneration of intervertebral disc of lumbar region with discogenic back pain and lower extremity pain          PLAN:   We discussed with the patient the assessment and recommendations. The following is the plan we agreed on:   - Schedule for bilateral L5/S1 TF JEAN MARIE after receiving clearance from hernia surgeon  - Continue home pain medications as prescribed  - The patient will continue a home exercise routine to help with pain and strengthening.  - RTC 2 weeks after above procedure.       The above plan and management options were discussed at length with patient. Patient is in agreement with the above and verbalized understanding.     Nora Javier  12/03/2024    I have personally taken the history and examined this patient and agree with the resident's note as stated above.

## 2024-12-18 DIAGNOSIS — E11.42 DIABETIC POLYNEUROPATHY ASSOCIATED WITH TYPE 2 DIABETES MELLITUS: ICD-10-CM

## 2024-12-18 RX ORDER — GABAPENTIN 300 MG/1
CAPSULE ORAL
Qty: 60 CAPSULE | Refills: 1 | Status: SHIPPED | OUTPATIENT
Start: 2024-12-18

## 2024-12-23 ENCOUNTER — HOSPITAL ENCOUNTER (OUTPATIENT)
Facility: OTHER | Age: 64
Discharge: HOME | End: 2024-12-23
Attending: ANESTHESIOLOGY | Admitting: ANESTHESIOLOGY
Payer: MEDICAID

## 2024-12-23 VITALS
BODY MASS INDEX: 24.64 KG/M2 | SYSTOLIC BLOOD PRESSURE: 133 MMHG | OXYGEN SATURATION: 96 % | HEART RATE: 62 BPM | RESPIRATION RATE: 18 BRPM | WEIGHT: 157 LBS | TEMPERATURE: 98 F | DIASTOLIC BLOOD PRESSURE: 77 MMHG | HEIGHT: 67 IN

## 2024-12-23 DIAGNOSIS — M54.16 LUMBAR RADICULOPATHY: Primary | ICD-10-CM

## 2024-12-23 DIAGNOSIS — G89.29 CHRONIC PAIN: ICD-10-CM

## 2024-12-23 LAB — POCT GLUCOSE: 104 MG/DL (ref 70–110)

## 2024-12-23 PROCEDURE — 25500020 PHARM REV CODE 255: Performed by: ANESTHESIOLOGY

## 2024-12-23 PROCEDURE — 64483 NJX AA&/STRD TFRM EPI L/S 1: CPT | Mod: 50 | Performed by: ANESTHESIOLOGY

## 2024-12-23 PROCEDURE — 63600175 PHARM REV CODE 636 W HCPCS: Performed by: ANESTHESIOLOGY

## 2024-12-23 PROCEDURE — 64483 NJX AA&/STRD TFRM EPI L/S 1: CPT | Mod: 50,,, | Performed by: ANESTHESIOLOGY

## 2024-12-23 RX ORDER — FENTANYL CITRATE 50 UG/ML
INJECTION, SOLUTION INTRAMUSCULAR; INTRAVENOUS
Status: DISCONTINUED | OUTPATIENT
Start: 2024-12-23 | End: 2024-12-23 | Stop reason: HOSPADM

## 2024-12-23 RX ORDER — LIDOCAINE HYDROCHLORIDE 20 MG/ML
INJECTION, SOLUTION INFILTRATION; PERINEURAL
Status: DISCONTINUED | OUTPATIENT
Start: 2024-12-23 | End: 2024-12-23 | Stop reason: HOSPADM

## 2024-12-23 RX ORDER — DEXAMETHASONE SODIUM PHOSPHATE 10 MG/ML
INJECTION INTRAMUSCULAR; INTRAVENOUS
Status: DISCONTINUED | OUTPATIENT
Start: 2024-12-23 | End: 2024-12-23 | Stop reason: HOSPADM

## 2024-12-23 RX ORDER — LIDOCAINE HYDROCHLORIDE 10 MG/ML
INJECTION, SOLUTION EPIDURAL; INFILTRATION; INTRACAUDAL; PERINEURAL
Status: DISCONTINUED | OUTPATIENT
Start: 2024-12-23 | End: 2024-12-23 | Stop reason: HOSPADM

## 2024-12-23 RX ORDER — MIDAZOLAM HYDROCHLORIDE 1 MG/ML
INJECTION INTRAMUSCULAR; INTRAVENOUS
Status: DISCONTINUED | OUTPATIENT
Start: 2024-12-23 | End: 2024-12-23 | Stop reason: HOSPADM

## 2024-12-23 RX ORDER — SODIUM CHLORIDE 9 MG/ML
INJECTION, SOLUTION INTRAVENOUS CONTINUOUS
Status: DISCONTINUED | OUTPATIENT
Start: 2024-12-23 | End: 2024-12-23 | Stop reason: HOSPADM

## 2024-12-23 NOTE — DISCHARGE INSTRUCTIONS

## 2024-12-23 NOTE — DISCHARGE SUMMARY
Discharge Note  Short Stay      SUMMARY     Admit Date: 12/23/2024    Attending Physician: Krystal Mtz      Discharge Physician: Krystal Mtz      Discharge Date: 12/23/2024 11:51 AM    Procedure(s) (LRB):  LUMBAR TRANSFORAMINAL BILATERAL L5/S1 *ASPIRIN OTC8 HOLD FOR 5 DAYS *HERNIA SX CLEARANCE IN CHART* (Bilateral)    Final Diagnosis: Lumbar radiculopathy [M54.16]    Disposition: Home or self care    Patient Instructions:   Current Discharge Medication List        CONTINUE these medications which have NOT CHANGED    Details   acetaminophen (TYLENOL) 500 MG tablet Take 1,000 mg by mouth every 6 (six) hours as needed for Pain.      amLODIPine (NORVASC) 5 MG tablet Take 1 tablet (5 mg total) by mouth once daily.  Qty: 90 tablet, Refills: 3    Comments: .  Associated Diagnoses: Hypertension, essential      aspirin (ECOTRIN) 81 MG EC tablet Take 81 mg by mouth once daily.      atorvastatin (LIPITOR) 20 MG tablet Take 1 tablet (20 mg total) by mouth once daily.  Qty: 90 tablet, Refills: 3    Associated Diagnoses: Hyperlipidemia, unspecified hyperlipidemia type      gabapentin (NEURONTIN) 300 MG capsule Take 1 capsule by mouth twice daily  Qty: 60 capsule, Refills: 1    Associated Diagnoses: Diabetic polyneuropathy associated with type 2 diabetes mellitus      hydrOXYzine HCL (ATARAX) 10 MG Tab TAKE 1 TABLET BY MOUTH NIGHTLY AS NEEDED FOR ITCHING  Qty: 30 tablet, Refills: 0    Associated Diagnoses: Itching      losartan-hydrochlorothiazide 100-12.5 mg (HYZAAR) 100-12.5 mg Tab Take 1 tablet by mouth once daily.  Qty: 90 tablet, Refills: 3    Comments: .  Associated Diagnoses: Hypertension, essential      metFORMIN (GLUCOPHAGE) 500 MG tablet Take 1 tablet (500 mg total) by mouth daily with breakfast.  Qty: 90 tablet, Refills: 3    Associated Diagnoses: Type 2 diabetes mellitus with other specified complication, without long-term current use of insulin      oxyCODONE-acetaminophen (PERCOCET) 5-325 mg per tablet Take 1 tablet  by mouth every 4 (four) hours as needed for Pain.  Qty: 15 tablet, Refills: 0    Comments: Quantity prescribed more than 7 day supply? No  Associated Diagnoses: Inguinal hernia of left side without obstruction or gangrene      urea (CARMOL) 40 % Crea Apply topically once daily.  Qty: 85 g, Refills: 11                 Discharge Diagnosis: Lumbar radiculopathy [M54.16]  Condition on Discharge: Stable with no complications to procedure   Diet on Discharge: Same as before.  Activity: as per instruction sheet.  Discharge to: Home with a responsible adult.  Follow up: 2-4 weeks       Please call my office or pager at [phone] if experienced any weakness or loss of sensation, fever > 101.5, pain uncontrolled with oral medications, persistent nausea/vomiting/or diarrhea, redness or drainage from the incisions, or any other worrisome concerns. If physician on call was not reached or could not communicate with our office for any reason please go to the nearest emergency department

## 2024-12-23 NOTE — OP NOTE
Lumbar Transforaminal Epidural Steroid Injection under Fluoroscopic Guidance    The procedure, risks, benefits, and options were discussed with the patient. There are no contraindications to the procedure. The patent expressed understanding and agreed to the procedure. Informed written consent was obtained prior to the start of the procedure and can be found in the patient's chart.    PATIENT NAME: Renny Young   MRN: [id number]     DATE OF PROCEDURE: 12/23/2024    PROCEDURE:  Bilateral  L5/S1 Lumbar Transforaminal Epidural Steroid Injection under Fluoroscopic Guidance    PRE-OP DIAGNOSIS: Lumbar radiculopathy [M54.16] Lumbar radiculopathy [M54.16]    POST-OP DIAGNOSIS: Same    PHYSICIAN: Krystal Mtz MD    ASSISTANTS: LIAM Olivarez DO  Pain Fellow [employer] 2024      MEDICATIONS INJECTED: Preservative-free Decadron 10mg with 5cc of Lidocaine 1% MPF     LOCAL ANESTHETIC INJECTED: Xylocaine 2%     SEDATION: Versed 2mg and Fentanyl 25mcg                                                                                                                                                                                     Conscious sedation ordered by MMESSI. Patient re-evaluation prior to administration of conscious sedation. No changes noted in patient's status from initial evaluation. The patient's vital signs were monitored by RN and patient remained hemodynamically stable throughout the procedure.    Event Time In   Sedation Start 1154   Sedation End 1203       ESTIMATED BLOOD LOSS: None    COMPLICATIONS: None    TECHNIQUE: Time-out was performed to identify the patient and procedure to be performed. With the patient laying in a prone position, the surgical area was prepped and draped in the usual sterile fashion using ChloraPrep and a fenestrated drape.The levels were determined under fluoroscopy guidance. Skin anesthesia was achieved by injecting Lidocaine 2% over the injection sites. The transforaminal spaces were then  approached with a 22 gauge, 3.5 inch spinal quinke needle that was introduced under fluoroscopic guidance in the AP and Lateral views. Once the needle tip was in the area of the transforaminal space, and there was no blood, CSF or paraesthesias, contrast dye Omnipaque (300mg/mL) was injected to confirm placement and there was no vascular runoff. Fluoroscopic imaging in the AP and lateral views revealed a clear outline of the spinal nerve with proximal spread of agent through the neural foramen into the epidural space. 3 mL of the medication mixture listed above was injected slowly at each site. Displacement of the radio opaque contrast after injection of the medication confirmed that the medication went into the area of the transforaminal spaces. The needles were removed and bleeding was nil. A sterile dressing was applied. No specimens collected. The patient tolerated the procedure well.     PRE-PROCEDURE PAIN SCORE: 7/10    POST-PROCEDURE PAIN SCORE: 0/10    The patient was monitored after the procedure in the recovery area. They were given post-procedure and discharge instructions to follow at home. The patient was discharged in a stable condition.        Krystal Mtz MD

## 2024-12-31 ENCOUNTER — TELEPHONE (OUTPATIENT)
Dept: INTERNAL MEDICINE | Facility: CLINIC | Age: 64
End: 2024-12-31

## 2024-12-31 DIAGNOSIS — I25.10 ASCVD (ARTERIOSCLEROTIC CARDIOVASCULAR DISEASE): ICD-10-CM

## 2024-12-31 DIAGNOSIS — Z87.891 PERSONAL HISTORY OF NICOTINE DEPENDENCE: Primary | ICD-10-CM

## 2024-12-31 NOTE — TELEPHONE ENCOUNTER
Notified pt's brother Harsh of message from PCP and he said he will tell Renny.   CT is on recall to repeat in Nov of 2025.

## 2024-12-31 NOTE — TELEPHONE ENCOUNTER
Please CALL patient and report that the radiologist reviewing the CT scan advised that there were no concerning areas at this time. They have recommended a repeat scan in 1 year to continue surveillance.     Please schedule follow up scan in 1 year - see orders. Thank you

## 2025-01-08 ENCOUNTER — TELEPHONE (OUTPATIENT)
Dept: ENDOSCOPY | Facility: HOSPITAL | Age: 65
End: 2025-01-08
Payer: MEDICAID

## 2025-01-08 NOTE — TELEPHONE ENCOUNTER
Pt called requesting if he can eat or not before procedure. Informed pt he can not have any solid to eat

## 2025-01-09 ENCOUNTER — ANESTHESIA (OUTPATIENT)
Dept: ENDOSCOPY | Facility: HOSPITAL | Age: 65
End: 2025-01-09
Payer: MEDICAID

## 2025-01-09 ENCOUNTER — ANESTHESIA EVENT (OUTPATIENT)
Dept: ENDOSCOPY | Facility: HOSPITAL | Age: 65
End: 2025-01-09
Payer: MEDICAID

## 2025-01-09 ENCOUNTER — HOSPITAL ENCOUNTER (OUTPATIENT)
Facility: HOSPITAL | Age: 65
Discharge: HOME | End: 2025-01-09
Attending: COLON & RECTAL SURGERY | Admitting: COLON & RECTAL SURGERY
Payer: MEDICAID

## 2025-01-09 VITALS
OXYGEN SATURATION: 98 % | SYSTOLIC BLOOD PRESSURE: 125 MMHG | DIASTOLIC BLOOD PRESSURE: 75 MMHG | BODY MASS INDEX: 24.64 KG/M2 | WEIGHT: 157 LBS | HEIGHT: 67 IN | HEART RATE: 78 BPM | RESPIRATION RATE: 17 BRPM | TEMPERATURE: 98 F

## 2025-01-09 DIAGNOSIS — Z12.11 SCREENING FOR COLON CANCER: ICD-10-CM

## 2025-01-09 DIAGNOSIS — Z12.11 ENCOUNTER FOR SCREENING COLONOSCOPY: ICD-10-CM

## 2025-01-09 LAB — POCT GLUCOSE: 91 MG/DL (ref 70–110)

## 2025-01-09 PROCEDURE — 27201012 HC FORCEPS, HOT/COLD, DISP: Performed by: COLON & RECTAL SURGERY

## 2025-01-09 PROCEDURE — 45385 COLONOSCOPY W/LESION REMOVAL: CPT | Mod: ,,, | Performed by: COLON & RECTAL SURGERY

## 2025-01-09 PROCEDURE — 37000008 HC ANESTHESIA 1ST 15 MINUTES: Performed by: COLON & RECTAL SURGERY

## 2025-01-09 PROCEDURE — 88305 TISSUE EXAM BY PATHOLOGIST: CPT | Performed by: PATHOLOGY

## 2025-01-09 PROCEDURE — 63600175 PHARM REV CODE 636 W HCPCS: Performed by: STUDENT IN AN ORGANIZED HEALTH CARE EDUCATION/TRAINING PROGRAM

## 2025-01-09 PROCEDURE — 88305 TISSUE EXAM BY PATHOLOGIST: CPT | Mod: 26,,, | Performed by: PATHOLOGY

## 2025-01-09 PROCEDURE — 45385 COLONOSCOPY W/LESION REMOVAL: CPT | Performed by: COLON & RECTAL SURGERY

## 2025-01-09 PROCEDURE — 27201089 HC SNARE, DISP (ANY): Performed by: COLON & RECTAL SURGERY

## 2025-01-09 PROCEDURE — 37000009 HC ANESTHESIA EA ADD 15 MINS: Performed by: COLON & RECTAL SURGERY

## 2025-01-09 RX ORDER — PHENYLEPHRINE HYDROCHLORIDE 10 MG/ML
INJECTION INTRAVENOUS
Status: DISCONTINUED | OUTPATIENT
Start: 2025-01-09 | End: 2025-01-09

## 2025-01-09 RX ORDER — PROPOFOL 10 MG/ML
VIAL (ML) INTRAVENOUS
Status: DISCONTINUED | OUTPATIENT
Start: 2025-01-09 | End: 2025-01-09

## 2025-01-09 RX ORDER — LIDOCAINE HYDROCHLORIDE 20 MG/ML
INJECTION INTRAVENOUS
Status: DISCONTINUED | OUTPATIENT
Start: 2025-01-09 | End: 2025-01-09

## 2025-01-09 RX ADMIN — LIDOCAINE HYDROCHLORIDE 50 MG: 20 INJECTION INTRAVENOUS at 09:01

## 2025-01-09 RX ADMIN — PROPOFOL 70 MG: 10 INJECTION, EMULSION INTRAVENOUS at 09:01

## 2025-01-09 RX ADMIN — PHENYLEPHRINE HYDROCHLORIDE 100 MCG: 10 INJECTION INTRAVENOUS at 10:01

## 2025-01-09 RX ADMIN — PROPOFOL 175 MCG/KG/MIN: 10 INJECTION, EMULSION INTRAVENOUS at 09:01

## 2025-01-09 NOTE — PROVATION PATIENT INSTRUCTIONS
Discharge Summary/Instructions after an Endoscopic Procedure  Patient Name: Renny Young  Patient MRN: [id number]  Patient YOB: 1960  Thursday, January 9, 2025  Gene Duran MD  Dear patient,  As a result of recent federal legislation (The Federal Cures Act), you may   receive lab or pathology results from your procedure in your [facility]   account before your physician is able to contact you. Your physician or   their representative will relay the results to you with their   recommendations at their soonest availability.  Thank you,  RESTRICTIONS:  During your procedure today, you received medications for sedation.  These   medications may affect your judgment, balance and coordination.  Therefore,   for 24 hours, you have the following restrictions:   - DO NOT drive a car, operate machinery, make legal/financial decisions,   sign important papers or drink alcohol.    ACTIVITY:  Today: no heavy lifting, straining or running due to procedural   sedation/anesthesia.  The following day: return to full activity including work.  DIET:  Eat and drink normally unless instructed otherwise.     TREATMENT FOR COMMON SIDE EFFECTS:  - Mild abdominal pain, nausea, belching, bloating or excessive gas:  rest,   eat lightly and use a heating pad.  - Sore Throat: treat with throat lozenges and/or gargle with warm salt   water.  - Because air was used during the procedure, expelling large amounts of air   from your rectum or belching is normal.  - If a bowel prep was taken, you may not have a bowel movement for 1-3 days.    This is normal.  SYMPTOMS TO WATCH FOR AND REPORT TO YOUR PHYSICIAN:  1. Abdominal pain or bloating, other than gas cramps.  2. Chest pain.  3. Back pain.  4. Signs of infection such as: chills or fever occurring within 24 hours   after the procedure.  5. Rectal bleeding, which would show as bright red, maroon, or black stools.   (A tablespoon of blood from the rectum is not serious, especially if    hemorrhoids are present.)  6. Vomiting.  7. Weakness or dizziness.  GO DIRECTLY TO THE NEAREST EMERGENCY ROOM IF YOU HAVE ANY OF THE FOLLOWING:      Difficulty breathing              Chills and/or fever over 101 F   Persistent vomiting and/or vomiting blood   Severe abdominal pain   Severe chest pain   Black, tarry stools   Bleeding- more than one tablespoon   Any other symptom or condition that you feel may need urgent attention  Your doctor recommends these additional instructions:  If any biopsies were taken, your doctors clinic will contact you in 1 to 2   weeks with any results.  - Discharge patient to home.   - Resume previous diet.   - Continue present medications.   - Await pathology results.   - Repeat colonoscopy date to be determined after pending pathology results   are reviewed for surveillance.   - Patient has a contact number available for emergencies.  The signs and   symptoms of potential delayed complications were discussed with the   patient.  Return to normal activities tomorrow.  Written discharge   instructions were provided to the patient.  For questions, problems or results please call your physician - Gene Duran MD at Work:  [phone].  [facility], EMERGENCY ROOM PHONE NUMBER: [phone]  IF A COMPLICATION OR EMERGENCY SITUATION ARISES AND YOU ARE UNABLE TO REACH   YOUR PHYSICIAN - GO DIRECTLY TO THE EMERGENCY ROOM.  Gene Duran MD  1/9/2025 10:18:19 AM  This report has been verified and signed electronically.  Dear patient,  As a result of recent federal legislation (The Federal Cures Act), you may   receive lab or pathology results from your procedure in your [facility]   account before your physician is able to contact you. Your physician or   their representative will relay the results to you with their   recommendations at their soonest availability.  Thank you,  PROVATION

## 2025-01-09 NOTE — ANESTHESIA PREPROCEDURE EVALUATION
[facility]  Anesthesia Pre-Operative Evaluation     Patient Name: Renny Young  YOB: 1960  MRN: [id number]  [facility]: [id number]       Admit Date: (Not on file)   Admit Team: Networked reference to record PCT      Date of Procedure: 1/9/2025  Anesthesia: Choice Procedure: Procedure(s) (LRB):  COLONOSCOPY, SCREENING, HIGH RISK PATIENT (N/A)  Pre-Operative Diagnosis: Colon cancer screening [Z12.11]  Hx of colonic polyps [Z86.0100]  Proceduralist:Surgeons and Role:     * Gene Duran MD - Primary  Code Status: Prior   Advanced Directive: <no information>  Isolation Precautions: No active isolations  Capacity: Full capacity     SUBJECTIVE:   Renny Young is a 64 y.o. male who  has a past medical history of Colon polyp, Diabetes mellitus, type 2, Full dentures, Hyperlipidemia, Hypertension, Pacemaker, Pacemaker at end of battery life (05/06/2020), Smoker (06/10/2015), and Wears prescription eyeglasses.  No notes on file    Hospital LOS: 0 days  ICU LOS: Patient does not have an ICU stay during this admission.    he has a current medication list which includes the following long-term medication(s): amlodipine, aspirin, atorvastatin, gabapentin, losartan-hydrochlorothiazide 100-12.5 mg, and metformin.   Current Outpatient Medications   Medication Instructions    acetaminophen (TYLENOL) 1,000 mg, Every 6 hours PRN    amLODIPine (NORVASC) 5 mg, Oral, Daily    aspirin (ECOTRIN) 81 mg, Daily    atorvastatin (LIPITOR) 20 mg, Oral, Daily    gabapentin (NEURONTIN) 300 MG capsule Take 1 capsule by mouth twice daily    hydrOXYzine HCL (ATARAX) 10 mg, Oral, Nightly PRN    losartan-hydrochlorothiazide 100-12.5 mg (HYZAAR) 100-12.5 mg Tab 1 tablet, Oral, Daily    metFORMIN (GLUCOPHAGE) 500 mg, Oral, With breakfast    oxyCODONE-acetaminophen (PERCOCET) 5-325 mg per tablet 1 tablet, Oral, Every 4 hours PRN    urea (CARMOL) 40 % Crea Topical (Top), Daily     ALLERGIES:   Review of patient's allergies  indicates:  No Known Allergies  LDA:   AIRWAY:         * No LDAs found *      Lines/Drains/Airways       None                  Anesthesia Evaluation      Airway   Mallampati: III  TM distance: Normal  Neck ROM: Normal ROM  Dental    (+) Dentures    Pulmonary    Cardiovascular   (+) pacemaker, hypertension    Neuro/Psych    (+) neuromuscular disease    GI/Hepatic/Renal      Endo/Other    (+) diabetes mellitus type 2, arthritis  Abdominal                    MEDICATIONS:     Current Outpatient Medications on File Prior to Encounter   Medication Sig Dispense Refill Last Dose/Taking    acetaminophen (TYLENOL) 500 MG tablet Take 1,000 mg by mouth every 6 (six) hours as needed for Pain.       amLODIPine (NORVASC) 5 MG tablet Take 1 tablet (5 mg total) by mouth once daily. 90 tablet 3     aspirin (ECOTRIN) 81 MG EC tablet Take 81 mg by mouth once daily.       atorvastatin (LIPITOR) 20 MG tablet Take 1 tablet (20 mg total) by mouth once daily. 90 tablet 3     hydrOXYzine HCL (ATARAX) 10 MG Tab TAKE 1 TABLET BY MOUTH NIGHTLY AS NEEDED FOR ITCHING 30 tablet 0     losartan-hydrochlorothiazide 100-12.5 mg (HYZAAR) 100-12.5 mg Tab Take 1 tablet by mouth once daily. 90 tablet 3     metFORMIN (GLUCOPHAGE) 500 MG tablet Take 1 tablet (500 mg total) by mouth daily with breakfast. 90 tablet 3     oxyCODONE-acetaminophen (PERCOCET) 5-325 mg per tablet Take 1 tablet by mouth every 4 (four) hours as needed for Pain. 15 tablet 0       Inpatient Medications:  Antibiotics (From admission, onward)      None          VTE Risk Mitigation (From admission, onward)      None              No current facility-administered medications for this encounter.     Current Outpatient Medications   Medication Sig Dispense Refill    acetaminophen (TYLENOL) 500 MG tablet Take 1,000 mg by mouth every 6 (six) hours as needed for Pain.      amLODIPine (NORVASC) 5 MG tablet Take 1 tablet (5 mg total) by mouth once daily. 90 tablet 3    aspirin (ECOTRIN) 81 MG EC  tablet Take 81 mg by mouth once daily.      atorvastatin (LIPITOR) 20 MG tablet Take 1 tablet (20 mg total) by mouth once daily. 90 tablet 3    gabapentin (NEURONTIN) 300 MG capsule Take 1 capsule by mouth twice daily 60 capsule 1    hydrOXYzine HCL (ATARAX) 10 MG Tab TAKE 1 TABLET BY MOUTH NIGHTLY AS NEEDED FOR ITCHING 30 tablet 0    losartan-hydrochlorothiazide 100-12.5 mg (HYZAAR) 100-12.5 mg Tab Take 1 tablet by mouth once daily. 90 tablet 3    metFORMIN (GLUCOPHAGE) 500 MG tablet Take 1 tablet (500 mg total) by mouth daily with breakfast. 90 tablet 3    oxyCODONE-acetaminophen (PERCOCET) 5-325 mg per tablet Take 1 tablet by mouth every 4 (four) hours as needed for Pain. 15 tablet 0    urea (CARMOL) 40 % Crea Apply topically once daily. 85 g 11          History:   There are no hospital problems to display for this patient.    Surgical History:    has a past surgical history that includes Hernia repair; Colonoscopy (N/A, 2/13/2017); Transforaminal epidural injection of steroid (Right, 6/5/2018); Injection of anesthetic agent around nerve (Bilateral, 7/5/2018); Radiofrequency ablation (Left, 6/11/2019); Radiofrequency ablation (Right, 6/25/2019); Epidural steroid injection (N/A, 8/13/2019); Replacement of pacemaker generator (Left, 5/6/2020); Revision of procedure involving pacemaker lead (Left, 5/6/2020); Epidural steroid injection (N/A, 6/16/2020); Transforaminal epidural injection of steroid (Right, 9/10/2020); Transforaminal epidural injection of steroid (Right, 6/15/2021); Transforaminal epidural injection of steroid (Right, 6/20/2022); Transforaminal epidural injection of steroid (Bilateral, 9/26/2022); Transforaminal epidural injection of steroid (Bilateral, 7/13/2023); repair, hernia, inguinal, without history of prior repair, age 5 years or older (Left, 11/18/2024); and Transforaminal epidural injection of steroid (Bilateral, 12/23/2024).   Social History:    reports being sexually active.  reports that  "he has been smoking cigarettes. He started smoking about 47 years ago. He has a 35.3 pack-year smoking history. He has never been exposed to tobacco smoke. He has never used smokeless tobacco. He reports current alcohol use. He reports that he does not use drugs.    There were no vitals filed for this visit.  Vital Signs Range (Last 24H):       There is no height or weight on file to calculate BMI.  Wt Readings from Last 4 Encounters:   12/23/24 71.2 kg (157 lb)   12/03/24 70.8 kg (156 lb 1.4 oz)   11/18/24 70.8 kg (156 lb)   11/14/24 70.8 kg (156 lb)        Intake/Output - Last 3 Shifts       None          Lab Results   Component Value Date    WBC 10.65 11/14/2023    HGB 16.8 11/14/2023    HCT 50.0 11/14/2023     11/14/2023     11/07/2024    K 4.0 11/07/2024     11/07/2024    CREATININE 1.1 11/07/2024    BUN 15 11/07/2024    CO2 24 11/07/2024     11/07/2024    CALCIUM 9.7 11/07/2024    ALKPHOS 58 11/07/2024    ALT 25 11/07/2024    AST 25 11/07/2024    ALBUMIN 4.1 11/07/2024    INR 1.1 09/28/2015    HGBA1C 5.9 (H) 11/07/2024    TROPONINI 0.006 11/14/2023    BNP <10 11/14/2023     No results found for this or any previous visit (from the past 12 hours).  No results for input(s): "WBC", "HGB", "HCT", "PLT", "NA", "K", "CREATININE", "GLU", "INR", "LACTATE", "5HIAAPLASMA", "5HIAAURINT", "5HIAA", "[id number]" in the last 168 hours.  No LMP for male patient.    EKG:   Results for orders placed or performed in visit on 11/14/23   EKG 12-lead    Collection Time: 11/14/23 12:14 AM    Narrative    Test Reason :     Vent. Rate : 060 BPM     Atrial Rate : 060 BPM     P-R Int : 154 ms          QRS Dur : 078 ms      QT Int : 416 ms       P-R-T Axes : 084 040 081 degrees     QTc Int : 416 ms    Atrial-paced rhythm  Nonspecific ST-t wave abnormality  Abnormal ECG  When compared with ECG of 01-NOV-2023 08:02,  Electronic atrial pacemaker has replaced Sinus rhythm  Limb lead reversal No longer " present  Confirmed by TERESA BAEZ MD ([id number]) on 11/14/2023 5:08:26 PM    Referred By: [email or web address]   SELF           Confirmed By:TERESA BAEZ MD     TTE:  Results for orders placed during the hospital encounter of 12/14/21    Echo    Interpretation Summary  · The estimated ejection fraction is 65%.  · The left ventricle is normal in size with concentric remodeling and normal systolic function.  · Normal left ventricular diastolic function.  · Normal right ventricular size with normal right ventricular systolic function.  · There is a lead/wire present in the right ventricle  · Mild tricuspid regurgitation.  · The estimated PA systolic pressure is 34 mmHg.  · Normal central venous pressure (3 mmHg).  · There is no pericardial effusion    RADHA:  No results found for this or any previous visit.    Stress Test:  No results found for this or any previous visit.    Results for orders placed during the hospital encounter of 06/30/22    Stress Echo Which stress agent will be used? Treadmill Exercise; Color Flow Doppler? No    Interpretation Summary  · The patient exercised for 7 minutes and 1 seconds on a high ramp protocol, corresponding to a functional capacity of 12 METS, achieving a peak heart rate of 141 bpm, which is 89% of the age predicted maximum heart rate.  · The patient's exercise capacity was normal.  · There were no arrhythmias during stress.  · The test was stopped because the patient experienced fatigue.  · The ECG portion of this study is negative for myocardial ischemia.  · The left ventricle is normal in size with concentric remodeling and normal systolic function.  · The estimated ejection fraction is 60%.  · Normal left ventricular diastolic function.  · Normal right ventricular size with normal right ventricular systolic function.  · The estimated PA systolic pressure is 31 mmHg.  · Normal central venous pressure (3 mmHg).  · The stress echo portion of this study is negative for myocardial  ischemia.    [facility]:  No results found for this or any previous visit.    Cardiac Device Check   Results for orders placed in visit on 10/30/24    Cardiac device check - In Clinic & Hospital    No results found for this or any previous visit.        Pre-op Assessment          Review of Systems  Anesthesia Hx:  No problems with previous Anesthesia                Social:  Smoker, Social Alcohol Use       Cardiovascular:    Pacemaker Hypertension                                    Hypertension         Musculoskeletal:  Arthritis        Arthritis          Neurological:    Neuromuscular Disease,           Arthritis                         Neuromuscular Disease   Endocrine:  Diabetes, type 2    Diabetes                          Physical Exam  General: Well nourished, Cooperative and Alert    Airway:  Mallampati: III / II  Mouth Opening: Normal  TM Distance: Normal  Tongue: Normal  Neck ROM: Normal ROM    Dental:  Dentures        Anesthesia Plan  Type of Anesthesia, risks & benefits discussed:    Anesthesia Type: Gen ETT, Gen Natural Airway, MAC  Intra-op Monitoring Plan: Standard ASA Monitors  Post Op Pain Control Plan: multimodal analgesia and IV/PO Opioids PRN  Induction:  IV  Airway Plan: Direct and Video, Post-Induction  Informed Consent: Informed consent signed with the Patient and all parties understand the risks and agree with anesthesia plan.  All questions answered.   ASA Score: 3  Day of Surgery Review of History & Physical: H&P completed by Anesthesiologist.  Anesthesia Plan Notes: Chart reviewed, patient interviewed and examined.  The anesthetic plan was explained.  Risks, benefits, and alternatives were discussed. Questions were answered and the consent was signed.        ANA Lpoez M.D.         Ready For Surgery From Anesthesia Perspective.     .

## 2025-01-09 NOTE — ANESTHESIA POSTPROCEDURE EVALUATION
Anesthesia Post Evaluation    Patient: Renny Young    Procedure(s) Performed: Procedure(s) (LRB):  COLONOSCOPY, SCREENING, HIGH RISK PATIENT (N/A)    Final Anesthesia Type: general      Patient location during evaluation: PACU  Patient participation: Yes- Able to Participate  Level of consciousness: awake and alert  Post-procedure vital signs: reviewed and stable  Pain management: adequate  Airway patency: patent    PONV status at discharge: No PONV  Anesthetic complications: no      Cardiovascular status: blood pressure returned to baseline  Respiratory status: unassisted  Hydration status: euvolemic  Follow-up not needed.              Vitals Value Taken Time   BP 98/66 01/09/25 1035   Temp 36.6 °C (97.9 °F) 01/09/25 1020   Pulse 77 01/09/25 1035   Resp 16 01/09/25 1035   SpO2 97 % 01/09/25 1035         No case tracking events are documented in the log.      Pain/Gill Score: Gill Score: 9 (1/9/2025 10:35 AM)           Cardiac

## 2025-01-09 NOTE — H&P
History & Physical    SUBJECTIVE:     History of Present Illness:  Patient is a 64 y.o. male with a hx of pacemaker use who presents for repeat surveillance colonoscopy. Denies recents changes to their medical history or medications. Denies recent anticoagulation use, last took aspirin 3 days ago.  They have no further questions at this time and would like to proceed with colonoscopy.      Colonoscopy 2/3/17:  The perianal and digital rectal examinations were normal.        A 3 mm polyp was found in the ascending colon. The polyp was        semi-sessile. The polyp was removed with a jumbo cold forceps.        Resection and retrieval were complete. Verification of patient        identification for the specimen was done. Estimated blood loss was        minimal.        No other significant abnormalities were identified in a careful        examination of the remainder of the colon.        The terminal ileum appeared normal.   Impression:           - One 3 mm polyp in the ascending colon, removed                         with a jumbo cold forceps. Resected and retrieved.                         - The examined portion of the ileum was normal.   Pathology: adenomatous polyp    Denies family hx of colon cancer.    Review of patient's allergies indicates:  No Known Allergies    No current facility-administered medications for this encounter.       Past Medical History:   Diagnosis Date    Colon polyp     Diabetes mellitus, type 2     Full dentures     Hyperlipidemia     Hypertension     Pacemaker     Pacemaker at end of battery life 05/06/2020    Smoker 06/10/2015    Offered cessation program and declined    Wears prescription eyeglasses      Past Surgical History:   Procedure Laterality Date    COLONOSCOPY N/A 2/13/2017    Procedure: COLONOSCOPY;  Surgeon: NILDA Juares MD;  Location: [facility] ([address] [place] [address]);  Service: Endoscopy;  Laterality: N/A;  Do not cancel this order. Patient has Pacemaker in place.     EPIDURAL STEROID  INJECTION N/A 8/13/2019    Procedure: INJECTION, STEROID, EPIDURAL IL, L5/S1;  Surgeon: Josue Paredes MD;  Location: [facility]IN [facility]GT;  Service: Pain Management;  Laterality: N/A;  IL JEAN MARIE L5/S1    EPIDURAL STEROID INJECTION N/A 6/16/2020    Procedure: INJECTION, STEROID, EPIDURAL, L5-S1 IL add on @ 2 ok by  [place];  Surgeon: Josue Paredes MD;  Location: [facility];  Service: Pain Management;  Laterality: N/A;    HERNIA REPAIR      INJECTION OF ANESTHETIC AGENT AROUND NERVE Bilateral 7/5/2018    Procedure: BLOCK, NERVE;  Surgeon: Krystal Mtz MD;  Location: [facility];  Service: Pain Management;  Laterality: Bilateral;  Lumbar Bilateral L3-L4-L5 Medial Branch Block    RADIOFREQUENCY ABLATION Left 6/11/2019    Procedure: RADIOFREQUENCY ABLATION, L3-L4-L5 MEDIAL BRANCH   1 OF 2;  Surgeon: Josue Paredes MD;  Location: [facility];  Service: Pain Management;  Laterality: Left;    RADIOFREQUENCY ABLATION Right 6/25/2019    Procedure: RADIOFREQUENCY ABLATION, L3-L4-L5 MEDIAL BRANCH JING 2 OF 2;  Surgeon: Josue Paredes MD;  Location: [facility];  Service: Pain Management;  Laterality: Right;    REPAIR, HERNIA, INGUINAL, WITHOUT HISTORY OF PRIOR REPAIR, AGE 5 YEARS OR OLDER Left 11/18/2024    Procedure: REPAIR, HERNIA, INGUINAL, WITHOUT HISTORY OF PRIOR REPAIR, AGE 5 YEARS OR OLDER;  Surgeon: Titus Craft MD;  Location: [facility] OR;  Service: General;  Laterality: Left;    REPLACEMENT OF PACEMAKER GENERATOR Left 5/6/2020    Procedure: REPLACEMENT, PULSE GENERATOR, CARDIAC PACEMAKER;  Surgeon: Bradford Spence MD;  Location: [facility] EP LAB;  Service: Cardiology;  Laterality: Left;  EOL/LEAD Mlfx, Gen Change, Poss RA lead rev, SJM, MAC, GP, 3 PREP    REVISION OF PROCEDURE INVOLVING PACEMAKER LEAD Left 5/6/2020    Procedure: REVISION, ELECTRODE LEAD, CARDIAC PACEMAKER;  Surgeon: Bradford Spence MD;  Location: [facility] EP LAB;  Service: Cardiology;  Laterality: Left;    TRANSFORAMINAL EPIDURAL INJECTION OF  STEROID Right 6/5/2018    Procedure: INJECTION-STEROID-EPIDURAL-TRANSFORAMINAL;  Surgeon: Josue Paredes MD;  Location: [facility];  Service: Pain Management;  Laterality: Right;  LUMBAR RIGHT L5 AND S1 TRANSFORAMINL JEAN MARIE  [id number]    W/ SEDATION     TRANSFORAMINAL EPIDURAL INJECTION OF STEROID Right 9/10/2020    Procedure: INJECTION, STEROID, EPIDURAL, TRANSFORAMINAL APPROACH, L5-S1 AND S1;  Surgeon: Josue Paredes MD;  Location: [facility];  Service: Pain Management;  Laterality: Right;    TRANSFORAMINAL EPIDURAL INJECTION OF STEROID Right 6/15/2021    Procedure: INJECTION, STEROID, EPIDURAL, TRANSFORAMINAL APPROACH L5-S1 AND S1 need consent;  Surgeon: Josue Paredes MD;  Location: [facility]IN [facility];  Service: Pain Management;  Laterality: Right;    TRANSFORAMINAL EPIDURAL INJECTION OF STEROID Right 6/20/2022    Procedure: INJECTION, STEROID, EPIDURAL, TRANSFORAMINAL APPROACH, RIGHT L5-S1 AND S1 CONTRAST;  Surgeon: Krystal Mtz MD;  Location: [facility]IN [facility];  Service: Pain Management;  Laterality: Right;    TRANSFORAMINAL EPIDURAL INJECTION OF STEROID Bilateral 9/26/2022    Procedure: INJECTION, STEROID, EPIDURAL, TRANSFORAMINAL APPROACH, BILATERAL L5-S1 CONTRAST;  Surgeon: Krystal Mtz MD;  Location: [facility]IN [facility];  Service: Pain Management;  Laterality: Bilateral;    TRANSFORAMINAL EPIDURAL INJECTION OF STEROID Bilateral 7/13/2023    Procedure: INJECTION, STEROID, EPIDURAL, TRANSFORAMINAL APPROACH, L5-S1 BILATERAL;  Surgeon: Krystal Mtz MD;  Location: [facility];  Service: Pain Management;  Laterality: Bilateral;    TRANSFORAMINAL EPIDURAL INJECTION OF STEROID Bilateral 12/23/2024    Procedure: LUMBAR TRANSFORAMINAL BILATERAL L5/S1 *ASPIRIN OTC8 HOLD FOR 5 DAYS *HERNIA SX CLEARANCE IN CHART*;  Surgeon: Krystal Mtz MD;  Location: [facility];  Service: Pain Management;  Laterality: Bilateral;     Family History   Problem Relation Name Age of Onset    Diabetes Mother      Diabetes Father       "Kidney disease Father      Melanoma Neg Hx       Social History     Tobacco Use    Smoking status: Every Day     Current packs/day: 0.75     Average packs/day: 0.8 packs/day for 47.0 years (35.3 ttl pk-yrs)     Types: Cigarettes     Start date: 1978     Passive exposure: Never    Smokeless tobacco: Never   Substance Use Topics    Alcohol use: Yes     Comment: Beer- Socially    Drug use: No        Review of Systems:  Review of Systems    OBJECTIVE:     Vital Signs (Most Recent)  Temp: 99.1 °F (37.3 °C) (01/09/25 0906)  Pulse: 98 (01/09/25 0906)  Resp: 16 (01/09/25 0906)  BP: (!) 174/90 (01/09/25 0906)  SpO2: 96 % (01/09/25 0906)  5' 7" (1.702 m)  71.2 kg (157 lb)     Physical Exam  Constitutional:       General: He is not in acute distress.  HENT:      Mouth/Throat:      Mouth: Mucous membranes are moist.   Eyes:      Extraocular Movements: Extraocular movements intact.      Conjunctiva/sclera: Conjunctivae normal.   Cardiovascular:      Rate and Rhythm: Normal rate.      Pulses: Normal pulses.   Pulmonary:      Effort: Pulmonary effort is normal. No respiratory distress.   Skin:     General: Skin is warm and dry.   Neurological:      General: No focal deficit present.      Mental Status: He is alert.   Psychiatric:         Mood and Affect: Mood normal.         Behavior: Behavior normal.       Deep Sedation: Mallampati Score per anesthesia     SedationPlan :Moderate     ASA : III     Patient is medically cleared for anesthesia.     Anesthesia/Surgery risks, benefits and alternative options discussed and understood by patient/family..    Lyndon Howell MD  General Surgery PGY-1    "

## 2025-01-13 ENCOUNTER — TELEPHONE (OUTPATIENT)
Dept: INTERNAL MEDICINE | Facility: CLINIC | Age: 65
End: 2025-01-13
Payer: MEDICAID

## 2025-01-13 ENCOUNTER — TELEPHONE (OUTPATIENT)
Dept: PAIN MEDICINE | Facility: CLINIC | Age: 65
End: 2025-01-13
Payer: MEDICAID

## 2025-01-13 DIAGNOSIS — H69.93 DYSFUNCTION OF BOTH EUSTACHIAN TUBES: Primary | ICD-10-CM

## 2025-01-13 DIAGNOSIS — H53.9 VISUAL DISTURBANCE: ICD-10-CM

## 2025-01-13 LAB
FINAL PATHOLOGIC DIAGNOSIS: NORMAL
GROSS: NORMAL
Lab: NORMAL

## 2025-01-13 NOTE — TELEPHONE ENCOUNTER
----- Message from Austin sent at 1/13/2025  2:40 PM CST -----  Regarding: Reschedule  Name of Who is Calling:  Patient          What is the request in detail:  Patient would like to reschedule his 01/09/2025 appointment            Can the clinic reply by [facility]: No            [facility]: [phone]

## 2025-01-13 NOTE — TELEPHONE ENCOUNTER
----- Message from Austin sent at 1/13/2025  2:45 PM CST -----  Regarding: Referral  Name of Who is Calling:  Patient          What is the request in detail:  Patient would like to have a referral for ENT            Can the clinic reply by [facility]: nO            What Number to Call Back if not in [facility]: [phone]

## 2025-01-15 ENCOUNTER — OFFICE VISIT (OUTPATIENT)
Dept: SPINE | Facility: CLINIC | Age: 65
End: 2025-01-15
Payer: MEDICAID

## 2025-01-15 VITALS
DIASTOLIC BLOOD PRESSURE: 81 MMHG | BODY MASS INDEX: 24.63 KG/M2 | OXYGEN SATURATION: 100 % | WEIGHT: 156.94 LBS | HEART RATE: 84 BPM | SYSTOLIC BLOOD PRESSURE: 162 MMHG | RESPIRATION RATE: 19 BRPM | HEIGHT: 67 IN

## 2025-01-15 DIAGNOSIS — M51.362 DEGENERATION OF INTERVERTEBRAL DISC OF LUMBAR REGION WITH DISCOGENIC BACK PAIN AND LOWER EXTREMITY PAIN: ICD-10-CM

## 2025-01-15 DIAGNOSIS — M53.3 SACROILIAC JOINT PAIN: Primary | ICD-10-CM

## 2025-01-15 DIAGNOSIS — M54.16 LUMBAR RADICULOPATHY: ICD-10-CM

## 2025-01-15 DIAGNOSIS — M47.816 LUMBAR SPONDYLOSIS: ICD-10-CM

## 2025-01-15 PROCEDURE — 3008F BODY MASS INDEX DOCD: CPT | Mod: CPTII,,, | Performed by: NURSE PRACTITIONER

## 2025-01-15 PROCEDURE — 1159F MED LIST DOCD IN RCRD: CPT | Mod: CPTII,,, | Performed by: NURSE PRACTITIONER

## 2025-01-15 PROCEDURE — 3079F DIAST BP 80-89 MM HG: CPT | Mod: CPTII,,, | Performed by: NURSE PRACTITIONER

## 2025-01-15 PROCEDURE — 99214 OFFICE O/P EST MOD 30 MIN: CPT | Mod: PBBFAC | Performed by: NURSE PRACTITIONER

## 2025-01-15 PROCEDURE — 1160F RVW MEDS BY RX/DR IN RCRD: CPT | Mod: CPTII,,, | Performed by: NURSE PRACTITIONER

## 2025-01-15 PROCEDURE — 99999 PR PBB SHADOW E&M-EST. PATIENT-LVL IV: CPT | Mod: PBBFAC,,, | Performed by: NURSE PRACTITIONER

## 2025-01-15 PROCEDURE — 99214 OFFICE O/P EST MOD 30 MIN: CPT | Mod: S$PBB,,, | Performed by: NURSE PRACTITIONER

## 2025-01-15 PROCEDURE — 3077F SYST BP >= 140 MM HG: CPT | Mod: CPTII,,, | Performed by: NURSE PRACTITIONER

## 2025-01-15 NOTE — TELEPHONE ENCOUNTER
Please see referral orders and please call patient to schedule a consult with Optometry and ENT. Thank you.

## 2025-01-15 NOTE — H&P (VIEW-ONLY)
Chronic Pain - Established Visit    Referring Physician: No ref. provider found    Chief Complaint:   Chief Complaint   Patient presents with    Leg Pain          SUBJECTIVE: Disclaimer: This note has been generated using voice-recognition software. There may be typographical errors that have been missed during proof-reading    Interval History 1/15/2025:  The patient returns to clinic today for follow up of back pain. He is s/p bilateral L5/S1 TF JEAN MARIE on 12/23/2024. He reports 50% relief. He continues to report left sided low back and buttock pain. He denies any radicular leg pain. His pain is worse with prolonged sitting and walking. He does endorse morning stiffness. He denies any other health changes. His pain today is 7/10.    Interval History 12/3/2024:  The patient is here to discuss progressively worsening lower back pain. Last received his L5/S1 TF JEAN MARIE on 07/23/2024 so he thinks its time for another injection. Denies any motor weakness or loss of sensation. Recently had hernia repair on 11/18/2024.    Interval History 6/26/2023:  The patient is here to discuss worsened lower back and leg pain. I last saw him in September at which time he underwent bilateral L5/S1 TF JEAN MARIE on 9/26/22 with 90% relief for 8 months. He says that his pain was minimal during that time. He was able to work without pain disturbance. He has continues with PT exercises 3 days per week for the past 9 months. His pain has been worsening over the past month. He would like to repeat the procedure. He also says that his wife passed away since previous encounter due to long-standing history of sarcoidosis. He has understandably been upset about this. His pain today is 5/10.     Interval History 9/13/2022:  Renny is here to discuss worsened back and leg pain. The pain radiates from the lower back into the back of both legs to anterior shin and great toe. He has associated numbness. He is diabetic but states that he has not had major problems  with his sugars recently. No recent trauma. He previously underwent right L5/S1 and S1 TF JEAN MARIE with 80% relief for about 2 months. During that time he was able to ambulate and work without limitation by pain. Today, he again reports severe pain which is affecting his daily activities. He has been in PT exercises for the past 15 weeks without much benefit of symptoms. He has tried ice and heat without benefit. He continues to take Gabapentin which provides some benefit. His overall pain today is 8/10.    Interval History 6/7/2022:  The patient returns to clinic today for follow up of low back pain. He reports increased low back pain over the last two weeks. He reports low back pain that radiates into the posterior aspect of his right leg to the bottom of his foot. He denies any left leg pain. His pain is worse with prolonged standing and walking. He reports that previous TF JEAN MARIE in 2021 provided 80% relief. He did notice that it took 3-4 weeks to see full benefit. He continues to take Gabapentin. He continues to perform a home exercise routine as prescribed. He denies any weakness. He denies any other health changes. His pain today is 5/10.    Interval history 07/02/2021:  The patient presents for follow-up lower back pain and right leg radiculopathy.  He is status post repeat right-sided TF JEAN MARIE to L5/S1&S1.  He has difficulty quantifying pain relief but states that normally his leg pain is 100% resolved and this time pain persists.  He is not have any recent images.  His medication regimen includes gabapentin 300 mg states this is mildly beneficial or denies any adverse side effects of medication.  Denies any focal loss of bowel, bladder or saddle paresthesias concerning for cauda equina.    Interval history 05/27/2021:  The patient presents for follow-up of lower back pain and bilateral leg radiculopathy.  He has had prior ILESI and TFESI both with 80%  benefit lasting approximately 9 months and pain just now  returning. Pain is worse to right side at this time.  He denies any new areas of pain or neurological changes.The patient denies myelopathic symptoms such as handwriting changes or difficulty with buttons/coins/keys. Denies perineal paresthesias, bowel/bladder dysfunction.    Interval History 9/1/2020:  The patient is here for follow up of back and leg pain.  He is now s/p L5/S1 IL JEAN MARIE with 80% of back pain.  However, he is having significant right leg pain, mainly down the buttock and posterior calf.  He had benefit with right L5/S1 and S1 TF JEAN MARIE in the past and would like to repeat.  He has associated numbness to right calf, worse with walking.  He does have some benefit with Gabapentin.  He had a recent Covid test for work which was negative. His pain today is 5/10.    Interval History 6/9/2020:  The patient is here for follow up of chronic lower back pain.  We have not seen the patient since last year.  He underwent an epidural at that time and says that his pain has been very mild until a few weeks ago.  He is having pain across lower back with radiation into the legs, mainly on the left.  He continues to take gabapentin with some benefit.  The pain bothers him the most with walking and activity.  He had pacemaker replacement on 5/6/20.  He is not currently on blood thinners.  His pain today is 5/10.    Interval History 7/30/2019:  The patient returns for follow up of back pain.  He is s/p repeat left then right L3,4,5 RFAs completed on 6/24/19 with about 80% relief.  His back pain is mild.  He has had increased leg pain recently, worse on the left side.  Previous leg pain was relieved with JEAN MARIE last year.  He would like to repeat this.  His leg pain bothers him mainly at night when trying to sleep.  He stopped Gabapentin when he was not having leg pain but recently restarted.  He is unable to take at night.  His pain today is 6/10.    Interval History 5/14/2019:  The patient returns for follow up.  He  previously had benefit with JEAN MARIE for leg pain and RFAs for back pain.  He is having some back pain that has been worsening over the past couple of weeks.  He has significant pain in the morning.  He has some improvement when he moves around.  He says that cold air aggravates his pain.  He stopped Gabapentin last year when his leg pain improved.  He is not having much leg pain now.  His pain today is 6/10.    Interval History 2/7/2019:  The patient presents for check up of chronic back pain.  He reports doing well since last visit.  He feels that last JEAN MARIE is still providing him benefit.  He has not had any leg pain.  He does still have back pain but states that it is tolerable.  His morning pain is much improved from RFAs last year.  He continues to work and is active.  His pain today is 5/10.    Interval History 12/6/2018:  The patient presents today for follow up.  He continues with pain to the lower back.  He is not having leg pain at this time.  He had some relief with RFAs with the past.  He stopped Gabapentin when his leg pain improved.  He takes Tylenol sparingly with some benefit.  His pain today is 5/10.  The patient denies any bowel or bladder incontinence or signs of saddle paresthesia.  The patient denies any major medical changes since last office visit.    Interval History 10/25/2018:  The patient returns for follow up of back and leg pain.  He is s/p L5/S1 IL JEAN MARIE on 10/11/18 with 80% pain relief for his legs.  He has had increased lower back pain over the past few days which he attributes to weather changes.  He describes it as aching.  He has not been stretching.  He does walk a lot for work.  He cannot take oral NSAIDs due to pacemaker placement.  His pain today is 8/10.     Interval History 9/11/2018:  The patient presents for procedure follow up appointment.  He is s/p left then right L3,4,5 RFA completed on 8/14/18 with 80% pain relief initially.  He has had a little more pain over the past week.   He is now having intermittent radiation into the back of both legs, left greater than right.  He previously had benefit with right sided TF JEAN MARIE.  He is still taking gabapentin.  His pain today is 5/10.    Interval History 7/25/2018:  The patient returns today for follow up of back pain.  He is s/p bilateral L3,4,5 MBB on 7/5/18 with 100% relief for 2 days.  He previously had benefit with right TF JEAN MARIE for leg pain.  He has not had right leg pain since the epidural.  However, he is reporting lateral left leg pain that has progressed over the past couple of weeks.  He continues to be active and works.  His pain today is 5/10.    Interval History 6/20/2018:  The patient returns today for follow up of lower back and right leg pain.  He is s/p right L5 and S1 TF JEAN MARIE on 6/5/18 with 100% relief of right leg pain.  He has not had any leg pain since the procedure.  He continues to report pain across the lower back.  This is always worse first thing in the morning.  He states that this feels aching and throbbing in nature.  He did have recent lumbar CT scan.  He would like to discuss further procedures.  He continues to work and be active.  His pain today is 5/10.    Interval History 5/22/2018:  The patient returns for follow up of back pain.  He is having radiation down there back of the right leg to the posterior calf.  The back pain is most severe in the morning and he states that this feels like a stiffness.  This improves when he walks.  He has severe leg pain that is intermittent, mainly with activity.  This is his biggest complaint.  He did complete the medrol dose pack recently with limited improvement.  He had XRAYs which show severe loss of disc space at L5/S1.  He has not had a CT scan.  He is taking Gabapentin 300 mg at bedtime.  It is written BID but it makes him drowsy during the day.  He has some benefit with Aleve but has been told to avoid NSAIDs due to history of pacemaker placement.  His pain today is 5/10.      Initial encounter:    Renny Young presents to the clinic for the evaluation of lower back and right leg pain. The pain started two months ago and symptoms have been worsening.    Brief history:    Pain Description:    The pain is located in the lower back and right leg area in the L5/S1 distribution.      At BEST  5/10     At WORST  7/10 on the WORST day.      On average pain is rated as 5/10.     Today the pain is rated as 5/10    The pain is described as aching      Symptoms interfere with daily activity.     Exacerbating factors: Standing and Morning.      Mitigating factors medications.     Patient denies night fever/night sweats, urinary incontinence, bowel incontinence, significant weight loss, significant motor weakness and loss of sensations.  Patient denies any suicidal or homicidal ideations    Pain Medications:  Current:  OTC Tylenol PRN  Gabapentin 300 mg BID    Tried in Past:  NSAIDs - Aleve  TCA -Never  SNRI -Never  Anti-convulsants - Gabapentin   Muscle Relaxants -Never  Opioids-Never    Physical Therapy/Home Exercise: yes     report:  Reviewed and consistent with medication use as prescribed.    Pain Procedures:   6/5/18 Right L5 and S1 TF JEAN MARIE- significant relief  7/5/18 Bilateral L3,4,5 MBB- 100% relief for 2 days  7/31/18 Left L3,4,5 RFA- 80% relief  8/14/18 Right L3,4,5 RFA- 80% relief  10/11/18 L5/S1 IL JEAN MARIE- 80% relief  6/11/19 Left L3,4,5 RFA- 80% relief  6/25/19 Right L3,4,5 RFA- 80% relief  8/13/19 L5/S1 IL JEAN MARIE- 90% relief for 9 months  6/16/20 L5/S1 IL JEAN MARIE- 80% relief of back pain  6/15/2021- Right L5/S1 and S1 TF JEAN MARIE  6/20/22 Right L5 and S1 TF JEAN MARIE- 80% relief for 2 months  9/26/22 Bilateral L5/S1 TF JEAN MARIE- 90% relief for 9 months  7/23/24 Bialteral L5/S1 TF JEAN MARIE- 80% relief  12/23/2024- Bilateral L5/S1 TF JEAN MARIE    Chiropractor -never  Acupuncture - never  TENS unit -never  Spinal decompression -never  Joint replacement -never    Imaging:   CT Lumbar Spine 7/9/2021:  COMPARISON:  Lumbar  spine CT May 28, 2018     FINDINGS:  Mild levocurvature of the lumbar spine.  No listhesis.  There is no acute fracture.  The vertebral bodies are normal in height without compression fractures. Posterior elements are intact. There is unchanged severe disc height loss at the L5-S1 level with associated sclerosis and subchondral cystic change within the endplates.  Mild atherosclerotic calcification within the abdominal aorta which is not aneurysmal.  Otherwise, no soft tissue abnormality identified.     T12-L1: The disc is normal in configuration.  There is no facet arthropathy.  There is no neural foraminal stenosis.  There is no spinal canal stenosis.     L1-L2: The disc is normal in configuration.  There is no facet arthropathy.  There is no neuroforaminal stenosis.  There is no spinal canal stenosis.     L2-L3: The disc is normal in configuration.  There is no facet arthropathy.  There is no neuroforaminal stenosis.  There is no spinal canal stenosis.     L3-L4: Minimal diffuse disc bulge.  Mild bilateral facet arthropathy.  There is no neuroforaminal stenosis.  There is no spinal canal stenosis.     L4-L5: There is a diffuse disc bulge.  Mild-to-moderate bilateral facet arthropathy.  There is no neuroforaminal stenosis.  There is no spinal canal stenosis.     L5-S1: There is a circumferential disc osteophyte complex secondary to the severe degenerative disc disease at this level.  Mild to moderate bilateral facet arthropathy.  Unchanged mild bilateral neural foraminal stenosis secondary to diffuse disc osteophyte complex as well as the facet arthropathy.  There is no spinal canal stenosis.     Impression:     Inferior lumbar spine degenerative changes worst at L5-S1 which result in mild bilateral neural foraminal stenosis at this level.  Overall, findings are not significantly changed compared to prior study from May 2018.        Past Medical History:   Diagnosis Date    Colon polyp     Diabetes mellitus, type  2     Full dentures     Hyperlipidemia     Hypertension     Pacemaker     Pacemaker at end of battery life 05/06/2020    Smoker 06/10/2015    Offered cessation program and declined    Wears prescription eyeglasses      Past Surgical History:   Procedure Laterality Date    COLONOSCOPY N/A 2/13/2017    Procedure: COLONOSCOPY;  Surgeon: NILDA Juares MD;  Location: [facility] ENDO (4TH FLR);  Service: Endoscopy;  Laterality: N/A;  Do not cancel this order. Patient has Pacemaker in place.     COLONOSCOPY, SCREENING, HIGH RISK PATIENT N/A 1/9/2025    Procedure: COLONOSCOPY, SCREENING, HIGH RISK PATIENT;  Surgeon: Gene Duran MD;  Location: [facility] ENDO (4TH FLR);  Service: Endoscopy;  Laterality: N/A;  ref by / [facility] mailed-RB  12/31-pre call complete-tb    EPIDURAL STEROID INJECTION N/A 8/13/2019    Procedure: INJECTION, STEROID, EPIDURAL IL, L5/S1;  Surgeon: Josue Paredes MD;  Location: [facility] PAIN MGT;  Service: Pain Management;  Laterality: N/A;  IL JEAN MARIE L5/S1    EPIDURAL STEROID INJECTION N/A 6/16/2020    Procedure: INJECTION, STEROID, EPIDURAL, L5-S1 IL add on @ 2 ok by  [place];  Surgeon: Josue Paredes MD;  Location: [facility] PAIN MGT;  Service: Pain Management;  Laterality: N/A;    HERNIA REPAIR      INJECTION OF ANESTHETIC AGENT AROUND NERVE Bilateral 7/5/2018    Procedure: BLOCK, NERVE;  Surgeon: Krystal Mtz MD;  Location: [facility] PAIN MGT;  Service: Pain Management;  Laterality: Bilateral;  Lumbar Bilateral L3-L4-L5 Medial Branch Block    RADIOFREQUENCY ABLATION Left 6/11/2019    Procedure: RADIOFREQUENCY ABLATION, L3-L4-L5 MEDIAL BRANCH   1 OF 2;  Surgeon: Josue Paredes MD;  Location: [facility] PAIN MGT;  Service: Pain Management;  Laterality: Left;    RADIOFREQUENCY ABLATION Right 6/25/2019    Procedure: RADIOFREQUENCY ABLATION, L3-L4-L5 MEDIAL BRANCH JING 2 OF 2;  Surgeon: Josue Paredes MD;  Location: [facility] PAIN MGT;  Service: Pain Management;  Laterality: Right;    REPAIR, HERNIA,  INGUINAL, WITHOUT HISTORY OF PRIOR REPAIR, AGE 5 YEARS OR OLDER Left 11/18/2024    Procedure: REPAIR, HERNIA, INGUINAL, WITHOUT HISTORY OF PRIOR REPAIR, AGE 5 YEARS OR OLDER;  Surgeon: Titus Craft MD;  Location: [facility] OR;  Service: General;  Laterality: Left;    REPLACEMENT OF PACEMAKER GENERATOR Left 5/6/2020    Procedure: REPLACEMENT, PULSE GENERATOR, CARDIAC PACEMAKER;  Surgeon: Bradford Spence MD;  Location: [facility] EP LAB;  Service: Cardiology;  Laterality: Left;  EOL/LEAD Mlfx, Gen Change, Poss RA lead rev, SJM, MAC, GP, 3 PREP    REVISION OF PROCEDURE INVOLVING PACEMAKER LEAD Left 5/6/2020    Procedure: REVISION, ELECTRODE LEAD, CARDIAC PACEMAKER;  Surgeon: Bradford Spence MD;  Location: [facility] EP LAB;  Service: Cardiology;  Laterality: Left;    TRANSFORAMINAL EPIDURAL INJECTION OF STEROID Right 6/5/2018    Procedure: INJECTION-STEROID-EPIDURAL-TRANSFORAMINAL;  Surgeon: Josue Paredes MD;  Location: [facility] PAIN MGT;  Service: Pain Management;  Laterality: Right;  LUMBAR RIGHT L5 AND S1 TRANSFORAMINL JEAN MARIE  [id number]    W/ SEDATION     TRANSFORAMINAL EPIDURAL INJECTION OF STEROID Right 9/10/2020    Procedure: INJECTION, STEROID, EPIDURAL, TRANSFORAMINAL APPROACH, L5-S1 AND S1;  Surgeon: Josue Paredes MD;  Location: [facility] PAIN MGT;  Service: Pain Management;  Laterality: Right;    TRANSFORAMINAL EPIDURAL INJECTION OF STEROID Right 6/15/2021    Procedure: INJECTION, STEROID, EPIDURAL, TRANSFORAMINAL APPROACH L5-S1 AND S1 need consent;  Surgeon: Josue Paredes MD;  Location: [facility] PAIN MGT;  Service: Pain Management;  Laterality: Right;    TRANSFORAMINAL EPIDURAL INJECTION OF STEROID Right 6/20/2022    Procedure: INJECTION, STEROID, EPIDURAL, TRANSFORAMINAL APPROACH, RIGHT L5-S1 AND S1 CONTRAST;  Surgeon: Krystal Mtz MD;  Location: [facility] PAIN MGT;  Service: Pain Management;  Laterality: Right;    TRANSFORAMINAL EPIDURAL INJECTION OF STEROID Bilateral 9/26/2022    Procedure: INJECTION, STEROID, EPIDURAL,  TRANSFORAMINAL APPROACH, BILATERAL L5-S1 CONTRAST;  Surgeon: Krystal Mtz MD;  Location: [facility] PAIN MGT;  Service: Pain Management;  Laterality: Bilateral;    TRANSFORAMINAL EPIDURAL INJECTION OF STEROID Bilateral 7/13/2023    Procedure: INJECTION, STEROID, EPIDURAL, TRANSFORAMINAL APPROACH, L5-S1 BILATERAL;  Surgeon: Krystal Mtz MD;  Location: [facility] PAIN MGT;  Service: Pain Management;  Laterality: Bilateral;    TRANSFORAMINAL EPIDURAL INJECTION OF STEROID Bilateral 12/23/2024    Procedure: LUMBAR TRANSFORAMINAL BILATERAL L5/S1 *ASPIRIN OTC8 HOLD FOR 5 DAYS *HERNIA SX CLEARANCE IN CHART*;  Surgeon: Krystal Mtz MD;  Location: [facility] PAIN MGT;  Service: Pain Management;  Laterality: Bilateral;     Social History     Socioeconomic History    Marital status:     Number of children: 3   Occupational History    Occupation:    Tobacco Use    Smoking status: Every Day     Current packs/day: 0.75     Average packs/day: 0.8 packs/day for 47.0 years (35.3 ttl pk-yrs)     Types: Cigarettes     Start date: 1978     Passive exposure: Never    Smokeless tobacco: Never   Substance and Sexual Activity    Alcohol use: Yes     Comment: Beer- Socially    Drug use: No    Sexual activity: Yes     Family History   Problem Relation Name Age of Onset    Diabetes Mother      Diabetes Father      Kidney disease Father      Melanoma Neg Hx         Review of patient's allergies indicates:  No Known Allergies    Current Outpatient Medications   Medication Sig    acetaminophen (TYLENOL) 500 MG tablet Take 1,000 mg by mouth every 6 (six) hours as needed for Pain.    amLODIPine (NORVASC) 5 MG tablet Take 1 tablet (5 mg total) by mouth once daily.    aspirin (ECOTRIN) 81 MG EC tablet Take 81 mg by mouth once daily.    atorvastatin (LIPITOR) 20 MG tablet Take 1 tablet (20 mg total) by mouth once daily.    gabapentin (NEURONTIN) 300 MG capsule Take 1 capsule by mouth twice daily    hydrOXYzine HCL (ATARAX) 10 MG Tab TAKE 1 TABLET BY  "MOUTH NIGHTLY AS NEEDED FOR ITCHING    losartan-hydrochlorothiazide 100-12.5 mg (HYZAAR) 100-12.5 mg Tab Take 1 tablet by mouth once daily.    metFORMIN (GLUCOPHAGE) 500 MG tablet Take 1 tablet (500 mg total) by mouth daily with breakfast.    oxyCODONE-acetaminophen (PERCOCET) 5-325 mg per tablet Take 1 tablet by mouth every 4 (four) hours as needed for Pain.    urea (CARMOL) 40 % Crea Apply topically once daily.     No current facility-administered medications for this visit.       REVIEW OF SYSTEMS:    GENERAL:  No weight loss, malaise or fevers.  HEENT:   No recent changes in vision or hearing  NECK:  Negative for lumps, no difficulty with swallowing.  RESPIRATORY:  Negative for cough, wheezing or shortness of breath, patient denies any recent URI.  CARDIOVASCULAR:  Negative for chest pain, leg swelling or palpitations. Pacemaker for SSS.  GI:  Negative for abdominal discomfort, blood in stools or black stools or change in bowel habits.  MUSCULOSKELETAL:  See HPI.  SKIN:  Negative for lesions, rash, and itching.  PSYCH:  No mood disorder or recent psychosocial stressors.  Patients sleep is not disturbed secondary to pain.  HEMATOLOGY/LYMPHOLOGY:  Negative for prolonged bleeding, bruising easily or swollen nodes.  Patient is not currently taking any anti-coagulants  ENDO: DM2 on metformin  NEURO:   No history of headaches, syncope, paralysis, seizures or tremors.  All other reviewed and negative other than HPI.    OBJECTIVE:    BP (!) 162/81 (BP Location: Right arm, Patient Position: Sitting)   Pulse 84   Resp 19   Ht 5' 7" (1.702 m)   Wt 71.2 kg (156 lb 15.5 oz)   SpO2 100%   BMI 24.58 kg/m²     PHYSICAL EXAMINATION:    GENERAL: Well appearing, in no acute distress, alert and oriented x3.  PSYCH:  Mood and affect appropriate.  SKIN: Skin color, texture, turgor normal, no rashes or lesions.  HEAD/FACE:  Normocephalic, atraumatic. Cranial nerves grossly intact.  CV: RRR with palpation of the radial " artery.  PULM: No evidence of respiratory difficulty, symmetric chest rise.  BACK: Straight leg raising in the sitting position is negative for radicular pain.  There is pain with palpation over the facet joints of the lumbar spine bilaterally. Limited ROM with pain on extension. Mildly positive facet loading bilaterally.   EXTREMITIES: No deformities, edema, or skin discoloration. Good capillary refill.  MUSCULOSKELETAL:There is pain with palpation over the left SI joint. Positive FABERs, Gaenslen's and SI compression on the left.   NEURO: No loss of sensation noted.    GAIT: Antalgic- ambulates without assistance.      ASSESSMENT: 64 y.o. year old male with lower back pain, consistent with the following diagnoses:    Encounter Diagnoses   Name Primary?    Sacroiliac joint pain Yes    Lumbar spondylosis     Lumbar radiculopathy     Degeneration of intervertebral disc of lumbar region with discogenic back pain and lower extremity pain            PLAN:     - Previous imaging was reviewed and discussed with the patient today. Labs reviewed.     - He is s/p bilateral L5/S1 TF JEAN MARIE with benefit.     - Schedule for left SI joint injection.     - Consider lumbar MBB in the future.     - Continue current medications.     - The patient will continue a home exercise routine to help with pain and strengthening.    - RTC 2 weeks after above procedure.     The above plan and management options were discussed at length with patient. Patient is in agreement with the above and verbalized understanding.     Krissy Garcia  01/15/2025

## 2025-01-15 NOTE — PROGRESS NOTES
Chronic Pain - Established Visit    Referring Physician: No ref. provider found    Chief Complaint:   Chief Complaint   Patient presents with    Leg Pain          SUBJECTIVE: Disclaimer: This note has been generated using voice-recognition software. There may be typographical errors that have been missed during proof-reading    Interval History 1/15/2025:  The patient returns to clinic today for follow up of back pain. He is s/p bilateral L5/S1 TF JEAN MARIE on 12/23/2024. He reports 50% relief. He continues to report left sided low back and buttock pain. He denies any radicular leg pain. His pain is worse with prolonged sitting and walking. He does endorse morning stiffness. He denies any other health changes. His pain today is 7/10.    Interval History 12/3/2024:  The patient is here to discuss progressively worsening lower back pain. Last received his L5/S1 TF JEAN MARIE on 07/23/2024 so he thinks its time for another injection. Denies any motor weakness or loss of sensation. Recently had hernia repair on 11/18/2024.    Interval History 6/26/2023:  The patient is here to discuss worsened lower back and leg pain. I last saw him in September at which time he underwent bilateral L5/S1 TF JEAN MARIE on 9/26/22 with 90% relief for 8 months. He says that his pain was minimal during that time. He was able to work without pain disturbance. He has continues with PT exercises 3 days per week for the past 9 months. His pain has been worsening over the past month. He would like to repeat the procedure. He also says that his wife passed away since previous encounter due to long-standing history of sarcoidosis. He has understandably been upset about this. His pain today is 5/10.     Interval History 9/13/2022:  Renny is here to discuss worsened back and leg pain. The pain radiates from the lower back into the back of both legs to anterior shin and great toe. He has associated numbness. He is diabetic but states that he has not had major problems  with his sugars recently. No recent trauma. He previously underwent right L5/S1 and S1 TF JEAN MARIE with 80% relief for about 2 months. During that time he was able to ambulate and work without limitation by pain. Today, he again reports severe pain which is affecting his daily activities. He has been in PT exercises for the past 15 weeks without much benefit of symptoms. He has tried ice and heat without benefit. He continues to take Gabapentin which provides some benefit. His overall pain today is 8/10.    Interval History 6/7/2022:  The patient returns to clinic today for follow up of low back pain. He reports increased low back pain over the last two weeks. He reports low back pain that radiates into the posterior aspect of his right leg to the bottom of his foot. He denies any left leg pain. His pain is worse with prolonged standing and walking. He reports that previous TF JEAN MARIE in 2021 provided 80% relief. He did notice that it took 3-4 weeks to see full benefit. He continues to take Gabapentin. He continues to perform a home exercise routine as prescribed. He denies any weakness. He denies any other health changes. His pain today is 5/10.    Interval history 07/02/2021:  The patient presents for follow-up lower back pain and right leg radiculopathy.  He is status post repeat right-sided TF JEAN MARIE to L5/S1&S1.  He has difficulty quantifying pain relief but states that normally his leg pain is 100% resolved and this time pain persists.  He is not have any recent images.  His medication regimen includes gabapentin 300 mg states this is mildly beneficial or denies any adverse side effects of medication.  Denies any focal loss of bowel, bladder or saddle paresthesias concerning for cauda equina.    Interval history 05/27/2021:  The patient presents for follow-up of lower back pain and bilateral leg radiculopathy.  He has had prior ILESI and TFESI both with 80%  benefit lasting approximately 9 months and pain just now  returning. Pain is worse to right side at this time.  He denies any new areas of pain or neurological changes.The patient denies myelopathic symptoms such as handwriting changes or difficulty with buttons/coins/keys. Denies perineal paresthesias, bowel/bladder dysfunction.    Interval History 9/1/2020:  The patient is here for follow up of back and leg pain.  He is now s/p L5/S1 IL JEAN MARIE with 80% of back pain.  However, he is having significant right leg pain, mainly down the buttock and posterior calf.  He had benefit with right L5/S1 and S1 TF JEAN MARIE in the past and would like to repeat.  He has associated numbness to right calf, worse with walking.  He does have some benefit with Gabapentin.  He had a recent Covid test for work which was negative. His pain today is 5/10.    Interval History 6/9/2020:  The patient is here for follow up of chronic lower back pain.  We have not seen the patient since last year.  He underwent an epidural at that time and says that his pain has been very mild until a few weeks ago.  He is having pain across lower back with radiation into the legs, mainly on the left.  He continues to take gabapentin with some benefit.  The pain bothers him the most with walking and activity.  He had pacemaker replacement on 5/6/20.  He is not currently on blood thinners.  His pain today is 5/10.    Interval History 7/30/2019:  The patient returns for follow up of back pain.  He is s/p repeat left then right L3,4,5 RFAs completed on 6/24/19 with about 80% relief.  His back pain is mild.  He has had increased leg pain recently, worse on the left side.  Previous leg pain was relieved with JEAN MARIE last year.  He would like to repeat this.  His leg pain bothers him mainly at night when trying to sleep.  He stopped Gabapentin when he was not having leg pain but recently restarted.  He is unable to take at night.  His pain today is 6/10.    Interval History 5/14/2019:  The patient returns for follow up.  He  previously had benefit with JEAN MARIE for leg pain and RFAs for back pain.  He is having some back pain that has been worsening over the past couple of weeks.  He has significant pain in the morning.  He has some improvement when he moves around.  He says that cold air aggravates his pain.  He stopped Gabapentin last year when his leg pain improved.  He is not having much leg pain now.  His pain today is 6/10.    Interval History 2/7/2019:  The patient presents for check up of chronic back pain.  He reports doing well since last visit.  He feels that last JEAN MARIE is still providing him benefit.  He has not had any leg pain.  He does still have back pain but states that it is tolerable.  His morning pain is much improved from RFAs last year.  He continues to work and is active.  His pain today is 5/10.    Interval History 12/6/2018:  The patient presents today for follow up.  He continues with pain to the lower back.  He is not having leg pain at this time.  He had some relief with RFAs with the past.  He stopped Gabapentin when his leg pain improved.  He takes Tylenol sparingly with some benefit.  His pain today is 5/10.  The patient denies any bowel or bladder incontinence or signs of saddle paresthesia.  The patient denies any major medical changes since last office visit.    Interval History 10/25/2018:  The patient returns for follow up of back and leg pain.  He is s/p L5/S1 IL JEAN MARIE on 10/11/18 with 80% pain relief for his legs.  He has had increased lower back pain over the past few days which he attributes to weather changes.  He describes it as aching.  He has not been stretching.  He does walk a lot for work.  He cannot take oral NSAIDs due to pacemaker placement.  His pain today is 8/10.     Interval History 9/11/2018:  The patient presents for procedure follow up appointment.  He is s/p left then right L3,4,5 RFA completed on 8/14/18 with 80% pain relief initially.  He has had a little more pain over the past week.   He is now having intermittent radiation into the back of both legs, left greater than right.  He previously had benefit with right sided TF JEAN MARIE.  He is still taking gabapentin.  His pain today is 5/10.    Interval History 7/25/2018:  The patient returns today for follow up of back pain.  He is s/p bilateral L3,4,5 MBB on 7/5/18 with 100% relief for 2 days.  He previously had benefit with right TF JEAN MARIE for leg pain.  He has not had right leg pain since the epidural.  However, he is reporting lateral left leg pain that has progressed over the past couple of weeks.  He continues to be active and works.  His pain today is 5/10.    Interval History 6/20/2018:  The patient returns today for follow up of lower back and right leg pain.  He is s/p right L5 and S1 TF JEAN MARIE on 6/5/18 with 100% relief of right leg pain.  He has not had any leg pain since the procedure.  He continues to report pain across the lower back.  This is always worse first thing in the morning.  He states that this feels aching and throbbing in nature.  He did have recent lumbar CT scan.  He would like to discuss further procedures.  He continues to work and be active.  His pain today is 5/10.    Interval History 5/22/2018:  The patient returns for follow up of back pain.  He is having radiation down there back of the right leg to the posterior calf.  The back pain is most severe in the morning and he states that this feels like a stiffness.  This improves when he walks.  He has severe leg pain that is intermittent, mainly with activity.  This is his biggest complaint.  He did complete the medrol dose pack recently with limited improvement.  He had XRAYs which show severe loss of disc space at L5/S1.  He has not had a CT scan.  He is taking Gabapentin 300 mg at bedtime.  It is written BID but it makes him drowsy during the day.  He has some benefit with Aleve but has been told to avoid NSAIDs due to history of pacemaker placement.  His pain today is 5/10.      Initial encounter:    Renny Young presents to the clinic for the evaluation of lower back and right leg pain. The pain started two months ago and symptoms have been worsening.    Brief history:    Pain Description:    The pain is located in the lower back and right leg area in the L5/S1 distribution.      At BEST  5/10     At WORST  7/10 on the WORST day.      On average pain is rated as 5/10.     Today the pain is rated as 5/10    The pain is described as aching      Symptoms interfere with daily activity.     Exacerbating factors: Standing and Morning.      Mitigating factors medications.     Patient denies night fever/night sweats, urinary incontinence, bowel incontinence, significant weight loss, significant motor weakness and loss of sensations.  Patient denies any suicidal or homicidal ideations    Pain Medications:  Current:  OTC Tylenol PRN  Gabapentin 300 mg BID    Tried in Past:  NSAIDs - Aleve  TCA -Never  SNRI -Never  Anti-convulsants - Gabapentin   Muscle Relaxants -Never  Opioids-Never    Physical Therapy/Home Exercise: yes     report:  Reviewed and consistent with medication use as prescribed.    Pain Procedures:   6/5/18 Right L5 and S1 TF JEAN MARIE- significant relief  7/5/18 Bilateral L3,4,5 MBB- 100% relief for 2 days  7/31/18 Left L3,4,5 RFA- 80% relief  8/14/18 Right L3,4,5 RFA- 80% relief  10/11/18 L5/S1 IL JEAN MARIE- 80% relief  6/11/19 Left L3,4,5 RFA- 80% relief  6/25/19 Right L3,4,5 RFA- 80% relief  8/13/19 L5/S1 IL JEAN MARIE- 90% relief for 9 months  6/16/20 L5/S1 IL JEAN MARIE- 80% relief of back pain  6/15/2021- Right L5/S1 and S1 TF JEAN MARIE  6/20/22 Right L5 and S1 TF JEAN MARIE- 80% relief for 2 months  9/26/22 Bilateral L5/S1 TF JEAN MARIE- 90% relief for 9 months  7/23/24 Bialteral L5/S1 TF JEAN MARIE- 80% relief  12/23/2024- Bilateral L5/S1 TF JEAN MARIE    Chiropractor -never  Acupuncture - never  TENS unit -never  Spinal decompression -never  Joint replacement -never    Imaging:   CT Lumbar Spine 7/9/2021:  COMPARISON:  Lumbar  spine CT May 28, 2018     FINDINGS:  Mild levocurvature of the lumbar spine.  No listhesis.  There is no acute fracture.  The vertebral bodies are normal in height without compression fractures. Posterior elements are intact. There is unchanged severe disc height loss at the L5-S1 level with associated sclerosis and subchondral cystic change within the endplates.  Mild atherosclerotic calcification within the abdominal aorta which is not aneurysmal.  Otherwise, no soft tissue abnormality identified.     T12-L1: The disc is normal in configuration.  There is no facet arthropathy.  There is no neural foraminal stenosis.  There is no spinal canal stenosis.     L1-L2: The disc is normal in configuration.  There is no facet arthropathy.  There is no neuroforaminal stenosis.  There is no spinal canal stenosis.     L2-L3: The disc is normal in configuration.  There is no facet arthropathy.  There is no neuroforaminal stenosis.  There is no spinal canal stenosis.     L3-L4: Minimal diffuse disc bulge.  Mild bilateral facet arthropathy.  There is no neuroforaminal stenosis.  There is no spinal canal stenosis.     L4-L5: There is a diffuse disc bulge.  Mild-to-moderate bilateral facet arthropathy.  There is no neuroforaminal stenosis.  There is no spinal canal stenosis.     L5-S1: There is a circumferential disc osteophyte complex secondary to the severe degenerative disc disease at this level.  Mild to moderate bilateral facet arthropathy.  Unchanged mild bilateral neural foraminal stenosis secondary to diffuse disc osteophyte complex as well as the facet arthropathy.  There is no spinal canal stenosis.     Impression:     Inferior lumbar spine degenerative changes worst at L5-S1 which result in mild bilateral neural foraminal stenosis at this level.  Overall, findings are not significantly changed compared to prior study from May 2018.        Past Medical History:   Diagnosis Date    Colon polyp     Diabetes mellitus, type  2     Full dentures     Hyperlipidemia     Hypertension     Pacemaker     Pacemaker at end of battery life 05/06/2020    Smoker 06/10/2015    Offered cessation program and declined    Wears prescription eyeglasses      Past Surgical History:   Procedure Laterality Date    COLONOSCOPY N/A 2/13/2017    Procedure: COLONOSCOPY;  Surgeon: NILDA Juares MD;  Location: [facility] ENDO (4TH FLR);  Service: Endoscopy;  Laterality: N/A;  Do not cancel this order. Patient has Pacemaker in place.     COLONOSCOPY, SCREENING, HIGH RISK PATIENT N/A 1/9/2025    Procedure: COLONOSCOPY, SCREENING, HIGH RISK PATIENT;  Surgeon: Gene Duran MD;  Location: [facility] ENDO (4TH FLR);  Service: Endoscopy;  Laterality: N/A;  ref by / [facility] mailed-RB  12/31-pre call complete-tb    EPIDURAL STEROID INJECTION N/A 8/13/2019    Procedure: INJECTION, STEROID, EPIDURAL IL, L5/S1;  Surgeon: Josue Paredes MD;  Location: [facility] PAIN MGT;  Service: Pain Management;  Laterality: N/A;  IL JEAN MARIE L5/S1    EPIDURAL STEROID INJECTION N/A 6/16/2020    Procedure: INJECTION, STEROID, EPIDURAL, L5-S1 IL add on @ 2 ok by  Asia;  Surgeon: Josue Paredes MD;  Location: [facility] PAIN MGT;  Service: Pain Management;  Laterality: N/A;    HERNIA REPAIR      INJECTION OF ANESTHETIC AGENT AROUND NERVE Bilateral 7/5/2018    Procedure: BLOCK, NERVE;  Surgeon: Krystal Mtz MD;  Location: [facility] PAIN MGT;  Service: Pain Management;  Laterality: Bilateral;  Lumbar Bilateral L3-L4-L5 Medial Branch Block    RADIOFREQUENCY ABLATION Left 6/11/2019    Procedure: RADIOFREQUENCY ABLATION, L3-L4-L5 MEDIAL BRANCH   1 OF 2;  Surgeon: Josue Paredes MD;  Location: [facility] PAIN MGT;  Service: Pain Management;  Laterality: Left;    RADIOFREQUENCY ABLATION Right 6/25/2019    Procedure: RADIOFREQUENCY ABLATION, L3-L4-L5 MEDIAL BRANCH JING 2 OF 2;  Surgeon: Josue Paredes MD;  Location: [facility] PAIN MGT;  Service: Pain Management;  Laterality: Right;    REPAIR, HERNIA,  INGUINAL, WITHOUT HISTORY OF PRIOR REPAIR, AGE 5 YEARS OR OLDER Left 11/18/2024    Procedure: REPAIR, HERNIA, INGUINAL, WITHOUT HISTORY OF PRIOR REPAIR, AGE 5 YEARS OR OLDER;  Surgeon: Titus Craft MD;  Location: [facility] OR;  Service: General;  Laterality: Left;    REPLACEMENT OF PACEMAKER GENERATOR Left 5/6/2020    Procedure: REPLACEMENT, PULSE GENERATOR, CARDIAC PACEMAKER;  Surgeon: Bradford Spence MD;  Location: [facility] EP LAB;  Service: Cardiology;  Laterality: Left;  EOL/LEAD Mlfx, Gen Change, Poss RA lead rev, SJM, MAC, GP, 3 PREP    REVISION OF PROCEDURE INVOLVING PACEMAKER LEAD Left 5/6/2020    Procedure: REVISION, ELECTRODE LEAD, CARDIAC PACEMAKER;  Surgeon: Bradford Spence MD;  Location: [facility] EP LAB;  Service: Cardiology;  Laterality: Left;    TRANSFORAMINAL EPIDURAL INJECTION OF STEROID Right 6/5/2018    Procedure: INJECTION-STEROID-EPIDURAL-TRANSFORAMINAL;  Surgeon: Josue Paredes MD;  Location: [facility] PAIN MGT;  Service: Pain Management;  Laterality: Right;  LUMBAR RIGHT L5 AND S1 TRANSFORAMINL JEAN MARIE  [id number]    W/ SEDATION     TRANSFORAMINAL EPIDURAL INJECTION OF STEROID Right 9/10/2020    Procedure: INJECTION, STEROID, EPIDURAL, TRANSFORAMINAL APPROACH, L5-S1 AND S1;  Surgeon: Josue Paredes MD;  Location: [facility] PAIN MGT;  Service: Pain Management;  Laterality: Right;    TRANSFORAMINAL EPIDURAL INJECTION OF STEROID Right 6/15/2021    Procedure: INJECTION, STEROID, EPIDURAL, TRANSFORAMINAL APPROACH L5-S1 AND S1 need consent;  Surgeon: Josue Paredes MD;  Location: [facility] PAIN MGT;  Service: Pain Management;  Laterality: Right;    TRANSFORAMINAL EPIDURAL INJECTION OF STEROID Right 6/20/2022    Procedure: INJECTION, STEROID, EPIDURAL, TRANSFORAMINAL APPROACH, RIGHT L5-S1 AND S1 CONTRAST;  Surgeon: Krystal Mtz MD;  Location: [facility] PAIN MGT;  Service: Pain Management;  Laterality: Right;    TRANSFORAMINAL EPIDURAL INJECTION OF STEROID Bilateral 9/26/2022    Procedure: INJECTION, STEROID, EPIDURAL,  TRANSFORAMINAL APPROACH, BILATERAL L5-S1 CONTRAST;  Surgeon: Krystal Mtz MD;  Location: [facility] PAIN MGT;  Service: Pain Management;  Laterality: Bilateral;    TRANSFORAMINAL EPIDURAL INJECTION OF STEROID Bilateral 7/13/2023    Procedure: INJECTION, STEROID, EPIDURAL, TRANSFORAMINAL APPROACH, L5-S1 BILATERAL;  Surgeon: Krystal Mtz MD;  Location: [facility] PAIN MGT;  Service: Pain Management;  Laterality: Bilateral;    TRANSFORAMINAL EPIDURAL INJECTION OF STEROID Bilateral 12/23/2024    Procedure: LUMBAR TRANSFORAMINAL BILATERAL L5/S1 *ASPIRIN OTC8 HOLD FOR 5 DAYS *HERNIA SX CLEARANCE IN CHART*;  Surgeon: Krystal Mtz MD;  Location: [facility] PAIN MGT;  Service: Pain Management;  Laterality: Bilateral;     Social History     Socioeconomic History    Marital status:     Number of children: 3   Occupational History    Occupation:    Tobacco Use    Smoking status: Every Day     Current packs/day: 0.75     Average packs/day: 0.8 packs/day for 47.0 years (35.3 ttl pk-yrs)     Types: Cigarettes     Start date: 1978     Passive exposure: Never    Smokeless tobacco: Never   Substance and Sexual Activity    Alcohol use: Yes     Comment: Beer- Socially    Drug use: No    Sexual activity: Yes     Family History   Problem Relation Name Age of Onset    Diabetes Mother      Diabetes Father      Kidney disease Father      Melanoma Neg Hx         Review of patient's allergies indicates:  No Known Allergies    Current Outpatient Medications   Medication Sig    acetaminophen (TYLENOL) 500 MG tablet Take 1,000 mg by mouth every 6 (six) hours as needed for Pain.    amLODIPine (NORVASC) 5 MG tablet Take 1 tablet (5 mg total) by mouth once daily.    aspirin (ECOTRIN) 81 MG EC tablet Take 81 mg by mouth once daily.    atorvastatin (LIPITOR) 20 MG tablet Take 1 tablet (20 mg total) by mouth once daily.    gabapentin (NEURONTIN) 300 MG capsule Take 1 capsule by mouth twice daily    hydrOXYzine HCL (ATARAX) 10 MG Tab TAKE 1 TABLET BY  "MOUTH NIGHTLY AS NEEDED FOR ITCHING    losartan-hydrochlorothiazide 100-12.5 mg (HYZAAR) 100-12.5 mg Tab Take 1 tablet by mouth once daily.    metFORMIN (GLUCOPHAGE) 500 MG tablet Take 1 tablet (500 mg total) by mouth daily with breakfast.    oxyCODONE-acetaminophen (PERCOCET) 5-325 mg per tablet Take 1 tablet by mouth every 4 (four) hours as needed for Pain.    urea (CARMOL) 40 % Crea Apply topically once daily.     No current facility-administered medications for this visit.       REVIEW OF SYSTEMS:    GENERAL:  No weight loss, malaise or fevers.  HEENT:   No recent changes in vision or hearing  NECK:  Negative for lumps, no difficulty with swallowing.  RESPIRATORY:  Negative for cough, wheezing or shortness of breath, patient denies any recent URI.  CARDIOVASCULAR:  Negative for chest pain, leg swelling or palpitations. Pacemaker for SSS.  GI:  Negative for abdominal discomfort, blood in stools or black stools or change in bowel habits.  MUSCULOSKELETAL:  See HPI.  SKIN:  Negative for lesions, rash, and itching.  PSYCH:  No mood disorder or recent psychosocial stressors.  Patients sleep is not disturbed secondary to pain.  HEMATOLOGY/LYMPHOLOGY:  Negative for prolonged bleeding, bruising easily or swollen nodes.  Patient is not currently taking any anti-coagulants  ENDO: DM2 on metformin  NEURO:   No history of headaches, syncope, paralysis, seizures or tremors.  All other reviewed and negative other than HPI.    OBJECTIVE:    BP (!) 162/81 (BP Location: Right arm, Patient Position: Sitting)   Pulse 84   Resp 19   Ht 5' 7" (1.702 m)   Wt 71.2 kg (156 lb 15.5 oz)   SpO2 100%   BMI 24.58 kg/m²     PHYSICAL EXAMINATION:    GENERAL: Well appearing, in no acute distress, alert and oriented x3.  PSYCH:  Mood and affect appropriate.  SKIN: Skin color, texture, turgor normal, no rashes or lesions.  HEAD/FACE:  Normocephalic, atraumatic. Cranial nerves grossly intact.  CV: RRR with palpation of the radial " artery.  PULM: No evidence of respiratory difficulty, symmetric chest rise.  BACK: Straight leg raising in the sitting position is negative for radicular pain.  There is pain with palpation over the facet joints of the lumbar spine bilaterally. Limited ROM with pain on extension. Mildly positive facet loading bilaterally.   EXTREMITIES: No deformities, edema, or skin discoloration. Good capillary refill.  MUSCULOSKELETAL:There is pain with palpation over the left SI joint. Positive FABERs, Gaenslen's and SI compression on the left.   NEURO: No loss of sensation noted.    GAIT: Antalgic- ambulates without assistance.      ASSESSMENT: 64 y.o. year old male with lower back pain, consistent with the following diagnoses:    Encounter Diagnoses   Name Primary?    Sacroiliac joint pain Yes    Lumbar spondylosis     Lumbar radiculopathy     Degeneration of intervertebral disc of lumbar region with discogenic back pain and lower extremity pain            PLAN:     - Previous imaging was reviewed and discussed with the patient today. Labs reviewed.     - He is s/p bilateral L5/S1 TF JEAN MARIE with benefit.     - Schedule for left SI joint injection.     - Consider lumbar MBB in the future.     - Continue current medications.     - The patient will continue a home exercise routine to help with pain and strengthening.    - RTC 2 weeks after above procedure.     The above plan and management options were discussed at length with patient. Patient is in agreement with the above and verbalized understanding.     Krissy Garcia  01/15/2025

## 2025-01-15 NOTE — TELEPHONE ENCOUNTER
Called patient to get more information on why he is requesting a ENT referral. Patient stated that he has been having problems with his ears, he stated there is no pain but he feels that his hearing is not 100 percent. Patient is requesting a referral to ophthalmology. He stated that he has not had his ears and eyes checked out in a while.

## 2025-01-16 ENCOUNTER — PATIENT MESSAGE (OUTPATIENT)
Dept: PAIN MEDICINE | Facility: OTHER | Age: 65
End: 2025-01-16
Payer: MEDICAID

## 2025-01-16 DIAGNOSIS — M53.3 SACROILIAC JOINT PAIN: Primary | ICD-10-CM

## 2025-01-17 ENCOUNTER — PATIENT MESSAGE (OUTPATIENT)
Dept: PAIN MEDICINE | Facility: OTHER | Age: 65
End: 2025-01-17
Payer: MEDICAID

## 2025-01-30 ENCOUNTER — HOSPITAL ENCOUNTER (OUTPATIENT)
Facility: OTHER | Age: 65
Discharge: HOME | End: 2025-01-30
Attending: ANESTHESIOLOGY | Admitting: ANESTHESIOLOGY
Payer: MEDICAID

## 2025-01-30 VITALS
WEIGHT: 156 LBS | OXYGEN SATURATION: 95 % | HEART RATE: 60 BPM | DIASTOLIC BLOOD PRESSURE: 71 MMHG | SYSTOLIC BLOOD PRESSURE: 140 MMHG | TEMPERATURE: 98 F | RESPIRATION RATE: 16 BRPM | BODY MASS INDEX: 24.48 KG/M2 | HEIGHT: 67 IN

## 2025-01-30 DIAGNOSIS — M53.3 SACROILIAC DYSFUNCTION: Primary | ICD-10-CM

## 2025-01-30 LAB — POCT GLUCOSE: 106 MG/DL (ref 70–110)

## 2025-01-30 PROCEDURE — 25500020 PHARM REV CODE 255: Performed by: ANESTHESIOLOGY

## 2025-01-30 PROCEDURE — 27096 INJECT SACROILIAC JOINT: CPT | Mod: LT | Performed by: ANESTHESIOLOGY

## 2025-01-30 PROCEDURE — 27096 INJECT SACROILIAC JOINT: CPT | Mod: LT,,, | Performed by: ANESTHESIOLOGY

## 2025-01-30 PROCEDURE — 63600175 PHARM REV CODE 636 W HCPCS: Performed by: ANESTHESIOLOGY

## 2025-01-30 RX ORDER — LIDOCAINE HYDROCHLORIDE 20 MG/ML
INJECTION, SOLUTION INFILTRATION; PERINEURAL
Status: DISCONTINUED | OUTPATIENT
Start: 2025-01-30 | End: 2025-01-30 | Stop reason: HOSPADM

## 2025-01-30 RX ORDER — SODIUM CHLORIDE 9 MG/ML
INJECTION, SOLUTION INTRAVENOUS CONTINUOUS
Status: DISCONTINUED | OUTPATIENT
Start: 2025-01-30 | End: 2025-01-30 | Stop reason: HOSPADM

## 2025-01-30 RX ORDER — MIDAZOLAM HYDROCHLORIDE 1 MG/ML
INJECTION INTRAMUSCULAR; INTRAVENOUS
Status: DISCONTINUED | OUTPATIENT
Start: 2025-01-30 | End: 2025-01-30 | Stop reason: HOSPADM

## 2025-01-30 RX ORDER — FENTANYL CITRATE 50 UG/ML
INJECTION, SOLUTION INTRAMUSCULAR; INTRAVENOUS
Status: DISCONTINUED | OUTPATIENT
Start: 2025-01-30 | End: 2025-01-30 | Stop reason: HOSPADM

## 2025-01-30 RX ORDER — BUPIVACAINE HYDROCHLORIDE 2.5 MG/ML
INJECTION, SOLUTION EPIDURAL; INFILTRATION; INTRACAUDAL
Status: DISCONTINUED | OUTPATIENT
Start: 2025-01-30 | End: 2025-01-30 | Stop reason: HOSPADM

## 2025-01-30 RX ORDER — TRIAMCINOLONE ACETONIDE 40 MG/ML
INJECTION, SUSPENSION INTRA-ARTICULAR; INTRAMUSCULAR
Status: DISCONTINUED | OUTPATIENT
Start: 2025-01-30 | End: 2025-01-30 | Stop reason: HOSPADM

## 2025-01-30 NOTE — OP NOTE
Sacroiliac Joint Injection under Fluoroscopic Guidance    The procedure, risks, benefits, and options were discussed with the patient. There are no contraindications to the procedure. The patent expressed understanding and agreed to the procedure. Informed written consent was obtained prior to the start of the procedure and can be found in the patient's chart.    PATIENT NAME: Renny Young   MRN: [id number]     DATE OF PROCEDURE: 01/30/2025    PROCEDURE: Left Sacroiliac Joint Injection under Fluoroscopic Guidance    PRE-OP DIAGNOSIS: Sacroiliac joint pain [M53.3]    POST-OP DIAGNOSIS: Same    PHYSICIAN: Krystal Mtz MD    ASSISTANTS: Teo Miller M.D. [employer] Pain Fellow      MEDICATIONS INJECTED: Preservative-free Kenalog 40mg with 3cc of Bupivacine 0.25%     LOCAL ANESTHETIC INJECTED: Xylocaine 2%     SEDATION: Versed 2mg and Fentanyl 25mcg                                                                                                                                                                                     Conscious sedation ordered by M.D. Patient re-evaluation prior to administration of conscious sedation. No changes noted in patient's status from initial evaluation. The patient's vital signs were monitored by RN and patient remained hemodynamically stable throughout the procedure.    Event Time In   Sedation Start 0838   Sedation End 0842       ESTIMATED BLOOD LOSS: None    COMPLICATIONS: None    TECHNIQUE: Time-out was performed to identify the patient and procedure to be performed. With the patient laying in a prone position, the surgical area was prepped and draped in the usual sterile fashion using ChloraPrep and a fenestrated drape. The sacroiliac joint was determined under fluoroscopy guidance. Skin anesthesia was achieved by injecting Lidocaine 2% over the injection site. The sacroiliac joint was  then approached with a 25 gauge, 3.5 inch spinal quinke needle that was introduced under fluoroscopic  guidance in the AP and Lateral views. Once the needle tip was in the area of the joint, and there was no blood, contrast dye Omnipaque (300mg/mL) was injected to confirm placement and there was no vascular runoff. Fluoroscopic imaging in the AP and lateral views revealed a clear outline of the joint space. 4 mL of the medication mixture listed above was injected slowly intraarticular and mallorie-articular. Displacement of the radio opaque contrast after injection of the medication confirmed that the medication went into the area of the joint. The needles were removed and bleeding was nil.  A sterile dressing was applied. No specimens collected. The patient tolerated the procedure well.     PRE-PROCEDURE PAIN SCORE: 7-8/10    POST-PROCEDURE PAIN SCORE: 0/10    The patient was monitored after the procedure in the recovery area. They were given post-procedure and discharge instructions to follow at home. The patient was discharged in a stable condition.    Krystal Mtz MD

## 2025-01-30 NOTE — DISCHARGE SUMMARY
Discharge Note  Short Stay      SUMMARY     Admit Date: 1/30/2025    Attending Physician: Krystal Mtz      Discharge Physician: Krystal Mtz      Discharge Date: 1/30/2025 8:37 AM    Procedure(s) (LRB):  INJECTION,SACROILIAC JOINT LEFT (Left)    Final Diagnosis: Sacroiliac joint pain [M53.3]    Disposition: Home or self care    Patient Instructions:   Current Discharge Medication List        CONTINUE these medications which have NOT CHANGED    Details   acetaminophen (TYLENOL) 500 MG tablet Take 1,000 mg by mouth every 6 (six) hours as needed for Pain.      amLODIPine (NORVASC) 5 MG tablet Take 1 tablet (5 mg total) by mouth once daily.  Qty: 90 tablet, Refills: 3    Comments: .  Associated Diagnoses: Hypertension, essential      aspirin (ECOTRIN) 81 MG EC tablet Take 81 mg by mouth once daily.      atorvastatin (LIPITOR) 20 MG tablet Take 1 tablet (20 mg total) by mouth once daily.  Qty: 90 tablet, Refills: 3    Associated Diagnoses: Hyperlipidemia, unspecified hyperlipidemia type      gabapentin (NEURONTIN) 300 MG capsule Take 1 capsule by mouth twice daily  Qty: 60 capsule, Refills: 1    Associated Diagnoses: Diabetic polyneuropathy associated with type 2 diabetes mellitus      hydrOXYzine HCL (ATARAX) 10 MG Tab TAKE 1 TABLET BY MOUTH NIGHTLY AS NEEDED FOR ITCHING  Qty: 30 tablet, Refills: 0    Associated Diagnoses: Itching      losartan-hydrochlorothiazide 100-12.5 mg (HYZAAR) 100-12.5 mg Tab Take 1 tablet by mouth once daily.  Qty: 90 tablet, Refills: 3    Comments: .  Associated Diagnoses: Hypertension, essential      metFORMIN (GLUCOPHAGE) 500 MG tablet Take 1 tablet (500 mg total) by mouth daily with breakfast.  Qty: 90 tablet, Refills: 3    Associated Diagnoses: Type 2 diabetes mellitus with other specified complication, without long-term current use of insulin      oxyCODONE-acetaminophen (PERCOCET) 5-325 mg per tablet Take 1 tablet by mouth every 4 (four) hours as needed for Pain.  Qty: 15 tablet, Refills:  0    Comments: Quantity prescribed more than 7 day supply? No  Associated Diagnoses: Inguinal hernia of left side without obstruction or gangrene      urea (CARMOL) 40 % Crea Apply topically once daily.  Qty: 85 g, Refills: 11                 Discharge Diagnosis: Sacroiliac joint pain [M53.3]  Condition on Discharge: Stable with no complications to procedure   Diet on Discharge: Same as before.  Activity: as per instruction sheet.  Discharge to: Home with a responsible adult.  Follow up: 2-4 weeks       Please call my office or pager at [phone] if experienced any weakness or loss of sensation, fever > 101.5, pain uncontrolled with oral medications, persistent nausea/vomiting/or diarrhea, redness or drainage from the incisions, or any other worrisome concerns. If physician on call was not reached or could not communicate with our office for any reason please go to the nearest emergency department

## 2025-01-30 NOTE — DISCHARGE INSTRUCTIONS

## 2025-02-11 ENCOUNTER — OFFICE VISIT (OUTPATIENT)
Dept: OPTOMETRY | Facility: CLINIC | Age: 65
End: 2025-02-11
Payer: MEDICAID

## 2025-02-11 DIAGNOSIS — H52.03 HYPEROPIA OF BOTH EYES WITH ASTIGMATISM AND PRESBYOPIA: ICD-10-CM

## 2025-02-11 DIAGNOSIS — H25.13 NUCLEAR SCLEROSIS, BILATERAL: ICD-10-CM

## 2025-02-11 DIAGNOSIS — H40.013 OAG (OPEN ANGLE GLAUCOMA) SUSPECT, LOW RISK, BILATERAL: ICD-10-CM

## 2025-02-11 DIAGNOSIS — H53.9 VISUAL DISTURBANCE: ICD-10-CM

## 2025-02-11 DIAGNOSIS — H52.4 HYPEROPIA OF BOTH EYES WITH ASTIGMATISM AND PRESBYOPIA: ICD-10-CM

## 2025-02-11 DIAGNOSIS — H52.203 HYPEROPIA OF BOTH EYES WITH ASTIGMATISM AND PRESBYOPIA: ICD-10-CM

## 2025-02-11 DIAGNOSIS — E11.69 TYPE 2 DIABETES MELLITUS WITH OTHER SPECIFIED COMPLICATION, WITHOUT LONG-TERM CURRENT USE OF INSULIN: Primary | ICD-10-CM

## 2025-02-11 PROCEDURE — 1159F MED LIST DOCD IN RCRD: CPT | Mod: CPTII,,, | Performed by: OPTOMETRIST

## 2025-02-11 PROCEDURE — 92015 DETERMINE REFRACTIVE STATE: CPT | Mod: ,,, | Performed by: OPTOMETRIST

## 2025-02-11 PROCEDURE — 99213 OFFICE O/P EST LOW 20 MIN: CPT | Mod: PBBFAC | Performed by: OPTOMETRIST

## 2025-02-11 PROCEDURE — 99999 PR PBB SHADOW E&M-EST. PATIENT-LVL III: CPT | Mod: PBBFAC,,, | Performed by: OPTOMETRIST

## 2025-02-11 PROCEDURE — 2023F DILAT RTA XM W/O RTNOPTHY: CPT | Mod: CPTII,,, | Performed by: OPTOMETRIST

## 2025-02-11 PROCEDURE — 92014 COMPRE OPH EXAM EST PT 1/>: CPT | Mod: S$PBB,,, | Performed by: OPTOMETRIST

## 2025-02-11 NOTE — PROGRESS NOTES
"HPI    Here for Dm eye exam     Eye meds: None    64 year old male states he wears glasses all waking hours, but feels   "glasses aren't strong enough."   Denies flashes, floaters or ocular pain.   States two weeks ago, he was seeing double, but couldn't telll if it was   OU or just one eye, but only lasted a few minutes.. has not happened again     Pt says his mom had POAG    Hemoglobin A1C       Date                     Value               Ref Range             Status                11/07/2024               5.9 (H)             4.0 - 5.6 %           Final                     06/12/2023               6.1 (H)             4.0 - 5.6 %           Final                     07/26/2022               6.4 (H)             4.0 - 5.6 %           Final                ----------   Last edited by Harley Duffy, OD on 2/11/2025 10:49 AM.            Assessment /Plan     For exam results, see Encounter Report.    Type 2 diabetes mellitus with other specified complication, without long-term current use of insulin  -No retinopathy noted today.  Continued control with primary care physician and annual comprehensive eye exam.    Nuclear sclerosis, bilateral  -Educated patient on presence of cataracts at today's exam, monitor at annual dilated fundus exam. 5+ years surgical estimate.    OAG (open angle glaucoma) suspect, low risk, bilateral  -Needs HVF, OCT, IOP at follow up.  No testing available today    Visual disturbance  -     Ambulatory referral/consult to Optometry  -Transient diplopia single occurrence.  No pathology visual concerns noted today    Hyperopia of both eyes with astigmatism and presbyopia  Eyeglass Final Rx       Eyeglass Final Rx         Sphere Cylinder Axis Dist VA Add    Right +2.00 +0.50 180 20/25 +2.50    Left +2.25 Sphere  20/25 +2.50      Type: PAL    Expiration Date: 2/11/2026                      RTC 1 mo HVF, OCT, IOP                   "

## 2025-02-13 ENCOUNTER — OFFICE VISIT (OUTPATIENT)
Dept: PAIN MEDICINE | Facility: CLINIC | Age: 65
End: 2025-02-13
Payer: MEDICAID

## 2025-02-13 VITALS
DIASTOLIC BLOOD PRESSURE: 87 MMHG | RESPIRATION RATE: 18 BRPM | BODY MASS INDEX: 23.88 KG/M2 | SYSTOLIC BLOOD PRESSURE: 150 MMHG | WEIGHT: 152.13 LBS | OXYGEN SATURATION: 100 % | TEMPERATURE: 98 F | HEIGHT: 67 IN | HEART RATE: 71 BPM

## 2025-02-13 DIAGNOSIS — M47.816 LUMBAR SPONDYLOSIS: ICD-10-CM

## 2025-02-13 DIAGNOSIS — M51.362 DEGENERATION OF INTERVERTEBRAL DISC OF LUMBAR REGION WITH DISCOGENIC BACK PAIN AND LOWER EXTREMITY PAIN: ICD-10-CM

## 2025-02-13 DIAGNOSIS — M54.16 LUMBAR RADICULOPATHY: Primary | ICD-10-CM

## 2025-02-13 DIAGNOSIS — M53.3 SACROILIAC JOINT PAIN: ICD-10-CM

## 2025-02-13 PROCEDURE — 3008F BODY MASS INDEX DOCD: CPT | Mod: CPTII,,, | Performed by: NURSE PRACTITIONER

## 2025-02-13 PROCEDURE — 1159F MED LIST DOCD IN RCRD: CPT | Mod: CPTII,,, | Performed by: NURSE PRACTITIONER

## 2025-02-13 PROCEDURE — 99999 PR PBB SHADOW E&M-EST. PATIENT-LVL IV: CPT | Mod: PBBFAC,,, | Performed by: NURSE PRACTITIONER

## 2025-02-13 PROCEDURE — 99213 OFFICE O/P EST LOW 20 MIN: CPT | Mod: S$PBB,,, | Performed by: NURSE PRACTITIONER

## 2025-02-13 PROCEDURE — 1160F RVW MEDS BY RX/DR IN RCRD: CPT | Mod: CPTII,,, | Performed by: NURSE PRACTITIONER

## 2025-02-13 PROCEDURE — 99214 OFFICE O/P EST MOD 30 MIN: CPT | Mod: PBBFAC | Performed by: NURSE PRACTITIONER

## 2025-02-13 PROCEDURE — 3077F SYST BP >= 140 MM HG: CPT | Mod: CPTII,,, | Performed by: NURSE PRACTITIONER

## 2025-02-13 PROCEDURE — 3079F DIAST BP 80-89 MM HG: CPT | Mod: CPTII,,, | Performed by: NURSE PRACTITIONER

## 2025-02-13 NOTE — PROGRESS NOTES
Chronic Pain - Established Visit    Referring Physician: No ref. provider found    Chief Complaint:   Chief Complaint   Patient presents with    Low-back Pain          SUBJECTIVE: Disclaimer: This note has been generated using voice-recognition software. There may be typographical errors that have been missed during proof-reading    Interval History 2/13/2025:  The patient returns to clinic today for follow up of back pain. He is s/p left SI joint injection on 1/30/2025. He reports limited relief. He continues to report left sided low back and buttock pain. He denies any radicular leg pain at this time. His pain is worse with prolonged walking and activity. He denies any other health changes. His pain today is 6/10.    Interval History 1/15/2025:  The patient returns to clinic today for follow up of back pain. He is s/p bilateral L5/S1 TF JEAN MARIE on 12/23/2024. He reports 50% relief. He continues to report left sided low back and buttock pain. He denies any radicular leg pain. His pain is worse with prolonged sitting and walking. He does endorse morning stiffness. He denies any other health changes. His pain today is 7/10.    Interval History 12/3/2024:  The patient is here to discuss progressively worsening lower back pain. Last received his L5/S1 TF JEAN MARIE on 07/23/2024 so he thinks its time for another injection. Denies any motor weakness or loss of sensation. Recently had hernia repair on 11/18/2024.    Interval History 6/26/2023:  The patient is here to discuss worsened lower back and leg pain. I last saw him in September at which time he underwent bilateral L5/S1 TF JEAN MARIE on 9/26/22 with 90% relief for 8 months. He says that his pain was minimal during that time. He was able to work without pain disturbance. He has continues with PT exercises 3 days per week for the past 9 months. His pain has been worsening over the past month. He would like to repeat the procedure. He also says that his wife passed away since  previous encounter due to long-standing history of sarcoidosis. He has understandably been upset about this. His pain today is 5/10.     Interval History 9/13/2022:  Renny is here to discuss worsened back and leg pain. The pain radiates from the lower back into the back of both legs to anterior shin and great toe. He has associated numbness. He is diabetic but states that he has not had major problems with his sugars recently. No recent trauma. He previously underwent right L5/S1 and S1 TF JEAN MARIE with 80% relief for about 2 months. During that time he was able to ambulate and work without limitation by pain. Today, he again reports severe pain which is affecting his daily activities. He has been in PT exercises for the past 15 weeks without much benefit of symptoms. He has tried ice and heat without benefit. He continues to take Gabapentin which provides some benefit. His overall pain today is 8/10.    Interval History 6/7/2022:  The patient returns to clinic today for follow up of low back pain. He reports increased low back pain over the last two weeks. He reports low back pain that radiates into the posterior aspect of his right leg to the bottom of his foot. He denies any left leg pain. His pain is worse with prolonged standing and walking. He reports that previous TF JEAN MARIE in 2021 provided 80% relief. He did notice that it took 3-4 weeks to see full benefit. He continues to take Gabapentin. He continues to perform a home exercise routine as prescribed. He denies any weakness. He denies any other health changes. His pain today is 5/10.    Interval history 07/02/2021:  The patient presents for follow-up lower back pain and right leg radiculopathy.  He is status post repeat right-sided TF JEAN MARIE to L5/S1&S1.  He has difficulty quantifying pain relief but states that normally his leg pain is 100% resolved and this time pain persists.  He is not have any recent images.  His medication regimen includes gabapentin 300 mg  states this is mildly beneficial or denies any adverse side effects of medication.  Denies any focal loss of bowel, bladder or saddle paresthesias concerning for cauda equina.    Interval history 05/27/2021:  The patient presents for follow-up of lower back pain and bilateral leg radiculopathy.  He has had prior ILESI and TFESI both with 80%  benefit lasting approximately 9 months and pain just now returning. Pain is worse to right side at this time.  He denies any new areas of pain or neurological changes.The patient denies myelopathic symptoms such as handwriting changes or difficulty with buttons/coins/keys. Denies perineal paresthesias, bowel/bladder dysfunction.    Interval History 9/1/2020:  The patient is here for follow up of back and leg pain.  He is now s/p L5/S1 IL JEAN MARIE with 80% of back pain.  However, he is having significant right leg pain, mainly down the buttock and posterior calf.  He had benefit with right L5/S1 and S1 TF JEAN MARIE in the past and would like to repeat.  He has associated numbness to right calf, worse with walking.  He does have some benefit with Gabapentin.  He had a recent Covid test for work which was negative. His pain today is 5/10.    Interval History 6/9/2020:  The patient is here for follow up of chronic lower back pain.  We have not seen the patient since last year.  He underwent an epidural at that time and says that his pain has been very mild until a few weeks ago.  He is having pain across lower back with radiation into the legs, mainly on the left.  He continues to take gabapentin with some benefit.  The pain bothers him the most with walking and activity.  He had pacemaker replacement on 5/6/20.  He is not currently on blood thinners.  His pain today is 5/10.    Interval History 7/30/2019:  The patient returns for follow up of back pain.  He is s/p repeat left then right L3,4,5 RFAs completed on 6/24/19 with about 80% relief.  His back pain is mild.  He has had increased leg  pain recently, worse on the left side.  Previous leg pain was relieved with JEAN MARIE last year.  He would like to repeat this.  His leg pain bothers him mainly at night when trying to sleep.  He stopped Gabapentin when he was not having leg pain but recently restarted.  He is unable to take at night.  His pain today is 6/10.    Interval History 5/14/2019:  The patient returns for follow up.  He previously had benefit with JEAN MARIE for leg pain and RFAs for back pain.  He is having some back pain that has been worsening over the past couple of weeks.  He has significant pain in the morning.  He has some improvement when he moves around.  He says that cold air aggravates his pain.  He stopped Gabapentin last year when his leg pain improved.  He is not having much leg pain now.  His pain today is 6/10.    Interval History 2/7/2019:  The patient presents for check up of chronic back pain.  He reports doing well since last visit.  He feels that last JEAN MARIE is still providing him benefit.  He has not had any leg pain.  He does still have back pain but states that it is tolerable.  His morning pain is much improved from RFAs last year.  He continues to work and is active.  His pain today is 5/10.    Interval History 12/6/2018:  The patient presents today for follow up.  He continues with pain to the lower back.  He is not having leg pain at this time.  He had some relief with RFAs with the past.  He stopped Gabapentin when his leg pain improved.  He takes Tylenol sparingly with some benefit.  His pain today is 5/10.  The patient denies any bowel or bladder incontinence or signs of saddle paresthesia.  The patient denies any major medical changes since last office visit.    Interval History 10/25/2018:  The patient returns for follow up of back and leg pain.  He is s/p L5/S1 IL JEAN MARIE on 10/11/18 with 80% pain relief for his legs.  He has had increased lower back pain over the past few days which he attributes to weather changes.  He  describes it as aching.  He has not been stretching.  He does walk a lot for work.  He cannot take oral NSAIDs due to pacemaker placement.  His pain today is 8/10.     Interval History 9/11/2018:  The patient presents for procedure follow up appointment.  He is s/p left then right L3,4,5 RFA completed on 8/14/18 with 80% pain relief initially.  He has had a little more pain over the past week.  He is now having intermittent radiation into the back of both legs, left greater than right.  He previously had benefit with right sided TF JEAN MARIE.  He is still taking gabapentin.  His pain today is 5/10.    Interval History 7/25/2018:  The patient returns today for follow up of back pain.  He is s/p bilateral L3,4,5 MBB on 7/5/18 with 100% relief for 2 days.  He previously had benefit with right TF JEAN MARIE for leg pain.  He has not had right leg pain since the epidural.  However, he is reporting lateral left leg pain that has progressed over the past couple of weeks.  He continues to be active and works.  His pain today is 5/10.    Interval History 6/20/2018:  The patient returns today for follow up of lower back and right leg pain.  He is s/p right L5 and S1 TF JEAN MARIE on 6/5/18 with 100% relief of right leg pain.  He has not had any leg pain since the procedure.  He continues to report pain across the lower back.  This is always worse first thing in the morning.  He states that this feels aching and throbbing in nature.  He did have recent lumbar CT scan.  He would like to discuss further procedures.  He continues to work and be active.  His pain today is 5/10.    Interval History 5/22/2018:  The patient returns for follow up of back pain.  He is having radiation down there back of the right leg to the posterior calf.  The back pain is most severe in the morning and he states that this feels like a stiffness.  This improves when he walks.  He has severe leg pain that is intermittent, mainly with activity.  This is his biggest  complaint.  He did complete the medrol dose pack recently with limited improvement.  He had XRAYs which show severe loss of disc space at L5/S1.  He has not had a CT scan.  He is taking Gabapentin 300 mg at bedtime.  It is written BID but it makes him drowsy during the day.  He has some benefit with Aleve but has been told to avoid NSAIDs due to history of pacemaker placement.  His pain today is 5/10.     Initial encounter:    Renny Young presents to the clinic for the evaluation of lower back and right leg pain. The pain started two months ago and symptoms have been worsening.    Brief history:    Pain Description:    The pain is located in the lower back and right leg area in the L5/S1 distribution.      At BEST  5/10     At WORST  7/10 on the WORST day.      On average pain is rated as 5/10.     Today the pain is rated as 5/10    The pain is described as aching      Symptoms interfere with daily activity.     Exacerbating factors: Standing and Morning.      Mitigating factors medications.     Patient denies night fever/night sweats, urinary incontinence, bowel incontinence, significant weight loss, significant motor weakness and loss of sensations.  Patient denies any suicidal or homicidal ideations    Pain Medications:  Current:  OTC Tylenol PRN  Gabapentin 300 mg BID    Tried in Past:  NSAIDs - Aleve  TCA -Never  SNRI -Never  Anti-convulsants - Gabapentin   Muscle Relaxants -Never  Opioids-Never    Physical Therapy/Home Exercise: yes     report:  Reviewed and consistent with medication use as prescribed.    Pain Procedures:   6/5/18 Right L5 and S1 TF JEAN MARIE- significant relief  7/5/18 Bilateral L3,4,5 MBB- 100% relief for 2 days  7/31/18 Left L3,4,5 RFA- 80% relief  8/14/18 Right L3,4,5 RFA- 80% relief  10/11/18 L5/S1 IL JEAN MARIE- 80% relief  6/11/19 Left L3,4,5 RFA- 80% relief  6/25/19 Right L3,4,5 RFA- 80% relief  8/13/19 L5/S1 IL JEAN MARIE- 90% relief for 9 months  6/16/20 L5/S1 IL JEAN MARIE- 80% relief of back  pain  6/15/2021- Right L5/S1 and S1 TF JEAN MARIE  6/20/22 Right L5 and S1 TF JEAN MARIE- 80% relief for 2 months  9/26/22 Bilateral L5/S1 TF JEAN MARIE- 90% relief for 9 months  7/23/24 Bialteral L5/S1 TF JEAN MARIE- 80% relief  12/23/2024- Bilateral L5/S1 TF JEAN MARIE  1/30/2025- Left SI joint injection    Chiropractor -never  Acupuncture - never  TENS unit -never  Spinal decompression -never  Joint replacement -never    Imaging:   CT Lumbar Spine 7/9/2021:  COMPARISON:  Lumbar spine CT May 28, 2018     FINDINGS:  Mild levocurvature of the lumbar spine.  No listhesis.  There is no acute fracture.  The vertebral bodies are normal in height without compression fractures. Posterior elements are intact. There is unchanged severe disc height loss at the L5-S1 level with associated sclerosis and subchondral cystic change within the endplates.  Mild atherosclerotic calcification within the abdominal aorta which is not aneurysmal.  Otherwise, no soft tissue abnormality identified.     T12-L1: The disc is normal in configuration.  There is no facet arthropathy.  There is no neural foraminal stenosis.  There is no spinal canal stenosis.     L1-L2: The disc is normal in configuration.  There is no facet arthropathy.  There is no neuroforaminal stenosis.  There is no spinal canal stenosis.     L2-L3: The disc is normal in configuration.  There is no facet arthropathy.  There is no neuroforaminal stenosis.  There is no spinal canal stenosis.     L3-L4: Minimal diffuse disc bulge.  Mild bilateral facet arthropathy.  There is no neuroforaminal stenosis.  There is no spinal canal stenosis.     L4-L5: There is a diffuse disc bulge.  Mild-to-moderate bilateral facet arthropathy.  There is no neuroforaminal stenosis.  There is no spinal canal stenosis.     L5-S1: There is a circumferential disc osteophyte complex secondary to the severe degenerative disc disease at this level.  Mild to moderate bilateral facet arthropathy.  Unchanged mild bilateral neural foraminal  stenosis secondary to diffuse disc osteophyte complex as well as the facet arthropathy.  There is no spinal canal stenosis.     Impression:     Inferior lumbar spine degenerative changes worst at L5-S1 which result in mild bilateral neural foraminal stenosis at this level.  Overall, findings are not significantly changed compared to prior study from May 2018.        Past Medical History:   Diagnosis Date    Colon polyp     Diabetes mellitus, type 2     Full dentures     Hyperlipidemia     Hypertension     Pacemaker     Pacemaker at end of battery life 05/06/2020    Smoker 06/10/2015    Offered cessation program and declined    Wears prescription eyeglasses      Past Surgical History:   Procedure Laterality Date    COLONOSCOPY N/A 2/13/2017    Procedure: COLONOSCOPY;  Surgeon: NILDA Juares MD;  Location: [facility] ENDO (4TH FLR);  Service: Endoscopy;  Laterality: N/A;  Do not cancel this order. Patient has Pacemaker in place.     COLONOSCOPY, SCREENING, HIGH RISK PATIENT N/A 1/9/2025    Procedure: COLONOSCOPY, SCREENING, HIGH RISK PATIENT;  Surgeon: Gene Duran MD;  Location: [facility] ENDO (4TH FLR);  Service: Endoscopy;  Laterality: N/A;  ref by / [facility] mailed-RB  12/31-pre call complete-tb    EPIDURAL STEROID INJECTION N/A 8/13/2019    Procedure: INJECTION, STEROID, EPIDURAL IL, L5/S1;  Surgeon: Josue Paredes MD;  Location: [facility] PAIN MGT;  Service: Pain Management;  Laterality: N/A;  IL JEAN MARIE L5/S1    EPIDURAL STEROID INJECTION N/A 6/16/2020    Procedure: INJECTION, STEROID, EPIDURAL, L5-S1 IL add on @ 2 ok by  [place];  Surgeon: Josue Paredes MD;  Location: [facility] PAIN MGT;  Service: Pain Management;  Laterality: N/A;    HERNIA REPAIR      INJECTION OF ANESTHETIC AGENT AROUND NERVE Bilateral 7/5/2018    Procedure: BLOCK, NERVE;  Surgeon: Krystal Mtz MD;  Location: [facility] PAIN MGT;  Service: Pain Management;  Laterality: Bilateral;  Lumbar Bilateral L3-L4-L5 Medial Branch Block    INJECTION,  SACROILIAC JOINT Left 1/30/2025    Procedure: INJECTION,SACROILIAC JOINT LEFT;  Surgeon: Krystal Mtz MD;  Location: [facility] [place] PAIN MGT;  Service: Pain Management;  Laterality: Left;  2 WK F/U ROSCOE    RADIOFREQUENCY ABLATION Left 6/11/2019    Procedure: RADIOFREQUENCY ABLATION, L3-L4-L5 MEDIAL BRANCH   1 OF 2;  Surgeon: Josue Paredes MD;  Location: [facility] [place] PAIN MGT;  Service: Pain Management;  Laterality: Left;    RADIOFREQUENCY ABLATION Right 6/25/2019    Procedure: RADIOFREQUENCY ABLATION, L3-L4-L5 MEDIAL BRANCH JING 2 OF 2;  Surgeon: Josue Paredes MD;  Location: [facility] [place] PAIN MGT;  Service: Pain Management;  Laterality: Right;    REPAIR, HERNIA, INGUINAL, WITHOUT HISTORY OF PRIOR REPAIR, AGE 5 YEARS OR OLDER Left 11/18/2024    Procedure: REPAIR, HERNIA, INGUINAL, WITHOUT HISTORY OF PRIOR REPAIR, AGE 5 YEARS OR OLDER;  Surgeon: Titus Craft MD;  Location: [facility] [place] OR;  Service: General;  Laterality: Left;    REPLACEMENT OF PACEMAKER GENERATOR Left 5/6/2020    Procedure: REPLACEMENT, PULSE GENERATOR, CARDIAC PACEMAKER;  Surgeon: Bradford Spence MD;  Location: [facility] EP LAB;  Service: Cardiology;  Laterality: Left;  EOL/LEAD Mlfx, Gen Change, Poss RA lead rev, SJM, MAC, GP, 3 PREP    REVISION OF PROCEDURE INVOLVING PACEMAKER LEAD Left 5/6/2020    Procedure: REVISION, ELECTRODE LEAD, CARDIAC PACEMAKER;  Surgeon: Bradford Spence MD;  Location: [facility] EP LAB;  Service: Cardiology;  Laterality: Left;    TRANSFORAMINAL EPIDURAL INJECTION OF STEROID Right 6/5/2018    Procedure: INJECTION-STEROID-EPIDURAL-TRANSFORAMINAL;  Surgeon: Josue Paredes MD;  Location: [facility] [place] PAIN MGT;  Service: Pain Management;  Laterality: Right;  LUMBAR RIGHT L5 AND S1 TRANSFORAMINL JEAN MARIE  [id number]    W/ SEDATION     TRANSFORAMINAL EPIDURAL INJECTION OF STEROID Right 9/10/2020    Procedure: INJECTION, STEROID, EPIDURAL, TRANSFORAMINAL APPROACH, L5-S1 AND S1;  Surgeon: Josue Paredes MD;  Location: [facility] [place] PAIN MGT;  Service: Pain Management;   Laterality: Right;    TRANSFORAMINAL EPIDURAL INJECTION OF STEROID Right 6/15/2021    Procedure: INJECTION, STEROID, EPIDURAL, TRANSFORAMINAL APPROACH L5-S1 AND S1 need consent;  Surgeon: Josue Paredes MD;  Location: [facility] PAIN MGT;  Service: Pain Management;  Laterality: Right;    TRANSFORAMINAL EPIDURAL INJECTION OF STEROID Right 6/20/2022    Procedure: INJECTION, STEROID, EPIDURAL, TRANSFORAMINAL APPROACH, RIGHT L5-S1 AND S1 CONTRAST;  Surgeon: Krystal Mzt MD;  Location: [facility] PAIN MGT;  Service: Pain Management;  Laterality: Right;    TRANSFORAMINAL EPIDURAL INJECTION OF STEROID Bilateral 9/26/2022    Procedure: INJECTION, STEROID, EPIDURAL, TRANSFORAMINAL APPROACH, BILATERAL L5-S1 CONTRAST;  Surgeon: Krystal Mtz MD;  Location: [facility] PAIN MGT;  Service: Pain Management;  Laterality: Bilateral;    TRANSFORAMINAL EPIDURAL INJECTION OF STEROID Bilateral 7/13/2023    Procedure: INJECTION, STEROID, EPIDURAL, TRANSFORAMINAL APPROACH, L5-S1 BILATERAL;  Surgeon: Krystal Mtz MD;  Location: [facility] PAIN MGT;  Service: Pain Management;  Laterality: Bilateral;    TRANSFORAMINAL EPIDURAL INJECTION OF STEROID Bilateral 12/23/2024    Procedure: LUMBAR TRANSFORAMINAL BILATERAL L5/S1 *ASPIRIN OTC8 HOLD FOR 5 DAYS *HERNIA SX CLEARANCE IN CHART*;  Surgeon: Krystal Mtz MD;  Location: [facility] PAIN MGT;  Service: Pain Management;  Laterality: Bilateral;     Social History     Socioeconomic History    Marital status:     Number of children: 3   Occupational History    Occupation:    Tobacco Use    Smoking status: Every Day     Current packs/day: 0.75     Average packs/day: 0.7 packs/day for 47.1 years (35.3 ttl pk-yrs)     Types: Cigarettes     Start date: 1978     Passive exposure: Never    Smokeless tobacco: Never   Substance and Sexual Activity    Alcohol use: Yes     Comment: Beer- Socially    Drug use: No    Sexual activity: Yes     Family History   Problem Relation Name Age of Onset    Diabetes Mother       Diabetes Father      Kidney disease Father      Melanoma Neg Hx         Review of patient's allergies indicates:  No Known Allergies    Current Outpatient Medications   Medication Sig    acetaminophen (TYLENOL) 500 MG tablet Take 1,000 mg by mouth every 6 (six) hours as needed for Pain.    amLODIPine (NORVASC) 5 MG tablet Take 1 tablet (5 mg total) by mouth once daily.    aspirin (ECOTRIN) 81 MG EC tablet Take 81 mg by mouth once daily.    atorvastatin (LIPITOR) 20 MG tablet Take 1 tablet (20 mg total) by mouth once daily.    gabapentin (NEURONTIN) 300 MG capsule Take 1 capsule by mouth twice daily    hydrOXYzine HCL (ATARAX) 10 MG Tab TAKE 1 TABLET BY MOUTH NIGHTLY AS NEEDED FOR ITCHING    losartan-hydrochlorothiazide 100-12.5 mg (HYZAAR) 100-12.5 mg Tab Take 1 tablet by mouth once daily.    metFORMIN (GLUCOPHAGE) 500 MG tablet Take 1 tablet (500 mg total) by mouth daily with breakfast.    oxyCODONE-acetaminophen (PERCOCET) 5-325 mg per tablet Take 1 tablet by mouth every 4 (four) hours as needed for Pain.    urea (CARMOL) 40 % Crea Apply topically once daily.     No current facility-administered medications for this visit.       REVIEW OF SYSTEMS:    GENERAL:  No weight loss, malaise or fevers.  HEENT:   No recent changes in vision or hearing  NECK:  Negative for lumps, no difficulty with swallowing.  RESPIRATORY:  Negative for cough, wheezing or shortness of breath, patient denies any recent URI.  CARDIOVASCULAR:  Negative for chest pain, leg swelling or palpitations. Pacemaker for SSS.  GI:  Negative for abdominal discomfort, blood in stools or black stools or change in bowel habits.  MUSCULOSKELETAL:  See HPI.  SKIN:  Negative for lesions, rash, and itching.  PSYCH:  No mood disorder or recent psychosocial stressors.  Patients sleep is not disturbed secondary to pain.  HEMATOLOGY/LYMPHOLOGY:  Negative for prolonged bleeding, bruising easily or swollen nodes.  Patient is not currently taking any  "anti-coagulants  ENDO: DM2 on metformin  NEURO:   No history of headaches, syncope, paralysis, seizures or tremors.  All other reviewed and negative other than HPI.    OBJECTIVE:    BP (!) 150/87   Pulse 71   Temp 98 °F (36.7 °C)   Resp 18   Ht 5' 7" (1.702 m)   Wt 69 kg (152 lb 1.9 oz)   SpO2 100%   BMI 23.82 kg/m²     PHYSICAL EXAMINATION:    GENERAL: Well appearing, in no acute distress, alert and oriented x3.  PSYCH:  Mood and affect appropriate.  SKIN: Skin color, texture, turgor normal, no rashes or lesions.  HEAD/FACE:  Normocephalic, atraumatic. Cranial nerves grossly intact.  CV: RRR with palpation of the radial artery.  PULM: No evidence of respiratory difficulty, symmetric chest rise.  BACK: Straight leg raising in the sitting position is negative for radicular pain.  There is pain with palpation over the facet joints of the lumbar spine bilaterally. Limited ROM with pain on extension. Mildly positive facet loading bilaterally.   EXTREMITIES: No deformities, edema, or skin discoloration. Good capillary refill.  MUSCULOSKELETAL:There is pain with palpation over the left SI joint.  NEURO: No loss of sensation noted.    GAIT: Antalgic- ambulates without assistance.      ASSESSMENT: 64 y.o. year old male with lower back pain, consistent with the following diagnoses:    Encounter Diagnoses   Name Primary?    Lumbar radiculopathy Yes    Lumbar spondylosis     Degeneration of intervertebral disc of lumbar region with discogenic back pain and lower extremity pain     Sacroiliac joint pain              PLAN:     - Previous imaging was reviewed and discussed with the patient today. Labs reviewed.     - He is s/p left SI joint injection with limited relief.     - We can repeat bilateral L5/S1 TF JEAN MARIE as needed.      - Consider lumbar MBB in the future.     - Continue current medications.     - The patient will continue a home exercise routine to help with pain and strengthening.    - RTC in 1 month.      The " above plan and management options were discussed at length with patient. Patient is in agreement with the above and verbalized [email or web address]     Krissy Garcia  02/13/2025

## 2025-03-17 ENCOUNTER — PATIENT MESSAGE (OUTPATIENT)
Dept: OTOLARYNGOLOGY | Facility: CLINIC | Age: 65
End: 2025-03-17

## 2025-03-17 ENCOUNTER — TELEPHONE (OUTPATIENT)
Dept: OTOLARYNGOLOGY | Facility: CLINIC | Age: 65
End: 2025-03-17

## 2025-03-18 ENCOUNTER — OFFICE VISIT (OUTPATIENT)
Dept: PAIN MEDICINE | Facility: CLINIC | Age: 65
End: 2025-03-18
Payer: MEDICAID

## 2025-03-18 VITALS
DIASTOLIC BLOOD PRESSURE: 80 MMHG | WEIGHT: 145.31 LBS | SYSTOLIC BLOOD PRESSURE: 134 MMHG | BODY MASS INDEX: 22.75 KG/M2 | HEART RATE: 94 BPM

## 2025-03-18 DIAGNOSIS — M54.16 LUMBAR RADICULOPATHY: Primary | ICD-10-CM

## 2025-03-18 DIAGNOSIS — M51.362 DEGENERATION OF INTERVERTEBRAL DISC OF LUMBAR REGION WITH DISCOGENIC BACK PAIN AND LOWER EXTREMITY PAIN: ICD-10-CM

## 2025-03-18 DIAGNOSIS — M47.816 LUMBAR SPONDYLOSIS: ICD-10-CM

## 2025-03-18 PROCEDURE — 99213 OFFICE O/P EST LOW 20 MIN: CPT | Mod: S$PBB,,, | Performed by: NURSE PRACTITIONER

## 2025-03-18 PROCEDURE — 1160F RVW MEDS BY RX/DR IN RCRD: CPT | Mod: CPTII,,, | Performed by: NURSE PRACTITIONER

## 2025-03-18 PROCEDURE — 3075F SYST BP GE 130 - 139MM HG: CPT | Mod: CPTII,,, | Performed by: NURSE PRACTITIONER

## 2025-03-18 PROCEDURE — 99213 OFFICE O/P EST LOW 20 MIN: CPT | Mod: PBBFAC | Performed by: NURSE PRACTITIONER

## 2025-03-18 PROCEDURE — 3079F DIAST BP 80-89 MM HG: CPT | Mod: CPTII,,, | Performed by: NURSE PRACTITIONER

## 2025-03-18 PROCEDURE — 1159F MED LIST DOCD IN RCRD: CPT | Mod: CPTII,,, | Performed by: NURSE PRACTITIONER

## 2025-03-18 PROCEDURE — 99999 PR PBB SHADOW E&M-EST. PATIENT-LVL III: CPT | Mod: PBBFAC,,, | Performed by: NURSE PRACTITIONER

## 2025-03-18 PROCEDURE — 3008F BODY MASS INDEX DOCD: CPT | Mod: CPTII,,, | Performed by: NURSE PRACTITIONER

## 2025-03-18 NOTE — PROGRESS NOTES
Chronic Pain - Established Visit    Referring Physician: No ref. provider found    Chief Complaint:   Chief Complaint   Patient presents with    Follow-up    Low-back Pain          SUBJECTIVE: Disclaimer: This note has been generated using voice-recognition software. There may be typographical errors that have been missed during proof-reading    Interval History 3/18/2025:  The patient returns to clinic today for follow up of back pain. He reports worsened low back pain that radiates into the posterior aspect of his left leg to under his foot. He denies any right leg pain. His pain is worse with prolonged walking and activity. He is performing a home exercise routine. He is taking Gabapentin. He denies any other health changes. His pain today is 7/10.    Interval History 2/13/2025:  The patient returns to clinic today for follow up of back pain. He is s/p left SI joint injection on 1/30/2025. He reports limited relief. He continues to report left sided low back and buttock pain. He denies any radicular leg pain at this time. His pain is worse with prolonged walking and activity. He denies any other health changes. His pain today is 6/10.    Interval History 1/15/2025:  The patient returns to clinic today for follow up of back pain. He is s/p bilateral L5/S1 TF JEAN MARIE on 12/23/2024. He reports 50% relief. He continues to report left sided low back and buttock pain. He denies any radicular leg pain. His pain is worse with prolonged sitting and walking. He does endorse morning stiffness. He denies any other health changes. His pain today is 7/10.    Interval History 12/3/2024:  The patient is here to discuss progressively worsening lower back pain. Last received his L5/S1 TF JEAN MARIE on 07/23/2024 so he thinks its time for another injection. Denies any motor weakness or loss of sensation. Recently had hernia repair on 11/18/2024.    Interval History 6/26/2023:  The patient is here to discuss worsened lower back and leg pain.  I last saw him in September at which time he underwent bilateral L5/S1 TF JEAN MARIE on 9/26/22 with 90% relief for 8 months. He says that his pain was minimal during that time. He was able to work without pain disturbance. He has continues with PT exercises 3 days per week for the past 9 months. His pain has been worsening over the past month. He would like to repeat the procedure. He also says that his wife passed away since previous encounter due to long-standing history of sarcoidosis. He has understandably been upset about this. His pain today is 5/10.     Interval History 9/13/2022:  Renny is here to discuss worsened back and leg pain. The pain radiates from the lower back into the back of both legs to anterior shin and great toe. He has associated numbness. He is diabetic but states that he has not had major problems with his sugars recently. No recent trauma. He previously underwent right L5/S1 and S1 TF JEAN MARIE with 80% relief for about 2 months. During that time he was able to ambulate and work without limitation by pain. Today, he again reports severe pain which is affecting his daily activities. He has been in PT exercises for the past 15 weeks without much benefit of symptoms. He has tried ice and heat without benefit. He continues to take Gabapentin which provides some benefit. His overall pain today is 8/10.    Interval History 6/7/2022:  The patient returns to clinic today for follow up of low back pain. He reports increased low back pain over the last two weeks. He reports low back pain that radiates into the posterior aspect of his right leg to the bottom of his foot. He denies any left leg pain. His pain is worse with prolonged standing and walking. He reports that previous TF JEAN MARIE in 2021 provided 80% relief. He did notice that it took 3-4 weeks to see full benefit. He continues to take Gabapentin. He continues to perform a home exercise routine as prescribed. He denies any weakness. He denies any other  health changes. His pain today is 5/10.    Interval history 07/02/2021:  The patient presents for follow-up lower back pain and right leg radiculopathy.  He is status post repeat right-sided TF JEAN MARIE to L5/S1&S1.  He has difficulty quantifying pain relief but states that normally his leg pain is 100% resolved and this time pain persists.  He is not have any recent images.  His medication regimen includes gabapentin 300 mg states this is mildly beneficial or denies any adverse side effects of medication.  Denies any focal loss of bowel, bladder or saddle paresthesias concerning for cauda equina.    Interval history 05/27/2021:  The patient presents for follow-up of lower back pain and bilateral leg radiculopathy.  He has had prior ILESI and TFESI both with 80%  benefit lasting approximately 9 months and pain just now returning. Pain is worse to right side at this time.  He denies any new areas of pain or neurological changes.The patient denies myelopathic symptoms such as handwriting changes or difficulty with buttons/coins/keys. Denies perineal paresthesias, bowel/bladder dysfunction.    Interval History 9/1/2020:  The patient is here for follow up of back and leg pain.  He is now s/p L5/S1 IL JEAN MARIE with 80% of back pain.  However, he is having significant right leg pain, mainly down the buttock and posterior calf.  He had benefit with right L5/S1 and S1 TF JEAN MARIE in the past and would like to repeat.  He has associated numbness to right calf, worse with walking.  He does have some benefit with Gabapentin.  He had a recent Covid test for work which was negative. His pain today is 5/10.    Interval History 6/9/2020:  The patient is here for follow up of chronic lower back pain.  We have not seen the patient since last year.  He underwent an epidural at that time and says that his pain has been very mild until a few weeks ago.  He is having pain across lower back with radiation into the legs, mainly on the left.  He  continues to take gabapentin with some benefit.  The pain bothers him the most with walking and activity.  He had pacemaker replacement on 5/6/20.  He is not currently on blood thinners.  His pain today is 5/10.    Interval History 7/30/2019:  The patient returns for follow up of back pain.  He is s/p repeat left then right L3,4,5 RFAs completed on 6/24/19 with about 80% relief.  His back pain is mild.  He has had increased leg pain recently, worse on the left side.  Previous leg pain was relieved with JEAN MARIE last year.  He would like to repeat this.  His leg pain bothers him mainly at night when trying to sleep.  He stopped Gabapentin when he was not having leg pain but recently restarted.  He is unable to take at night.  His pain today is 6/10.    Interval History 5/14/2019:  The patient returns for follow up.  He previously had benefit with JEAN MARIE for leg pain and RFAs for back pain.  He is having some back pain that has been worsening over the past couple of weeks.  He has significant pain in the morning.  He has some improvement when he moves around.  He says that cold air aggravates his pain.  He stopped Gabapentin last year when his leg pain improved.  He is not having much leg pain now.  His pain today is 6/10.    Interval History 2/7/2019:  The patient presents for check up of chronic back pain.  He reports doing well since last visit.  He feels that last JEAN MARIE is still providing him benefit.  He has not had any leg pain.  He does still have back pain but states that it is tolerable.  His morning pain is much improved from RFAs last year.  He continues to work and is active.  His pain today is 5/10.    Interval History 12/6/2018:  The patient presents today for follow up.  He continues with pain to the lower back.  He is not having leg pain at this time.  He had some relief with RFAs with the past.  He stopped Gabapentin when his leg pain improved.  He takes Tylenol sparingly with some benefit.  His pain today is  5/10.  The patient denies any bowel or bladder incontinence or signs of saddle paresthesia.  The patient denies any major medical changes since last office visit.    Interval History 10/25/2018:  The patient returns for follow up of back and leg pain.  He is s/p L5/S1 IL JEAN MARIE on 10/11/18 with 80% pain relief for his legs.  He has had increased lower back pain over the past few days which he attributes to weather changes.  He describes it as aching.  He has not been stretching.  He does walk a lot for work.  He cannot take oral NSAIDs due to pacemaker placement.  His pain today is 8/10.     Interval History 9/11/2018:  The patient presents for procedure follow up appointment.  He is s/p left then right L3,4,5 RFA completed on 8/14/18 with 80% pain relief initially.  He has had a little more pain over the past week.  He is now having intermittent radiation into the back of both legs, left greater than right.  He previously had benefit with right sided TF JEAN MARIE.  He is still taking gabapentin.  His pain today is 5/10.    Interval History 7/25/2018:  The patient returns today for follow up of back pain.  He is s/p bilateral L3,4,5 MBB on 7/5/18 with 100% relief for 2 days.  He previously had benefit with right TF JEAN MARIE for leg pain.  He has not had right leg pain since the epidural.  However, he is reporting lateral left leg pain that has progressed over the past couple of weeks.  He continues to be active and works.  His pain today is 5/10.    Interval History 6/20/2018:  The patient returns today for follow up of lower back and right leg pain.  He is s/p right L5 and S1 TF JEAN MARIE on 6/5/18 with 100% relief of right leg pain.  He has not had any leg pain since the procedure.  He continues to report pain across the lower back.  This is always worse first thing in the morning.  He states that this feels aching and throbbing in nature.  He did have recent lumbar CT scan.  He would like to discuss further procedures.  He continues  to work and be active.  His pain today is 5/10.    Interval History 5/22/2018:  The patient returns for follow up of back pain.  He is having radiation down there back of the right leg to the posterior calf.  The back pain is most severe in the morning and he states that this feels like a stiffness.  This improves when he walks.  He has severe leg pain that is intermittent, mainly with activity.  This is his biggest complaint.  He did complete the medrol dose pack recently with limited improvement.  He had XRAYs which show severe loss of disc space at L5/S1.  He has not had a CT scan.  He is taking Gabapentin 300 mg at bedtime.  It is written BID but it makes him drowsy during the day.  He has some benefit with Aleve but has been told to avoid NSAIDs due to history of pacemaker placement.  His pain today is 5/10.     Initial encounter:    Renny Young presents to the clinic for the evaluation of lower back and right leg pain. The pain started two months ago and symptoms have been worsening.    Brief history:    Pain Description:    The pain is located in the lower back and right leg area in the L5/S1 distribution.      At BEST  5/10     At WORST  7/10 on the WORST day.      On average pain is rated as 5/10.     Today the pain is rated as 5/10    The pain is described as aching      Symptoms interfere with daily activity.     Exacerbating factors: Standing and Morning.      Mitigating factors medications.     Patient denies night fever/night sweats, urinary incontinence, bowel incontinence, significant weight loss, significant motor weakness and loss of sensations.  Patient denies any suicidal or homicidal ideations    Pain Medications:  Current:  OTC Tylenol PRN  Gabapentin 300 mg BID    Tried in Past:  NSAIDs - Aleve  TCA -Never  SNRI -Never  Anti-convulsants - Gabapentin   Muscle Relaxants -Never  Opioids-Never    Physical Therapy/Home Exercise: yes     report:  Reviewed and consistent with medication use as  prescribed.    Pain Procedures:   6/5/18 Right L5 and S1 TF JEAN MARIE- significant relief  7/5/18 Bilateral L3,4,5 MBB- 100% relief for 2 days  7/31/18 Left L3,4,5 RFA- 80% relief  8/14/18 Right L3,4,5 RFA- 80% relief  10/11/18 L5/S1 IL JEAN MARIE- 80% relief  6/11/19 Left L3,4,5 RFA- 80% relief  6/25/19 Right L3,4,5 RFA- 80% relief  8/13/19 L5/S1 IL JEAN MARIE- 90% relief for 9 months  6/16/20 L5/S1 IL JEAN MARIE- 80% relief of back pain  6/15/2021- Right L5/S1 and S1 TF JEAN MARIE  6/20/22 Right L5 and S1 TF JEAN MARIE- 80% relief for 2 months  9/26/22 Bilateral L5/S1 TF JEAN MARIE- 90% relief for 9 months  7/23/24 Bialteral L5/S1 TF JEAN MARIE- 80% relief  12/23/2024- Bilateral L5/S1 TF JEAN MARIE  1/30/2025- Left SI joint injection    Chiropractor -never  Acupuncture - never  TENS unit -never  Spinal decompression -never  Joint replacement -never    Imaging:   CT Lumbar Spine 7/9/2021:  COMPARISON:  Lumbar spine CT May 28, 2018     FINDINGS:  Mild levocurvature of the lumbar spine.  No listhesis.  There is no acute fracture.  The vertebral bodies are normal in height without compression fractures. Posterior elements are intact. There is unchanged severe disc height loss at the L5-S1 level with associated sclerosis and subchondral cystic change within the endplates.  Mild atherosclerotic calcification within the abdominal aorta which is not aneurysmal.  Otherwise, no soft tissue abnormality identified.     T12-L1: The disc is normal in configuration.  There is no facet arthropathy.  There is no neural foraminal stenosis.  There is no spinal canal stenosis.     L1-L2: The disc is normal in configuration.  There is no facet arthropathy.  There is no neuroforaminal stenosis.  There is no spinal canal stenosis.     L2-L3: The disc is normal in configuration.  There is no facet arthropathy.  There is no neuroforaminal stenosis.  There is no spinal canal stenosis.     L3-L4: Minimal diffuse disc bulge.  Mild bilateral facet arthropathy.  There is no neuroforaminal stenosis.   There is no spinal canal stenosis.     L4-L5: There is a diffuse disc bulge.  Mild-to-moderate bilateral facet arthropathy.  There is no neuroforaminal stenosis.  There is no spinal canal stenosis.     L5-S1: There is a circumferential disc osteophyte complex secondary to the severe degenerative disc disease at this level.  Mild to moderate bilateral facet arthropathy.  Unchanged mild bilateral neural foraminal stenosis secondary to diffuse disc osteophyte complex as well as the facet arthropathy.  There is no spinal canal stenosis.     Impression:     Inferior lumbar spine degenerative changes worst at L5-S1 which result in mild bilateral neural foraminal stenosis at this level.  Overall, findings are not significantly changed compared to prior study from May 2018.        Past Medical History:   Diagnosis Date    Colon polyp     Diabetes mellitus, type 2     Full dentures     Hyperlipidemia     Hypertension     Pacemaker     Pacemaker at end of battery life 05/06/2020    Smoker 06/10/2015    Offered cessation program and declined    Wears prescription eyeglasses      Past Surgical History:   Procedure Laterality Date    COLONOSCOPY N/A 2/13/2017    Procedure: COLONOSCOPY;  Surgeon: NILDA Juares MD;  Location: [address]);  Service: Endoscopy;  Laterality: N/A;  Do not cancel this order. Patient has Pacemaker in place.     COLONOSCOPY, SCREENING, HIGH RISK PATIENT N/A 1/9/2025    Procedure: COLONOSCOPY, SCREENING, HIGH RISK PATIENT;  Surgeon: Gene Duran MD;  Location: [address]);  Service: Endoscopy;  Laterality: N/A;  ref by / [facility] inst mailed-RB  12/31-pre call complete-tb    EPIDURAL STEROID INJECTION N/A 8/13/2019    Procedure: INJECTION, STEROID, EPIDURAL IL, L5/S1;  Surgeon: Josue Paredes MD;  [facility];  Service: Pain Management;  Laterality: N/A;  IL JEAN MARIE L5/S1    EPIDURAL STEROID INJECTION N/A 6/16/2020    Procedure: INJECTION, STEROID, EPIDURAL, L5-S1  IL add on @ 2 ok by  [place];  Surgeon: Josue Paredes MD;  Location: [facility] PAIN MGT;  Service: Pain Management;  Laterality: N/A;    HERNIA REPAIR      INJECTION OF ANESTHETIC AGENT AROUND NERVE Bilateral 7/5/2018    Procedure: BLOCK, NERVE;  Surgeon: Krystal Mtz MD;  Location: [facility] PAIN MGT;  Service: Pain Management;  Laterality: Bilateral;  Lumbar Bilateral L3-L4-L5 Medial Branch Block    INJECTION, SACROILIAC JOINT Left 1/30/2025    Procedure: INJECTION,SACROILIAC JOINT LEFT;  Surgeon: Krystal Mtz MD;  Location: [facility] PAIN MGT;  Service: Pain Management;  Laterality: Left;  2 WK F/U ROSCOE    RADIOFREQUENCY ABLATION Left 6/11/2019    Procedure: RADIOFREQUENCY ABLATION, L3-L4-L5 MEDIAL BRANCH   1 OF 2;  Surgeon: Josue Paredes MD;  Location: [facility] PAIN MGT;  Service: Pain Management;  Laterality: Left;    RADIOFREQUENCY ABLATION Right 6/25/2019    Procedure: RADIOFREQUENCY ABLATION, L3-L4-L5 MEDIAL BRANCH JING 2 OF 2;  Surgeon: Josue Paredes MD;  Location: [facility] PAIN MGT;  Service: Pain Management;  Laterality: Right;    REPAIR, HERNIA, INGUINAL, WITHOUT HISTORY OF PRIOR REPAIR, AGE 5 YEARS OR OLDER Left 11/18/2024    Procedure: REPAIR, HERNIA, INGUINAL, WITHOUT HISTORY OF PRIOR REPAIR, AGE 5 YEARS OR OLDER;  Surgeon: Titus Craft MD;  Location: [facility] OR;  Service: General;  Laterality: Left;    REPLACEMENT OF PACEMAKER GENERATOR Left 5/6/2020    Procedure: REPLACEMENT, PULSE GENERATOR, CARDIAC PACEMAKER;  Surgeon: Bradford Spence MD;  Location: [facility] EP LAB;  Service: Cardiology;  Laterality: Left;  EOL/LEAD Mlfx, Gen Change, Poss RA lead rev, SJM, MAC, GP, 3 PREP    REVISION OF PROCEDURE INVOLVING PACEMAKER LEAD Left 5/6/2020    Procedure: REVISION, ELECTRODE LEAD, CARDIAC PACEMAKER;  Surgeon: Bradford Spence MD;  Location: [facility] EP LAB;  Service: Cardiology;  Laterality: Left;    TRANSFORAMINAL EPIDURAL INJECTION OF STEROID Right 6/5/2018    Procedure: INJECTION-STEROID-EPIDURAL-TRANSFORAMINAL;   Surgeon: Josue Paredes MD;  Location: [facility] PAIN MGT;  Service: Pain Management;  Laterality: Right;  LUMBAR RIGHT L5 AND S1 TRANSFORAMINL JEAN MARIE  [id number]    W/ SEDATION     TRANSFORAMINAL EPIDURAL INJECTION OF STEROID Right 9/10/2020    Procedure: INJECTION, STEROID, EPIDURAL, TRANSFORAMINAL APPROACH, L5-S1 AND S1;  Surgeon: Josue Paredes MD;  Location: [facility] PAIN MGT;  Service: Pain Management;  Laterality: Right;    TRANSFORAMINAL EPIDURAL INJECTION OF STEROID Right 6/15/2021    Procedure: INJECTION, STEROID, EPIDURAL, TRANSFORAMINAL APPROACH L5-S1 AND S1 need consent;  Surgeon: Josue Paredes MD;  Location: [facility] PAIN MGT;  Service: Pain Management;  Laterality: Right;    TRANSFORAMINAL EPIDURAL INJECTION OF STEROID Right 6/20/2022    Procedure: INJECTION, STEROID, EPIDURAL, TRANSFORAMINAL APPROACH, RIGHT L5-S1 AND S1 CONTRAST;  Surgeon: Krystal Mtz MD;  Location: [facility] PAIN MGT;  Service: Pain Management;  Laterality: Right;    TRANSFORAMINAL EPIDURAL INJECTION OF STEROID Bilateral 9/26/2022    Procedure: INJECTION, STEROID, EPIDURAL, TRANSFORAMINAL APPROACH, BILATERAL L5-S1 CONTRAST;  Surgeon: Krystal Mtz MD;  Location: [facility] PAIN [facility]GT;  Service: Pain Management;  Laterality: Bilateral;    TRANSFORAMINAL EPIDURAL INJECTION OF STEROID Bilateral 7/13/2023    Procedure: INJECTION, STEROID, EPIDURAL, TRANSFORAMINAL APPROACH, L5-S1 BILATERAL;  Surgeon: Krystal Mtz MD;  Location: [facility] PAIN [facility]GT;  Service: Pain Management;  Laterality: Bilateral;    TRANSFORAMINAL EPIDURAL INJECTION OF STEROID Bilateral 12/23/2024    Procedure: LUMBAR TRANSFORAMINAL BILATERAL L5/S1 *ASPIRIN OTC8 HOLD FOR 5 DAYS *HERNIA SX CLEARANCE IN CHART*;  Surgeon: Krystal Mtz MD;  Location: [facility] PAIN MGT;  Service: Pain Management;  Laterality: Bilateral;     Social History     Socioeconomic History    Marital status:     Number of children: 3   Occupational History    Occupation:    Tobacco Use    Smoking status: Every Day      Current packs/day: 0.75     Average packs/day: 0.8 packs/day for 47.2 years (35.4 ttl pk-yrs)     Types: Cigarettes     Start date: 1978     Passive exposure: Never    Smokeless tobacco: Never   Substance and Sexual Activity    Alcohol use: Yes     Comment: Beer- Socially    Drug use: No    Sexual activity: Yes     Family History   Problem Relation Name Age of Onset    Diabetes Mother      Diabetes Father      Kidney disease Father      Melanoma Neg Hx         Review of patient's allergies indicates:  No Known Allergies    Current Outpatient Medications   Medication Sig    acetaminophen (TYLENOL) 500 MG tablet Take 1,000 mg by mouth every 6 (six) hours as needed for Pain.    amLODIPine (NORVASC) 5 MG tablet Take 1 tablet (5 mg total) by mouth once daily.    aspirin (ECOTRIN) 81 MG EC tablet Take 81 mg by mouth once daily.    atorvastatin (LIPITOR) 20 MG tablet Take 1 tablet (20 mg total) by mouth once daily.    gabapentin (NEURONTIN) 300 MG capsule Take 1 capsule by mouth twice daily    hydrOXYzine HCL (ATARAX) 10 MG Tab TAKE 1 TABLET BY MOUTH NIGHTLY AS NEEDED FOR ITCHING    losartan-hydrochlorothiazide 100-12.5 mg (HYZAAR) 100-12.5 mg Tab Take 1 tablet by mouth once daily.    metFORMIN (GLUCOPHAGE) 500 MG tablet Take 1 tablet (500 mg total) by mouth daily with breakfast.    oxyCODONE-acetaminophen (PERCOCET) 5-325 mg per tablet Take 1 tablet by mouth every 4 (four) hours as needed for Pain.    urea (CARMOL) 40 % Crea Apply topically once daily.     No current facility-administered medications for this visit.       REVIEW OF SYSTEMS:    GENERAL:  No weight loss, malaise or fevers.  HEENT:   No recent changes in vision or hearing  NECK:  Negative for lumps, no difficulty with swallowing.  RESPIRATORY:  Negative for cough, wheezing or shortness of breath, patient denies any recent URI.  CARDIOVASCULAR:  Negative for chest pain, leg swelling or palpitations. Pacemaker for SSS.  GI:  Negative for abdominal  discomfort, blood in stools or black stools or change in bowel habits.  MUSCULOSKELETAL:  See HPI.  SKIN:  Negative for lesions, rash, and itching.  PSYCH:  No mood disorder or recent psychosocial stressors.  Patients sleep is not disturbed secondary to pain.  HEMATOLOGY/LYMPHOLOGY:  Negative for prolonged bleeding, bruising easily or swollen nodes.  Patient is not currently taking any anti-coagulants  ENDO: DM2 on metformin  NEURO:   No history of headaches, syncope, paralysis, seizures or tremors.  All other reviewed and negative other than HPI.    OBJECTIVE:    /80 (Patient Position: Sitting)   Pulse 94   Wt 65.9 kg (145 lb 4.5 oz)   BMI 22.75 kg/m²     PHYSICAL EXAMINATION:    GENERAL: Well appearing, in no acute distress, alert and oriented x3.  PSYCH:  Mood and affect appropriate.  SKIN: Skin color, texture, turgor normal, no rashes or lesions.  HEAD/FACE:  Normocephalic, atraumatic. Cranial nerves grossly intact.  CV: RRR with palpation of the radial artery.  PULM: No evidence of respiratory difficulty, symmetric chest rise.  BACK: Straight leg raising in the sitting position is negative for radicular pain.  There is pain with palpation over the facet joints of the lumbar spine bilaterally. Limited ROM with pain on extension. Mildly positive facet loading bilaterally.   EXTREMITIES: No deformities, edema, or skin discoloration. Good capillary refill.  MUSCULOSKELETAL:There is pain with palpation over the left SI joint.  NEURO: No loss of sensation noted.    GAIT: Antalgic- ambulates without assistance.      ASSESSMENT: 64 y.o. year old male with lower back pain, consistent with the following diagnoses:    Encounter Diagnoses   Name Primary?    Lumbar radiculopathy Yes    Lumbar spondylosis     Degeneration of intervertebral disc of lumbar region with discogenic back pain and lower extremity pain          PLAN:     - Previous imaging was reviewed and discussed with the patient today. Labs reviewed.      - Schedule for left L5/S1 and S1 TF JEAN MARIE.     - Consider lumbar MBB in the future.     - Continue current medications.     - The patient will continue a home exercise routine to help with pain and strengthening.    - RTC 2 weeks after above procedure.     The above plan and management options were discussed at length with patient. Patient is in agreement with the above and verbalized [email or web address]     Krissy Garcia  03/18/2025

## 2025-03-18 NOTE — H&P (VIEW-ONLY)
Chronic Pain - Established Visit    Referring Physician: No ref. provider found    Chief Complaint:   Chief Complaint   Patient presents with    Follow-up    Low-back Pain          SUBJECTIVE: Disclaimer: This note has been generated using voice-recognition software. There may be typographical errors that have been missed during proof-reading    Interval History 3/18/2025:  The patient returns to clinic today for follow up of back pain. He reports worsened low back pain that radiates into the posterior aspect of his left leg to under his foot. He denies any right leg pain. His pain is worse with prolonged walking and activity. He is performing a home exercise routine. He is taking Gabapentin. He denies any other health changes. His pain today is 7/10.    Interval History 2/13/2025:  The patient returns to clinic today for follow up of back pain. He is s/p left SI joint injection on 1/30/2025. He reports limited relief. He continues to report left sided low back and buttock pain. He denies any radicular leg pain at this time. His pain is worse with prolonged walking and activity. He denies any other health changes. His pain today is 6/10.    Interval History 1/15/2025:  The patient returns to clinic today for follow up of back pain. He is s/p bilateral L5/S1 TF JEAN MARIE on 12/23/2024. He reports 50% relief. He continues to report left sided low back and buttock pain. He denies any radicular leg pain. His pain is worse with prolonged sitting and walking. He does endorse morning stiffness. He denies any other health changes. His pain today is 7/10.    Interval History 12/3/2024:  The patient is here to discuss progressively worsening lower back pain. Last received his L5/S1 TF JEAN MARIE on 07/23/2024 so he thinks its time for another injection. Denies any motor weakness or loss of sensation. Recently had hernia repair on 11/18/2024.    Interval History 6/26/2023:  The patient is here to discuss worsened lower back and leg pain.  I last saw him in September at which time he underwent bilateral L5/S1 TF JEAN MARIE on 9/26/22 with 90% relief for 8 months. He says that his pain was minimal during that time. He was able to work without pain disturbance. He has continues with PT exercises 3 days per week for the past 9 months. His pain has been worsening over the past month. He would like to repeat the procedure. He also says that his wife passed away since previous encounter due to long-standing history of sarcoidosis. He has understandably been upset about this. His pain today is 5/10.     Interval History 9/13/2022:  Renny is here to discuss worsened back and leg pain. The pain radiates from the lower back into the back of both legs to anterior shin and great toe. He has associated numbness. He is diabetic but states that he has not had major problems with his sugars recently. No recent trauma. He previously underwent right L5/S1 and S1 TF JEAN MARIE with 80% relief for about 2 months. During that time he was able to ambulate and work without limitation by pain. Today, he again reports severe pain which is affecting his daily activities. He has been in PT exercises for the past 15 weeks without much benefit of symptoms. He has tried ice and heat without benefit. He continues to take Gabapentin which provides some benefit. His overall pain today is 8/10.    Interval History 6/7/2022:  The patient returns to clinic today for follow up of low back pain. He reports increased low back pain over the last two weeks. He reports low back pain that radiates into the posterior aspect of his right leg to the bottom of his foot. He denies any left leg pain. His pain is worse with prolonged standing and walking. He reports that previous TF JEAN MARIE in 2021 provided 80% relief. He did notice that it took 3-4 weeks to see full benefit. He continues to take Gabapentin. He continues to perform a home exercise routine as prescribed. He denies any weakness. He denies any other  health changes. His pain today is 5/10.    Interval history 07/02/2021:  The patient presents for follow-up lower back pain and right leg radiculopathy.  He is status post repeat right-sided TF JEAN MARIE to L5/S1&S1.  He has difficulty quantifying pain relief but states that normally his leg pain is 100% resolved and this time pain persists.  He is not have any recent images.  His medication regimen includes gabapentin 300 mg states this is mildly beneficial or denies any adverse side effects of medication.  Denies any focal loss of bowel, bladder or saddle paresthesias concerning for cauda equina.    Interval history 05/27/2021:  The patient presents for follow-up of lower back pain and bilateral leg radiculopathy.  He has had prior ILESI and TFESI both with 80%  benefit lasting approximately 9 months and pain just now returning. Pain is worse to right side at this time.  He denies any new areas of pain or neurological changes.The patient denies myelopathic symptoms such as handwriting changes or difficulty with buttons/coins/keys. Denies perineal paresthesias, bowel/bladder dysfunction.    Interval History 9/1/2020:  The patient is here for follow up of back and leg pain.  He is now s/p L5/S1 IL JEAN MARIE with 80% of back pain.  However, he is having significant right leg pain, mainly down the buttock and posterior calf.  He had benefit with right L5/S1 and S1 TF JEAN MARIE in the past and would like to repeat.  He has associated numbness to right calf, worse with walking.  He does have some benefit with Gabapentin.  He had a recent Covid test for work which was negative. His pain today is 5/10.    Interval History 6/9/2020:  The patient is here for follow up of chronic lower back pain.  We have not seen the patient since last year.  He underwent an epidural at that time and says that his pain has been very mild until a few weeks ago.  He is having pain across lower back with radiation into the legs, mainly on the left.  He  continues to take gabapentin with some benefit.  The pain bothers him the most with walking and activity.  He had pacemaker replacement on 5/6/20.  He is not currently on blood thinners.  His pain today is 5/10.    Interval History 7/30/2019:  The patient returns for follow up of back pain.  He is s/p repeat left then right L3,4,5 RFAs completed on 6/24/19 with about 80% relief.  His back pain is mild.  He has had increased leg pain recently, worse on the left side.  Previous leg pain was relieved with JEAN MARIE last year.  He would like to repeat this.  His leg pain bothers him mainly at night when trying to sleep.  He stopped Gabapentin when he was not having leg pain but recently restarted.  He is unable to take at night.  His pain today is 6/10.    Interval History 5/14/2019:  The patient returns for follow up.  He previously had benefit with JEAN MARIE for leg pain and RFAs for back pain.  He is having some back pain that has been worsening over the past couple of weeks.  He has significant pain in the morning.  He has some improvement when he moves around.  He says that cold air aggravates his pain.  He stopped Gabapentin last year when his leg pain improved.  He is not having much leg pain now.  His pain today is 6/10.    Interval History 2/7/2019:  The patient presents for check up of chronic back pain.  He reports doing well since last visit.  He feels that last JEAN MARIE is still providing him benefit.  He has not had any leg pain.  He does still have back pain but states that it is tolerable.  His morning pain is much improved from RFAs last year.  He continues to work and is active.  His pain today is 5/10.    Interval History 12/6/2018:  The patient presents today for follow up.  He continues with pain to the lower back.  He is not having leg pain at this time.  He had some relief with RFAs with the past.  He stopped Gabapentin when his leg pain improved.  He takes Tylenol sparingly with some benefit.  His pain today is  5/10.  The patient denies any bowel or bladder incontinence or signs of saddle paresthesia.  The patient denies any major medical changes since last office visit.    Interval History 10/25/2018:  The patient returns for follow up of back and leg pain.  He is s/p L5/S1 IL JEAN MARIE on 10/11/18 with 80% pain relief for his legs.  He has had increased lower back pain over the past few days which he attributes to weather changes.  He describes it as aching.  He has not been stretching.  He does walk a lot for work.  He cannot take oral NSAIDs due to pacemaker placement.  His pain today is 8/10.     Interval History 9/11/2018:  The patient presents for procedure follow up appointment.  He is s/p left then right L3,4,5 RFA completed on 8/14/18 with 80% pain relief initially.  He has had a little more pain over the past week.  He is now having intermittent radiation into the back of both legs, left greater than right.  He previously had benefit with right sided TF JEAN MARIE.  He is still taking gabapentin.  His pain today is 5/10.    Interval History 7/25/2018:  The patient returns today for follow up of back pain.  He is s/p bilateral L3,4,5 MBB on 7/5/18 with 100% relief for 2 days.  He previously had benefit with right TF JEAN MARIE for leg pain.  He has not had right leg pain since the epidural.  However, he is reporting lateral left leg pain that has progressed over the past couple of weeks.  He continues to be active and works.  His pain today is 5/10.    Interval History 6/20/2018:  The patient returns today for follow up of lower back and right leg pain.  He is s/p right L5 and S1 TF JEAN MARIE on 6/5/18 with 100% relief of right leg pain.  He has not had any leg pain since the procedure.  He continues to report pain across the lower back.  This is always worse first thing in the morning.  He states that this feels aching and throbbing in nature.  He did have recent lumbar CT scan.  He would like to discuss further procedures.  He continues  to work and be active.  His pain today is 5/10.    Interval History 5/22/2018:  The patient returns for follow up of back pain.  He is having radiation down there back of the right leg to the posterior calf.  The back pain is most severe in the morning and he states that this feels like a stiffness.  This improves when he walks.  He has severe leg pain that is intermittent, mainly with activity.  This is his biggest complaint.  He did complete the medrol dose pack recently with limited improvement.  He had XRAYs which show severe loss of disc space at L5/S1.  He has not had a CT scan.  He is taking Gabapentin 300 mg at bedtime.  It is written BID but it makes him drowsy during the day.  He has some benefit with Aleve but has been told to avoid NSAIDs due to history of pacemaker placement.  His pain today is 5/10.     Initial encounter:    Renny Young presents to the clinic for the evaluation of lower back and right leg pain. The pain started two months ago and symptoms have been worsening.    Brief history:    Pain Description:    The pain is located in the lower back and right leg area in the L5/S1 distribution.      At BEST  5/10     At WORST  7/10 on the WORST day.      On average pain is rated as 5/10.     Today the pain is rated as 5/10    The pain is described as aching      Symptoms interfere with daily activity.     Exacerbating factors: Standing and Morning.      Mitigating factors medications.     Patient denies night fever/night sweats, urinary incontinence, bowel incontinence, significant weight loss, significant motor weakness and loss of sensations.  Patient denies any suicidal or homicidal ideations    Pain Medications:  Current:  OTC Tylenol PRN  Gabapentin 300 mg BID    Tried in Past:  NSAIDs - Aleve  TCA -Never  SNRI -Never  Anti-convulsants - Gabapentin   Muscle Relaxants -Never  Opioids-Never    Physical Therapy/Home Exercise: yes     report:  Reviewed and consistent with medication use as  prescribed.    Pain Procedures:   6/5/18 Right L5 and S1 TF JEAN MARIE- significant relief  7/5/18 Bilateral L3,4,5 MBB- 100% relief for 2 days  7/31/18 Left L3,4,5 RFA- 80% relief  8/14/18 Right L3,4,5 RFA- 80% relief  10/11/18 L5/S1 IL JEAN MARIE- 80% relief  6/11/19 Left L3,4,5 RFA- 80% relief  6/25/19 Right L3,4,5 RFA- 80% relief  8/13/19 L5/S1 IL JEAN MARIE- 90% relief for 9 months  6/16/20 L5/S1 IL JEAN MARIE- 80% relief of back pain  6/15/2021- Right L5/S1 and S1 TF JEAN MARIE  6/20/22 Right L5 and S1 TF JEAN MARIE- 80% relief for 2 months  9/26/22 Bilateral L5/S1 TF JEAN MARIE- 90% relief for 9 months  7/23/24 Bialteral L5/S1 TF JEAN MARIE- 80% relief  12/23/2024- Bilateral L5/S1 TF JEAN MARIE  1/30/2025- Left SI joint injection    Chiropractor -never  Acupuncture - never  TENS unit -never  Spinal decompression -never  Joint replacement -never    Imaging:   CT Lumbar Spine 7/9/2021:  COMPARISON:  Lumbar spine CT May 28, 2018     FINDINGS:  Mild levocurvature of the lumbar spine.  No listhesis.  There is no acute fracture.  The vertebral bodies are normal in height without compression fractures. Posterior elements are intact. There is unchanged severe disc height loss at the L5-S1 level with associated sclerosis and subchondral cystic change within the endplates.  Mild atherosclerotic calcification within the abdominal aorta which is not aneurysmal.  Otherwise, no soft tissue abnormality identified.     T12-L1: The disc is normal in configuration.  There is no facet arthropathy.  There is no neural foraminal stenosis.  There is no spinal canal stenosis.     L1-L2: The disc is normal in configuration.  There is no facet arthropathy.  There is no neuroforaminal stenosis.  There is no spinal canal stenosis.     L2-L3: The disc is normal in configuration.  There is no facet arthropathy.  There is no neuroforaminal stenosis.  There is no spinal canal stenosis.     L3-L4: Minimal diffuse disc bulge.  Mild bilateral facet arthropathy.  There is no neuroforaminal stenosis.   There is no spinal canal stenosis.     L4-L5: There is a diffuse disc bulge.  Mild-to-moderate bilateral facet arthropathy.  There is no neuroforaminal stenosis.  There is no spinal canal stenosis.     L5-S1: There is a circumferential disc osteophyte complex secondary to the severe degenerative disc disease at this level.  Mild to moderate bilateral facet arthropathy.  Unchanged mild bilateral neural foraminal stenosis secondary to diffuse disc osteophyte complex as well as the facet arthropathy.  There is no spinal canal stenosis.     Impression:     Inferior lumbar spine degenerative changes worst at L5-S1 which result in mild bilateral neural foraminal stenosis at this level.  Overall, findings are not significantly changed compared to prior study from May 2018.        Past Medical History:   Diagnosis Date    Colon polyp     Diabetes mellitus, type 2     Full dentures     Hyperlipidemia     Hypertension     Pacemaker     Pacemaker at end of battery life 05/06/2020    Smoker 06/10/2015    Offered cessation program and declined    Wears prescription eyeglasses      Past Surgical History:   Procedure Laterality Date    COLONOSCOPY N/A 2/13/2017    Procedure: COLONOSCOPY;  Surgeon: NILDA Juares MD;  Location: [address]);  Service: Endoscopy;  Laterality: N/A;  Do not cancel this order. Patient has Pacemaker in place.     COLONOSCOPY, SCREENING, HIGH RISK PATIENT N/A 1/9/2025    Procedure: COLONOSCOPY, SCREENING, HIGH RISK PATIENT;  Surgeon: Gene Duran MD;  Location: [address]);  Service: Endoscopy;  Laterality: N/A;  ref by / [facility] inst mailed-RB  12/31-pre call complete-tb    EPIDURAL STEROID INJECTION N/A 8/13/2019    Procedure: INJECTION, STEROID, EPIDURAL IL, L5/S1;  Surgeon: Josue Paredes MD;  Location: [facility] PAIN MGT;  Service: Pain Management;  Laterality: N/A;  IL JEAN MARIE L5/S1    EPIDURAL STEROID INJECTION N/A 6/16/2020    Procedure: INJECTION, STEROID, EPIDURAL, L5-S1  IL add on @ 2 ok by  Asia;  Surgeon: Josue Paredes MD;  Location: [facility] PAIN [facility]GT;  Service: Pain Management;  Laterality: N/A;    HERNIA REPAIR      INJECTION OF ANESTHETIC AGENT AROUND NERVE Bilateral 7/5/2018    Procedure: BLOCK, NERVE;  Surgeon: Krystal Mtz MD;  Location: [facility] PAIN MGT;  Service: Pain Management;  Laterality: Bilateral;  Lumbar Bilateral L3-L4-L5 Medial Branch Block    INJECTION, SACROILIAC JOINT Left 1/30/2025    Procedure: INJECTION,SACROILIAC JOINT LEFT;  Surgeon: Krystal Mtz MD;  Location: [facility] PAIN [facility]GT;  Service: Pain Management;  Laterality: Left;  2 WK F/U ROSCOE    RADIOFREQUENCY ABLATION Left 6/11/2019    Procedure: RADIOFREQUENCY ABLATION, L3-L4-L5 MEDIAL BRANCH   1 OF 2;  Surgeon: Josue Paredes MD;  Location: [facility] PAIN [facility]GT;  Service: Pain Management;  Laterality: Left;    RADIOFREQUENCY ABLATION Right 6/25/2019    Procedure: RADIOFREQUENCY ABLATION, L3-L4-L5 MEDIAL BRANCH JING 2 OF 2;  Surgeon: Josue Paredes MD;  Location: [facility] PAIN [facility]GT;  Service: Pain Management;  Laterality: Right;    REPAIR, HERNIA, INGUINAL, WITHOUT HISTORY OF PRIOR REPAIR, AGE 5 YEARS OR OLDER Left 11/18/2024    Procedure: REPAIR, HERNIA, INGUINAL, WITHOUT HISTORY OF PRIOR REPAIR, AGE 5 YEARS OR OLDER;  Surgeon: Titus Craft MD;  Location: [facility] OR;  Service: General;  Laterality: Left;    REPLACEMENT OF PACEMAKER GENERATOR Left 5/6/2020    Procedure: REPLACEMENT, PULSE GENERATOR, CARDIAC PACEMAKER;  Surgeon: Bradford Spence MD;  Location: [facility] EP LAB;  Service: Cardiology;  Laterality: Left;  EOL/LEAD Mlfx, Gen Change, Poss RA lead rev, SJM, MAC, GP, 3 PREP    REVISION OF PROCEDURE INVOLVING PACEMAKER LEAD Left 5/6/2020    Procedure: REVISION, ELECTRODE LEAD, CARDIAC PACEMAKER;  Surgeon: Bradford Spence MD;  Location: [facility] EP LAB;  Service: Cardiology;  Laterality: Left;    TRANSFORAMINAL EPIDURAL INJECTION OF STEROID Right 6/5/2018    Procedure: INJECTION-STEROID-EPIDURAL-TRANSFORAMINAL;   Surgeon: Josue Paredes MD;  Location: [facility] PAIN MGT;  Service: Pain Management;  Laterality: Right;  LUMBAR RIGHT L5 AND S1 TRANSFORAMINL JEAN MARIE  [id number]    W/ SEDATION     TRANSFORAMINAL EPIDURAL INJECTION OF STEROID Right 9/10/2020    Procedure: INJECTION, STEROID, EPIDURAL, TRANSFORAMINAL APPROACH, L5-S1 AND S1;  Surgeon: Josue Paredes MD;  Location: [facility] PAIN MGT;  Service: Pain Management;  Laterality: Right;    TRANSFORAMINAL EPIDURAL INJECTION OF STEROID Right 6/15/2021    Procedure: INJECTION, STEROID, EPIDURAL, TRANSFORAMINAL APPROACH L5-S1 AND S1 need consent;  Surgeon: Josue Paredes MD;  Location: [facility] PAIN MGT;  Service: Pain Management;  Laterality: Right;    TRANSFORAMINAL EPIDURAL INJECTION OF STEROID Right 6/20/2022    Procedure: INJECTION, STEROID, EPIDURAL, TRANSFORAMINAL APPROACH, RIGHT L5-S1 AND S1 CONTRAST;  Surgeon: Krystal Mtz MD;  Location: [facility] PAIN [facility]GT;  Service: Pain Management;  Laterality: Right;    TRANSFORAMINAL EPIDURAL INJECTION OF STEROID Bilateral 9/26/2022    Procedure: INJECTION, STEROID, EPIDURAL, TRANSFORAMINAL APPROACH, BILATERAL L5-S1 CONTRAST;  Surgeon: Krystal Mtz MD;  Location: [facility] PAIN [facility]GT;  Service: Pain Management;  Laterality: Bilateral;    TRANSFORAMINAL EPIDURAL INJECTION OF STEROID Bilateral 7/13/2023    Procedure: INJECTION, STEROID, EPIDURAL, TRANSFORAMINAL APPROACH, L5-S1 BILATERAL;  Surgeon: Krystal Mtz MD;  Location: [facility] PAIN [facility]GT;  Service: Pain Management;  Laterality: Bilateral;    TRANSFORAMINAL EPIDURAL INJECTION OF STEROID Bilateral 12/23/2024    Procedure: LUMBAR TRANSFORAMINAL BILATERAL L5/S1 *ASPIRIN OTC8 HOLD FOR 5 DAYS *HERNIA SX CLEARANCE IN CHART*;  Surgeon: Krystal Mtz MD;  Location: [facility] PAIN [facility]GT;  Service: Pain Management;  Laterality: Bilateral;     Social History     Socioeconomic History    Marital status:     Number of children: 3   Occupational History    Occupation:    Tobacco Use    Smoking status: Every Day      Current packs/day: 0.75     Average packs/day: 0.8 packs/day for 47.2 years (35.4 ttl pk-yrs)     Types: Cigarettes     Start date: 1978     Passive exposure: Never    Smokeless tobacco: Never   Substance and Sexual Activity    Alcohol use: Yes     Comment: Beer- Socially    Drug use: No    Sexual activity: Yes     Family History   Problem Relation Name Age of Onset    Diabetes Mother      Diabetes Father      Kidney disease Father      Melanoma Neg Hx         Review of patient's allergies indicates:  No Known Allergies    Current Outpatient Medications   Medication Sig    acetaminophen (TYLENOL) 500 MG tablet Take 1,000 mg by mouth every 6 (six) hours as needed for Pain.    amLODIPine (NORVASC) 5 MG tablet Take 1 tablet (5 mg total) by mouth once daily.    aspirin (ECOTRIN) 81 MG EC tablet Take 81 mg by mouth once daily.    atorvastatin (LIPITOR) 20 MG tablet Take 1 tablet (20 mg total) by mouth once daily.    gabapentin (NEURONTIN) 300 MG capsule Take 1 capsule by mouth twice daily    hydrOXYzine HCL (ATARAX) 10 MG Tab TAKE 1 TABLET BY MOUTH NIGHTLY AS NEEDED FOR ITCHING    losartan-hydrochlorothiazide 100-12.5 mg (HYZAAR) 100-12.5 mg Tab Take 1 tablet by mouth once daily.    metFORMIN (GLUCOPHAGE) 500 MG tablet Take 1 tablet (500 mg total) by mouth daily with breakfast.    oxyCODONE-acetaminophen (PERCOCET) 5-325 mg per tablet Take 1 tablet by mouth every 4 (four) hours as needed for Pain.    urea (CARMOL) 40 % Crea Apply topically once daily.     No current facility-administered medications for this visit.       REVIEW OF SYSTEMS:    GENERAL:  No weight loss, malaise or fevers.  HEENT:   No recent changes in vision or hearing  NECK:  Negative for lumps, no difficulty with swallowing.  RESPIRATORY:  Negative for cough, wheezing or shortness of breath, patient denies any recent URI.  CARDIOVASCULAR:  Negative for chest pain, leg swelling or palpitations. Pacemaker for SSS.  GI:  Negative for abdominal  discomfort, blood in stools or black stools or change in bowel habits.  MUSCULOSKELETAL:  See HPI.  SKIN:  Negative for lesions, rash, and itching.  PSYCH:  No mood disorder or recent psychosocial stressors.  Patients sleep is not disturbed secondary to pain.  HEMATOLOGY/LYMPHOLOGY:  Negative for prolonged bleeding, bruising easily or swollen nodes.  Patient is not currently taking any anti-coagulants  ENDO: DM2 on metformin  NEURO:   No history of headaches, syncope, paralysis, seizures or tremors.  All other reviewed and negative other than HPI.    OBJECTIVE:    /80 (Patient Position: Sitting)   Pulse 94   Wt 65.9 kg (145 lb 4.5 oz)   BMI 22.75 kg/m²     PHYSICAL EXAMINATION:    GENERAL: Well appearing, in no acute distress, alert and oriented x3.  PSYCH:  Mood and affect appropriate.  SKIN: Skin color, texture, turgor normal, no rashes or lesions.  HEAD/FACE:  Normocephalic, atraumatic. Cranial nerves grossly intact.  CV: RRR with palpation of the radial artery.  PULM: No evidence of respiratory difficulty, symmetric chest rise.  BACK: Straight leg raising in the sitting position is negative for radicular pain.  There is pain with palpation over the facet joints of the lumbar spine bilaterally. Limited ROM with pain on extension. Mildly positive facet loading bilaterally.   EXTREMITIES: No deformities, edema, or skin discoloration. Good capillary refill.  MUSCULOSKELETAL:There is pain with palpation over the left SI joint.  NEURO: No loss of sensation noted.    GAIT: Antalgic- ambulates without assistance.      ASSESSMENT: 64 y.o. year old male with lower back pain, consistent with the following diagnoses:    Encounter Diagnoses   Name Primary?    Lumbar radiculopathy Yes    Lumbar spondylosis     Degeneration of intervertebral disc of lumbar region with discogenic back pain and lower extremity pain          PLAN:     - Previous imaging was reviewed and discussed with the patient today. Labs reviewed.      - Schedule for left L5/S1 and S1 TF JEAN MARIE.     - Consider lumbar MBB in the future.     - Continue current medications.     - The patient will continue a home exercise routine to help with pain and strengthening.    - RTC 2 weeks after above procedure.     The above plan and management options were discussed at length with patient. Patient is in agreement with the above and verbalized understanding.     Krissy Garcia  03/18/2025

## 2025-03-19 ENCOUNTER — PATIENT MESSAGE (OUTPATIENT)
Dept: PAIN MEDICINE | Facility: OTHER | Age: 65
End: 2025-03-19
Payer: MEDICAID

## 2025-03-28 ENCOUNTER — OFFICE VISIT (OUTPATIENT)
Dept: OTOLARYNGOLOGY | Facility: CLINIC | Age: 65
End: 2025-03-28
Payer: MEDICAID

## 2025-03-28 ENCOUNTER — CLINICAL SUPPORT (OUTPATIENT)
Dept: OTOLARYNGOLOGY | Facility: CLINIC | Age: 65
End: 2025-03-28
Payer: MEDICAID

## 2025-03-28 VITALS
HEIGHT: 67 IN | SYSTOLIC BLOOD PRESSURE: 143 MMHG | HEART RATE: 73 BPM | WEIGHT: 145.31 LBS | DIASTOLIC BLOOD PRESSURE: 84 MMHG | BODY MASS INDEX: 22.81 KG/M2

## 2025-03-28 DIAGNOSIS — H90.3 SENSORINEURAL HEARING LOSS (SNHL) OF BOTH EARS: ICD-10-CM

## 2025-03-28 DIAGNOSIS — H90.3 SENSORINEURAL HEARING LOSS (SNHL) OF BOTH EARS: Primary | ICD-10-CM

## 2025-03-28 NOTE — PROGRESS NOTES
Renny Young, a 64 y.o. male, was seen today in the clinic for an audiologic evaluation. Mr. Young reported decreased hearing in both ears. He reported difficulty hearing his television. Patient denied tinnitus, otalgia, aural fullness, and dizziness.    Tympanometry revealed Type A tympanogram in the right ear and Type A tympanogram in the left ear. Audiogram results revealed normal hearing sensitivity sloping to moderately severe sensorineural hearing loss (SNHL) in the right ear and normal hearing sensitivity sloping to moderately severe rising to moderate SNHL in the left ear. A slight asymmetry was noted from 5980-5717 Hz with the left ear being the better hearing ear. Speech reception thresholds were noted at 25 dBHL in the right ear and 25 dBHL in the left ear.  Speech discrimination scores were 84% in the right ear and 96% in the left ear.    Recommendations:  Otologic evaluation  Hearing aid consultation  Annual audiogram or sooner if change is perceived  Hearing protection in noise

## 2025-03-28 NOTE — PROGRESS NOTES
Ear, Nose, & Throat  Otolaryngology - Head & Neck Surgery      Subjective:     Chief Complaint:   Chief Complaint   Patient presents with    Follow-up       Renny Young is a 64 y.o. male who was referred to me by Dr. Jarvis Washington in consultation for chronic, progressive hearing loss. He while on jury duty. He could not hear the .     He struggles to hear the TV and finds that has TV volume is louder than previously.    Associated symptoms include none. He denies any non-pulsatile tinnitus, vertigo, aural fullness, otalgia, or otorrhea.     Otologic history is significant for none    He does not wear hearing aids but is interested.     He is not using ototoxic medications.     Risk factors for hearing loss include familial history of early hearing loss (mother, elderly).      Past Medical History  Active Ambulatory Problems     Diagnosis Date Noted    Hypertension, essential 02/14/2017    Type 2 diabetes mellitus, without long-term current use of insulin 06/10/2015    Pacemaker 06/10/2015    Sick sinus syndrome 09/29/2015    Diabetic polyneuropathy associated with type 2 diabetes mellitus 12/12/2015    Hyperlipidemia 08/09/2016    Pulmonary nodule 04/24/2017    DDD (degenerative disc disease), lumbar 05/08/2018    Lumbar radiculopathy 05/08/2018    Facet arthritis of lumbar region 05/08/2018    Lumbar spondylosis 07/05/2018    Personal history of nicotine dependence 02/17/2020    Polyp of colon 02/04/2021    Condyloma acuminata 09/28/2022    ASCVD (arteriosclerotic cardiovascular disease) 12/31/2024     Resolved Ambulatory Problems     Diagnosis Date Noted    Smoker 06/10/2015    Screening 08/24/2015    DM (diabetes mellitus), type 2, uncontrolled 08/09/2016    Post-op pain 08/22/2016    Type 2 diabetes mellitus without complication 04/06/2017    Chronic pain 07/31/2018    Pacemaker battery depletion 04/30/2020    Pacemaker at end of battery life 05/06/2020    HPV (human papilloma virus) infection  08/10/2022    Chronic right shoulder pain 06/12/2023    Inguinal hernia of left side without obstruction or gangrene 11/18/2024     Past Medical History:   Diagnosis Date    Colon polyp     Diabetes mellitus, type 2     Full dentures     Hypertension     Wears prescription eyeglasses        Past Surgical History  He has a past surgical history that includes Hernia repair; Colonoscopy (N/A, 2/13/2017); Transforaminal epidural injection of steroid (Right, 6/5/2018); Injection of anesthetic agent around nerve (Bilateral, 7/5/2018); Radiofrequency ablation (Left, 6/11/2019); Radiofrequency ablation (Right, 6/25/2019); Epidural steroid injection (N/A, 8/13/2019); Replacement of pacemaker generator (Left, 5/6/2020); Revision of procedure involving pacemaker lead (Left, 5/6/2020); Epidural steroid injection (N/A, 6/16/2020); Transforaminal epidural injection of steroid (Right, 9/10/2020); Transforaminal epidural injection of steroid (Right, 6/15/2021); Transforaminal epidural injection of steroid (Right, 6/20/2022); Transforaminal epidural injection of steroid (Bilateral, 9/26/2022); Transforaminal epidural injection of steroid (Bilateral, 7/13/2023); repair, hernia, inguinal, without history of prior repair, age 5 years or older (Left, 11/18/2024); Transforaminal epidural injection of steroid (Bilateral, 12/23/2024); colonoscopy, screening, high risk patient (N/A, 1/9/2025); and injection, sacroiliac joint (Left, 1/30/2025).    Past Surgical History:   Procedure Laterality Date    COLONOSCOPY N/A 2/13/2017    Procedure: COLONOSCOPY;  Surgeon: NILDA Juares MD;  Location: [facility] ([address] [place] [address]);  Service: Endoscopy;  Laterality: N/A;  Do not cancel this order. Patient has Pacemaker in place.     COLONOSCOPY, SCREENING, HIGH RISK PATIENT N/A 1/9/2025    Procedure: COLONOSCOPY, SCREENING, HIGH RISK PATIENT;  Surgeon: Gene Duran MD;  [facility]  Service: Endoscopy;  Laterality: N/A;  ref by  / suprep [facility] mailed-RB  12/31-pre call complete-tb    EPIDURAL STEROID INJECTION N/A 8/13/2019    Procedure: INJECTION, STEROID, EPIDURAL IL, L5/S1;  Surgeon: Josue Paredes MD;  Location: [facility] PAIN MGT;  Service: Pain Management;  Laterality: N/A;  IL JEAN MARIE L5/S1    EPIDURAL STEROID INJECTION N/A 6/16/2020    Procedure: INJECTION, STEROID, EPIDURAL, L5-S1 IL add on @ 2 ok by  [place];  Surgeon: Josue Paredes MD;  Location: [facility] PAIN MGT;  Service: Pain Management;  Laterality: N/A;    HERNIA REPAIR      INJECTION OF ANESTHETIC AGENT AROUND NERVE Bilateral 7/5/2018    Procedure: BLOCK, NERVE;  Surgeon: Krystal Mtz MD;  Location: [facility] PAIN MGT;  Service: Pain Management;  Laterality: Bilateral;  Lumbar Bilateral L3-L4-L5 Medial Branch Block    INJECTION, SACROILIAC JOINT Left 1/30/2025    Procedure: INJECTION,SACROILIAC JOINT LEFT;  Surgeon: Krystal Mtz MD;  Location: [facility] PAIN MGT;  Service: Pain Management;  Laterality: Left;  2 WK F/U ROSCOE    RADIOFREQUENCY ABLATION Left 6/11/2019    Procedure: RADIOFREQUENCY ABLATION, L3-L4-L5 MEDIAL BRANCH   1 OF 2;  Surgeon: Josue Paredes MD;  Location: [facility] PAIN MGT;  Service: Pain Management;  Laterality: Left;    RADIOFREQUENCY ABLATION Right 6/25/2019    Procedure: RADIOFREQUENCY ABLATION, L3-L4-L5 MEDIAL BRANCH JING 2 OF 2;  Surgeon: Josue Paredes MD;  Location: [facility] PAIN MGT;  Service: Pain Management;  Laterality: Right;    REPAIR, HERNIA, INGUINAL, WITHOUT HISTORY OF PRIOR REPAIR, AGE 5 YEARS OR OLDER Left 11/18/2024    Procedure: REPAIR, HERNIA, INGUINAL, WITHOUT HISTORY OF PRIOR REPAIR, AGE 5 YEARS OR OLDER;  Surgeon: Titus Craft MD;  Location: [facility] OR;  Service: General;  Laterality: Left;    REPLACEMENT OF PACEMAKER GENERATOR Left 5/6/2020    Procedure: REPLACEMENT, PULSE GENERATOR, CARDIAC PACEMAKER;  Surgeon: Bradford Spence MD;  Location: [facility] EP LAB;  Service: Cardiology;  Laterality: Left;  EOL/LEAD Mlfx, Gen Change, Poss RA  lead rev, SJM, MAC, GP, 3 PREP    REVISION OF PROCEDURE INVOLVING PACEMAKER LEAD Left 5/6/2020    Procedure: REVISION, ELECTRODE LEAD, CARDIAC PACEMAKER;  Surgeon: Bradford Spence MD;  Location: [facility] EP LAB;  Service: Cardiology;  Laterality: Left;    TRANSFORAMINAL EPIDURAL INJECTION OF STEROID Right 6/5/2018    Procedure: INJECTION-STEROID-EPIDURAL-TRANSFORAMINAL;  Surgeon: Josue Paredes MD;  Location: [facility] PAIN MGT;  Service: Pain Management;  Laterality: Right;  LUMBAR RIGHT L5 AND S1 TRANSFORAMINL JEAN MARIE  22010    W/ SEDATION     TRANSFORAMINAL EPIDURAL INJECTION OF STEROID Right 9/10/2020    Procedure: INJECTION, STEROID, EPIDURAL, TRANSFORAMINAL APPROACH, L5-S1 AND S1;  Surgeon: Josue Paredes MD;  Location: [facility] PAIN MGT;  Service: Pain Management;  Laterality: Right;    TRANSFORAMINAL EPIDURAL INJECTION OF STEROID Right 6/15/2021    Procedure: INJECTION, STEROID, EPIDURAL, TRANSFORAMINAL APPROACH L5-S1 AND S1 need consent;  Surgeon: Josue Paredes MD;  Location: [facility] PAIN MGT;  Service: Pain Management;  Laterality: Right;    TRANSFORAMINAL EPIDURAL INJECTION OF STEROID Right 6/20/2022    Procedure: INJECTION, STEROID, EPIDURAL, TRANSFORAMINAL APPROACH, RIGHT L5-S1 AND S1 CONTRAST;  Surgeon: Krystal Mtz MD;  Location: [facility] PAIN MGT;  Service: Pain Management;  Laterality: Right;    TRANSFORAMINAL EPIDURAL INJECTION OF STEROID Bilateral 9/26/2022    Procedure: INJECTION, STEROID, EPIDURAL, TRANSFORAMINAL APPROACH, BILATERAL L5-S1 CONTRAST;  Surgeon: Krystal Mtz MD;  Location: [facility] PAIN MGT;  Service: Pain Management;  Laterality: Bilateral;    TRANSFORAMINAL EPIDURAL INJECTION OF STEROID Bilateral 7/13/2023    Procedure: INJECTION, STEROID, EPIDURAL, TRANSFORAMINAL APPROACH, L5-S1 BILATERAL;  Surgeon: Krystal Mtz MD;  Location: [facility] PAIN MGT;  Service: Pain Management;  Laterality: Bilateral;    TRANSFORAMINAL EPIDURAL INJECTION OF STEROID Bilateral 12/23/2024    Procedure: LUMBAR TRANSFORAMINAL  "BILATERAL L5/S1 *ASPIRIN OTC8 HOLD FOR 5 DAYS *HERNIA SX CLEARANCE IN CHART*;  Surgeon: Krystal Mtz MD;  Location: [facility];  Service: Pain Management;  Laterality: Bilateral;        Family History  His family history includes Diabetes in his father and mother; Kidney disease in his father.    Social History  He reports that he has been smoking cigarettes. He started smoking about 47 years ago. He has a 35.4 pack-year smoking history. He has never been exposed to tobacco smoke. He has never used smokeless tobacco. He reports current alcohol use. He reports that he does not use drugs.    Allergies  He has no known allergies.    Medications  He has a current medication list which includes the following prescription(s): acetaminophen, amlodipine, aspirin, atorvastatin, gabapentin, hydroxyzine hcl, losartan-hydrochlorothiazide 100-12.5 mg, metformin, oxycodone-acetaminophen, and urea.    ROS:  Pertinent positive and negative review of systems as noted in HPI.      Objective:     BP (!) 143/84 (BP Location: Left arm, Patient Position: Sitting)   Pulse 73   Ht 5' 7" (1.702 m)   Wt 65.9 kg (145 lb 4.5 oz)   BMI 22.75 kg/m²    Constitutional: Well appearing, communicating. No acute distress  Right Ear:    Auricle normally developed   EAC: normal   Tympanic membrane: intact   Middle Ear: No effusion present and [place] in normal position  Left Ear:    Auricle normally developed   EAC: normal   Tympanic membrane: intact   Middle Ear: No effusion present and [place] in normal position  Neuro/Psychiatric:     Affect: Appropriate   Eyes: EOMI intact  Respiratory: No increased WOB, no stridor       Data Review:   LABS    I have independently reviewed the lab results shown above. Findings significant for the conditions being treated include:     IMAGING    No pertinent imaging available    AUDIO    I have independently reviewed the following audiogram and reviewed the report. Findings as follows:     Pure Tone " Audiometry: Symmetric  Right - Mild (26-40 dB) to Moderate-severe (56-70 dB) high frequency sensorineural hearing loss  Left - Mild (26-40 dB) to Moderate-severe (56-70 dB) high frequency sensorineural hearing loss    Tympanometry  Right: Type A  Left: Type A    Word Discrimination Score  Right: 84%  Left 96%      Procedures:       Assessment:     1. Sensorineural hearing loss (SNHL) of both ears      Plan:     I had a long discussion with the patient regarding his condition. I believe that he is an appropriate candidate for hearing aids. I will give him the contact information of Krissy Sutherland () our hearing aid coordinator. I also discussed with him the need to repeat his audiogram in 1 year. He was afforded an opportunity to ask questions. He showed good understanding and agreed with this plan.    Voice recognition software was used in the creation of this note/communication and any sound-alike errors which may have occurred from its use should be taken in context when interpreting. If such errors prevent a clear understanding of the note/communication, please contact the office for clarification.   Problem List Items Addressed This Visit    None  Visit Diagnoses         Sensorineural hearing loss (SNHL) of both ears

## 2025-04-01 ENCOUNTER — HOSPITAL ENCOUNTER (OUTPATIENT)
Facility: OTHER | Age: 65
Discharge: HOME | End: 2025-04-01
Attending: ANESTHESIOLOGY | Admitting: ANESTHESIOLOGY
Payer: MEDICAID

## 2025-04-01 VITALS
BODY MASS INDEX: 24.96 KG/M2 | HEIGHT: 67 IN | WEIGHT: 159 LBS | SYSTOLIC BLOOD PRESSURE: 121 MMHG | TEMPERATURE: 99 F | OXYGEN SATURATION: 99 % | RESPIRATION RATE: 18 BRPM | DIASTOLIC BLOOD PRESSURE: 92 MMHG | HEART RATE: 61 BPM

## 2025-04-01 DIAGNOSIS — G89.29 CHRONIC PAIN: ICD-10-CM

## 2025-04-01 DIAGNOSIS — M54.16 LUMBAR RADICULOPATHY: Primary | ICD-10-CM

## 2025-04-01 LAB — POCT GLUCOSE: 91 MG/DL (ref 70–110)

## 2025-04-01 PROCEDURE — 63600175 PHARM REV CODE 636 W HCPCS: Performed by: ANESTHESIOLOGY

## 2025-04-01 PROCEDURE — 64484 NJX AA&/STRD TFRM EPI L/S EA: CPT | Mod: LT | Performed by: ANESTHESIOLOGY

## 2025-04-01 PROCEDURE — 99152 MOD SED SAME PHYS/QHP 5/>YRS: CPT | Performed by: ANESTHESIOLOGY

## 2025-04-01 PROCEDURE — 64483 NJX AA&/STRD TFRM EPI L/S 1: CPT | Mod: LT,,, | Performed by: ANESTHESIOLOGY

## 2025-04-01 PROCEDURE — 25500020 PHARM REV CODE 255: Performed by: ANESTHESIOLOGY

## 2025-04-01 PROCEDURE — 64484 NJX AA&/STRD TFRM EPI L/S EA: CPT | Mod: LT,,, | Performed by: ANESTHESIOLOGY

## 2025-04-01 PROCEDURE — 64483 NJX AA&/STRD TFRM EPI L/S 1: CPT | Mod: LT | Performed by: ANESTHESIOLOGY

## 2025-04-01 PROCEDURE — 99152 MOD SED SAME PHYS/QHP 5/>YRS: CPT | Mod: ,,, | Performed by: ANESTHESIOLOGY

## 2025-04-01 RX ORDER — LIDOCAINE HYDROCHLORIDE 20 MG/ML
INJECTION, SOLUTION INFILTRATION; PERINEURAL
Status: DISCONTINUED | OUTPATIENT
Start: 2025-04-01 | End: 2025-04-01 | Stop reason: HOSPADM

## 2025-04-01 RX ORDER — MIDAZOLAM HYDROCHLORIDE 1 MG/ML
INJECTION INTRAMUSCULAR; INTRAVENOUS
Status: DISCONTINUED | OUTPATIENT
Start: 2025-04-01 | End: 2025-04-01 | Stop reason: HOSPADM

## 2025-04-01 RX ORDER — SODIUM CHLORIDE 9 MG/ML
INJECTION, SOLUTION INTRAVENOUS CONTINUOUS
Status: DISCONTINUED | OUTPATIENT
Start: 2025-04-01 | End: 2025-04-01 | Stop reason: HOSPADM

## 2025-04-01 RX ORDER — LIDOCAINE HYDROCHLORIDE 10 MG/ML
INJECTION, SOLUTION EPIDURAL; INFILTRATION; INTRACAUDAL; PERINEURAL
Status: DISCONTINUED | OUTPATIENT
Start: 2025-04-01 | End: 2025-04-01 | Stop reason: HOSPADM

## 2025-04-01 RX ORDER — DEXAMETHASONE SODIUM PHOSPHATE 10 MG/ML
INJECTION, SOLUTION INTRA-ARTICULAR; INTRALESIONAL; INTRAMUSCULAR; INTRAVENOUS; SOFT TISSUE
Status: DISCONTINUED | OUTPATIENT
Start: 2025-04-01 | End: 2025-04-01 | Stop reason: HOSPADM

## 2025-04-01 RX ORDER — FENTANYL CITRATE 50 UG/ML
INJECTION, SOLUTION INTRAMUSCULAR; INTRAVENOUS
Status: DISCONTINUED | OUTPATIENT
Start: 2025-04-01 | End: 2025-04-01 | Stop reason: HOSPADM

## 2025-04-01 NOTE — OP NOTE
Lumbar Transforaminal Epidural Steroid Injection under Fluoroscopic Guidance    The procedure, risks, benefits, and options were discussed with the patient. There are no contraindications to the procedure. The patent expressed understanding and agreed to the procedure. Informed written consent was obtained prior to the start of the procedure and can be found in the patient's chart.    PATIENT NAME: Renny Young   MRN: [id number]     DATE OF PROCEDURE: 04/01/2025    PROCEDURE:  Left  L5/S1 and S1 Lumbar Transforaminal Epidural Steroid Injection under Fluoroscopic Guidance    PRE-OP DIAGNOSIS: Lumbar radiculopathy [M54.16] Lumbar radiculopathy [M54.16]    POST-OP DIAGNOSIS: Same    PHYSICIAN: Krystal Mtz MD    ASSISTANTS: Moo Nam MD [employer] pain fellow 24/25      MEDICATIONS INJECTED: Preservative-free Decadron 10mg with 5cc of Lidocaine 1% MPF     LOCAL ANESTHETIC INJECTED: Xylocaine 2%     SEDATION: Versed 2mg and Fentanyl 25mcg                                                                                                                                                                                     Conscious sedation ordered by M.D. Patient re-evaluation prior to administration of conscious sedation. No changes noted in patient's status from initial evaluation. The patient's vital signs were monitored by RN and patient remained hemodynamically stable throughout the procedure.    Event Time In   Sedation Start 1411   Sedation End 1425       ESTIMATED BLOOD LOSS: None    COMPLICATIONS: None    TECHNIQUE: Time-out was performed to identify the patient and procedure to be performed. With the patient laying in a prone position, the surgical area was prepped and draped in the usual sterile fashion using ChloraPrep and a fenestrated drape.The levels were determined under fluoroscopy guidance. Skin anesthesia was achieved by injecting Lidocaine 2% over the injection sites. The transforaminal spaces were then approached  with a 22 gauge, 3.5 inch spinal quinke needle that was introduced under fluoroscopic guidance in the AP and Lateral views. Once the needle tip was in the area of the transforaminal space, and there was no blood, CSF or paraesthesias, contrast dye Omnipaque (300mg/mL) was injected to confirm placement and there was no vascular runoff. Fluoroscopic imaging in the AP and lateral views revealed a clear outline of the spinal nerve with proximal spread of agent through the neural foramen into the epidural space. 3 mL of the medication mixture listed above was injected slowly at each site. Displacement of the radio opaque contrast after injection of the medication confirmed that the medication went into the area of the transforaminal spaces. The needles were removed and bleeding was nil. A sterile dressing was applied. No specimens collected. The patient tolerated the procedure well.     PRE-PROCEDURE PAIN SCORE: 0-5/10    POST-PROCEDURE PAIN SCORE: 0/10    The patient was monitored after the procedure in the recovery area. They were given post-procedure and discharge instructions to follow at home. The patient was discharged in a stable condition.    Krystal Mtz MD

## 2025-04-01 NOTE — DISCHARGE INSTRUCTIONS

## 2025-04-01 NOTE — DISCHARGE SUMMARY
Discharge Note  Short Stay      SUMMARY     Admit Date: 4/1/2025    Attending Physician: Krystal Mtz      Discharge Physician: Krystal Mtz      Discharge Date: 4/1/2025 2:25 PM    Procedure(s) (LRB):  LUMBAR TRANSFORAMINAL LEFT L5/S1 AND S1 *ASPIRIN OTC* HOLD FOR 5 DAYS (Left)    Final Diagnosis: Lumbar radiculopathy [M54.16]    Disposition: Home or self care    Patient Instructions:   Current Discharge Medication List        CONTINUE these medications which have NOT CHANGED    Details   acetaminophen (TYLENOL) 500 MG tablet Take 1,000 mg by mouth every 6 (six) hours as needed for Pain.      amLODIPine (NORVASC) 5 MG tablet Take 1 tablet (5 mg total) by mouth once daily.  Qty: 90 tablet, Refills: 3    Comments: .  Associated Diagnoses: Hypertension, essential      aspirin (ECOTRIN) 81 MG EC tablet Take 81 mg by mouth once daily.      atorvastatin (LIPITOR) 20 MG tablet Take 1 tablet (20 mg total) by mouth once daily.  Qty: 90 tablet, Refills: 3    Associated Diagnoses: Hyperlipidemia, unspecified hyperlipidemia type      gabapentin (NEURONTIN) 300 MG capsule Take 1 capsule by mouth twice daily  Qty: 60 capsule, Refills: 1    Associated Diagnoses: Diabetic polyneuropathy associated with type 2 diabetes mellitus      hydrOXYzine HCL (ATARAX) 10 MG Tab TAKE 1 TABLET BY MOUTH NIGHTLY AS NEEDED FOR ITCHING  Qty: 30 tablet, Refills: 0    Associated Diagnoses: Itching      losartan-hydrochlorothiazide 100-12.5 mg (HYZAAR) 100-12.5 mg Tab Take 1 tablet by mouth once daily.  Qty: 90 tablet, Refills: 3    Comments: .  Associated Diagnoses: Hypertension, essential      metFORMIN (GLUCOPHAGE) 500 MG tablet Take 1 tablet (500 mg total) by mouth daily with breakfast.  Qty: 90 tablet, Refills: 3    Associated Diagnoses: Type 2 diabetes mellitus with other specified complication, without long-term current use of insulin      oxyCODONE-acetaminophen (PERCOCET) 5-325 mg per tablet Take 1 tablet by mouth every 4 (four) hours as needed  for Pain.  Qty: 15 tablet, Refills: 0    Comments: Quantity prescribed more than 7 day supply? No  Associated Diagnoses: Inguinal hernia of left side without obstruction or gangrene      urea (CARMOL) 40 % Crea Apply topically once daily.  Qty: 85 g, Refills: 11                 Discharge Diagnosis: Lumbar radiculopathy [M54.16]  Condition on Discharge: Stable with no complications to procedure   Diet on Discharge: Same as before.  Activity: as per instruction sheet.  Discharge to: Home with a responsible adult.  Follow up: 2-4 weeks       Please call my office or pager at [phone] if experienced any weakness or loss of sensation, fever > 101.5, pain uncontrolled with oral medications, persistent nausea/vomiting/or diarrhea, redness or drainage from the incisions, or any other worrisome concerns. If physician on call was not reached or could not communicate with our office for any reason please go to the nearest emergency department

## 2025-04-03 ENCOUNTER — TELEPHONE (OUTPATIENT)
Dept: PODIATRY | Facility: CLINIC | Age: 65
End: 2025-04-03
Payer: MEDICAID

## 2025-04-03 NOTE — TELEPHONE ENCOUNTER
Appointment scheduled for 04/08 at 8:45a.    Patient verbalized understanding.    ----- Message from Summer sent at 4/3/2025 12:10 PM CDT -----  Regarding: appt  Type:  Patient Returning Call  Name of who is calling:pt  What is request in detail:pt is requesting a call back in regards to appt, pt needs an appt to see dr due to foot pain. Please assist.   Can clinic reply by [facility]:no  What number to call back if not in [facility]: [phone]

## 2025-04-08 ENCOUNTER — OFFICE VISIT (OUTPATIENT)
Dept: PODIATRY | Facility: CLINIC | Age: 65
End: 2025-04-08
Payer: MEDICAID

## 2025-04-08 VITALS
HEART RATE: 82 BPM | SYSTOLIC BLOOD PRESSURE: 148 MMHG | WEIGHT: 158.94 LBS | HEIGHT: 67 IN | BODY MASS INDEX: 24.94 KG/M2 | DIASTOLIC BLOOD PRESSURE: 83 MMHG

## 2025-04-08 DIAGNOSIS — L84 CORN OR CALLUS: ICD-10-CM

## 2025-04-08 DIAGNOSIS — E11.42 TYPE 2 DIABETES MELLITUS WITH DIABETIC POLYNEUROPATHY, UNSPECIFIED WHETHER LONG TERM INSULIN USE: Primary | ICD-10-CM

## 2025-04-08 PROCEDURE — 99213 OFFICE O/P EST LOW 20 MIN: CPT | Mod: PBBFAC,PN | Performed by: PODIATRIST

## 2025-04-08 PROCEDURE — 17999 UNLISTD PX SKN MUC MEMB SUBQ: CPT | Mod: CSM,,, | Performed by: PODIATRIST

## 2025-04-08 PROCEDURE — 99999 PR PBB SHADOW E&M-EST. PATIENT-LVL III: CPT | Mod: PBBFAC,,, | Performed by: PODIATRIST

## 2025-04-08 RX ORDER — UREA 40 %
CREAM (GRAM) TOPICAL DAILY
Qty: 85 G | Refills: 11 | Status: SHIPPED | OUTPATIENT
Start: 2025-04-08

## 2025-04-08 NOTE — PROGRESS NOTES
Subjective:      Patient ID: Renny Young is a 64 y.o. male.    Chief Complaint: Foot Pain    Diabetes, increased risk amputation needing evaluation/management/optomization of foot care.    Cc2 painful hard callus left big toe.  Chronic condition present on and off for years.  This exacerbation is Gradual onset, worsening over past several weeks, aggravated by increased weight bearing, shoe gear, pressure.  Prior trimming helped for a few months without lasting resolution.  Not using urea.    OTC pain med not helping.     Chief Complaint   Patient presents with    Foot Pain       Casual shoes both feet    Review of Systems   Constitutional: Negative for chills, diaphoresis, fever, malaise/fatigue and night sweats.   Cardiovascular:  Negative for claudication, cyanosis, leg swelling and syncope.   Skin:  Positive for suspicious lesions. Negative for color change, dry skin, nail changes, rash and unusual hair distribution.   Musculoskeletal:  Negative for falls, joint pain, joint swelling, muscle cramps, muscle weakness and stiffness.   Gastrointestinal:  Negative for constipation, diarrhea, nausea and vomiting.   Neurological:  Negative for brief paralysis, disturbances in coordination, focal weakness, numbness, paresthesias, sensory change and tremors.           Objective:      Physical Exam  Constitutional:       General: He is not in acute distress.     Appearance: He is well-developed. He is not diaphoretic.   Cardiovascular:      Pulses:           Popliteal pulses are 2+ on the right side and 2+ on the left side.        Dorsalis pedis pulses are 2+ on the right side and 2+ on the left side.        Posterior tibial pulses are 2+ on the right side and 2+ on the left side.      Comments: Capillary refill 3 seconds all toes/distal feet, all toes/both feet warm to touch.      Negative lymphadenopathy bilateral popliteal fossa and tarsal tunnel.      Negavie lower extremity edema bilateral.    Musculoskeletal:       Right ankle: No swelling, deformity, ecchymosis or lacerations. Normal range of motion. Normal pulse.      Right Achilles Tendon: Normal. No defects. Zafar's test negative.      Comments: Grossly normal position toes except 5th bilateral.         Lymphadenopathy:      Lower Body: No right inguinal adenopathy. No left inguinal adenopathy.      Comments: Negative lymphadenopathy bilateral popliteal fossa and tarsal tunnel.    Negative lymphangitic streaking bilateral feet/ankles/legs.   Skin:     General: Skin is warm and dry.      Capillary Refill: Capillary refill takes 2 to 3 seconds.      Coloration: Skin is not pale.      Findings: No abrasion, bruising, burn, ecchymosis, erythema, laceration, lesion or rash.      Nails: There is no clubbing.      Comments: Focal hyperkeratotic lesion consisting entirely of hyperkeratotic tissue without open skin, drainage, pus, fluctuance, malodor, or signs of infection medial left hallux ipj, plantar hallux ipj left, lateral left hallux ipj.    Otherwise, Skin is normal age and health appropriate color, turgor, texture, and temperature bilateral lower extremities without ulceration, hyperpigmentation, discoloration, masses nodules or cords palpated.  No ecchymosis, erythema, edema, or cardinal signs of infection bilateral lower extremities.    Toenails normal color and trophic qualities.          Neurological:      Mental Status: He is alert and oriented to person, place, and time.      Sensory: No sensory deficit.      Motor: No tremor, atrophy or abnormal muscle tone.      Gait: Gait normal.      Comments: Negative tinel sign to percussion sural, superficial peroneal, deep peroneal, saphenous, and posterior tibial nerves right and left ankles and feet.       Psychiatric:         Behavior: Behavior is cooperative.               Assessment:       Encounter Diagnoses   Name Primary?    Type 2 diabetes mellitus with diabetic polyneuropathy, unspecified whether long term  insulin use Yes    Corn or callus          Plan:       Renny was seen today for foot pain.    Diagnoses and all orders for this visit:    Type 2 diabetes mellitus with diabetic polyneuropathy, unspecified whether long term insulin use    Corn or callus      I counseled the patient on his conditions, their implications and medical management.    Diabetes, increased risk amputation needing evaluation/management/optomization of foot care.    Discussed conservative treatment with shoes of adequate dimensions, material, and style to alleviate symptoms and delay or prevent surgical intervention.    Inspect feet multiple times daily for signs of occurrence/recurrence ulceration.    Urea      We discussed that with the patient's current diagnoses and findings trimming of calluses not covered service by insurance.  Patient verbalizes understanding and willingness to pay 2 dollars for non-covered foot care.      Non-covered foot care:     With the patient's permission, I debrided hyperkeratotic lesion(s) as above totaling       3         to, not  Including dermis with sterile #15 blade.  Patient tolerated the procedure well and related significant relief.    Declines discussion surgical correction hallux position and exostoses pending maintenance of relief.        No follow-ups on file.

## 2025-04-23 ENCOUNTER — OCHSNER VIRTUAL EMERGENCY DEPARTMENT (OUTPATIENT)
Facility: CLINIC | Age: 65
End: 2025-04-23
Payer: MEDICAID

## 2025-04-23 ENCOUNTER — NURSE TRIAGE (OUTPATIENT)
Dept: ADMINISTRATIVE | Facility: CLINIC | Age: 65
End: 2025-04-23
Payer: MEDICAID

## 2025-04-23 NOTE — TELEPHONE ENCOUNTER
"Speaking with pt who reports that for past week he has been having intermittent SOB. He denies having SOB at this time, but does note SOB with walking.  Dispo is to be seen in office. Now States he is unable to be seen today due to brining mother to 's appointment.    1:44 secure chat to [facility]    Dr. Jones states ok to be seen tomorrow in office.    Unable to schedule appointment for pt, so was advised to be seen at  to get cxr, and EKG. He verbalized understanding, Advised will send message to office as well.      Reason for Disposition   MILD difficulty breathing (e.g., minimal/no SOB at rest, SOB with walking, pulse < 100) of new-onset or worse than normal    Additional Information   Negative: SEVERE difficulty breathing (e.g., struggling for each breath, speaks in single words, pulse > 120)   Negative: Breathing stopped and hasn't returned   Negative: Choking on something   Negative: Bluish (or gray) lips or face   Negative: Difficult to awaken or acting confused (e.g., disoriented, slurred speech)   Negative: Passed out (e.g., fainted, lost consciousness, blacked out and was not responding)   Negative: Wheezing started suddenly after medicine, an allergic food, or bee sting   Negative: Stridor (harsh sound while breathing in)   Negative: Slow, shallow and weak breathing   Negative: Sounds like a life-threatening emergency to the triager   Negative: MODERATE difficulty breathing (e.g., speaks in phrases, SOB even at rest, pulse 100-120) of new-onset or worse than normal   Negative: Oxygen level (e.g., pulse oximetry) 90% or lower   Negative: Wheezing can be heard across the room   Negative: Drooling or spitting out saliva (because can't swallow)   Negative: Any history of prior "blood clot" in leg or lungs   Negative: Illness requiring prolonged bedrest in past month (e.g., immobilization, long hospital stay)   Negative: Hip or leg fracture (broken bone) in past month (or had cast on leg or ankle in " past month)   Negative: Major surgery in the past month   Negative: Long-distance travel in past month (e.g., car, bus, train, plane; with trip lasting 6 or more hours)   Negative: Cancer treatment in past six months (or has cancer now)   Negative: Extra heartbeats, irregular heart beating, or heart is beating very fast (i.e., 'palpitations')   Negative: Fever > 103 F (39.4 C)   Negative: Fever > 101 F (38.3 C) and over 60 years of age   Negative: Fever > 100 F (37.8 C) and bedridden (e.g., nursing home patient, stroke, chronic illness, recovering from surgery)   Negative: Fever > 100 F (37.8 C) and diabetes mellitus or weak immune system (e.g., HIV positive, cancer chemo, splenectomy, organ transplant, chronic steroids)   Negative: Periods where breathing stops and then resumes normally and bedridden (e.g., nursing home patient, CVA)   Negative: Pregnant or postpartum (from 0 to 6 weeks after delivery)   Negative: Patient sounds very sick or weak to the triager    Protocols used: Breathing Difficulty-A-[place]

## 2025-04-23 NOTE — PLAN OF CARE-OVED
[facility] Emergency Department Plan of Care Note  Referral Source: Nurse On-Call                               Chief Complaint   Patient presents with    Shortness of Breath       Recommendation: Urgent Care                            64-year-old male with history of HTN, DM contents nurse on-call for recommendations about [facility] for the past week.  No other associated symptoms such as chest pain, wheezing, cough, leg swelling.  Per chart review patient had normal stress echo in 2022, is also active smoker so unclear whether he may have undiagnosed COPD.  No PCP appointments available urgently, reasonable to start an urgent care for chest x-ray and EKG for initial evaluation, can escalate to ED for cardiac workup if needed.

## 2025-04-25 ENCOUNTER — PATIENT OUTREACH (OUTPATIENT)
Facility: OTHER | Age: 65
End: 2025-04-25
Payer: MEDICAID

## 2025-04-25 NOTE — PROGRESS NOTES
Spoke with patient to assess if he received the care he was needing after speaking with [facility] RN on 4/23/2025.  Patient reported he did not visit an urgent care clinic.  Patient requesting PCP appointment to be scheduled.  Patient's PCP appointment scheduled for 4/29/2025 at 2:45 pm with Danya Reese DO.  Appointment reminder set and patient appreciative.

## 2025-04-28 ENCOUNTER — PATIENT OUTREACH (OUTPATIENT)
Facility: OTHER | Age: 65
End: 2025-04-28
Payer: MEDICAID

## 2025-04-28 NOTE — PROGRESS NOTES
Patient outreached to remind about upcoming appointment on 4/29/2025 at 2:45 pm with Danya Reese DO, but unable to reach patient.  Left patient a voicemail with appointment details.

## 2025-05-13 DIAGNOSIS — E11.42 DIABETIC POLYNEUROPATHY ASSOCIATED WITH TYPE 2 DIABETES MELLITUS: ICD-10-CM

## 2025-05-13 RX ORDER — GABAPENTIN 300 MG/1
300 CAPSULE ORAL 2 TIMES DAILY
Qty: 60 CAPSULE | Refills: 0 | Status: SHIPPED | OUTPATIENT
Start: 2025-05-13

## 2025-05-13 NOTE — TELEPHONE ENCOUNTER
----- Message from [employer] sent at 5/13/2025  2:11 PM CDT -----  Name of Who is Calling: ANDRES MONTERROSO [[id number]]What is the request in detail: Pt is requesting a call back to get rescheduled for follow up visit. [facility] is not populating a schedule. Please assist. Can the clinic reply by [facility]: NoWhat Number to Call Back if not in [facility]:  [phone]

## 2025-05-14 ENCOUNTER — OFFICE VISIT (OUTPATIENT)
Dept: PAIN MEDICINE | Facility: CLINIC | Age: 65
End: 2025-05-14
Payer: MEDICAID

## 2025-05-14 VITALS
BODY MASS INDEX: 23.94 KG/M2 | HEART RATE: 71 BPM | RESPIRATION RATE: 19 BRPM | SYSTOLIC BLOOD PRESSURE: 130 MMHG | OXYGEN SATURATION: 100 % | WEIGHT: 152.56 LBS | DIASTOLIC BLOOD PRESSURE: 74 MMHG | HEIGHT: 67 IN

## 2025-05-14 DIAGNOSIS — E11.9 TYPE 2 DIABETES MELLITUS WITHOUT COMPLICATION: ICD-10-CM

## 2025-05-14 DIAGNOSIS — M47.817 FACET ARTHROPATHY, LUMBOSACRAL: ICD-10-CM

## 2025-05-14 DIAGNOSIS — M47.819 FACET ARTHROPATHY: ICD-10-CM

## 2025-05-14 DIAGNOSIS — M47.816 LUMBAR SPONDYLOSIS: Primary | ICD-10-CM

## 2025-05-14 DIAGNOSIS — M51.362 DEGENERATION OF INTERVERTEBRAL DISC OF LUMBAR REGION WITH DISCOGENIC BACK PAIN AND LOWER EXTREMITY PAIN: ICD-10-CM

## 2025-05-14 DIAGNOSIS — M54.16 LUMBAR RADICULOPATHY: ICD-10-CM

## 2025-05-14 DIAGNOSIS — G89.4 CHRONIC PAIN SYNDROME: ICD-10-CM

## 2025-05-14 PROCEDURE — 99999 PR PBB SHADOW E&M-EST. PATIENT-LVL IV: CPT | Mod: PBBFAC,,,

## 2025-05-14 PROCEDURE — 3075F SYST BP GE 130 - 139MM HG: CPT | Mod: CPTII,,,

## 2025-05-14 PROCEDURE — 3078F DIAST BP <80 MM HG: CPT | Mod: CPTII,,,

## 2025-05-14 PROCEDURE — 1160F RVW MEDS BY RX/DR IN RCRD: CPT | Mod: CPTII,,,

## 2025-05-14 PROCEDURE — 99214 OFFICE O/P EST MOD 30 MIN: CPT | Mod: S$PBB,,,

## 2025-05-14 PROCEDURE — 99214 OFFICE O/P EST MOD 30 MIN: CPT | Mod: PBBFAC

## 2025-05-14 PROCEDURE — 1159F MED LIST DOCD IN RCRD: CPT | Mod: CPTII,,,

## 2025-05-14 PROCEDURE — 3008F BODY MASS INDEX DOCD: CPT | Mod: CPTII,,,

## 2025-05-14 NOTE — H&P (VIEW-ONLY)
Interventional Pain Management - Established Visit  Follow-Up     Referring Physician: No ref. provider found    Chief Complaint:   Chief Complaint   Patient presents with    Low-back Pain          SUBJECTIVE: Disclaimer: This note has been generated using voice-recognition software. There may be typographical errors that have been missed during proof-reading    Interval History 5/14/2025:  Renny Young returns for follow-up after Left L5/S1 and S1 TFESI on 4/1/2025. He reports 80% relief of left leg pain. He continues with axial lower back pain left>right. His back pain is worse with walking and standing. The pain is aching in the morning. He has had bilateral L3, L4, L5 RFA in the past with 80% relief for over 12 months. He continues a physical therapy-directed home exercise program 2-3 times per week. He takes gabapentin 300 mg BID with benefit. He denies any perceived side effects. He denies recent health changes. He denies recent falls or trauma. He denies new onset fever/night sweats, urinary incontinence, bowel incontinence, significant weight changes, significant motor weakness or changes, or loss of sensations. His back pain today is 5/10.      Interval History 3/18/2025:  The patient returns to clinic today for follow up of back pain. He reports worsened low back pain that radiates into the posterior aspect of his left leg to under his foot. He denies any right leg pain. His pain is worse with prolonged walking and activity. He is performing a home exercise routine. He is taking Gabapentin. He denies any other health changes. His pain today is 7/10.    Interval History 2/13/2025:  The patient returns to clinic today for follow up of back pain. He is s/p left SI joint injection on 1/30/2025. He reports limited relief. He continues to report left sided low back and buttock pain. He denies any radicular leg pain at this time. His pain is worse with prolonged walking and activity. He denies any other health  changes. His pain today is 6/10.    Interval History 1/15/2025:  The patient returns to clinic today for follow up of back pain. He is s/p bilateral L5/S1 TF JEAN MARIE on 12/23/2024. He reports 50% relief. He continues to report left sided low back and buttock pain. He denies any radicular leg pain. His pain is worse with prolonged sitting and walking. He does endorse morning stiffness. He denies any other health changes. His pain today is 7/10.    Interval History 12/3/2024:  The patient is here to discuss progressively worsening lower back pain. Last received his L5/S1 TF JEAN MARIE on 07/23/2024 so he thinks its time for another injection. Denies any motor weakness or loss of sensation. Recently had hernia repair on 11/18/2024.    Interval History 6/26/2023:  The patient is here to discuss worsened lower back and leg pain. I last saw him in September at which time he underwent bilateral L5/S1 TF JEAN MARIE on 9/26/22 with 90% relief for 8 months. He says that his pain was minimal during that time. He was able to work without pain disturbance. He has continues with PT exercises 3 days per week for the past 9 months. His pain has been worsening over the past month. He would like to repeat the procedure. He also says that his wife passed away since previous encounter due to long-standing history of sarcoidosis. He has understandably been upset about this. His pain today is 5/10.     Interval History 9/13/2022:  Renny is here to discuss worsened back and leg pain. The pain radiates from the lower back into the back of both legs to anterior shin and great toe. He has associated numbness. He is diabetic but states that he has not had major problems with his sugars recently. No recent trauma. He previously underwent right L5/S1 and S1 TF JEAN MARIE with 80% relief for about 2 months. During that time he was able to ambulate and work without limitation by pain. Today, he again reports severe pain which is affecting his daily activities. He has  been in PT exercises for the past 15 weeks without much benefit of symptoms. He has tried ice and heat without benefit. He continues to take Gabapentin which provides some benefit. His overall pain today is 8/10.    Interval History 6/7/2022:  The patient returns to clinic today for follow up of low back pain. He reports increased low back pain over the last two weeks. He reports low back pain that radiates into the posterior aspect of his right leg to the bottom of his foot. He denies any left leg pain. His pain is worse with prolonged standing and walking. He reports that previous TF JEAN MARIE in 2021 provided 80% relief. He did notice that it took 3-4 weeks to see full benefit. He continues to take Gabapentin. He continues to perform a home exercise routine as prescribed. He denies any weakness. He denies any other health changes. His pain today is 5/10.    Interval history 07/02/2021:  The patient presents for follow-up lower back pain and right leg radiculopathy.  He is status post repeat right-sided TF JEAN MARIE to L5/S1&S1.  He has difficulty quantifying pain relief but states that normally his leg pain is 100% resolved and this time pain persists.  He is not have any recent images.  His medication regimen includes gabapentin 300 mg states this is mildly beneficial or denies any adverse side effects of medication.  Denies any focal loss of bowel, bladder or saddle paresthesias concerning for cauda equina.    Interval history 05/27/2021:  The patient presents for follow-up of lower back pain and bilateral leg radiculopathy.  He has had prior ILESI and TFESI both with 80%  benefit lasting approximately 9 months and pain just now returning. Pain is worse to right side at this time.  He denies any new areas of pain or neurological changes.The patient denies myelopathic symptoms such as handwriting changes or difficulty with buttons/coins/keys. Denies perineal paresthesias, bowel/bladder dysfunction.    Interval History  9/1/2020:  The patient is here for follow up of back and leg pain.  He is now s/p L5/S1 IL JEAN MARIE with 80% of back pain.  However, he is having significant right leg pain, mainly down the buttock and posterior calf.  He had benefit with right L5/S1 and S1 TF JEAN MARIE in the past and would like to repeat.  He has associated numbness to right calf, worse with walking.  He does have some benefit with Gabapentin.  He had a recent Covid test for work which was negative. His pain today is 5/10.    Interval History 6/9/2020:  The patient is here for follow up of chronic lower back pain.  We have not seen the patient since last year.  He underwent an epidural at that time and says that his pain has been very mild until a few weeks ago.  He is having pain across lower back with radiation into the legs, mainly on the left.  He continues to take gabapentin with some benefit.  The pain bothers him the most with walking and activity.  He had pacemaker replacement on 5/6/20.  He is not currently on blood thinners.  His pain today is 5/10.    Interval History 7/30/2019:  The patient returns for follow up of back pain.  He is s/p repeat left then right L3,4,5 RFAs completed on 6/24/19 with about 80% relief.  His back pain is mild.  He has had increased leg pain recently, worse on the left side.  Previous leg pain was relieved with JEAN MARIE last year.  He would like to repeat this.  His leg pain bothers him mainly at night when trying to sleep.  He stopped Gabapentin when he was not having leg pain but recently restarted.  He is unable to take at night.  His pain today is 6/10.    Interval History 5/14/2019:  The patient returns for follow up.  He previously had benefit with JEAN MARIE for leg pain and RFAs for back pain.  He is having some back pain that has been worsening over the past couple of weeks.  He has significant pain in the morning.  He has some improvement when he moves around.  He says that cold air aggravates his pain.  He stopped  Gabapentin last year when his leg pain improved.  He is not having much leg pain now.  His pain today is 6/10.    Interval History 2/7/2019:  The patient presents for check up of chronic back pain.  He reports doing well since last visit.  He feels that last JEAN MARIE is still providing him benefit.  He has not had any leg pain.  He does still have back pain but states that it is tolerable.  His morning pain is much improved from RFAs last year.  He continues to work and is active.  His pain today is 5/10.    Interval History 12/6/2018:  The patient presents today for follow up.  He continues with pain to the lower back.  He is not having leg pain at this time.  He had some relief with RFAs with the past.  He stopped Gabapentin when his leg pain improved.  He takes Tylenol sparingly with some benefit.  His pain today is 5/10.  The patient denies any bowel or bladder incontinence or signs of saddle paresthesia.  The patient denies any major medical changes since last office visit.    Interval History 10/25/2018:  The patient returns for follow up of back and leg pain.  He is s/p L5/S1 IL JEAN MARIE on 10/11/18 with 80% pain relief for his legs.  He has had increased lower back pain over the past few days which he attributes to weather changes.  He describes it as aching.  He has not been stretching.  He does walk a lot for work.  He cannot take oral NSAIDs due to pacemaker placement.  His pain today is 8/10.     Interval History 9/11/2018:  The patient presents for procedure follow up appointment.  He is s/p left then right L3,4,5 RFA completed on 8/14/18 with 80% pain relief initially.  He has had a little more pain over the past week.  He is now having intermittent radiation into the back of both legs, left greater than right.  He previously had benefit with right sided TF JEAN MARIE.  He is still taking gabapentin.  His pain today is 5/10.    Interval History 7/25/2018:  The patient returns today for follow up of back pain.  He is  s/p bilateral L3,4,5 MBB on 7/5/18 with 100% relief for 2 days.  He previously had benefit with right TF JEAN MARIE for leg pain.  He has not had right leg pain since the epidural.  However, he is reporting lateral left leg pain that has progressed over the past couple of weeks.  He continues to be active and works.  His pain today is 5/10.    Interval History 6/20/2018:  The patient returns today for follow up of lower back and right leg pain.  He is s/p right L5 and S1 TF JEAN MARIE on 6/5/18 with 100% relief of right leg pain.  He has not had any leg pain since the procedure.  He continues to report pain across the lower back.  This is always worse first thing in the morning.  He states that this feels aching and throbbing in nature.  He did have recent lumbar CT scan.  He would like to discuss further procedures.  He continues to work and be active.  His pain today is 5/10.    Interval History 5/22/2018:  The patient returns for follow up of back pain.  He is having radiation down there back of the right leg to the posterior calf.  The back pain is most severe in the morning and he states that this feels like a stiffness.  This improves when he walks.  He has severe leg pain that is intermittent, mainly with activity.  This is his biggest complaint.  He did complete the medrol dose pack recently with limited improvement.  He had XRAYs which show severe loss of disc space at L5/S1.  He has not had a CT scan.  He is taking Gabapentin 300 mg at bedtime.  It is written BID but it makes him drowsy during the day.  He has some benefit with Aleve but has been told to avoid NSAIDs due to history of pacemaker placement.  His pain today is 5/10.     Initial encounter:    Renny Young presents to the clinic for the evaluation of lower back and right leg pain. The pain started two months ago and symptoms have been worsening.    Brief history:    Pain Description:    The pain is located in the lower back and right leg area in the L5/S1  distribution.      At BEST  5/10     At WORST  7/10 on the WORST day.      On average pain is rated as 5/10.     Today the pain is rated as 5/10    The pain is described as aching      Symptoms interfere with daily activity.     Exacerbating factors: Standing and Morning.      Mitigating factors medications.     Patient denies night fever/night sweats, urinary incontinence, bowel incontinence, significant weight loss, significant motor weakness and loss of sensations.  Patient denies any suicidal or homicidal ideations    Pain Medications:  Current:  OTC Tylenol PRN  Gabapentin 300 mg BID    Tried in Past:  NSAIDs - Aleve  TCA -Never  SNRI -Never  Anti-convulsants - Gabapentin   Muscle Relaxants -Never  Opioids-Never    Physical Therapy/Home Exercise: yes, 2-3 times per week (>6 weeks within the last 6 months)     report:  Reviewed and consistent with medication use as prescribed.    Pain Procedures:   6/5/18 Right L5 and S1 TF JEAN MARIE- significant relief  7/5/18 Bilateral L3,4,5 MBB- 100% relief for 2 days  7/31/18 Left L3,4,5 RFA- 80% relief  8/14/18 Right L3,4,5 RFA- 80% relief  10/11/18 L5/S1 IL JEAN MARIE- 80% relief  6/11/19 Left L3,4,5 RFA- 80% relief  6/25/19 Right L3,4,5 RFA- 80% relief  8/13/19 L5/S1 IL JEAN MARIE- 90% relief for 9 months  6/16/20 L5/S1 IL JEAN MARIE- 80% relief of back pain  6/15/2021- Right L5/S1 and S1 TF JEAN MARIE  6/20/22 Right L5 and S1 TF JEAN MARIE- 80% relief for 2 months  9/26/22 Bilateral L5/S1 TF JEAN MARIE- 90% relief for 9 months  7/23/24 Bialteral L5/S1 TF JEAN MARIE- 80% relief  12/23/2024- Bilateral L5/S1 TF JEAN MARIE  1/30/2025- Left SI joint injection  4/1/2025 - left L5/S1 and S1 TFESI - 80% relief of leg pain    Chiropractor -never  Acupuncture - never  TENS unit -never  Spinal decompression -never  Joint replacement -never    Imaging: PACEMAKER IN PLACE    CT Lumbar Spine 7/9/2021:  COMPARISON:  Lumbar spine CT May 28, 2018     FINDINGS:  Mild levocurvature of the lumbar spine.  No listhesis.  There is no acute fracture.   The vertebral bodies are normal in height without compression fractures. Posterior elements are intact. There is unchanged severe disc height loss at the L5-S1 level with associated sclerosis and subchondral cystic change within the endplates.  Mild atherosclerotic calcification within the abdominal aorta which is not aneurysmal.  Otherwise, no soft tissue abnormality identified.     T12-L1: The disc is normal in configuration.  There is no facet arthropathy.  There is no neural foraminal stenosis.  There is no spinal canal stenosis.     L1-L2: The disc is normal in configuration.  There is no facet arthropathy.  There is no neuroforaminal stenosis.  There is no spinal canal stenosis.     L2-L3: The disc is normal in configuration.  There is no facet arthropathy.  There is no neuroforaminal stenosis.  There is no spinal canal stenosis.     L3-L4: Minimal diffuse disc bulge.  Mild bilateral facet arthropathy.  There is no neuroforaminal stenosis.  There is no spinal canal stenosis.     L4-L5: There is a diffuse disc bulge.  Mild-to-moderate bilateral facet arthropathy.  There is no neuroforaminal stenosis.  There is no spinal canal stenosis.     L5-S1: There is a circumferential disc osteophyte complex secondary to the severe degenerative disc disease at this level.  Mild to moderate bilateral facet arthropathy.  Unchanged mild bilateral neural foraminal stenosis secondary to diffuse disc osteophyte complex as well as the facet arthropathy.  There is no spinal canal stenosis.     Impression:     Inferior lumbar spine degenerative changes worst at L5-S1 which result in mild bilateral neural foraminal stenosis at this level.  Overall, findings are not significantly changed compared to prior study from May 2018.        Past Medical History:   Diagnosis Date    Colon polyp     Diabetes mellitus, type 2     Full dentures     Hyperlipidemia     Hypertension     Pacemaker     Pacemaker at end of battery life 05/06/2020     Smoker 06/10/2015    Offered cessation program and declined    Wears prescription eyeglasses      Past Surgical History:   Procedure Laterality Date    COLONOSCOPY N/A 2/13/2017    Procedure: COLONOSCOPY;  Surgeon: NILDA Juares MD;  Location: [facility] ENDO (4TH FLR);  Service: Endoscopy;  Laterality: N/A;  Do not cancel this order. Patient has Pacemaker in place.     COLONOSCOPY, SCREENING, HIGH RISK PATIENT N/A 1/9/2025    Procedure: COLONOSCOPY, SCREENING, HIGH RISK PATIENT;  Surgeon: Gene Duran MD;  Location: [facility] ENDO (4TH FLR);  Service: Endoscopy;  Laterality: N/A;  ref by / [facility] mailed-RB  12/31-pre call complete-tb    EPIDURAL STEROID INJECTION N/A 8/13/2019    Procedure: INJECTION, STEROID, EPIDURAL IL, L5/S1;  Surgeon: Josue Paredes MD;  Location: [facility] PAIN MGT;  Service: Pain Management;  Laterality: N/A;  IL JEAN MARIE L5/S1    EPIDURAL STEROID INJECTION N/A 6/16/2020    Procedure: INJECTION, STEROID, EPIDURAL, L5-S1 IL add on @ 2 ok by  Asia;  Surgeon: Josue Paredes MD;  Location: [facility] PAIN MGT;  Service: Pain Management;  Laterality: N/A;    HERNIA REPAIR      INJECTION OF ANESTHETIC AGENT AROUND NERVE Bilateral 7/5/2018    Procedure: BLOCK, NERVE;  Surgeon: Krystal Mtz MD;  Location: [facility] PAIN MGT;  Service: Pain Management;  Laterality: Bilateral;  Lumbar Bilateral L3-L4-L5 Medial Branch Block    INJECTION, SACROILIAC JOINT Left 1/30/2025    Procedure: INJECTION,SACROILIAC JOINT LEFT;  Surgeon: Krystal Mtz MD;  Location: [facility] PAIN MGT;  Service: Pain Management;  Laterality: Left;  2 WK F/U ROSCOE    RADIOFREQUENCY ABLATION Left 6/11/2019    Procedure: RADIOFREQUENCY ABLATION, L3-L4-L5 MEDIAL BRANCH   1 OF 2;  Surgeon: Josue Paredes MD;  Location: [facility] PAIN MGT;  Service: Pain Management;  Laterality: Left;    RADIOFREQUENCY ABLATION Right 6/25/2019    Procedure: RADIOFREQUENCY ABLATION, L3-L4-L5 MEDIAL BRANCH JING 2 OF 2;  Surgeon: Josue Paredes MD;  Location:  [facility] PAIN MGT;  Service: Pain Management;  Laterality: Right;    REPAIR, HERNIA, INGUINAL, WITHOUT HISTORY OF PRIOR REPAIR, AGE 5 YEARS OR OLDER Left 11/18/2024    Procedure: REPAIR, HERNIA, INGUINAL, WITHOUT HISTORY OF PRIOR REPAIR, AGE 5 YEARS OR OLDER;  Surgeon: Titus Craft MD;  Location: [facility] OR;  Service: General;  Laterality: Left;    REPLACEMENT OF PACEMAKER GENERATOR Left 5/6/2020    Procedure: REPLACEMENT, PULSE GENERATOR, CARDIAC PACEMAKER;  Surgeon: Bradford Spence MD;  Location: [facility] EP LAB;  Service: Cardiology;  Laterality: Left;  EOL/LEAD Mlfx, Gen Change, Poss RA lead rev, SJM, MAC, GP, 3 PREP    REVISION OF PROCEDURE INVOLVING PACEMAKER LEAD Left 5/6/2020    Procedure: REVISION, ELECTRODE LEAD, CARDIAC PACEMAKER;  Surgeon: Bradford Spence MD;  Location: [facility] EP LAB;  Service: Cardiology;  Laterality: Left;    TRANSFORAMINAL EPIDURAL INJECTION OF STEROID Right 6/5/2018    Procedure: INJECTION-STEROID-EPIDURAL-TRANSFORAMINAL;  Surgeon: Josue Paredes MD;  Location: [facility] PAIN MGT;  Service: Pain Management;  Laterality: Right;  LUMBAR RIGHT L5 AND S1 TRANSFORAMINL JEAN MARIE  [id number]    W/ SEDATION     TRANSFORAMINAL EPIDURAL INJECTION OF STEROID Right 9/10/2020    Procedure: INJECTION, STEROID, EPIDURAL, TRANSFORAMINAL APPROACH, L5-S1 AND S1;  Surgeon: Josue Paredes MD;  Location: [facility] PAIN MGT;  Service: Pain Management;  Laterality: Right;    TRANSFORAMINAL EPIDURAL INJECTION OF STEROID Right 6/15/2021    Procedure: INJECTION, STEROID, EPIDURAL, TRANSFORAMINAL APPROACH L5-S1 AND S1 need consent;  Surgeon: Josue Paredes MD;  Location: [facility] PAIN MGT;  Service: Pain Management;  Laterality: Right;    TRANSFORAMINAL EPIDURAL INJECTION OF STEROID Right 6/20/2022    Procedure: INJECTION, STEROID, EPIDURAL, TRANSFORAMINAL APPROACH, RIGHT L5-S1 AND S1 CONTRAST;  Surgeon: Krystal Mtz MD;  Location: [facility] PAIN MGT;  Service: Pain Management;  Laterality: Right;    TRANSFORAMINAL EPIDURAL INJECTION OF  STEROID Bilateral 9/26/2022    Procedure: INJECTION, STEROID, EPIDURAL, TRANSFORAMINAL APPROACH, BILATERAL L5-S1 CONTRAST;  Surgeon: Krystal Mtz MD;  Location: [facility] PAIN MGT;  Service: Pain Management;  Laterality: Bilateral;    TRANSFORAMINAL EPIDURAL INJECTION OF STEROID Bilateral 7/13/2023    Procedure: INJECTION, STEROID, EPIDURAL, TRANSFORAMINAL APPROACH, L5-S1 BILATERAL;  Surgeon: Krystal Mtz MD;  Location: [facility] PAIN MGT;  Service: Pain Management;  Laterality: Bilateral;    TRANSFORAMINAL EPIDURAL INJECTION OF STEROID Bilateral 12/23/2024    Procedure: LUMBAR TRANSFORAMINAL BILATERAL L5/S1 *ASPIRIN OTC8 HOLD FOR 5 DAYS *HERNIA SX CLEARANCE IN CHART*;  Surgeon: Krystal Mtz MD;  Location: [facility] PAIN MGT;  Service: Pain Management;  Laterality: Bilateral;    TRANSFORAMINAL EPIDURAL INJECTION OF STEROID Left 4/1/2025    Procedure: LUMBAR TRANSFORAMINAL LEFT L5/S1 AND S1 *ASPIRIN OTC* HOLD FOR 5 DAYS;  Surgeon: Krystal Mtz MD;  Location: [facility] PAIN MGT;  Service: Pain Management;  Laterality: Left;  2 WK F/U VENICE     Social History     Socioeconomic History    Marital status:     Number of children: 3   Occupational History    Occupation:    Tobacco Use    Smoking status: Every Day     Current packs/day: 0.75     Average packs/day: 0.8 packs/day for 47.4 years (35.5 ttl pk-yrs)     Types: Cigarettes     Start date: 1978     Passive exposure: Never    Smokeless tobacco: Never   Substance and Sexual Activity    Alcohol use: Yes     Comment: Beer- Socially    Drug use: No    Sexual activity: Yes     Family History   Problem Relation Name Age of Onset    Diabetes Mother      Diabetes Father      Kidney disease Father      Melanoma Neg Hx         Review of patient's allergies indicates:  No Known Allergies    Current Outpatient Medications   Medication Sig    acetaminophen (TYLENOL) 500 MG tablet Take 1,000 mg by mouth every 6 (six) hours as needed for Pain.    amLODIPine (NORVASC) 5 MG tablet Take 1  "tablet (5 mg total) by mouth once daily.    aspirin (ECOTRIN) 81 MG EC tablet Take 81 mg by mouth once daily.    atorvastatin (LIPITOR) 20 MG tablet Take 1 tablet (20 mg total) by mouth once daily.    gabapentin (NEURONTIN) 300 MG capsule Take 1 capsule by mouth twice daily    hydrOXYzine HCL (ATARAX) 10 MG Tab TAKE 1 TABLET BY MOUTH NIGHTLY AS NEEDED FOR ITCHING    losartan-hydrochlorothiazide 100-12.5 mg (HYZAAR) 100-12.5 mg Tab Take 1 tablet by mouth once daily.    metFORMIN (GLUCOPHAGE) 500 MG tablet Take 1 tablet (500 mg total) by mouth daily with breakfast.    oxyCODONE-acetaminophen (PERCOCET) 5-325 mg per tablet Take 1 tablet by mouth every 4 (four) hours as needed for Pain.    urea (CARMOL) 40 % Crea Apply topically once daily.     No current facility-administered medications for this visit.       REVIEW OF SYSTEMS:    GENERAL:  No weight loss, malaise or fevers.  HEENT:   No recent changes in vision or hearing  NECK:  Negative for lumps, no difficulty with swallowing.  RESPIRATORY:  Negative for cough, wheezing or shortness of breath, patient denies any recent URI.  CARDIOVASCULAR:  Negative for chest pain, leg swelling or palpitations. Pacemaker for SSS.  GI:  Negative for abdominal discomfort, blood in stools or black stools or change in bowel habits.  MUSCULOSKELETAL:  See HPI.  SKIN:  Negative for lesions, rash, and itching.  PSYCH:  No mood disorder or recent psychosocial stressors.  Patients sleep is not disturbed secondary to pain.  HEMATOLOGY/LYMPHOLOGY:  Negative for prolonged bleeding, bruising easily or swollen nodes.  Patient is not currently taking any anti-coagulants  ENDO: DM2 on metformin  NEURO:   No history of headaches, syncope, paralysis, seizures or tremors.  All other reviewed and negative other than HPI.    OBJECTIVE:    /74 (BP Location: Right arm, Patient Position: Sitting)   Pulse 71   Resp 19   Ht 5' 7" (1.702 m)   Wt 69.2 kg (152 lb 8.9 oz)   SpO2 100%   BMI 23.89 " kg/m²     PHYSICAL EXAMINATION:     GENERAL: Well appearing, in no acute distress, alert and oriented x3.  PSYCH:  Mood and affect appropriate.  SKIN: Skin color, texture, turgor normal, no rashes or lesions.  HEAD/FACE:  Normocephalic, atraumatic. Cranial nerves grossly intact.  CV: RRR with palpation of the radial artery.  PULM: No evidence of respiratory difficulty, symmetric chest rise.  BACK: Straight leg raising in the sitting position is negative for radicular pain.  There is pain with palpation over the facet joints of the lumbar spine bilaterally. Limited ROM with pain on extension. Mildly positive facet loading bilaterally. No pain to palpation to bilateral SI joints.   EXTREMITIES: No deformities, edema, or skin discoloration. Good capillary refill.  MUSCULOSKELETAL: Hip, Knee provocative maneuvers are negative. Bilateral upper and lower extremity strength is normal and symmetric.  No atrophy or tone abnormalities are noted.   NEURO: No loss of sensation noted.    GAIT: Antalgic- ambulates without assistance.      ASSESSMENT: 64 y.o. year old male with lower back pain, consistent with the following diagnoses:    Encounter Diagnoses   Name Primary?    Lumbar spondylosis Yes    Facet arthropathy     Facet arthropathy, lumbosacral     Chronic pain syndrome     Lumbar radiculopathy     Degeneration of intervertebral disc of lumbar region with discogenic back pain and lower extremity pain        PLAN:     - Previous imaging was reviewed and discussed with the patient today. Labs reviewed.     - He is s/p left L5/S1 and S1 TF JEAN MARIE with 80% relief of leg pain    - Patient's pain is axial in nature without radiation. He has had previous good benefit from bilateral lumbar RFA. Procedure order placed for bilateral L3, L4, L5 MBB x 2 with progression to RFA at these levels if significant short-term relief from MBB. Patient will keep a pain diary. He has participated in a home exercise program for over 6 weeks within  the last 6 months with limited benefit.     - Continue current medications.     - The patient will continue a home exercise routine to help with pain and strengthening.    - RTC 6 weeks after RFA if completed.     The above plan and management options were discussed at length with patient. Patient is in agreement with the above and verbalized understanding.     Caitlin Galicia NP   05/14/2025

## 2025-05-15 DIAGNOSIS — M47.816 LUMBAR SPONDYLOSIS: Primary | ICD-10-CM

## 2025-05-15 DIAGNOSIS — L29.9 ITCHING: ICD-10-CM

## 2025-05-15 NOTE — TELEPHONE ENCOUNTER
Refill Routing Note   Medication(s) are not appropriate for processing by [facility] for the following reason(s):        Outside of protocol    [facility] action(s):  Route             Appointments  past 12m or future 3m with PCP    Date Provider   Last Visit   11/7/2024 Jarvis Washington MD   Next Visit   Visit date not found Jarvis Washington MD   ED visits in past 90 days: 0        Note composed:12:04 PM 05/15/2025

## 2025-05-17 RX ORDER — HYDROXYZINE HYDROCHLORIDE 10 MG/1
10 TABLET, FILM COATED ORAL NIGHTLY PRN
Qty: 30 TABLET | Refills: 0 | Status: SHIPPED | OUTPATIENT
Start: 2025-05-17

## 2025-06-04 ENCOUNTER — HOSPITAL ENCOUNTER (OUTPATIENT)
Facility: OTHER | Age: 65
Discharge: HOME | End: 2025-06-04
Attending: ANESTHESIOLOGY | Admitting: ANESTHESIOLOGY
Payer: MEDICAID

## 2025-06-04 VITALS
SYSTOLIC BLOOD PRESSURE: 167 MMHG | HEIGHT: 66 IN | DIASTOLIC BLOOD PRESSURE: 89 MMHG | HEART RATE: 61 BPM | BODY MASS INDEX: 24.91 KG/M2 | RESPIRATION RATE: 18 BRPM | TEMPERATURE: 99 F | OXYGEN SATURATION: 98 % | WEIGHT: 155 LBS

## 2025-06-04 DIAGNOSIS — M47.816 LUMBAR SPONDYLOSIS: Primary | ICD-10-CM

## 2025-06-04 DIAGNOSIS — G89.29 CHRONIC PAIN: ICD-10-CM

## 2025-06-04 LAB — POCT GLUCOSE: 106 MG/DL (ref 70–110)

## 2025-06-04 PROCEDURE — 64493 INJ PARAVERT F JNT L/S 1 LEV: CPT | Mod: 50 | Performed by: ANESTHESIOLOGY

## 2025-06-04 PROCEDURE — 64493 INJ PARAVERT F JNT L/S 1 LEV: CPT | Mod: 50,KX,, | Performed by: ANESTHESIOLOGY

## 2025-06-04 PROCEDURE — 64494 INJ PARAVERT F JNT L/S 2 LEV: CPT | Mod: 50,KX,, | Performed by: ANESTHESIOLOGY

## 2025-06-04 PROCEDURE — 63600175 PHARM REV CODE 636 W HCPCS: Performed by: ANESTHESIOLOGY

## 2025-06-04 PROCEDURE — 64494 INJ PARAVERT F JNT L/S 2 LEV: CPT | Mod: 50 | Performed by: ANESTHESIOLOGY

## 2025-06-04 RX ORDER — SODIUM CHLORIDE 9 MG/ML
INJECTION, SOLUTION INTRAVENOUS CONTINUOUS
Status: DISCONTINUED | OUTPATIENT
Start: 2025-06-04 | End: 2025-06-04 | Stop reason: HOSPADM

## 2025-06-04 RX ORDER — BUPIVACAINE HYDROCHLORIDE 2.5 MG/ML
INJECTION, SOLUTION EPIDURAL; INFILTRATION; INTRACAUDAL; PERINEURAL
Status: DISCONTINUED | OUTPATIENT
Start: 2025-06-04 | End: 2025-06-04 | Stop reason: HOSPADM

## 2025-06-04 RX ORDER — LIDOCAINE HYDROCHLORIDE 20 MG/ML
INJECTION, SOLUTION INFILTRATION; PERINEURAL
Status: DISCONTINUED | OUTPATIENT
Start: 2025-06-04 | End: 2025-06-04 | Stop reason: HOSPADM

## 2025-06-04 NOTE — DISCHARGE SUMMARY
Discharge Note  Short Stay      SUMMARY     Admit Date: 6/4/2025    Attending Physician: Cornelia Linares      Discharge Physician: Cornelia Linares      Discharge Date: 6/4/2025 11:32 AM    Procedure(s) (LRB):  BLOCK, NERVE BILATERAL L3, L4, L5 MEDIAL BRANCH 1 OF 2 (Bilateral)    Final Diagnosis: Lumbar spondylosis [M47.816]    Disposition: Home or self care    Patient Instructions:   Current Discharge Medication List        CONTINUE these medications which have NOT CHANGED    Details   aspirin (ECOTRIN) 81 MG EC tablet Take 81 mg by mouth once daily.      acetaminophen (TYLENOL) 500 MG tablet Take 1,000 mg by mouth every 6 (six) hours as needed for Pain.      amLODIPine (NORVASC) 5 MG tablet Take 1 tablet (5 mg total) by mouth once daily.  Qty: 90 tablet, Refills: 3    Comments: .  Associated Diagnoses: Hypertension, essential      atorvastatin (LIPITOR) 20 MG tablet Take 1 tablet (20 mg total) by mouth once daily.  Qty: 90 tablet, Refills: 3    Associated Diagnoses: Hyperlipidemia, unspecified hyperlipidemia type      gabapentin (NEURONTIN) 300 MG capsule Take 1 capsule by mouth twice daily  Qty: 60 capsule, Refills: 0    Associated Diagnoses: Diabetic polyneuropathy associated with type 2 diabetes mellitus      hydrOXYzine HCL (ATARAX) 10 MG Tab TAKE 1 TABLET BY MOUTH NIGHTLY AS NEEDED FOR ITCHING  Qty: 30 tablet, Refills: 0    Associated Diagnoses: Itching      losartan-hydrochlorothiazide 100-12.5 mg (HYZAAR) 100-12.5 mg Tab Take 1 tablet by mouth once daily.  Qty: 90 tablet, Refills: 3    Comments: .  Associated Diagnoses: Hypertension, essential      metFORMIN (GLUCOPHAGE) 500 MG tablet Take 1 tablet (500 mg total) by mouth daily with breakfast.  Qty: 90 tablet, Refills: 3    Associated Diagnoses: Type 2 diabetes mellitus with other specified complication, without long-term current use of insulin      oxyCODONE-acetaminophen (PERCOCET) 5-325 mg per tablet Take 1 tablet by mouth every 4 (four) hours as needed for  Pain.  Qty: 15 tablet, Refills: 0    Comments: Quantity prescribed more than 7 day supply? No  Associated Diagnoses: Inguinal hernia of left side without obstruction or gangrene      urea (CARMOL) 40 % Crea Apply topically once daily.  Qty: 85 g, Refills: 11                 Discharge Diagnosis: Lumbar spondylosis [M47.816]  Condition on Discharge: Stable with no complications to procedure   Diet on Discharge: Same as before.  Activity: as per instruction sheet.  Discharge to: Home with a responsible adult.  Follow up: 2-4 weeks       Please call my office or pager at [phone] if experienced any weakness or loss of sensation, fever > 101.5, pain uncontrolled with oral medications, persistent nausea/vomiting/or diarrhea, redness or drainage from the incisions, or any other worrisome concerns. If physician on call was not reached or could not communicate with our office for any reason please go to the nearest emergency department

## 2025-06-04 NOTE — DISCHARGE SUMMARY
Discharge Note  Short Stay      SUMMARY     Admit Date: 6/4/2025    Attending Physician: Krystal Mtz MD    Discharge Physician: Krystal Mtz MD      Discharge Date: 6/4/2025 10:23 AM    Procedure(s) (LRB):  BLOCK, NERVE BILATERAL L3, L4, L5 MEDIAL BRANCH 1 OF 2 (Bilateral)    Final Diagnosis:  1. Lumbar spondylosis    2. Chronic pain           Disposition: Home or self care    Patient Instructions:   Current Discharge Medication List        CONTINUE these medications which have NOT CHANGED    Details   acetaminophen (TYLENOL) 500 MG tablet Take 1,000 mg by mouth every 6 (six) hours as needed for Pain.      amLODIPine (NORVASC) 5 MG tablet Take 1 tablet (5 mg total) by mouth once daily.  Qty: 90 tablet, Refills: 3    Comments: .  Associated Diagnoses: Hypertension, essential      aspirin (ECOTRIN) 81 MG EC tablet Take 81 mg by mouth once daily.      atorvastatin (LIPITOR) 20 MG tablet Take 1 tablet (20 mg total) by mouth once daily.  Qty: 90 tablet, Refills: 3    Associated Diagnoses: Hyperlipidemia, unspecified hyperlipidemia type      gabapentin (NEURONTIN) 300 MG capsule Take 1 capsule by mouth twice daily  Qty: 60 capsule, Refills: 0    Associated Diagnoses: Diabetic polyneuropathy associated with type 2 diabetes mellitus      hydrOXYzine HCL (ATARAX) 10 MG Tab TAKE 1 TABLET BY MOUTH NIGHTLY AS NEEDED FOR ITCHING  Qty: 30 tablet, Refills: 0    Associated Diagnoses: Itching      losartan-hydrochlorothiazide 100-12.5 mg (HYZAAR) 100-12.5 mg Tab Take 1 tablet by mouth once daily.  Qty: 90 tablet, Refills: 3    Comments: .  Associated Diagnoses: Hypertension, essential      metFORMIN (GLUCOPHAGE) 500 MG tablet Take 1 tablet (500 mg total) by mouth daily with breakfast.  Qty: 90 tablet, Refills: 3    Associated Diagnoses: Type 2 diabetes mellitus with other specified complication, without long-term current use of insulin      oxyCODONE-acetaminophen (PERCOCET) 5-325 mg per tablet Take 1 tablet by mouth every 4  (four) hours as needed for Pain.  Qty: 15 tablet, Refills: 0    Comments: Quantity prescribed more than 7 day supply? No  Associated Diagnoses: Inguinal hernia of left side without obstruction or gangrene      urea (CARMOL) 40 % Crea Apply topically once daily.  Qty: 85 g, Refills: 11                 Discharge Diagnosis: Same as above  Condition on Discharge: Stable with no complications to procedure   Diet on Discharge: Same as before.  Activity: as per instruction sheet.  Discharge to: Home with a responsible adult.  Follow up: 2-4 weeks       Please call my office or pager at [phone] if experienced any weakness or loss of sensation, fever > 101.5, pain uncontrolled with oral medications, persistent nausea/vomiting/or diarrhea, redness or drainage from the incisions, or any other worrisome concerns. If physician on call was not reached or could not communicate with our office for any reason please go to the nearest emergency department     Cornelia Linares MD

## 2025-06-04 NOTE — OP NOTE
Diagnostic Lumbar Medial Branch Block Under Fluoroscopy    The procedure, risks, benefits, and options were discussed with the patient. There are no contraindications to the procedure. The patent expressed understanding and agreed to the procedure. Informed written consent was obtained prior to the start of the procedure and can be found in the patient's chart.    PATIENT NAME: Renny Young   MRN: [id number]     DATE OF PROCEDURE: 06/04/2025                                           PROCEDURE:  Diagnostic Bilateral L3, L4, and L5 Lumbar Medial Branch Block under Fluoroscopy    PRE-OP DIAGNOSIS: Lumbar spondylosis [M47.816] Lumbar spondylosis [M47.816]    POST-OP DIAGNOSIS: Same    PHYSICIAN: Krystal Mtz MD    ASSISTANTS: Cornelia Linares MD  [facility]      MEDICATIONS INJECTED:  Bupivicaine 0.25%    LOCAL ANESTHETIC INJECTED:   Xylocaine 2%    SEDATION: None    ESTIMATED BLOOD LOSS:  None    COMPLICATIONS:  None.    INTERVAL HISTORY: Patient has clinical and imaging findings suggestive of facet mediated pain. Patient had a previous diagnostic block performed with at least 80% relief in pain and/or at least 50% improvement in the ability to perform previously painful movements and ADLs for the expected duration of the local anesthetic utilized.    TECHNIQUE: Time-out was performed to identify the patient and procedure to be performed. With the patient laying in a prone position, the surgical area was prepped and draped in the usual sterile fashion using ChloraPrep and fenestrated drape. The levels were determined under fluoroscopic guidance. Skin anesthesia was achieved by injecting Lidocaine 2% over the injection sites. A 22 gauge, 3.5 inch needle was introduced into the medial branch nerves at the junctions of the superior articular process and the transverse processes of the targeted sites using AP, lateral and/or contralateral oblique fluoroscopic imaging. After negative aspiration for blood or CSF was  confirmed, 1 mL of the anesthetic listed above was then slowly injected at each site. The needles were removed and bleeding was nil. A sterile dressing was applied. No specimens collected. The patient tolerated the procedure well.    PRE-PROCEDURE PAIN SCORE: 5-8/10    POST-PROCEDURE PAIN SCORE: 0/10    The patient was monitored after the procedure in the recovery area. They were given post-procedure and discharge instructions to follow at home. The patient was discharged in a stable condition.    Cornelia Linares MD     I reviewed and edited the fellow's note. I conducted my own interview and physical examination. I agree with the findings. I was present and supervising all critical portions of the procedure.

## 2025-06-04 NOTE — DISCHARGE INSTRUCTIONS

## 2025-06-05 ENCOUNTER — TELEPHONE (OUTPATIENT)
Dept: PAIN MEDICINE | Facility: CLINIC | Age: 65
End: 2025-06-05
Payer: MEDICAID

## 2025-06-19 ENCOUNTER — HOSPITAL ENCOUNTER (OUTPATIENT)
Facility: OTHER | Age: 65
Discharge: HOME | End: 2025-06-19
Attending: ANESTHESIOLOGY | Admitting: ANESTHESIOLOGY
Payer: MEDICAID

## 2025-06-19 VITALS
SYSTOLIC BLOOD PRESSURE: 165 MMHG | OXYGEN SATURATION: 98 % | HEIGHT: 67 IN | BODY MASS INDEX: 24.33 KG/M2 | WEIGHT: 155 LBS | DIASTOLIC BLOOD PRESSURE: 85 MMHG | HEART RATE: 60 BPM | RESPIRATION RATE: 18 BRPM | TEMPERATURE: 98 F

## 2025-06-19 DIAGNOSIS — M47.816 LUMBAR SPONDYLOSIS: Primary | ICD-10-CM

## 2025-06-19 DIAGNOSIS — G89.29 CHRONIC PAIN: ICD-10-CM

## 2025-06-19 LAB — POCT GLUCOSE: 108 MG/DL (ref 70–110)

## 2025-06-19 PROCEDURE — 63600175 PHARM REV CODE 636 W HCPCS: Performed by: ANESTHESIOLOGY

## 2025-06-19 PROCEDURE — 64493 INJ PARAVERT F JNT L/S 1 LEV: CPT | Mod: 50,KX,, | Performed by: ANESTHESIOLOGY

## 2025-06-19 PROCEDURE — 64494 INJ PARAVERT F JNT L/S 2 LEV: CPT | Mod: 50,KX,, | Performed by: ANESTHESIOLOGY

## 2025-06-19 PROCEDURE — 64493 INJ PARAVERT F JNT L/S 1 LEV: CPT | Mod: 50 | Performed by: ANESTHESIOLOGY

## 2025-06-19 PROCEDURE — 64494 INJ PARAVERT F JNT L/S 2 LEV: CPT | Mod: 50 | Performed by: ANESTHESIOLOGY

## 2025-06-19 RX ORDER — LIDOCAINE HYDROCHLORIDE 20 MG/ML
INJECTION, SOLUTION INFILTRATION; PERINEURAL
Status: DISCONTINUED | OUTPATIENT
Start: 2025-06-19 | End: 2025-06-19 | Stop reason: HOSPADM

## 2025-06-19 RX ORDER — SODIUM CHLORIDE 9 MG/ML
INJECTION, SOLUTION INTRAVENOUS CONTINUOUS
Status: DISCONTINUED | OUTPATIENT
Start: 2025-06-19 | End: 2025-06-19 | Stop reason: HOSPADM

## 2025-06-19 RX ORDER — BUPIVACAINE HYDROCHLORIDE 2.5 MG/ML
INJECTION, SOLUTION EPIDURAL; INFILTRATION; INTRACAUDAL; PERINEURAL
Status: DISCONTINUED | OUTPATIENT
Start: 2025-06-19 | End: 2025-06-19 | Stop reason: HOSPADM

## 2025-06-19 NOTE — H&P
HPI  Patient presenting for Procedure(s) (LRB):  BLOCK, NERVE BILATERAL L3, L4, L5 MEDIAL BRANCH 2 OF 2 (Bilateral)     Patient on Anti-coagulation Yes. ASA 81    No health changes since previous encounter    Past Medical History:   Diagnosis Date    Colon polyp     Diabetes mellitus, type 2     Full dentures     Hyperlipidemia     Hypertension     Pacemaker     Pacemaker at end of battery life 05/06/2020    Smoker 06/10/2015    Offered cessation program and declined    Wears prescription eyeglasses      Past Surgical History:   Procedure Laterality Date    COLONOSCOPY N/A 2/13/2017    Procedure: COLONOSCOPY;  Surgeon: NILDA Juares MD;  Location: [facility] ENDO (4TH FLR);  Service: Endoscopy;  Laterality: N/A;  Do not cancel this order. Patient has Pacemaker in place.     COLONOSCOPY, SCREENING, HIGH RISK PATIENT N/A 1/9/2025    Procedure: COLONOSCOPY, SCREENING, HIGH RISK PATIENT;  Surgeon: Gene Duran MD;  Location: [facility] ENDO (4TH FLR);  Service: Endoscopy;  Laterality: N/A;  ref by / [facility] inst mailed-RB  12/31-pre call complete-tb    EPIDURAL STEROID INJECTION N/A 8/13/2019    Procedure: INJECTION, STEROID, EPIDURAL IL, L5/S1;  Surgeon: Josue Paredes MD;  Location: [facility] PAIN MGT;  Service: Pain Management;  Laterality: N/A;  IL JEAN MARIE L5/S1    EPIDURAL STEROID INJECTION N/A 6/16/2020    Procedure: INJECTION, STEROID, EPIDURAL, L5-S1 IL add on @ 2 ok by  Asia;  Surgeon: Josue Paredes MD;  Location: [facility] PAIN MGT;  Service: Pain Management;  Laterality: N/A;    HERNIA REPAIR      INJECTION OF ANESTHETIC AGENT AROUND NERVE Bilateral 7/5/2018    Procedure: BLOCK, NERVE;  Surgeon: Krystal Mtz MD;  Location: [facility] PAIN MGT;  Service: Pain Management;  Laterality: Bilateral;  Lumbar Bilateral L3-L4-L5 Medial Branch Block    INJECTION OF ANESTHETIC AGENT AROUND NERVE Bilateral 6/4/2025    Procedure: BLOCK, NERVE BILATERAL L3, L4, L5 MEDIAL BRANCH 1 OF 2;  Surgeon: Krystal Mtz MD;  Location: [facility]  PAIN MGT;  Service: Pain Management;  Laterality: Bilateral;    INJECTION, SACROILIAC JOINT Left 1/30/2025    Procedure: INJECTION,SACROILIAC JOINT LEFT;  Surgeon: Krystal Mtz MD;  Location: [facility] PAIN MGT;  Service: Pain Management;  Laterality: Left;  2 WK F/U ROSCOE    RADIOFREQUENCY ABLATION Left 6/11/2019    Procedure: RADIOFREQUENCY ABLATION, L3-L4-L5 MEDIAL BRANCH   1 OF 2;  Surgeon: Josue Paredes MD;  Location: [facility] PAIN MGT;  Service: Pain Management;  Laterality: Left;    RADIOFREQUENCY ABLATION Right 6/25/2019    Procedure: RADIOFREQUENCY ABLATION, L3-L4-L5 MEDIAL BRANCH JING 2 OF 2;  Surgeon: Josue Paredes MD;  Location: [facility] PAIN MGT;  Service: Pain Management;  Laterality: Right;    REPAIR, HERNIA, INGUINAL, WITHOUT HISTORY OF PRIOR REPAIR, AGE 5 YEARS OR OLDER Left 11/18/2024    Procedure: REPAIR, HERNIA, INGUINAL, WITHOUT HISTORY OF PRIOR REPAIR, AGE 5 YEARS OR OLDER;  Surgeon: Titus Craft MD;  Location: [facility] OR;  Service: General;  Laterality: Left;    REPLACEMENT OF PACEMAKER GENERATOR Left 5/6/2020    Procedure: REPLACEMENT, PULSE GENERATOR, CARDIAC PACEMAKER;  Surgeon: Bradford Spence MD;  Location: [facility] EP LAB;  Service: Cardiology;  Laterality: Left;  EOL/LEAD Mlfx, Gen Change, Poss RA lead rev, SJM, MAC, GP, 3 PREP    REVISION OF PROCEDURE INVOLVING PACEMAKER LEAD Left 5/6/2020    Procedure: REVISION, ELECTRODE LEAD, CARDIAC PACEMAKER;  Surgeon: Bradford Spence MD;  Location: [facility] EP LAB;  Service: Cardiology;  Laterality: Left;    TRANSFORAMINAL EPIDURAL INJECTION OF STEROID Right 6/5/2018    Procedure: INJECTION-STEROID-EPIDURAL-TRANSFORAMINAL;  Surgeon: Josue Paredes MD;  Location: [facility] PAIN MGT;  Service: Pain Management;  Laterality: Right;  LUMBAR RIGHT L5 AND S1 TRANSFORAMINL JEAN MARIE  [id number]    W/ SEDATION     TRANSFORAMINAL EPIDURAL INJECTION OF STEROID Right 9/10/2020    Procedure: INJECTION, STEROID, EPIDURAL, TRANSFORAMINAL APPROACH, L5-S1 AND S1;  Surgeon: Josue MATAMOROS  MD Lena;  Location: [facility] PAIN MGT;  Service: Pain Management;  Laterality: Right;    TRANSFORAMINAL EPIDURAL INJECTION OF STEROID Right 6/15/2021    Procedure: INJECTION, STEROID, EPIDURAL, TRANSFORAMINAL APPROACH L5-S1 AND S1 need consent;  Surgeon: Josue Paredes MD;  Location: [facility] PAIN MGT;  Service: Pain Management;  Laterality: Right;    TRANSFORAMINAL EPIDURAL INJECTION OF STEROID Right 6/20/2022    Procedure: INJECTION, STEROID, EPIDURAL, TRANSFORAMINAL APPROACH, RIGHT L5-S1 AND S1 CONTRAST;  Surgeon: Krystal Mtz MD;  Location: [facility] PAIN MGT;  Service: Pain Management;  Laterality: Right;    TRANSFORAMINAL EPIDURAL INJECTION OF STEROID Bilateral 9/26/2022    Procedure: INJECTION, STEROID, EPIDURAL, TRANSFORAMINAL APPROACH, BILATERAL L5-S1 CONTRAST;  Surgeon: Krystal Mtz MD;  Location: [facility] PAIN MGT;  Service: Pain Management;  Laterality: Bilateral;    TRANSFORAMINAL EPIDURAL INJECTION OF STEROID Bilateral 7/13/2023    Procedure: INJECTION, STEROID, EPIDURAL, TRANSFORAMINAL APPROACH, L5-S1 BILATERAL;  Surgeon: Krystal Mtz MD;  Location: [facility] PAIN MGT;  Service: Pain Management;  Laterality: Bilateral;    TRANSFORAMINAL EPIDURAL INJECTION OF STEROID Bilateral 12/23/2024    Procedure: LUMBAR TRANSFORAMINAL BILATERAL L5/S1 *ASPIRIN OTC8 HOLD FOR 5 DAYS *HERNIA SX CLEARANCE IN CHART*;  Surgeon: Krystal Mtz MD;  Location: [facility] PAIN MGT;  Service: Pain Management;  Laterality: Bilateral;    TRANSFORAMINAL EPIDURAL INJECTION OF STEROID Left 4/1/2025    Procedure: LUMBAR TRANSFORAMINAL LEFT L5/S1 AND S1 *ASPIRIN OTC* HOLD FOR 5 DAYS;  Surgeon: Krystal Mtz MD;  Location: [facility] PAIN MGT;  Service: Pain Management;  Laterality: Left;  2 WK F/U VENICE     Review of patient's allergies indicates:  No Known Allergies   No current facility-administered medications for this encounter.       PMHx, PSHx, Allergies, Medications reviewed in epic    ROS negative except pain complaints in HPI    OBJECTIVE:    There  were no vitals taken for this visit.    PHYSICAL EXAMINATION:    GENERAL: Well appearing, in no acute distress, alert and oriented x3.  PSYCH:  Mood and affect appropriate.  SKIN: Skin color, texture, turgor normal, no rashes or lesions which will impact the procedure.  CV: RRR with palpation of the radial artery.  PULM: No evidence of respiratory difficulty, symmetric chest rise. Clear to auscultation.  NEURO: Cranial nerves grossly intact.    Plan:    Proceed with procedure as planned Procedure(s) (LRB):  BLOCK, NERVE BILATERAL L3, L4, L5 MEDIAL BRANCH 2 OF 2 (Bilateral)    Yaya Hanks  06/19/2025

## 2025-06-19 NOTE — DISCHARGE SUMMARY
Discharge Note  Short Stay      SUMMARY     Admit Date: 6/19/2025    Attending Physician: Yaya Hanks      Discharge Physician: Yaya Hanks      Discharge Date: 6/19/2025 11:45 AM    Procedure(s) (LRB):  BLOCK, NERVE BILATERAL L3, L4, L5 MEDIAL BRANCH 2 OF 2 (Bilateral)    Final Diagnosis: Lumbar spondylosis [M47.816]    Disposition: Home or self care    Patient Instructions:   Current Discharge Medication List        CONTINUE these medications which have NOT CHANGED    Details   acetaminophen (TYLENOL) 500 MG tablet Take 1,000 mg by mouth every 6 (six) hours as needed for Pain.      amLODIPine (NORVASC) 5 MG tablet Take 1 tablet (5 mg total) by mouth once daily.  Qty: 90 tablet, Refills: 3    Comments: .  Associated Diagnoses: Hypertension, essential      aspirin (ECOTRIN) 81 MG EC tablet Take 81 mg by mouth once daily.      atorvastatin (LIPITOR) 20 MG tablet Take 1 tablet (20 mg total) by mouth once daily.  Qty: 90 tablet, Refills: 3    Associated Diagnoses: Hyperlipidemia, unspecified hyperlipidemia type      gabapentin (NEURONTIN) 300 MG capsule Take 1 capsule by mouth twice daily  Qty: 60 capsule, Refills: 0    Associated Diagnoses: Diabetic polyneuropathy associated with type 2 diabetes mellitus      hydrOXYzine HCL (ATARAX) 10 MG Tab TAKE 1 TABLET BY MOUTH NIGHTLY AS NEEDED FOR ITCHING  Qty: 30 tablet, Refills: 0    Associated Diagnoses: Itching      losartan-hydrochlorothiazide 100-12.5 mg (HYZAAR) 100-12.5 mg Tab Take 1 tablet by mouth once daily.  Qty: 90 tablet, Refills: 3    Comments: .  Associated Diagnoses: Hypertension, essential      metFORMIN (GLUCOPHAGE) 500 MG tablet Take 1 tablet (500 mg total) by mouth daily with breakfast.  Qty: 90 tablet, Refills: 3    Associated Diagnoses: Type 2 diabetes mellitus with other specified complication, without long-term current use of insulin      oxyCODONE-acetaminophen (PERCOCET) 5-325 mg per tablet Take 1 tablet by mouth every 4 (four) hours as  needed for Pain.  Qty: 15 tablet, Refills: 0    Comments: Quantity prescribed more than 7 day supply? No  Associated Diagnoses: Inguinal hernia of left side without obstruction or gangrene      urea (CARMOL) 40 % Crea Apply topically once daily.  Qty: 85 g, Refills: 11                 Discharge Diagnosis: Lumbar spondylosis [M47.816]  Condition on Discharge: Stable with no complications to procedure   Diet on Discharge: Same as before.  Activity: as per instruction sheet.  Discharge to: Home with a responsible adult.  Follow up: 2-4 weeks       Please call my office or pager at [phone] if experienced any weakness or loss of sensation, fever > 101.5, pain uncontrolled with oral medications, persistent nausea/vomiting/or diarrhea, redness or drainage from the incisions, or any other worrisome concerns. If physician on call was not reached or could not communicate with our office for any reason please go to the nearest emergency department

## 2025-06-19 NOTE — OP NOTE
Diagnostic Lumbar Medial Branch Block Under Fluoroscopy    The procedure, risks, benefits, and options were discussed with the patient. There are no contraindications to the procedure. The patent expressed understanding and agreed to the procedure. Informed written consent was obtained prior to the start of the procedure and can be found in the patient's chart.    PATIENT NAME: Renny Young   MRN: [id number]     DATE OF PROCEDURE: 06/19/2025                                           PROCEDURE:  Diagnostic Bilateral L3, L4, and L5 Lumbar Medial Branch Block under Fluoroscopy    PRE-OP DIAGNOSIS: Lumbar spondylosis [M47.816] Lumbar spondylosis [M47.816]    POST-OP DIAGNOSIS: Same    PHYSICIAN: Krystal Mtz MD    ASSISTANTS: Dr. Ephraim Sutton MD Pain Fellow    MEDICATIONS INJECTED:  Bupivicaine 0.25%    LOCAL ANESTHETIC INJECTED:   Xylocaine 2%    SEDATION: None    ESTIMATED BLOOD LOSS:  None    COMPLICATIONS:  None.    INTERVAL HISTORY: Patient has clinical and imaging findings suggestive of facet mediated pain. Patient had a previous diagnostic block performed with at least 80% relief in pain and/or at least 50% improvement in the ability to perform previously painful movements and ADLs for the expected duration of the local anesthetic utilized.    TECHNIQUE: Time-out was performed to identify the patient and procedure to be performed. With the patient laying in a prone position, the surgical area was prepped and draped in the usual sterile fashion using ChloraPrep and fenestrated drape. The levels were determined under fluoroscopic guidance. Skin anesthesia was achieved by injecting Lidocaine 2% over the injection sites. A 22 gauge, 3.5 inch needle was introduced into the medial branch nerves at the junctions of the superior articular process and the transverse processes of the targeted sites using AP, lateral and/or contralateral oblique fluoroscopic imaging. After negative aspiration for blood or CSF was confirmed,  1 mL of the anesthetic listed above was then slowly injected at each site. The needles were removed and bleeding was nil. A sterile dressing was applied. No specimens collected. The patient tolerated the procedure well.    PRE-PROCEDURE PAIN SCORE: 8-9/10    POST-PROCEDURE PAIN SCORE: 5/10    The patient was monitored after the procedure in the recovery area. They were given post-procedure and discharge instructions to follow at home. The patient was discharged in a stable condition.    Yaya Hanks MD     I reviewed and edited the fellow's note. I conducted my own interview and physical examination. I agree with the findings. I was present and supervising all critical portions of the procedure.

## 2025-06-19 NOTE — DISCHARGE INSTRUCTIONS

## 2025-07-18 DIAGNOSIS — E11.42 DIABETIC POLYNEUROPATHY ASSOCIATED WITH TYPE 2 DIABETES MELLITUS: ICD-10-CM

## 2025-07-18 RX ORDER — GABAPENTIN 300 MG/1
300 CAPSULE ORAL 2 TIMES DAILY
Qty: 60 CAPSULE | Refills: 0 | Status: SHIPPED | OUTPATIENT
Start: 2025-07-18

## 2025-08-05 ENCOUNTER — PATIENT MESSAGE (OUTPATIENT)
Dept: ADMINISTRATIVE | Facility: HOSPITAL | Age: 65
End: 2025-08-05
Payer: MEDICAID

## 2025-08-06 ENCOUNTER — TELEPHONE (OUTPATIENT)
Dept: PAIN MEDICINE | Facility: CLINIC | Age: 65
End: 2025-08-06
Payer: MEDICAID

## 2025-08-06 DIAGNOSIS — M47.816 LUMBAR SPONDYLOSIS: Primary | ICD-10-CM

## 2025-08-06 DIAGNOSIS — M47.817 FACET ARTHROPATHY, LUMBOSACRAL: ICD-10-CM

## 2025-08-06 DIAGNOSIS — M47.819 FACET ARTHROPATHY: ICD-10-CM

## 2025-08-06 NOTE — TELEPHONE ENCOUNTER
Medial Branch Block Diary from 06.19.25  lumbar    2    Pre procedure pain level: 7  Percentage of relief within 24 hours of procedure: 80%  Post procedure level: 2  Current pain level: 8  Any Improvements in ADL: bathing, dressing, eating, transferring, using toilet, and walking

## 2025-08-06 NOTE — ADDENDUM NOTE
Addended by: MARIN ARTIS on: 8/6/2025 01:27 PM     Modules accepted: Orders     Will wait for patient to call back

## 2025-08-13 ENCOUNTER — CLINICAL SUPPORT (OUTPATIENT)
Dept: CARDIOLOGY | Facility: HOSPITAL | Age: 65
End: 2025-08-13
Attending: INTERNAL MEDICINE
Payer: MEDICAID

## 2025-08-13 DIAGNOSIS — I49.5 SICK SINUS SYNDROME: Primary | ICD-10-CM

## 2025-08-13 DIAGNOSIS — I48.0 PAROXYSMAL ATRIAL FIBRILLATION: ICD-10-CM

## 2025-08-13 DIAGNOSIS — Z95.0 CARDIAC PACEMAKER IN SITU: ICD-10-CM

## 2025-08-13 PROCEDURE — 93280 PM DEVICE PROGR EVAL DUAL: CPT

## 2025-08-20 ENCOUNTER — HOSPITAL ENCOUNTER (OUTPATIENT)
Facility: OTHER | Age: 65
Discharge: HOME | End: 2025-08-20
Attending: ANESTHESIOLOGY | Admitting: ANESTHESIOLOGY
Payer: MEDICAID

## 2025-08-20 VITALS
SYSTOLIC BLOOD PRESSURE: 134 MMHG | WEIGHT: 155 LBS | BODY MASS INDEX: 24.33 KG/M2 | HEIGHT: 67 IN | RESPIRATION RATE: 16 BRPM | TEMPERATURE: 98 F | OXYGEN SATURATION: 96 % | HEART RATE: 60 BPM | DIASTOLIC BLOOD PRESSURE: 79 MMHG

## 2025-08-20 DIAGNOSIS — G89.29 CHRONIC PAIN: ICD-10-CM

## 2025-08-20 DIAGNOSIS — M47.816 LUMBAR SPONDYLOSIS: Primary | ICD-10-CM

## 2025-08-20 LAB — POCT GLUCOSE: 84 MG/DL (ref 70–110)

## 2025-08-20 PROCEDURE — 64636 DESTROY L/S FACET JNT ADDL: CPT | Mod: 50,,, | Performed by: ANESTHESIOLOGY

## 2025-08-20 PROCEDURE — 99152 MOD SED SAME PHYS/QHP 5/>YRS: CPT | Performed by: ANESTHESIOLOGY

## 2025-08-20 PROCEDURE — 63600175 PHARM REV CODE 636 W HCPCS: Performed by: ANESTHESIOLOGY

## 2025-08-20 PROCEDURE — 64636 DESTROY L/S FACET JNT ADDL: CPT | Mod: 50 | Performed by: ANESTHESIOLOGY

## 2025-08-20 PROCEDURE — 64635 DESTROY LUMB/SAC FACET JNT: CPT | Mod: 50,,, | Performed by: ANESTHESIOLOGY

## 2025-08-20 PROCEDURE — 64635 DESTROY LUMB/SAC FACET JNT: CPT | Mod: 50 | Performed by: ANESTHESIOLOGY

## 2025-08-20 PROCEDURE — 99152 MOD SED SAME PHYS/QHP 5/>YRS: CPT | Mod: ,,, | Performed by: ANESTHESIOLOGY

## 2025-08-20 RX ORDER — FENTANYL CITRATE 50 UG/ML
INJECTION, SOLUTION INTRAMUSCULAR; INTRAVENOUS
Status: DISCONTINUED | OUTPATIENT
Start: 2025-08-20 | End: 2025-08-20 | Stop reason: HOSPADM

## 2025-08-20 RX ORDER — LIDOCAINE HYDROCHLORIDE 20 MG/ML
INJECTION, SOLUTION INFILTRATION; PERINEURAL
Status: DISCONTINUED | OUTPATIENT
Start: 2025-08-20 | End: 2025-08-20 | Stop reason: HOSPADM

## 2025-08-20 RX ORDER — SODIUM CHLORIDE 9 MG/ML
INJECTION, SOLUTION INTRAVENOUS CONTINUOUS
Status: DISCONTINUED | OUTPATIENT
Start: 2025-08-20 | End: 2025-08-20 | Stop reason: HOSPADM

## 2025-08-20 RX ORDER — DEXAMETHASONE SODIUM PHOSPHATE 10 MG/ML
INJECTION, SOLUTION INTRA-ARTICULAR; INTRALESIONAL; INTRAMUSCULAR; INTRAVENOUS; SOFT TISSUE
Status: DISCONTINUED | OUTPATIENT
Start: 2025-08-20 | End: 2025-08-20 | Stop reason: HOSPADM

## 2025-08-20 RX ORDER — MIDAZOLAM HYDROCHLORIDE 1 MG/ML
INJECTION INTRAMUSCULAR; INTRAVENOUS
Status: DISCONTINUED | OUTPATIENT
Start: 2025-08-20 | End: 2025-08-20 | Stop reason: HOSPADM

## 2025-08-20 RX ORDER — BUPIVACAINE HYDROCHLORIDE 2.5 MG/ML
INJECTION, SOLUTION EPIDURAL; INFILTRATION; INTRACAUDAL; PERINEURAL
Status: DISCONTINUED | OUTPATIENT
Start: 2025-08-20 | End: 2025-08-20 | Stop reason: HOSPADM

## (undated) DEVICE — Device

## (undated) DEVICE — PAD DEFIB CADENCE ADULT R2

## (undated) DEVICE — DRAPE LAP T SHT W/ INSTR PAD

## (undated) DEVICE — DRESSING LEUKOPLAST FLEX 1X3IN

## (undated) DEVICE — DRAPE INCISE IOBAN 2 23X17IN

## (undated) DEVICE — TOWEL OR DISP STRL BLUE 4/PK

## (undated) DEVICE — NDL HYPO REG 25G X 1 1/2

## (undated) DEVICE — ELECTRODE BLD EXT INSUL 1

## (undated) DEVICE — ELECTRODE BLADE TEFLON 6

## (undated) DEVICE — BANDAGE ADHESIVE

## (undated) DEVICE — ADHESIVE DERMABOND ADVANCED

## (undated) DEVICE — SUT VICRYL CTD 2-0 GI 27 SH

## (undated) DEVICE — TIP YANKAUERS BULB NO VENT

## (undated) DEVICE — GLOVE SENSICARE PI SURG 7.5

## (undated) DEVICE — ELECTRODE REM PLYHSV RETURN 9

## (undated) DEVICE — DRAPE UTILITY STRL 15X26IN

## (undated) DEVICE — COVER PROBE ADHESIVE 8X72IN

## (undated) DEVICE — DRESSING AQUACEL AG ADV 3.5X6

## (undated) DEVICE — SYS CLSR DERMABOND PRINEO 22CM

## (undated) DEVICE — SUT VICRYL PLUS 3-0 SH 18IN

## (undated) DEVICE — SUT STRATAFIX PGAPCL 3 FS-2

## (undated) DEVICE — DRAIN PENRS SIL STRL .25X18IN

## (undated) DEVICE — GAUZE SPONGE 4X4 12PLY

## (undated) DEVICE — PACEMAKER SHEET

## (undated) DEVICE — NDL ECLIPSE SAFETY 23G 1.5IN

## (undated) DEVICE — SYR B-D DISP CONTROL 10CC100/C

## (undated) DEVICE — PENCIL ELECTROSURG HOLST W/BLD

## (undated) DEVICE — SPONGE KITTNER 1/4X 5/8 L STRL

## (undated) DEVICE — APPLICATOR CHLORAPREP ORN 26ML

## (undated) DEVICE — SUT PROLENE 2-0 SH 36IN BLU

## (undated) DEVICE — GOWN ORBIS LVL 4 XXL 49IN

## (undated) DEVICE — SPONGE LAP 18X18 PREWASHED

## (undated) DEVICE — PACK PACER PERMANENT

## (undated) DEVICE — BANDAGE ELASTOPLAST 3INX5YDS